# Patient Record
Sex: MALE | Race: BLACK OR AFRICAN AMERICAN | NOT HISPANIC OR LATINO
[De-identification: names, ages, dates, MRNs, and addresses within clinical notes are randomized per-mention and may not be internally consistent; named-entity substitution may affect disease eponyms.]

---

## 2017-12-19 ENCOUNTER — APPOINTMENT (OUTPATIENT)
Dept: INFECTIOUS DISEASE | Facility: CLINIC | Age: 27
End: 2017-12-19
Payer: MEDICAID

## 2017-12-19 ENCOUNTER — LABORATORY RESULT (OUTPATIENT)
Age: 27
End: 2017-12-19

## 2017-12-19 VITALS
OXYGEN SATURATION: 97 % | SYSTOLIC BLOOD PRESSURE: 126 MMHG | HEART RATE: 83 BPM | WEIGHT: 212 LBS | TEMPERATURE: 97.8 F | DIASTOLIC BLOOD PRESSURE: 80 MMHG | HEIGHT: 78 IN | BODY MASS INDEX: 24.53 KG/M2

## 2017-12-19 DIAGNOSIS — F17.200 NICOTINE DEPENDENCE, UNSPECIFIED, UNCOMPLICATED: ICD-10-CM

## 2017-12-19 DIAGNOSIS — H57.9 UNSPECIFIED DISORDER OF EYE AND ADNEXA: ICD-10-CM

## 2017-12-19 DIAGNOSIS — F32.9 MAJOR DEPRESSIVE DISORDER, SINGLE EPISODE, UNSPECIFIED: ICD-10-CM

## 2017-12-19 PROCEDURE — 99204 OFFICE O/P NEW MOD 45 MIN: CPT

## 2017-12-20 LAB
25(OH)D3 SERPL-MCNC: 10 NG/ML
ALBUMIN SERPL ELPH-MCNC: 3.8 G/DL
ALP BLD-CCNC: 107 U/L
ALT SERPL-CCNC: 18 U/L
ANION GAP SERPL CALC-SCNC: 15 MMOL/L
APPEARANCE: CLEAR
AST SERPL-CCNC: 29 U/L
BACTERIA: NEGATIVE
BASOPHILS # BLD AUTO: 0 K/UL
BASOPHILS NFR BLD AUTO: 0 %
BILIRUB SERPL-MCNC: 0.3 MG/DL
BILIRUBIN URINE: ABNORMAL
BLOOD URINE: NEGATIVE
BUN SERPL-MCNC: 6 MG/DL
C TRACH RRNA SPEC QL NAA+PROBE: NOT DETECTED
CALCIUM SERPL-MCNC: 8.7 MG/DL
CD3 CELLS # BLD: 792 /UL
CD3 CELLS NFR BLD: 77 %
CD3+CD4+ CELLS # BLD: 199 /UL
CD3+CD4+ CELLS NFR BLD: 19 %
CD3+CD4+ CELLS/CD3+CD8+ CLL SPEC: 0.36 RATIO
CD3+CD8+ CELLS # SPEC: 550 /UL
CD3+CD8+ CELLS NFR BLD: 53 %
CHLORIDE SERPL-SCNC: 104 MMOL/L
CO2 SERPL-SCNC: 24 MMOL/L
COLOR: ABNORMAL
CREAT SERPL-MCNC: 0.91 MG/DL
EOSINOPHIL # BLD AUTO: 0 K/UL
EOSINOPHIL NFR BLD AUTO: 0
GLUCOSE QUALITATIVE U: NEGATIVE MG/DL
GLUCOSE SERPL-MCNC: 80 MG/DL
HAV IGG+IGM SER QL: REACTIVE
HBV CORE IGG+IGM SER QL: NONREACTIVE
HBV SURFACE AB SER QL: NONREACTIVE
HBV SURFACE AG SER QL: NONREACTIVE
HCT VFR BLD CALC: 43 %
HGB BLD-MCNC: 13.2 G/DL
HIV1+2 AB SPEC QL IA.RAPID: REACTIVE
HYALINE CASTS: 0 /LPF
KETONES URINE: ABNORMAL
LEUKOCYTE ESTERASE URINE: NEGATIVE
LYMPHOCYTES # BLD AUTO: 0.64 K/UL
LYMPHOCYTES NFR BLD AUTO: 24.8 %
MAN DIFF?: NORMAL
MCHC RBC-ENTMCNC: 26.6 PG
MCHC RBC-ENTMCNC: 30.7 GM/DL
MCV RBC AUTO: 86.7 FL
MICROSCOPIC-UA: NORMAL
MONOCYTES # BLD AUTO: 0.27 K/UL
MONOCYTES NFR BLD AUTO: 10.6 %
N GONORRHOEA RRNA SPEC QL NAA+PROBE: NOT DETECTED
NEUTROPHILS # BLD AUTO: 1.55 K/UL
NEUTROPHILS NFR BLD AUTO: 60.2 %
NITRITE URINE: NEGATIVE
PH URINE: 6.5
PLATELET # BLD AUTO: 148 K/UL
POTASSIUM SERPL-SCNC: 3.8 MMOL/L
PROT SERPL-MCNC: 8.2 G/DL
PROTEIN URINE: ABNORMAL MG/DL
RBC # BLD: 4.96 M/UL
RBC # FLD: 14.9 %
RED BLOOD CELLS URINE: 2 /HPF
SODIUM SERPL-SCNC: 143 MMOL/L
SOURCE AMPLIFICATION: NORMAL
SPECIFIC GRAVITY URINE: 1.03
SQUAMOUS EPITHELIAL CELLS: 1 /HPF
T PALLIDUM AB SER QL IA: NEGATIVE
UROBILINOGEN URINE: 1 MG/DL
WBC # FLD AUTO: 2.57 K/UL
WHITE BLOOD CELLS URINE: 4 /HPF

## 2017-12-21 LAB
ADJUSTED MITOGEN: 1.56 IU/ML
ADJUSTED TB AG: 0 IU/ML
HIV1 RNA # SERPL NAA+PROBE: ABNORMAL
HIV1 RNA # SERPL NAA+PROBE: NORMAL
M TB IFN-G BLD-IMP: NEGATIVE
QUANTIFERON GOLD NIL: 0.02 IU/ML
VIRAL LOAD INTERP: NORMAL
VIRAL LOAD LOG: 5.01

## 2017-12-22 ENCOUNTER — APPOINTMENT (OUTPATIENT)
Dept: INFECTIOUS DISEASE | Facility: CLINIC | Age: 27
End: 2017-12-22

## 2018-01-10 ENCOUNTER — APPOINTMENT (OUTPATIENT)
Dept: INFECTIOUS DISEASE | Facility: CLINIC | Age: 28
End: 2018-01-10
Payer: MEDICAID

## 2018-01-10 VITALS
HEIGHT: 78 IN | DIASTOLIC BLOOD PRESSURE: 79 MMHG | OXYGEN SATURATION: 97 % | HEART RATE: 77 BPM | BODY MASS INDEX: 24.3 KG/M2 | WEIGHT: 210 LBS | TEMPERATURE: 97.2 F | SYSTOLIC BLOOD PRESSURE: 128 MMHG

## 2018-01-10 PROCEDURE — 99214 OFFICE O/P EST MOD 30 MIN: CPT | Mod: 25

## 2018-01-10 PROCEDURE — 90740 HEPB VACC 3 DOSE IMMUNSUP IM: CPT

## 2018-01-10 PROCEDURE — G0010: CPT

## 2018-01-11 LAB
ALBUMIN SERPL ELPH-MCNC: 4 G/DL
ALP BLD-CCNC: 110 U/L
ALT SERPL-CCNC: 10 U/L
ANION GAP SERPL CALC-SCNC: 13 MMOL/L
AST SERPL-CCNC: 19 U/L
BASOPHILS # BLD AUTO: 0.01 K/UL
BASOPHILS NFR BLD AUTO: 0.3 %
BILIRUB SERPL-MCNC: 0.3 MG/DL
BUN SERPL-MCNC: 12 MG/DL
CALCIUM SERPL-MCNC: 9.3 MG/DL
CHLORIDE SERPL-SCNC: 104 MMOL/L
CO2 SERPL-SCNC: 26 MMOL/L
CREAT SERPL-MCNC: 1.19 MG/DL
EOSINOPHIL # BLD AUTO: 0.04 K/UL
EOSINOPHIL NFR BLD AUTO: 1.2 %
GLUCOSE SERPL-MCNC: 98 MG/DL
HCT VFR BLD CALC: 45.1 %
HGB BLD-MCNC: 13.8 G/DL
IMM GRANULOCYTES NFR BLD AUTO: 0.6 %
LYMPHOCYTES # BLD AUTO: 1.3 K/UL
LYMPHOCYTES NFR BLD AUTO: 39 %
MAN DIFF?: NORMAL
MCHC RBC-ENTMCNC: 27.2 PG
MCHC RBC-ENTMCNC: 30.6 GM/DL
MCV RBC AUTO: 89 FL
MONOCYTES # BLD AUTO: 0.48 K/UL
MONOCYTES NFR BLD AUTO: 14.4 %
NEUTROPHILS # BLD AUTO: 1.48 K/UL
NEUTROPHILS NFR BLD AUTO: 44.5 %
PLATELET # BLD AUTO: 172 K/UL
POTASSIUM SERPL-SCNC: 4.1 MMOL/L
PROT SERPL-MCNC: 7.7 G/DL
RBC # BLD: 5.07 M/UL
RBC # FLD: 15.5 %
SODIUM SERPL-SCNC: 143 MMOL/L
WBC # FLD AUTO: 3.33 K/UL

## 2018-01-12 LAB
CD3 CELLS # BLD: 1093 /UL
CD3 CELLS NFR BLD: 74 %
CD3+CD4+ CELLS # BLD: 342 /UL
CD3+CD4+ CELLS NFR BLD: 23 %
CD3+CD4+ CELLS/CD3+CD8+ CLL SPEC: 0.5 RATIO
CD3+CD8+ CELLS # SPEC: 682 /UL
CD3+CD8+ CELLS NFR BLD: 46 %
HIV1 RNA # SERPL NAA+PROBE: 401
HIV1 RNA # SERPL NAA+PROBE: ABNORMAL
VIRAL LOAD INTERP: NORMAL
VIRAL LOAD LOG: 2.6

## 2018-01-16 ENCOUNTER — APPOINTMENT (OUTPATIENT)
Dept: INFECTIOUS DISEASE | Facility: CLINIC | Age: 28
End: 2018-01-16

## 2018-01-17 LAB
HIV 1 PROVIRAL DNA GENTYP BLD MC DOC: NORMAL
HIV GENOSURE ARCHIVE 1: NORMAL
HIV1 PROVIR DNA RT + PR + IN MUT DET SEQ: NORMAL
HIV1 PROVIRAL DNA GENTYP BLD MC NAR: NORMAL

## 2018-02-12 ENCOUNTER — APPOINTMENT (OUTPATIENT)
Dept: INFECTIOUS DISEASE | Facility: CLINIC | Age: 28
End: 2018-02-12

## 2018-02-14 ENCOUNTER — APPOINTMENT (OUTPATIENT)
Dept: INFECTIOUS DISEASE | Facility: CLINIC | Age: 28
End: 2018-02-14
Payer: MEDICAID

## 2018-02-14 VITALS
OXYGEN SATURATION: 98 % | WEIGHT: 210 LBS | HEART RATE: 82 BPM | BODY MASS INDEX: 24.3 KG/M2 | HEIGHT: 78 IN | TEMPERATURE: 97.6 F | SYSTOLIC BLOOD PRESSURE: 121 MMHG | DIASTOLIC BLOOD PRESSURE: 78 MMHG

## 2018-02-14 PROCEDURE — 99214 OFFICE O/P EST MOD 30 MIN: CPT

## 2018-02-15 ENCOUNTER — MED ADMIN CHARGE (OUTPATIENT)
Age: 28
End: 2018-02-15

## 2018-02-15 LAB
ALBUMIN SERPL ELPH-MCNC: 3.9 G/DL
ALP BLD-CCNC: 106 U/L
ALT SERPL-CCNC: 16 U/L
ANION GAP SERPL CALC-SCNC: 14 MMOL/L
AST SERPL-CCNC: 30 U/L
BASOPHILS # BLD AUTO: 0.01 K/UL
BASOPHILS NFR BLD AUTO: 0.2 %
BILIRUB SERPL-MCNC: 0.4 MG/DL
BUN SERPL-MCNC: 14 MG/DL
CALCIUM SERPL-MCNC: 9.5 MG/DL
CHLORIDE SERPL-SCNC: 102 MMOL/L
CO2 SERPL-SCNC: 25 MMOL/L
CREAT SERPL-MCNC: 1.06 MG/DL
EOSINOPHIL # BLD AUTO: 0.02 K/UL
EOSINOPHIL NFR BLD AUTO: 0.5 %
GLUCOSE SERPL-MCNC: 82 MG/DL
HCT VFR BLD CALC: 44.8 %
HGB BLD-MCNC: 13.7 G/DL
HIV1 RNA # SERPL NAA+PROBE: ABNORMAL
HIV1 RNA # SERPL NAA+PROBE: ABNORMAL COPIES/ML
IMM GRANULOCYTES NFR BLD AUTO: 0 %
LYMPHOCYTES # BLD AUTO: 1.59 K/UL
LYMPHOCYTES NFR BLD AUTO: 36.6 %
MAN DIFF?: NORMAL
MCHC RBC-ENTMCNC: 27.1 PG
MCHC RBC-ENTMCNC: 30.6 GM/DL
MCV RBC AUTO: 88.7 FL
MONOCYTES # BLD AUTO: 0.55 K/UL
MONOCYTES NFR BLD AUTO: 12.6 %
NEUTROPHILS # BLD AUTO: 2.18 K/UL
NEUTROPHILS NFR BLD AUTO: 50.1 %
PLATELET # BLD AUTO: 217 K/UL
POTASSIUM SERPL-SCNC: 4.2 MMOL/L
PROT SERPL-MCNC: 8 G/DL
RBC # BLD: 5.05 M/UL
RBC # FLD: 14.9 %
SODIUM SERPL-SCNC: 141 MMOL/L
VIRAL LOAD INTERP: NORMAL
VIRAL LOAD LOG: ABNORMAL LG COP/ML
WBC # FLD AUTO: 4.35 K/UL

## 2018-04-16 ENCOUNTER — APPOINTMENT (OUTPATIENT)
Dept: INFECTIOUS DISEASE | Facility: CLINIC | Age: 28
End: 2018-04-16

## 2018-04-17 ENCOUNTER — APPOINTMENT (OUTPATIENT)
Dept: INFECTIOUS DISEASE | Facility: CLINIC | Age: 28
End: 2018-04-17
Payer: MEDICAID

## 2018-04-17 VITALS
DIASTOLIC BLOOD PRESSURE: 75 MMHG | HEIGHT: 78 IN | WEIGHT: 217 LBS | BODY MASS INDEX: 25.11 KG/M2 | OXYGEN SATURATION: 98 % | SYSTOLIC BLOOD PRESSURE: 116 MMHG | HEART RATE: 72 BPM | TEMPERATURE: 97.7 F

## 2018-04-17 DIAGNOSIS — H61.23 IMPACTED CERUMEN, BILATERAL: ICD-10-CM

## 2018-04-17 PROCEDURE — 99214 OFFICE O/P EST MOD 30 MIN: CPT

## 2018-04-18 LAB
ALBUMIN SERPL ELPH-MCNC: 3.8 G/DL
ALP BLD-CCNC: 110 U/L
ALT SERPL-CCNC: 23 U/L
ANION GAP SERPL CALC-SCNC: 11 MMOL/L
APPEARANCE: CLEAR
AST SERPL-CCNC: 27 U/L
BACTERIA: NEGATIVE
BASOPHILS # BLD AUTO: 0.03 K/UL
BASOPHILS NFR BLD AUTO: 0.8 %
BILIRUB SERPL-MCNC: 0.2 MG/DL
BILIRUBIN URINE: NEGATIVE
BLOOD URINE: NEGATIVE
BUN SERPL-MCNC: 13 MG/DL
C TRACH RRNA SPEC QL NAA+PROBE: NOT DETECTED
CALCIUM SERPL-MCNC: 9.5 MG/DL
CD3 CELLS # BLD: 1231 /UL
CD3 CELLS NFR BLD: 74 %
CD3+CD4+ CELLS # BLD: 444 /UL
CD3+CD4+ CELLS NFR BLD: 27 %
CD3+CD4+ CELLS/CD3+CD8+ CLL SPEC: 0.6 RATIO
CD3+CD8+ CELLS # SPEC: 734 /UL
CD3+CD8+ CELLS NFR BLD: 44 %
CHLORIDE SERPL-SCNC: 106 MMOL/L
CO2 SERPL-SCNC: 25 MMOL/L
COLOR: YELLOW
CREAT SERPL-MCNC: 1.13 MG/DL
EOSINOPHIL # BLD AUTO: 0.04 K/UL
EOSINOPHIL NFR BLD AUTO: 1 %
GLUCOSE QUALITATIVE U: NEGATIVE MG/DL
GLUCOSE SERPL-MCNC: 91 MG/DL
HBA1C MFR BLD HPLC: 5.2 %
HBV SURFACE AG SER QL: NONREACTIVE
HCT VFR BLD CALC: 45.8 %
HCV AB SER QL: NONREACTIVE
HCV S/CO RATIO: 0.14 S/CO
HGB BLD-MCNC: 14.2 G/DL
HIV1 RNA # SERPL NAA+PROBE: ABNORMAL
HIV1 RNA # SERPL NAA+PROBE: ABNORMAL COPIES/ML
HYALINE CASTS: 2 /LPF
IMM GRANULOCYTES NFR BLD AUTO: 0.5 %
KETONES URINE: NEGATIVE
LEUKOCYTE ESTERASE URINE: NEGATIVE
LYMPHOCYTES # BLD AUTO: 1.62 K/UL
LYMPHOCYTES NFR BLD AUTO: 41.9 %
MAN DIFF?: NORMAL
MCHC RBC-ENTMCNC: 27.2 PG
MCHC RBC-ENTMCNC: 31 GM/DL
MCV RBC AUTO: 87.6 FL
MICROSCOPIC-UA: NORMAL
MONOCYTES # BLD AUTO: 0.42 K/UL
MONOCYTES NFR BLD AUTO: 10.9 %
N GONORRHOEA RRNA SPEC QL NAA+PROBE: NOT DETECTED
NEUTROPHILS # BLD AUTO: 1.74 K/UL
NEUTROPHILS NFR BLD AUTO: 44.9 %
NITRITE URINE: NEGATIVE
PH URINE: 5.5
PLATELET # BLD AUTO: 200 K/UL
POTASSIUM SERPL-SCNC: 4.4 MMOL/L
PROT SERPL-MCNC: 7.8 G/DL
PROTEIN URINE: NEGATIVE MG/DL
RBC # BLD: 5.23 M/UL
RBC # FLD: 14.7 %
RED BLOOD CELLS URINE: 2 /HPF
SODIUM SERPL-SCNC: 142 MMOL/L
SOURCE AMPLIFICATION: NORMAL
SPECIFIC GRAVITY URINE: 1.03
SQUAMOUS EPITHELIAL CELLS: 1 /HPF
T PALLIDUM AB SER QL IA: NEGATIVE
UROBILINOGEN URINE: NEGATIVE MG/DL
VIRAL LOAD INTERP: NORMAL
VIRAL LOAD LOG: ABNORMAL LG COP/ML
WBC # FLD AUTO: 3.87 K/UL
WHITE BLOOD CELLS URINE: 1 /HPF

## 2018-04-18 RX ORDER — AMOXICILLIN AND CLAVULANATE POTASSIUM 875; 125 MG/1; MG/1
875-125 TABLET, COATED ORAL
Qty: 20 | Refills: 0 | Status: COMPLETED | COMMUNITY
Start: 2017-12-19 | End: 2018-04-18

## 2018-06-13 ENCOUNTER — RX RENEWAL (OUTPATIENT)
Age: 28
End: 2018-06-13

## 2018-07-11 ENCOUNTER — APPOINTMENT (OUTPATIENT)
Dept: INFECTIOUS DISEASE | Facility: CLINIC | Age: 28
End: 2018-07-11

## 2018-09-17 ENCOUNTER — APPOINTMENT (OUTPATIENT)
Dept: INFECTIOUS DISEASE | Facility: CLINIC | Age: 28
End: 2018-09-17

## 2018-09-17 ENCOUNTER — APPOINTMENT (OUTPATIENT)
Dept: INFECTIOUS DISEASE | Facility: CLINIC | Age: 28
End: 2018-09-17
Payer: MEDICAID

## 2018-09-17 VITALS
SYSTOLIC BLOOD PRESSURE: 121 MMHG | WEIGHT: 226 LBS | DIASTOLIC BLOOD PRESSURE: 77 MMHG | HEART RATE: 60 BPM | TEMPERATURE: 97.3 F | OXYGEN SATURATION: 100 % | RESPIRATION RATE: 16 BRPM | HEIGHT: 78 IN | BODY MASS INDEX: 26.15 KG/M2

## 2018-09-17 PROCEDURE — 99214 OFFICE O/P EST MOD 30 MIN: CPT

## 2018-09-19 LAB
25(OH)D3 SERPL-MCNC: 13 NG/ML
ALBUMIN SERPL ELPH-MCNC: 4.2 G/DL
ALP BLD-CCNC: 97 U/L
ALT SERPL-CCNC: 25 U/L
ANION GAP SERPL CALC-SCNC: 10 MMOL/L
APPEARANCE: CLEAR
AST SERPL-CCNC: 27 U/L
BACTERIA: NEGATIVE
BASOPHILS # BLD AUTO: 0.01 K/UL
BASOPHILS NFR BLD AUTO: 0.3 %
BILIRUB SERPL-MCNC: 0.2 MG/DL
BILIRUBIN URINE: NEGATIVE
BLOOD URINE: NEGATIVE
BUN SERPL-MCNC: 15 MG/DL
C TRACH RRNA SPEC QL NAA+PROBE: NOT DETECTED
CALCIUM SERPL-MCNC: 9.4 MG/DL
CD3 CELLS # BLD: 844 /UL
CD3 CELLS NFR BLD: 74 %
CD3+CD4+ CELLS # BLD: 348 /UL
CD3+CD4+ CELLS NFR BLD: 30 %
CD3+CD4+ CELLS/CD3+CD8+ CLL SPEC: 0.77 RATIO
CD3+CD8+ CELLS # SPEC: 450 /UL
CD3+CD8+ CELLS NFR BLD: 39 %
CHLORIDE SERPL-SCNC: 107 MMOL/L
CHOLEST SERPL-MCNC: 134 MG/DL
CHOLEST/HDLC SERPL: 2.7 RATIO
CO2 SERPL-SCNC: 26 MMOL/L
COLOR: YELLOW
CREAT SERPL-MCNC: 1.14 MG/DL
EOSINOPHIL # BLD AUTO: 0.04 K/UL
EOSINOPHIL NFR BLD AUTO: 1.2 %
GLUCOSE QUALITATIVE U: NEGATIVE MG/DL
GLUCOSE SERPL-MCNC: 99 MG/DL
HBA1C MFR BLD HPLC: 5.2 %
HCT VFR BLD CALC: 45.8 %
HDLC SERPL-MCNC: 50 MG/DL
HGB BLD-MCNC: 14.2 G/DL
HIV1 RNA # SERPL NAA+PROBE: 72
HIV1 RNA # SERPL NAA+PROBE: ABNORMAL
IMM GRANULOCYTES NFR BLD AUTO: 0.3 %
KETONES URINE: NEGATIVE
LDLC SERPL CALC-MCNC: 65 MG/DL
LEUKOCYTE ESTERASE URINE: NEGATIVE
LYMPHOCYTES # BLD AUTO: 1.2 K/UL
LYMPHOCYTES NFR BLD AUTO: 36.1 %
M TB IFN-G BLD-IMP: NEGATIVE
MAN DIFF?: NORMAL
MCHC RBC-ENTMCNC: 27.7 PG
MCHC RBC-ENTMCNC: 31 GM/DL
MCV RBC AUTO: 89.3 FL
MICROSCOPIC-UA: NORMAL
MONOCYTES # BLD AUTO: 0.38 K/UL
MONOCYTES NFR BLD AUTO: 11.4 %
N GONORRHOEA RRNA SPEC QL NAA+PROBE: NOT DETECTED
NEUTROPHILS # BLD AUTO: 1.68 K/UL
NEUTROPHILS NFR BLD AUTO: 50.7 %
NITRITE URINE: NEGATIVE
PH URINE: 6
PLATELET # BLD AUTO: 208 K/UL
POTASSIUM SERPL-SCNC: 3.9 MMOL/L
PROT SERPL-MCNC: 7.7 G/DL
PROTEIN URINE: NEGATIVE MG/DL
QUANTIFERON TB PLUS MITOGEN MINUS NIL: 7.1 IU/ML
QUANTIFERON TB PLUS NIL: 0.01 IU/ML
QUANTIFERON TB PLUS TB1 MINUS NIL: 0 IU/ML
QUANTIFERON TB PLUS TB2 MINUS NIL: 0.01 IU/ML
RBC # BLD: 5.13 M/UL
RBC # FLD: 14.3 %
RED BLOOD CELLS URINE: 3 /HPF
SODIUM SERPL-SCNC: 143 MMOL/L
SOURCE AMPLIFICATION: NORMAL
SPECIFIC GRAVITY URINE: 1.02
SQUAMOUS EPITHELIAL CELLS: 0 /HPF
T PALLIDUM AB SER QL IA: NEGATIVE
TRIGL SERPL-MCNC: 93 MG/DL
UROBILINOGEN URINE: NEGATIVE MG/DL
VIRAL LOAD INTERP: NORMAL
VIRAL LOAD LOG: 1.86
WBC # FLD AUTO: 3.32 K/UL
WHITE BLOOD CELLS URINE: 0 /HPF

## 2018-11-19 ENCOUNTER — APPOINTMENT (OUTPATIENT)
Dept: INFECTIOUS DISEASE | Facility: CLINIC | Age: 28
End: 2018-11-19
Payer: MEDICAID

## 2018-11-19 ENCOUNTER — TRANSCRIPTION ENCOUNTER (OUTPATIENT)
Age: 28
End: 2018-11-19

## 2018-11-19 VITALS
OXYGEN SATURATION: 98 % | DIASTOLIC BLOOD PRESSURE: 71 MMHG | HEART RATE: 78 BPM | WEIGHT: 227 LBS | BODY MASS INDEX: 26.27 KG/M2 | TEMPERATURE: 97 F | HEIGHT: 78 IN | SYSTOLIC BLOOD PRESSURE: 113 MMHG

## 2018-11-19 DIAGNOSIS — Z00.00 ENCOUNTER FOR GENERAL ADULT MEDICAL EXAMINATION W/OUT ABNORMAL FINDINGS: ICD-10-CM

## 2018-11-19 DIAGNOSIS — K04.7 PERIAPICAL ABSCESS W/OUT SINUS: ICD-10-CM

## 2018-11-19 PROCEDURE — 99214 OFFICE O/P EST MOD 30 MIN: CPT

## 2018-11-20 LAB
25(OH)D3 SERPL-MCNC: 20.4 NG/ML
ALBUMIN SERPL ELPH-MCNC: 4.3 G/DL
ALP BLD-CCNC: 86 U/L
ALT SERPL-CCNC: 18 U/L
ANION GAP SERPL CALC-SCNC: 12 MMOL/L
APPEARANCE: CLEAR
AST SERPL-CCNC: 23 U/L
BACTERIA: NEGATIVE
BASOPHILS # BLD AUTO: 0.01 K/UL
BASOPHILS NFR BLD AUTO: 0.3 %
BILIRUB SERPL-MCNC: 0.3 MG/DL
BILIRUBIN URINE: NEGATIVE
BLOOD URINE: NEGATIVE
BUN SERPL-MCNC: 12 MG/DL
C TRACH RRNA SPEC QL NAA+PROBE: NOT DETECTED
C TRACH RRNA SPEC QL NAA+PROBE: NOT DETECTED
CALCIUM SERPL-MCNC: 9.6 MG/DL
CD3 CELLS # BLD: 947 /UL
CD3 CELLS NFR BLD: 73 %
CD3+CD4+ CELLS # BLD: 408 /UL
CD3+CD4+ CELLS NFR BLD: 32 %
CD3+CD4+ CELLS/CD3+CD8+ CLL SPEC: 0.87 RATIO
CD3+CD8+ CELLS # SPEC: 472 /UL
CD3+CD8+ CELLS NFR BLD: 36 %
CHLORIDE SERPL-SCNC: 103 MMOL/L
CHOLEST SERPL-MCNC: 154 MG/DL
CHOLEST/HDLC SERPL: 3.1 RATIO
CO2 SERPL-SCNC: 27 MMOL/L
COLOR: YELLOW
CREAT SERPL-MCNC: 1 MG/DL
EOSINOPHIL # BLD AUTO: 0.02 K/UL
EOSINOPHIL NFR BLD AUTO: 0.5 %
GLUCOSE QUALITATIVE U: NEGATIVE MG/DL
GLUCOSE SERPL-MCNC: 77 MG/DL
HBA1C MFR BLD HPLC: 5.1 %
HBV SURFACE AB SER QL: REACTIVE
HCT VFR BLD CALC: 47.1 %
HCV AB SER QL: NONREACTIVE
HCV S/CO RATIO: 0.15 S/CO
HDLC SERPL-MCNC: 49 MG/DL
HGB BLD-MCNC: 14.5 G/DL
HIV1 RNA # SERPL NAA+PROBE: ABNORMAL
HIV1 RNA # SERPL NAA+PROBE: ABNORMAL COPIES/ML
HYALINE CASTS: 1 /LPF
IMM GRANULOCYTES NFR BLD AUTO: 0.5 %
KETONES URINE: ABNORMAL
LDLC SERPL CALC-MCNC: 76 MG/DL
LEUKOCYTE ESTERASE URINE: NEGATIVE
LYMPHOCYTES # BLD AUTO: 1.39 K/UL
LYMPHOCYTES NFR BLD AUTO: 38.1 %
MAN DIFF?: NORMAL
MCHC RBC-ENTMCNC: 27.1 PG
MCHC RBC-ENTMCNC: 30.8 GM/DL
MCV RBC AUTO: 88 FL
MICROSCOPIC-UA: NORMAL
MONOCYTES # BLD AUTO: 0.28 K/UL
MONOCYTES NFR BLD AUTO: 7.7 %
N GONORRHOEA RRNA SPEC QL NAA+PROBE: NOT DETECTED
N GONORRHOEA RRNA SPEC QL NAA+PROBE: NOT DETECTED
NEUTROPHILS # BLD AUTO: 1.93 K/UL
NEUTROPHILS NFR BLD AUTO: 52.9 %
NITRITE URINE: NEGATIVE
PH URINE: 6
PLATELET # BLD AUTO: 185 K/UL
POTASSIUM SERPL-SCNC: 4.1 MMOL/L
PROT SERPL-MCNC: 7.8 G/DL
PROTEIN URINE: NEGATIVE MG/DL
RBC # BLD: 5.35 M/UL
RBC # FLD: 14.2 %
RED BLOOD CELLS URINE: 6 /HPF
SODIUM SERPL-SCNC: 142 MMOL/L
SOURCE AMPLIFICATION: NORMAL
SOURCE ORAL: NORMAL
SPECIFIC GRAVITY URINE: 1.03
SQUAMOUS EPITHELIAL CELLS: 0 /HPF
T PALLIDUM AB SER QL IA: NEGATIVE
TRIGL SERPL-MCNC: 144 MG/DL
UROBILINOGEN URINE: 1 MG/DL
VIRAL LOAD INTERP: NORMAL
VIRAL LOAD LOG: ABNORMAL LG COP/ML
WBC # FLD AUTO: 3.65 K/UL
WHITE BLOOD CELLS URINE: 1 /HPF

## 2018-11-21 LAB
M TB IFN-G BLD-IMP: NEGATIVE
QUANTIFERON TB PLUS MITOGEN MINUS NIL: >10 IU/ML
QUANTIFERON TB PLUS NIL: 0.06 IU/ML
QUANTIFERON TB PLUS TB1 MINUS NIL: 0 IU/ML
QUANTIFERON TB PLUS TB2 MINUS NIL: -0.01 IU/ML

## 2018-11-28 ENCOUNTER — APPOINTMENT (OUTPATIENT)
Dept: OPHTHALMOLOGY | Facility: CLINIC | Age: 28
End: 2018-11-28

## 2018-11-30 ENCOUNTER — APPOINTMENT (OUTPATIENT)
Dept: INFECTIOUS DISEASE | Facility: CLINIC | Age: 28
End: 2018-11-30
Payer: MEDICAID

## 2018-11-30 VITALS
BODY MASS INDEX: 26.15 KG/M2 | DIASTOLIC BLOOD PRESSURE: 72 MMHG | SYSTOLIC BLOOD PRESSURE: 128 MMHG | TEMPERATURE: 99.3 F | WEIGHT: 226 LBS | OXYGEN SATURATION: 98 % | HEIGHT: 78 IN | HEART RATE: 78 BPM

## 2018-11-30 DIAGNOSIS — Z87.09 PERSONAL HISTORY OF OTHER DISEASES OF THE RESPIRATORY SYSTEM: ICD-10-CM

## 2018-11-30 PROCEDURE — 99214 OFFICE O/P EST MOD 30 MIN: CPT

## 2018-12-03 LAB
CORONAVIRUS (229E,HKU1,NL63,OC43): DETECTED
MEV IGG FLD QL IA: <5 AU/ML
MEV IGG+IGM SER-IMP: NEGATIVE
MUV AB SER-ACNC: NEGATIVE
MUV IGG SER QL IA: <5 AU/ML
RAPID RVP RESULT: DETECTED
RUBV IGG FLD-ACNC: 0.3 INDEX
RUBV IGG SER-IMP: NEGATIVE
RUBV IGM FLD-ACNC: <20 AU/ML

## 2018-12-06 LAB
MEV IGM SER QL: NEGATIVE
MUV IGM SER QL IA: <0.8 AU

## 2019-01-16 ENCOUNTER — APPOINTMENT (OUTPATIENT)
Dept: INFECTIOUS DISEASE | Facility: CLINIC | Age: 29
End: 2019-01-16

## 2019-01-30 ENCOUNTER — APPOINTMENT (OUTPATIENT)
Dept: INFECTIOUS DISEASE | Facility: CLINIC | Age: 29
End: 2019-01-30
Payer: MEDICAID

## 2019-01-30 ENCOUNTER — MED ADMIN CHARGE (OUTPATIENT)
Age: 29
End: 2019-01-30

## 2019-01-30 VITALS
TEMPERATURE: 97.3 F | HEIGHT: 78 IN | DIASTOLIC BLOOD PRESSURE: 71 MMHG | SYSTOLIC BLOOD PRESSURE: 122 MMHG | BODY MASS INDEX: 26.38 KG/M2 | WEIGHT: 228 LBS | HEART RATE: 83 BPM | OXYGEN SATURATION: 98 %

## 2019-01-30 PROCEDURE — 90707 MMR VACCINE SC: CPT

## 2019-01-30 PROCEDURE — 90715 TDAP VACCINE 7 YRS/> IM: CPT

## 2019-01-30 PROCEDURE — 99214 OFFICE O/P EST MOD 30 MIN: CPT | Mod: 25

## 2019-01-30 PROCEDURE — 90471 IMMUNIZATION ADMIN: CPT

## 2019-01-30 PROCEDURE — 90472 IMMUNIZATION ADMIN EACH ADD: CPT

## 2019-01-30 NOTE — ASSESSMENT
[Treatment Education] : treatment education [Treatment Adherence] : treatment adherence [Drug Interactions / Side Effects] : drug interactions/side effects [Anticipatory Guidance] : anticipatory guidance [FreeTextEntry1] : YONI GAITAN is a 28 year old male being seen for a follow-up visit.  please check on 3rd hep B today. doing well on Genvoya. see in 6 months. . \par Uses  [facility] pharmacy.

## 2019-01-30 NOTE — HISTORY OF PRESENT ILLNESS
[FreeTextEntry1] : History of Present Illness\par Reason For Visit\par YONI GAITAN is a 28 year old male being seen for a follow-up visit.  please check on 3rd hep B today. doing well on Genvoya. see in 6 months. Needs MMR booster for school...CD4 over 200...400 present. Needs TD booster. . \par Uses  [place] [facility]. \par  \par  History of Present Illness\par 27 year old male, diagnosed in 2003 took a test to check his status . Was originally taking stribild and stated undetectable ( I switched to Genvoya) . Pt was at the [facility]. .Pt states didn’t feel right and came here . Pt lives in the [place]. Medical Hx: HIV, depression. Surgeries: none. \par pt drinks socially. Smokes cigaretes 4 a day, and pot three times a day. occasional; cocaine. \par MSM only male partners. \par family Hx: both alive: dad-unknown, mom , HTN, glasses. \par medications: only genvoya and a green over the counter pill clonazepam. ( from family member) \par Allergies: NKA \par \par  \par Active Problems\par Anxiety (300.00) (F41.9)\par Dental decay (521.00) (K02.9)\par Dental infection (522.4) (K04.7)\par Depression (311) (F32.9)\par HIV infection (V08) (B20)\par Right eye symptoms (379.90) (H57.9)\par \par Allergies\par No Known Drug Allergies\par \par  \par Active Problems\par Anxiety (300.00) (F41.9)\par Dental decay (521.00) (K02.9)\par Dental infection (522.4) (K04.7)\par Depression (311) (F32.9)\par Excessive cerumen in both ear canals (380.4) (H61.23)\par HIV infection (V08) (B20)\par Right eye symptoms (379.90) (H57.9)\par Visit for eye and vision exam (V72.0) (Z01.00)\par \par Past Medical History\par History of dental examination (V15.89) (Z92.89)\par \par Current Meds\par Amoxicillin-Pot Clavulanate 875-125 MG Oral Tablet; TAKE 1 TABLET EVERY 12 HOURS\par DAILY\par Daily Multi Oral Tablet; TAKE 1 TABLET DAILY\par Genvoya [id number] MG Oral Tablet; TAKE 1 TABLET BY MOUTH DAILY\par Vitamin D3 2000 UNIT Oral Tablet; Take 1 tablet daily\par \par Allergies\par No Known Drug Allergies\par

## 2019-02-01 LAB
25(OH)D3 SERPL-MCNC: 35.4 NG/ML
ALBUMIN SERPL ELPH-MCNC: 3.9 G/DL
ALP BLD-CCNC: 96 U/L
ALT SERPL-CCNC: 14 U/L
ANION GAP SERPL CALC-SCNC: 12 MMOL/L
APPEARANCE: CLEAR
AST SERPL-CCNC: 23 U/L
BACTERIA: NEGATIVE
BASOPHILS # BLD AUTO: 0.01 K/UL
BASOPHILS NFR BLD AUTO: 0.2 %
BILIRUB SERPL-MCNC: 0.2 MG/DL
BILIRUBIN URINE: NEGATIVE
BLOOD URINE: NEGATIVE
BUN SERPL-MCNC: 12 MG/DL
C TRACH RRNA SPEC QL NAA+PROBE: NOT DETECTED
CALCIUM SERPL-MCNC: 9 MG/DL
CD3 CELLS # BLD: 1149 /UL
CD3 CELLS NFR BLD: 71 %
CD3+CD4+ CELLS # BLD: 499 /UL
CD3+CD4+ CELLS NFR BLD: 31 %
CD3+CD4+ CELLS/CD3+CD8+ CLL SPEC: 0.82 RATIO
CD3+CD8+ CELLS # SPEC: 610 /UL
CD3+CD8+ CELLS NFR BLD: 38 %
CHLORIDE SERPL-SCNC: 105 MMOL/L
CHOLEST SERPL-MCNC: 118 MG/DL
CHOLEST/HDLC SERPL: 2.7 RATIO
CO2 SERPL-SCNC: 26 MMOL/L
COLOR: ABNORMAL
CREAT SERPL-MCNC: 0.99 MG/DL
EOSINOPHIL # BLD AUTO: 0.07 K/UL
EOSINOPHIL NFR BLD AUTO: 1.5 %
GLUCOSE QUALITATIVE U: NEGATIVE MG/DL
GLUCOSE SERPL-MCNC: 77 MG/DL
HBA1C MFR BLD HPLC: 5 %
HBV SURFACE AG SER QL: NONREACTIVE
HCT VFR BLD CALC: 43.4 %
HDLC SERPL-MCNC: 43 MG/DL
HGB BLD-MCNC: 13 G/DL
HIV1 RNA # SERPL NAA+PROBE: ABNORMAL
HIV1 RNA # SERPL NAA+PROBE: ABNORMAL COPIES/ML
IMM GRANULOCYTES NFR BLD AUTO: 0 %
KETONES URINE: ABNORMAL
LDLC SERPL CALC-MCNC: 50 MG/DL
LEUKOCYTE ESTERASE URINE: NEGATIVE
LYMPHOCYTES # BLD AUTO: 1.48 K/UL
LYMPHOCYTES NFR BLD AUTO: 31 %
MAN DIFF?: NORMAL
MCHC RBC-ENTMCNC: 26.8 PG
MCHC RBC-ENTMCNC: 30 GM/DL
MCV RBC AUTO: 89.5 FL
MICROSCOPIC-UA: NORMAL
MONOCYTES # BLD AUTO: 0.55 K/UL
MONOCYTES NFR BLD AUTO: 11.5 %
N GONORRHOEA RRNA SPEC QL NAA+PROBE: NOT DETECTED
NEUTROPHILS # BLD AUTO: 2.67 K/UL
NEUTROPHILS NFR BLD AUTO: 55.8 %
NITRITE URINE: NEGATIVE
PH URINE: 6
PLATELET # BLD AUTO: 217 K/UL
POTASSIUM SERPL-SCNC: 4 MMOL/L
PROT SERPL-MCNC: 7 G/DL
PROTEIN URINE: ABNORMAL MG/DL
RBC # BLD: 4.85 M/UL
RBC # FLD: 14.6 %
RED BLOOD CELLS URINE: 3 /HPF
SODIUM SERPL-SCNC: 143 MMOL/L
SOURCE AMPLIFICATION: NORMAL
SPECIFIC GRAVITY URINE: 1.04
SQUAMOUS EPITHELIAL CELLS: 1 /HPF
T PALLIDUM AB SER QL IA: NEGATIVE
TRIGL SERPL-MCNC: 127 MG/DL
UROBILINOGEN URINE: 1 MG/DL
VIRAL LOAD INTERP: NORMAL
VIRAL LOAD LOG: ABNORMAL LG COP/ML
WBC # FLD AUTO: 4.78 K/UL
WHITE BLOOD CELLS URINE: 1 /HPF

## 2019-02-07 LAB
M TB IFN-G BLD-IMP: NEGATIVE
QUANTIFERON TB PLUS MITOGEN MINUS NIL: 3.14 IU/ML
QUANTIFERON TB PLUS NIL: 0.01 IU/ML
QUANTIFERON TB PLUS TB1 MINUS NIL: 0 IU/ML
QUANTIFERON TB PLUS TB2 MINUS NIL: 0 IU/ML

## 2019-04-04 ENCOUNTER — APPOINTMENT (OUTPATIENT)
Dept: INFECTIOUS DISEASE | Facility: CLINIC | Age: 29
End: 2019-04-04
Payer: MEDICAID

## 2019-04-04 ENCOUNTER — LABORATORY RESULT (OUTPATIENT)
Age: 29
End: 2019-04-04

## 2019-04-04 VITALS
BODY MASS INDEX: 25.92 KG/M2 | SYSTOLIC BLOOD PRESSURE: 135 MMHG | WEIGHT: 224 LBS | HEART RATE: 72 BPM | TEMPERATURE: 97.1 F | DIASTOLIC BLOOD PRESSURE: 76 MMHG | OXYGEN SATURATION: 99 % | HEIGHT: 78 IN

## 2019-04-04 DIAGNOSIS — Z72.0 TOBACCO USE: ICD-10-CM

## 2019-04-04 DIAGNOSIS — K02.9 DENTAL CARIES, UNSPECIFIED: ICD-10-CM

## 2019-04-04 LAB
CD3 CELLS # BLD: 1020 /UL
CD3 CELLS NFR BLD: 74 %
CD3+CD4+ CELLS # BLD: 426 /UL
CD3+CD4+ CELLS NFR BLD: 31 %
CD3+CD4+ CELLS/CD3+CD8+ CLL SPEC: 0.78 RATIO
CD3+CD8+ CELLS # SPEC: 548 /UL
CD3+CD8+ CELLS NFR BLD: 40 %

## 2019-04-04 PROCEDURE — 99214 OFFICE O/P EST MOD 30 MIN: CPT

## 2019-04-04 NOTE — HISTORY OF PRESENT ILLNESS
[FreeTextEntry1] : \par History of Present Illness\par Reason For Visit\par 4/4/2019----YONI GAITAN is a 28 year old male being seen for a follow-up visit. please check on 3rd hep B today. doing well on Genvoya. see in 6 months. labs today. \par Uses [place] [facility]. \par  \par  History of Present Illness\par 27 year old male, diagnosed in 2003 took a test to check his status . Was originally taking stribild and stated undetectable ( I switched to Genvoya) . Pt was at the clinic [facility]. .Pt states didn’t feel right and came here . Pt lives in the [place]. Medical Hx: HIV, depression. Surgeries: none. \par pt drinks socially. Smokes cigaretes 4 a day, and pot three times a day. occasional; cocaine. \par MSM only male partners. \par family Hx: both alive: dad-unknown, mom , HTN, glasses. \par medications: only genvoya and a green over the counter pill clonazepam. ( from family member) \par Allergies: NKA \par \par  \par Active Problems\par Anxiety (300.00) (F41.9)\par Dental decay (521.00) (K02.9)\par Dental infection (522.4) (K04.7)\par Depression (311) (F32.9)\par HIV infection (V08) (B20)\par Right eye symptoms (379.90) (H57.9)\par \par Allergies\par No Known Drug Allergies\par

## 2019-04-04 NOTE — ASSESSMENT
[Treatment Education] : treatment education [Treatment Adherence] : treatment adherence [Drug Interactions / Side Effects] : drug interactions/side effects [HIV Education] : HIV Education [Anticipatory Guidance] : anticipatory guidance [FreeTextEntry1] : 4/4/2019----YONI GAITAN is a 28 year old male being seen for a follow-up visit. please check on 3rd hep B today. doing well on Genvoya. see in 6 months. labs today. \par Uses [facility]. HIV stable. smoking cessation discussed and pt starting to cut down on his own and does not want any other help at this time.

## 2019-04-05 LAB
25(OH)D3 SERPL-MCNC: 37.8 NG/ML
ALBUMIN SERPL ELPH-MCNC: 4.1 G/DL
ALP BLD-CCNC: 106 U/L
ALT SERPL-CCNC: 18 U/L
ANION GAP SERPL CALC-SCNC: 15 MMOL/L
APPEARANCE: CLEAR
AST SERPL-CCNC: 24 U/L
BACTERIA: NEGATIVE
BASOPHILS # BLD AUTO: 0.02 K/UL
BASOPHILS NFR BLD AUTO: 0.5 %
BILIRUB SERPL-MCNC: <0.2 MG/DL
BILIRUBIN URINE: NEGATIVE
BLOOD URINE: NEGATIVE
BUN SERPL-MCNC: 21 MG/DL
C TRACH RRNA SPEC QL NAA+PROBE: NOT DETECTED
CALCIUM SERPL-MCNC: 9.2 MG/DL
CHLORIDE SERPL-SCNC: 105 MMOL/L
CHOLEST SERPL-MCNC: 133 MG/DL
CHOLEST/HDLC SERPL: 2.7 RATIO
CO2 SERPL-SCNC: 24 MMOL/L
COLOR: YELLOW
CREAT SERPL-MCNC: 1.19 MG/DL
EOSINOPHIL # BLD AUTO: 0.06 K/UL
EOSINOPHIL NFR BLD AUTO: 1.5 %
GLUCOSE QUALITATIVE U: NEGATIVE
GLUCOSE SERPL-MCNC: 102 MG/DL
HCT VFR BLD CALC: 45.7 %
HDLC SERPL-MCNC: 49 MG/DL
HGB BLD-MCNC: 13.7 G/DL
HIV1 RNA # SERPL NAA+PROBE: ABNORMAL
HIV1 RNA # SERPL NAA+PROBE: ABNORMAL COPIES/ML
HYALINE CASTS: 1 /LPF
IMM GRANULOCYTES NFR BLD AUTO: 0.2 %
KETONES URINE: NEGATIVE
LDLC SERPL CALC-MCNC: 61 MG/DL
LEUKOCYTE ESTERASE URINE: NEGATIVE
LYMPHOCYTES # BLD AUTO: 1.42 K/UL
LYMPHOCYTES NFR BLD AUTO: 34.7 %
MAN DIFF?: NORMAL
MCHC RBC-ENTMCNC: 26.9 PG
MCHC RBC-ENTMCNC: 30 GM/DL
MCV RBC AUTO: 89.6 FL
MICROSCOPIC-UA: NORMAL
MONOCYTES # BLD AUTO: 0.46 K/UL
MONOCYTES NFR BLD AUTO: 11.2 %
N GONORRHOEA RRNA SPEC QL NAA+PROBE: NOT DETECTED
NEUTROPHILS # BLD AUTO: 2.12 K/UL
NEUTROPHILS NFR BLD AUTO: 51.9 %
NITRITE URINE: NEGATIVE
PH URINE: 6.5
PLATELET # BLD AUTO: 161 K/UL
POTASSIUM SERPL-SCNC: 4.4 MMOL/L
PROT SERPL-MCNC: 6.8 G/DL
PROTEIN URINE: NORMAL
RBC # BLD: 5.1 M/UL
RBC # FLD: 14.2 %
RED BLOOD CELLS URINE: 3 /HPF
SODIUM SERPL-SCNC: 144 MMOL/L
SOURCE AMPLIFICATION: NORMAL
SOURCE ANAL: NORMAL
SOURCE ORAL: NORMAL
SPECIFIC GRAVITY URINE: 1.03
SQUAMOUS EPITHELIAL CELLS: 1 /HPF
TRIGL SERPL-MCNC: 115 MG/DL
TSH SERPL-ACNC: 2.6 UIU/ML
UROBILINOGEN URINE: NORMAL
VIRAL LOAD INTERP: NORMAL
VIRAL LOAD LOG: ABNORMAL LG COP/ML
WBC # FLD AUTO: 4.09 K/UL
WHITE BLOOD CELLS URINE: 1 /HPF

## 2019-04-29 RX ORDER — OSELTAMIVIR PHOSPHATE 75 MG/1
75 CAPSULE ORAL TWICE DAILY
Qty: 10 | Refills: 0 | Status: COMPLETED | COMMUNITY
Start: 2018-11-30 | End: 2019-04-29

## 2019-05-20 ENCOUNTER — APPOINTMENT (OUTPATIENT)
Dept: INFECTIOUS DISEASE | Facility: CLINIC | Age: 29
End: 2019-05-20

## 2019-07-15 ENCOUNTER — RX RENEWAL (OUTPATIENT)
Age: 29
End: 2019-07-15

## 2019-07-31 NOTE — REASON FOR VISIT
----- Message from Suha Wilhelm sent at 7/31/2019  8:45 AM CDT -----  Contact: Aliyah/Wife/[phone]  Aliyah called in regards needing to talk with Dr Sequeira about patient is back to the emergency. Please check her message through [facility]. Thank you.  
Spoke to pt's wife this morning, see separate message, ED MD informed pt is coming.  
[Follow-Up: _____] : a [unfilled] follow-up visit

## 2019-10-04 ENCOUNTER — APPOINTMENT (OUTPATIENT)
Dept: INFECTIOUS DISEASE | Facility: CLINIC | Age: 29
End: 2019-10-04
Payer: MEDICAID

## 2019-10-04 VITALS
BODY MASS INDEX: 23.37 KG/M2 | HEIGHT: 78 IN | SYSTOLIC BLOOD PRESSURE: 119 MMHG | TEMPERATURE: 97.5 F | DIASTOLIC BLOOD PRESSURE: 70 MMHG | HEART RATE: 85 BPM | OXYGEN SATURATION: 97 % | WEIGHT: 202 LBS

## 2019-10-04 LAB
ALBUMIN SERPL ELPH-MCNC: 4.8 G/DL
ALP BLD-CCNC: 88 U/L
ALT SERPL-CCNC: 13 U/L
ANION GAP SERPL CALC-SCNC: 13 MMOL/L
APPEARANCE: CLEAR
AST SERPL-CCNC: 25 U/L
BACTERIA: NEGATIVE
BASOPHILS # BLD AUTO: 0.02 K/UL
BASOPHILS NFR BLD AUTO: 0.6 %
BILIRUB SERPL-MCNC: 0.4 MG/DL
BILIRUBIN URINE: NEGATIVE
BLOOD URINE: NEGATIVE
BUN SERPL-MCNC: 17 MG/DL
CALCIUM SERPL-MCNC: 9.6 MG/DL
CD3 CELLS # BLD: 807 /UL
CD3 CELLS NFR BLD: 72 %
CD3+CD4+ CELLS # BLD: 338 /UL
CD3+CD4+ CELLS NFR BLD: 30 %
CD3+CD4+ CELLS/CD3+CD8+ CLL SPEC: 0.78 RATIO
CD3+CD8+ CELLS # SPEC: 431 /UL
CD3+CD8+ CELLS NFR BLD: 39 %
CHLORIDE SERPL-SCNC: 102 MMOL/L
CHOLEST SERPL-MCNC: 135 MG/DL
CHOLEST/HDLC SERPL: 2.7 RATIO
CO2 SERPL-SCNC: 26 MMOL/L
COLOR: YELLOW
CREAT SERPL-MCNC: 1.26 MG/DL
EOSINOPHIL # BLD AUTO: 0.02 K/UL
EOSINOPHIL NFR BLD AUTO: 0.6 %
ESTIMATED AVERAGE GLUCOSE: 103 MG/DL
GLUCOSE QUALITATIVE U: NEGATIVE
GLUCOSE SERPL-MCNC: 96 MG/DL
HBA1C MFR BLD HPLC: 5.2 %
HCT VFR BLD CALC: 46.9 %
HDLC SERPL-MCNC: 51 MG/DL
HGB BLD-MCNC: 14.3 G/DL
HYALINE CASTS: 0 /LPF
IMM GRANULOCYTES NFR BLD AUTO: 0.3 %
KETONES URINE: NEGATIVE
LDLC SERPL CALC-MCNC: 75 MG/DL
LEUKOCYTE ESTERASE URINE: NEGATIVE
LYMPHOCYTES # BLD AUTO: 1.23 K/UL
LYMPHOCYTES NFR BLD AUTO: 36.9 %
MAN DIFF?: NORMAL
MCHC RBC-ENTMCNC: 26.8 PG
MCHC RBC-ENTMCNC: 30.5 GM/DL
MCV RBC AUTO: 88 FL
MICROSCOPIC-UA: NORMAL
MONOCYTES # BLD AUTO: 0.41 K/UL
MONOCYTES NFR BLD AUTO: 12.3 %
NEUTROPHILS # BLD AUTO: 1.64 K/UL
NEUTROPHILS NFR BLD AUTO: 49.3 %
NITRITE URINE: NEGATIVE
PH URINE: 6
PLATELET # BLD AUTO: 164 K/UL
POTASSIUM SERPL-SCNC: 4.4 MMOL/L
PROT SERPL-MCNC: 7.2 G/DL
PROTEIN URINE: NORMAL
RBC # BLD: 5.33 M/UL
RBC # FLD: 13.2 %
RED BLOOD CELLS URINE: 2 /HPF
SODIUM SERPL-SCNC: 141 MMOL/L
SPECIFIC GRAVITY URINE: 1.04
SQUAMOUS EPITHELIAL CELLS: 0 /HPF
TRIGL SERPL-MCNC: 44 MG/DL
TSH SERPL-ACNC: 1.17 UIU/ML
UROBILINOGEN URINE: ABNORMAL
WBC # FLD AUTO: 3.33 K/UL
WHITE BLOOD CELLS URINE: 1 /HPF

## 2019-10-04 PROCEDURE — 99214 OFFICE O/P EST MOD 30 MIN: CPT

## 2019-10-07 LAB
25(OH)D3 SERPL-MCNC: 27.3 NG/ML
C TRACH RRNA SPEC QL NAA+PROBE: NOT DETECTED
C TRACH RRNA SPEC QL NAA+PROBE: NOT DETECTED
HIV1 RNA # SERPL NAA+PROBE: 655
HIV1 RNA # SERPL NAA+PROBE: ABNORMAL
N GONORRHOEA RRNA SPEC QL NAA+PROBE: NOT DETECTED
N GONORRHOEA RRNA SPEC QL NAA+PROBE: NOT DETECTED
SOURCE AMPLIFICATION: NORMAL
SOURCE ORAL: NORMAL
T PALLIDUM AB SER QL IA: NEGATIVE
VIRAL LOAD INTERP: NORMAL
VIRAL LOAD LOG: 2.82

## 2019-10-21 NOTE — ASSESSMENT
[FreeTextEntry1] : 10/4/2019----YONI GAITAN is a 28 year old male being seen for a follow-up visit. Doing well on Genvoya. see in 6 months. labs today. Uses [facility]. HIV stable.  [Treatment Education] : treatment education [Treatment Adherence] : treatment adherence [Drug Interactions / Side Effects] : drug interactions/side effects [HIV Education] : HIV Education [Anticipatory Guidance] : anticipatory guidance

## 2019-10-21 NOTE — HISTORY OF PRESENT ILLNESS
[FreeTextEntry1] : Reason For Visit\par 10/4/2019----YONI GAITAN is a 28 year old male being seen for a follow-up visit. Doing well on Genvoya. see in 6 months. labs today. \par Uses [facility]. \par  \par  History of Present Illness\par 27 year old male, diagnosed in 2003 took a test to check his status . Was originally taking stribild and stated undetectable ( I switched to Genvoya) . Pt was at the [facility] .Pt states didn’t feel right and came here . Pt lives in the [place]. Medical Hx: HIV, depression. Surgeries: none. \par pt drinks socially. Smokes cigaretes 4 a day, and pot three times a day. occasional; cocaine. \par MSM only male partners. \par family Hx: both alive: dad-unknown, mom , HTN, glasses. \par medications: only genvoya and a green over the counter pill clonazepam. ( from family member) \par Allergies: NKA \par \par  \par Active Problems\par Anxiety (300.00) (F41.9)\par Dental decay (521.00) (K02.9)\par Dental infection (522.4) (K04.7)\par Depression (311) (F32.9)\par HIV infection (V08) (B20)\par Right eye symptoms (379.90) (H57.9)\par \par Allergies\par No Known Drug Allergies\par \par  \par Active Problems\par Anxiety (300.00) (F41.9)\par Dental decay (521.00) (K02.9)\par Dental infection (522.4) (K04.7)\par Depression (311) (F32.9)\par Excessive cerumen in both ear canals (380.4) (H61.23)\par HIV infection (V08) (B20)\par Right eye symptoms (379.90) (H57.9)\par Upper respiratory infection (465.9) (J06.9)\par Visit for eye and vision exam (V72.0) (Z01.00)\par \par Past Medical History\par History of dental examination (V15.89) (Z92.89)\par \par Current Meds\par Amoxicillin-Pot Clavulanate 875-125 MG Oral Tablet; TAKE 1 TABLET EVERY 12 HOURS\par DAILY\par Daily Multi Oral Tablet; TAKE 1 TABLET DAILY\par Genvoya [id number] MG Oral Tablet; TAKE 1 TABLET BY MOUTH DAILY\par Oseltamivir Phosphate 75 MG Oral Capsule; TAKE 1 CAPSULE TWICE DAILY WITH\par MEALS\par Vitamin D3 2000 UNIT Oral Tablet; Take 1 tablet daily\par \par Allergies\par No Known Drug Allergies\par

## 2020-04-03 ENCOUNTER — APPOINTMENT (OUTPATIENT)
Dept: INFECTIOUS DISEASE | Facility: CLINIC | Age: 30
End: 2020-04-03

## 2020-05-07 ENCOUNTER — APPOINTMENT (OUTPATIENT)
Dept: INFECTIOUS DISEASE | Facility: CLINIC | Age: 30
End: 2020-05-07

## 2020-06-23 ENCOUNTER — APPOINTMENT (OUTPATIENT)
Dept: INFECTIOUS DISEASE | Facility: CLINIC | Age: 30
End: 2020-06-23
Payer: COMMERCIAL

## 2020-06-23 ENCOUNTER — APPOINTMENT (OUTPATIENT)
Dept: INFECTIOUS DISEASE | Facility: CLINIC | Age: 30
End: 2020-06-23

## 2020-06-23 PROCEDURE — 99442: CPT

## 2020-07-28 ENCOUNTER — RX RENEWAL (OUTPATIENT)
Age: 30
End: 2020-07-28

## 2020-07-28 RX ORDER — MULTIVIT-MIN/FOLIC/VIT K/LYCOP 400-300MCG
50 MCG TABLET ORAL
Qty: 30 | Refills: 1 | Status: COMPLETED | COMMUNITY
Start: 2018-11-19 | End: 2020-07-28

## 2020-07-28 RX ORDER — AMOXICILLIN AND CLAVULANATE POTASSIUM 875; 125 MG/1; MG/1
875-125 TABLET, COATED ORAL
Qty: 20 | Refills: 0 | Status: COMPLETED | COMMUNITY
Start: 2018-04-17 | End: 2020-07-28

## 2020-08-16 ENCOUNTER — FORM ENCOUNTER (OUTPATIENT)
Age: 30
End: 2020-08-16

## 2020-08-17 ENCOUNTER — APPOINTMENT (OUTPATIENT)
Dept: INFECTIOUS DISEASE | Facility: CLINIC | Age: 30
End: 2020-08-17
Payer: COMMERCIAL

## 2020-08-17 VITALS
SYSTOLIC BLOOD PRESSURE: 121 MMHG | BODY MASS INDEX: 26.84 KG/M2 | WEIGHT: 232 LBS | DIASTOLIC BLOOD PRESSURE: 74 MMHG | HEIGHT: 78 IN | TEMPERATURE: 97.5 F | OXYGEN SATURATION: 98 % | HEART RATE: 74 BPM

## 2020-08-17 PROCEDURE — 99214 OFFICE O/P EST MOD 30 MIN: CPT

## 2020-08-17 NOTE — HISTORY OF PRESENT ILLNESS
[FreeTextEntry1] : 8/17/2020----YONI GAITAN is a 29 year old male being seen for a follow-up visit. Doing well on Genvoya. I was cioncerned over VL blip 655 in Oct 2019. Pt states missing meds for a few days. \par Pt states no family Hx of colon cancer. Not smoking. MSM , no new partners. \par Plan 8/17/2020: \par all labs today.  \par see in 6 months\par  \par  \par Diagnosed with HIV in 2003 took a HIV test to check his status. Was originally taking Stribild and stated undetectable ( I switched to Genvoya). Pt was at the clinic [facility]..Pt states didn’t feel right at [facility] and came here. Pt lives in the [place]. M\par edical Hx: HIV, depression. Surgeries: none. \par pt drinks socially. Smokes cigaretes 4 a day, and pot three times a day. occasional; cocaine. \par MSM only male partners. \par family Hx: both alive: dad-unknown, mom , HTN, glasses. \par medications: only genvoya and a green over the counter pill clonazepam. ( from family member) \par Allergies: NKA \par \par  \par Active Problems\par Anxiety (300.00) (F41.9)\par Dental decay (521.00) (K02.9)\par Dental infection (522.4) (K04.7)\par Depression (311) (F32.9)\par HIV infection (V08) (B20)\par Right eye symptoms (379.90) (H57.9)\par \par Allergies\par No Known Drug Allergies\par \par  \par Active Problems\par Anxiety (300.00) (F41.9)\par Dental decay (521.00) (K02.9)\par Dental infection (522.4) (K04.7)\par Depression (311) (F32.9)\par Excessive cerumen in both ear canals (380.4) (H61.23)\par HIV infection (V08) (B20)\par Right eye symptoms (379.90) (H57.9)\par Upper respiratory infection (465.9) (J06.9)\par Visit for eye and vision exam (V72.0) (Z01.00)\par \par Past Medical History\par History of dental examination (V15.89) (Z92.89)\par \par Current Meds\par Daily Multi Oral Tablet; TAKE 1 TABLET DAILY\par Genvoya 506-204-163-10 MG Oral Tablet; TAKE 1 TABLET BY MOUTH DAILY\par Vitamin D3 2000 UNIT Oral Tablet; Take 1 tablet daily\par \par Allergies\par No Known Drug Allergies\par \par Review of Systems\par \par Constitutional, Eyes, ENT, Cardiovascular, Respiratory, Gastrointestinal, Genitourinary, Musculoskeletal, Integumentary, Neurological, Psychiatric and Heme/Lymph are otherwise negative. \par  \par \par \par

## 2020-08-18 LAB
25(OH)D3 SERPL-MCNC: 18.9 NG/ML
ALBUMIN SERPL ELPH-MCNC: 3.9 G/DL
ALP BLD-CCNC: 115 U/L
ALT SERPL-CCNC: 28 U/L
ANION GAP SERPL CALC-SCNC: 11 MMOL/L
APPEARANCE: CLEAR
AST SERPL-CCNC: 34 U/L
BACTERIA: NEGATIVE
BASOPHILS # BLD AUTO: 0.01 K/UL
BASOPHILS NFR BLD AUTO: 0.2 %
BILIRUB SERPL-MCNC: 0.3 MG/DL
BILIRUBIN URINE: NEGATIVE
BLOOD URINE: NEGATIVE
BUN SERPL-MCNC: 14 MG/DL
CALCIUM SERPL-MCNC: 8.8 MG/DL
CD3 CELLS # BLD: 1241 /UL
CD3 CELLS NFR BLD: 78 %
CD3+CD4+ CELLS # BLD: 498 /UL
CD3+CD4+ CELLS NFR BLD: 31 %
CD3+CD4+ CELLS/CD3+CD8+ CLL SPEC: 0.79 RATIO
CD3+CD8+ CELLS # SPEC: 632 /UL
CD3+CD8+ CELLS NFR BLD: 40 %
CHLORIDE SERPL-SCNC: 106 MMOL/L
CHOLEST SERPL-MCNC: 129 MG/DL
CHOLEST/HDLC SERPL: 2.8 RATIO
CO2 SERPL-SCNC: 24 MMOL/L
COLOR: YELLOW
CREAT SERPL-MCNC: 1.15 MG/DL
EOSINOPHIL # BLD AUTO: 0.04 K/UL
EOSINOPHIL NFR BLD AUTO: 0.9 %
ESTIMATED AVERAGE GLUCOSE: 100 MG/DL
GLUCOSE QUALITATIVE U: NEGATIVE
GLUCOSE SERPL-MCNC: 92 MG/DL
HBA1C MFR BLD HPLC: 5.1 %
HCT VFR BLD CALC: 43.5 %
HCV AB SER QL: NONREACTIVE
HCV S/CO RATIO: 0.16 S/CO
HDLC SERPL-MCNC: 47 MG/DL
HGB BLD-MCNC: 13.3 G/DL
HYALINE CASTS: 0 /LPF
IMM GRANULOCYTES NFR BLD AUTO: 0.2 %
KETONES URINE: NEGATIVE
LDLC SERPL CALC-MCNC: 46 MG/DL
LEUKOCYTE ESTERASE URINE: NEGATIVE
LYMPHOCYTES # BLD AUTO: 1.67 K/UL
LYMPHOCYTES NFR BLD AUTO: 39.1 %
MAN DIFF?: NORMAL
MCHC RBC-ENTMCNC: 27.7 PG
MCHC RBC-ENTMCNC: 30.6 GM/DL
MCV RBC AUTO: 90.4 FL
MICROSCOPIC-UA: NORMAL
MONOCYTES # BLD AUTO: 0.49 K/UL
MONOCYTES NFR BLD AUTO: 11.5 %
NEUTROPHILS # BLD AUTO: 2.05 K/UL
NEUTROPHILS NFR BLD AUTO: 48.1 %
NITRITE URINE: NEGATIVE
PH URINE: 6.5
PLATELET # BLD AUTO: 162 K/UL
POTASSIUM SERPL-SCNC: 4.1 MMOL/L
PROT SERPL-MCNC: 6.6 G/DL
PROTEIN URINE: NORMAL
RBC # BLD: 4.81 M/UL
RBC # FLD: 13.8 %
RED BLOOD CELLS URINE: 2 /HPF
SARS-COV-2 IGG SERPL IA-ACNC: <0.1 INDEX
SARS-COV-2 IGG SERPL QL IA: NEGATIVE
SODIUM SERPL-SCNC: 141 MMOL/L
SPECIFIC GRAVITY URINE: 1.03
SQUAMOUS EPITHELIAL CELLS: 0 /HPF
T PALLIDUM AB SER QL IA: NEGATIVE
TRIGL SERPL-MCNC: 181 MG/DL
UROBILINOGEN URINE: ABNORMAL
WBC # FLD AUTO: 4.27 K/UL
WHITE BLOOD CELLS URINE: 1 /HPF

## 2020-08-19 LAB
C TRACH RRNA SPEC QL NAA+PROBE: NOT DETECTED
C TRACH RRNA SPEC QL NAA+PROBE: NOT DETECTED
HIV1 RNA # SERPL NAA+PROBE: ABNORMAL
HIV1 RNA # SERPL NAA+PROBE: ABNORMAL COPIES/ML
N GONORRHOEA RRNA SPEC QL NAA+PROBE: NOT DETECTED
N GONORRHOEA RRNA SPEC QL NAA+PROBE: NOT DETECTED
SOURCE AMPLIFICATION: NORMAL
SOURCE ORAL: NORMAL
VIRAL LOAD INTERP: NORMAL
VIRAL LOAD LOG: ABNORMAL LG COP/ML

## 2020-08-20 LAB
M TB IFN-G BLD-IMP: NEGATIVE
QUANTIFERON TB PLUS MITOGEN MINUS NIL: 8.67 IU/ML
QUANTIFERON TB PLUS NIL: 0.02 IU/ML
QUANTIFERON TB PLUS TB1 MINUS NIL: 0 IU/ML
QUANTIFERON TB PLUS TB2 MINUS NIL: 0 IU/ML

## 2020-09-08 LAB
HIV GENOSURE ARCHIVE 1: NORMAL
HIV1 PROVIR DNA RT + PR + IN MUT DET SEQ: NORMAL
HIV1 PROVIRAL DNA GENTYP BLD MC NAR: NORMAL

## 2020-12-16 PROBLEM — Z87.09 HISTORY OF UPPER RESPIRATORY INFECTION: Status: RESOLVED | Noted: 2018-11-30 | Resolved: 2020-12-16

## 2021-01-08 ENCOUNTER — NON-APPOINTMENT (OUTPATIENT)
Age: 31
End: 2021-01-08

## 2021-02-16 ENCOUNTER — APPOINTMENT (OUTPATIENT)
Dept: INFECTIOUS DISEASE | Facility: CLINIC | Age: 31
End: 2021-02-16

## 2021-02-16 ENCOUNTER — NON-APPOINTMENT (OUTPATIENT)
Age: 31
End: 2021-02-16

## 2021-03-03 ENCOUNTER — APPOINTMENT (OUTPATIENT)
Dept: INFECTIOUS DISEASE | Facility: CLINIC | Age: 31
End: 2021-03-03

## 2021-03-12 ENCOUNTER — NON-APPOINTMENT (OUTPATIENT)
Age: 31
End: 2021-03-12

## 2021-03-15 ENCOUNTER — NON-APPOINTMENT (OUTPATIENT)
Age: 31
End: 2021-03-15

## 2021-03-17 ENCOUNTER — NON-APPOINTMENT (OUTPATIENT)
Age: 31
End: 2021-03-17

## 2021-04-20 ENCOUNTER — APPOINTMENT (OUTPATIENT)
Dept: INFECTIOUS DISEASE | Facility: CLINIC | Age: 31
End: 2021-04-20

## 2021-05-07 ENCOUNTER — NON-APPOINTMENT (OUTPATIENT)
Age: 31
End: 2021-05-07

## 2021-06-02 ENCOUNTER — FORM ENCOUNTER (OUTPATIENT)
Age: 31
End: 2021-06-02

## 2021-06-03 ENCOUNTER — APPOINTMENT (OUTPATIENT)
Dept: INFECTIOUS DISEASE | Facility: CLINIC | Age: 31
End: 2021-06-03
Payer: COMMERCIAL

## 2021-06-03 ENCOUNTER — LABORATORY RESULT (OUTPATIENT)
Age: 31
End: 2021-06-03

## 2021-06-03 VITALS
HEIGHT: 78 IN | DIASTOLIC BLOOD PRESSURE: 72 MMHG | OXYGEN SATURATION: 99 % | SYSTOLIC BLOOD PRESSURE: 146 MMHG | TEMPERATURE: 97.6 F | HEART RATE: 72 BPM | WEIGHT: 224 LBS | BODY MASS INDEX: 25.92 KG/M2

## 2021-06-03 DIAGNOSIS — L98.9 DISORDER OF THE SKIN AND SUBCUTANEOUS TISSUE, UNSPECIFIED: ICD-10-CM

## 2021-06-03 PROCEDURE — 99072 ADDL SUPL MATRL&STAF TM PHE: CPT

## 2021-06-03 PROCEDURE — 99213 OFFICE O/P EST LOW 20 MIN: CPT

## 2021-06-03 NOTE — HISTORY OF PRESENT ILLNESS
[FreeTextEntry1] : 6/3/2021----YONI GAITAN is a 30 year old male being seen for a follow-up visit. Doing well on Genvoya. \par Pt states no family Hx of colon cancer. Not smoking. MSM , no new partners. \par Planning on getting Covid Vaccine \par social drinking , smokes marijuana daily, cutting down, \par works for [employer], going to school. \par no family Hx of colon \par Has dentures top and bottom . \par Plan 6/3/2021: \par all labs today, taking Genvoya., vitamin D3. \par see in 6 months\par  \par  \par Diagnosed with HIV in 2003 took a HIV test to check his status. Was originally taking Stribild and stated undetectable ( I switched to Genvoya). Pt was at the clinic [facility]..Pt states didn’t feel right at [facility] and came here. Pt lives in the [place]. M\par edical Hx: HIV, depression. Surgeries: none. \par pt drinks socially. Smokes cigaretes 4 a day, and pot three times a day. occasional; cocaine. \par MSM only male partners. \par family Hx: both alive: dad-unknown, mom , HTN, glasses. \par medications: only genvoya and a green over the counter pill clonazepam. ( from family member) \par Allergies: NKA \par \par  \par Active Problems\par Anxiety (300.00) (F41.9)\par Dental decay (521.00) (K02.9)\par Dental infection (522.4) (K04.7)\par Depression (311) (F32.9)\par HIV infection (V08) (B20)\par Right eye symptoms (379.90) (H57.9)\par \par Allergies\par No Known Drug Allergies\par \par  \par Active Problems\par Anxiety (300.00) (F41.9)\par Dental decay (521.00) (K02.9)\par Dental infection (522.4) (K04.7)\par Depression (311) (F32.9)\par Excessive cerumen in both ear canals (380.4) (H61.23)\par HIV infection (V08) (B20)\par Right eye symptoms (379.90) (H57.9)\par Upper respiratory infection (465.9) (J06.9)\par Visit for eye and vision exam (V72.0) (Z01.00)\par \par Past Medical History\par History of dental examination (V15.89) (Z92.89)\par \par Current Meds\par Daily Multi Oral Tablet; TAKE 1 TABLET DAILY\par Genvoya 772-519-785-10 MG Oral Tablet; TAKE 1 TABLET BY MOUTH DAILY\par Vitamin D3 2000 UNIT Oral Tablet; Take 1 tablet daily\par \par Allergies\par No Known Drug Allergies\par

## 2021-06-04 LAB
25(OH)D3 SERPL-MCNC: 24.4 NG/ML
ALBUMIN SERPL ELPH-MCNC: 4.2 G/DL
ALP BLD-CCNC: 95 U/L
ALT SERPL-CCNC: 24 U/L
ANION GAP SERPL CALC-SCNC: 11 MMOL/L
APPEARANCE: CLEAR
AST SERPL-CCNC: 35 U/L
BACTERIA: NEGATIVE
BASOPHILS # BLD AUTO: 0.01 K/UL
BASOPHILS NFR BLD AUTO: 0.4 %
BILIRUB SERPL-MCNC: 0.6 MG/DL
BILIRUBIN URINE: NEGATIVE
BLOOD URINE: NEGATIVE
BUN SERPL-MCNC: 10 MG/DL
C TRACH RRNA SPEC QL NAA+PROBE: NOT DETECTED
CALCIUM OXALATE CRYSTALS: ABNORMAL
CALCIUM SERPL-MCNC: 9.2 MG/DL
CD3 CELLS # BLD: 670 /UL
CD3 CELLS NFR BLD: 78 %
CD3+CD4+ CELLS # BLD: 261 /UL
CD3+CD4+ CELLS NFR BLD: 30 %
CD3+CD4+ CELLS/CD3+CD8+ CLL SPEC: 0.75 RATIO
CD3+CD8+ CELLS # SPEC: 351 /UL
CD3+CD8+ CELLS NFR BLD: 41 %
CHLORIDE SERPL-SCNC: 104 MMOL/L
CHOLEST SERPL-MCNC: 128 MG/DL
CO2 SERPL-SCNC: 26 MMOL/L
COLOR: YELLOW
CREAT SERPL-MCNC: 1.07 MG/DL
EOSINOPHIL # BLD AUTO: 0.02 K/UL
EOSINOPHIL NFR BLD AUTO: 0.8 %
ESTIMATED AVERAGE GLUCOSE: 103 MG/DL
GLUCOSE QUALITATIVE U: NEGATIVE
GLUCOSE SERPL-MCNC: 93 MG/DL
HBA1C MFR BLD HPLC: 5.2 %
HCT VFR BLD CALC: 48 %
HCV AB SER QL: NONREACTIVE
HCV S/CO RATIO: 0.17 S/CO
HDLC SERPL-MCNC: 47 MG/DL
HGB BLD-MCNC: 14.5 G/DL
HYALINE CASTS: 0 /LPF
IMM GRANULOCYTES NFR BLD AUTO: 0.4 %
KETONES URINE: NEGATIVE
LDLC SERPL CALC-MCNC: 64 MG/DL
LEUKOCYTE ESTERASE URINE: ABNORMAL
LYMPHOCYTES # BLD AUTO: 0.92 K/UL
LYMPHOCYTES NFR BLD AUTO: 36.2 %
MAN DIFF?: NORMAL
MCHC RBC-ENTMCNC: 27 PG
MCHC RBC-ENTMCNC: 30.2 GM/DL
MCV RBC AUTO: 89.2 FL
MICROSCOPIC-UA: NORMAL
MONOCYTES # BLD AUTO: 0.42 K/UL
MONOCYTES NFR BLD AUTO: 16.5 %
N GONORRHOEA RRNA SPEC QL NAA+PROBE: NOT DETECTED
NEUTROPHILS # BLD AUTO: 1.16 K/UL
NEUTROPHILS NFR BLD AUTO: 45.7 %
NITRITE URINE: NEGATIVE
NONHDLC SERPL-MCNC: 80 MG/DL
PH URINE: 7
PLATELET # BLD AUTO: 150 K/UL
POTASSIUM SERPL-SCNC: 4.1 MMOL/L
PROT SERPL-MCNC: 7 G/DL
PROTEIN URINE: ABNORMAL
RBC # BLD: 5.38 M/UL
RBC # FLD: 13.4 %
RED BLOOD CELLS URINE: 4 /HPF
SODIUM SERPL-SCNC: 140 MMOL/L
SOURCE AMPLIFICATION: NORMAL
SOURCE ANAL: NORMAL
SOURCE ORAL: NORMAL
SPECIFIC GRAVITY URINE: 1.03
SQUAMOUS EPITHELIAL CELLS: 1 /HPF
T PALLIDUM AB SER QL IA: NEGATIVE
TRIGL SERPL-MCNC: 80 MG/DL
TSH SERPL-ACNC: 0.71 UIU/ML
UROBILINOGEN URINE: ABNORMAL
WBC # FLD AUTO: 2.54 K/UL
WHITE BLOOD CELLS URINE: 1 /HPF

## 2021-06-07 LAB
HIV1 RNA # SERPL NAA+PROBE: ABNORMAL
HIV1 RNA # SERPL NAA+PROBE: NORMAL
VIRAL LOAD INTERP: NORMAL
VIRAL LOAD LOG: 3.35

## 2021-07-06 ENCOUNTER — NON-APPOINTMENT (OUTPATIENT)
Age: 31
End: 2021-07-06

## 2021-11-16 ENCOUNTER — NON-APPOINTMENT (OUTPATIENT)
Age: 31
End: 2021-11-16

## 2021-12-06 ENCOUNTER — NON-APPOINTMENT (OUTPATIENT)
Age: 31
End: 2021-12-06

## 2021-12-21 ENCOUNTER — NON-APPOINTMENT (OUTPATIENT)
Age: 31
End: 2021-12-21

## 2021-12-21 ENCOUNTER — FORM ENCOUNTER (OUTPATIENT)
Age: 31
End: 2021-12-21

## 2021-12-22 ENCOUNTER — APPOINTMENT (OUTPATIENT)
Dept: INFECTIOUS DISEASE | Facility: CLINIC | Age: 31
End: 2021-12-22

## 2021-12-22 ENCOUNTER — NON-APPOINTMENT (OUTPATIENT)
Age: 31
End: 2021-12-22

## 2021-12-22 ENCOUNTER — APPOINTMENT (OUTPATIENT)
Dept: INFECTIOUS DISEASE | Facility: CLINIC | Age: 31
End: 2021-12-22
Payer: COMMERCIAL

## 2021-12-22 VITALS
DIASTOLIC BLOOD PRESSURE: 86 MMHG | WEIGHT: 242 LBS | BODY MASS INDEX: 28 KG/M2 | HEIGHT: 78 IN | SYSTOLIC BLOOD PRESSURE: 152 MMHG | TEMPERATURE: 98.1 F | OXYGEN SATURATION: 97 % | HEART RATE: 78 BPM

## 2021-12-22 LAB
25(OH)D3 SERPL-MCNC: 21.1 NG/ML
ALBUMIN SERPL ELPH-MCNC: 4.3 G/DL
ALP BLD-CCNC: 139 U/L
ALT SERPL-CCNC: 50 U/L
ANION GAP SERPL CALC-SCNC: 11 MMOL/L
AST SERPL-CCNC: 31 U/L
BASOPHILS # BLD AUTO: 0.01 K/UL
BASOPHILS NFR BLD AUTO: 0.3 %
BILIRUB SERPL-MCNC: 0.2 MG/DL
BUN SERPL-MCNC: 13 MG/DL
CALCIUM SERPL-MCNC: 9.2 MG/DL
CD3 CELLS # BLD: 986 /UL
CD3 CELLS NFR BLD: 79 %
CD3+CD4+ CELLS # BLD: 390 /UL
CD3+CD4+ CELLS NFR BLD: 31 %
CD3+CD4+ CELLS/CD3+CD8+ CLL SPEC: 0.75 RATIO
CD3+CD8+ CELLS # SPEC: 523 /UL
CD3+CD8+ CELLS NFR BLD: 42 %
CHLORIDE SERPL-SCNC: 104 MMOL/L
CHOLEST SERPL-MCNC: 148 MG/DL
CO2 SERPL-SCNC: 25 MMOL/L
CREAT SERPL-MCNC: 1.08 MG/DL
EOSINOPHIL # BLD AUTO: 0.04 K/UL
EOSINOPHIL NFR BLD AUTO: 1.1 %
GLUCOSE SERPL-MCNC: 100 MG/DL
HCT VFR BLD CALC: 46 %
HDLC SERPL-MCNC: 40 MG/DL
HGB BLD-MCNC: 13.8 G/DL
IMM GRANULOCYTES NFR BLD AUTO: 0.5 %
LDLC SERPL CALC-MCNC: 72 MG/DL
LYMPHOCYTES # BLD AUTO: 1.44 K/UL
LYMPHOCYTES NFR BLD AUTO: 39 %
MAN DIFF?: NORMAL
MCHC RBC-ENTMCNC: 27.2 PG
MCHC RBC-ENTMCNC: 30 GM/DL
MCV RBC AUTO: 90.7 FL
MONOCYTES # BLD AUTO: 0.44 K/UL
MONOCYTES NFR BLD AUTO: 11.9 %
NEUTROPHILS # BLD AUTO: 1.74 K/UL
NEUTROPHILS NFR BLD AUTO: 47.2 %
NONHDLC SERPL-MCNC: 108 MG/DL
PLATELET # BLD AUTO: 129 K/UL
POTASSIUM SERPL-SCNC: 4.3 MMOL/L
PROT SERPL-MCNC: 7 G/DL
PSA SERPL-MCNC: 0.33 NG/ML
RBC # BLD: 5.07 M/UL
RBC # FLD: 13.6 %
SODIUM SERPL-SCNC: 140 MMOL/L
TRIGL SERPL-MCNC: 182 MG/DL
TSH SERPL-ACNC: 2.82 UIU/ML
WBC # FLD AUTO: 3.69 K/UL

## 2021-12-22 PROCEDURE — 99213 OFFICE O/P EST LOW 20 MIN: CPT

## 2021-12-22 PROCEDURE — 99072 ADDL SUPL MATRL&STAF TM PHE: CPT

## 2021-12-22 NOTE — HISTORY OF PRESENT ILLNESS
[FreeTextEntry1] : 12/22/2021----YONI GAITAN is a 31 year old male being seen for a follow-up visit. \par Doing well on Genvoya. \par Pt states no family Hx of colon cancer. \par Not smoking. MSM , no new partners. \par Planning on getting Covid Vaccine \par social drinking , smokes marijuana daily, cutting down, \par works for [employer], going to school. \par no family Hx of colon \par Has dentures top and bottom . \par Declines flu vaccine\par Plan 12/22/2021: \par 1) all labs today, taking Genvoya., vitamin D3. \par 2) see in 6 months\par  \par  \par Diagnosed with HIV in 2003 took a HIV test to check his status. Was originally taking Stribild and stated undetectable ( I switched to Genvoya). Pt was at the clinic [facility]..Pt states didn’t feel right at [facility] and came here. Pt lives in the [place]. M\par edical Hx: HIV, depression. Surgeries: none. \par pt drinks socially. Smokes cigaretes 4 a day, and pot three times a day. occasional; cocaine. \par MSM only male partners. \par family Hx: both alive: dad-unknown, mom , HTN, glasses. \par medications: only genvoya and a green over the counter pill clonazepam. ( from family member) \par Allergies: NKA \par \par  \par Active Problems\par Anxiety (300.00) (F41.9)\par Dental decay (521.00) (K02.9)\par Dental infection (522.4) (K04.7)\par Depression (311) (F32.9)\par HIV infection (V08) (B20)\par Right eye symptoms (379.90) (H57.9)\par \par Allergies\par No Known Drug Allergies\par \par  \par Active Problems\par Anxiety (300.00) (F41.9)\par Dental decay (521.00) (K02.9)\par Dental infection (522.4) (K04.7)\par Depression (311) (F32.9)\par Excessive cerumen in both ear canals (380.4) (H61.23)\par HIV infection (V08) (B20)\par Right eye symptoms (379.90) (H57.9)\par Upper respiratory infection (465.9) (J06.9)\par Visit for eye and vision exam (V72.0) (Z01.00)\par \par Past Medical History\par History of dental examination (V15.89) (Z92.89)\par \par Current Meds\par Daily Multi Oral Tablet; TAKE 1 TABLET DAILY\par Genvoya [id number] MG Oral Tablet; TAKE 1 TABLET BY MOUTH DAILY\par Vitamin D3 2000 UNIT Oral Tablet; Take 1 tablet daily\par \par Allergies\par No Known Drug Allergies\par \par

## 2021-12-23 ENCOUNTER — NON-APPOINTMENT (OUTPATIENT)
Age: 31
End: 2021-12-23

## 2021-12-23 LAB
APPEARANCE: CLEAR
BACTERIA: NEGATIVE
BILIRUBIN URINE: NEGATIVE
BLOOD URINE: NEGATIVE
C TRACH RRNA SPEC QL NAA+PROBE: NOT DETECTED
COLOR: YELLOW
ESTIMATED AVERAGE GLUCOSE: 105 MG/DL
GLUCOSE QUALITATIVE U: NEGATIVE
HBA1C MFR BLD HPLC: 5.3 %
HCV AB SER QL: NONREACTIVE
HCV S/CO RATIO: 0.13 S/CO
HIV1 RNA # SERPL NAA+PROBE: ABNORMAL
HIV1 RNA # SERPL NAA+PROBE: NORMAL
HYALINE CASTS: 0 /LPF
KETONES URINE: NEGATIVE
LEUKOCYTE ESTERASE URINE: NEGATIVE
MICROSCOPIC-UA: NORMAL
N GONORRHOEA RRNA SPEC QL NAA+PROBE: NOT DETECTED
NITRITE URINE: NEGATIVE
PH URINE: 6
PROTEIN URINE: NORMAL
RED BLOOD CELLS URINE: 4 /HPF
SOURCE AMPLIFICATION: NORMAL
SPECIFIC GRAVITY URINE: 1.03
SQUAMOUS EPITHELIAL CELLS: 0 /HPF
T PALLIDUM AB SER QL IA: NEGATIVE
UROBILINOGEN URINE: NORMAL
VIRAL LOAD INTERP: NORMAL
VIRAL LOAD LOG: 3.1
WHITE BLOOD CELLS URINE: 0 /HPF

## 2021-12-27 LAB
C TRACH RRNA SPEC QL NAA+PROBE: NOT DETECTED
N GONORRHOEA RRNA SPEC QL NAA+PROBE: NOT DETECTED
SOURCE ORAL: NORMAL

## 2021-12-30 ENCOUNTER — NON-APPOINTMENT (OUTPATIENT)
Age: 31
End: 2021-12-30

## 2022-02-09 ENCOUNTER — NON-APPOINTMENT (OUTPATIENT)
Age: 32
End: 2022-02-09

## 2022-02-10 ENCOUNTER — APPOINTMENT (OUTPATIENT)
Dept: INFECTIOUS DISEASE | Facility: CLINIC | Age: 32
End: 2022-02-10

## 2022-02-14 ENCOUNTER — NON-APPOINTMENT (OUTPATIENT)
Age: 32
End: 2022-02-14

## 2022-04-01 ENCOUNTER — NON-APPOINTMENT (OUTPATIENT)
Age: 32
End: 2022-04-01

## 2022-06-23 ENCOUNTER — NON-APPOINTMENT (OUTPATIENT)
Age: 32
End: 2022-06-23

## 2022-06-27 ENCOUNTER — NON-APPOINTMENT (OUTPATIENT)
Age: 32
End: 2022-06-27

## 2022-06-28 ENCOUNTER — FORM ENCOUNTER (OUTPATIENT)
Age: 32
End: 2022-06-28

## 2022-06-29 ENCOUNTER — NON-APPOINTMENT (OUTPATIENT)
Age: 32
End: 2022-06-29

## 2022-06-29 ENCOUNTER — APPOINTMENT (OUTPATIENT)
Dept: INFECTIOUS DISEASE | Facility: CLINIC | Age: 32
End: 2022-06-29

## 2022-06-29 ENCOUNTER — APPOINTMENT (OUTPATIENT)
Dept: INFECTIOUS DISEASE | Facility: CLINIC | Age: 32
End: 2022-06-29
Payer: COMMERCIAL

## 2022-06-29 VITALS
BODY MASS INDEX: 21.64 KG/M2 | TEMPERATURE: 98.3 F | DIASTOLIC BLOOD PRESSURE: 69 MMHG | WEIGHT: 187 LBS | SYSTOLIC BLOOD PRESSURE: 109 MMHG | OXYGEN SATURATION: 98 % | HEART RATE: 98 BPM | HEIGHT: 78 IN

## 2022-06-29 DIAGNOSIS — K60.2 ANAL FISSURE, UNSPECIFIED: ICD-10-CM

## 2022-06-29 DIAGNOSIS — Z92.89 PERSONAL HISTORY OF OTHER MEDICAL TREATMENT: ICD-10-CM

## 2022-06-29 DIAGNOSIS — Z01.00 ENCOUNTER FOR EXAMINATION OF EYES AND VISION W/OUT ABNORMAL FINDINGS: ICD-10-CM

## 2022-06-29 PROCEDURE — 99214 OFFICE O/P EST MOD 30 MIN: CPT

## 2022-06-29 PROCEDURE — 99072 ADDL SUPL MATRL&STAF TM PHE: CPT

## 2022-06-29 RX ORDER — ELVITEGRAVIR, COBICISTAT, EMTRICITABINE, AND TENOFOVIR ALAFENAMIDE 150; 150; 200; 10 MG/1; MG/1; MG/1; MG/1
150-150-200-10 TABLET ORAL
Qty: 30 | Refills: 0 | Status: DISCONTINUED | COMMUNITY
Start: 2017-12-19 | End: 2022-06-29

## 2022-06-29 NOTE — HISTORY OF PRESENT ILLNESS
[FreeTextEntry1] : \par 6/29/22----YONI GAITAN is a 31 year old male being seen for a follow-up visit. was  on Genvoya, plan to start Biktarvy today . \par Pt states no family Hx of colon cancer. \par Not smoking. MSM , no new partners. \par Planning on getting Covid Vaccine \par social drinking , smokes marijuana daily, cutting down, \par works for [employer], going to school. \par no family Hx of colon \par Has dentures top and bottom . \par Declines flu vaccine\par Plan 6/29/22: \par 1) all labs today, stop Genvoya and start Biktarvy., vitamin D3. \par 2) see in 2 months\par 3) see colorectal ASAP, for rectal fissures, I put in a referral,. I sent over colace and percoset 5/325 max 2 tabs a day quanity 20 tablets\par  \par Diagnosed with HIV in 2003 took a HIV test to check his status. Was originally taking Stribild and stated undetectable ( I switched to Genvoya). Pt was at the clinic [facility]..Pt states didn’t feel right at [facility] and came here. Pt lives in the [place]. M\par edical Hx: HIV, depression. Surgeries: none. \par pt drinks socially. Smokes cigaretes 4 a day, and pot three times a day. occasional; cocaine. \par MSM only male partners. \par family Hx: both alive: dad-unknown, mom , HTN, glasses. \par medications: only genvoya and a green over the counter pill clonazepam. ( from family member) \par Allergies: NKA \par \par  \par Active Problems\par Anxiety (300.00) (F41.9)\par Dental decay (521.00) (K02.9)\par Dental infection (522.4) (K04.7)\par Depression (311) (F32.9)\par HIV infection (V08) (B20)\par Right eye symptoms (379.90) (H57.9)\par \par Allergies\par No Known Drug Allergies\par \par  \par Active Problems\par Anxiety (300.00) (F41.9)\par Dental decay (521.00) (K02.9)\par Dental infection (522.4) (K04.7)\par Depression (311) (F32.9)\par Excessive cerumen in both ear canals (380.4) (H61.23)\par HIV infection (V08) (B20)\par Right eye symptoms (379.90) (H57.9)\par Upper respiratory infection (465.9) (J06.9)\par Visit for eye and vision exam (V72.0) (Z01.00)\par \par Past Medical History\par History of dental examination (V15.89) (Z92.89)\par \par Current Meds\par Daily Multi Oral Tablet; TAKE 1 TABLET DAILY\par Genvoya [phone] MG Oral Tablet; TAKE 1 TABLET BY MOUTH DAILY\par Vitamin D3 2000 UNIT Oral Tablet; Take 1 tablet daily\par \par Allergies\par No Known Drug Allergies\par \par

## 2022-06-30 ENCOUNTER — NON-APPOINTMENT (OUTPATIENT)
Age: 32
End: 2022-06-30

## 2022-06-30 LAB
25(OH)D3 SERPL-MCNC: 24.1 NG/ML
ALBUMIN SERPL ELPH-MCNC: 3.9 G/DL
ALP BLD-CCNC: 89 U/L
ALT SERPL-CCNC: 8 U/L
ANION GAP SERPL CALC-SCNC: 13 MMOL/L
AST SERPL-CCNC: 13 U/L
BASOPHILS # BLD AUTO: 0.02 K/UL
BASOPHILS NFR BLD AUTO: 0.4 %
BILIRUB SERPL-MCNC: 0.2 MG/DL
BUN SERPL-MCNC: 20 MG/DL
CALCIUM SERPL-MCNC: 9.3 MG/DL
CD3 CELLS # BLD: 815 /UL
CD3 CELLS NFR BLD: 90 %
CD3+CD4+ CELLS # BLD: 237 /UL
CD3+CD4+ CELLS NFR BLD: 26 %
CD3+CD4+ CELLS/CD3+CD8+ CLL SPEC: 0.45 RATIO
CD3+CD8+ CELLS # SPEC: 527 /UL
CD3+CD8+ CELLS NFR BLD: 58 %
CHLORIDE SERPL-SCNC: 99 MMOL/L
CHOLEST SERPL-MCNC: 117 MG/DL
CO2 SERPL-SCNC: 26 MMOL/L
CREAT SERPL-MCNC: 1.51 MG/DL
EGFR: 62 ML/MIN/1.73M2
EOSINOPHIL # BLD AUTO: 0.06 K/UL
EOSINOPHIL NFR BLD AUTO: 1.1 %
ESTIMATED AVERAGE GLUCOSE: 114 MG/DL
GLUCOSE SERPL-MCNC: 97 MG/DL
HBA1C MFR BLD HPLC: 5.6 %
HBV SURFACE AG SER QL: NONREACTIVE
HCT VFR BLD CALC: 36.5 %
HCV AB SER QL: NONREACTIVE
HCV S/CO RATIO: 0.2 S/CO
HDLC SERPL-MCNC: 29 MG/DL
HGB BLD-MCNC: 10.7 G/DL
HIV1 RNA # SERPL NAA+PROBE: ABNORMAL
HIV1 RNA # SERPL NAA+PROBE: NORMAL
IMM GRANULOCYTES NFR BLD AUTO: 0.5 %
LDLC SERPL CALC-MCNC: 70 MG/DL
LYMPHOCYTES # BLD AUTO: 1.09 K/UL
LYMPHOCYTES NFR BLD AUTO: 19.8 %
MAN DIFF?: NORMAL
MCHC RBC-ENTMCNC: 23.5 PG
MCHC RBC-ENTMCNC: 29.3 GM/DL
MCV RBC AUTO: 80.2 FL
MONOCYTES # BLD AUTO: 0.63 K/UL
MONOCYTES NFR BLD AUTO: 11.4 %
NEUTROPHILS # BLD AUTO: 3.68 K/UL
NEUTROPHILS NFR BLD AUTO: 66.8 %
NONHDLC SERPL-MCNC: 89 MG/DL
PLATELET # BLD AUTO: 242 K/UL
POTASSIUM SERPL-SCNC: 4 MMOL/L
PROT SERPL-MCNC: 7.4 G/DL
PSA SERPL-MCNC: 0.34 NG/ML
RBC # BLD: 4.55 M/UL
RBC # FLD: 14.6 %
SODIUM SERPL-SCNC: 138 MMOL/L
T PALLIDUM AB SER QL IA: NEGATIVE
TRIGL SERPL-MCNC: 93 MG/DL
VIRAL LOAD INTERP: NORMAL
VIRAL LOAD LOG: 4.63
WBC # FLD AUTO: 5.51 K/UL

## 2022-07-01 LAB
C TRACH RRNA SPEC QL NAA+PROBE: NOT DETECTED
C TRACH RRNA SPEC QL NAA+PROBE: NOT DETECTED
N GONORRHOEA RRNA SPEC QL NAA+PROBE: NOT DETECTED
N GONORRHOEA RRNA SPEC QL NAA+PROBE: NOT DETECTED
SOURCE ANAL: NORMAL
SOURCE ORAL: NORMAL

## 2022-07-06 ENCOUNTER — NON-APPOINTMENT (OUTPATIENT)
Age: 32
End: 2022-07-06

## 2022-07-11 ENCOUNTER — NON-APPOINTMENT (OUTPATIENT)
Age: 32
End: 2022-07-11

## 2022-07-13 ENCOUNTER — NON-APPOINTMENT (OUTPATIENT)
Age: 32
End: 2022-07-13

## 2022-07-20 RX ORDER — EPINEPHRINE 0.3 MG/.3ML
0.3 INJECTION INTRAMUSCULAR
Qty: 2 | Refills: 0 | Status: DISCONTINUED | COMMUNITY
Start: 2022-05-29

## 2022-07-20 RX ORDER — IBUPROFEN 800 MG/1
800 TABLET, FILM COATED ORAL
Qty: 20 | Refills: 0 | Status: DISCONTINUED | COMMUNITY
Start: 2022-05-18

## 2022-07-20 RX ORDER — CHOLECALCIFEROL (VITAMIN D3) 50 MCG
50 MCG TABLET ORAL
Qty: 30 | Refills: 5 | Status: DISCONTINUED | COMMUNITY
Start: 2020-07-28 | End: 2022-07-20

## 2022-07-20 RX ORDER — PREDNISONE 20 MG/1
20 TABLET ORAL
Qty: 21 | Refills: 0 | Status: DISCONTINUED | COMMUNITY
Start: 2022-05-29

## 2022-07-20 RX ORDER — SENNOSIDES 8.6 MG/1
8.6 TABLET, COATED ORAL
Qty: 60 | Refills: 0 | Status: DISCONTINUED | COMMUNITY
Start: 2022-04-12

## 2022-07-20 RX ORDER — POLYETHYLENE GLYCOL 3350 17 G/17G
17 POWDER, FOR SOLUTION ORAL
Qty: 238 | Refills: 0 | Status: DISCONTINUED | COMMUNITY
Start: 2022-04-12

## 2022-07-20 RX ORDER — HYDROCORTISONE 25 MG/G
2.5 CREAM TOPICAL
Qty: 28 | Refills: 0 | Status: DISCONTINUED | COMMUNITY
Start: 2022-04-12

## 2022-07-20 RX ORDER — MULTIVITAMIN/IRON/FOLIC ACID 18MG-0.4MG
TABLET ORAL DAILY
Qty: 30 | Refills: 5 | Status: DISCONTINUED | COMMUNITY
Start: 2018-11-19 | End: 2022-07-20

## 2022-07-20 RX ORDER — DIPHENHYDRAMINE HCL 25 MG/1
25 CAPSULE ORAL
Qty: 30 | Refills: 0 | Status: DISCONTINUED | COMMUNITY
Start: 2022-05-29

## 2022-07-21 ENCOUNTER — APPOINTMENT (OUTPATIENT)
Dept: SURGERY | Facility: CLINIC | Age: 32
End: 2022-07-21

## 2022-07-21 VITALS
SYSTOLIC BLOOD PRESSURE: 124 MMHG | HEART RATE: 103 BPM | OXYGEN SATURATION: 97 % | HEIGHT: 78 IN | RESPIRATION RATE: 17 BRPM | TEMPERATURE: 98 F | DIASTOLIC BLOOD PRESSURE: 81 MMHG

## 2022-07-21 DIAGNOSIS — K62.89 OTHER SPECIFIED DISEASES OF ANUS AND RECTUM: ICD-10-CM

## 2022-07-21 PROCEDURE — 99203 OFFICE O/P NEW LOW 30 MIN: CPT

## 2022-07-21 PROCEDURE — 99072 ADDL SUPL MATRL&STAF TM PHE: CPT

## 2022-07-21 NOTE — PHYSICAL EXAM
[Normal Breath Sounds] : Normal breath sounds [Normal Heart Sounds] : normal heart sounds [No Rash or Lesion] : No rash or lesion [Alert] : alert [Oriented to Person] : oriented to person [Oriented to Place] : oriented to place [Oriented to Time] : oriented to time [Calm] : calm [JVD] : no jugular venous distention  [de-identified] : NAD [de-identified] : Neuro- Cranial nerve grossly intact. Normal gait.  [de-identified] : ROm WNL [FreeTextEntry1] : Perianal inspection demonstrated a 2 cm left anterior ulcerated firm mass.  Internal digital exam and anoscopy deferred because of severe pain.

## 2022-07-21 NOTE — HISTORY OF PRESENT ILLNESS
[FreeTextEntry1] : Murphy is a 31 year old male here for consultation of rectal discomfort.\par \par Pt never had a colonoscopy. \par \par Patients having rectal discomfort,excess tissue  and pain for the past couple of months and worsened for the past  two days during BM and lingers for whole day. Pain wakes him up at night. Reports pain all day. Reports having BM 2-3 loose BMs daily. Stools are watery with clumps and occasional large blood clots. Last time he saw blood was 2 days  ago. History of fissure in the past and  was treated . Mucus discharge noted while passing gas yesterday. Not on anticoagulants. No family history of [place]/Rectal CA. \par  \par

## 2022-07-21 NOTE — ASSESSMENT
[FreeTextEntry1] : I have seen and evaluated patient and I have corroborated all nursing input into this note.  Patient with severely painful ulcerated anal mass.  I am concerned this is anal cancer.  Biopsies are needed.  Indications, risks, benefits, alternatives for exam under anesthesia with biopsies reviewed including but not limited to bleeding, infection, and persistent pain.  All questions answered.

## 2022-07-25 ENCOUNTER — OUTPATIENT (OUTPATIENT)
Dept: OUTPATIENT SERVICES | Facility: HOSPITAL | Age: 32
LOS: 1 days | End: 2022-07-25
Payer: MEDICAID

## 2022-07-25 ENCOUNTER — TRANSCRIPTION ENCOUNTER (OUTPATIENT)
Age: 32
End: 2022-07-25

## 2022-07-25 VITALS
TEMPERATURE: 98 F | HEIGHT: 78 IN | WEIGHT: 179.02 LBS | RESPIRATION RATE: 16 BRPM | DIASTOLIC BLOOD PRESSURE: 73 MMHG | SYSTOLIC BLOOD PRESSURE: 119 MMHG | HEART RATE: 98 BPM | OXYGEN SATURATION: 98 %

## 2022-07-25 DIAGNOSIS — Z01.818 ENCOUNTER FOR OTHER PREPROCEDURAL EXAMINATION: ICD-10-CM

## 2022-07-25 DIAGNOSIS — B20 HUMAN IMMUNODEFICIENCY VIRUS [HIV] DISEASE: ICD-10-CM

## 2022-07-25 DIAGNOSIS — K62.89 OTHER SPECIFIED DISEASES OF ANUS AND RECTUM: ICD-10-CM

## 2022-07-25 LAB
ANION GAP SERPL CALC-SCNC: 12 MMOL/L — SIGNIFICANT CHANGE UP (ref 5–17)
BUN SERPL-MCNC: 16 MG/DL — SIGNIFICANT CHANGE UP (ref 7–23)
CALCIUM SERPL-MCNC: 9.1 MG/DL — SIGNIFICANT CHANGE UP (ref 8.4–10.5)
CHLORIDE SERPL-SCNC: 99 MMOL/L — SIGNIFICANT CHANGE UP (ref 96–108)
CO2 SERPL-SCNC: 27 MMOL/L — SIGNIFICANT CHANGE UP (ref 22–31)
CREAT SERPL-MCNC: 1.33 MG/DL — HIGH (ref 0.5–1.3)
EGFR: 72 ML/MIN/1.73M2 — SIGNIFICANT CHANGE UP
GLUCOSE SERPL-MCNC: 97 MG/DL — SIGNIFICANT CHANGE UP (ref 70–99)
HCT VFR BLD CALC: 27.1 % — LOW (ref 39–50)
HGB BLD-MCNC: 8.2 G/DL — LOW (ref 13–17)
MCHC RBC-ENTMCNC: 22.8 PG — LOW (ref 27–34)
MCHC RBC-ENTMCNC: 30.3 GM/DL — LOW (ref 32–36)
MCV RBC AUTO: 75.3 FL — LOW (ref 80–100)
NRBC # BLD: 0 /100 WBCS — SIGNIFICANT CHANGE UP (ref 0–0)
PLATELET # BLD AUTO: 216 K/UL — SIGNIFICANT CHANGE UP (ref 150–400)
POTASSIUM SERPL-MCNC: 3.3 MMOL/L — LOW (ref 3.5–5.3)
POTASSIUM SERPL-SCNC: 3.3 MMOL/L — LOW (ref 3.5–5.3)
RBC # BLD: 3.6 M/UL — LOW (ref 4.2–5.8)
RBC # FLD: 15.9 % — HIGH (ref 10.3–14.5)
SODIUM SERPL-SCNC: 138 MMOL/L — SIGNIFICANT CHANGE UP (ref 135–145)
WBC # BLD: 9.14 K/UL — SIGNIFICANT CHANGE UP (ref 3.8–10.5)
WBC # FLD AUTO: 9.14 K/UL — SIGNIFICANT CHANGE UP (ref 3.8–10.5)

## 2022-07-25 PROCEDURE — 80048 BASIC METABOLIC PNL TOTAL CA: CPT

## 2022-07-25 PROCEDURE — 85027 COMPLETE CBC AUTOMATED: CPT

## 2022-07-25 PROCEDURE — G0463: CPT

## 2022-07-25 RX ORDER — LIDOCAINE HCL 20 MG/ML
0.2 VIAL (ML) INJECTION ONCE
Refills: 0 | Status: DISCONTINUED | OUTPATIENT
Start: 2022-07-26 | End: 2022-08-10

## 2022-07-25 RX ORDER — SODIUM CHLORIDE 9 MG/ML
1000 INJECTION, SOLUTION INTRAVENOUS
Refills: 0 | Status: DISCONTINUED | OUTPATIENT
Start: 2022-07-26 | End: 2022-08-10

## 2022-07-25 NOTE — H&P PST ADULT - NSICDXPASTMEDICALHX_GEN_ALL_CORE_FT
PAST MEDICAL HISTORY:  Current smoker     History of depression and anxiety    HIV infection 6/30/2022 virus detected, switched to Biktarvy, male partners

## 2022-07-25 NOTE — H&P PST ADULT - ATTENDING COMMENTS
If benign fissure identified will do LIS because the pain so severe.  Risk of incontinence of flatus and stool staining reviewed and all questions answered

## 2022-07-25 NOTE — H&P PST ADULT - HISTORY OF PRESENT ILLNESS
31 yr old M with HIV followed by jacky [facility] , switched to Biktarvy a month ago, tolerating medication well. Patient reports anal mass and worsening of rectal pain, presents to PST for scheduled rectal examination under anesthesia, biopsy anal mass on 7/26/2022. Denies fever, chills, no acute complaints. Denies Covid exposure. Covid PCR 7/23/2022 - negative in [facility].

## 2022-07-25 NOTE — H&P PST ADULT - PROBLEM SELECTOR PLAN 1
rectal examination under anesthesia   biopsy anal mass   PST Instructions provided, patient verbalized understanding   CBC, BMP collected and sent   Covid PCR 7/23- negative in [facility]

## 2022-07-26 ENCOUNTER — OUTPATIENT (OUTPATIENT)
Dept: OUTPATIENT SERVICES | Facility: HOSPITAL | Age: 32
LOS: 1 days | End: 2022-07-26
Payer: MEDICAID

## 2022-07-26 ENCOUNTER — TRANSCRIPTION ENCOUNTER (OUTPATIENT)
Age: 32
End: 2022-07-26

## 2022-07-26 ENCOUNTER — RESULT REVIEW (OUTPATIENT)
Age: 32
End: 2022-07-26

## 2022-07-26 ENCOUNTER — NON-APPOINTMENT (OUTPATIENT)
Age: 32
End: 2022-07-26

## 2022-07-26 ENCOUNTER — APPOINTMENT (OUTPATIENT)
Dept: SURGERY | Facility: HOSPITAL | Age: 32
End: 2022-07-26

## 2022-07-26 VITALS
HEART RATE: 100 BPM | RESPIRATION RATE: 18 BRPM | DIASTOLIC BLOOD PRESSURE: 69 MMHG | OXYGEN SATURATION: 100 % | SYSTOLIC BLOOD PRESSURE: 142 MMHG | TEMPERATURE: 100 F

## 2022-07-26 VITALS
SYSTOLIC BLOOD PRESSURE: 113 MMHG | RESPIRATION RATE: 16 BRPM | OXYGEN SATURATION: 96 % | TEMPERATURE: 98 F | HEIGHT: 78 IN | HEART RATE: 122 BPM | DIASTOLIC BLOOD PRESSURE: 66 MMHG | WEIGHT: 179.02 LBS

## 2022-07-26 DIAGNOSIS — K62.89 OTHER SPECIFIED DISEASES OF ANUS AND RECTUM: ICD-10-CM

## 2022-07-26 PROCEDURE — 88305 TISSUE EXAM BY PATHOLOGIST: CPT

## 2022-07-26 PROCEDURE — 45171 EXC RECT TUM TRANSANAL PART: CPT

## 2022-07-26 PROCEDURE — 88360 TUMOR IMMUNOHISTOCHEM/MANUAL: CPT | Mod: 26

## 2022-07-26 PROCEDURE — 88360 TUMOR IMMUNOHISTOCHEM/MANUAL: CPT

## 2022-07-26 PROCEDURE — C1889: CPT

## 2022-07-26 PROCEDURE — 88341 IMHCHEM/IMCYTCHM EA ADD ANTB: CPT

## 2022-07-26 PROCEDURE — 88342 IMHCHEM/IMCYTCHM 1ST ANTB: CPT | Mod: 26,59

## 2022-07-26 PROCEDURE — 88341 IMHCHEM/IMCYTCHM EA ADD ANTB: CPT | Mod: 26,59

## 2022-07-26 PROCEDURE — 88305 TISSUE EXAM BY PATHOLOGIST: CPT | Mod: 26

## 2022-07-26 DEVICE — SURGICEL FIBRILLAR 2 X 4": Type: IMPLANTABLE DEVICE | Status: FUNCTIONAL

## 2022-07-26 RX ORDER — IBUPROFEN 200 MG
800 TABLET ORAL ONCE
Refills: 0 | Status: DISCONTINUED | OUTPATIENT
Start: 2022-07-26 | End: 2022-08-10

## 2022-07-26 RX ORDER — BICTEGRAVIR SODIUM, EMTRICITABINE, AND TENOFOVIR ALAFENAMIDE FUMARATE 30; 120; 15 MG/1; MG/1; MG/1
1 TABLET ORAL
Qty: 0 | Refills: 0 | DISCHARGE

## 2022-07-26 RX ORDER — OXYCODONE HYDROCHLORIDE 5 MG/1
5 TABLET ORAL ONCE
Refills: 0 | Status: DISCONTINUED | OUTPATIENT
Start: 2022-07-26 | End: 2022-07-26

## 2022-07-26 RX ADMIN — OXYCODONE HYDROCHLORIDE 5 MILLIGRAM(S): 5 TABLET ORAL at 16:29

## 2022-07-26 NOTE — ASU DISCHARGE PLAN (ADULT/PEDIATRIC) - CALL YOUR DOCTOR IF YOU HAVE ANY OF THE FOLLOWING:
Bleeding that does not stop/Swelling that gets worse/Pain not relieved by Medications/Numbness, tingling, color or temperature change to extremity/Excessive diarrhea/Inability to tolerate liquids or foods

## 2022-07-26 NOTE — ASU DISCHARGE PLAN (ADULT/PEDIATRIC) - ASU DC SPECIAL INSTRUCTIONSFT
ACTIVITY: No heavy lifting anything more than 10-15lbs or straining. Otherwise, you may return to your usual level of physical activity. If you are taking narcotic pain medication (such as oxycodone or Percocet), do NOT drive a car, operate machinery or make important decisions.  NOTIFY YOUR SURGEON IF: You have any excessive bleeding or pus draining from your wound, any fever (over 100.4 F) or chills, persistent nausea/vomiting with inability to tolerate food or liquids, persistent diarrhea, or if your pain is not controlled on your discharge pain medications.  FOLLOW-UP:  1. Please call to make a follow-up appointment in 1-2 weeks upon discharge from the hospital - Please call immediately upon discharge to schedule the appointment  2. Please follow up with your primary care physician in one week regarding your hospitalization.

## 2022-07-26 NOTE — BRIEF OPERATIVE NOTE - NSICDXBRIEFPROCEDURE_GEN_ALL_CORE_FT
PROCEDURES:  Exam under anesthesia, anus 26-Jul-2022 16:05:58  Krishan Wagoner  Biopsy of mass of anal canal 26-Jul-2022 16:06:09  Krishan Wagoner

## 2022-07-26 NOTE — ASU DISCHARGE PLAN (ADULT/PEDIATRIC) - ***IN THE EVENT THAT YOU DEVELOP A COMPLICATION AND YOU ARE UNABLE TO REACH YOUR OWN PHYSICIAN, YOU MAY CONTACT:
"Chief Complaint   Patient presents with   • Conjunctivitis     right eye pink started this am.    • Cough     congestion x2 days       HISTORY OF PRESENT ILLNESS: Patient is a 47 y.o. male who presents today for about 5 days of worsening sinus congestion/sore throat and in last 2 days right red eye, drainage and discomfort.  Patient states he is waking up with his eye crusted shut with drainage.  He denies vision changes.   Does not wear contact lenses.  Has bad sore throat, diffuse, aching.  Sinus congestion/PND.  Coughing also started in last few days.  He has had fevers on and off for last several days as well.  OTC not helping, nothing attempted for the eye.  Did miss work as well.     There are no active problems to display for this patient.      Allergies:Patient has no known allergies.    Current Outpatient Prescriptions Ordered in [facility]   Medication Sig Dispense Refill   • amoxicillin-clavulanate (AUGMENTIN) 875-125 MG Tab Take 1 Tab by mouth 2 times a day. 20 Tab 0   • tobramycin (TOBREX) 0.3 % Solution ophthalmic solution Place 1 Drop in right eye every 4 hours for 7 days. 3 mL 0     No current Epic-ordered facility-administered medications on file.        No past medical history on file.    Social History   Substance Use Topics   • Smoking status: Never Smoker   • Smokeless tobacco: Never Used   • Alcohol use No       No family status information on file.   No family history on file. No pertinent FH    ROS:  Review of Systems   Constitutional: SEE HPI  HENT: SEE HPI  Eyes: SEE HPI  Respiratory: SEE HPI   Cardiovascular: Negative for chest pain, palpitations, orthopnea and leg swelling.   Gastrointestinal: Negative for heartburn, nausea, vomiting and abdominal pain.       Exam:  Blood pressure 122/78, pulse 88, temperature 37.5 °C (99.5 °F), temperature source Temporal, resp. rate 17, height 1.854 m (6' 1\"), weight 96.6 kg (213 lb), SpO2 97 %.  General:  Well nourished, well developed male in NAD  Eyes: " PERRLA, EOM within normal limits, moderate right conjunctival injection with yellow crusting/drainage, no scleral icterus, visual fields and acuity grossly intact.  Ears: Normal shape and symmetry, no tenderness, no discharge. External canals are without any significant edema or erythema. Tympanic membranes are without any inflammation, no effusion. Gross auditory acuity is intact  Nose: Symmetrical, sinuses diffuse tenderness, boggy turbinates.   Mouth: reasonable hygiene, significant posterior erythema without exudates however copious purulent post nasal drainage.   Neck: no masses, range of motion within normal limits, no tracheal deviation. No lymphadenopathy  Pulmonary: Normal respiratory effort, no wheezes, crackles, or rhonchi.  Cardiovascular: regular rate and rhythm without murmurs, rubs, or gallops.  Skin: No visible rashes or lesion. Warm, pink, dry.   Extremities: no clubbing, cyanosis, or edema.  Neuro: A&O x 3. Speech normal/clear.  Normal gait.       Assessment/Plan:  1. Acute non-recurrent maxillary sinusitis  amoxicillin-clavulanate (AUGMENTIN) 875-125 MG Tab   2. Acute bacterial conjunctivitis of right eye  tobramycin (TOBREX) 0.3 % Solution ophthalmic solution        -consistent with unilateral bacterial conjunctivitis.     -drops as above.   -hand hygiene encouraged.   -oral coverage, copious purulent drainage, fevers.   -work note provided  -RTC precautions and ER precautions regarding eyes discussed      Supportive care, differential diagnoses, and indications for immediate follow-up discussed with patient.   Pathogenesis of diagnosis discussed including typical length and natural progression.   Instructed to return to clinic or nearest emergency department for any change in condition, further concerns, or worsening of symptoms.  Patient states understanding of the plan of care and discharge instructions.        Keara Pearce P.A.-C.     Statement Selected

## 2022-07-26 NOTE — PRE-ANESTHESIA EVALUATION ADULT - NSANTHSUBSTSD_GEN_ALL_CORE
marijuana; cocaine/Yes marijuana (smokes and eats multiple times/day); cocaine (occasionally, last snorted three weeks ago)/Yes

## 2022-07-26 NOTE — ASU DISCHARGE PLAN (ADULT/PEDIATRIC) - NS MD DC FALL RISK RISK
For information on Fall & Injury Prevention, visit: [email or web address] OR  [email or web address] OR  https://www.cdc.gov/steadi/patient.html

## 2022-07-27 ENCOUNTER — OUTPATIENT (OUTPATIENT)
Dept: OUTPATIENT SERVICES | Facility: HOSPITAL | Age: 32
LOS: 1 days | Discharge: HOME | End: 2022-07-27

## 2022-07-27 DIAGNOSIS — Z09 ENCOUNTER FOR FOLLOW-UP EXAMINATION AFTER COMPLETED TREATMENT FOR CONDITIONS OTHER THAN MALIGNANT NEOPLASM: ICD-10-CM

## 2022-07-27 DIAGNOSIS — C21.0 MALIGNANT NEOPLASM OF ANUS, UNSPECIFIED: ICD-10-CM

## 2022-07-27 PROBLEM — B20 HUMAN IMMUNODEFICIENCY VIRUS [HIV] DISEASE: Chronic | Status: ACTIVE | Noted: 2022-07-25

## 2022-07-27 PROBLEM — Z86.59 PERSONAL HISTORY OF OTHER MENTAL AND BEHAVIORAL DISORDERS: Chronic | Status: ACTIVE | Noted: 2022-07-25

## 2022-07-27 PROBLEM — F17.200 NICOTINE DEPENDENCE, UNSPECIFIED, UNCOMPLICATED: Chronic | Status: ACTIVE | Noted: 2022-07-25

## 2022-07-28 ENCOUNTER — RESULT REVIEW (OUTPATIENT)
Age: 32
End: 2022-07-28

## 2022-07-28 ENCOUNTER — APPOINTMENT (OUTPATIENT)
Dept: HEMATOLOGY ONCOLOGY | Facility: CLINIC | Age: 32
End: 2022-07-28

## 2022-07-28 ENCOUNTER — OUTPATIENT (OUTPATIENT)
Dept: OUTPATIENT SERVICES | Facility: HOSPITAL | Age: 32
LOS: 1 days | End: 2022-07-28
Payer: MEDICAID

## 2022-07-28 VITALS
DIASTOLIC BLOOD PRESSURE: 69 MMHG | HEIGHT: 78 IN | SYSTOLIC BLOOD PRESSURE: 118 MMHG | HEART RATE: 101 BPM | RESPIRATION RATE: 17 BRPM | TEMPERATURE: 98.4 F | OXYGEN SATURATION: 96 % | BODY MASS INDEX: 21.43 KG/M2 | WEIGHT: 185.19 LBS

## 2022-07-28 DIAGNOSIS — F41.9 ANXIETY DISORDER, UNSPECIFIED: ICD-10-CM

## 2022-07-28 DIAGNOSIS — B20 HUMAN IMMUNODEFICIENCY VIRUS [HIV] DISEASE: ICD-10-CM

## 2022-07-28 DIAGNOSIS — Z60.2 PROBLEMS RELATED TO LIVING ALONE: ICD-10-CM

## 2022-07-28 DIAGNOSIS — C21.0 MALIGNANT NEOPLASM OF ANUS, UNSPECIFIED: ICD-10-CM

## 2022-07-28 DIAGNOSIS — Z78.9 OTHER SPECIFIED HEALTH STATUS: ICD-10-CM

## 2022-07-28 LAB
BASOPHILS # BLD AUTO: 0.02 K/UL — SIGNIFICANT CHANGE UP (ref 0–0.2)
BASOPHILS NFR BLD AUTO: 0.2 % — SIGNIFICANT CHANGE UP (ref 0–2)
EOSINOPHIL # BLD AUTO: 0.16 K/UL — SIGNIFICANT CHANGE UP (ref 0–0.5)
EOSINOPHIL NFR BLD AUTO: 1.8 % — SIGNIFICANT CHANGE UP (ref 0–6)
HCT VFR BLD CALC: 26.1 % — LOW (ref 39–50)
HGB BLD-MCNC: 8 G/DL — LOW (ref 13–17)
IMM GRANULOCYTES NFR BLD AUTO: 1 % — SIGNIFICANT CHANGE UP (ref 0–1.5)
LYMPHOCYTES # BLD AUTO: 1.41 K/UL — SIGNIFICANT CHANGE UP (ref 1–3.3)
LYMPHOCYTES # BLD AUTO: 16.1 % — SIGNIFICANT CHANGE UP (ref 13–44)
MCHC RBC-ENTMCNC: 22.7 PG — LOW (ref 27–34)
MCHC RBC-ENTMCNC: 30.7 G/DL — LOW (ref 32–36)
MCV RBC AUTO: 74.1 FL — LOW (ref 80–100)
MONOCYTES # BLD AUTO: 0.65 K/UL — SIGNIFICANT CHANGE UP (ref 0–0.9)
MONOCYTES NFR BLD AUTO: 7.4 % — SIGNIFICANT CHANGE UP (ref 2–14)
NEUTROPHILS # BLD AUTO: 6.44 K/UL — SIGNIFICANT CHANGE UP (ref 1.8–7.4)
NEUTROPHILS NFR BLD AUTO: 73.5 % — SIGNIFICANT CHANGE UP (ref 43–77)
NRBC # BLD: 0 /100 WBCS — SIGNIFICANT CHANGE UP (ref 0–0)
PLATELET # BLD AUTO: 413 K/UL — HIGH (ref 150–400)
RBC # BLD: 3.52 M/UL — LOW (ref 4.2–5.8)
RBC # FLD: 16.1 % — HIGH (ref 10.3–14.5)
WBC # BLD: 8.77 K/UL — SIGNIFICANT CHANGE UP (ref 3.8–10.5)
WBC # FLD AUTO: 8.77 K/UL — SIGNIFICANT CHANGE UP (ref 3.8–10.5)

## 2022-07-28 PROCEDURE — 99205 OFFICE O/P NEW HI 60 MIN: CPT | Mod: 25

## 2022-07-28 PROCEDURE — 86901 BLOOD TYPING SEROLOGIC RH(D): CPT

## 2022-07-28 PROCEDURE — 99417 PROLNG OP E/M EACH 15 MIN: CPT

## 2022-07-28 PROCEDURE — 86850 RBC ANTIBODY SCREEN: CPT

## 2022-07-28 PROCEDURE — 86900 BLOOD TYPING SEROLOGIC ABO: CPT

## 2022-07-28 PROCEDURE — 99072 ADDL SUPL MATRL&STAF TM PHE: CPT

## 2022-07-28 RX ORDER — BICTEGRAVIR SODIUM, EMTRICITABINE, AND TENOFOVIR ALAFENAMIDE FUMARATE 50; 200; 25 MG/1; MG/1; MG/1
50-200-25 TABLET ORAL
Qty: 30 | Refills: 3 | Status: ACTIVE | COMMUNITY
Start: 2022-06-29

## 2022-07-28 RX ORDER — MULTIVITAMIN
TABLET ORAL
Qty: 30 | Refills: 3 | Status: ACTIVE | COMMUNITY
Start: 2022-06-30

## 2022-07-28 RX ORDER — DOCUSATE SODIUM 100 MG/1
100 CAPSULE, LIQUID FILLED ORAL
Qty: 30 | Refills: 0 | Status: ACTIVE | COMMUNITY
Start: 2022-06-29

## 2022-07-28 RX ORDER — OXYCODONE 5 MG/1
5 TABLET ORAL
Qty: 10 | Refills: 0 | Status: DISCONTINUED | COMMUNITY
Start: 2022-07-21 | End: 2022-07-28

## 2022-07-28 RX ORDER — OXYCODONE AND ACETAMINOPHEN 5; 325 MG/1; MG/1
5-325 TABLET ORAL
Qty: 20 | Refills: 0 | Status: DISCONTINUED | COMMUNITY
Start: 2022-06-29 | End: 2022-07-28

## 2022-07-28 SDOH — SOCIAL STABILITY - SOCIAL INSECURITY: PROBLEMS RELATED TO LIVING ALONE: Z60.2

## 2022-07-28 NOTE — PHYSICAL EXAM
[Ambulatory and capable of all self care but unable to carry out any work activities] : Status 2- Ambulatory and capable of all self care but unable to carry out any work activities. Up and about more than 50% of waking hours [Thin] : thin [Normal] : affect appropriate [FreeTextEntry1] : Perianal inspection demonstrated a 2 cm left anterior ulcerated firm mass. Internal digital exam and anoscopy deferred because of severe pain.  [de-identified] : several ~ 2cm nodes palpable in the left inguinal space (none noted on right).

## 2022-07-28 NOTE — CONSULT LETTER
[Dear  ___] : Dear  [unfilled], [Consult Letter:] : I had the pleasure of evaluating your patient, [unfilled]. [Please see my note below.] : Please see my note below. [Consult Closing:] : Thank you very much for allowing me to participate in the care of this patient.  If you have any questions, please do not hesitate to contact me. [Sincerely,] : Sincerely, [DrIsabel  ___] : Dr. JC

## 2022-07-28 NOTE — HISTORY OF PRESENT ILLNESS
[de-identified] : Mr. Porter is a 31 year old male with past medical history of HIV since 2003 (no infection related) presenting to the office for an initial consultation on anal CA.\par \par Patients having rectal discomfort since 4/2022. Then noted excess tissue and pain for the past couple of months and worsened for the past two days during BM and lingers for whole day. Pain wakes him up at night. Pain is intermittent all day. Reports having BM 2-3 loose BMs daily. Stools are watery with clumps and occasional large blood clots. Last time he saw blood was 2 days ago. History of fissure in the past and was treated . Mucus discharge noted while passing gas yesterday. Not on anticoagulants. No family history of [place]/Rectal CA. \par \par He has lost weight 240 to 190 pounds.\par \par EUA 7/26/2022: Mass left lateral anal canal then extends proximally for 5-6 cm near circumferential.  Ulcerated and fixed.  Extensive biopsies taken.\par Patient states a bit more pain since biopsy.\par \par I called radiology to get CT scan done today.\par Will do MRI next week due to pain; he wants to wait until then.\par \par Of note, he is very emotional today, tearful, and afraid. \par He broke down multiple time and was able to collect himself to have some limited conversations.\par He did decline SW support today on multiple requests.\par He is here alone today, and took an Uber from the [place] to arrive.\par Of note his mother lives in [place] and is his only support.\par He has no significant other, and lives alone.\par He does not work and is supported via public assistance. [de-identified] : ID: Jamaal Field\par Surgeon: Driss Clay\par Radiation Oncology: pending\par \par patient cell [phone]

## 2022-07-29 LAB
25(OH)D3 SERPL-MCNC: 16.4 NG/ML
ALBUMIN SERPL ELPH-MCNC: 3.6 G/DL
ALP BLD-CCNC: 98 U/L
ALT SERPL-CCNC: 10 U/L
ANION GAP SERPL CALC-SCNC: 14 MMOL/L
APTT BLD: 27.8 SEC
AST SERPL-CCNC: 16 U/L
BILIRUB SERPL-MCNC: 0.3 MG/DL
BUN SERPL-MCNC: 6 MG/DL
CALCIUM SERPL-MCNC: 9.2 MG/DL
CEA SERPL-MCNC: 18.2 NG/ML
CHLORIDE SERPL-SCNC: 100 MMOL/L
CO2 SERPL-SCNC: 28 MMOL/L
COVID-19 NUCLEOCAPSID  GAM ANTIBODY INTERPRETATION: NEGATIVE
COVID-19 SPIKE DOMAIN ANTIBODY INTERPRETATION: POSITIVE
CREAT SERPL-MCNC: 1.06 MG/DL
EGFR: 95 ML/MIN/1.73M2
ESTIMATED AVERAGE GLUCOSE: 120 MG/DL
FERRITIN SERPL-MCNC: 77 NG/ML
GLUCOSE SERPL-MCNC: 96 MG/DL
HBA1C MFR BLD HPLC: 5.8 %
HBV SURFACE AB SER QL: NONREACTIVE
HBV SURFACE AG SER QL: NONREACTIVE
HCV AB SER QL: NONREACTIVE
HCV S/CO RATIO: 0.15 S/CO
INR PPP: 1.09 RATIO
LDH SERPL-CCNC: 214 U/L
POTASSIUM SERPL-SCNC: 3.6 MMOL/L
PROT SERPL-MCNC: 6.7 G/DL
PT BLD: 12.7 SEC
SARS-COV-2 AB SERPL IA-ACNC: 2.94 U/ML
SARS-COV-2 AB SERPL QL IA: 0.1 INDEX
SODIUM SERPL-SCNC: 141 MMOL/L

## 2022-08-01 ENCOUNTER — OUTPATIENT (OUTPATIENT)
Dept: OUTPATIENT SERVICES | Facility: HOSPITAL | Age: 32
LOS: 1 days | Discharge: HOME | End: 2022-08-01

## 2022-08-01 PROCEDURE — 99072 ADDL SUPL MATRL&STAF TM PHE: CPT

## 2022-08-01 PROCEDURE — 77263 THER RADIOLOGY TX PLNG CPLX: CPT

## 2022-08-02 ENCOUNTER — APPOINTMENT (OUTPATIENT)
Dept: RADIATION ONCOLOGY | Facility: CLINIC | Age: 32
End: 2022-08-02

## 2022-08-02 ENCOUNTER — NON-APPOINTMENT (OUTPATIENT)
Age: 32
End: 2022-08-02

## 2022-08-02 PROCEDURE — 99072 ADDL SUPL MATRL&STAF TM PHE: CPT

## 2022-08-02 PROCEDURE — 99204 OFFICE O/P NEW MOD 45 MIN: CPT | Mod: 25,GC

## 2022-08-02 NOTE — REASON FOR VISIT
[Home] : at home, [unfilled] , at the time of the visit. [Medical Office: ([facility])___] : at the medical office located in  [Verbal consent obtained from patient] : the patient, [unfilled]

## 2022-08-03 ENCOUNTER — NON-APPOINTMENT (OUTPATIENT)
Age: 32
End: 2022-08-03

## 2022-08-03 LAB — SURGICAL PATHOLOGY STUDY: SIGNIFICANT CHANGE UP

## 2022-08-04 ENCOUNTER — NON-APPOINTMENT (OUTPATIENT)
Age: 32
End: 2022-08-04

## 2022-08-04 NOTE — VITALS
[Maximal Pain Intensity: 8/10] : 8/10 [Opioid] : opioid [80: Normal activity with effort; some signs or symptoms of disease.] : 80: Normal activity with effort; some signs or symptoms of disease.  [ECOG Performance Status: 1 - Restricted in physically strenuous activity but ambulatory and able to carry out work of a light or sedentary nature] : Performance Status: 1 - Restricted in physically strenuous activity but ambulatory and able to carry out work of a light or sedentary nature, e.g., light house work, office work [Date: ____________] : Patient's last distress assessment performed on [unfilled]. [7 - Distress Level] : Distress Level: 7 [Referred Patient  to social work for follow-up] : Patient was referred to social work for follow-up [FreeTextEntry7] : Transporation and insurance concerns

## 2022-08-04 NOTE — PHYSICAL EXAM
[Normal] : well developed, well nourished, in no acute distress [Thin] : thin [de-identified] : telehealth

## 2022-08-04 NOTE — HISTORY OF PRESENT ILLNESS
[FreeTextEntry1] : Mr. Portre is a 31 year old male with past medical history of HIV since 2003 (no infection related)  presenting to the office for an initial consultation on anal CA.\par \par 4/2022- noticed rectal discomfort, later found excess tissue and pain \par Weight loss:\par 12/2021: 242lb\par 7/28/22: 185lb\par \par 7/21/22: Surgeon Dr. Clay noted 2cm anal mass and scheduled him for biopsy\par \par EUA 7/26/2022: Mass left lateral anal canal then extends proximally for 5-6 cm near circumferential. Ulcerated and fixed. Extensive biopsies taken.\par \par Saw Dr. Frazier 7/28/22 who palpated left sided inguinal nodes\par \par CEA 7/28/22: 18.2\par CT chest and MRI scheduled for 8/3/2022\par \par He has lost weight 240 to 190 pounds.\par \par \par 8/2/22\par Pt presents in consultation. Reports ongoing 8/10 pain (relieved with Rx Oxycodone), fluctuating appetite, and mild constipation. Denies abdominal pain, N/V, or diarrhea. Notes straining and discomfort with urination, muscle weakness on BLE. He is currently staying with his mother. Has MRI scheduled for tomorrow. \par

## 2022-08-04 NOTE — REVIEW OF SYSTEMS
[Constipation] : constipation [Muscle Weakness] : muscle weakness [Difficulty Walking] : difficulty walking [Depression] : depression [Negative] : Respiratory [Anal Pain: Grade 2 - Moderate pain; limiting instrumental ADL] : Anal Pain: Grade 2 - Moderate pain; limiting instrumental ADL [Constipation: Grade 1 - Occasional or intermittent symptoms; occasional use of stool softeners, laxatives, dietary modification, or enema] : Constipation: Grade 1 - Occasional or intermittent symptoms; occasional use of stool softeners, laxatives, dietary modification, or enema [Diarrhea: Grade 0] : Diarrhea: Grade 0 [Dyspepsia: Grade 0] : Dyspepsia: Grade 0 [Dysphagia: Grade 0] : Dysphagia: Grade 0 [Esophagitis: Grade 0] : Esophagitis: Grade 0 [Fecal Incontinence: Grade 0] : Fecal Incontinence: Grade 0 [Gastroparesis: Grade 0] : Gastroparesis: Grade 0 [Nausea: Grade 0] : Nausea: Grade 0 [Proctitis: Grade 0] : Proctitis: Grade 0 [Rectal Pain: Grade 2 - Moderate pain; limiting instrumental ADL] : Rectal Pain: Grade 2 - Moderate pain; limiting instrumental ADL [Small Intestinal Obstruction: Grade 0] : Small Intestinal Obstruction: Grade 0 [Vomiting: Grade 0] : Vomiting: Grade 0 [FreeTextEntry8] : dysuria  [FreeTextEntry9] : BLE

## 2022-08-05 ENCOUNTER — APPOINTMENT (OUTPATIENT)
Dept: MRI IMAGING | Facility: IMAGING CENTER | Age: 32
End: 2022-08-05

## 2022-08-05 LAB
FULL GENE SEQUENCE RESULT: NORMAL
INTERPRETATION PGDFRB: NORMAL
Lab: NORMAL
REF LAB TEST METHOD: NORMAL
REVIEWED BY: NORMAL
TA REPEAT RESULT: NORMAL
TEST PERFORMANCE INFO SPEC: NORMAL

## 2022-08-06 LAB
DPYD GENOTYPE: NORMAL
DPYD PHENOTYPE: NORMAL
DPYD SPECIMEN: NORMAL

## 2022-08-08 ENCOUNTER — APPOINTMENT (OUTPATIENT)
Dept: HEMATOLOGY ONCOLOGY | Facility: CLINIC | Age: 32
End: 2022-08-08

## 2022-08-08 ENCOUNTER — NON-APPOINTMENT (OUTPATIENT)
Age: 32
End: 2022-08-08

## 2022-08-08 VITALS
RESPIRATION RATE: 16 BRPM | WEIGHT: 174.36 LBS | HEIGHT: 78 IN | BODY MASS INDEX: 20.17 KG/M2 | SYSTOLIC BLOOD PRESSURE: 111 MMHG | OXYGEN SATURATION: 97 % | DIASTOLIC BLOOD PRESSURE: 66 MMHG | HEART RATE: 105 BPM | TEMPERATURE: 97.1 F

## 2022-08-08 DIAGNOSIS — Z01.812 ENCOUNTER FOR PREPROCEDURAL LABORATORY EXAMINATION: ICD-10-CM

## 2022-08-08 DIAGNOSIS — G47.9 SLEEP DISORDER, UNSPECIFIED: ICD-10-CM

## 2022-08-08 PROCEDURE — 99205 OFFICE O/P NEW HI 60 MIN: CPT

## 2022-08-08 PROCEDURE — 99072 ADDL SUPL MATRL&STAF TM PHE: CPT

## 2022-08-08 RX ORDER — NALOXONE HYDROCHLORIDE 4 MG/.1ML
4 SPRAY NASAL
Qty: 1 | Refills: 0 | Status: ACTIVE | COMMUNITY
Start: 2022-08-08 | End: 1900-01-01

## 2022-08-08 NOTE — HISTORY OF PRESENT ILLNESS
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 Santiago. \par PMH significant for HIV (dx in 2003) on Biktarvy, follows with Dr. Jamaal Wright from ID at [facility]. \par \par Patient began having rectal discomfort in 4/2022, with pain progressively worsening. He noticed tissue growth from the anus and began passing blood clots. Was seen by colorectal sx on 7/21. Patient was taken to OR for exam under anesthesia and biopsy of the anorectal mass on 7/26/22. A partial excision was performed of a 2cm x 2cm mass. \par \par Main reason for which patient is referred is for pain. \par His pain is localized to the rectum/anus and L buttock. The pain is throbbing in intensity. He rates the pain at 8-9/10 at its worst. He has been using Oxycodone IR 5mg every 6 hours, as written. Has also been using Aleve 440mg once to twice daily. He smokes marijuana daily, this helps to provide some pain relief and a boost in appetite. Has been applying lidocaine cream to the perianal region.\par \par ROS:\par +weight loss of ~60lbs in the last 8 months\par +taste is off lately, has altered his food intake although he does have a good appetite\par +trouble sleeping - 2/2 pain; is asking if he can get a medication to help him sleep.\par +constipation - took fleet enema ahead of a scheduled MRI last week but it made his pain much worse. He did have a good BM though. Last BM was yesterday.  He uses colace TID. Just recently started taking dulcolax PRN. Tries to stay well hydrated. \par +has been anxious about his health and the treatments to come. \par Denies n/v.\par \par Patient is single, lives alone in the [place] in an apartment. Has no children. Is temporarily staying with his mother in [facility] in order to make it to the cancer center more easily for frequent appointments. Uses taxis and medical transportation.  Was previously working for [employer] and [employer], is not presently working. Receives public assistance. His support system consists of his mother and his sister. Describes himself as a quirky person at baseline. He uses cocaine recreationally, states he has not used any since the time of his cancer diagnosis as he wants to focus on getting treated. \par \par I-STOP Ref#:  [id number]

## 2022-08-08 NOTE — PHYSICAL EXAM
[General Appearance - Alert] : alert [General Appearance - In No Acute Distress] : in no acute distress [Sclera] : the sclera and conjunctiva were normal [Normal Oral Mucosa] : normal oral mucosa [Neck Appearance] : the appearance of the neck was normal [] : no respiratory distress [Auscultation Breath Sounds / Voice Sounds] : lungs were clear to auscultation bilaterally [Heart Rate And Rhythm] : heart rate was normal and rhythm regular [Heart Sounds] : normal S1 and S2 [Edema] : there was no peripheral edema [Bowel Sounds] : normal bowel sounds [Abdomen Soft] : soft [Abdomen Tenderness] : non-tender [Skin Color & Pigmentation] : normal skin color and pigmentation [No Focal Deficits] : no focal deficits [Oriented To Time, Place, And Person] : oriented to person, place, and time [Affect] : the affect was normal [FreeTextEntry1] : antalgic gait

## 2022-08-08 NOTE — ASSESSMENT
[FreeTextEntry1] : 31yoM with:\par \par 1. Anal SCC - Patient is pending PET/CT; communicated with Rad Onc and Med Onc teams as he is presently scheduled for a CT A/P on 8/10 but PET/CT would be preferable imaging modality. \par \par 2. Pain 2/2 Neoplasm - \par - Start methadone 2.5mg BID, c/w Oxycodone IR 5mg PRN. Will uptitrate methadone as tolerated to obtain optimal pain control.\par - Medical cannabis certification completed today. Provided cannabis education, overview of state program, and discussed adverse effects in detail. Counseled that vaporized cannabis is not the preferred route of administration due to the fact that both short-term and long-term risks associated with vaporizing oils are not yet fully understood. \par - Co-prescribed Naloxone spray 4 mg/0.1 ml intranasal, spray 0.1 mL into one nostril. Repeat into other nostril after 2-3 minutes if no or minimal response (http://[email or web address].org/prescribers/[email or web address]/).  Educated patient on signs of opioid overdose.\par \par 3. Trouble sleeping - 2/2 to pain - Will work on controlling pain and readdress. \par \par 4. Substance use disorder - Pt reports he last used cocaine weeks ago and has no desire to use at this time given his medical issues. Reinforced importance of staying off of illegal drugs. \par \par 5. Encounter for palliative care- Emotional support provided. \par \par Follow up in 1 to 2 weeks, call sooner with questions or issues.

## 2022-08-09 ENCOUNTER — NON-APPOINTMENT (OUTPATIENT)
Age: 32
End: 2022-08-09

## 2022-08-10 ENCOUNTER — APPOINTMENT (OUTPATIENT)
Dept: CT IMAGING | Facility: IMAGING CENTER | Age: 32
End: 2022-08-10

## 2022-08-10 ENCOUNTER — NON-APPOINTMENT (OUTPATIENT)
Age: 32
End: 2022-08-10

## 2022-08-10 PROCEDURE — 77333 RADIATION TREATMENT AID(S): CPT | Mod: 26

## 2022-08-11 ENCOUNTER — NON-APPOINTMENT (OUTPATIENT)
Age: 32
End: 2022-08-11

## 2022-08-11 RX ORDER — OXYCODONE 5 MG/1
5 TABLET ORAL
Qty: 40 | Refills: 0 | Status: DISCONTINUED | COMMUNITY
Start: 2022-07-27 | End: 2022-08-11

## 2022-08-12 ENCOUNTER — APPOINTMENT (OUTPATIENT)
Dept: NUCLEAR MEDICINE | Facility: IMAGING CENTER | Age: 32
End: 2022-08-12

## 2022-08-12 ENCOUNTER — NON-APPOINTMENT (OUTPATIENT)
Age: 32
End: 2022-08-12

## 2022-08-15 ENCOUNTER — NON-APPOINTMENT (OUTPATIENT)
Age: 32
End: 2022-08-15

## 2022-08-16 ENCOUNTER — APPOINTMENT (OUTPATIENT)
Dept: NUCLEAR MEDICINE | Facility: IMAGING CENTER | Age: 32
End: 2022-08-16

## 2022-08-16 ENCOUNTER — APPOINTMENT (OUTPATIENT)
Dept: HEMATOLOGY ONCOLOGY | Facility: CLINIC | Age: 32
End: 2022-08-16

## 2022-08-16 ENCOUNTER — OUTPATIENT (OUTPATIENT)
Dept: OUTPATIENT SERVICES | Facility: HOSPITAL | Age: 32
LOS: 1 days | End: 2022-08-16
Payer: MEDICAID

## 2022-08-16 DIAGNOSIS — Z00.8 ENCOUNTER FOR OTHER GENERAL EXAMINATION: ICD-10-CM

## 2022-08-16 DIAGNOSIS — C21.0 MALIGNANT NEOPLASM OF ANUS, UNSPECIFIED: ICD-10-CM

## 2022-08-16 PROCEDURE — 78815 PET IMAGE W/CT SKULL-THIGH: CPT | Mod: 26,PI

## 2022-08-16 PROCEDURE — A9552: CPT

## 2022-08-16 PROCEDURE — 78815 PET IMAGE W/CT SKULL-THIGH: CPT

## 2022-08-17 ENCOUNTER — APPOINTMENT (OUTPATIENT)
Dept: MRI IMAGING | Facility: CLINIC | Age: 32
End: 2022-08-17

## 2022-08-18 ENCOUNTER — NON-APPOINTMENT (OUTPATIENT)
Age: 32
End: 2022-08-18

## 2022-08-18 RX ORDER — OXYCODONE AND ACETAMINOPHEN 7.5; 325 MG/1; MG/1
7.5-325 TABLET ORAL
Qty: 45 | Refills: 0 | Status: DISCONTINUED | COMMUNITY
Start: 2022-08-11 | End: 2022-08-18

## 2022-08-19 ENCOUNTER — NON-APPOINTMENT (OUTPATIENT)
Age: 32
End: 2022-08-19

## 2022-08-22 ENCOUNTER — APPOINTMENT (OUTPATIENT)
Dept: NUCLEAR MEDICINE | Facility: CLINIC | Age: 32
End: 2022-08-22

## 2022-08-22 ENCOUNTER — NON-APPOINTMENT (OUTPATIENT)
Age: 32
End: 2022-08-22

## 2022-08-24 ENCOUNTER — NON-APPOINTMENT (OUTPATIENT)
Age: 32
End: 2022-08-24

## 2022-08-24 PROCEDURE — 77300 RADIATION THERAPY DOSE PLAN: CPT | Mod: 26

## 2022-08-24 PROCEDURE — 77301 RADIOTHERAPY DOSE PLAN IMRT: CPT | Mod: 26

## 2022-08-24 PROCEDURE — 77338 DESIGN MLC DEVICE FOR IMRT: CPT | Mod: 26

## 2022-08-25 ENCOUNTER — APPOINTMENT (OUTPATIENT)
Dept: HEMATOLOGY ONCOLOGY | Facility: CLINIC | Age: 32
End: 2022-08-25

## 2022-08-25 ENCOUNTER — NON-APPOINTMENT (OUTPATIENT)
Age: 32
End: 2022-08-25

## 2022-08-25 VITALS
DIASTOLIC BLOOD PRESSURE: 68 MMHG | SYSTOLIC BLOOD PRESSURE: 116 MMHG | OXYGEN SATURATION: 97 % | HEART RATE: 100 BPM | TEMPERATURE: 97.3 F | RESPIRATION RATE: 16 BRPM

## 2022-08-25 DIAGNOSIS — K59.00 CONSTIPATION, UNSPECIFIED: ICD-10-CM

## 2022-08-25 DIAGNOSIS — Z51.5 ENCOUNTER FOR PALLIATIVE CARE: ICD-10-CM

## 2022-08-25 DIAGNOSIS — R05.9 COUGH, UNSPECIFIED: ICD-10-CM

## 2022-08-25 DIAGNOSIS — F19.90 OTHER PSYCHOACTIVE SUBSTANCE USE, UNSPECIFIED, UNCOMPLICATED: ICD-10-CM

## 2022-08-25 DIAGNOSIS — K62.89 OTHER SPECIFIED DISEASES OF ANUS AND RECTUM: ICD-10-CM

## 2022-08-25 DIAGNOSIS — C21.0 MALIGNANT NEOPLASM OF ANUS, UNSPECIFIED: ICD-10-CM

## 2022-08-25 PROCEDURE — 99213 OFFICE O/P EST LOW 20 MIN: CPT

## 2022-08-25 PROCEDURE — 99072 ADDL SUPL MATRL&STAF TM PHE: CPT

## 2022-08-25 PROCEDURE — 93010 ELECTROCARDIOGRAM REPORT: CPT

## 2022-08-25 PROCEDURE — 99215 OFFICE O/P EST HI 40 MIN: CPT

## 2022-08-25 RX ORDER — METHADONE HYDROCHLORIDE 10 MG/5ML
10 SOLUTION ORAL TWICE DAILY
Qty: 35 | Refills: 0 | Status: ACTIVE | COMMUNITY
Start: 2022-08-25 | End: 1900-01-01

## 2022-08-25 RX ORDER — OXYCODONE 5 MG/1
5 TABLET ORAL
Qty: 40 | Refills: 0 | Status: ACTIVE | COMMUNITY
Start: 2022-08-18 | End: 1900-01-01

## 2022-08-25 RX ORDER — METHADONE HYDROCHLORIDE 10 MG/5ML
10 SOLUTION ORAL
Qty: 53 | Refills: 0 | Status: DISCONTINUED | COMMUNITY
Start: 2022-08-08 | End: 2022-08-25

## 2022-08-25 NOTE — HISTORY OF PRESENT ILLNESS
[de-identified] : Mr. Porter is a 31 year old male with past medical history of HIV since 2003 (no infection related) presenting to the office for an initial consultation on anal CA.\par \par Patients having rectal discomfort since 4/2022. Then noted excess tissue and pain for the past couple of months and worsened for the past two days during BM and lingers for whole day. Pain wakes him up at night. Pain is intermittent all day. Reports having BM 2-3 loose BMs daily. Stools are watery with clumps and occasional large blood clots. Last time he saw blood was 2 days ago. History of fissure in the past and was treated . Mucus discharge noted while passing gas yesterday. Not on anticoagulants. No family history of [place]/Rectal CA. \par \par He has lost weight 240 to 190 pounds.\par \par EUA 7/26/2022: Mass left lateral anal canal then extends proximally for 5-6 cm near circumferential.  Ulcerated and fixed.  Extensive biopsies taken.\par Patient states a bit more pain since biopsy.\par \par I called radiology to get CT scan done today.\par Will do MRI next week due to pain; he wants to wait until then.\par \par Of note, he is very emotional today, tearful, and afraid. \par He broke down multiple time and was able to collect himself to have some limited conversations.\par He did decline SW support today on multiple requests.\par He is here alone today, and took an Uber from the [place] to arrive.\par Of note his mother lives in [place] and is his only support.\par He has no significant other, and lives alone.\par He does not work and is supported via public assistance.\par \par 8/25/2022:\par Notified by palliative team patient not feeling well.\par Patient seen in exam area.\par Patient admits to fatigue and new productive cough which he reports began from vacation.\par Patient recently visit [place] and now reports productive cough with yellow sputum.\par Patient admits to rectal pain and is using PRN oxycodone 5mg. Pain regimen is Methadone 2.5mg BID and PRN oxycodone IR 5mg, takes two times a day.\par Patient underwent PET/CT which revealed diffuse disease.  \par Thickening of the anorectal jcn corresponds to known squamous cell carcinoma,  FDG-avid left perirectal, left pelvic, and left inguinal lymphadenopathy is compatible with metastatic disease.Difficult to delineate hypermetabolism in left lower sacrum is suspicious for bone involvement. Extension of hypermetabolism through the left sciatic notch is suspicious for perineural disease, disease in the liver, left lateral chest wall discrete intramuscular focus at the level of the second/third intercostal space, indeterminate and difficult to evaluate on CT.\par ECG was performed which revealed sinus tachycardia, HR 120s.  \par No ST elevation or arrhythmias noted.\par  [de-identified] : ID: Jamaal Field\par Surgeon: Driss Clay\par Radiation Oncology: pending\par \par patient cell [phone]

## 2022-08-25 NOTE — PHYSICAL EXAM
[Ambulatory and capable of all self care but unable to carry out any work activities] : Status 2- Ambulatory and capable of all self care but unable to carry out any work activities. Up and about more than 50% of waking hours [Thin] : thin [Normal] : affect appropriate [FreeTextEntry1] : Perianal inspection demonstrated a 2 cm left anterior ulcerated firm mass. Internal digital exam and anoscopy deferred because of severe pain.  [de-identified] : several ~ 2cm nodes palpable in the left inguinal space (none noted on right).

## 2022-08-26 ENCOUNTER — APPOINTMENT (OUTPATIENT)
Dept: INFECTIOUS DISEASE | Facility: CLINIC | Age: 32
End: 2022-08-26

## 2022-08-26 LAB — SARS-COV-2 N GENE NPH QL NAA+PROBE: NOT DETECTED

## 2022-08-26 NOTE — PHYSICAL EXAM
[General Appearance - Alert] : alert [General Appearance - In No Acute Distress] : in no acute distress [Sclera] : the sclera and conjunctiva were normal [Normal Oral Mucosa] : normal oral mucosa [Neck Appearance] : the appearance of the neck was normal [] : no respiratory distress [Heart Sounds] : normal S1 and S2 [Edema] : there was no peripheral edema [Bowel Sounds] : normal bowel sounds [Abdomen Soft] : soft [Abdomen Tenderness] : non-tender [Skin Color & Pigmentation] : normal skin color and pigmentation [No Focal Deficits] : no focal deficits [Oriented To Time, Place, And Person] : oriented to person, place, and time [FreeTextEntry1] : +Irritable

## 2022-08-26 NOTE — DATA REVIEWED
[FreeTextEntry1] : PET CT  (08/16/2022)\par COMPARISON:  No PET/CT available for comparison.\par \par OTHER STUDIES USED FOR CORRELATION: None.\par \par FINDINGS: Study is technically suboptimal due to patient motion.\par \par HEAD/NECK: Physiologic FDG activity in visualized brain, head, and neck.\par \par CHEST: Discrete intramuscular focus in left lateral chest wall at the level of the second-third intercostal space (SUV 6.3; image 89), without corresponding abnormality on CT.\par \par There are a few small foci of increased radiotracer activity in the mediastinum and right perihilar regions, without corresponding abnormalities on CT, possibly corresponding to difficult to delineate lymph nodes.\par \par LUNGS: FDG avid ill-defined patchy opacities within the right upper, middle and lower lobes SUV 10.6 (image 117). Non-FDG avid left upper lobe groundglass subpleural nodule, measuring up to 0.9 cm (image 107). Faint non-FDG avid groundglass opacity within the left lower lobe (image 119). There is a 3 mm non-FDG avid left lower lobe nodule (image 111).\par \par PLEURA/PERICARDIUM: No abnormal FDG activity. No effusion.\par \par HEPATOBILIARY/PANCREAS: Multiple FDG avid bilobar hepatic hypodensities, difficult to delineate on CT in the absence of intravenous contrast. For reference, a lesion in the right hepatic lobe measures 2.2 x 1.3 cm, SUV 5.8 (image 148). A hypodense hepatic lesion measuring 4.8 x 4.4 cm demonstrates FDG activity comparable to activity in normal liver, possibly a hemangioma (image 160). For reference, normal liver demonstrates SUV mean 1.4.\par \par SPLEEN: No abnormal FDG activity.\par \par ADRENAL GLANDS: No abnormal FDG activity. No nodule.\par \par KIDNEYS/URINARY BLADDER: Physiologic excreted FDG activity.\par \par REPRODUCTIVE ORGANS: No abnormal FDG activity.\par \par ABDOMINOPELVIC NODES: Extensive conglomerate of enlarged lymph nodes in the left common iliac, left internal/external iliac, left perirectal and bilateral inguinal regions, difficult to delineate on CT in the absence of intravenous contrast. For reference, a peripherally FDG avid centrally necrotic left internal iliac chain lymph node SUV 10.1 (image 227). Additionally, left inguinal lymph node measuring 1.9 x 1.8 cm SUV 9.3 (image 242).\par \par BOWEL/PERITONEUM/MESENTERY: Extensive FDG avid thickening at the anorectal junction with associated enlarged perirectal lymph nodes.\par \par BONES/SOFT TISSUES: FDG avidity at the level of the left lower sacrum (SUV 13.9; image 216). FDG avidity extends through the left sciatic notch to the level of the posterior left acetabulum (images 235-242), concerning for perineural disease.\par \par IMPRESSION: Abnormal FDG-PET/CT scan.\par \par Study is technically suboptimal due to patient motion.\par \par 1. Extensive FDG avid thickening at the anorectal junction corresponds to known squamous cell carcinoma.\par \par 2. FDG-avid left perirectal, left pelvic, and left inguinal lymphadenopathy is compatible with metastatic disease.\par \par 3. Difficult to delineate hypermetabolism in left lower sacrum is suspicious for bone involvement. Extension of hypermetabolism through the left sciatic notch is suspicious for perineural disease. Further evaluation may be performed with pelvic MRI, with and without intravenous contrast.\par \par 3. Multiple FDG avid bilobar hepatic hepatic metastases. A hypodense hepatic lesion with physiologic FDG activity may represent a hemangioma. Further evaluation may be performed with MRI. Hepatic lesions are accessible to an ultrasound-guided percutaneous needle biopsy.\par \par 4. FDG avid patchy opacities in the right lung with additional findings in the left lung, may be related to underlying infectious etiology. Please correlate clinically. Dedicated CT of chest may be obtained for further evaluation.\par \par 6. Increased radiotracer activity within the mediastinum and right perihilar regions, indeterminate and difficult to delineate on noncontrast CT. Contrast-enhanced CT may be obtained for further evaluation.\par \par 7. Left lateral chest wall discrete intramuscular focus at the level of the second/third intercostal space, indeterminate and difficult to evaluate on CT. Differential diagnosis includes metastasis. Further evaluation may be performed with ultrasound.

## 2022-08-26 NOTE — ASSESSMENT
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 Follow up with Rad Onc and Med Onc. \par - Med Onc team notified of patient's new onset of symptoms.  ECG performed in office today to evaluate QTc as patient is on Methadone.  ECG revealed sinus tachycardia with . No ST elevations or arrhythmia noted. \par - Recommended patient go to the ED for further evaluation to r/o PE vs. infectious process.  He declined and wishes to follow up with his ID MD tomorrow. \par \par 2. Pain 2/2 Neoplasm \par - Recommend uptitration of Methadone for optimal pain control but limited by QTc 480ms in office today. Discussed with Dr. Mazariegos. Will c/w methadone 2.5mg BID at this time.  C/w PRN Oxycodone IR 5mg q6h #40 tabs dispensed today. Discussed expectation that it will last him until next follow-up. He expressed understanding.\par - Previously certified for medical cannabis. He continues to utilize recreational cannabis.  \par - Previously co-prescribed Naloxone spray ([email or web address]).  \par \par 3. Constipation - Encouraged to maintain strict bowel regimen while on opioids and the importance of staying well hydrated. \par \par 4. Trouble sleeping - 2/2 to pain - Will work on controlling pain and reassess. \par \par 5. DANIELA - Patient reports he last used cocaine weeks ago and has no desire to use at this time given his medical issues. Reinforced importance of abstaining from illicit substance use. \par \par 6. Encounter for palliative care- Emotional support provided. \par \par Patient will require short interval follow up to ensure symptom optimization and medication adherence. \par Follow up in 1 to 2 weeks in-person, call sooner with questions or issues.

## 2022-08-26 NOTE — HISTORY OF PRESENT ILLNESS
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 Santiago. \par PMH significant for HIV (dx in 2003) on Biktarvy, follows with Dr. Jamaal Wright [facility]. \par \par Patient began having rectal discomfort in 4/2022, with pain progressively worsening. He noticed tissue growth from the anus and began passing blood clots. Was seen by colorectal sx on 7/21. Patient was taken to OR for exam under anesthesia and biopsy of the anorectal mass on 7/26/22. A partial excision was performed of a 2cm x 2cm mass. \par \par Main reason for which patient is referred is for pain. \par His pain is localized to the rectum/anus and L buttock. The pain is throbbing in intensity. He rates the pain at 8-9/10 at its worst. He has been using Oxycodone IR 5mg every 6 hours, as written. Has also been using Aleve 440mg once to twice daily. He smokes marijuana daily, this helps to provide some pain relief and a boost in appetite. Has been applying lidocaine cream to the perianal region.\par \par Interval history:\par Patient is seen for follow-up visit.\par He reports increased weakness, fatigue, and new productive cough (+yellow sputum) which began after returning from [place] last night.  He has discomfort to his ribs with coughing. Denies fever, chills. or recent sick contacts. He is scheduled to see his ID MD tomorrow.\par  \par He continues to experience neoplasm-related rectal pain which is exacerbated with movement, laying on his back, and sitting. He rates the pain at its highest an 10/10 and is completely relieved with PRN oxycodone 5mg. He remains on Methadone 2.5mg BID.  It was previously recommended that the increase his Methadone to 5mg but he has not done so.\par \par Current analgesic regimen:\par Methadone 2.5mg BID\par PRN Oxycodone IR 5mg (reports using twice daily)\par \par ROS:\par +low appetite\par +weight loss of ~60lbs in the last 8 months\par +taste is off lately, has altered his food intake although he does have a good appetite\par +trouble sleeping - 2/2 pain\par +constipation - last BM today.  No longer using colace or dulcolax PRN. Tries to stay well hydrated. \par +has been anxious about his health and the treatments to come. \par Denies n/v.\par All other ROS as outlined or noncontributory. \par \par Patient is single, lives alone in the [place] in an apartment. Has no children. Is temporarily staying with his mother in [facility] in order to make it to the cancer center more easily for frequent appointments. Uses taxis and medical transportation.  Was previously working for [employer], is not presently working. Receives public assistance. His support system consists of his mother and his sister. Describes himself as a quirky person at baseline. He uses cocaine recreationally, states he has not used any since the time of his cancer diagnosis as he wants to focus on getting treated. \par \par I-STOP Ref#:  [id number]

## 2022-08-29 ENCOUNTER — NON-APPOINTMENT (OUTPATIENT)
Age: 32
End: 2022-08-29

## 2022-08-30 ENCOUNTER — NON-APPOINTMENT (OUTPATIENT)
Age: 32
End: 2022-08-30

## 2022-08-31 ENCOUNTER — APPOINTMENT (OUTPATIENT)
Dept: HEMATOLOGY ONCOLOGY | Facility: CLINIC | Age: 32
End: 2022-08-31

## 2022-08-31 ENCOUNTER — NON-APPOINTMENT (OUTPATIENT)
Age: 32
End: 2022-08-31

## 2022-09-01 ENCOUNTER — NON-APPOINTMENT (OUTPATIENT)
Age: 32
End: 2022-09-01

## 2022-09-02 ENCOUNTER — NON-APPOINTMENT (OUTPATIENT)
Age: 32
End: 2022-09-02

## 2022-09-06 ENCOUNTER — APPOINTMENT (OUTPATIENT)
Dept: HEMATOLOGY ONCOLOGY | Facility: CLINIC | Age: 32
End: 2022-09-06

## 2022-09-08 ENCOUNTER — NON-APPOINTMENT (OUTPATIENT)
Age: 32
End: 2022-09-08

## 2022-09-08 ENCOUNTER — INPATIENT (INPATIENT)
Facility: HOSPITAL | Age: 32
LOS: 33 days | Discharge: EXPIRED | End: 2022-10-12
Attending: HOSPITALIST | Admitting: HOSPITALIST

## 2022-09-08 ENCOUNTER — APPOINTMENT (OUTPATIENT)
Dept: HEMATOLOGY ONCOLOGY | Facility: CLINIC | Age: 32
End: 2022-09-08

## 2022-09-08 VITALS
WEIGHT: 126.77 LBS | DIASTOLIC BLOOD PRESSURE: 61 MMHG | OXYGEN SATURATION: 100 % | RESPIRATION RATE: 17 BRPM | SYSTOLIC BLOOD PRESSURE: 123 MMHG | HEIGHT: 78 IN | TEMPERATURE: 96 F | HEART RATE: 95 BPM

## 2022-09-08 DIAGNOSIS — I80.00 PHLEBITIS AND THROMBOPHLEBITIS OF SUPERFICIAL VESSELS OF UNSPECIFIED LOWER EXTREMITY: ICD-10-CM

## 2022-09-08 DIAGNOSIS — B20 HUMAN IMMUNODEFICIENCY VIRUS [HIV] DISEASE: ICD-10-CM

## 2022-09-08 DIAGNOSIS — J85.2 ABSCESS OF LUNG WITHOUT PNEUMONIA: ICD-10-CM

## 2022-09-08 DIAGNOSIS — C20 MALIGNANT NEOPLASM OF RECTUM: ICD-10-CM

## 2022-09-08 DIAGNOSIS — J98.9 RESPIRATORY DISORDER, UNSPECIFIED: ICD-10-CM

## 2022-09-08 LAB
A1C WITH ESTIMATED AVERAGE GLUCOSE RESULT: 5.1 % — SIGNIFICANT CHANGE UP (ref 4–5.6)
ALBUMIN SERPL ELPH-MCNC: 2.3 G/DL — LOW (ref 3.3–5)
ALP SERPL-CCNC: 250 U/L — HIGH (ref 40–120)
ALT FLD-CCNC: 12 U/L — SIGNIFICANT CHANGE UP (ref 4–41)
ANION GAP SERPL CALC-SCNC: 7 MMOL/L — SIGNIFICANT CHANGE UP (ref 7–14)
APPEARANCE UR: ABNORMAL
APTT BLD: 29.6 SEC — SIGNIFICANT CHANGE UP (ref 27–36.3)
AST SERPL-CCNC: 22 U/L — SIGNIFICANT CHANGE UP (ref 4–40)
BACTERIA # UR AUTO: ABNORMAL
BASE EXCESS BLDV CALC-SCNC: 10.7 MMOL/L — HIGH (ref -2–3)
BASOPHILS # BLD AUTO: 0.03 K/UL — SIGNIFICANT CHANGE UP (ref 0–0.2)
BASOPHILS NFR BLD AUTO: 0.2 % — SIGNIFICANT CHANGE UP (ref 0–2)
BILIRUB SERPL-MCNC: 0.5 MG/DL — SIGNIFICANT CHANGE UP (ref 0.2–1.2)
BILIRUB UR-MCNC: NEGATIVE — SIGNIFICANT CHANGE UP
BLD GP AB SCN SERPL QL: NEGATIVE — SIGNIFICANT CHANGE UP
BLOOD GAS VENOUS COMPREHENSIVE RESULT: SIGNIFICANT CHANGE UP
BUN SERPL-MCNC: 13 MG/DL — SIGNIFICANT CHANGE UP (ref 7–23)
CALCIUM SERPL-MCNC: 14.2 MG/DL — CRITICAL HIGH (ref 8.4–10.5)
CHLORIDE BLDV-SCNC: 100 MMOL/L — SIGNIFICANT CHANGE UP (ref 96–108)
CHLORIDE SERPL-SCNC: 97 MMOL/L — LOW (ref 98–107)
CO2 BLDV-SCNC: 37.7 MMOL/L — HIGH (ref 22–26)
CO2 SERPL-SCNC: 31 MMOL/L — SIGNIFICANT CHANGE UP (ref 22–31)
COLOR SPEC: YELLOW — SIGNIFICANT CHANGE UP
CREAT SERPL-MCNC: 0.81 MG/DL — SIGNIFICANT CHANGE UP (ref 0.5–1.3)
DIFF PNL FLD: ABNORMAL
EGFR: 119 ML/MIN/1.73M2 — SIGNIFICANT CHANGE UP
EOSINOPHIL # BLD AUTO: 0.16 K/UL — SIGNIFICANT CHANGE UP (ref 0–0.5)
EOSINOPHIL NFR BLD AUTO: 1.1 % — SIGNIFICANT CHANGE UP (ref 0–6)
EPI CELLS # UR: 1 /HPF — SIGNIFICANT CHANGE UP (ref 0–5)
ESTIMATED AVERAGE GLUCOSE: 100 — SIGNIFICANT CHANGE UP
GAS PNL BLDV: 132 MMOL/L — LOW (ref 136–145)
GAS PNL BLDV: SIGNIFICANT CHANGE UP
GLUCOSE BLDV-MCNC: 92 MG/DL — SIGNIFICANT CHANGE UP (ref 70–99)
GLUCOSE SERPL-MCNC: 95 MG/DL — SIGNIFICANT CHANGE UP (ref 70–99)
GLUCOSE UR QL: NEGATIVE — SIGNIFICANT CHANGE UP
HCO3 BLDV-SCNC: 36 MMOL/L — HIGH (ref 22–29)
HCT VFR BLD CALC: 31.4 % — LOW (ref 39–50)
HCT VFR BLDA CALC: 29 % — LOW (ref 39–51)
HGB BLD CALC-MCNC: 9.7 G/DL — LOW (ref 13–17)
HGB BLD-MCNC: 9.4 G/DL — LOW (ref 13–17)
IANC: 11.82 K/UL — HIGH (ref 1.8–7.4)
IMM GRANULOCYTES NFR BLD AUTO: 1 % — SIGNIFICANT CHANGE UP (ref 0–1.5)
INR BLD: 1.21 RATIO — HIGH (ref 0.88–1.16)
KETONES UR-MCNC: NEGATIVE — SIGNIFICANT CHANGE UP
LACTATE BLDV-MCNC: 1.5 MMOL/L — SIGNIFICANT CHANGE UP (ref 0.5–2)
LEUKOCYTE ESTERASE UR-ACNC: NEGATIVE — SIGNIFICANT CHANGE UP
LYMPHOCYTES # BLD AUTO: 1.38 K/UL — SIGNIFICANT CHANGE UP (ref 1–3.3)
LYMPHOCYTES # BLD AUTO: 9.9 % — LOW (ref 13–44)
MAGNESIUM SERPL-MCNC: 1.7 MG/DL — SIGNIFICANT CHANGE UP (ref 1.6–2.6)
MCHC RBC-ENTMCNC: 23.2 PG — LOW (ref 27–34)
MCHC RBC-ENTMCNC: 29.9 GM/DL — LOW (ref 32–36)
MCV RBC AUTO: 77.3 FL — LOW (ref 80–100)
MONOCYTES # BLD AUTO: 0.42 K/UL — SIGNIFICANT CHANGE UP (ref 0–0.9)
MONOCYTES NFR BLD AUTO: 3 % — SIGNIFICANT CHANGE UP (ref 2–14)
NEUTROPHILS # BLD AUTO: 11.82 K/UL — HIGH (ref 1.8–7.4)
NEUTROPHILS NFR BLD AUTO: 84.8 % — HIGH (ref 43–77)
NITRITE UR-MCNC: NEGATIVE — SIGNIFICANT CHANGE UP
NRBC # BLD: 0 /100 WBCS — SIGNIFICANT CHANGE UP (ref 0–0)
NRBC # FLD: 0 K/UL — SIGNIFICANT CHANGE UP (ref 0–0)
PCO2 BLDV: 52 MMHG — SIGNIFICANT CHANGE UP (ref 42–55)
PH BLDV: 7.45 — HIGH (ref 7.32–7.43)
PH UR: 7 — SIGNIFICANT CHANGE UP (ref 5–8)
PHOSPHATE SERPL-MCNC: 3.4 MG/DL — SIGNIFICANT CHANGE UP (ref 2.5–4.5)
PLATELET # BLD AUTO: 538 K/UL — HIGH (ref 150–400)
PO2 BLDV: 80 MMHG — SIGNIFICANT CHANGE UP
POTASSIUM BLDV-SCNC: 4.3 MMOL/L — SIGNIFICANT CHANGE UP (ref 3.5–5.1)
POTASSIUM SERPL-MCNC: 4.4 MMOL/L — SIGNIFICANT CHANGE UP (ref 3.5–5.3)
POTASSIUM SERPL-SCNC: 4.4 MMOL/L — SIGNIFICANT CHANGE UP (ref 3.5–5.3)
PROCALCITONIN SERPL-MCNC: 0.25 NG/ML — HIGH (ref 0.02–0.1)
PROT SERPL-MCNC: 6.3 G/DL — SIGNIFICANT CHANGE UP (ref 6–8.3)
PROT UR-MCNC: ABNORMAL
PROTHROM AB SERPL-ACNC: 14.1 SEC — HIGH (ref 10.5–13.4)
RBC # BLD: 4.06 M/UL — LOW (ref 4.2–5.8)
RBC # FLD: 23 % — HIGH (ref 10.3–14.5)
RBC CASTS # UR COMP ASSIST: 80 /HPF — HIGH (ref 0–4)
RH IG SCN BLD-IMP: POSITIVE — SIGNIFICANT CHANGE UP
SAO2 % BLDV: 97.5 % — SIGNIFICANT CHANGE UP
SODIUM SERPL-SCNC: 135 MMOL/L — SIGNIFICANT CHANGE UP (ref 135–145)
SP GR SPEC: 1.02 — SIGNIFICANT CHANGE UP (ref 1.01–1.05)
TSH SERPL-MCNC: 7.88 UIU/ML — HIGH (ref 0.27–4.2)
UROBILINOGEN FLD QL: ABNORMAL
WBC # BLD: 13.95 K/UL — HIGH (ref 3.8–10.5)
WBC # FLD AUTO: 13.95 K/UL — HIGH (ref 3.8–10.5)
WBC UR QL: 5 /HPF — SIGNIFICANT CHANGE UP (ref 0–5)

## 2022-09-08 PROCEDURE — 71045 X-RAY EXAM CHEST 1 VIEW: CPT | Mod: 26

## 2022-09-08 PROCEDURE — 99291 CRITICAL CARE FIRST HOUR: CPT

## 2022-09-08 RX ORDER — HYDROMORPHONE HYDROCHLORIDE 2 MG/ML
0.5 INJECTION INTRAMUSCULAR; INTRAVENOUS; SUBCUTANEOUS ONCE
Refills: 0 | Status: DISCONTINUED | OUTPATIENT
Start: 2022-09-08 | End: 2022-09-08

## 2022-09-08 RX ORDER — PIPERACILLIN AND TAZOBACTAM 4; .5 G/20ML; G/20ML
3.38 INJECTION, POWDER, LYOPHILIZED, FOR SOLUTION INTRAVENOUS EVERY 8 HOURS
Refills: 0 | Status: DISCONTINUED | OUTPATIENT
Start: 2022-09-08 | End: 2022-09-08

## 2022-09-08 RX ORDER — LIDOCAINE 4 G/100G
1 CREAM TOPICAL DAILY
Refills: 0 | Status: DISCONTINUED | OUTPATIENT
Start: 2022-09-08 | End: 2022-10-10

## 2022-09-08 RX ORDER — PIPERACILLIN AND TAZOBACTAM 4; .5 G/20ML; G/20ML
3.38 INJECTION, POWDER, LYOPHILIZED, FOR SOLUTION INTRAVENOUS ONCE
Refills: 0 | Status: DISCONTINUED | OUTPATIENT
Start: 2022-09-08 | End: 2022-09-08

## 2022-09-08 RX ORDER — MORPHINE SULFATE 50 MG/1
2 CAPSULE, EXTENDED RELEASE ORAL EVERY 6 HOURS
Refills: 0 | Status: DISCONTINUED | OUTPATIENT
Start: 2022-09-08 | End: 2022-09-08

## 2022-09-08 RX ORDER — HEPARIN SODIUM 5000 [USP'U]/ML
5000 INJECTION INTRAVENOUS; SUBCUTANEOUS EVERY 8 HOURS
Refills: 0 | Status: DISCONTINUED | OUTPATIENT
Start: 2022-09-08 | End: 2022-09-08

## 2022-09-08 RX ORDER — SENNA PLUS 8.6 MG/1
2 TABLET ORAL AT BEDTIME
Refills: 0 | Status: DISCONTINUED | OUTPATIENT
Start: 2022-09-08 | End: 2022-10-11

## 2022-09-08 RX ORDER — HEPARIN SODIUM 5000 [USP'U]/ML
4500 INJECTION INTRAVENOUS; SUBCUTANEOUS EVERY 6 HOURS
Refills: 0 | Status: DISCONTINUED | OUTPATIENT
Start: 2022-09-08 | End: 2022-09-11

## 2022-09-08 RX ORDER — POLYETHYLENE GLYCOL 3350 17 G/17G
17 POWDER, FOR SOLUTION ORAL DAILY
Refills: 0 | Status: DISCONTINUED | OUTPATIENT
Start: 2022-09-08 | End: 2022-09-11

## 2022-09-08 RX ORDER — LACTULOSE 10 G/15ML
10 SOLUTION ORAL DAILY
Refills: 0 | Status: DISCONTINUED | OUTPATIENT
Start: 2022-09-08 | End: 2022-10-11

## 2022-09-08 RX ORDER — MAGNESIUM SULFATE 500 MG/ML
2 VIAL (ML) INJECTION ONCE
Refills: 0 | Status: COMPLETED | OUTPATIENT
Start: 2022-09-08 | End: 2022-09-09

## 2022-09-08 RX ORDER — ONDANSETRON 8 MG/1
4 TABLET, FILM COATED ORAL EVERY 8 HOURS
Refills: 0 | Status: DISCONTINUED | OUTPATIENT
Start: 2022-09-08 | End: 2022-09-14

## 2022-09-08 RX ORDER — HEPARIN SODIUM 5000 [USP'U]/ML
2000 INJECTION INTRAVENOUS; SUBCUTANEOUS EVERY 6 HOURS
Refills: 0 | Status: DISCONTINUED | OUTPATIENT
Start: 2022-09-08 | End: 2022-09-11

## 2022-09-08 RX ORDER — HEPARIN SODIUM 5000 [USP'U]/ML
4500 INJECTION INTRAVENOUS; SUBCUTANEOUS ONCE
Refills: 0 | Status: COMPLETED | OUTPATIENT
Start: 2022-09-08 | End: 2022-09-08

## 2022-09-08 RX ORDER — MORPHINE SULFATE 50 MG/1
30 CAPSULE, EXTENDED RELEASE ORAL EVERY 12 HOURS
Refills: 0 | Status: DISCONTINUED | OUTPATIENT
Start: 2022-09-08 | End: 2022-09-12

## 2022-09-08 RX ORDER — PIPERACILLIN AND TAZOBACTAM 4; .5 G/20ML; G/20ML
3.38 INJECTION, POWDER, LYOPHILIZED, FOR SOLUTION INTRAVENOUS ONCE
Refills: 0 | Status: COMPLETED | OUTPATIENT
Start: 2022-09-08 | End: 2022-09-08

## 2022-09-08 RX ORDER — MORPHINE SULFATE 50 MG/1
4 CAPSULE, EXTENDED RELEASE ORAL EVERY 4 HOURS
Refills: 0 | Status: DISCONTINUED | OUTPATIENT
Start: 2022-09-08 | End: 2022-09-08

## 2022-09-08 RX ORDER — ACETAMINOPHEN 500 MG
1000 TABLET ORAL ONCE
Refills: 0 | Status: COMPLETED | OUTPATIENT
Start: 2022-09-08 | End: 2022-09-08

## 2022-09-08 RX ORDER — HEPARIN SODIUM 5000 [USP'U]/ML
INJECTION INTRAVENOUS; SUBCUTANEOUS
Qty: 25000 | Refills: 0 | Status: DISCONTINUED | OUTPATIENT
Start: 2022-09-08 | End: 2022-09-09

## 2022-09-08 RX ORDER — CHLORHEXIDINE GLUCONATE 213 G/1000ML
1 SOLUTION TOPICAL
Refills: 0 | Status: DISCONTINUED | OUTPATIENT
Start: 2022-09-08 | End: 2022-10-12

## 2022-09-08 RX ORDER — FERROUS SULFATE 325(65) MG
325 TABLET ORAL
Refills: 0 | Status: DISCONTINUED | OUTPATIENT
Start: 2022-09-08 | End: 2022-09-26

## 2022-09-08 RX ORDER — BICTEGRAVIR SODIUM, EMTRICITABINE, AND TENOFOVIR ALAFENAMIDE FUMARATE 30; 120; 15 MG/1; MG/1; MG/1
1 TABLET ORAL DAILY
Refills: 0 | Status: DISCONTINUED | OUTPATIENT
Start: 2022-09-08 | End: 2022-10-11

## 2022-09-08 RX ORDER — PIPERACILLIN AND TAZOBACTAM 4; .5 G/20ML; G/20ML
3.38 INJECTION, POWDER, LYOPHILIZED, FOR SOLUTION INTRAVENOUS EVERY 8 HOURS
Refills: 0 | Status: DISCONTINUED | OUTPATIENT
Start: 2022-09-08 | End: 2022-09-09

## 2022-09-08 RX ORDER — HYDROMORPHONE HYDROCHLORIDE 2 MG/ML
0.5 INJECTION INTRAMUSCULAR; INTRAVENOUS; SUBCUTANEOUS EVERY 4 HOURS
Refills: 0 | Status: DISCONTINUED | OUTPATIENT
Start: 2022-09-08 | End: 2022-09-10

## 2022-09-08 RX ORDER — CEFAZOLIN SODIUM 1 G
VIAL (EA) INJECTION
Refills: 0 | Status: DISCONTINUED | OUTPATIENT
Start: 2022-09-08 | End: 2022-09-08

## 2022-09-08 RX ADMIN — MORPHINE SULFATE 4 MILLIGRAM(S): 50 CAPSULE, EXTENDED RELEASE ORAL at 17:00

## 2022-09-08 RX ADMIN — Medication 325 MILLIGRAM(S): at 15:39

## 2022-09-08 RX ADMIN — LACTULOSE 10 GRAM(S): 10 SOLUTION ORAL at 23:52

## 2022-09-08 RX ADMIN — MORPHINE SULFATE 30 MILLIGRAM(S): 50 CAPSULE, EXTENDED RELEASE ORAL at 18:43

## 2022-09-08 RX ADMIN — MORPHINE SULFATE 2 MILLIGRAM(S): 50 CAPSULE, EXTENDED RELEASE ORAL at 14:46

## 2022-09-08 RX ADMIN — Medication 1000 MILLIGRAM(S): at 16:08

## 2022-09-08 RX ADMIN — PIPERACILLIN AND TAZOBACTAM 25 GRAM(S): 4; .5 INJECTION, POWDER, LYOPHILIZED, FOR SOLUTION INTRAVENOUS at 20:54

## 2022-09-08 RX ADMIN — Medication 400 MILLIGRAM(S): at 15:38

## 2022-09-08 RX ADMIN — BICTEGRAVIR SODIUM, EMTRICITABINE, AND TENOFOVIR ALAFENAMIDE FUMARATE 1 TABLET(S): 30; 120; 15 TABLET ORAL at 23:52

## 2022-09-08 RX ADMIN — MORPHINE SULFATE 2 MILLIGRAM(S): 50 CAPSULE, EXTENDED RELEASE ORAL at 14:31

## 2022-09-08 RX ADMIN — HEPARIN SODIUM 1100 UNIT(S)/HR: 5000 INJECTION INTRAVENOUS; SUBCUTANEOUS at 22:22

## 2022-09-08 RX ADMIN — MORPHINE SULFATE 30 MILLIGRAM(S): 50 CAPSULE, EXTENDED RELEASE ORAL at 19:13

## 2022-09-08 RX ADMIN — PIPERACILLIN AND TAZOBACTAM 200 GRAM(S): 4; .5 INJECTION, POWDER, LYOPHILIZED, FOR SOLUTION INTRAVENOUS at 15:37

## 2022-09-08 RX ADMIN — HYDROMORPHONE HYDROCHLORIDE 0.5 MILLIGRAM(S): 2 INJECTION INTRAMUSCULAR; INTRAVENOUS; SUBCUTANEOUS at 19:48

## 2022-09-08 RX ADMIN — ONDANSETRON 4 MILLIGRAM(S): 8 TABLET, FILM COATED ORAL at 14:00

## 2022-09-08 RX ADMIN — POLYETHYLENE GLYCOL 3350 17 GRAM(S): 17 POWDER, FOR SOLUTION ORAL at 23:52

## 2022-09-08 RX ADMIN — HEPARIN SODIUM 4500 UNIT(S): 5000 INJECTION INTRAVENOUS; SUBCUTANEOUS at 22:16

## 2022-09-08 RX ADMIN — CHLORHEXIDINE GLUCONATE 1 APPLICATION(S): 213 SOLUTION TOPICAL at 14:34

## 2022-09-08 RX ADMIN — SENNA PLUS 2 TABLET(S): 8.6 TABLET ORAL at 22:55

## 2022-09-08 RX ADMIN — HYDROMORPHONE HYDROCHLORIDE 0.5 MILLIGRAM(S): 2 INJECTION INTRAMUSCULAR; INTRAVENOUS; SUBCUTANEOUS at 20:03

## 2022-09-08 RX ADMIN — MORPHINE SULFATE 4 MILLIGRAM(S): 50 CAPSULE, EXTENDED RELEASE ORAL at 17:15

## 2022-09-08 NOTE — PATIENT PROFILE ADULT - DOMESTIC TRAVEL HIGH RISK QUESTION
Knee Sprain: Care Instructions  Your Care Instructions    A knee sprain is one or more stretched, partly torn, or completely torn knee ligaments. Ligaments are bands of ropelike tissue that connect bone to bone and make the knee stable. The knee has four main ligaments. Knee sprains often happen because of a twisting or bending injury from sports such as skiing, basketball, soccer, or football. The knee turns one way while the lower or upper leg goes another way. A sprain also can happen when the knee is hit from the side or the front. If a knee ligament is slightly stretched, you will probably need only home treatment. You may need a splint or brace (immobilizer) for a partly torn ligament. A complete tear may need surgery. A minor knee sprain may take up to 6 weeks to heal, while a severe sprain may take months. Follow-up care is a key part of your treatment and safety. Be sure to make and go to all appointments, and call your doctor if you are having problems. It's also a good idea to know your test results and keep a list of the medicines you take. How can you care for yourself at home? · Follow instructions about how much weight you can put on your leg and how to walk with crutches. · Prop up your leg on a pillow when you ice it or anytime you sit or lie down for the next 3 days. Try to keep it above the level of your heart. This will help reduce swelling. · Put ice or a cold pack on your knee for 10 to 20 minutes at a time. Try to do this every 1 to 2 hours for the next 3 days (when you are awake) or until the swelling goes down. Put a thin cloth between the ice and your skin. Do not get the splint wet. · If you have an elastic bandage, make sure it is snug but not so tight that your leg is numb, tingles, or swells below the bandage. You can loosen the bandage if it is too tight. · Your doctor may recommend a brace (immobilizer) to support your knee while it heals. Wear it as directed.   · Ask your doctor if you can take an over-the-counter pain medicine, such as acetaminophen (Tylenol), ibuprofen (Advil, Motrin), or naproxen (Aleve). Be safe with medicines. Read and follow all instructions on the label. When should you call for help? Call 911 anytime you think you may need emergency care. For example, call if:  ? · You have sudden chest pain and shortness of breath, or you cough up blood. ?Call your doctor now or seek immediate medical care if:  ? · You have increased or severe pain. ? · You cannot move your toes or ankle. ? · Your foot is cool or pale or changes color. ? · You have tingling, weakness, or numbness in your foot or leg. ? · Your splint or brace feels too tight. ? · You are unable to straighten the knee, or the knee \"locks. \"   ? · You have signs of a blood clot in your leg, such as:  ¨ Pain in your calf, back of the knee, thigh, or groin. ¨ Redness and swelling in your leg. ? Watch closely for changes in your health, and be sure to contact your doctor if:  ? · Your pain is not getting better or is getting worse. Where can you learn more? Go to http://isael-patti.info/. Enter N406 in the search box to learn more about \"Knee Sprain: Care Instructions. \"  Current as of: March 21, 2017  Content Version: 11.4  © 9335-3279 [address]. Care instructions adapted under license by [employer] (which disclaims liability or warranty for this information). If you have questions about a medical condition or this instruction, always ask your healthcare professional. [place] [address] any warranty or liability for your use of this information. Knee Pain or Injury: Care Instructions  Your Care Instructions    Injuries are a common cause of knee problems. Sudden (acute) injuries may be caused by a direct blow to the knee. They can also be caused by abnormal twisting, bending, or falling on the knee.  Pain, bruising, or swelling may be severe, and may start within minutes of the injury. Overuse is another cause of knee pain. Other causes are climbing stairs, kneeling, and other activities that use the knee. Everyday wear and tear, especially as you get older, also can cause knee pain. Rest, along with home treatment, often relieves pain and allows your knee to heal. If you have a serious knee injury, you may need tests and treatment. Follow-up care is a key part of your treatment and safety. Be sure to make and go to all appointments, and call your doctor if you are having problems. It's also a good idea to know your test results and keep a list of the medicines you take. How can you care for yourself at home? · Be safe with medicines. Read and follow all instructions on the label. ¨ If the doctor gave you a prescription medicine for pain, take it as prescribed. ¨ If you are not taking a prescription pain medicine, ask your doctor if you can take an over-the-counter medicine. · Rest and protect your knee. Take a break from any activity that may cause pain. · Put ice or a cold pack on your knee for 10 to 20 minutes at a time. Put a thin cloth between the ice and your skin. · Prop up a sore knee on a pillow when you ice it or anytime you sit or lie down for the next 3 days. Try to keep it above the level of your heart. This will help reduce swelling. · If your knee is not swollen, you can put moist heat, a heating pad, or a warm cloth on your knee. · If your doctor recommends an elastic bandage, sleeve, or other type of support for your knee, wear it as directed. · Follow your doctor's instructions about how much weight you can put on your leg. Use a cane, crutches, or a walker as instructed. · Follow your doctor's instructions about activity during your healing process. If you can do mild exercise, slowly increase your activity. · Reach and stay at a healthy weight.  Extra weight can strain the joints, especially the knees and hips, and make the pain worse. Losing even a few pounds may help. When should you call for help? Call 911 anytime you think you may need emergency care. For example, call if:  ? · You have symptoms of a blood clot in your lung (called a pulmonary embolism). These may include:  ¨ Sudden chest pain. ¨ Trouble breathing. ¨ Coughing up blood. ?Call your doctor now or seek immediate medical care if:  ? · You have severe or increasing pain. ? · Your leg or foot turns cold or changes color. ? · You cannot stand or put weight on your knee. ? · Your knee looks twisted or bent out of shape. ? · You cannot move your knee. ? · You have signs of infection, such as:  ¨ Increased pain, swelling, warmth, or redness. ¨ Red streaks leading from the knee. ¨ Pus draining from a place on your knee. ¨ A fever. ? · You have signs of a blood clot in your leg (called a deep vein thrombosis), such as:  ¨ Pain in your calf, back of the knee, thigh, or groin. ¨ Redness and swelling in your leg or groin. ? Watch closely for changes in your health, and be sure to contact your doctor if:  ? · You have tingling, weakness, or numbness in your knee. ? · You have any new symptoms, such as swelling. ? · You have bruises from a knee injury that last longer than 2 weeks. ? · You do not get better as expected. Where can you learn more? Go to http://isael-patti.info/. Enter K195 in the search box to learn more about \"Knee Pain or Injury: Care Instructions. \"  Current as of: March 20, 2017  Content Version: 11.4  © 1439-4167 [employer]. Care instructions adapted under license by "[employer] (which disclaims liability or warranty for this information). If you have questions about a medical condition or this instruction, always ask your healthcare professional. [address] any warranty or liability for your use of this information.        Anterior Cruciate Ligament (ACL) Tear: Care Instructions  Your Care Instructions    The anterior cruciate ligament (ACL) is one of four knee ligaments that connect the upper leg bone with the lower leg bones. The ACL keeps your knee stable when your leg moves forward and backward. It also keeps the knee from bending sideways. A torn ACL often occurs during sports that require stop-and-go or twisting motions, such as soccer, football, basketball, and snow skiing. Treatment depends on how badly the ACL is torn. Not all injuries need surgery. Your doctor also will base your treatment on how unstable the knee is, whether other parts of your knee are injured, and how active you are. You may be able to wait for a while before deciding whether to have surgery. Follow-up care is a key part of your treatment and safety. Be sure to make and go to all appointments, and call your doctor if you are having problems. It's also a good idea to know your test results and keep a list of the medicines you take. How can you care for yourself at home? · Follow your doctor's directions for wearing a brace or an immobilizer, which limits movement of your knee. Wrapping your knee with an elastic bandage may help reduce or prevent swelling. · Use crutches as directed in the first few days after your injury if your doctor recommends them. · Rest your knee when possible. But try to flex your calf muscles often to help blood circulate in your legs. · Put ice or a cold pack on your knee for 10 to 20 minutes at a time. Try to do this every 1 to 2 hours during the next 3 days (when you are awake) or until the swelling goes down. Put a thin cloth between the ice and your skin. · Prop up your leg on a pillow when you ice it or anytime you sit or lie down during the next 3 days. Try to keep it above the level of your heart. This will help reduce swelling. · Take pain medicines exactly as directed.   ¨ If the doctor gave you a prescription medicine for pain, take it as prescribed. ¨ If you are not taking a prescription pain medicine, take an over-the-counter medicine to reduce pain and swelling. These include ibuprofen (Advil, Motrin) and naproxen (Aleve). Read and follow all instructions on the label. · Follow your doctor's or physical therapist's directions for strength exercises. Exercises to make your thigh muscles stronger and increase knee motion can help you get ready for a physical rehabilitation (rehab) program or surgery with a rehab program. Here are a few exercises you can do if your doctor says it is okay. ¨ Quad sets: Lie down on the floor or the bed with your injured leg straight. Fully extend your leg-there should be no or little bend in your knee. Tighten the thigh (quadriceps) of your injured leg for 10 seconds. Do not lift your heel up. Relax your quadriceps for 10 seconds. Repeat 8 to 12 times several times during the day. ¨ Straight-leg raises: Lie down on the floor or the bed with your injured leg flat and your uninjured leg bent so that the bottom of your foot is on the floor or bed. Tighten the quadriceps of your injured leg. Keeping your knee as straight as possible, lift your injured leg off the bed until it is about 18 inches above the bed or floor. Lower your leg back down and relax for 5 seconds. Do 3 sets of 8 to 12 repetitions. ¨ Heel slides: Lie down on the floor or the bed with your leg flat. Slowly begin to slide your heel toward your rear end (buttocks), keeping your heel on the floor. Your knee will begin to bend. Slide your heel and bend your knee until it becomes a little sore and you can feel a small amount of pressure inside your knee. Hold this position for 15 to 30 seconds. Slide your heel back down until your leg is straight on the floor. Relax for 10 seconds. Repeat 2 to 4 times several times during the day. ¨ Side-lying leg lifts: Lie on your side with your legs straight and the injured leg on top.  Keeping your leg straight, raise your injured leg up and backward about 6 inches. Lower the leg to the starting position. Do 3 sets of 20 repetitions, or if you tire quickly, 3 sets of 8 to 12 repetitions. When should you call for help? Call 911 anytime you think you may need emergency care. For example, call if:  ? · You have chest pain, are short of breath, or you cough up blood. ?Call your doctor now or seek immediate medical care if:  ? · You have new or worse pain. ? · Your foot is cool or pale or changes color. ? · You have tingling, weakness, or numbness in your toes. ? · Your cast or splint feels too tight. ? · You have signs of a blood clot in your leg (called a deep vein thrombosis), such as:  ¨ Pain in your calf, back of the knee, thigh, or groin. ¨ Redness or swelling in your leg. ? Watch closely for changes in your health, and be sure to contact your doctor if:  ? · You have a problem with your splint or cast.   ? · You do not get better as expected. Where can you learn more? Go to http://isael-patti.info/. Sofia Butler in the search box to learn more about \"Anterior Cruciate Ligament (ACL) Tear: Care Instructions. \"  Current as of: March 21, 2017  Content Version: 11.4  © 5997-1554 [employer]. Care instructions adapted under license by [employer] (which disclaims liability or warranty for this information). If you have questions about a medical condition or this instruction, always ask your healthcare professional. [place] [address] any warranty or liability for your use of this information. No

## 2022-09-08 NOTE — H&P ADULT - ASSESSMENT
31 M with PMH/ HIV, rectal cancer not on tx (followed up by Dr Frazier ([facility] oncology)), mets to liver and possibly bone,  R eye cataract, who presented to the [facility] on 8/31/22 after syncopal episode at home. Pt with cough x 2 mos, with yellow sputum and shortness of breath since 5 days PTA.   -- at [facility]: pt was found to be hypoxemic. 8/31/22 -- L chest wall Pigtail placement. Pigtail was placed for suspected PMX with improvement in shortness of breath. CT chest was performed, which showed that pigtail is within the mass__ differential is TB, fungal infection, cavitary lesion, or squamous cell carcinoma. Pt was found to be anemic due to rectal bleeding with Hgb 5.9 s/p 2 U PRBC on 9/1. Blood cxs with MSSA bacteremia, and has been treated with Cefazolin and Zosyn.  Since 9/2 pt with worsening diffuse SC emphysema extending to the neck/face/all 4 extremities/scrotum/back , which has been getting progressively worse over past 2 days as per pt's statement. 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 TTE with two large RV masses and two additional small masses. 9/7-- s/p L lateral chest tube to 14 Fr. 9/7 -- s/p IVC Filter placement (as per [facility] statement, was placed prophylactically due to undiagnosed vegetations on the TTE. 12 cm multiloculated thick walled cavitary mass in the CHRISTINE and lingula.+ liver lesion on the CT a/p. + external iliac vein thrombosis, PE study was indeterminate   __ pt was transferred to [facility] as per family request (mother) for pt to be evaluated by his outpt oncology team (Dr Frazier). As per oncology team the plan is to start Carbotaxol q 3 weeks + RT for diffuse mets as per PET scan,     Neuro: A & O x 3 , no active issues  -- not on sedation  -- continue Morphine ER 30 mg BID, and Morphine 4 mg IVP PRN q 4 hrs for severe pain (scrotal pain, rectal pain)    CV: pt is hemodynamically stable,   - vegetation /? mass/possibly clot noted on TTE  - -repeat TTE ordered    Pulm:  CT to gravity, place to suction only for transfer if off the unit  -- CTA to R/O PE and to evaluate cavitary lesion, prior PE study is indeterminate,   -- AFB x 3 are negative at 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  - CT in place, __ keep to gravity, repeat CT/chest ordered.     GI:   regular diet, bowel regimen    Renal:   TELMA, likely prerenal, resolved   -- follow up renal function     #scrotal swelling likely due SC empysema  -- indwelling pedraza was placed at [facility] (Coude placed for urinary strain)       ID:  MSSA bacteremia at [facility]  - -repeat blood cxs, UA, Cx of pleural fluid from the Pigtail,   -- continue Zosyn and reassess Abx after pleural fluid cx results,   - CXR with L cavitary lesion   -- AFB x 3 neg at [facility], galactomannan neg, blood cxs neg as of 9/2 as per [facility] documents     Hematology:   # rectal CA, possible RV mass, mets to liver and possibly bone,  -- Dr Frazier was called to see pt  - -will see pt in am,_ _follow up recs      #cardiac ? mass _ Lovenox held at [facility] and s/p 9/7-- IVC filter   thrombocytosis,  AOCD, ferritin 512, iron 11, TIBC-- 150, Iron -- 7 %, transferrin -- 122, ([facility])  - -hemoglobin send  and in lab -- follow up     # L external iliac vein DVT  -- start a/c after CBC and aPTT results, __ heparin gtt ordered     Endo:   not diabetic, HgbA1C ordered     Code: Full code    Lines:   PIV, indwelling pedraza    31 M with PMH/ HIV, rectal cancer not on tx (followed up by Dr Frazier ([facility] oncology)), mets to liver and possibly bone,  R eye cataract, who presented to the [facility] on 8/31/22 after syncopal episode at home. Pt with cough x 2 mos, with yellow sputum and shortness of breath since 5 days PTA.   -- at [facility]: pt was found to be hypoxemic. 8/31/22 -- L chest wall Pigtail placement. Pigtail was placed for suspected PMX with improvement in shortness of breath. CT chest was performed, which showed that pigtail is within the mass__ differential is TB, fungal infection, cavitary lesion, or squamous cell carcinoma. Pt was found to be anemic due to rectal bleeding with Hgb 5.9 s/p 2 U PRBC on 9/1. Blood cxs with MSSA bacteremia, and has been treated with Cefazolin and Zosyn.  Since 9/2 pt with worsening diffuse SC emphysema extending to the neck/face/all 4 extremities/scrotum/back , which has been getting progressively worse over past 2 days as per pt's statement. 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 TTE with two large RV masses and two additional small masses. 9/7-- s/p L lateral chest tube to 14 Fr. 9/7 -- s/p IVC Filter placement (as per [facility] statement, was placed prophylactically due to undiagnosed vegetations on the TTE. 12 cm multiloculated thick walled cavitary mass in the CHRISTINE and lingula.+ liver lesion on the CT a/p. + external iliac vein thrombosis, PE study was indeterminate   __ pt was transferred to [facility] as per family request (mother) for pt to be evaluated by his outpt oncology team (Dr Frazier). As per oncology team the plan is to start Carbotaxol q 3 weeks + RT for diffuse mets as per PET scan,     Neuro: A & O x 3 , no active issues  -- not on sedation  -- continue Morphine ER 30 mg BID, and Morphine 4 mg IVP PRN q 4 hrs for severe pain (scrotal pain, rectal pain)    CV: pt is hemodynamically stable,   - vegetation /? 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  - L CT in place, __ keep to water seal, repeat CT/chest ordered.   -- consider CT surgery eval     GI:   regular diet, bowel regimen    Renal:   TELMA, likely prerenal, resolved   -- follow up renal function     #scrotal swelling likely due SC emphysema  -- indwelling pedraza was placed at [facility] (Coude placed for urinary strain)       ID:  MSSA bacteremia at [facility]  - -repeat blood cxs, UA, Cx of pleural fluid from the Pigtail,   -- continue Zosyn and reassess Abx after pleural fluid cx results,   - CXR with L cavitary lesion   -- AFB x 3 neg at [facility], galactomannan neg, blood cxs neg as of 9/2 as per [facility] documents     Hematology:   # rectal CA, possible RV mass, mets to liver and possibly bone,  -- Dr Frazier was called to see pt  - -will see pt in am,_ _follow up recs      #cardiac ? mass _ Lovenox held at [facility] and s/p 9/7-- IVC filter   thrombocytosis,  AOCD, ferritin 512, iron 11, TIBC-- 150, Iron -- 7 %, transferrin -- 122, ([facility])  - -hemoglobin send  and in lab -- follow up     # L external iliac vein DVT  -- start a/c after CBC and aPTT results, __ heparin gtt ordered     Endo:   not diabetic, HgbA1C ordered     Code: Full code    Lines:   PIV, indwelling pedraza

## 2022-09-08 NOTE — CHART NOTE - NSCHARTNOTEFT_GEN_A_CORE
Pt needs CT scan of chest with IV contrast to rule out PE and better assess his pulmonary infection, however pt does not want to sign consent for the contrast, and he does not want to go to CT scan tonight. He states he will sign consent and will go to CT scan tomorrow.   I explained in detail the risk and benefit. He verbalizes understanding. He was judged to have mental capacity as well.     Witness by his assigned RN Krishan Dorsey.     d/w MICU attending Dr. Torre.

## 2022-09-08 NOTE — PATIENT PROFILE ADULT - FALL HARM RISK - HARM RISK INTERVENTIONS

## 2022-09-08 NOTE — H&P ADULT - CRITICAL CARE ATTENDING COMMENT
Patient is a 31 y o M w/ HIV/AIDS (on HAART), Metastatic Rectal Ca (Not yet on chemo) who was initially admitted to [facility] on 8/31 after presenting with productive cough for 2 months and then acute SOB x 5 days. L pigtail catheter was placed due to concern for L PTX. However, patient was found to have large CHRISTINE cavitary lesion instead, into which the pigtail had been placed.     Imaging notable for 8/31 CTH unremarkable, 8/31 CT C/A/P notable for large thick walled multiloculated CHRISTINE cavitary lesion, 10 cm ulcerated rectal mass inseperable from prostate and multiple necrotic liver mets and concern for L external iliac thrombosis. 9/6 CT chest which showed displaced pigtail catheter and worsening subcutaneous emphysema.     TTE notable for 2 large sperical masses in RV.    Sputm cultures from 9/1 grew MSSA and Blood cultures from 8/31 grew MSSA with blood cultures from 9/2 NGTD. Legionella negative. Sputum AFB negative. Fungitell negative.     Patient had been empirically on Zosyn, Cefazolin and Caspofungin (which was dc'ed).    On 9/7 patient had L pigtail replaced (currently 14F) as well as IVC filter placement.     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 Will consider removal and observation vs endobronchial valve placement. Will discuss with IP  - Pigtail to waterseal for now    #Concern for DVT - CT AP with concern for L external Iliac DVT. IVC filter placed at OSH for unclear reasons. No contraindication to AC  - b/l LE duplex  - Heparin gtt for now    #Abnormal TTE - OSH TTE notable for 2 large sperical masses in RV. POCUS here with limited views due to severe diffuse subcutaneous emphysema but on limited subcostal views with abnormally thickened and heterogenous RV endocardium with areas that are independently mobile. Differential includes infectious vs malignancy vs thrombus  - Official TTE but may need JENNIFER for better characterization  - Heparin gtt  - Blood cultures    #Metastatic rectal ca  - Onc consult    Shan Stout MD  Pulmonary & Critical Care Patient is a 31 y o M w/ HIV/AIDS (on HAART), Metastatic Rectal Ca (Not yet on chemo) who was initially admitted to [facility] on 8/31 after presenting with productive cough for 2 months and then acute SOB x 5 days. L pigtail catheter was placed due to concern for L PTX. However, patient was found to have large CHRISTINE cavitary lesion instead, into which the pigtail had been placed.     Imaging notable for 8/31 CTH unremarkable, 8/31 CT C/A/P notable for large thick walled multiloculated CHRISTINE cavitary lesion, 10 cm ulcerated rectal mass inseperable from prostate and multiple necrotic liver mets and concern for L external iliac thrombosis. 9/6 CT chest which showed displaced pigtail catheter and worsening subcutaneous emphysema.     TTE notable for 2 large sperical masses in RV.    Sputm cultures from 9/1 grew MSSA and Blood cultures from 8/31 grew MSSA with blood cultures from 9/2 NGTD. Legionella negative. Sputum AFB negative. Fungitell negative.     Patient had been empirically on Zosyn, Cefazolin and Caspofungin (which was dc'ed).    On 9/7 patient had L pigtail replaced (currently 14F) as well as IVC filter placement.     #CHRISTINE cavitary lesion - Differential includes cavitary PNA (sputum and blood cultures positive for MSSA) vs malignancy given history of metastatic rectal CA vs atypical infection. Of note PET/CT in our system from 8/2022 with only few subcm nodules in L and GGO in R lung, making malignancy as etiology of cavitary lesion less likely.   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 Will consider removal and observation vs endobronchial valve placement. Will discuss with IP  - Pigtail to waterseal for now    #Concern for DVT - CT AP with concern for L external Iliac DVT. IVC filter placed at OSH for unclear reasons. No contraindication to AC  - b/l LE duplex  - Heparin gtt for now    #Abnormal TTE - OSH TTE notable for 2 large sperical masses in RV. POCUS here with limited views due to severe diffuse subcutaneous emphysema but on limited subcostal views with abnormally thickened and heterogenous RV endocardium with areas that are independently mobile. Differential includes infectious vs malignancy vs thrombus  - Official TTE but may need JENNIFER for better characterization  - Heparin gtt  - Blood cultures    #Metastatic rectal ca  - Onc consult    Shan Stout MD  Pulmonary & Critical Care

## 2022-09-08 NOTE — H&P ADULT - HISTORY OF PRESENT ILLNESS
31 M with PMH/ HIV, anal cancer not on tx, Reye cataract, who presented to the [facility] after syncopal episode at home. Pt with cough x 2 mos, with yellow sputum and shortness of breath since 5 days PTA.   -- at [facility]: pt was found to be hypoxemix and have CHRISTINE cavitary lesion.  31 M with PMH/ HIV, anal cancer not on tx, Reye cataract, who presented to the [facility] on 8/31/22 after syncopal episode at home. Pt with cough x 2 mos, with yellow sputum and shortness of breath since 5 days PTA.   -- at [facility]: pt was found to be hypoxemic and have CHRISTINE cavitary lesion. 8/31/22 -- L Pigtail was placed for suspected PMX with improvement in shortness of breath. Pt was found to be anemic with Hgb 5.9 s/p 2 U PRBC on 9/1. Blood cxs with MSSA bacteremia, and has been treated with Cefazolin and Zosyn.  Since 9/2 pt with worsening diffuse SC emphysema, which has been getting progressively worse since 2 days ago as per pt's statement. TTE with two large RV masses and two additional small masses.  31 M with PMH/ HIV, rectal cancer not on tx (followed up by Dr Frazier ([facility] oncology)),  R eye cataract, who presented to the [facility] on 8/31/22 after syncopal episode at home. Pt with cough x 2 mos, with yellow sputum and shortness of breath since 5 days PTA.   -- at [facility]: pt was found to be hypoxemic and have CHRISTINE cavitary lesion. 8/31/22 -- ? 30 Fr L chest wall. Pigtail was placed for suspected PMX with improvement in shortness of breath. Pt was found to be anemic due to rectal bleeding with Hgb 5.9 s/p 2 U PRBC on 9/1. Blood cxs with MSSA bacteremia, and has been treated with Cefazolin and Zosyn.  Since 9/2 pt with worsening diffuse SC emphysema extending to the neck/face/all 4 extremities/scrotum/back , which has been getting progressively worse over past 2 days as per pt's statement. TTE with two large RV masses and two additional small masses. 9/7-- s/p L lateral chest tube to 14 Fr. 9/7 -- s/p IVC Filter placement (as per [facility] statement, was placed prophylactically due to undiagnosed vegetations on the TTE.   __ pt was transferred to [facility] as per family request (mother) for pt to be evaluated by his outpt oncology team (Dr Frazier). As per oncology team the plan is to start Carbotaxol q 3 weeks + RT for diffuse mets as per PET scan,  31 M with PMH/ HIV, rectal cancer not on tx (followed up by Dr Frazier ([facility] oncology)),  R eye cataract, who presented to the [facility] on 8/31/22 after syncopal episode at home. Pt with cough x 2 mos, with yellow sputum and shortness of breath since 5 days PTA.   -- at [facility]: pt was found to be hypoxemic. 8/31/22 -- L chest wall Pigtail placement. Pigtail was placed for suspected PMX with improvement in shortness of breath. CT chest was performed, which showed that pigtail is within the mas__ differential is TB, fungal infection, cavitary lesion, or squamous cell carcinoma. Pt was found to be anemic due to rectal bleeding with Hgb 5.9 s/p 2 U PRBC on 9/1. Blood cxs with MSSA bacteremia, and has been treated with Cefazolin and Zosyn.  Since 9/2 pt with worsening diffuse SC emphysema extending to the neck/face/all 4 extremities/scrotum/back , which has been getting progressively worse over past 2 days as per pt's statement. TTE with two large RV masses and two additional small masses. 9/7-- s/p L lateral chest tube to 14 Fr. 9/7 -- s/p IVC Filter placement (as per [facility] statement, was placed prophylactically due to undiagnosed vegetations on the TTE.   __ pt was transferred to [facility] as per family request (mother) for pt to be evaluated by his outpt oncology team (Dr Frazier). As per oncology team the plan is to start Carbotaxol q 3 weeks + RT for diffuse mets as per PET scan,  31 M with PMH/ HIV, rectal cancer not on tx (followed up by Dr Frazier ([facility] oncology)), mets to liver and possibly bone, R eye cataract, who presented to the [facility] on 8/31/22 after syncopal episode at home. Pt with cough x 2 mos, with yellow sputum and shortness of breath since 5 days PTA.   -- at [facility]: pt was found to be hypoxemic. 8/31/22 -- L chest wall Pigtail placement. Pigtail was placed for suspected PMX with improvement in shortness of breath. CT chest was performed, which showed that pigtail is within the mass__ differential is TB, fungal infection, cavitary lesion, or squamous cell carcinoma. Pt was found to be anemic due to rectal bleeding with Hgb 5.9 s/p 2 U PRBC on 9/1. Blood cxs with MSSA bacteremia, and has been treated with Cefazolin and Zosyn.  Since 9/2 pt with worsening diffuse SC emphysema extending to the neck/face/all 4 extremities/scrotum/back , which has been getting progressively worse over past 2 days as per pt's statement. 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 TTE with two large RV masses and two additional small masses. 9/7-- s/p L lateral chest tube to 14 Fr. 9/7 -- s/p IVC Filter placement (as per [facility] statement, was placed prophylactically due to undiagnosed vegetations on the TTE. 12 cm multiloculated thick walled cavitary mass in the CHRISTINE and lingula.+ liver lesion on the CT a/p. + external iliac vein thrombosis, PE study was indeterminate   __ pt was transferred to [facility] as per family request (mother) for pt to be evaluated by his outpt oncology team (Dr Frazier). As per oncology team the plan is to start Carbotaxol q 3 weeks + RT for diffuse mets as per PET scan,

## 2022-09-08 NOTE — H&P ADULT - NSHPPHYSICALEXAM_GEN_ALL_CORE
Vital Signs Last 24 Hrs  T(C): 35.7 (08 Sep 2022 14:00), Max: 35.7 (08 Sep 2022 14:00)  T(F): 96.2 (08 Sep 2022 14:00), Max: 96.2 (08 Sep 2022 14:00)  HR: 95 (08 Sep 2022 14:00) (95 - 95)  BP: 123/61 (08 Sep 2022 14:00) (123/61 - 123/61)  BP(mean): 75 (08 Sep 2022 14:00) (75 - 75)  RR: 17 (08 Sep 2022 14:00) (17 - 17)  SpO2: 100% (08 Sep 2022 14:00) (100% - 100%)    Parameters below as of 08 Sep 2022 14:00  Patient On (Oxygen Delivery Method): room air

## 2022-09-08 NOTE — H&P ADULT - NSICDXPASTMEDICALHX_GEN_ALL_CORE_FT
PAST MEDICAL HISTORY:  Current smoker     History of depression and anxiety    HIV infection 6/30/2022 virus detected, switched to Biktarvy, male partners    Rectal cancer not on tretment    Right cataract

## 2022-09-08 NOTE — H&P ADULT - NEUROLOGICAL
details… normal/cranial nerves II-XII intact/sensation intact/responds to pain/responds to verbal commands/deep reflexes intact/cranial nerves intact

## 2022-09-09 DIAGNOSIS — I51.89 OTHER ILL-DEFINED HEART DISEASES: ICD-10-CM

## 2022-09-09 PROBLEM — H26.9 UNSPECIFIED CATARACT: Chronic | Status: ACTIVE | Noted: 2022-09-08

## 2022-09-09 LAB
ANION GAP SERPL CALC-SCNC: 5 MMOL/L — LOW (ref 7–14)
APTT BLD: 38 SEC — HIGH (ref 27–36.3)
APTT BLD: 38.2 SEC — HIGH (ref 27–36.3)
APTT BLD: 49.5 SEC — HIGH (ref 27–36.3)
BASOPHILS # BLD AUTO: 0.03 K/UL — SIGNIFICANT CHANGE UP (ref 0–0.2)
BASOPHILS NFR BLD AUTO: 0.2 % — SIGNIFICANT CHANGE UP (ref 0–2)
BUN SERPL-MCNC: 13 MG/DL — SIGNIFICANT CHANGE UP (ref 7–23)
CALCIUM SERPL-MCNC: 14.9 MG/DL — CRITICAL HIGH (ref 8.4–10.5)
CHLORIDE SERPL-SCNC: 97 MMOL/L — LOW (ref 98–107)
CO2 SERPL-SCNC: 32 MMOL/L — HIGH (ref 22–31)
CREAT SERPL-MCNC: 0.86 MG/DL — SIGNIFICANT CHANGE UP (ref 0.5–1.3)
CRYPTOC AG FLD QL: NEGATIVE — SIGNIFICANT CHANGE UP
EGFR: 117 ML/MIN/1.73M2 — SIGNIFICANT CHANGE UP
EOSINOPHIL # BLD AUTO: 0.13 K/UL — SIGNIFICANT CHANGE UP (ref 0–0.5)
EOSINOPHIL NFR BLD AUTO: 0.9 % — SIGNIFICANT CHANGE UP (ref 0–6)
GLUCOSE SERPL-MCNC: 104 MG/DL — HIGH (ref 70–99)
HAPTOGLOB SERPL-MCNC: 308 MG/DL — HIGH (ref 34–200)
HCT VFR BLD CALC: 31.1 % — LOW (ref 39–50)
HGB BLD-MCNC: 9.4 G/DL — LOW (ref 13–17)
IANC: 12.46 K/UL — HIGH (ref 1.8–7.4)
IMM GRANULOCYTES NFR BLD AUTO: 0.8 % — SIGNIFICANT CHANGE UP (ref 0–1.5)
LDH SERPL L TO P-CCNC: 240 U/L — HIGH (ref 135–225)
LYMPHOCYTES # BLD AUTO: 1.25 K/UL — SIGNIFICANT CHANGE UP (ref 1–3.3)
LYMPHOCYTES # BLD AUTO: 8.7 % — LOW (ref 13–44)
MAGNESIUM SERPL-MCNC: 1.9 MG/DL — SIGNIFICANT CHANGE UP (ref 1.6–2.6)
MCHC RBC-ENTMCNC: 23.5 PG — LOW (ref 27–34)
MCHC RBC-ENTMCNC: 30.2 GM/DL — LOW (ref 32–36)
MCV RBC AUTO: 77.8 FL — LOW (ref 80–100)
MONOCYTES # BLD AUTO: 0.38 K/UL — SIGNIFICANT CHANGE UP (ref 0–0.9)
MONOCYTES NFR BLD AUTO: 2.6 % — SIGNIFICANT CHANGE UP (ref 2–14)
NEUTROPHILS # BLD AUTO: 12.46 K/UL — HIGH (ref 1.8–7.4)
NEUTROPHILS NFR BLD AUTO: 86.8 % — HIGH (ref 43–77)
NRBC # BLD: 0 /100 WBCS — SIGNIFICANT CHANGE UP (ref 0–0)
NRBC # FLD: 0 K/UL — SIGNIFICANT CHANGE UP (ref 0–0)
PHOSPHATE SERPL-MCNC: 3.4 MG/DL — SIGNIFICANT CHANGE UP (ref 2.5–4.5)
PLATELET # BLD AUTO: 615 K/UL — HIGH (ref 150–400)
POTASSIUM SERPL-MCNC: 4.3 MMOL/L — SIGNIFICANT CHANGE UP (ref 3.5–5.3)
POTASSIUM SERPL-SCNC: 4.3 MMOL/L — SIGNIFICANT CHANGE UP (ref 3.5–5.3)
RBC # BLD: 4 M/UL — LOW (ref 4.2–5.8)
RBC # FLD: 22.7 % — HIGH (ref 10.3–14.5)
SODIUM SERPL-SCNC: 134 MMOL/L — LOW (ref 135–145)
T3FREE SERPL-MCNC: 1.68 PG/ML — LOW (ref 2–4.4)
T4 FREE SERPL-MCNC: 1.1 NG/DL — SIGNIFICANT CHANGE UP (ref 0.9–1.8)
URATE SERPL-MCNC: 3.4 MG/DL — SIGNIFICANT CHANGE UP (ref 3.4–8.8)
WBC # BLD: 14.37 K/UL — HIGH (ref 3.8–10.5)
WBC # FLD AUTO: 14.37 K/UL — HIGH (ref 3.8–10.5)

## 2022-09-09 PROCEDURE — 71275 CT ANGIOGRAPHY CHEST: CPT | Mod: 26

## 2022-09-09 PROCEDURE — 99223 1ST HOSP IP/OBS HIGH 75: CPT

## 2022-09-09 PROCEDURE — 99221 1ST HOSP IP/OBS SF/LOW 40: CPT

## 2022-09-09 PROCEDURE — 99291 CRITICAL CARE FIRST HOUR: CPT

## 2022-09-09 PROCEDURE — 93306 TTE W/DOPPLER COMPLETE: CPT | Mod: 26

## 2022-09-09 RX ORDER — HYDROMORPHONE HYDROCHLORIDE 2 MG/ML
0.5 INJECTION INTRAMUSCULAR; INTRAVENOUS; SUBCUTANEOUS ONCE
Refills: 0 | Status: DISCONTINUED | OUTPATIENT
Start: 2022-09-09 | End: 2022-09-09

## 2022-09-09 RX ORDER — CEFAZOLIN SODIUM 1 G
2000 VIAL (EA) INJECTION EVERY 8 HOURS
Refills: 0 | Status: DISCONTINUED | OUTPATIENT
Start: 2022-09-10 | End: 2022-10-11

## 2022-09-09 RX ORDER — SODIUM CHLORIDE 9 MG/ML
1000 INJECTION, SOLUTION INTRAVENOUS
Refills: 0 | Status: DISCONTINUED | OUTPATIENT
Start: 2022-09-09 | End: 2022-09-11

## 2022-09-09 RX ORDER — HEPARIN SODIUM 5000 [USP'U]/ML
1500 INJECTION INTRAVENOUS; SUBCUTANEOUS
Qty: 25000 | Refills: 0 | Status: DISCONTINUED | OUTPATIENT
Start: 2022-09-09 | End: 2022-09-11

## 2022-09-09 RX ADMIN — HYDROMORPHONE HYDROCHLORIDE 0.5 MILLIGRAM(S): 2 INJECTION INTRAMUSCULAR; INTRAVENOUS; SUBCUTANEOUS at 00:03

## 2022-09-09 RX ADMIN — HEPARIN SODIUM 4500 UNIT(S): 5000 INJECTION INTRAVENOUS; SUBCUTANEOUS at 13:59

## 2022-09-09 RX ADMIN — HEPARIN SODIUM 1300 UNIT(S)/HR: 5000 INJECTION INTRAVENOUS; SUBCUTANEOUS at 13:58

## 2022-09-09 RX ADMIN — HYDROMORPHONE HYDROCHLORIDE 0.5 MILLIGRAM(S): 2 INJECTION INTRAMUSCULAR; INTRAVENOUS; SUBCUTANEOUS at 19:51

## 2022-09-09 RX ADMIN — HYDROMORPHONE HYDROCHLORIDE 0.5 MILLIGRAM(S): 2 INJECTION INTRAMUSCULAR; INTRAVENOUS; SUBCUTANEOUS at 11:00

## 2022-09-09 RX ADMIN — SODIUM CHLORIDE 75 MILLILITER(S): 9 INJECTION, SOLUTION INTRAVENOUS at 06:18

## 2022-09-09 RX ADMIN — MORPHINE SULFATE 30 MILLIGRAM(S): 50 CAPSULE, EXTENDED RELEASE ORAL at 20:22

## 2022-09-09 RX ADMIN — BICTEGRAVIR SODIUM, EMTRICITABINE, AND TENOFOVIR ALAFENAMIDE FUMARATE 1 TABLET(S): 30; 120; 15 TABLET ORAL at 13:59

## 2022-09-09 RX ADMIN — HEPARIN SODIUM 1300 UNIT(S)/HR: 5000 INJECTION INTRAVENOUS; SUBCUTANEOUS at 04:53

## 2022-09-09 RX ADMIN — SODIUM CHLORIDE 75 MILLILITER(S): 9 INJECTION, SOLUTION INTRAVENOUS at 21:04

## 2022-09-09 RX ADMIN — MORPHINE SULFATE 30 MILLIGRAM(S): 50 CAPSULE, EXTENDED RELEASE ORAL at 06:18

## 2022-09-09 RX ADMIN — PIPERACILLIN AND TAZOBACTAM 25 GRAM(S): 4; .5 INJECTION, POWDER, LYOPHILIZED, FOR SOLUTION INTRAVENOUS at 21:04

## 2022-09-09 RX ADMIN — LIDOCAINE 1 PATCH: 4 CREAM TOPICAL at 19:47

## 2022-09-09 RX ADMIN — HYDROMORPHONE HYDROCHLORIDE 0.5 MILLIGRAM(S): 2 INJECTION INTRAMUSCULAR; INTRAVENOUS; SUBCUTANEOUS at 15:22

## 2022-09-09 RX ADMIN — HYDROMORPHONE HYDROCHLORIDE 0.5 MILLIGRAM(S): 2 INJECTION INTRAMUSCULAR; INTRAVENOUS; SUBCUTANEOUS at 19:49

## 2022-09-09 RX ADMIN — Medication 25 GRAM(S): at 02:50

## 2022-09-09 RX ADMIN — HYDROMORPHONE HYDROCHLORIDE 0.5 MILLIGRAM(S): 2 INJECTION INTRAMUSCULAR; INTRAVENOUS; SUBCUTANEOUS at 14:30

## 2022-09-09 RX ADMIN — PIPERACILLIN AND TAZOBACTAM 25 GRAM(S): 4; .5 INJECTION, POWDER, LYOPHILIZED, FOR SOLUTION INTRAVENOUS at 12:36

## 2022-09-09 RX ADMIN — HEPARIN SODIUM 4500 UNIT(S): 5000 INJECTION INTRAVENOUS; SUBCUTANEOUS at 04:52

## 2022-09-09 RX ADMIN — HYDROMORPHONE HYDROCHLORIDE 0.5 MILLIGRAM(S): 2 INJECTION INTRAMUSCULAR; INTRAVENOUS; SUBCUTANEOUS at 10:21

## 2022-09-09 RX ADMIN — HEPARIN SODIUM 1500 UNIT(S)/HR: 5000 INJECTION INTRAVENOUS; SUBCUTANEOUS at 22:31

## 2022-09-09 RX ADMIN — HYDROMORPHONE HYDROCHLORIDE 0.5 MILLIGRAM(S): 2 INJECTION INTRAMUSCULAR; INTRAVENOUS; SUBCUTANEOUS at 04:04

## 2022-09-09 RX ADMIN — LIDOCAINE 1 PATCH: 4 CREAM TOPICAL at 12:38

## 2022-09-09 RX ADMIN — HYDROMORPHONE HYDROCHLORIDE 0.5 MILLIGRAM(S): 2 INJECTION INTRAMUSCULAR; INTRAVENOUS; SUBCUTANEOUS at 19:11

## 2022-09-09 RX ADMIN — HYDROMORPHONE HYDROCHLORIDE 0.5 MILLIGRAM(S): 2 INJECTION INTRAMUSCULAR; INTRAVENOUS; SUBCUTANEOUS at 00:33

## 2022-09-09 RX ADMIN — HYDROMORPHONE HYDROCHLORIDE 0.5 MILLIGRAM(S): 2 INJECTION INTRAMUSCULAR; INTRAVENOUS; SUBCUTANEOUS at 04:30

## 2022-09-09 RX ADMIN — PIPERACILLIN AND TAZOBACTAM 25 GRAM(S): 4; .5 INJECTION, POWDER, LYOPHILIZED, FOR SOLUTION INTRAVENOUS at 04:53

## 2022-09-09 RX ADMIN — CHLORHEXIDINE GLUCONATE 1 APPLICATION(S): 213 SOLUTION TOPICAL at 06:18

## 2022-09-09 NOTE — CONSULT NOTE ADULT - SUBJECTIVE AND OBJECTIVE BOX
Patient is a 31y old  Male who presents with a chief complaint of Shortness of breath (09 Sep 2022 09:05)    HPI:  31 M with PMH/ HIV, rectal cancer not on tx (followed up by Dr Frazier ([facility] oncology)), mets to liver and possibly bone, R eye cataract, who presented to the [facility] on 22 after syncopal episode at home. Pt with cough x 2 mos, with yellow sputum and shortness of breath since 5 days PTA.   -- at [facility]: pt was found to be hypoxemic. 22 -- L chest wall Pigtail placement. Pigtail was placed for suspected PMX with improvement in shortness of breath. CT chest was performed, which showed that pigtail is within the mass__ differential is TB, fungal infection, cavitary lesion, or squamous cell carcinoma. Pt was found to be anemic due to rectal bleeding with Hgb 5.9 s/p 2 U PRBC on . Blood cxs with MSSA bacteremia, and has been treated with Cefazolin and Zosyn.  Since  pt with worsening diffuse SC emphysema extending to the neck/face/all 4 extremities/scrotum/back , which has been getting progressively worse over past 2 days as per pt's statement. 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 TTE with two large RV masses and two additional small masses. -- s/p L lateral chest tube to 14 Fr.  -- s/p IVC Filter placement (as per [facility] statement, was placed prophylactically due to undiagnosed vegetations on the TTE. 12 cm multiloculated thick walled cavitary mass in the CHRISTINE and lingula.+ liver lesion on the CT a/p. + external iliac vein thrombosis, PE study was indeterminate   __ pt was transferred to [facility] as per family request (mother) for pt to be evaluated by his outpt oncology team (Dr Frazier). As per oncology team the plan is to start Carbotaxol q 3 weeks + RT for diffuse mets as per PET scan,  (08 Sep 2022 14:44)       prior hospital charts reviewed [  ]  primary team notes reviewed [  ]  other consultant notes reviewed [  ]    PAST MEDICAL & SURGICAL HISTORY:  HIV infection  2022 virus detected, switched to Biktarvy, male partners      Current smoker      History of depression  and anxiety      Rectal cancer  not on tretment      Right cataract      No significant past surgical history          Allergies  ibuprofen (Angioedema)    ANTIMICROBIALS (past 90 days)  MEDICATIONS  (STANDING):  bictegravir 50 mG/emtricitabine 200 mG/tenofovir alafenamide 25 mG (BIKTARVY)   1 Tablet(s) Oral (22 @ 23:52)    piperacillin/tazobactam IVPB.   200 mL/Hr IV Intermittent (22 @ 15:37)    piperacillin/tazobactam IVPB..   25 mL/Hr IV Intermittent (22 @ 12:36)   25 mL/Hr IV Intermittent (22 @ 04:53)   25 mL/Hr IV Intermittent (22 @ 20:54)        bictegravir 50 mG/emtricitabine 200 mG/tenofovir alafenamide 25 mG (BIKTARVY) 1 daily  piperacillin/tazobactam IVPB.. 3.375 every 8 hours    MEDICATIONS  (STANDING):  heparin   Injectable 4500 every 6 hours PRN  heparin   Injectable 2000 every 6 hours PRN  heparin  Infusion.  <Continuous>  HYDROmorphone  Injectable 0.5 every 4 hours PRN  lactulose Syrup 10 daily  morphine ER Tablet 30 every 12 hours  ondansetron Injectable 4 every 8 hours PRN  polyethylene glycol 3350 17 daily  senna 2 at bedtime    SOCIAL HISTORY:       FAMILY HISTORY:    REVIEW OF SYSTEMS  [  ] ROS unobtainable because:    [  ] All other systems negative except as noted below:	    Constitutional:  [ ] fever [ ] chills  [ ] weight loss  [ ] weakness  Skin:  [ ] rash [ ] phlebitis	  Eyes: [ ] icterus [ ] pain  [ ] discharge	  ENMT: [ ] sore throat  [ ] thrush [ ] ulcers [ ] exudates  Respiratory: [ ] dyspnea [ ] hemoptysis [ ] cough [ ] sputum	  Cardiovascular:  [ ] chest pain [ ] palpitations [ ] edema	  Gastrointestinal:  [ ] nausea [ ] vomiting [ ] diarrhea [ ] constipation [ ] pain	  Genitourinary:  [ ] dysuria [ ] frequency [ ] hematuria [ ] discharge [ ] flank pain  [ ] incontinence  Musculoskeletal:  [ ] myalgias [ ] arthralgias [ ] arthritis  [ ] back pain  Neurological:  [ ] headache [ ] seizures  [ ] confusion/altered mental status  Psychiatric:  [ ] anxiety [ ] depression	  Hematology/Lymphatics:  [ ] lymphadenopathy  Endocrine:  [ ] adrenal [ ] thyroid  Allergic/Immunologic:	 [ ] transplant [ ] seasonal    Vital Signs Last 24 Hrs  T(F): 97 (22 @ 08:00), Max: 97 (22 @ 08:00)  Vital Signs Last 24 Hrs  HR: 83 (22 @ 10:00) (81 - 95)  BP: 133/60 (22 @ 10:00) (91/70 - 139/61)  RR: 16 (22 @ 10:00)  SpO2: 98% (22 @ 10:00) (98% - 100%)  Wt(kg): --    PHYSICAL EXAM:  Constitutional: non-toxic, no distress  HEAD/EYES: anicteric, no conjunctival injection  ENT:  supple, no thrush  Cardiovascular:   normal S1, S2, no murmur, no edema  Respiratory:  clear BS bilaterally, no wheezes, no rales, chest tube set to water seal  GI:  soft, non-tender, normal bowel sounds  :  no pedraza, no CVA tenderness  Musculoskeletal:  no synovitis, normal ROM  Neurologic: awake and alert, normal strength, no focal findings  Skin:  no rash, no erythema, no phlebitis  Heme/Onc: no lymphadenopathy   Psychiatric:  awake, alert, appropriate mood                            9.4    14.37 )-----------( 615      ( 09 Sep 2022 03:50 )             31.1       134<L>  |  97<L>  |  13  ----------------------------<  104<H>  4.3   |  32<H>  |  0.86    Ca    14.9<HH>      09 Sep 2022 03:50  Phos  3.4       Mg     1.90         TPro  6.3  /  Alb  2.3<L>  /  TBili  0.5  /  DBili  x   /  AST  22  /  ALT  12  /  AlkPhos  250<H>      Urinalysis Basic - ( 08 Sep 2022 20:00 )    Color: Yellow / Appearance: Slightly Turbid / S.016 / pH: x  Gluc: x / Ketone: Negative  / Bili: Negative / Urobili: 3 mg/dL   Blood: x / Protein: Trace / Nitrite: Negative   Leuk Esterase: Negative / RBC: 80 /HPF / WBC 5 /HPF   Sq Epi: x / Non Sq Epi: 1 /HPF / Bacteria: Moderate    MICROBIOLOGY:              RADIOLOGY:  imaging below personally reviewed and agree with findings Patient is a 31y old  Male who presents with a chief complaint of Shortness of breath (09 Sep 2022 09:05)    HPI:  31 M with PMH/ HIV, rectal cancer not on tx (followed up by Dr Frazier ([facility] oncology)), mets to liver and possibly bone, R eye cataract, who presented to the [facility] on 22 after syncopal episode at home. Pt with cough x 2 mos, with yellow sputum and shortness of breath since 5 days PTA.   -- at [facility]: pt was found to be hypoxemic. 22 -- L chest wall Pigtail placement. Pigtail was placed for suspected PMX with improvement in shortness of breath. CT chest was performed, which showed that pigtail is within the mass__ differential is TB, fungal infection, cavitary lesion, or squamous cell carcinoma. Pt was found to be anemic due to rectal bleeding with Hgb 5.9 s/p 2 U PRBC on . Blood cxs with MSSA bacteremia, and has been treated with Cefazolin and Zosyn.  Since  pt with worsening diffuse SC emphysema extending to the neck/face/all 4 extremities/scrotum/back , which has been getting progressively worse over past 2 days as per pt's statement. 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 TTE with two large RV masses and two additional small masses. -- s/p L lateral chest tube to 14 Fr.  -- s/p IVC Filter placement (as per [facility] statement, was placed prophylactically due to undiagnosed vegetations on the TTE. 12 cm multiloculated thick walled cavitary mass in the CHRISTINE and lingula.+ liver lesion on the CT a/p. + external iliac vein thrombosis, PE study was indeterminate   __ pt was transferred to [facility] as per family request (mother) for pt to be evaluated by his outpt oncology team (Dr Frazier). As per oncology team the plan is to start Carbotaxol q 3 weeks + RT for diffuse mets as per PET scan,  (08 Sep 2022 14:44)       prior hospital charts reviewed [ x ]  primary team notes reviewed [x  ]  other consultant notes reviewed [x  ]    PAST MEDICAL & SURGICAL HISTORY:  HIV infection  2022 virus detected, switched to Biktarvy, male partners      Current smoker      History of depression  and anxiety      Rectal cancer  not on tretment      Right cataract      No significant past surgical history          Allergies  ibuprofen (Angioedema)    ANTIMICROBIALS (past 90 days)  MEDICATIONS  (STANDING):  bictegravir 50 mG/emtricitabine 200 mG/tenofovir alafenamide 25 mG (BIKTARVY)   1 Tablet(s) Oral (22 @ 23:52)    piperacillin/tazobactam IVPB.   200 mL/Hr IV Intermittent (22 @ 15:37)    piperacillin/tazobactam IVPB..   25 mL/Hr IV Intermittent (22 @ 12:36)   25 mL/Hr IV Intermittent (22 @ 04:53)   25 mL/Hr IV Intermittent (22 @ 20:54)        bictegravir 50 mG/emtricitabine 200 mG/tenofovir alafenamide 25 mG (BIKTARVY) 1 daily  piperacillin/tazobactam IVPB.. 3.375 every 8 hours    MEDICATIONS  (STANDING):  heparin   Injectable 4500 every 6 hours PRN  heparin   Injectable 2000 every 6 hours PRN  heparin  Infusion.  <Continuous>  HYDROmorphone  Injectable 0.5 every 4 hours PRN  lactulose Syrup 10 daily  morphine ER Tablet 30 every 12 hours  ondansetron Injectable 4 every 8 hours PRN  polyethylene glycol 3350 17 daily  senna 2 at bedtime    SOCIAL HISTORY:       FAMILY HISTORY:    REVIEW OF SYSTEMS  [  ] ROS unobtainable because:    [ x ] All other systems negative except as noted below:	    Constitutional:  [ ] fever [ ] chills  [ ] weight loss  [x ] weakness  Skin:  [ ] rash [ ] phlebitis	  Eyes: [ ] icterus [ ] pain  [ ] discharge	  ENMT: [ ] sore throat  [ ] thrush [ ] ulcers [ ] exudates  Respiratory: [x ] dyspnea [ ] hemoptysis [ ] cough [ ] sputum	  Cardiovascular:  [x ] chest pain [ ] palpitations [ ] edema	  Gastrointestinal:  [ ] nausea [x ] vomiting [ ] diarrhea [ ] constipation [x ] pain	  Genitourinary:  [ ] dysuria [ ] frequency [ ] hematuria [ ] discharge [ ] flank pain  [ ] incontinence  Musculoskeletal:  [ ] myalgias [ ] arthralgias [ ] arthritis  [ ] back pain  Neurological:  [ ] headache [ ] seizures  [ ] confusion/altered mental status  Psychiatric:  [ ] anxiety [ ] depression	  Hematology/Lymphatics:  [ ] lymphadenopathy  Endocrine:  [ ] adrenal [ ] thyroid  Allergic/Immunologic:	 [ ] transplant [ ] seasonal    Vital Signs Last 24 Hrs  T(F): 97 (22 @ 08:00), Max: 97 (22 @ 08:00)  Vital Signs Last 24 Hrs  HR: 83 (22 @ 10:00) (81 - 95)  BP: 133/60 (22 @ 10:00) (91/70 - 139/61)  RR: 16 (22 @ 10:00)  SpO2: 98% (22 @ 10:00) (98% - 100%)  Wt(kg): --    PHYSICAL EXAM:  Constitutional: non-toxic, no distress  HEAD/EYES: anicteric, no conjunctival injection  ENT:  supple, no thrush  Cardiovascular:   normal S1, S2, no murmur, no edema  Respiratory:  clear BS bilaterally although difficult to hear given SQ emphysema, no wheezes, no rales, L chest tube set to water seal  GI:  soft, non-tender, normal bowel sounds  :  no pedraza, no CVA tenderness  Musculoskeletal:  no synovitis, normal ROM, appreciable SQ air felt on chest diffusely   Neurologic: awake and alert, normal strength, no focal findings  Skin:  no rash, no erythema, no phlebitis  Heme/Onc: no lymphadenopathy   Psychiatric:  awake, alert, appropriate mood                            9.4    14.37 )-----------( 615      ( 09 Sep 2022 03:50 )             31.1       134<L>  |  97<L>  |  13  ----------------------------<  104<H>  4.3   |  32<H>  |  0.86    Ca    14.9<HH>      09 Sep 2022 03:50  Phos  3.4       Mg     1.90         TPro  6.3  /  Alb  2.3<L>  /  TBili  0.5  /  DBili  x   /  AST  22  /  ALT  12  /  AlkPhos  250<H>      Urinalysis Basic - ( 08 Sep 2022 20:00 )    Color: Yellow / Appearance: Slightly Turbid / S.016 / pH: x  Gluc: x / Ketone: Negative  / Bili: Negative / Urobili: 3 mg/dL   Blood: x / Protein: Trace / Nitrite: Negative   Leuk Esterase: Negative / RBC: 80 /HPF / WBC 5 /HPF   Sq Epi: x / Non Sq Epi: 1 /HPF / Bacteria: Moderate    MICROBIOLOGY:              RADIOLOGY:  imaging below personally reviewed and agree with findings Patient is a 31y old  Male who presents with a chief complaint of Shortness of breath (09 Sep 2022 09:05)    HPI:  31 M with PMH/ HIV, rectal cancer not on tx (followed up by Dr Frazier ([facility] oncology)), mets to liver and possibly bone, R eye cataract, who   presented to the [facility] on 22 after syncopal episode at home.   Pt with cough x 2 mos, with yellow sputum and shortness of breath since 5 days PTA.   -- at [facility]: pt was found to be hypoxemic. 22 -- L chest wall Pigtail placement. Pigtail was placed for suspected PMX with improvement in shortness of breath. CT chest was performed, which showed that pigtail is within the mass__ differential is TB, fungal infection, cavitary lesion, or squamous cell carcinoma.   Pt was found to be anemic due to rectal bleeding with Hgb 5.9 s/p 2 U PRBC on . Blood cxs with MSSA bacteremia, and has been treated with Cefazolin and Zosyn.  Since  pt with worsening diffuse SC emphysema extending to the neck/face/all 4 extremities/scrotum/back , which has been getting progressively worse over past 2 days as per pt's statement. 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 TTE with two large RV masses and two additional small masses. -- s/p L lateral chest tube to 14 Fr.  -- s/p IVC Filter placement (as per [facility] statement, was placed prophylactically due to undiagnosed vegetations on the TTE. 12 cm multiloculated thick walled cavitary mass in the CHRISTINE and lingula.+ liver lesion on the CT a/p. + external iliac vein thrombosis, PE study was indeterminate   __ pt was transferred to [facility] as per family request (mother) for pt to be evaluated by his outpt oncology team (Dr Frazier). As per oncology team the plan is to start Carbotaxol q 3 weeks + RT for diffuse mets as per PET scan,  (08 Sep 2022 14:44)       prior hospital charts reviewed [ x ]  primary team notes reviewed [x  ]  other consultant notes reviewed [x  ]    PAST MEDICAL & SURGICAL HISTORY:  HIV infection  2022 virus detected, switched to Biktarvy, male partners      Current smoker      History of depression  and anxiety      Rectal cancer  not on tretment      Right cataract      No significant past surgical history          Allergies  ibuprofen (Angioedema)    ANTIMICROBIALS (past 90 days)  MEDICATIONS  (STANDING):  bictegravir 50 mG/emtricitabine 200 mG/tenofovir alafenamide 25 mG (BIKTARVY)   1 Tablet(s) Oral (22 @ 23:52)    piperacillin/tazobactam IVPB.   200 mL/Hr IV Intermittent (22 @ 15:37)    piperacillin/tazobactam IVPB..   25 mL/Hr IV Intermittent (22 @ 12:36)   25 mL/Hr IV Intermittent (22 @ 04:53)   25 mL/Hr IV Intermittent (22 @ 20:54)        bictegravir 50 mG/emtricitabine 200 mG/tenofovir alafenamide 25 mG (BIKTARVY) 1 daily  piperacillin/tazobactam IVPB.. 3.375 every 8 hours    MEDICATIONS  (STANDING):  heparin   Injectable 4500 every 6 hours PRN  heparin   Injectable 2000 every 6 hours PRN  heparin  Infusion.  <Continuous>  HYDROmorphone  Injectable 0.5 every 4 hours PRN  lactulose Syrup 10 daily  morphine ER Tablet 30 every 12 hours  ondansetron Injectable 4 every 8 hours PRN  polyethylene glycol 3350 17 daily  senna 2 at bedtime    SOCIAL HISTORY: MSM     FAMILY HISTORY: noncontributory     REVIEW OF SYSTEMS  [  ] ROS unobtainable because:    [ x ] All other systems negative except as noted below:	    Constitutional:  [ ] fever [ ] chills  [ ] weight loss  [x ] weakness  Skin:  [ ] rash [ ] phlebitis	  Eyes: [ ] icterus [ ] pain  [ ] discharge	  ENMT: [ ] sore throat  [ ] thrush [ ] ulcers [ ] exudates  Respiratory: [x ] dyspnea [ ] hemoptysis [ ] cough [ ] sputum	  Cardiovascular:  [x ] chest pain [ ] palpitations [ ] edema	  Gastrointestinal:  [ ] nausea [x ] vomiting [ ] diarrhea [ ] constipation [ ] pain	  Genitourinary:  [ ] dysuria [ ] frequency [ ] hematuria [ ] discharge [ ] flank pain  [ ] incontinence  Musculoskeletal:  [ ] myalgias [ ] arthralgias [ ] arthritis  [ ] back pain  Neurological:  [ ] headache [ ] seizures  [ ] confusion/altered mental status  Psychiatric:  [ ] anxiety [ ] depression	  Hematology/Lymphatics:  [ ] lymphadenopathy  Endocrine:  [ ] adrenal [ ] thyroid  Allergic/Immunologic:	 [ ] transplant [ ] seasonal    Vital Signs Last 24 Hrs  T(F): 97 (22 @ 08:00), Max: 97 (22 @ 08:00)  Vital Signs Last 24 Hrs  HR: 83 (22 @ 10:00) (81 - 95)  BP: 133/60 (22 @ 10:00) (91/70 - 139/61)  RR: 16 (22 @ 10:00)  SpO2: 98% (22 @ 10:00) (98% - 100%)  Wt(kg): --    PHYSICAL EXAM:  Constitutional: non-toxic, no distress  HEAD/EYES: anicteric, no conjunctival injection  ENT:  supple  Cardiovascular: regular rate   Respiratory:  grossly clear BS bilaterally although difficult to hear given SQ emphysema, no wheezes, no rales, L chest tube set to water seal  GI:  soft, non-tender, normal bowel sounds  : no CVA tenderness  Musculoskeletal:  atraumatic   Neurologic: nonfocal   Skin:  no rash. appreciable SQ air felt on chest diffusely   Heme/Onc: no lymphadenopathy   Psychiatric:  jasson                             9.4    14.37 )-----------( 615      ( 09 Sep 2022 03:50 )             31.1       134<L>  |  97<L>  |  13  ----------------------------<  104<H>  4.3   |  32<H>  |  0.86    Ca    14.9<HH>      09 Sep 2022 03:50  Phos  3.4       Mg     1.90         TPro  6.3  /  Alb  2.3<L>  /  TBili  0.5  /  DBili  x   /  AST  22  /  ALT  12  /  AlkPhos  250<H>      Urinalysis Basic - ( 08 Sep 2022 20:00 )    Color: Yellow / Appearance: Slightly Turbid / S.016 / pH: x  Gluc: x / Ketone: Negative  / Bili: Negative / Urobili: 3 mg/dL   Blood: x / Protein: Trace / Nitrite: Negative   Leuk Esterase: Negative / RBC: 80 /HPF / WBC 5 /HPF   Sq Epi: x / Non Sq Epi: 1 /HPF / Bacteria: Moderate    MICROBIOLOGY:      RADIOLOGY:  imaging below personally reviewed and agree with findings  Xray Chest 1 View- PORTABLE-Urgent (Xray Chest 1 View- PORTABLE-Urgent .) (22 @ 15:33)   Very limited study as above.  Lower left chest pigtail catheter passes through focal lucency in lower   left lung.  Chest CT scan be done if clinically indicated.

## 2022-09-09 NOTE — CONSULT NOTE ADULT - SUBJECTIVE AND OBJECTIVE BOX
Patient is a 31y old  Male who presents with a chief complaint of Shortness of breath (09 Sep 2022 12:45)      HPI:  31 M with PMH/ HIV, anal SCC not on tx (followed up by Dr Frazier ([facility] oncology)), mets to liver and possibly bone, R eye cataract, who presented to the [facility] on 8/31/22 after syncopal episode at home. Pt with cough x 2 mos, with yellow sputum and shortness of breath since 5 days PTA.   -- at [facility]: pt was found to be hypoxemic. 8/31/22 -- L chest wall Pigtail placement. Pigtail was placed for suspected PMX with improvement in shortness of breath. CT chest was performed, which showed that pigtail is within the mass__ differential is TB, fungal infection, cavitary lesion, or squamous cell carcinoma. Pt was found to be anemic due to rectal bleeding with Hgb 5.9 s/p 2 U PRBC on 9/1. Blood cxs with MSSA bacteremia, and has been treated with Cefazolin and Zosyn.  Since 9/2 pt with worsening diffuse SC emphysema extending to the neck/face/all 4 extremities/scrotum/back , which has been getting progressively worse over past 2 days as per pt's statement. 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 TTE with two large RV masses and two additional small masses. 9/7-- s/p L lateral chest tube to 14 Fr. 9/7 -- s/p IVC Filter placement (as per [facility] statement, was placed prophylactically due to undiagnosed vegetations on the TTE. 12 cm multiloculated thick walled cavitary mass in the CHRISTINE and lingula.+ liver lesion on the CT a/p. + external iliac vein thrombosis, PE study was indeterminate   __ pt was transferred to [facility] as per family request (mother) for pt to be evaluated by his outpt oncology team (Dr Frazier).      ROS: as above     PAST MEDICAL & SURGICAL HISTORY:  HIV infection  6/30/2022 virus detected, switched to Biktarvy, male partners      Current smoker      History of depression  and anxiety      Rectal cancer  not on tretment      Right cataract      No significant past surgical history          SOCIAL HISTORY:    FAMILY HISTORY:      MEDICATIONS  (STANDING):  bictegravir 50 mG/emtricitabine 200 mG/tenofovir alafenamide 25 mG (BIKTARVY) 1 Tablet(s) Oral daily  chlorhexidine 2% Cloths 1 Application(s) Topical <User Schedule>  ferrous    sulfate 325 milliGRAM(s) Oral <User Schedule>  heparin  Infusion. 1500 Unit(s)/Hr (15 mL/Hr) IV Continuous <Continuous>  lactated ringers. 1000 milliLiter(s) (75 mL/Hr) IV Continuous <Continuous>  lactulose Syrup 10 Gram(s) Oral daily  lidocaine   4% Patch 1 Patch Transdermal daily  morphine ER Tablet 30 milliGRAM(s) Oral every 12 hours  piperacillin/tazobactam IVPB.. 3.375 Gram(s) IV Intermittent every 8 hours  polyethylene glycol 3350 17 Gram(s) Oral daily  senna 2 Tablet(s) Oral at bedtime    MEDICATIONS  (PRN):  heparin   Injectable 2000 Unit(s) IV Push every 6 hours PRN For aPTT between 40 - 57  heparin   Injectable 4500 Unit(s) IV Push every 6 hours PRN For aPTT less than 40  HYDROmorphone  Injectable 0.5 milliGRAM(s) IV Push every 4 hours PRN Severe Pain (7 - 10)  ondansetron Injectable 4 milliGRAM(s) IV Push every 8 hours PRN Nausea and/or Vomiting      Allergies    ibuprofen (Angioedema)    Intolerances        Vital Signs Last 24 Hrs  T(C): 36 (09 Sep 2022 12:00), Max: 36.1 (09 Sep 2022 08:00)  T(F): 96.8 (09 Sep 2022 12:00), Max: 97 (09 Sep 2022 08:00)  HR: 81 (09 Sep 2022 14:00) (81 - 93)  BP: 141/63 (09 Sep 2022 15:00) (91/70 - 141/63)  BP(mean): 79 (09 Sep 2022 15:00) (63 - 92)  RR: 16 (09 Sep 2022 10:00) (15 - 22)  SpO2: 98% (09 Sep 2022 10:00) (98% - 100%)    Parameters below as of 09 Sep 2022 10:00  Patient On (Oxygen Delivery Method): room air        PHYSICAL EXAM  General: adult in NAD  HEENT: clear oropharynx, anicteric sclera, pink conjunctiva  Neck: supple  CV: normal S1/S2 with no murmur rubs or gallops  Lungs: positive air movement b/l ant lungs, clear to auscultation, no wheezes, no rales  Abdomen: soft non-tender non-distended, no hepatosplenomegaly  Ext: no clubbing cyanosis or edema  Skin: no rashes and no petechiae  Neuro: alert and oriented X 3, none focal    LABS:                          9.4    14.37 )-----------( 615      ( 09 Sep 2022 03:50 )             31.1         Mean Cell Volume : 77.8 fL  Mean Cell Hemoglobin : 23.5 pg  Mean Cell Hemoglobin Concentration : 30.2 gm/dL  Auto Neutrophil # : 12.46 K/uL  Auto Lymphocyte # : 1.25 K/uL  Auto Monocyte # : 0.38 K/uL  Auto Eosinophil # : 0.13 K/uL  Auto Basophil # : 0.03 K/uL  Auto Neutrophil % : 86.8 %  Auto Lymphocyte % : 8.7 %  Auto Monocyte % : 2.6 %  Auto Eosinophil % : 0.9 %  Auto Basophil % : 0.2 %      Serial CBC's  09-09 @ 03:50  Hct-31.1 / Hgb-9.4 / Plat-615 / RBC-4.00 / WBC-14.37  Serial CBC's  09-08 @ 20:24  Hct-31.4 / Hgb-9.4 / Plat-538 / RBC-4.06 / WBC-13.95      09-09    134<L>  |  97<L>  |  13  ----------------------------<  104<H>  4.3   |  32<H>  |  0.86    Ca    14.9<HH>      09 Sep 2022 03:50  Phos  3.4     09-09  Mg     1.90     09-09    TPro  6.3  /  Alb  2.3<L>  /  TBili  0.5  /  DBili  x   /  AST  22  /  ALT  12  /  AlkPhos  250<H>  09-08      PT/INR - ( 08 Sep 2022 20:24 )   PT: 14.1 sec;   INR: 1.21 ratio         PTT - ( 09 Sep 2022 10:50 )  PTT:38.0 sec                RADIOLOGY & ADDITIONAL STUDIES:

## 2022-09-09 NOTE — PROGRESS NOTE ADULT - SUBJECTIVE AND OBJECTIVE BOX
INTERVAL HPI/OVERNIGHT EVENTS:    SUBJECTIVE: Patient seen and examined at bedside.     CONSTITUTIONAL: No weakness, fevers or chills  EYES/ENT: No visual changes;  No vertigo or throat pain   NECK: No pain or stiffness  RESPIRATORY: No cough, wheezing, hemoptysis; No shortness of breath  CARDIOVASCULAR: No chest pain or palpitations  GASTROINTESTINAL: No abdominal or epigastric pain. No nausea, vomiting, or hematemesis; No diarrhea or constipation. No melena or hematochezia.  GENITOURINARY: No dysuria, frequency or hematuria  NEUROLOGICAL: No numbness or weakness  SKIN: No itching, rashes    OBJECTIVE:    VITAL SIGNS:  ICU Vital Signs Last 24 Hrs  T(C): 36.1 (09 Sep 2022 08:00), Max: 36.1 (09 Sep 2022 08:00)  T(F): 97 (09 Sep 2022 08:00), Max: 97 (09 Sep 2022 08:00)  HR: 85 (09 Sep 2022 08:00) (81 - 95)  BP: 129/65 (09 Sep 2022 08:00) (91/70 - 139/61)  BP(mean): 79 (09 Sep 2022 08:00) (72 - 92)  ABP: --  ABP(mean): --  RR: 16 (09 Sep 2022 08:00) (15 - 22)  SpO2: 100% (09 Sep 2022 08:00) (100% - 100%)    O2 Parameters below as of 09 Sep 2022 08:00  Patient On (Oxygen Delivery Method): room air               @ 07:01  -   @ 07:00  --------------------------------------------------------  IN: 870 mL / OUT: 1640 mL / NET: -770 mL      CAPILLARY BLOOD GLUCOSE          PHYSICAL EXAM:    General: NAD  HEENT: NC/AT; PERRL, clear conjunctiva  Neck: supple  Respiratory: CTA b/l  Cardiovascular: +S1/S2; RRR  Abdomen: soft, NT/ND; +BS x4  Extremities: WWP, 2+ peripheral pulses b/l; no LE edema  Skin: normal color and turgor; no rash  Neurological:    MEDICATIONS:  MEDICATIONS  (STANDING):  bictegravir 50 mG/emtricitabine 200 mG/tenofovir alafenamide 25 mG (BIKTARVY) 1 Tablet(s) Oral daily  chlorhexidine 2% Cloths 1 Application(s) Topical <User Schedule>  ferrous    sulfate 325 milliGRAM(s) Oral <User Schedule>  heparin  Infusion.  Unit(s)/Hr (11 mL/Hr) IV Continuous <Continuous>  lactated ringers. 1000 milliLiter(s) (75 mL/Hr) IV Continuous <Continuous>  lactulose Syrup 10 Gram(s) Oral daily  lidocaine   4% Patch 1 Patch Transdermal daily  morphine ER Tablet 30 milliGRAM(s) Oral every 12 hours  piperacillin/tazobactam IVPB.. 3.375 Gram(s) IV Intermittent every 8 hours  polyethylene glycol 3350 17 Gram(s) Oral daily  senna 2 Tablet(s) Oral at bedtime    MEDICATIONS  (PRN):  heparin   Injectable 4500 Unit(s) IV Push every 6 hours PRN For aPTT less than 40  heparin   Injectable 2000 Unit(s) IV Push every 6 hours PRN For aPTT between 40 - 57  HYDROmorphone  Injectable 0.5 milliGRAM(s) IV Push every 4 hours PRN Severe Pain (7 - 10)  ondansetron Injectable 4 milliGRAM(s) IV Push every 8 hours PRN Nausea and/or Vomiting      ALLERGIES:  Allergies    ibuprofen (Angioedema)    Intolerances        LABS:                        9.4    14.37 )-----------( 615      ( 09 Sep 2022 03:50 )             31.1         134<L>  |  97<L>  |  13  ----------------------------<  104<H>  4.3   |  32<H>  |  0.86    Ca    14.9<HH>      09 Sep 2022 03:50  Phos  3.4       Mg     1.90         TPro  6.3  /  Alb  2.3<L>  /  TBili  0.5  /  DBili  x   /  AST  22  /  ALT  12  /  AlkPhos  250<H>      PT/INR - ( 08 Sep 2022 20:24 )   PT: 14.1 sec;   INR: 1.21 ratio         PTT - ( 09 Sep 2022 03:50 )  PTT:38.2 sec  Urinalysis Basic - ( 08 Sep 2022 20:00 )    Color: Yellow / Appearance: Slightly Turbid / S.016 / pH: x  Gluc: x / Ketone: Negative  / Bili: Negative / Urobili: 3 mg/dL   Blood: x / Protein: Trace / Nitrite: Negative   Leuk Esterase: Negative / RBC: 80 /HPF / WBC 5 /HPF   Sq Epi: x / Non Sq Epi: 1 /HPF / Bacteria: Moderate        RADIOLOGY & ADDITIONAL TESTS: Reviewed. INTERVAL HPI/OVERNIGHT EVENTS: Refused CT scan.  Ca elevated to 14.9.  UA with mod bacteria.      SUBJECTIVE: Patient seen and examined at bedside.     CONSTITUTIONAL: No weakness, fevers or chills  EYES/ENT: No visual changes;  No vertigo or throat pain   NECK: No pain or stiffness  RESPIRATORY: No cough, wheezing, hemoptysis; No shortness of breath  CARDIOVASCULAR: No chest pain or palpitations  GASTROINTESTINAL: No abdominal or epigastric pain. No nausea, vomiting, or hematemesis; No diarrhea or constipation. No melena or hematochezia.  GENITOURINARY: No dysuria, frequency or hematuria  NEUROLOGICAL: No numbness or weakness  SKIN: No itching, rashes    OBJECTIVE:    VITAL SIGNS:  ICU Vital Signs Last 24 Hrs  T(C): 36.1 (09 Sep 2022 08:00), Max: 36.1 (09 Sep 2022 08:00)  T(F): 97 (09 Sep 2022 08:00), Max: 97 (09 Sep 2022 08:00)  HR: 85 (09 Sep 2022 08:00) (81 - 95)  BP: 129/65 (09 Sep 2022 08:00) (91/70 - 139/61)  BP(mean): 79 (09 Sep 2022 08:00) (72 - 92)  ABP: --  ABP(mean): --  RR: 16 (09 Sep 2022 08:00) (15 - 22)  SpO2: 100% (09 Sep 2022 08:00) (100% - 100%)    O2 Parameters below as of 09 Sep 2022 08:00  Patient On (Oxygen Delivery Method): room air               @ 07:01  -   @ 07:00  --------------------------------------------------------  IN: 870 mL / OUT: 1640 mL / NET: -770 mL      CAPILLARY BLOOD GLUCOSE          PHYSICAL EXAM:    General: NAD  HEENT: NC/AT; PERRL, clear conjunctiva  Neck: supple  Respiratory: CTA b/l- L cT in place to waterseal   Cardiovascular: +S1/S2; RRR  Abdomen: soft, NT/ND; +BS x4  Extremities: WWP, 2+ peripheral pulses b/l; no LE edema  Skin: normal color and turgor; no rash  Neurological: A and O x 4 following commands     MEDICATIONS:  MEDICATIONS  (STANDING):  bictegravir 50 mG/emtricitabine 200 mG/tenofovir alafenamide 25 mG (BIKTARVY) 1 Tablet(s) Oral daily  chlorhexidine 2% Cloths 1 Application(s) Topical <User Schedule>  ferrous    sulfate 325 milliGRAM(s) Oral <User Schedule>  heparin  Infusion.  Unit(s)/Hr (11 mL/Hr) IV Continuous <Continuous>  lactated ringers. 1000 milliLiter(s) (75 mL/Hr) IV Continuous <Continuous>  lactulose Syrup 10 Gram(s) Oral daily  lidocaine   4% Patch 1 Patch Transdermal daily  morphine ER Tablet 30 milliGRAM(s) Oral every 12 hours  piperacillin/tazobactam IVPB.. 3.375 Gram(s) IV Intermittent every 8 hours  polyethylene glycol 3350 17 Gram(s) Oral daily  senna 2 Tablet(s) Oral at bedtime    MEDICATIONS  (PRN):  heparin   Injectable 4500 Unit(s) IV Push every 6 hours PRN For aPTT less than 40  heparin   Injectable 2000 Unit(s) IV Push every 6 hours PRN For aPTT between 40 - 57  HYDROmorphone  Injectable 0.5 milliGRAM(s) IV Push every 4 hours PRN Severe Pain (7 - 10)  ondansetron Injectable 4 milliGRAM(s) IV Push every 8 hours PRN Nausea and/or Vomiting      ALLERGIES:  Allergies    ibuprofen (Angioedema)    Intolerances        LABS:                        9.4    14.37 )-----------( 615      ( 09 Sep 2022 03:50 )             31.1         134<L>  |  97<L>  |  13  ----------------------------<  104<H>  4.3   |  32<H>  |  0.86    Ca    14.9<HH>      09 Sep 2022 03:50  Phos  3.4       Mg     1.90         TPro  6.3  /  Alb  2.3<L>  /  TBili  0.5  /  DBili  x   /  AST  22  /  ALT  12  /  AlkPhos  250<H>      PT/INR - ( 08 Sep 2022 20:24 )   PT: 14.1 sec;   INR: 1.21 ratio         PTT - ( 09 Sep 2022 03:50 )  PTT:38.2 sec  Urinalysis Basic - ( 08 Sep 2022 20:00 )    Color: Yellow / Appearance: Slightly Turbid / S.016 / pH: x  Gluc: x / Ketone: Negative  / Bili: Negative / Urobili: 3 mg/dL   Blood: x / Protein: Trace / Nitrite: Negative   Leuk Esterase: Negative / RBC: 80 /HPF / WBC 5 /HPF   Sq Epi: x / Non Sq Epi: 1 /HPF / Bacteria: Moderate        RADIOLOGY & ADDITIONAL TESTS: Reviewed.

## 2022-09-09 NOTE — PROGRESS NOTE ADULT - ASSESSMENT
31 M with PMH/ HIV, rectal cancer not on tx (followed up by Dr Frazier ([facility] oncology)), mets to liver and possibly bone,  R eye cataract, who presented to the [facility] on 8/31/22 after syncopal episode at home. Pt with cough x 2 mos, with yellow sputum and shortness of breath since 5 days PTA.   -- at [facility]: pt was found to be hypoxemic. 8/31/22 -- L chest wall Pigtail placement. Pigtail was placed for suspected PMX with improvement in shortness of breath. CT chest was performed, which showed that pigtail is within the mass__ differential is TB, fungal infection, cavitary lesion, or squamous cell carcinoma. Pt was found to be anemic due to rectal bleeding with Hgb 5.9 s/p 2 U PRBC on 9/1. Blood cxs with MSSA bacteremia, and has been treated with Cefazolin and Zosyn.  Since 9/2 pt with worsening diffuse SC emphysema extending to the neck/face/all 4 extremities/scrotum/back , which has been getting progressively worse over past 2 days as per pt's statement. 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 TTE with two large RV masses and two additional small masses. 9/7-- s/p L lateral chest tube to 14 Fr. 9/7 -- s/p IVC Filter placement (as per [facility] statement, was placed prophylactically due to undiagnosed vegetations on the TTE. 12 cm multiloculated thick walled cavitary mass in the CHRISTINE and lingula.+ liver lesion on the CT a/p. + external iliac vein thrombosis, PE study was indeterminate   __ pt was transferred to [facility] as per family request (mother) for pt to be evaluated by his outpt oncology team (Dr Frazier). As per oncology team the plan is to start Carbotaxol q 3 weeks + RT for diffuse mets as per PET scan,     Neuro: A & O x 3 , no active issues  -- not on sedation  -- continue Morphine ER 30 mg BID, and Morphine 4 mg IVP PRN q 4 hrs for severe pain (scrotal pain, rectal pain)    CV: pt is hemodynamically stable,   - vegetation /? 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  - L CT in place, __ keep to water seal, repeat CT/chest ordered.   -- consider CT surgery eval     GI:   regular diet, bowel regimen    Renal:   TELMA, likely prerenal, resolved   -- follow up renal function     #scrotal swelling likely due SC emphysema  -- indwelling pedraza was placed at [facility] (Coude placed for urinary strain)       ID:  MSSA bacteremia at [facility]  - -repeat blood cxs, UA, Cx of pleural fluid from the Pigtail,   -- continue Zosyn and reassess Abx after pleural fluid cx results,   - CXR with L cavitary lesion   -- AFB x 3 neg at [place], galactomannan neg, blood cxs neg as of 9/2 as per [place] documents     Hematology:   # rectal CA, possible RV mass, mets to liver and possibly bone,  -- Dr Frazier was called to see pt  - -will see pt in am,_ _follow up recs      #cardiac ? mass _ Lovenox held at [place] and s/p 9/7-- IVC filter   thrombocytosis,  AOCD, ferritin 512, iron 11, TIBC-- 150, Iron -- 7 %, transferrin -- 122, ([place])  - -hemoglobin send  and in lab -- follow up     # L external iliac vein DVT  -- start a/c after CBC and aPTT results, __ heparin gtt ordered     Endo:   not diabetic, HgbA1C ordered     Code: Full code    Lines:   PIV, indwelling pedraza    31 M with PMH/ HIV, rectal cancer not on tx (followed up by Dr Frazier ([facility] oncology)), mets to liver and possibly bone,  R eye cataract, who presented to the [facility] on 8/31/22 after syncopal episode at home. Pt with cough x 2 mos, with yellow sputum and shortness of breath since 5 days PTA.   At [facility]: pt was found to be hypoxemic. 8/31/22 -- L chest wall Pigtail placement. Pigtail was placed for suspected PMX with improvement in shortness of breath. CT chest was performed, which showed that pigtail is within the mass__ differential is TB, fungal infection, cavitary lesion, or squamous cell carcinoma. Pt was found to be anemic due to rectal bleeding with Hgb 5.9 s/p 2 U PRBC on 9/1. Blood cxs with MSSA bacteremia, and has been treated with Cefazolin and Zosyn.  Since 9/2 pt with worsening diffuse SC emphysema extending to the neck/face/all 4 extremities/scrotum/back , which has been getting progressively worse over past 2 days as per pt's statement. 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 TTE with two large RV masses and two additional small masses. 9/7-- s/p L lateral chest tube to 14 Fr. 9/7 -- s/p IVC Filter placement (as per [facility] statement, was placed prophylactically due to undiagnosed vegetations on the TTE. 12 cm multiloculated thick walled cavitary mass in the CHRISTINE and lingula.+ liver lesion on the CT a/p. + external iliac vein thrombosis, PE study was indeterminate     Pt was transferred to [facility] as per family request (mother) for pt to be evaluated by his outpt oncology team (Dr Frazier). As per oncology team the plan is to start Carbotaxol q 3 weeks + RT for diffuse mets as per PET scan     Neuro  -A & O x 3 , no active issues  - not on sedation  - continue Morphine ER 30 mg BID, and Morphine 4 mg IVP PRN q 4 hrs for severe pain (scrotal pain, rectal pain)    CV:   TTE with two large RV masses and two additional small masses. 9/7-- s/p L lateral chest tube to 14 Fr. 9/7 -- s/p IVC Filter placement (as per [facility] statement, was placed prophylactically due to undiagnosed vegetations on the TTE.  -pt is hemodynamically stable no pressors   -repeat TTE ordered f/u results  - cardiac MRI ordered       Pulm:  #CHRISTINE cavitary lesion - Differential includes cavitary PNA (sputum and blood cultures positive for MSSA) vs malignancy given history of metastatic rectal CA vs atypical infection. Of note PET/CT in our system from 8/2022 with only few subcm nodules in L and GGO in R lung, making malignancy as etiology of cavitary lesion less likely.   - L pigtail catheter was placed due to concern for L PTX. However, patient was found to have large CHRISTINE cavitary lesion instead, into which the pigtail had been placed.   -On 9/7 patient had L pigtail replaced (currently 14F) as well as IVC filter placement.   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  - L CT in place, keep to water seal, repeat  - CT/chest ordered.   - consider CT surgery eval vs IP once patient gets CT chest- currently refusing CT scan     GI:   regular diet, bowel regimen    Renal:   TELMA, likely prerenal, resolved   - follow up renal function   -Strict Is and Os: neg 770 making about 150/hr     #Hypercalcemia - ?related to malignancy  - Check PTH, PTHrp, Vit D levels  - Trend  - IVF    #scrotal swelling likely due SC emphysema  - indwelling pedraza was placed at [facility] (Coude placed for urinary strain)   - UA positive with moderate bacteria 9/9- will send Ucx       ID:  # MSSA bacteremia at [facility]  -Sputm cultures from 9/1 grew MSSA and Blood cultures from 8/31 grew MSSA with blood cultures from 9/2 NGTD. Legionella negative. Sputum AFB negative. Fungitell negative.    -repeat blood cxs, UA, Cx of pleural fluid from the Pigtail pending in lab  -Patient had been empirically on Zosyn, Cefazolin and Caspofungin (which was dc'ed).  - continue Zosyn and reassess Abx after pleural fluid cx results  - CXR with L cavitary lesion   - AFB x 3 neg at [place], galactomannan neg, blood cxs neg as of 9/2 as per [place] documents   - ID consult placed recs appreciated     Hematology:   # rectal CA, possible RV mass, mets to liver and possibly bone,  -Dr Frazier was called to see pt F/U      #cardiac ? mass  -Lovenox held at [place] and s/p 9/7-- IVC filter   thrombocytosis,  AOCD, ferritin 512, iron 11, TIBC-- 150, Iron -- 7 %, transferrin -- 122, ([place])  - H/H stable     # L external iliac vein DVT  - on heparin gtt  - ptt q 6     Endo:   -not diabetic, HgbA1C ordered     Code: Full code    Lines:   PIV, indwelling pedraza    31 M with PMH/ HIV, rectal cancer not on tx (followed up by Dr Frazier ([facility] oncology)), mets to liver and possibly bone,  R eye cataract, who presented to the [facility] on 8/31/22 after syncopal episode at home. Pt with cough x 2 mos, with yellow sputum and shortness of breath since 5 days PTA.   At [facility]: pt was found to be hypoxemic. 8/31/22 -- L chest wall Pigtail placement. Pigtail was placed for suspected PMX with improvement in shortness of breath. CT chest was performed, which showed that pigtail is within the mass__ differential is TB, fungal infection, cavitary lesion, or squamous cell carcinoma. Pt was found to be anemic due to rectal bleeding with Hgb 5.9 s/p 2 U PRBC on 9/1. Blood cxs with MSSA bacteremia, and has been treated with Cefazolin and Zosyn.  Since 9/2 pt with worsening diffuse SC emphysema extending to the neck/face/all 4 extremities/scrotum/back , which has been getting progressively worse over past 2 days as per pt's statement. 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 TTE with two large RV masses and two additional small masses. 9/7-- s/p L lateral chest tube to 14 Fr. 9/7 -- s/p IVC Filter placement (as per [facility] statement, was placed prophylactically due to undiagnosed vegetations on the TTE. 12 cm multiloculated thick walled cavitary mass in the CHRISTINE and lingula.+ liver lesion on the CT a/p. + external iliac vein thrombosis, PE study was indeterminate     Pt was transferred to [facility] as per family request (mother) for pt to be evaluated by his outpt oncology team (Dr Frazier). As per oncology team the plan is to start Carbotaxol q 3 weeks + RT for diffuse mets as per PET scan     Neuro  -A & O x 3 , no active issues  - not on sedation  - continue Morphine ER 30 mg BID, and Morphine 4 mg IVP PRN q 4 hrs for severe pain (scrotal pain, rectal pain)    CV:   TTE with two large RV masses and two additional small masses. 9/7-- s/p L lateral chest tube to 14 Fr. 9/7 -- s/p IVC Filter placement (as per [facility] statement, was placed prophylactically due to undiagnosed vegetations on the TTE.  -pt is hemodynamically stable no pressors   -repeat TTE ordered f/u results  - cardiac MRI ordered       Pulm:  #CHRISTINE cavitary lesion - Differential includes cavitary PNA (sputum and blood cultures positive for MSSA) vs malignancy given history of metastatic rectal CA vs atypical infection. Of note PET/CT in our system from 8/2022 with only few subcm nodules in L and GGO in R lung, making malignancy as etiology of cavitary lesion less likely.   - L pigtail catheter was placed due to concern for L PTX. However, patient was found to have large CHRISTINE cavitary lesion instead, into which the pigtail had been placed.   -On 9/7 patient had L pigtail replaced (currently 14F) as well as IVC filter placement.   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  - L CT in place, keep to water seal, repeat  - CT/chest ordered.   - consider CT surgery eval vs IP once patient gets CT chest- currently refusing CT scan     GI:   regular diet, bowel regimen    Renal:   TELMA, likely prerenal, resolved   - follow up renal function   -Strict Is and Os: neg 770 making about 150/hr     #Hypercalcemia - ?related to malignancy  - Check PTH, PTHrp, Vit D levels  - Trend  - IVF LR 2 75  - continue to trend levels     #scrotal swelling likely due SC emphysema  - indwelling pedraza was placed at [facility] (Coude placed for urinary strain)   - UA positive with moderate bacteria 9/9- will send Ucx       ID:  # MSSA bacteremia at [facility]  -Sputm cultures from 9/1 grew MSSA and Blood cultures from 8/31 grew MSSA with blood cultures from 9/2 NGTD. Legionella negative. Sputum AFB negative. Fungitell negative.    -repeat blood cxs, UA, Cx of pleural fluid from the Pigtail pending in lab  -Patient had been empirically on Zosyn, Cefazolin and Caspofungin (which was dc'ed).  - continue Zosyn and reassess Abx after pleural fluid cx results  - CXR with L cavitary lesion   - AFB x 3 neg at [place], galactomannan neg, blood cxs neg as of 9/2 as per [place] documents   - ID consult placed recs appreciated     Hematology:   # rectal CA, possible RV mass, mets to liver and possibly bone,  -Dr Frazier was called to see pt F/U      #cardiac ? mass  -Lovenox held at [place] and s/p 9/7-- IVC filter   thrombocytosis,  AOCD, ferritin 512, iron 11, TIBC-- 150, Iron -- 7 %, transferrin -- 122, ([place])  - H/H stable     # L external iliac vein DVT  - on heparin gtt  - ptt q 6     Endo:   -not diabetic, HgbA1C ordered     Code: Full code    Lines:   PIV, indwelling pedraza    31 M with PMH/ HIV, rectal cancer not on tx (followed up by Dr Frazier ([facility] oncology)), mets to liver and possibly bone,  R eye cataract, who presented to the [facility] on 8/31/22 after syncopal episode at home. Pt with cough x 2 mos, with yellow sputum and shortness of breath since 5 days PTA.   At [facility]: pt was found to be hypoxemic. 8/31/22 -- L chest wall Pigtail placement. Pigtail was placed for suspected PMX with improvement in shortness of breath. CT chest was performed, which showed that pigtail is within the mass__ differential is TB, fungal infection, cavitary lesion, or squamous cell carcinoma. Pt was found to be anemic due to rectal bleeding with Hgb 5.9 s/p 2 U PRBC on 9/1. Blood cxs with MSSA bacteremia, and has been treated with Cefazolin and Zosyn.  Since 9/2 pt with worsening diffuse SC emphysema extending to the neck/face/all 4 extremities/scrotum/back , which has been getting progressively worse over past 2 days as per pt's statement. 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 TTE with two large RV masses and two additional small masses. 9/7-- s/p L lateral chest tube to 14 Fr. 9/7 -- s/p IVC Filter placement (as per [facility] statement, was placed prophylactically due to undiagnosed vegetations on the TTE. 12 cm multiloculated thick walled cavitary mass in the CHRISTINE and lingula.+ liver lesion on the CT a/p. + external iliac vein thrombosis, PE study was indeterminate     Pt was transferred to [facility] as per family request (mother) for pt to be evaluated by his outpt oncology team (Dr Frazier). As per oncology team the plan is to start Carbotaxol q 3 weeks + RT for diffuse mets as per PET scan     Neuro  -A & O x 3 , no active issues  - not on sedation  - continue Morphine ER 30 mg BID, and Morphine 4 mg IVP PRN q 4 hrs for severe pain (scrotal pain, rectal pain)    CV:   TTE with two large RV masses and two additional small masses. 9/7-- s/p L lateral chest tube to 14 Fr. 9/7 -- s/p IVC Filter placement (as per [facility] statement, was placed prophylactically due to undiagnosed vegetations on the TTE.  -pt is hemodynamically stable no pressors   -repeat TTE ordered f/u results  - cardiac MRI ordered       Pulm:  #CHRISTINE cavitary lesion - Differential includes cavitary PNA (sputum and blood cultures positive for MSSA) vs malignancy given history of metastatic rectal CA vs atypical infection. Of note PET/CT in our system from 8/2022 with only few subcm nodules in L and GGO in R lung, making malignancy as etiology of cavitary lesion less likely.   - L pigtail catheter was placed due to concern for L PTX. However, patient was found to have large CHRISTINE cavitary lesion instead, into which the pigtail had been placed.   -On 9/7 patient had L pigtail replaced (currently 14F) as well as IVC filter placement.   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  - L CT in place, keep to water seal, repeat  - CT/chest ordered.   - consider CT surgery eval vs IP once patient gets CT chest- currently refusing CT scan     GI:   regular diet, bowel regimen    Renal:   TELMA, likely prerenal, resolved   - follow up renal function   -Strict Is and Os: neg 770 making about 150/hr     #Hypercalcemia - ?related to malignancy  - Check PTH, PTHrp, Vit D levels  - Trend  - IVF LR 2 75  - continue to trend levels     #scrotal swelling likely due SC emphysema  - indwelling pedraza was placed at [facility] (Coude placed for urinary strain)   - UA positive with moderate bacteria 9/9- will send Ucx       ID:  # MSSA bacteremia at [facility]  -Sputm cultures from 9/1 grew MSSA and Blood cultures from 8/31 grew MSSA with blood cultures from 9/2 NGTD. Legionella negative. Sputum AFB negative. Fungitell negative.    -repeat blood cxs, UA, Cx of pleural fluid from the Pigtail pending in lab  -Patient had been empirically on Zosyn, Cefazolin and Caspofungin (which was dc'ed).  - continue Zosyn and reassess Abx after pleural fluid cx results  - CXR with L cavitary lesion   - AFB x 3 neg at [place], galactomannan neg, blood cxs neg as of 9/2 as per [place] documents   - ID consult placed recs appreciated     # HIV  - Follows with Dr. Marcial in clinic  - CD4 392 and viral load < 30 on 9/2/22 as per ID note   - c/w home Biktarvy     Hematology:   # rectal CA, possible RV mass, mets to liver and possibly bone,  -Dr Frazier was called to see pt F/U      #cardiac ? mass  -Lovenox held at [place] and s/p 9/7-- IVC filter   thrombocytosis,  AOCD, ferritin 512, iron 11, TIBC-- 150, Iron -- 7 %, transferrin -- 122, ([place])  - H/H stable     # L external iliac vein DVT  - on heparin gtt  - ptt q 6     Endo:   -not diabetic, HgbA1C ordered     Code: Full code    Lines:   PIV, indwelling pedraza

## 2022-09-09 NOTE — CONSULT NOTE ADULT - ASSESSMENT
31m with untreated metastatic ano-rectal SCC, HPV positive, HIV infection, transferred from [facility] for continuation of care.   He presented to [facility] on 8/31/22 after syncopal episode at home.   Pt with cough x 2 mos, with yellow sputum and shortness of breath. Admitted for hypoxic respiratory failure.   8/31 CT C/A/P notable for large thick walled multiloculated CHRISTINE cavitary lesion, 10 cm ulcerated rectal mass inseperable from prostate and multiple necrotic liver mets and concern for L external iliac thrombosis.  S/p chest tube c/b severe subcutaneous emphysema  found to have MSSA bacteremia and multifocal cavitary lesions on CT chest c/f superimposed pna and vegetations in RV on TTE.    Recent PET/CT 8/16/22 reviewed  1. Extensive FDG avid thickening at the anorectal junction corresponds to   known squamous cell carcinoma.  2. FDG-avid left perirectal, left pelvic, and left inguinal   lymphadenopathy is compatible with metastatic disease.  3. Difficult to delineate hypermetabolism in left lower sacrum is   suspicious for bone involvement. Extension of hypermetabolism through the   left sciatic notch is suspicious for perineural disease. Further   evaluation may be performed with pelvic MRI, with and without intravenous   contrast.  3. Multiple FDG avid bilobar hepatic hepatic metastases. A hypodense   hepatic lesion with physiologic FDG activity may represent a hemangioma.   Further evaluation may be performed with MRI. Hepatic lesions are   accessible to an ultrasound-guided percutaneous needle biopsy.  4. FDG avid patchy opacities in the right lung with additional findings   in the left lung, may be related to underlying infectious etiology.   Please correlate clinically. Dedicated CT of chest may be obtained for   further evaluation.  6. Increased radiotracer activity within the mediastinum and right   perihilar regions, indeterminate and difficult to delineate on   noncontrast CT. Contrast-enhanced CT may be obtained for further   evaluation.  7. Left lateral chest wall discrete intramuscular focus at the level of   the second/third intercostal space, indeterminate and difficult to   evaluate on CT. Differential diagnosis includes metastasis. Further   evaluation may be performed with ultrasound.      -CT chest, LE dopplers and TTE/JENNIFER pending, will follow  -ID input appreciated, abx for 6 weeks  -Severe hypercalcemia, likely in the setting of malignancy. Ca is 14.9, corrected 16.8. Ca was normal 7/22. Check PTH, vit D and PTHrp. Unclear if pt has received treatment for hypercalcemia at [facility]. Would treat with aggressive hydration, calcitonin and Pamidronate or zometa.  -With regards to treatment of metastatic HPV related anal SCC, treatment will be on hold pending resolution of infection. Will need reassessment after resolution of infection to evaluate performance status and decide on systemic treatment options  -Pal care consult for pain management  -When stable and after the above, recommend radiation oncology consult for consideration of RT to anal mass for palliation.  -Supportive care, pain control, Nutrition, PT, DVT ppx  -Outpatient oncology f/u    Will follow. Please do not hesitate to call back with questions.     Racheal Constantino MD  Medical Oncology Attending  C: [phone]     time spent on direct patient care, interdisciplinary discussions and chart review.

## 2022-09-10 LAB
24R-OH-CALCIDIOL SERPL-MCNC: 14.7 NG/ML — LOW (ref 30–80)
ANION GAP SERPL CALC-SCNC: 7 MMOL/L — SIGNIFICANT CHANGE UP (ref 7–14)
ANION GAP SERPL CALC-SCNC: 8 MMOL/L — SIGNIFICANT CHANGE UP (ref 7–14)
APTT BLD: 41 SEC — HIGH (ref 27–36.3)
APTT BLD: 52.1 SEC — HIGH (ref 27–36.3)
APTT BLD: 60.6 SEC — HIGH (ref 27–36.3)
BASOPHILS # BLD AUTO: 0.03 K/UL — SIGNIFICANT CHANGE UP (ref 0–0.2)
BASOPHILS NFR BLD AUTO: 0.2 % — SIGNIFICANT CHANGE UP (ref 0–2)
BUN SERPL-MCNC: 18 MG/DL — SIGNIFICANT CHANGE UP (ref 7–23)
BUN SERPL-MCNC: 19 MG/DL — SIGNIFICANT CHANGE UP (ref 7–23)
CA-I BLD-SCNC: 1.89 MMOL/L — HIGH (ref 1.15–1.29)
CALCIUM SERPL-MCNC: 15.1 MG/DL — CRITICAL HIGH (ref 8.4–10.5)
CALCIUM SERPL-MCNC: 16.4 MG/DL — CRITICAL HIGH (ref 8.4–10.5)
CHLORIDE SERPL-SCNC: 97 MMOL/L — LOW (ref 98–107)
CHLORIDE SERPL-SCNC: 97 MMOL/L — LOW (ref 98–107)
CO2 SERPL-SCNC: 30 MMOL/L — SIGNIFICANT CHANGE UP (ref 22–31)
CO2 SERPL-SCNC: 33 MMOL/L — HIGH (ref 22–31)
CREAT SERPL-MCNC: 1.26 MG/DL — SIGNIFICANT CHANGE UP (ref 0.5–1.3)
CREAT SERPL-MCNC: 1.27 MG/DL — SIGNIFICANT CHANGE UP (ref 0.5–1.3)
CULTURE RESULTS: NO GROWTH — SIGNIFICANT CHANGE UP
EGFR: 76 ML/MIN/1.73M2 — SIGNIFICANT CHANGE UP
EGFR: 77 ML/MIN/1.73M2 — SIGNIFICANT CHANGE UP
EOSINOPHIL # BLD AUTO: 0.12 K/UL — SIGNIFICANT CHANGE UP (ref 0–0.5)
EOSINOPHIL NFR BLD AUTO: 0.8 % — SIGNIFICANT CHANGE UP (ref 0–6)
FUNGITELL: <31 PG/ML — SIGNIFICANT CHANGE UP
GLUCOSE SERPL-MCNC: 105 MG/DL — HIGH (ref 70–99)
GLUCOSE SERPL-MCNC: 97 MG/DL — SIGNIFICANT CHANGE UP (ref 70–99)
GRAM STN FLD: SIGNIFICANT CHANGE UP
HCT VFR BLD CALC: 29.7 % — LOW (ref 39–50)
HCT VFR BLD CALC: 33.1 % — LOW (ref 39–50)
HGB BLD-MCNC: 10.2 G/DL — LOW (ref 13–17)
HGB BLD-MCNC: 8.6 G/DL — LOW (ref 13–17)
HIV-1 VIRAL LOAD RESULT: SIGNIFICANT CHANGE UP
HIV1 RNA # SERPL NAA+PROBE: SIGNIFICANT CHANGE UP COPIES/ML
HIV1 RNA SER-IMP: SIGNIFICANT CHANGE UP
HIV1 RNA SERPL NAA+PROBE-ACNC: SIGNIFICANT CHANGE UP
HIV1 RNA SERPL NAA+PROBE-LOG#: SIGNIFICANT CHANGE UP LG COP/ML
IANC: 13.17 K/UL — HIGH (ref 1.8–7.4)
IMM GRANULOCYTES NFR BLD AUTO: 0.8 % — SIGNIFICANT CHANGE UP (ref 0–1.5)
LYMPHOCYTES # BLD AUTO: 1.22 K/UL — SIGNIFICANT CHANGE UP (ref 1–3.3)
LYMPHOCYTES # BLD AUTO: 7.9 % — LOW (ref 13–44)
MAGNESIUM SERPL-MCNC: 1.7 MG/DL — SIGNIFICANT CHANGE UP (ref 1.6–2.6)
MAGNESIUM SERPL-MCNC: 2 MG/DL — SIGNIFICANT CHANGE UP (ref 1.6–2.6)
MCHC RBC-ENTMCNC: 22.9 PG — LOW (ref 27–34)
MCHC RBC-ENTMCNC: 24.1 PG — LOW (ref 27–34)
MCHC RBC-ENTMCNC: 29 GM/DL — LOW (ref 32–36)
MCHC RBC-ENTMCNC: 30.8 GM/DL — LOW (ref 32–36)
MCV RBC AUTO: 78.3 FL — LOW (ref 80–100)
MCV RBC AUTO: 79.2 FL — LOW (ref 80–100)
MONOCYTES # BLD AUTO: 0.69 K/UL — SIGNIFICANT CHANGE UP (ref 0–0.9)
MONOCYTES NFR BLD AUTO: 4.5 % — SIGNIFICANT CHANGE UP (ref 2–14)
NEUTROPHILS # BLD AUTO: 13.17 K/UL — HIGH (ref 1.8–7.4)
NEUTROPHILS NFR BLD AUTO: 85.8 % — HIGH (ref 43–77)
NRBC # BLD: 0 /100 WBCS — SIGNIFICANT CHANGE UP (ref 0–0)
NRBC # BLD: 0 /100 WBCS — SIGNIFICANT CHANGE UP (ref 0–0)
NRBC # FLD: 0 K/UL — SIGNIFICANT CHANGE UP (ref 0–0)
NRBC # FLD: 0 K/UL — SIGNIFICANT CHANGE UP (ref 0–0)
PHOSPHATE SERPL-MCNC: 3.6 MG/DL — SIGNIFICANT CHANGE UP (ref 2.5–4.5)
PHOSPHATE SERPL-MCNC: 3.9 MG/DL — SIGNIFICANT CHANGE UP (ref 2.5–4.5)
PLATELET # BLD AUTO: 506 K/UL — HIGH (ref 150–400)
PLATELET # BLD AUTO: 568 K/UL — HIGH (ref 150–400)
POTASSIUM SERPL-MCNC: 4.3 MMOL/L — SIGNIFICANT CHANGE UP (ref 3.5–5.3)
POTASSIUM SERPL-MCNC: 4.4 MMOL/L — SIGNIFICANT CHANGE UP (ref 3.5–5.3)
POTASSIUM SERPL-SCNC: 4.3 MMOL/L — SIGNIFICANT CHANGE UP (ref 3.5–5.3)
POTASSIUM SERPL-SCNC: 4.4 MMOL/L — SIGNIFICANT CHANGE UP (ref 3.5–5.3)
RBC # BLD: 3.75 M/UL — LOW (ref 4.2–5.8)
RBC # BLD: 4.23 M/UL — SIGNIFICANT CHANGE UP (ref 4.2–5.8)
RBC # FLD: 22.8 % — HIGH (ref 10.3–14.5)
RBC # FLD: 22.9 % — HIGH (ref 10.3–14.5)
SODIUM SERPL-SCNC: 135 MMOL/L — SIGNIFICANT CHANGE UP (ref 135–145)
SODIUM SERPL-SCNC: 137 MMOL/L — SIGNIFICANT CHANGE UP (ref 135–145)
SPECIMEN SOURCE: SIGNIFICANT CHANGE UP
SPECIMEN SOURCE: SIGNIFICANT CHANGE UP
VIT D25+D1,25 OH+D1,25 PNL SERPL-MCNC: 8 PG/ML — LOW (ref 19.9–79.3)
WBC # BLD: 12.32 K/UL — HIGH (ref 3.8–10.5)
WBC # BLD: 15.35 K/UL — HIGH (ref 3.8–10.5)
WBC # FLD AUTO: 12.32 K/UL — HIGH (ref 3.8–10.5)
WBC # FLD AUTO: 15.35 K/UL — HIGH (ref 3.8–10.5)

## 2022-09-10 PROCEDURE — 93970 EXTREMITY STUDY: CPT | Mod: 26

## 2022-09-10 PROCEDURE — 99223 1ST HOSP IP/OBS HIGH 75: CPT

## 2022-09-10 PROCEDURE — 99291 CRITICAL CARE FIRST HOUR: CPT | Mod: GC

## 2022-09-10 RX ORDER — DORNASE ALFA 1 MG/ML
5 SOLUTION RESPIRATORY (INHALATION) ONCE
Refills: 0 | Status: DISCONTINUED | OUTPATIENT
Start: 2022-09-10 | End: 2022-09-10

## 2022-09-10 RX ORDER — HYDROMORPHONE HYDROCHLORIDE 2 MG/ML
1 INJECTION INTRAMUSCULAR; INTRAVENOUS; SUBCUTANEOUS EVERY 4 HOURS
Refills: 0 | Status: DISCONTINUED | OUTPATIENT
Start: 2022-09-10 | End: 2022-09-12

## 2022-09-10 RX ORDER — HYDROMORPHONE HYDROCHLORIDE 2 MG/ML
0.5 INJECTION INTRAMUSCULAR; INTRAVENOUS; SUBCUTANEOUS ONCE
Refills: 0 | Status: DISCONTINUED | OUTPATIENT
Start: 2022-09-10 | End: 2022-09-10

## 2022-09-10 RX ORDER — ACETAMINOPHEN 500 MG
1000 TABLET ORAL ONCE
Refills: 0 | Status: COMPLETED | OUTPATIENT
Start: 2022-09-10 | End: 2022-09-10

## 2022-09-10 RX ORDER — PAMIDRONATE DISODIUM 9 MG/ML
90 INJECTION, SOLUTION INTRAVENOUS ONCE
Refills: 0 | Status: COMPLETED | OUTPATIENT
Start: 2022-09-10 | End: 2022-09-10

## 2022-09-10 RX ORDER — CALCITONIN SALMON 200 [IU]/ML
230 INJECTION, SOLUTION INTRAMUSCULAR EVERY 12 HOURS
Refills: 0 | Status: COMPLETED | OUTPATIENT
Start: 2022-09-10 | End: 2022-09-11

## 2022-09-10 RX ORDER — ALTEPLASE 100 MG
10 KIT INTRAVENOUS ONCE
Refills: 0 | Status: DISCONTINUED | OUTPATIENT
Start: 2022-09-10 | End: 2022-09-10

## 2022-09-10 RX ORDER — ERGOCALCIFEROL 1.25 MG/1
50000 CAPSULE ORAL
Refills: 0 | Status: DISCONTINUED | OUTPATIENT
Start: 2022-09-10 | End: 2022-09-10

## 2022-09-10 RX ORDER — MAGNESIUM SULFATE 500 MG/ML
2 VIAL (ML) INJECTION ONCE
Refills: 0 | Status: COMPLETED | OUTPATIENT
Start: 2022-09-10 | End: 2022-09-11

## 2022-09-10 RX ORDER — SODIUM CHLORIDE 9 MG/ML
30 INJECTION INTRAMUSCULAR; INTRAVENOUS; SUBCUTANEOUS ONCE
Refills: 0 | Status: DISCONTINUED | OUTPATIENT
Start: 2022-09-10 | End: 2022-09-10

## 2022-09-10 RX ADMIN — CHLORHEXIDINE GLUCONATE 1 APPLICATION(S): 213 SOLUTION TOPICAL at 07:01

## 2022-09-10 RX ADMIN — Medication 325 MILLIGRAM(S): at 15:03

## 2022-09-10 RX ADMIN — HYDROMORPHONE HYDROCHLORIDE 1 MILLIGRAM(S): 2 INJECTION INTRAMUSCULAR; INTRAVENOUS; SUBCUTANEOUS at 21:30

## 2022-09-10 RX ADMIN — Medication 100 MILLIGRAM(S): at 12:30

## 2022-09-10 RX ADMIN — HYDROMORPHONE HYDROCHLORIDE 0.5 MILLIGRAM(S): 2 INJECTION INTRAMUSCULAR; INTRAVENOUS; SUBCUTANEOUS at 08:15

## 2022-09-10 RX ADMIN — MORPHINE SULFATE 30 MILLIGRAM(S): 50 CAPSULE, EXTENDED RELEASE ORAL at 07:00

## 2022-09-10 RX ADMIN — LIDOCAINE 1 PATCH: 4 CREAM TOPICAL at 12:40

## 2022-09-10 RX ADMIN — HEPARIN SODIUM 1600 UNIT(S)/HR: 5000 INJECTION INTRAVENOUS; SUBCUTANEOUS at 21:03

## 2022-09-10 RX ADMIN — HEPARIN SODIUM 1600 UNIT(S)/HR: 5000 INJECTION INTRAVENOUS; SUBCUTANEOUS at 06:18

## 2022-09-10 RX ADMIN — HYDROMORPHONE HYDROCHLORIDE 0.5 MILLIGRAM(S): 2 INJECTION INTRAMUSCULAR; INTRAVENOUS; SUBCUTANEOUS at 00:19

## 2022-09-10 RX ADMIN — HYDROMORPHONE HYDROCHLORIDE 1 MILLIGRAM(S): 2 INJECTION INTRAMUSCULAR; INTRAVENOUS; SUBCUTANEOUS at 12:30

## 2022-09-10 RX ADMIN — HYDROMORPHONE HYDROCHLORIDE 0.5 MILLIGRAM(S): 2 INJECTION INTRAMUSCULAR; INTRAVENOUS; SUBCUTANEOUS at 00:34

## 2022-09-10 RX ADMIN — BICTEGRAVIR SODIUM, EMTRICITABINE, AND TENOFOVIR ALAFENAMIDE FUMARATE 1 TABLET(S): 30; 120; 15 TABLET ORAL at 12:40

## 2022-09-10 RX ADMIN — SODIUM CHLORIDE 75 MILLILITER(S): 9 INJECTION, SOLUTION INTRAVENOUS at 21:04

## 2022-09-10 RX ADMIN — HYDROMORPHONE HYDROCHLORIDE 0.5 MILLIGRAM(S): 2 INJECTION INTRAMUSCULAR; INTRAVENOUS; SUBCUTANEOUS at 08:00

## 2022-09-10 RX ADMIN — HYDROMORPHONE HYDROCHLORIDE 1 MILLIGRAM(S): 2 INJECTION INTRAMUSCULAR; INTRAVENOUS; SUBCUTANEOUS at 17:30

## 2022-09-10 RX ADMIN — MORPHINE SULFATE 30 MILLIGRAM(S): 50 CAPSULE, EXTENDED RELEASE ORAL at 08:00

## 2022-09-10 RX ADMIN — MORPHINE SULFATE 30 MILLIGRAM(S): 50 CAPSULE, EXTENDED RELEASE ORAL at 18:48

## 2022-09-10 RX ADMIN — LIDOCAINE 1 PATCH: 4 CREAM TOPICAL at 00:29

## 2022-09-10 RX ADMIN — Medication 400 MILLIGRAM(S): at 15:09

## 2022-09-10 RX ADMIN — HYDROMORPHONE HYDROCHLORIDE 1 MILLIGRAM(S): 2 INJECTION INTRAMUSCULAR; INTRAVENOUS; SUBCUTANEOUS at 12:45

## 2022-09-10 RX ADMIN — Medication 1000 MILLIGRAM(S): at 15:30

## 2022-09-10 RX ADMIN — MORPHINE SULFATE 30 MILLIGRAM(S): 50 CAPSULE, EXTENDED RELEASE ORAL at 18:21

## 2022-09-10 RX ADMIN — HYDROMORPHONE HYDROCHLORIDE 1 MILLIGRAM(S): 2 INJECTION INTRAMUSCULAR; INTRAVENOUS; SUBCUTANEOUS at 21:04

## 2022-09-10 RX ADMIN — Medication 100 MILLIGRAM(S): at 04:31

## 2022-09-10 RX ADMIN — LIDOCAINE 1 PATCH: 4 CREAM TOPICAL at 19:14

## 2022-09-10 RX ADMIN — CALCITONIN SALMON 230 INTERNATIONAL UNIT(S): 200 INJECTION, SOLUTION INTRAMUSCULAR at 17:09

## 2022-09-10 RX ADMIN — HYDROMORPHONE HYDROCHLORIDE 0.5 MILLIGRAM(S): 2 INJECTION INTRAMUSCULAR; INTRAVENOUS; SUBCUTANEOUS at 04:19

## 2022-09-10 RX ADMIN — PAMIDRONATE DISODIUM 65 MILLIGRAM(S): 9 INJECTION, SOLUTION INTRAVENOUS at 17:10

## 2022-09-10 RX ADMIN — HYDROMORPHONE HYDROCHLORIDE 1 MILLIGRAM(S): 2 INJECTION INTRAMUSCULAR; INTRAVENOUS; SUBCUTANEOUS at 17:08

## 2022-09-10 RX ADMIN — Medication 100 MILLIGRAM(S): at 21:03

## 2022-09-10 RX ADMIN — SODIUM CHLORIDE 75 MILLILITER(S): 9 INJECTION, SOLUTION INTRAVENOUS at 08:13

## 2022-09-10 RX ADMIN — HYDROMORPHONE HYDROCHLORIDE 0.5 MILLIGRAM(S): 2 INJECTION INTRAMUSCULAR; INTRAVENOUS; SUBCUTANEOUS at 04:34

## 2022-09-10 NOTE — DIETITIAN INITIAL EVALUATION ADULT - NSFNSGIIOFT_GEN_A_CORE
09-09-22 @ 07:01  -  09-10-22 @ 07:00  --------------------------------------------------------  OUT:    Chest Tube (mL): 25 mL  Total OUT: 25 mL    Total NET: -25 mL      09-10-22 @ 07:01  -  09-10-22 @ 15:40  --------------------------------------------------------  OUT:    Chest Tube (mL): 0 mL  Total OUT: 0 mL    Total NET: 0 mL

## 2022-09-10 NOTE — DIETITIAN INITIAL EVALUATION ADULT - PERTINENT LABORATORY DATA
09-10    137  |  97<L>  |  18  ----------------------------<  97  4.4   |  33<H>  |  1.26    Ca    16.4<HH>      10 Sep 2022 12:25  Phos  3.9     09-10  Mg     2.00     09-10    TPro  6.3  /  Alb  2.3<L>  /  TBili  0.5  /  DBili  x   /  AST  22  /  ALT  12  /  AlkPhos  250<H>  09-08  A1C with Estimated Average Glucose Result: 5.1 % (09-08-22 @ 20:24)

## 2022-09-10 NOTE — CONSULT NOTE ADULT - SUBJECTIVE AND OBJECTIVE BOX
HPI:    31 M with PMH/ HIV, rectal cancer not on tx (followed up by Dr Frazier ([facility] oncology)), mets to liver and possibly bone, R eye cataract, who presented to the [facility] on 22 after syncopal episode at home. Pt with cough x 2 mos, with yellow sputum and shortness of breath since 5 days PTA.   -- at [facility]: pt was found to be hypoxemic. 22 -- L chest wall Pigtail placement. Pigtail was placed for suspected PMX with improvement in shortness of breath. CT chest was performed, which showed that pigtail is within the mass__ differential is TB, fungal infection, cavitary lesion, or squamous cell carcinoma. Pt was found to be anemic due to rectal bleeding with Hgb 5.9 s/p 2 U PRBC on . Blood cxs with MSSA bacteremia, and has been treated with Cefazolin and Zosyn.  Since  pt with worsening diffuse SC emphysema extending to the neck/face/all 4 extremities/scrotum/back , which has been getting progressively worse over past 2 days as per pt's statement. 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 TTE with two large RV masses and two additional small masses. -- s/p L lateral chest tube to 14 Fr.  -- s/p IVC Filter placement (as per [facility] statement, was placed prophylactically due to undiagnosed vegetations on the TTE. 12 cm multiloculated thick walled cavitary mass in the CHRISTINE and lingula.+ liver lesion on the CT a/p. + external iliac vein thrombosis, PE study was indeterminate   __ pt was transferred to [facility] as per family request (mother) for pt to be evaluated by his outpt oncology team (Dr Frazier). 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 Patient states that his shortness of breath has improved but feels swollen throughout his entire body with extensive testicular swelling which is causing him significant discomfort.     PAST MEDICAL & SURGICAL HISTORY:  HIV infection  2022 virus detected, switched to Biktarvy, male partners    Current smoker (tobacco and marijuana), cocaine use, alcohol use    History of depression and anxiety    Rectal cancer not on treatment    Right cataract    No significant past surgical history      REVIEW OF SYSTEMS  Negative other than what is noted in the HPI	    MEDICATIONS  (STANDING):  bictegravir 50 mG/emtricitabine 200 mG/tenofovir alafenamide 25 mG (BIKTARVY) 1 Tablet(s) Oral daily  ceFAZolin   IVPB 2000 milliGRAM(s) IV Intermittent every 8 hours  chlorhexidine 2% Cloths 1 Application(s) Topical <User Schedule>  ferrous    sulfate 325 milliGRAM(s) Oral <User Schedule>  heparin  Infusion. 1500 Unit(s)/Hr (15 mL/Hr) IV Continuous <Continuous>  HYDROmorphone  Injectable 0.5 milliGRAM(s) IV Push once  lactated ringers. 1000 milliLiter(s) (75 mL/Hr) IV Continuous <Continuous>  lactulose Syrup 10 Gram(s) Oral daily  lidocaine   4% Patch 1 Patch Transdermal daily  morphine ER Tablet 30 milliGRAM(s) Oral every 12 hours  polyethylene glycol 3350 17 Gram(s) Oral daily  senna 2 Tablet(s) Oral at bedtime    MEDICATIONS  (PRN):  heparin   Injectable 4500 Unit(s) IV Push every 6 hours PRN For aPTT less than 40  heparin   Injectable 2000 Unit(s) IV Push every 6 hours PRN For aPTT between 40 - 57  HYDROmorphone  Injectable 1 milliGRAM(s) IV Push every 4 hours PRN Severe Pain (7 - 10)  ondansetron Injectable 4 milliGRAM(s) IV Push every 8 hours PRN Nausea and/or Vomiting      Allergies  ibuprofen (Angioedema)  Intolerances    SOCIAL HISTORY: + Alcohol use, + Marijuana use, Smokes 1ppd x 10 years, occasional cocaine use, denies IVDA    FAMILY HISTORY: Denies      Vital Signs Last 24 Hrs  T(C): 35.8 (10 Sep 2022 08:00), Max: 36.1 (09 Sep 2022 20:00)  T(F): 96.5 (10 Sep 2022 08:00), Max: 96.9 (09 Sep 2022 20:00)  HR: 79 (10 Sep 2022 11:00) (70 - 90)  BP: 125/65 (10 Sep 2022 11:00) (118/63 - 145/71)  BP(mean): 80 (10 Sep 2022 11:00) (74 - 90)  RR: 16 (10 Sep 2022 11:00) (16 - 21)  SpO2: 98% (10 Sep 2022 11:00) (98% - 100%)    Parameters below as of 10 Sep 2022 08:00  Patient On (Oxygen Delivery Method): room air      PHYSICAL EXAM:    General: NAD, awake, alert, interactive	    Skin: Extensive subcutaneous emphysema throughout the body from the face (cheeks) extending down into the feet  	  ENMT: No gross deformities, extensive subcutaneous emphysema throughout the face and neck	    Respiratory and Thorax: Nonlabored respirations, saturating 100% on room air, extensive subcutaneous emphysema throughout the chest wall, tenderness to palpation, left chest pigtail catheter in place with thick, purulent material within the tubing, no airleak noted, however concern for clogged pigtail.   	  Cardiovascular:	Regular rate and rhythm    Gastrointestinal:	Soft, nondistended abdomen with subcutaneous emphysema with tenderness to palpation    Genitourinary: Scrotal swelling with significant amount of air    Musculoskeletal:	 No gross deformities, patient moving all extremities spontaneously     Neurological: No gross deficits    Psychiatric: Patient answering questions and responding appropriately	        LABS:                        8.6    12.32 )-----------( 506      ( 10 Sep 2022 04:30 )             29.7         134<L>  |  97<L>  |  13  ----------------------------<  104<H>  4.3   |  32<H>  |  0.86    Ca    14.9<HH>      09 Sep 2022 03:50  Phos  3.4       Mg     1.90         TPro  6.3  /  Alb  2.3<L>  /  TBili  0.5  /  DBili  x   /  AST  22  /  ALT  12  /  AlkPhos  250<H>  08    PT/INR - ( 08 Sep 2022 20:24 )   PT: 14.1 sec;   INR: 1.21 ratio         PTT - ( 10 Sep 2022 04:30 )  PTT:52.1 sec  Urinalysis Basic - ( 08 Sep 2022 20:00 )    Color: Yellow / Appearance: Slightly Turbid / S.016 / pH: x  Gluc: x / Ketone: Negative  / Bili: Negative / Urobili: 3 mg/dL   Blood: x / Protein: Trace / Nitrite: Negative   Leuk Esterase: Negative / RBC: 80 /HPF / WBC 5 /HPF   Sq Epi: x / Non Sq Epi: 1 /HPF / Bacteria: Moderate      RADIOLOGY & ADDITIONAL STUDIES:  ACC: [id number] EXAM: CT ANGIO CHEST PULM ART [facility]    PROCEDURE DATE: 2022  PROCEDURE:  CT Angiography of the Chest.  Sagittal and coronal reformats were performed as well as 3D (MIP) reconstructions.    FINDINGS: Motion artifacts degrade some of the imaging.    LUNGS AND AIRWAYS: Airway wall thickening. Widespread bilateral reticulonodular interstitial infiltrates.  Upper lobe predominant paraseptal emphysema.  Left upper lung nodular airspace disease versus noncalcified pulmonary nodules measuring up to 1.3 cm.  Small scattered areas of patchy consolidation particularly within right middle, right upper lobe and left lower lobes.    Large complex irregular thick walled almost 11 cm cavity primarily located within lingula and partially within left lower lobe contains a small caliber pigtail percutaneous drainage catheter via lateral approach. There are irregular internal septations and a small volume of layering complex fluid in the cavity. Left upper lobe segmental and subsegmental bronchi are associated with the superior aspect of the cavity. Lingular segmental and subsegmental bronchi appear to communicate with the cavity along the anterior inferior medial aspect.    There is a wide air and fluid containing tract along the path of the small caliber pigtail catheter which extends laterally to the chest wall which may be open externally  PLEURA: Small volume loculated air and fluid containing pleural collection anteriorly at the lower left hemithorax.  MEDIASTINUM AND MICHAEL: Extensive pneumomediastinum extending superiorly to the neck and also to a lesser extent inferiorly into the upper abdomen.  Similar mild multistation mediastinal and bilateral hilar prominent lymph nodes  VESSELS: Normal caliber thoracic aorta. Nonopacification of the lingular segmental and subsegmental arteries as well as a small portion of the left lower lobar pulmonary artery secondary to the adjacent cavitary pneumonia. Findings are felt less likely to be secondary to thromboembolic pulmonary embolism  HEART: Heart size is normal. No pericardial effusion.  CHEST WALL AND LOWER NECK: Extensive subcutaneous emphysema extended to abdominal wall  VISUALIZED UPPER ABDOMEN: Widespread hepatic metastases. Distended gallbladder likely containing sludge. Possible mild bilateral hydronephrosis. Upper retroperitoneal lymphadenopathy  Proximal celiac artery narrowing with poststenotic dilation suggestive of median arcuate ligament syndrome  BONES: Mild scoliosis    IMPRESSION:    Findings most in keeping with cavitary pneumonia involving lingula and left lower lobe complicated by bronchopleural fistula as described with tract around the small caliber pleural pigtail catheter, extensive subcutaneous emphysema and pneumomediastinum. Recommend thoracic surgical consultation.    Nonopacification of lingular and left lower lobe pulmonary arteries as described likely secondary to cavitary pneumonia rather than thromboembolic pulmonary embolism    Preliminary findings including the above were discussed with Dr. Wood by Dr Moody 2022 6:43 PM       HPI:    31 M with PMH/ HIV, rectal cancer not on tx (followed up by Dr Frazier ([facility] oncology)), mets to liver and possibly bone, R eye cataract, who presented to the [facility] on 22 after syncopal episode at home. Pt with cough x 2 mos, with yellow sputum and shortness of breath since 5 days PTA.   -- at [facility]: pt was found to be hypoxemic. 22 -- L chest wall Pigtail placement. Pigtail was placed for suspected PMX with improvement in shortness of breath. CT chest was performed, which showed that pigtail is within the mass__ differential is TB, fungal infection, cavitary lesion, or squamous cell carcinoma. Pt was found to be anemic due to rectal bleeding with Hgb 5.9 s/p 2 U PRBC on . Blood cxs with MSSA bacteremia, and has been treated with Cefazolin and Zosyn.  Since  pt with worsening diffuse SC emphysema extending to the neck/face/all 4 extremities/scrotum/back , which has been getting progressively worse over past 2 days as per pt's statement. 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 TTE with two large RV masses and two additional small masses. -- s/p L lateral chest tube to 14 Fr.  -- s/p IVC Filter placement (as per [facility] statement, was placed prophylactically due to undiagnosed vegetations on the TTE. 12 cm multiloculated thick walled cavitary mass in the CHRISTINE and lingula.+ liver lesion on the CT a/p. + external iliac vein thrombosis, PE study was indeterminate   __ pt was transferred to [facility] as per family request (mother) for pt to be evaluated by his outpt oncology team (Dr Frazier). 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 Patient states that his shortness of breath has improved but feels swollen throughout his entire body with extensive testicular swelling which is causing him significant discomfort. Denies any chest pain, fevers, chills, night sweats, nausea, vomiting.     PAST MEDICAL & SURGICAL HISTORY:  HIV infection  2022 virus detected, switched to Biktarvy, male partners    Current smoker (tobacco and marijuana), cocaine use, alcohol use    History of depression and anxiety    Rectal cancer not on treatment    Right cataract    No significant past surgical history      REVIEW OF SYSTEMS  Negative other than what is noted in the HPI	    MEDICATIONS  (STANDING):  bictegravir 50 mG/emtricitabine 200 mG/tenofovir alafenamide 25 mG (BIKTARVY) 1 Tablet(s) Oral daily  ceFAZolin   IVPB 2000 milliGRAM(s) IV Intermittent every 8 hours  chlorhexidine 2% Cloths 1 Application(s) Topical <User Schedule>  ferrous    sulfate 325 milliGRAM(s) Oral <User Schedule>  heparin  Infusion. 1500 Unit(s)/Hr (15 mL/Hr) IV Continuous <Continuous>  HYDROmorphone  Injectable 0.5 milliGRAM(s) IV Push once  lactated ringers. 1000 milliLiter(s) (75 mL/Hr) IV Continuous <Continuous>  lactulose Syrup 10 Gram(s) Oral daily  lidocaine   4% Patch 1 Patch Transdermal daily  morphine ER Tablet 30 milliGRAM(s) Oral every 12 hours  polyethylene glycol 3350 17 Gram(s) Oral daily  senna 2 Tablet(s) Oral at bedtime    MEDICATIONS  (PRN):  heparin   Injectable 4500 Unit(s) IV Push every 6 hours PRN For aPTT less than 40  heparin   Injectable 2000 Unit(s) IV Push every 6 hours PRN For aPTT between 40 - 57  HYDROmorphone  Injectable 1 milliGRAM(s) IV Push every 4 hours PRN Severe Pain (7 - 10)  ondansetron Injectable 4 milliGRAM(s) IV Push every 8 hours PRN Nausea and/or Vomiting      Allergies  ibuprofen (Angioedema)  Intolerances    SOCIAL HISTORY: + Alcohol use, + Marijuana use, Smokes 1ppd x 10 years, occasional cocaine use, denies IVDA    FAMILY HISTORY: Denies      Vital Signs Last 24 Hrs  T(C): 35.8 (10 Sep 2022 08:00), Max: 36.1 (09 Sep 2022 20:00)  T(F): 96.5 (10 Sep 2022 08:00), Max: 96.9 (09 Sep 2022 20:00)  HR: 79 (10 Sep 2022 11:00) (70 - 90)  BP: 125/65 (10 Sep 2022 11:00) (118/63 - 145/71)  BP(mean): 80 (10 Sep 2022 11:00) (74 - 90)  RR: 16 (10 Sep 2022 11:00) (16 - 21)  SpO2: 98% (10 Sep 2022 11:00) (98% - 100%)    Parameters below as of 10 Sep 2022 08:00  Patient On (Oxygen Delivery Method): room air      PHYSICAL EXAM:    General: NAD, awake, alert, interactive	    Skin: Extensive subcutaneous emphysema throughout the body from the face (cheeks) extending down into the feet  	  ENMT: No gross deformities, extensive subcutaneous emphysema throughout the face and neck	    Respiratory and Thorax: Nonlabored respirations, saturating 100% on room air, extensive subcutaneous emphysema throughout the chest wall, tenderness to palpation, left chest pigtail catheter in place with thick, purulent material within the tubing, no airleak noted, however concern for clogged pigtail.   	  Cardiovascular:	Regular rate and rhythm    Gastrointestinal:	Soft, nondistended abdomen with subcutaneous emphysema with tenderness to palpation    Genitourinary: Scrotal swelling with significant amount of air    Musculoskeletal:	 No gross deformities, patient moving all extremities spontaneously     Neurological: No gross deficits    Psychiatric: Patient answering questions and responding appropriately	        LABS:                        8.6    12.32 )-----------( 506      ( 10 Sep 2022 04:30 )             29.7     09-    134<L>  |  97<L>  |  13  ----------------------------<  104<H>  4.3   |  32<H>  |  0.86    Ca    14.9<HH>      09 Sep 2022 03:50  Phos  3.4     -  Mg     1.90     -    TPro  6.3  /  Alb  2.3<L>  /  TBili  0.5  /  DBili  x   /  AST  22  /  ALT  12  /  AlkPhos  250<H>  08    PT/INR - ( 08 Sep 2022 20:24 )   PT: 14.1 sec;   INR: 1.21 ratio         PTT - ( 10 Sep 2022 04:30 )  PTT:52.1 sec  Urinalysis Basic - ( 08 Sep 2022 20:00 )    Color: Yellow / Appearance: Slightly Turbid / S.016 / pH: x  Gluc: x / Ketone: Negative  / Bili: Negative / Urobili: 3 mg/dL   Blood: x / Protein: Trace / Nitrite: Negative   Leuk Esterase: Negative / RBC: 80 /HPF / WBC 5 /HPF   Sq Epi: x / Non Sq Epi: 1 /HPF / Bacteria: Moderate      RADIOLOGY & ADDITIONAL STUDIES:  ACC: [id number] EXAM: CT ANGIO CHEST PULM ART [facility]    PROCEDURE DATE: 2022  PROCEDURE:  CT Angiography of the Chest.  Sagittal and coronal reformats were performed as well as 3D (MIP) reconstructions.    FINDINGS: Motion artifacts degrade some of the imaging.    LUNGS AND AIRWAYS: Airway wall thickening. Widespread bilateral reticulonodular interstitial infiltrates.  Upper lobe predominant paraseptal emphysema.  Left upper lung nodular airspace disease versus noncalcified pulmonary nodules measuring up to 1.3 cm.  Small scattered areas of patchy consolidation particularly within right middle, right upper lobe and left lower lobes.    Large complex irregular thick walled almost 11 cm cavity primarily located within lingula and partially within left lower lobe contains a small caliber pigtail percutaneous drainage catheter via lateral approach. There are irregular internal septations and a small volume of layering complex fluid in the cavity. Left upper lobe segmental and subsegmental bronchi are associated with the superior aspect of the cavity. Lingular segmental and subsegmental bronchi appear to communicate with the cavity along the anterior inferior medial aspect.    There is a wide air and fluid containing tract along the path of the small caliber pigtail catheter which extends laterally to the chest wall which may be open externally  PLEURA: Small volume loculated air and fluid containing pleural collection anteriorly at the lower left hemithorax.  MEDIASTINUM AND MICHAEL: Extensive pneumomediastinum extending superiorly to the neck and also to a lesser extent inferiorly into the upper abdomen.  Similar mild multistation mediastinal and bilateral hilar prominent lymph nodes  VESSELS: Normal caliber thoracic aorta. Nonopacification of the lingular segmental and subsegmental arteries as well as a small portion of the left lower lobar pulmonary artery secondary to the adjacent cavitary pneumonia. Findings are felt less likely to be secondary to thromboembolic pulmonary embolism  HEART: Heart size is normal. No pericardial effusion.  CHEST WALL AND LOWER NECK: Extensive subcutaneous emphysema extended to abdominal wall  VISUALIZED UPPER ABDOMEN: Widespread hepatic metastases. Distended gallbladder likely containing sludge. Possible mild bilateral hydronephrosis. Upper retroperitoneal lymphadenopathy  Proximal celiac artery narrowing with poststenotic dilation suggestive of median arcuate ligament syndrome  BONES: Mild scoliosis    IMPRESSION:    Findings most in keeping with cavitary pneumonia involving lingula and left lower lobe complicated by bronchopleural fistula as described with tract around the small caliber pleural pigtail catheter, extensive subcutaneous emphysema and pneumomediastinum. Recommend thoracic surgical consultation.    Nonopacification of lingular and left lower lobe pulmonary arteries as described likely secondary to cavitary pneumonia rather than thromboembolic pulmonary embolism    Preliminary findings including the above were discussed with Dr. Wood by Dr Moody 2022 6:43 PM

## 2022-09-10 NOTE — DIETITIAN INITIAL EVALUATION ADULT - OTHER INFO
Spoke w PA.  Met with Pt., grandmother and cousin at bedside.  Spoke w RN.... poor PO intake reported (<50%).   Pt. generally disinterested during nutrition assessment and limited details regarding nutrition Hx obtained from Pt.   Chart reviewed.  Pt. requesting Ensure supplement which he replies drinking at least 3-5x/day PTA.   RDN also offered Magic Cup ice cream supplement, which Pt. is amenable to.  States usual body weight was ~225lbs w weight decrease to 125lbs.  Time period not specified.  Obtained weight hx from review of prior chart documentation.  Denies nausea/vomiting/diarrhea/constipation.

## 2022-09-10 NOTE — DIETITIAN INITIAL EVALUATION ADULT - REASON FOR ADMISSION
32yo M w HIV, metastatic rectal Ca.  Transferred to [facility] from [facility] where he was found to be hypoxemic s/p L  chest wall pigtail placement.  A/w findings of CHRISTINE cavitary lesion and worsening SC emphysema since (9/2).  Also with severe hypercalcemia.

## 2022-09-10 NOTE — PROGRESS NOTE ADULT - ASSESSMENT
31 M with PMH/ HIV, rectal cancer not on tx (followed up by Dr Frazier ([facility] oncology)), mets to liver and possibly bone,  R eye cataract, who presented to the [facility] on 8/31/22 after syncopal episode at home. Pt with cough x 2 mos, with yellow sputum and shortness of breath since 5 days PTA.   At [facility]: pt was found to be hypoxemic. 8/31/22 -- L chest wall Pigtail placement. Pigtail was placed for suspected PMX with improvement in shortness of breath. CT chest was performed, which showed that pigtail is within the mass__ differential is TB, fungal infection, cavitary lesion, or squamous cell carcinoma. Pt was found to be anemic due to rectal bleeding with Hgb 5.9 s/p 2 U PRBC on 9/1. Blood cxs with MSSA bacteremia, and has been treated with Cefazolin and Zosyn.  Since 9/2 pt with worsening diffuse SC emphysema extending to the neck/face/all 4 extremities/scrotum/back , which has been getting progressively worse over past 2 days as per pt's statement. 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 TTE with two large RV masses and two additional small masses. 9/7-- s/p L lateral chest tube to 14 Fr. 9/7 -- s/p IVC Filter placement (as per [facility] statement, was placed prophylactically due to undiagnosed vegetations on the TTE. 12 cm multiloculated thick walled cavitary mass in the CHRISTINE and lingula.+ liver lesion on the CT a/p. + external iliac vein thrombosis, PE study was indeterminate     Pt was transferred to [facility] as per family request (mother) for pt to be evaluated by his outpt oncology team (Dr Frazier). As per oncology team the plan is to start Carbotaxol q 3 weeks + RT for diffuse mets as per PET scan     Neuro  -A & O x 3 , no active issues  - not on sedation  - continue Morphine ER 30 mg BID, and Morphine 4 mg IVP PRN q 4 hrs for severe pain (scrotal pain, rectal pain)    CV:   TTE with two large RV masses and two additional small masses. 9/7-- s/p L lateral chest tube to 14 Fr. 9/7 -- s/p IVC Filter placement (as per [facility] statement, was placed prophylactically due to undiagnosed vegetations on the TTE.  -pt is hemodynamically stable no pressors   -repeat TTE ordered f/u results  - cardiac MRI ordered       Pulm:  #CHRISTINE cavitary lesion - Differential includes cavitary PNA (sputum and blood cultures positive for MSSA) vs malignancy given history of metastatic rectal CA vs atypical infection. Of note PET/CT in our system from 8/2022 with only few subcm nodules in L and GGO in R lung, making malignancy as etiology of cavitary lesion less likely.   - L pigtail catheter was placed due to concern for L PTX. However, patient was found to have large CHRISTINE cavitary lesion instead, into which the pigtail had been placed.   -On 9/7 patient had L pigtail replaced (currently 14F) as well as IVC filter placement.   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  - L CT in place, keep to water seal, repeat  - CT/chest ordered.   - consider CT surgery eval vs IP once patient gets CT chest- currently refusing CT scan     GI:   regular diet, bowel regimen    Renal:   TELMA, likely prerenal, resolved   - follow up renal function   -Strict Is and Os: neg 770 making about 150/hr     #Hypercalcemia - ?related to malignancy  - Check PTH, PTHrp, Vit D levels  - Trend  - IVF LR 2 75  - continue to trend levels     #scrotal swelling likely due SC emphysema  - indwelling pedraza was placed at [facility] (Coude placed for urinary strain)   - UA positive with moderate bacteria 9/9- will send Ucx       ID:  # MSSA bacteremia at [facility]  -Sputm cultures from 9/1 grew MSSA and Blood cultures from 8/31 grew MSSA with blood cultures from 9/2 NGTD. Legionella negative. Sputum AFB negative. Fungitell negative.    -repeat blood cxs, UA, Cx of pleural fluid from the Pigtail pending in lab  -Patient had been empirically on Zosyn, Cefazolin and Caspofungin (which was dc'ed).  - continue Zosyn and reassess Abx after pleural fluid cx results  - CXR with L cavitary lesion   - AFB x 3 neg at [place], galactomannan neg, blood cxs neg as of 9/2 as per [place] documents   - ID consult placed recs appreciated     # HIV  - Follows with Dr. Marcial in clinic  - CD4 392 and viral load < 30 on 9/2/22 as per ID note   - c/w home Biktarvy     Hematology:   # rectal CA, possible RV mass, mets to liver and possibly bone,  -Dr Frazier was called to see pt F/U      #cardiac ? mass  -Lovenox held at [place] and s/p 9/7-- IVC filter   thrombocytosis,  AOCD, ferritin 512, iron 11, TIBC-- 150, Iron -- 7 %, transferrin -- 122, ([place])  - H/H stable     # L external iliac vein DVT  - on heparin gtt  - ptt q 6     Endo:   -not diabetic, HgbA1C ordered     Code: Full code    Lines:   PIV, indwelling pedraza    31 M with PMH/ HIV, rectal cancer not on tx (followed up by Dr Frazier ([facility] oncology)), mets to liver and possibly bone,  R eye cataract, who presented to the [facility] on 8/31/22 after syncopal episode at home. Pt with cough x 2 mos, with yellow sputum and shortness of breath since 5 days PTA.   At [facility]: pt was found to be hypoxemic. 8/31/22 -- L chest wall Pigtail placement. Pigtail was placed for suspected PMX with improvement in shortness of breath. CT chest was performed, which showed that pigtail is within the mass__ differential is TB, fungal infection, cavitary lesion, or squamous cell carcinoma. Pt was found to be anemic due to rectal bleeding with Hgb 5.9 s/p 2 U PRBC on 9/1. Blood cxs with MSSA bacteremia, and has been treated with Cefazolin and Zosyn.  Since 9/2 pt with worsening diffuse SC emphysema extending to the neck/face/all 4 extremities/scrotum/back , which has been getting progressively worse over past 2 days as per pt's statement. 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 TTE with two large RV masses and two additional small masses. 9/7-- s/p L lateral chest tube to 14 Fr. 9/7 -- s/p IVC Filter placement (as per [facility] statement, was placed prophylactically due to undiagnosed vegetations on the TTE. 12 cm multiloculated thick walled cavitary mass in the CHRISTINE and lingula.+ liver lesion on the CT a/p. + external iliac vein thrombosis, PE study was indeterminate     Pt was transferred to [facility] as per family request (mother) for pt to be evaluated by his outpt oncology team (Dr Frazier). As per oncology team the plan is to start Carbotaxol q 3 weeks + RT for diffuse mets as per PET scan     Neuro  -A & O x 3 , no active issues  - not on sedation  - continue Morphine ER 30 mg BID, and Morphine 4 mg IVP PRN q 4 hrs for severe pain (scrotal pain, rectal pain)    CV:   TTE with two large RV masses and two additional small masses. 9/7-- s/p L lateral chest tube to 14 Fr. 9/7 -- s/p IVC Filter placement (as per [facility] statement, was placed prophylactically due to undiagnosed vegetations on the TTE.  - pt is hemodynamically stable no pressors   - repeat TTE 9/9 - Limited and technically difficult study with views limited to the subcostal window.  Very limited views demonstrate  what appears to be a mobile mass, possibly in association with the tricuspid valve. This may represent tumour, thrombus, or vegetation.  Consider JENNIFER for further evaluation, if clinically indicated.  - cardiac MRI ordered       Pulm:  #CHRISTINE cavitary lesion - Differential includes cavitary PNA (sputum and blood cultures positive for MSSA) vs malignancy given history of metastatic rectal CA vs atypical infection. Of note PET/CT in our system from 8/2022 with only few subcm nodules in L and GGO in R lung, making malignancy as etiology of cavitary lesion less likely.   - L pigtail catheter was placed due to concern for L PTX. However, patient was found to have large CHRISTINE cavitary lesion instead, into which the pigtail had been placed.   -On 9/7 patient had L pigtail replaced (currently 14F) as well as IVC filter placement.   - f/u sputum cultures  - Pleaural fluid Cx neg thus far  - f/u serum, histo, blasto, coccidio.  - serum fungitell and crypto Ag  -neg   - s/p Zosyn , now on Cefazolin x6 weeks per ID recs  - CT to water seal, place to suction only for transfer if off the unit  - CTA 9/9- Findings most in keeping with cavitary pneumonia involving lingula and left lower lobe complicated by bronchopleural fistula as described with tract around the small caliber pleural pigtail catheter, extensive subcutaneous emphysema and pneumomediastinum. Recommend thoracic surgical consultation.  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  - L CT in place, keep to water seal, repeat CTH completed, results as above  - CT surgery consulted- Recommend MIST protocol x1 dose through current pigtail catheter due to concern for clogged pigtail   - CT surg also recommended IR consult today for image guided pigtail catheter (pneumonostomy) placement into the pneumatocele- will hold off for now as MICU team to attempt pigtail placement at bedside tomorrow.    - CT surg plans for blow hole placement to evacuate subcutaneous emphysema today, heparin gtt held.  - No operative thoracic surgical interventions planned at this time    GI:   regular diet, bowel regimen    Renal:   TELMA, likely prerenal, resolved   - follow up renal function   -Strict Is and Os: +142 making about 100/hr     #Hypercalcemia - ?related to malignancy  - Check PTH, PTHrp  - Vit D levels low, per heme/onc hold off supplementation until Ca normalized as can worsen hyperCa  - Ca 16.4 today, 17 corrected for low serum albumin. per Heme/onc recs ordered calcitonin 4u/kg x 2 doses and pamidronate 90mcg IVPB x1.  - Cont IVF LR @ 75  - continue to trend levels     #scrotal swelling likely due SC emphysema  - indwelling pedraza was placed at [facility] (Coude placed for urinary strain)   - UA positive with moderate bacteria 9/9- UCx sent , pending results       ID:  # MSSA bacteremia at [facility]  - Sputm cultures from 9/1 grew MSSA and Blood cultures from 8/31 grew MSSA with blood cultures from 9/2 NGTD. Legionella negative. Sputum AFB negative x3. Fungitell and galactomannan negative.   - Patient had been empirically on Zosyn, Cefazolin and Caspofungin (which was dc'ed).  - s/p Zosyn, transitioned to Cefazolin per ID recs on 9/9, plan for 6 week course  - Pleural fluid Cx neg thus far  - Blood Cx neg 9/8  - UA 9/8 w/  some bacteria, UCx pending  - CXR with L cavitary lesion, seen again on CT chest 9/9  - fungitel neg 9/10  - Sputum Cx pending  - ID consulted, recs appreciated    # HIV  - Follows with Dr. Marcial in clinic  - CD4 392 and viral load < 30 on 9/2/22 as per ID note   - Viral load undetectable 9/8  - c/w home Biktarvy     Hematology:   # rectal CA, possible RV mass, mets to liver and possibly bone,  -Dr Frazier was called to see pt F/U      #cardiac ? mass  -Lovenox held at [place] and s/p 9/7-- IVC filter   thrombocytosis,  AOCD, ferritin 512, iron 11, TIBC-- 150, Iron -- 7 %, transferrin -- 122, ([place])  - H/H stable     # L external iliac vein DVT  - on heparin gtt  - ptt q 6     Endo:   -not diabetic, HgbA1C ordered     Code: Full code  Palliative consult placed 9/10    Lines:   PIV, indwelling pedraza    31 M with PMH/ HIV, rectal cancer not on tx (followed up by Dr Frazier ([facility] oncology)), mets to liver and possibly bone,  R eye cataract, who presented to the [facility] on 8/31/22 after syncopal episode at home. Pt with cough x 2 mos, with yellow sputum and shortness of breath since 5 days PTA.   At [facility]: pt was found to be hypoxemic. 8/31/22 -- L chest wall Pigtail placement. Pigtail was placed for suspected PMX with improvement in shortness of breath. CT chest was performed, which showed that pigtail is within the mass__ differential is TB, fungal infection, cavitary lesion, or squamous cell carcinoma. Pt was found to be anemic due to rectal bleeding with Hgb 5.9 s/p 2 U PRBC on 9/1. Blood cxs with MSSA bacteremia, and has been treated with Cefazolin and Zosyn.  Since 9/2 pt with worsening diffuse SC emphysema extending to the neck/face/all 4 extremities/scrotum/back , which has been getting progressively worse over past 2 days as per pt's statement. 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 TTE with two large RV masses and two additional small masses. 9/7-- s/p L lateral chest tube to 14 Fr. 9/7 -- s/p IVC Filter placement (as per [facility] statement, was placed prophylactically due to undiagnosed vegetations on the TTE. 12 cm multiloculated thick walled cavitary mass in the CHRISTINE and lingula.+ liver lesion on the CT a/p. + external iliac vein thrombosis, PE study was indeterminate     Pt was transferred to [facility] as per family request (mother) for pt to be evaluated by his outpt oncology team (Dr Frazier). As per oncology team the plan is to start Carbotaxol q 3 weeks + RT for diffuse mets as per PET scan     Neuro  -A & O x 3 , no active issues  - not on sedation  - continue Morphine ER 30 mg BID, and Dilaudid increased from 0.5 to 1mg q 4 hrs for severe pain (scrotal pain, rectal pain)    CV:   TTE with two large RV masses and two additional small masses. 9/7-- s/p L lateral chest tube to 14 Fr. 9/7 -- s/p IVC Filter placement (as per [facility] statement, was placed prophylactically due to undiagnosed vegetations on the TTE.  - pt is hemodynamically stable no pressors   - repeat TTE 9/9 - Limited and technically difficult study with views limited to the subcostal window.  Very limited views demonstrate  what appears to be a mobile mass, possibly in association with the tricuspid valve. This may represent tumour, thrombus, or vegetation.  Consider JENNIFER for further evaluation, if clinically indicated.  - cardiac MRI ordered       Pulm:  #CHRISTINE cavitary lesion - Differential includes cavitary PNA (sputum and blood cultures positive for MSSA) vs malignancy given history of metastatic rectal CA vs atypical infection. Of note PET/CT in our system from 8/2022 with only few subcm nodules in L and GGO in R lung, making malignancy as etiology of cavitary lesion less likely.   - L pigtail catheter was placed due to concern for L PTX. However, patient was found to have large CHRISTINE cavitary lesion instead, into which the pigtail had been placed.   -On 9/7 patient had L pigtail replaced (currently 14F) as well as IVC filter placement.   - f/u sputum cultures  - Pleaural fluid Cx neg thus far  - f/u serum, histo, blasto, coccidio.  - serum fungitell and crypto Ag  -neg   - s/p Zosyn , now on Cefazolin x6 weeks per ID recs  - CT to water seal, place to suction only for transfer if off the unit  - CTA 9/9- Findings most in keeping with cavitary pneumonia involving lingula and left lower lobe complicated by bronchopleural fistula as described with tract around the small caliber pleural pigtail catheter, extensive subcutaneous emphysema and pneumomediastinum. Recommend thoracic surgical consultation.  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  - L CT in place, keep to water seal, repeat CTH completed, results as above  - CT surgery consulted- Recommend MIST protocol x1 dose through current pigtail catheter due to concern for clogged pigtail   - CT surg also recommended IR consult today for image guided pigtail catheter (pneumonostomy) placement into the pneumatocele- will hold off for now as MICU team to attempt pigtail placement at bedside tomorrow.    - CT surg plans for blow hole placement to evacuate subcutaneous emphysema today, heparin gtt held.  - No operative thoracic surgical interventions planned at this time    GI:   regular diet, bowel regimen    Renal:   TELMA, likely prerenal, resolved   - follow up renal function   -Strict Is and Os: +142 making about 100/hr     #Hypercalcemia - ?related to malignancy  - Check PTH, PTHrp  - Vit D levels low, per heme/onc hold off supplementation until Ca normalized as can worsen hyperCa  - Ca 16.4 today, 17 corrected for low serum albumin. per Heme/onc recs ordered calcitonin 4u/kg x 2 doses and pamidronate 90mcg IVPB x1.  - Cont IVF LR @ 75  - continue to trend levels     #scrotal swelling likely due SC emphysema  - indwelling pedraza was placed at [facility] (Coude placed for urinary strain)   - UA positive with moderate bacteria 9/9- UCx sent , pending results       ID:  # MSSA bacteremia at [facility]  - Sputm cultures from 9/1 grew MSSA and Blood cultures from 8/31 grew MSSA with blood cultures from 9/2 NGTD. Legionella negative. Sputum AFB negative x3. Fungitell and galactomannan negative.   - Patient had been empirically on Zosyn, Cefazolin and Caspofungin (which was dc'ed).  - s/p Zosyn, transitioned to Cefazolin per ID recs on 9/9, plan for 6 week course  - Pleural fluid Cx neg thus far  - Blood Cx neg 9/8  - UA 9/8 w/  some bacteria, UCx pending  - CXR with L cavitary lesion, seen again on CT chest 9/9  - fungitel neg 9/10  - Sputum Cx pending  - ID consulted, recs appreciated    # HIV  - Follows with Dr. Marcial in clinic  - CD4 392 and viral load < 30 on 9/2/22 as per ID note   - Viral load undetectable 9/8  - c/w home Biktarvy     Hematology:   # rectal CA, possible RV mass, mets to liver and possibly bone,  -Dr Frazier was called to see pt F/U      #cardiac ? mass  -Lovenox held at [place] and s/p 9/7-- IVC filter   thrombocytosis,  AOCD, ferritin 512, iron 11, TIBC-- 150, Iron -- 7 %, transferrin -- 122, ([place])  - H/H stable     # L external iliac vein DVT  - on heparin gtt  - ptt q 6     Endo:  - not diabetic, HgbA1C ordered     Code: Full code  Palliative consult placed 9/10    Lines:   PIV, indwelling pedraza    31 M with PMH/ HIV, rectal cancer not on tx (followed up by Dr Frazier ([facility] oncology)), mets to liver and possibly bone,  R eye cataract, who presented to the [facility] on 8/31/22 after syncopal episode at home. Pt with cough x 2 mos, with yellow sputum and shortness of breath since 5 days PTA.   At [facility]: pt was found to be hypoxemic. 8/31/22 -- L chest wall Pigtail placement. Pigtail was placed for suspected PMX with improvement in shortness of breath. CT chest was performed, which showed that pigtail is within the mass__ differential is TB, fungal infection, cavitary lesion, or squamous cell carcinoma. Pt was found to be anemic due to rectal bleeding with Hgb 5.9 s/p 2 U PRBC on 9/1. Blood cxs with MSSA bacteremia, and has been treated with Cefazolin and Zosyn.  Since 9/2 pt with worsening diffuse SC emphysema extending to the neck/face/all 4 extremities/scrotum/back , which has been getting progressively worse over past 2 days as per pt's statement. 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 TTE with two large RV masses and two additional small masses. 9/7-- s/p L lateral chest tube to 14 Fr. 9/7 -- s/p IVC Filter placement (as per [facility] statement, was placed prophylactically due to undiagnosed vegetations on the TTE. 12 cm multiloculated thick walled cavitary mass in the CHRISTINE and lingula.+ liver lesion on the CT a/p. + external iliac vein thrombosis, PE study was indeterminate     Pt was transferred to [facility] as per family request (mother) for pt to be evaluated by his outpt oncology team (Dr Frazier). As per oncology team the plan is to start Carbotaxol q 3 weeks + RT for diffuse mets as per PET scan     Neuro  -A & O x 3 , no active issues  - not on sedation  - continue Morphine ER 30 mg BID, and Dilaudid increased from 0.5 to 1mg q 4 hrs for severe pain (scrotal pain, rectal pain)    CV:   TTE with two large RV masses and two additional small masses. 9/7-- s/p L lateral chest tube to 14 Fr. 9/7 -- s/p IVC Filter placement (as per [facility] statement, was placed prophylactically due to undiagnosed vegetations on the TTE.  - pt is hemodynamically stable no pressors   - repeat TTE 9/9 - Limited and technically difficult study with views limited to the subcostal window.  Very limited views demonstrate what appears to be a mobile mass, possibly in association with the tricuspid valve. This may represent tumour, thrombus, or vegetation. Consider JENNIFER for further evaluation, if clinically indicated.  - cardiac MRI ordered       Pulm:  #CHRISTINE cavitary lesion - Differential includes cavitary PNA (sputum and blood cultures positive for MSSA) vs malignancy given history of metastatic rectal CA vs atypical infection. Of note PET/CT in our system from 8/2022 with only few subcm nodules in L and GGO in R lung, making malignancy as etiology of cavitary lesion less likely.   - L pigtail catheter was placed due to concern for L PTX. However, patient was found to have large CHRISTINE cavitary lesion instead, into which the pigtail had been placed.   -On 9/7 patient had L pigtail replaced (currently 14F) as well as IVC filter placement.   - f/u sputum cultures  - Pleaural fluid Cx neg thus far  - f/u serum, histo, blasto, coccidio.  - serum fungitell and crypto Ag  -neg   - s/p Zosyn , now on Cefazolin x6 weeks per ID recs  - CT to water seal, place to suction only for transfer if off the unit  - CTA 9/9- Findings most in keeping with cavitary pneumonia involving lingula and left lower lobe complicated by bronchopleural fistula as described with tract around the small caliber pleural pigtail catheter, extensive subcutaneous emphysema and pneumomediastinum. Recommend thoracic surgical consultation.  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  - L CT in place, keep to water seal, repeat CTH completed, results as above  - CT surgery consulted- Recommend MIST protocol x1 dose through current pigtail catheter due to concern for clogged pigtail ,also rec to consult IR for image guided pigtail catheter (pneumonostomy) placement into the pneumatocele, as well as blow hole placement to evacuate subQ epmhysema- will hold off MIST/IR for now as MICU team to attempt pigtail placement at bedside tomorrow.    - hold heparin gtt pre procedure tomorrow.  - No operative thoracic surgical interventions planned at this time    GI:   regular diet, bowel regimen    Renal:   TELMA, likely prerenal, resolved   - follow up renal function   -Strict Is and Os: +142 making about 100/hr     #Hypercalcemia - ?related to malignancy  - Check PTH, PTHrp  - Vit D levels low, per heme/onc hold off supplementation until Ca normalized as can worsen hyperCa  - Ca 16.4 today, 17 corrected for low serum albumin. per Heme/onc recs ordered calcitonin 4u/kg x 2 doses and pamidronate 90mcg IVPB x1.  - Cont IVF LR @ 75  - continue to trend levels     #scrotal swelling likely due SC emphysema  - indwelling pedraza was placed at [facility] (Coude placed for urinary strain)   - UA positive with moderate bacteria 9/9- UCx sent , pending results       ID:  # MSSA bacteremia at [facility]  - Sputm cultures from 9/1 grew MSSA and Blood cultures from 8/31 grew MSSA with blood cultures from 9/2 NGTD. Legionella negative. Sputum AFB negative x3. Fungitell and galactomannan negative.   - Patient had been empirically on Zosyn, Cefazolin and Caspofungin (which was dc'ed).  - s/p Zosyn, transitioned to Cefazolin per ID recs on 9/9, plan for 6 week course  - Pleural fluid Cx neg thus far  - Blood Cx neg 9/8  - UA 9/8 w/  some bacteria, UCx pending  - CXR with L cavitary lesion, seen again on CT chest 9/9  - fungitel neg 9/10  - Sputum Cx pending  - ID consulted, recs appreciated    # HIV  - Follows with Dr. Marcial in clinic  - CD4 392 and viral load < 30 on 9/2/22 as per ID note   - Viral load undetectable 9/8  - c/w home Biktarvy     Hematology:   # rectal CA, possible RV mass, mets to liver and possibly bone,  -Dr Frazier was called to see pt F/U      #cardiac ? mass  -Lovenox held at [place] and s/p 9/7-- IVC filter   thrombocytosis,  AOCD, ferritin 512, iron 11, TIBC-- 150, Iron -- 7 %, transferrin -- 122, ([place])  - H/H stable     # L external iliac vein DVT  - on heparin gtt  - ptt q 6     Endo:  - not diabetic, HgbA1C ordered     Code: Full code  Palliative consult placed 9/10    Lines:   PIV, indwelling pedraza

## 2022-09-10 NOTE — CONSULT NOTE ADULT - ASSESSMENT
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   - Recommend MIST protocol x1 dose through current pigtail catheter due to concern for clogged pigtail   - Recommend IR consult today for image guided pigtail catheter (pneumonostomy) placement into the pneumotocele   - Will plan to evacuate subcutaneous emphysema via small skin incisions. Please hold heparin gtt as patient is being therapeutically anticoagulated for DVT.   - No operative thoracic surgical interventions planned at this time    Assessment and plan discussed with Dr. Quinteros who agrees.

## 2022-09-10 NOTE — PROGRESS NOTE ADULT - SUBJECTIVE AND OBJECTIVE BOX
INTERVAL HPI/OVERNIGHT EVENTS:     SUBJECTIVE: Patient seen and examined at bedside.     CONSTITUTIONAL: No weakness, fevers or chills  EYES/ENT: No visual changes;  No vertigo or throat pain   NECK: No pain or stiffness  RESPIRATORY: No cough, wheezing, hemoptysis; No shortness of breath  CARDIOVASCULAR: No chest pain or palpitations  GASTROINTESTINAL: No abdominal or epigastric pain. No nausea, vomiting, or hematemesis; No diarrhea or constipation. No melena or hematochezia.  GENITOURINARY: No dysuria, frequency or hematuria  NEUROLOGICAL: No numbness or weakness  SKIN: No itching, rashes    OBJECTIVE:    VITAL SIGNS:  ICU Vital Signs Last 24 Hrs  T(C): 36.1 (09 Sep 2022 08:00), Max: 36.1 (09 Sep 2022 08:00)  T(F): 97 (09 Sep 2022 08:00), Max: 97 (09 Sep 2022 08:00)  HR: 85 (09 Sep 2022 08:00) (81 - 95)  BP: 129/65 (09 Sep 2022 08:00) (91/70 - 139/61)  BP(mean): 79 (09 Sep 2022 08:00) (72 - 92)  ABP: --  ABP(mean): --  RR: 16 (09 Sep 2022 08:00) (15 - 22)  SpO2: 100% (09 Sep 2022 08:00) (100% - 100%)    O2 Parameters below as of 09 Sep 2022 08:00  Patient On (Oxygen Delivery Method): room air               @ 07:01  -   @ 07:00  --------------------------------------------------------  IN: 870 mL / OUT: 1640 mL / NET: -770 mL      CAPILLARY BLOOD GLUCOSE          PHYSICAL EXAM:    General: NAD  HEENT: NC/AT; PERRL, clear conjunctiva  Neck: supple  Respiratory: CTA b/l- L cT in place to waterseal   Cardiovascular: +S1/S2; RRR  Abdomen: soft, NT/ND; +BS x4  Extremities: WWP, 2+ peripheral pulses b/l; no LE edema  Skin: normal color and turgor; no rash  Neurological: A and O x 4 following commands     MEDICATIONS:  MEDICATIONS  (STANDING):  bictegravir 50 mG/emtricitabine 200 mG/tenofovir alafenamide 25 mG (BIKTARVY) 1 Tablet(s) Oral daily  chlorhexidine 2% Cloths 1 Application(s) Topical <User Schedule>  ferrous    sulfate 325 milliGRAM(s) Oral <User Schedule>  heparin  Infusion.  Unit(s)/Hr (11 mL/Hr) IV Continuous <Continuous>  lactated ringers. 1000 milliLiter(s) (75 mL/Hr) IV Continuous <Continuous>  lactulose Syrup 10 Gram(s) Oral daily  lidocaine   4% Patch 1 Patch Transdermal daily  morphine ER Tablet 30 milliGRAM(s) Oral every 12 hours  piperacillin/tazobactam IVPB.. 3.375 Gram(s) IV Intermittent every 8 hours  polyethylene glycol 3350 17 Gram(s) Oral daily  senna 2 Tablet(s) Oral at bedtime    MEDICATIONS  (PRN):  heparin   Injectable 4500 Unit(s) IV Push every 6 hours PRN For aPTT less than 40  heparin   Injectable 2000 Unit(s) IV Push every 6 hours PRN For aPTT between 40 - 57  HYDROmorphone  Injectable 0.5 milliGRAM(s) IV Push every 4 hours PRN Severe Pain (7 - 10)  ondansetron Injectable 4 milliGRAM(s) IV Push every 8 hours PRN Nausea and/or Vomiting      ALLERGIES:  Allergies    ibuprofen (Angioedema)    Intolerances        LABS:                        9.4    14.37 )-----------( 615      ( 09 Sep 2022 03:50 )             31.1         134<L>  |  97<L>  |  13  ----------------------------<  104<H>  4.3   |  32<H>  |  0.86    Ca    14.9<HH>      09 Sep 2022 03:50  Phos  3.4       Mg     1.90         TPro  6.3  /  Alb  2.3<L>  /  TBili  0.5  /  DBili  x   /  AST  22  /  ALT  12  /  AlkPhos  250<H>      PT/INR - ( 08 Sep 2022 20:24 )   PT: 14.1 sec;   INR: 1.21 ratio         PTT - ( 09 Sep 2022 03:50 )  PTT:38.2 sec  Urinalysis Basic - ( 08 Sep 2022 20:00 )    Color: Yellow / Appearance: Slightly Turbid / S.016 / pH: x  Gluc: x / Ketone: Negative  / Bili: Negative / Urobili: 3 mg/dL   Blood: x / Protein: Trace / Nitrite: Negative   Leuk Esterase: Negative / RBC: 80 /HPF / WBC 5 /HPF   Sq Epi: x / Non Sq Epi: 1 /HPF / Bacteria: Moderate        RADIOLOGY & ADDITIONAL TESTS: Reviewed. 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     SUBJECTIVE: Patient seen and examined at bedside. C/o scrotal pain.    CONSTITUTIONAL: No weakness, fevers or chills  EYES/ENT: No visual changes;  No vertigo or throat pain   NECK: No pain or stiffness  RESPIRATORY: No cough, wheezing, hemoptysis; No shortness of breath  CARDIOVASCULAR: No chest pain or palpitations  GASTROINTESTINAL: No abdominal or epigastric pain. No nausea, vomiting, or hematemesis; No diarrhea or constipation. No melena or hematochezia.  GENITOURINARY: No dysuria, frequency or hematuria  NEUROLOGICAL: No numbness or weakness  SKIN: No itching, rashes    OBJECTIVE:    VITAL SIGNS:  ICU Vital Signs Last 24 Hrs  T(C): 36.1 (09 Sep 2022 08:00), Max: 36.1 (09 Sep 2022 08:00)  T(F): 97 (09 Sep 2022 08:00), Max: 97 (09 Sep 2022 08:00)  HR: 85 (09 Sep 2022 08:00) (81 - 95)  BP: 129/65 (09 Sep 2022 08:00) (91/70 - 139/61)  BP(mean): 79 (09 Sep 2022 08:00) (72 - 92)  ABP: --  ABP(mean): --  RR: 16 (09 Sep 2022 08:00) (15 - 22)  SpO2: 100% (09 Sep 2022 08:00) (100% - 100%)    O2 Parameters below as of 09 Sep 2022 08:00  Patient On (Oxygen Delivery Method): room air               @ 07:01  -   @ 07:00  --------------------------------------------------------  IN: 870 mL / OUT: 1640 mL / NET: -770 mL      CAPILLARY BLOOD GLUCOSE          PHYSICAL EXAM:    General: NAD  HEENT: NC/AT; PERRL, clear conjunctiva  Neck: supple  Respiratory: CTA b/l- L cT in place to waterseal   Cardiovascular: +S1/S2; RRR  Abdomen: soft, NT/ND; +BS x4  Extremities: WWP, 2+ peripheral pulses b/l; no LE edema  Skin: normal color and turgor; no rash  Neurological: A and O x 4 following commands     MEDICATIONS:  MEDICATIONS  (STANDING):  bictegravir 50 mG/emtricitabine 200 mG/tenofovir alafenamide 25 mG (BIKTARVY) 1 Tablet(s) Oral daily  chlorhexidine 2% Cloths 1 Application(s) Topical <User Schedule>  ferrous    sulfate 325 milliGRAM(s) Oral <User Schedule>  heparin  Infusion.  Unit(s)/Hr (11 mL/Hr) IV Continuous <Continuous>  lactated ringers. 1000 milliLiter(s) (75 mL/Hr) IV Continuous <Continuous>  lactulose Syrup 10 Gram(s) Oral daily  lidocaine   4% Patch 1 Patch Transdermal daily  morphine ER Tablet 30 milliGRAM(s) Oral every 12 hours  piperacillin/tazobactam IVPB.. 3.375 Gram(s) IV Intermittent every 8 hours  polyethylene glycol 3350 17 Gram(s) Oral daily  senna 2 Tablet(s) Oral at bedtime    MEDICATIONS  (PRN):  heparin   Injectable 4500 Unit(s) IV Push every 6 hours PRN For aPTT less than 40  heparin   Injectable 2000 Unit(s) IV Push every 6 hours PRN For aPTT between 40 - 57  HYDROmorphone  Injectable 0.5 milliGRAM(s) IV Push every 4 hours PRN Severe Pain (7 - 10)  ondansetron Injectable 4 milliGRAM(s) IV Push every 8 hours PRN Nausea and/or Vomiting      ALLERGIES:  Allergies    ibuprofen (Angioedema)    Intolerances        LABS:                        9.4    14.37 )-----------( 615      ( 09 Sep 2022 03:50 )             31.1         134<L>  |  97<L>  |  13  ----------------------------<  104<H>  4.3   |  32<H>  |  0.86    Ca    14.9<HH>      09 Sep 2022 03:50  Phos  3.4       Mg     1.90         TPro  6.3  /  Alb  2.3<L>  /  TBili  0.5  /  DBili  x   /  AST  22  /  ALT  12  /  AlkPhos  250<H>      PT/INR - ( 08 Sep 2022 20:24 )   PT: 14.1 sec;   INR: 1.21 ratio         PTT - ( 09 Sep 2022 03:50 )  PTT:38.2 sec  Urinalysis Basic - ( 08 Sep 2022 20:00 )    Color: Yellow / Appearance: Slightly Turbid / S.016 / pH: x  Gluc: x / Ketone: Negative  / Bili: Negative / Urobili: 3 mg/dL   Blood: x / Protein: Trace / Nitrite: Negative   Leuk Esterase: Negative / RBC: 80 /HPF / WBC 5 /HPF   Sq Epi: x / Non Sq Epi: 1 /HPF / Bacteria: Moderate        RADIOLOGY & ADDITIONAL TESTS: Reviewed.

## 2022-09-10 NOTE — DIETITIAN INITIAL EVALUATION ADULT - PERTINENT MEDS FT
MEDICATIONS  (STANDING):  bictegravir 50 mG/emtricitabine 200 mG/tenofovir alafenamide 25 mG (BIKTARVY) 1 Tablet(s) Oral daily  calcitonin Injectable 230 International Unit(s) IntraMuscular every 12 hours  ceFAZolin   IVPB 2000 milliGRAM(s) IV Intermittent every 8 hours  chlorhexidine 2% Cloths 1 Application(s) Topical <User Schedule>  ferrous    sulfate 325 milliGRAM(s) Oral <User Schedule>  heparin  Infusion. 1500 Unit(s)/Hr (15 mL/Hr) IV Continuous <Continuous>  lactated ringers. 1000 milliLiter(s) (75 mL/Hr) IV Continuous <Continuous>  lactulose Syrup 10 Gram(s) Oral daily  lidocaine   4% Patch 1 Patch Transdermal daily  morphine ER Tablet 30 milliGRAM(s) Oral every 12 hours  pamidronate IVPB 90 milliGRAM(s) IV Intermittent once  polyethylene glycol 3350 17 Gram(s) Oral daily  senna 2 Tablet(s) Oral at bedtime    MEDICATIONS  (PRN):  heparin   Injectable 4500 Unit(s) IV Push every 6 hours PRN For aPTT less than 40  heparin   Injectable 2000 Unit(s) IV Push every 6 hours PRN For aPTT between 40 - 57  HYDROmorphone  Injectable 1 milliGRAM(s) IV Push every 4 hours PRN Severe Pain (7 - 10)  ondansetron Injectable 4 milliGRAM(s) IV Push every 8 hours PRN Nausea and/or Vomiting

## 2022-09-10 NOTE — DIETITIAN INITIAL EVALUATION ADULT - ORAL NUTRITION SUPPLEMENTS
--Ensure Plus HP (8oz) PO 4x daily   --Will provide Magic Cup supplement 2x daily (butter pecan and orange/cream flavors)

## 2022-09-11 LAB
ALBUMIN SERPL ELPH-MCNC: 2.4 G/DL — LOW (ref 3.3–5)
ALP SERPL-CCNC: 232 U/L — HIGH (ref 40–120)
ALT FLD-CCNC: 15 U/L — SIGNIFICANT CHANGE UP (ref 4–41)
ANION GAP SERPL CALC-SCNC: 8 MMOL/L — SIGNIFICANT CHANGE UP (ref 7–14)
APTT BLD: 48.9 SEC — HIGH (ref 27–36.3)
APTT BLD: 57.9 SEC — HIGH (ref 27–36.3)
AST SERPL-CCNC: 37 U/L — SIGNIFICANT CHANGE UP (ref 4–40)
BASOPHILS # BLD AUTO: 0.03 K/UL — SIGNIFICANT CHANGE UP (ref 0–0.2)
BASOPHILS NFR BLD AUTO: 0.2 % — SIGNIFICANT CHANGE UP (ref 0–2)
BILIRUB DIRECT SERPL-MCNC: <0.2 MG/DL — SIGNIFICANT CHANGE UP (ref 0–0.3)
BILIRUB INDIRECT FLD-MCNC: >0.2 MG/DL — SIGNIFICANT CHANGE UP (ref 0–1)
BILIRUB SERPL-MCNC: 0.4 MG/DL — SIGNIFICANT CHANGE UP (ref 0.2–1.2)
BLD GP AB SCN SERPL QL: NEGATIVE — SIGNIFICANT CHANGE UP
BUN SERPL-MCNC: 21 MG/DL — SIGNIFICANT CHANGE UP (ref 7–23)
CA-I BLD-SCNC: 1.83 MMOL/L — HIGH (ref 1.15–1.29)
CALCIUM SERPL-MCNC: 14.2 MG/DL — CRITICAL HIGH (ref 8.4–10.5)
CHLORIDE SERPL-SCNC: 94 MMOL/L — LOW (ref 98–107)
CO2 SERPL-SCNC: 30 MMOL/L — SIGNIFICANT CHANGE UP (ref 22–31)
CREAT SERPL-MCNC: 1.38 MG/DL — HIGH (ref 0.5–1.3)
EGFR: 69 ML/MIN/1.73M2 — SIGNIFICANT CHANGE UP
EOSINOPHIL # BLD AUTO: 0.18 K/UL — SIGNIFICANT CHANGE UP (ref 0–0.5)
EOSINOPHIL NFR BLD AUTO: 1.3 % — SIGNIFICANT CHANGE UP (ref 0–6)
GLUCOSE SERPL-MCNC: 108 MG/DL — HIGH (ref 70–99)
GRAM STN FLD: SIGNIFICANT CHANGE UP
HCT VFR BLD CALC: 29.6 % — LOW (ref 39–50)
HCT VFR BLD CALC: 30.8 % — LOW (ref 39–50)
HGB BLD-MCNC: 8.8 G/DL — LOW (ref 13–17)
HGB BLD-MCNC: 9.4 G/DL — LOW (ref 13–17)
IANC: 11.79 K/UL — HIGH (ref 1.8–7.4)
IMM GRANULOCYTES NFR BLD AUTO: 0.5 % — SIGNIFICANT CHANGE UP (ref 0–1.5)
LACTATE SERPL-SCNC: 1.8 MMOL/L — SIGNIFICANT CHANGE UP (ref 0.5–2)
LYMPHOCYTES # BLD AUTO: 1.38 K/UL — SIGNIFICANT CHANGE UP (ref 1–3.3)
LYMPHOCYTES # BLD AUTO: 9.8 % — LOW (ref 13–44)
MAGNESIUM SERPL-MCNC: 1.5 MG/DL — LOW (ref 1.6–2.6)
MCHC RBC-ENTMCNC: 23.3 PG — LOW (ref 27–34)
MCHC RBC-ENTMCNC: 23.7 PG — LOW (ref 27–34)
MCHC RBC-ENTMCNC: 29.7 GM/DL — LOW (ref 32–36)
MCHC RBC-ENTMCNC: 30.5 GM/DL — LOW (ref 32–36)
MCV RBC AUTO: 77.6 FL — LOW (ref 80–100)
MCV RBC AUTO: 78.3 FL — LOW (ref 80–100)
MONOCYTES # BLD AUTO: 0.61 K/UL — SIGNIFICANT CHANGE UP (ref 0–0.9)
MONOCYTES NFR BLD AUTO: 4.3 % — SIGNIFICANT CHANGE UP (ref 2–14)
NEUTROPHILS # BLD AUTO: 11.79 K/UL — HIGH (ref 1.8–7.4)
NEUTROPHILS NFR BLD AUTO: 83.9 % — HIGH (ref 43–77)
NRBC # BLD: 0 /100 WBCS — SIGNIFICANT CHANGE UP (ref 0–0)
NRBC # BLD: 0 /100 WBCS — SIGNIFICANT CHANGE UP (ref 0–0)
NRBC # FLD: 0 K/UL — SIGNIFICANT CHANGE UP (ref 0–0)
NRBC # FLD: 0 K/UL — SIGNIFICANT CHANGE UP (ref 0–0)
PHOSPHATE SERPL-MCNC: 3.5 MG/DL — SIGNIFICANT CHANGE UP (ref 2.5–4.5)
PLATELET # BLD AUTO: 457 K/UL — HIGH (ref 150–400)
PLATELET # BLD AUTO: 477 K/UL — HIGH (ref 150–400)
POTASSIUM SERPL-MCNC: 4.2 MMOL/L — SIGNIFICANT CHANGE UP (ref 3.5–5.3)
POTASSIUM SERPL-SCNC: 4.2 MMOL/L — SIGNIFICANT CHANGE UP (ref 3.5–5.3)
PROT SERPL-MCNC: 6.3 G/DL — SIGNIFICANT CHANGE UP (ref 6–8.3)
RBC # BLD: 3.78 M/UL — LOW (ref 4.2–5.8)
RBC # BLD: 3.97 M/UL — LOW (ref 4.2–5.8)
RBC # FLD: 22.5 % — HIGH (ref 10.3–14.5)
RBC # FLD: 22.7 % — HIGH (ref 10.3–14.5)
RH IG SCN BLD-IMP: POSITIVE — SIGNIFICANT CHANGE UP
SODIUM SERPL-SCNC: 132 MMOL/L — LOW (ref 135–145)
SPECIMEN SOURCE: SIGNIFICANT CHANGE UP
WBC # BLD: 14.06 K/UL — HIGH (ref 3.8–10.5)
WBC # BLD: 14.22 K/UL — HIGH (ref 3.8–10.5)
WBC # FLD AUTO: 14.06 K/UL — HIGH (ref 3.8–10.5)
WBC # FLD AUTO: 14.22 K/UL — HIGH (ref 3.8–10.5)

## 2022-09-11 PROCEDURE — 74176 CT ABD & PELVIS W/O CONTRAST: CPT | Mod: 26

## 2022-09-11 PROCEDURE — 99291 CRITICAL CARE FIRST HOUR: CPT | Mod: GC

## 2022-09-11 PROCEDURE — 71250 CT THORAX DX C-: CPT | Mod: 26

## 2022-09-11 RX ORDER — HYDROMORPHONE HYDROCHLORIDE 2 MG/ML
1 INJECTION INTRAMUSCULAR; INTRAVENOUS; SUBCUTANEOUS ONCE
Refills: 0 | Status: DISCONTINUED | OUTPATIENT
Start: 2022-09-11 | End: 2022-09-11

## 2022-09-11 RX ORDER — ACETAMINOPHEN 500 MG
1000 TABLET ORAL ONCE
Refills: 0 | Status: COMPLETED | OUTPATIENT
Start: 2022-09-11 | End: 2022-09-11

## 2022-09-11 RX ORDER — HEPARIN SODIUM 5000 [USP'U]/ML
4500 INJECTION INTRAVENOUS; SUBCUTANEOUS EVERY 6 HOURS
Refills: 0 | Status: DISCONTINUED | OUTPATIENT
Start: 2022-09-11 | End: 2022-09-14

## 2022-09-11 RX ORDER — HEPARIN SODIUM 5000 [USP'U]/ML
1700 INJECTION INTRAVENOUS; SUBCUTANEOUS
Qty: 25000 | Refills: 0 | Status: DISCONTINUED | OUTPATIENT
Start: 2022-09-11 | End: 2022-09-16

## 2022-09-11 RX ORDER — HEPARIN SODIUM 5000 [USP'U]/ML
2000 INJECTION INTRAVENOUS; SUBCUTANEOUS EVERY 6 HOURS
Refills: 0 | Status: DISCONTINUED | OUTPATIENT
Start: 2022-09-11 | End: 2022-09-14

## 2022-09-11 RX ORDER — POLYETHYLENE GLYCOL 3350 17 G/17G
17 POWDER, FOR SOLUTION ORAL
Refills: 0 | Status: DISCONTINUED | OUTPATIENT
Start: 2022-09-11 | End: 2022-10-10

## 2022-09-11 RX ORDER — MAGNESIUM SULFATE 500 MG/ML
2 VIAL (ML) INJECTION ONCE
Refills: 0 | Status: COMPLETED | OUTPATIENT
Start: 2022-09-11 | End: 2022-09-11

## 2022-09-11 RX ADMIN — Medication 100 MILLIGRAM(S): at 03:55

## 2022-09-11 RX ADMIN — Medication 1000 MILLIGRAM(S): at 09:02

## 2022-09-11 RX ADMIN — HYDROMORPHONE HYDROCHLORIDE 1 MILLIGRAM(S): 2 INJECTION INTRAMUSCULAR; INTRAVENOUS; SUBCUTANEOUS at 14:00

## 2022-09-11 RX ADMIN — CALCITONIN SALMON 230 INTERNATIONAL UNIT(S): 200 INJECTION, SOLUTION INTRAMUSCULAR at 06:36

## 2022-09-11 RX ADMIN — HYDROMORPHONE HYDROCHLORIDE 1 MILLIGRAM(S): 2 INJECTION INTRAMUSCULAR; INTRAVENOUS; SUBCUTANEOUS at 17:00

## 2022-09-11 RX ADMIN — LIDOCAINE 1 PATCH: 4 CREAM TOPICAL at 19:00

## 2022-09-11 RX ADMIN — LIDOCAINE 1 PATCH: 4 CREAM TOPICAL at 23:00

## 2022-09-11 RX ADMIN — HYDROMORPHONE HYDROCHLORIDE 1 MILLIGRAM(S): 2 INJECTION INTRAMUSCULAR; INTRAVENOUS; SUBCUTANEOUS at 09:08

## 2022-09-11 RX ADMIN — HEPARIN SODIUM 1600 UNIT(S)/HR: 5000 INJECTION INTRAVENOUS; SUBCUTANEOUS at 07:32

## 2022-09-11 RX ADMIN — CHLORHEXIDINE GLUCONATE 1 APPLICATION(S): 213 SOLUTION TOPICAL at 07:18

## 2022-09-11 RX ADMIN — LIDOCAINE 1 PATCH: 4 CREAM TOPICAL at 11:49

## 2022-09-11 RX ADMIN — HYDROMORPHONE HYDROCHLORIDE 1 MILLIGRAM(S): 2 INJECTION INTRAMUSCULAR; INTRAVENOUS; SUBCUTANEOUS at 23:55

## 2022-09-11 RX ADMIN — HEPARIN SODIUM 1600 UNIT(S)/HR: 5000 INJECTION INTRAVENOUS; SUBCUTANEOUS at 04:33

## 2022-09-11 RX ADMIN — POLYETHYLENE GLYCOL 3350 17 GRAM(S): 17 POWDER, FOR SOLUTION ORAL at 08:16

## 2022-09-11 RX ADMIN — LIDOCAINE 1 PATCH: 4 CREAM TOPICAL at 00:41

## 2022-09-11 RX ADMIN — HEPARIN SODIUM 1700 UNIT(S)/HR: 5000 INJECTION INTRAVENOUS; SUBCUTANEOUS at 12:16

## 2022-09-11 RX ADMIN — HYDROMORPHONE HYDROCHLORIDE 1 MILLIGRAM(S): 2 INJECTION INTRAMUSCULAR; INTRAVENOUS; SUBCUTANEOUS at 01:42

## 2022-09-11 RX ADMIN — Medication 1000 MILLIGRAM(S): at 16:00

## 2022-09-11 RX ADMIN — HYDROMORPHONE HYDROCHLORIDE 1 MILLIGRAM(S): 2 INJECTION INTRAMUSCULAR; INTRAVENOUS; SUBCUTANEOUS at 19:22

## 2022-09-11 RX ADMIN — Medication 400 MILLIGRAM(S): at 08:41

## 2022-09-11 RX ADMIN — Medication 100 MILLIGRAM(S): at 11:49

## 2022-09-11 RX ADMIN — MORPHINE SULFATE 30 MILLIGRAM(S): 50 CAPSULE, EXTENDED RELEASE ORAL at 06:36

## 2022-09-11 RX ADMIN — MORPHINE SULFATE 30 MILLIGRAM(S): 50 CAPSULE, EXTENDED RELEASE ORAL at 17:57

## 2022-09-11 RX ADMIN — BICTEGRAVIR SODIUM, EMTRICITABINE, AND TENOFOVIR ALAFENAMIDE FUMARATE 1 TABLET(S): 30; 120; 15 TABLET ORAL at 11:51

## 2022-09-11 RX ADMIN — MORPHINE SULFATE 30 MILLIGRAM(S): 50 CAPSULE, EXTENDED RELEASE ORAL at 18:46

## 2022-09-11 RX ADMIN — HYDROMORPHONE HYDROCHLORIDE 1 MILLIGRAM(S): 2 INJECTION INTRAMUSCULAR; INTRAVENOUS; SUBCUTANEOUS at 16:25

## 2022-09-11 RX ADMIN — Medication 100 MILLIGRAM(S): at 20:04

## 2022-09-11 RX ADMIN — HYDROMORPHONE HYDROCHLORIDE 1 MILLIGRAM(S): 2 INJECTION INTRAMUSCULAR; INTRAVENOUS; SUBCUTANEOUS at 06:37

## 2022-09-11 RX ADMIN — HYDROMORPHONE HYDROCHLORIDE 1 MILLIGRAM(S): 2 INJECTION INTRAMUSCULAR; INTRAVENOUS; SUBCUTANEOUS at 19:50

## 2022-09-11 RX ADMIN — SENNA PLUS 2 TABLET(S): 8.6 TABLET ORAL at 22:50

## 2022-09-11 RX ADMIN — Medication 25 GRAM(S): at 03:43

## 2022-09-11 RX ADMIN — HEPARIN SODIUM 1800 UNIT(S)/HR: 5000 INJECTION INTRAVENOUS; SUBCUTANEOUS at 19:50

## 2022-09-11 RX ADMIN — HYDROMORPHONE HYDROCHLORIDE 1 MILLIGRAM(S): 2 INJECTION INTRAMUSCULAR; INTRAVENOUS; SUBCUTANEOUS at 09:49

## 2022-09-11 RX ADMIN — HEPARIN SODIUM 1600 UNIT(S)/HR: 5000 INJECTION INTRAVENOUS; SUBCUTANEOUS at 02:19

## 2022-09-11 RX ADMIN — HYDROMORPHONE HYDROCHLORIDE 1 MILLIGRAM(S): 2 INJECTION INTRAMUSCULAR; INTRAVENOUS; SUBCUTANEOUS at 02:00

## 2022-09-11 RX ADMIN — Medication 400 MILLIGRAM(S): at 15:16

## 2022-09-11 RX ADMIN — SODIUM CHLORIDE 75 MILLILITER(S): 9 INJECTION, SOLUTION INTRAVENOUS at 07:31

## 2022-09-11 RX ADMIN — HYDROMORPHONE HYDROCHLORIDE 1 MILLIGRAM(S): 2 INJECTION INTRAMUSCULAR; INTRAVENOUS; SUBCUTANEOUS at 07:53

## 2022-09-11 RX ADMIN — Medication 25 GRAM(S): at 07:31

## 2022-09-11 RX ADMIN — HYDROMORPHONE HYDROCHLORIDE 1 MILLIGRAM(S): 2 INJECTION INTRAMUSCULAR; INTRAVENOUS; SUBCUTANEOUS at 13:35

## 2022-09-11 NOTE — PROGRESS NOTE ADULT - ASSESSMENT
31 M with PMH/ HIV, rectal cancer not on tx (followed up by Dr Frazier ([facility] oncology)), mets to liver and possibly bone,  R eye cataract, who presented to the [facility] on 8/31/22 after syncopal episode at home. Pt with cough x 2 mos, with yellow sputum and shortness of breath since 5 days PTA.   At [facility]: pt was found to be hypoxemic. 8/31/22 -- L chest wall Pigtail placement. Pigtail was placed for suspected PMX with improvement in shortness of breath. CT chest was performed, which showed that pigtail is within the mass__ differential is TB, fungal infection, cavitary lesion, or squamous cell carcinoma. Pt was found to be anemic due to rectal bleeding with Hgb 5.9 s/p 2 U PRBC on 9/1. Blood cxs with MSSA bacteremia, and has been treated with Cefazolin and Zosyn.  Since 9/2 pt with worsening diffuse SC emphysema extending to the neck/face/all 4 extremities/scrotum/back , which has been getting progressively worse over past 2 days as per pt's statement. 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 TTE with two large RV masses and two additional small masses. 9/7-- s/p L lateral chest tube to 14 Fr. 9/7 -- s/p IVC Filter placement (as per [facility] statement, was placed prophylactically due to undiagnosed vegetations on the TTE. 12 cm multiloculated thick walled cavitary mass in the CHRISTINE and lingula.+ liver lesion on the CT a/p. + external iliac vein thrombosis, PE study was indeterminate     Pt was transferred to [facility] as per family request (mother) for pt to be evaluated by his outpt oncology team (Dr Frazier). As per oncology team the plan is to start Carbotaxol q 3 weeks + RT for diffuse mets as per PET scan     Neuro  -A & O x 3 , no active issues  - not on sedation  - continue Morphine ER 30 mg BID, and Dilaudid increased from 0.5 to 1mg q 4 hrs for severe pain (scrotal pain, rectal pain)    CV:   TTE with two large RV masses and two additional small masses. 9/7-- s/p L lateral chest tube to 14 Fr. 9/7 -- s/p IVC Filter placement (as per [facility] statement, was placed prophylactically due to undiagnosed vegetations on the TTE.  - pt is hemodynamically stable no pressors   - repeat TTE 9/9 - Limited and technically difficult study with views limited to the subcostal window.  Very limited views demonstrate what appears to be a mobile mass, possibly in association with the tricuspid valve. This may represent tumour, thrombus, or vegetation. Consider JENNIFER for further evaluation, if clinically indicated.  - cardiac MRI ordered       Pulm:  #CHRISTINE cavitary lesion - Differential includes cavitary PNA (sputum and blood cultures positive for MSSA) vs malignancy given history of metastatic rectal CA vs atypical infection. Of note PET/CT in our system from 8/2022 with only few subcm nodules in L and GGO in R lung, making malignancy as etiology of cavitary lesion less likely.   - L pigtail catheter was placed due to concern for L PTX. However, patient was found to have large CHRISTINE cavitary lesion instead, into which the pigtail had been placed.   -On 9/7 patient had L pigtail replaced (currently 14F) as well as IVC filter placement.   - f/u sputum cultures  - Pleaural fluid Cx neg thus far  - f/u serum, histo, blasto, coccidio.  - serum fungitell and crypto Ag  -neg   - s/p Zosyn , now on Cefazolin x6 weeks per ID recs  - CT to water seal, place to suction only for transfer if off the unit  - CTA 9/9- Findings most in keeping with cavitary pneumonia involving lingula and left lower lobe complicated by bronchopleural fistula as described with tract around the small caliber pleural pigtail catheter, extensive subcutaneous emphysema and pneumomediastinum. Recommend thoracic surgical consultation.  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  - L CT in place, keep to water seal, repeat CTH completed, results as above  - CT surgery consulted- Recommend MIST protocol x1 dose through current pigtail catheter due to concern for clogged pigtail ,also rec to consult IR for image guided pigtail catheter (pneumonostomy) placement into the pneumatocele, as well as blow hole placement to evacuate subQ epmhysema- will hold off MIST/IR for now as MICU team to attempt pigtail placement at bedside tomorrow.    - hold heparin gtt pre procedure tomorrow.  - No operative thoracic surgical interventions planned at this time    GI:   regular diet, bowel regimen    Renal:   TELMA, likely prerenal, resolved   - follow up renal function   -Strict Is and Os: +142 making about 100/hr     #Hypercalcemia - ?related to malignancy  - Check PTH, PTHrp  - Vit D levels low, per heme/onc hold off supplementation until Ca normalized as can worsen hyperCa  - Ca 16.4 today, 17 corrected for low serum albumin. per Heme/onc recs ordered calcitonin 4u/kg x 2 doses and pamidronate 90mcg IVPB x1.  - Cont IVF LR @ 75  - continue to trend levels     #scrotal swelling likely due SC emphysema  - indwelling pedraza was placed at [facility] (Coude placed for urinary strain)   - UA positive with moderate bacteria 9/9- UCx sent , pending results       ID:  # MSSA bacteremia at [facility]  - Sputm cultures from 9/1 grew MSSA and Blood cultures from 8/31 grew MSSA with blood cultures from 9/2 NGTD. Legionella negative. Sputum AFB negative x3. Fungitell and galactomannan negative.   - Patient had been empirically on Zosyn, Cefazolin and Caspofungin (which was dc'ed).  - s/p Zosyn, transitioned to Cefazolin per ID recs on 9/9, plan for 6 week course  - Pleural fluid Cx neg thus far  - Blood Cx neg 9/8  - UA 9/8 w/  some bacteria, UCx pending  - CXR with L cavitary lesion, seen again on CT chest 9/9  - fungitel neg 9/10  - Sputum Cx pending  - ID consulted, recs appreciated    # HIV  - Follows with Dr. Marcial in clinic  - CD4 392 and viral load < 30 on 9/2/22 as per ID note   - Viral load undetectable 9/8  - c/w home Biktarvy     Hematology:   # rectal CA, possible RV mass, mets to liver and possibly bone,  -Dr Frazier was called to see pt F/U      #cardiac ? mass  -Lovenox held at [place] and s/p 9/7-- IVC filter   thrombocytosis,  AOCD, ferritin 512, iron 11, TIBC-- 150, Iron -- 7 %, transferrin -- 122, ([place])  - H/H stable     # L external iliac vein DVT  - on heparin gtt  - ptt q 6     Endo:  - not diabetic, HgbA1C ordered     Code: Full code  Palliative consult placed 9/10    Lines:   PIV, indwelling pedraza    31 M with PMH/ HIV, rectal cancer not on tx (followed up by Dr Frazier ([facility] oncology)), mets to liver and possibly bone,  R eye cataract, who presented to the [facility] on 8/31/22 after syncopal episode at home. Pt with cough x 2 mos, with yellow sputum and shortness of breath since 5 days PTA.   At [facility]: pt was found to be hypoxemic. 8/31/22 -- L chest wall Pigtail placement. Pigtail was placed for suspected PMX with improvement in shortness of breath. CT chest was performed, which showed that pigtail is within the mass__ differential is TB, fungal infection, cavitary lesion, or squamous cell carcinoma. Pt was found to be anemic due to rectal bleeding with Hgb 5.9 s/p 2 U PRBC on 9/1. Blood cxs with MSSA bacteremia, and has been treated with Cefazolin and Zosyn.  Since 9/2 pt with worsening diffuse SC emphysema extending to the neck/face/all 4 extremities/scrotum/back , which has been getting progressively worse over past 2 days as per pt's statement. 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 TTE with two large RV masses and two additional small masses. 9/7-- s/p L lateral chest tube to 14 Fr. 9/7 -- s/p IVC Filter placement (as per [facility] statement, was placed prophylactically due to undiagnosed vegetations on the TTE. 12 cm multiloculated thick walled cavitary mass in the CHRISTINE and lingula.+ liver lesion on the CT a/p. + external iliac vein thrombosis, PE study was indeterminate     Pt was transferred to [facility] as per family request (mother) for pt to be evaluated by his outpt oncology team (Dr Frazier). As per oncology team the plan is to start Carbotaxol q 3 weeks + RT for diffuse mets as per PET scan     Neuro  -A & O x 3 , no active issues  - not on sedation  - continue Morphine ER 30 mg BID, and Dilaudid increased from 1 mg q hrs for severe pain (scrotal pain, rectal pain)__ increased to q 3 hrs, encourage pt to day bowel regimen   -- monitor to pain control     CV:   TTE with two large RV masses and two additional small masses. 9/7-- s/p L lateral chest tube to 14 Fr. 9/7 -- s/p IVC Filter placement (as per [facility] statement, was placed prophylactically due to undiagnosed vegetations on the TTE.  - pt is hemodynamically stable no pressors   - repeat TTE 9/9 - Limited and technically difficult study with views limited to the subcostal window.  Very limited views demonstrate what appears to be a mobile mass, possibly in association with the tricuspid valve. This may represent tumour, thrombus, or vegetation. Consider JENNIFER for further evaluation, if clinically indicated.  - cardiac MRI ordered to evaluate RV mass /RV thrombosis      Pulm:  #CHRISTINE cavitary lesion - Differential includes cavitary PNA (sputum and blood cultures positive for MSSA) vs malignancy given history of metastatic rectal CA vs atypical infection. Of note PET/CT in our system from 8/2022 with only few subcm nodules in L and GGO in R lung, making malignancy as etiology of cavitary lesion less likely.   - L pigtail catheter was placed due to concern for L PTX. However, patient was found to have large CHRISTINE cavitary lesion instead, into which the pigtail had been placed.   -On 9/7 patient had L pigtail replaced (currently 14F) as well as IVC filter placement.   - f/u sputum cultures  - Pleaural fluid Cx neg thus far  - f/u serum, histo, blasto, coccidio.  - serum fungitell and crypto Ag  -neg   - s/p Zosyn , now on Cefazolin x6 weeks per ID recs  - CT to water seal, place to suction only for transfer if off the unit  - CTA 9/9- Findings most in keeping with cavitary pneumonia involving lingula and left lower lobe complicated by bronchopleural fistula as described with tract around the small caliber pleural pigtail catheter, extensive subcutaneous emphysema and pneumomediastinum. Recommend thoracic surgical consultation.  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  - L CT in place, keep to water seal, repeat CTH completed, results as above  - CT surgery consulted- Recommend MIST protocol x1 dose through current pigtail catheter due to concern for clogged pigtail ,also rec to consult IR for image guided pigtail catheter (pneumonostomy) placement into the pneumatocele, as well as blow hole placement to evacuate subQ epmhysema- will hold off MIST/IR for now as MICU team to attempt pigtail placement at bedside tomorrow.    - hold heparin gtt pre procedure tomorrow.  - No operative thoracic surgical interventions planned at this time    GI:   regular diet, bowel regimen    Renal:   TELMA, likely prerenal, resolved   - follow up renal function   -Strict Is and Os: +142 making about 100/hr     #Hypercalcemia - ?related to malignancy  - Check PTH, PTHrp  - Vit D levels low, per heme/onc hold off supplementation until Ca normalized as can worsen hyperCa  - Ca 16.4 today, 17 corrected for low serum albumin. per Heme/onc recs ordered calcitonin 4u/kg x 2 doses and pamidronate 90mcg IVPB x1.  - Cont IVF LR @ 75  - continue to trend levels     #scrotal swelling likely due SC emphysema  - indwelling pedraza was placed at [place] (Coude placed for urinary strain)   - UA positive with moderate bacteria 9/9- UCx sent , pending results       ID:  # MSSA bacteremia at [facility]  - Sputm cultures from 9/1 grew MSSA and Blood cultures from 8/31 grew MSSA with blood cultures from 9/2 NGTD. Legionella negative. Sputum AFB negative x3. Fungitell and galactomannan negative.   - Patient had been empirically on Zosyn, Cefazolin and Caspofungin (which was dc'ed).  - s/p Zosyn, transitioned to Cefazolin per ID recs on 9/9, plan for 6 week course  - Pleural fluid Cx neg thus far  - Blood Cx neg 9/8  - UA 9/8 w/  some bacteria, UCx pending  - CXR with L cavitary lesion, seen again on CT chest 9/9  - fungitel neg 9/10  - Sputum Cx pending  - ID consulted, recs appreciated    # HIV  - Follows with Dr. Marcial in clinic  - CD4 392 and viral load < 30 on 9/2/22 as per ID note   - Viral load undetectable 9/8  - c/w home Biktarvy     Hematology:   # rectal CA, possible RV mass, mets to liver and possibly bone,  -Dr Frazier was called to see pt F/U      #cardiac ? mass  -Lovenox held at [place] and s/p 9/7-- IVC filter   thrombocytosis,  AOCD, ferritin 512, iron 11, TIBC-- 150, Iron -- 7 %, transferrin -- 122, ([place])  - H/H stable     # L external iliac vein DVT  - on heparin gtt  - ptt q 6     Endo:  - not diabetic, HgbA1C ordered     Code: Full code  Palliative consult placed 9/10    Lines:   PIV, indwelling pedraza    31 M with PMH/ HIV, rectal cancer not on tx (followed up by Dr Frazier ([facility] oncology)), mets to liver and possibly bone,  R eye cataract, who presented to the [facility] on 8/31/22 after syncopal episode at home. Pt with cough x 2 mos, with yellow sputum and shortness of breath since 5 days PTA.   At [facility]: pt was found to be hypoxemic. 8/31/22 -- L chest wall Pigtail placement. Pigtail was placed for suspected PMX with improvement in shortness of breath. CT chest was performed, which showed that pigtail is within the mass__ differential is TB, fungal infection, cavitary lesion, or squamous cell carcinoma. Pt was found to be anemic due to rectal bleeding with Hgb 5.9 s/p 2 U PRBC on 9/1. Blood cxs with MSSA bacteremia, and has been treated with Cefazolin and Zosyn.  Since 9/2 pt with worsening diffuse SC emphysema extending to the neck/face/all 4 extremities/scrotum/back , which has been getting progressively worse over past 2 days as per pt's statement. 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 TTE with two large RV masses and two additional small masses. 9/7-- s/p L lateral chest tube to 14 Fr. 9/7 -- s/p IVC Filter placement (as per [facility] statement, was placed prophylactically due to undiagnosed vegetations on the TTE. 12 cm multiloculated thick walled cavitary mass in the CHRISTINE and lingula.+ liver lesion on the CT a/p. + external iliac vein thrombosis, PE study was indeterminate     Pt was transferred to [facility] as per family request (mother) for pt to be evaluated by his outpt oncology team (Dr Frazier). As per oncology team the plan is to start Carbotaxol q 3 weeks + RT for diffuse mets as per PET scan     Neuro  -A & O x 3 , no active issues  - not on sedation  - continue Morphine ER 30 mg BID, and Dilaudid increased from 1 mg q hrs for severe pain (scrotal pain, rectal pain)__ increased to q 3 hrs, encourage pt to day bowel regimen   -- monitor to pain control     CV:   - hemodynamically stable, remains off vasopressors   TTE with two large RV masses and two additional small masses. 9/7-- s/p L lateral chest tube to 14 Fr. 9/7 -- s/p IVC Filter placement (as per [facility] statement, was placed prophylactically due to undiagnosed vegetations on the TTE.  - pt is hemodynamically stable no pressors   - repeat TTE 9/9 - Limited and technically difficult study with views limited to the subcostal window.  Very limited views demonstrate what appears to be a mobile mass, possibly in association with the tricuspid valve. This may represent tumour, thrombus, or vegetation. Consider JENNIFER for further evaluation, if clinically indicated.  - cardiac MRI ordered to evaluate RV mass /RV thrombosis      Pulm:  #CHRISTINE cavitary lesion - Differential includes cavitary PNA (sputum and blood cultures positive for MSSA) vs malignancy given history of metastatic rectal CA vs atypical infection. Of note PET/CT in our system from 8/2022 with only few subcm nodules in L and GGO in R lung, making malignancy as etiology of cavitary lesion less likely.   - L pigtail catheter was placed at [facility] due to concern for L PTX. However, patient was found to have large CHRISTINE cavitary lesion instead, into which the pigtail had been placed.   -On 9/7 patient had L pigtail replaced (currently 14F) as well as IVC filter placement. ([facility])   - f/u sputum cultures-- sent on 9/10  - Pleural fluid Cx neg 9/9  - f/u asperfillus,, histo, blasto, coccidio.-- in lab  - serum fungitell and crypto Ag  - neg   - s/p Zosyn , now on Cefazolin x 6 weeks per ID recs  - CT to water seal, place to suction only for transfer if off the unit, no output from chest tube   - CTA 9/9- Findings most in keeping with cavitary pneumonia involving lingula and left lower lobe complicated by bronchopleural fistula as described with tract around the small caliber pleural pigtail catheter, extensive subcutaneous emphysema and pneumomediastinum. 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  - L CT in place, keep to water seal, repeat CTH completed, results as above  - CT surgery consulted- Recommend MIST protocol x1 dose through current pigtail catheter due to concern for clogged pigtail ,also rec to consult IR for image guided pigtail catheter (pneumonostomy) placement into the pneumatocele, as well as blow hole placement to evacuate subQ epmhysema- will hold off MIST/IR for now as MICU team to attempt pigtail placement at bedside tomorrow.    - hold heparin gtt pre procedure tomorrow.  - No operative thoracic surgical interventions planned at this time    GI:   regular diet, bowel regimen    Renal:   TELMA, likely prerenal, resolved   - follow up renal function   -Strict Is and Os: +142 making about 100/hr     #Hypercalcemia - ?related to malignancy  - Check PTH, PTHrp  - Vit D levels low, per heme/onc hold off supplementation until Ca normalized as can worsen hyperCa  - Ca 16.4 today, 17 corrected for low serum albumin. per Heme/onc recs ordered calcitonin 4u/kg x 2 doses and pamidronate 90mcg IVPB x1.  - Cont IVF LR @ 75  - continue to trend levels     #scrotal swelling likely due SC emphysema  - indwelling pedraza was placed at [facility] (Coude placed for urinary strain)   - UA positive with moderate bacteria 9/9- UCx sent , pending results       ID:  # MSSA bacteremia at [facility]  - Sputm cultures from 9/1 grew MSSA and Blood cultures from 8/31 grew MSSA with blood cultures from 9/2 NGTD. Legionella negative. Sputum AFB negative x3. Fungitell and galactomannan negative.   - Patient had been empirically on Zosyn, Cefazolin and Caspofungin (which was dc'ed).  - s/p Zosyn, transitioned to Cefazolin per ID recs on 9/9, plan for 6 week course  - Pleural fluid Cx neg thus far  - Blood Cx neg 9/8  - UA 9/8 w/  some bacteria, UCx pending  - CXR with L cavitary lesion, seen again on CT chest 9/9  - fungitel neg 9/10  - Sputum Cx pending  - ID consulted, recs appreciated    # HIV  - Follows with Dr. Marcial in clinic  - CD4 392 and viral load < 30 on 9/2/22 as per ID note   - Viral load undetectable 9/8  - c/w home Biktarvy     Hematology:   # rectal CA, possible RV mass, mets to liver and possibly bone,  -Dr Frazier was called to see pt F/U      #cardiac ? mass  -Lovenox held at [place] and s/p 9/7-- IVC filter   thrombocytosis,  AOCD, ferritin 512, iron 11, TIBC-- 150, Iron -- 7 %, transferrin -- 122, ([place])  - H/H stable     # L external iliac vein DVT  - on heparin gtt  - ptt q 6     Endo:  - not diabetic, HgbA1C ordered     Code: Full code  Palliative consult placed 9/10    Lines:   PIV, indwelling pedraza    31 M with PMH/ HIV, rectal cancer not on tx (followed up by Dr Frazier ([facility] oncology)), mets to liver and possibly bone,  R eye cataract, who presented to the [facility] on 8/31/22 after syncopal episode at home. Pt with cough x 2 mos, with yellow sputum and shortness of breath since 5 days PTA.   At [facility]: pt was found to be hypoxemic. 8/31/22 -- L chest wall Pigtail placement. Pigtail was placed for suspected PMX with improvement in shortness of breath. CT chest was performed, which showed that pigtail is within the mass__ differential is TB, fungal infection, cavitary lesion, or squamous cell carcinoma. Pt was found to be anemic due to rectal bleeding with Hgb 5.9 s/p 2 U PRBC on 9/1. Blood cxs with MSSA bacteremia, and has been treated with Cefazolin and Zosyn.  Since 9/2 pt with worsening diffuse SC emphysema extending to the neck/face/all 4 extremities/scrotum/back , which has been getting progressively worse over past 2 days as per pt's statement. 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 TTE with two large RV masses and two additional small masses. 9/7-- s/p L lateral chest tube to 14 Fr. 9/7 -- s/p IVC Filter placement (as per [facility] statement, was placed prophylactically due to undiagnosed vegetations on the TTE. 12 cm multiloculated thick walled cavitary mass in the CHRISTINE and lingula.+ liver lesion on the CT a/p. + external iliac vein thrombosis, PE study was indeterminate     Pt was transferred to [facility] as per family request (mother) for pt to be evaluated by his outpt oncology team (Dr Frazier). As per oncology team the plan is to start Carbotaxol q 3 weeks + RT for diffuse mets as per PET scan     Neuro  -A & O x 3 , no active issues  - not on sedation  - continue Morphine ER 30 mg BID, and Dilaudid increased from 1 mg q hrs for severe pain (scrotal pain, rectal pain)__ increased to q 3 hrs, encourage pt to day bowel regimen   -- monitor to pain control     CV:   - hemodynamically stable, remains off vasopressors   TTE with two large RV masses and two additional small masses. 9/7-- s/p L lateral chest tube to 14 Fr. 9/7 -- s/p IVC Filter placement (as per [facility] statement, was placed prophylactically due to undiagnosed vegetations on the TTE.  - pt is hemodynamically stable no pressors   - repeat TTE 9/9 - Limited and technically difficult study with views limited to the subcostal window.  Very limited views demonstrate what appears to be a mobile mass, possibly in association with the tricuspid valve. This may represent tumour, thrombus, or vegetation. Consider JENNIFER for further evaluation, if clinically indicated.  - cardiac MRI ordered to evaluate RV mass /RV thrombosis      Pulm:  #CHRISTINE cavitary lesion - Differential includes cavitary PNA (sputum and blood cultures positive for MSSA) vs malignancy given history of metastatic rectal CA vs atypical infection. Of note PET/CT in our system from 8/2022 with only few subcm nodules in L and GGO in R lung, making malignancy as etiology of cavitary lesion less likely.   - L pigtail catheter was placed at [facility] due to concern for L PTX. However, patient was found to have large CHRISTINE cavitary lesion instead, into which the pigtail had been placed.   -On 9/7 patient had L pigtail replaced (currently 14F) as well as IVC filter placement. ([facility])   9/10-- s/p MIST (10 mg of Alteplase injected into pleural space)   - f/u sputum cultures-- sent on 9/10  - Pleural fluid Cx neg 9/9  - f/u asperfillus,, histo, blasto, coccidio.-- in lab  - serum fungitell and crypto Ag  - neg   - s/p Zosyn , now on Cefazolin x 6 weeks per ID recs   - CT to water seal, place to suction only for transfer if off the unit, no output from chest tube   - CTA 9/9- Findings most in keeping with cavitary pneumonia involving lingula and left lower lobe complicated by bronchopleural fistula as described with tract around the small caliber pleural pigtail catheter, extensive subcutaneous emphysema and pneumomediastinum. Nonopacification of lingular and left lower lobe pulmonary arteries as described likely secondary to cavitary pneumonia rather than thromboembolic pulmonary embolism  - AFB x 3 are negative at [facility]     # bronchopleural fistula.  - L CT in place, keep to water seal, repeat CTH completed, results as above  - CT surgery consulted- Recommend MIST protocol x1 dose through current pigtail catheter due to concern for clogged pigtail ,also rec to consult IR for image guided pigtail catheter (pneumonostomy) placement into the pneumatocele, as well as blow hole placement to evacuate subQ epmhysema- will hold off MIST/IR for now as MICU team to attempt pigtail placement at bedside tomorrow.    - hold heparin gtt pre procedure tomorrow.  - No operative thoracic surgical interventions planned at this time    GI:   regular diet, bowel regimen    Renal:   TELMA, likely prerenal, resolved   - follow up renal function   -Strict Is and Os: +142 making about 100/hr     #Hypercalcemia - ?related to malignancy  - Check PTH, PTHrp  - Vit D levels low, per heme/onc hold off supplementation until Ca normalized as can worsen hyperCa  - Ca 16.4 today, 17 corrected for low serum albumin. per Heme/onc recs ordered calcitonin 4u/kg x 2 doses and pamidronate 90mcg IVPB x1.  - Cont IVF LR @ 75  - continue to trend levels     #scrotal swelling likely due SC emphysema  - indwelling pedraza was placed at [facility] (Coude placed for urinary strain)   - UA positive with moderate bacteria 9/9- UCx sent , pending results       ID:  # MSSA bacteremia at [facility]  - Sputm cultures from 9/1 grew MSSA and Blood cultures from 8/31 grew MSSA with blood cultures from 9/2 NGTD. Legionella negative. Sputum AFB negative x3. Fungitell and galactomannan negative.   - Patient had been empirically on Zosyn, Cefazolin and Caspofungin (which was dc'ed).  - s/p Zosyn, transitioned to Cefazolin per ID recs on 9/9, plan for 6 week course  - Pleural fluid Cx neg thus far  - Blood Cx neg 9/8  - UA 9/8 w/  some bacteria, UCx pending  - CXR with L cavitary lesion, seen again on CT chest 9/9  - fungitel neg 9/10  - Sputum Cx pending  - ID consulted, recs appreciated    # HIV  - Follows with Dr. Marcial in clinic  - CD4 392 and viral load < 30 on 9/2/22 as per ID note   - Viral load undetectable 9/8  - c/w home Biktarvy     Hematology:   # rectal CA, possible RV mass, mets to liver and possibly bone,  -Dr Frazier was called to see pt F/U      #cardiac ? mass  -Lovenox held at [place] and s/p 9/7-- IVC filter   thrombocytosis,  AOCD, ferritin 512, iron 11, TIBC-- 150, Iron -- 7 %, transferrin -- 122, ([place])  - H/H stable     # L external iliac vein DVT  - on heparin gtt  - ptt q 6     Endo:  - not diabetic, HgbA1C ordered     Code: Full code  Palliative consult placed 9/10    Lines:   PIV, indwelling pedraza    31 M with PMH/ HIV, rectal cancer not on tx (followed up by Dr Frazier ([facility] oncology)), mets to liver and possibly bone,  R eye cataract, who presented to the [facility] on 8/31/22 after syncopal episode at home. Pt with cough x 2 mos, with yellow sputum and shortness of breath since 5 days PTA.   At [facility]: pt was found to be hypoxemic. 8/31/22 -- L chest wall Pigtail placement. Pigtail was placed for suspected PMX with improvement in shortness of breath. CT chest was performed, which showed that pigtail is within the mass__ differential is TB, fungal infection, cavitary lesion, or squamous cell carcinoma. Pt was found to be anemic due to rectal bleeding with Hgb 5.9 s/p 2 U PRBC on 9/1. Blood cxs with MSSA bacteremia, and has been treated with Cefazolin and Zosyn.  Since 9/2 pt with worsening diffuse SC emphysema extending to the neck/face/all 4 extremities/scrotum/back , which has been getting progressively worse over past 2 days as per pt's statement. 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 TTE with two large RV masses and two additional small masses. 9/7-- s/p L lateral chest tube to 14 Fr. 9/7 -- s/p IVC Filter placement (as per [facility] statement, was placed prophylactically due to undiagnosed vegetations on the TTE. 12 cm multiloculated thick walled cavitary mass in the CHRISTINE and lingula.+ liver lesion on the CT a/p. + external iliac vein thrombosis, PE study was indeterminate     Pt was transferred to [facility] as per family request (mother) for pt to be evaluated by his outpt oncology team (Dr Frazier). As per oncology team the plan is to start Carbotaxol q 3 weeks + RT for diffuse mets as per PET scan     Neuro  -A & O x 3 , no active issues  - not on sedation  - continue Morphine ER 30 mg BID, and Dilaudid increased from 1 mg q hrs for severe pain (scrotal pain, rectal pain)__ increased to q 3 hrs, encourage pt to day bowel regimen   -- monitor to pain control     CV:   - hemodynamically stable, remains off vasopressors   TTE with two large RV masses and two additional small masses. 9/7-- s/p L lateral chest tube to 14 Fr. 9/7 -- s/p IVC Filter placement (as per [facility] statement, was placed prophylactically due to undiagnosed vegetations on the TTE.  - pt is hemodynamically stable no pressors   - repeat TTE 9/9 - Limited and technically difficult study with views limited to the subcostal window.  Very limited views demonstrate what appears to be a mobile mass, possibly in association with the tricuspid valve. This may represent tumour, thrombus, or vegetation. Consider JENNIFER for further evaluation, if clinically indicated.  - cardiac MRI ordered to evaluate RV mass /RV thrombosis      Pulm:  #CHRISTINE cavitary lesion - Differential includes cavitary PNA (sputum and blood cultures positive for MSSA) vs malignancy given history of metastatic rectal CA vs atypical infection. Of note PET/CT in our system from 8/2022 with only few subcm nodules in L and GGO in R lung, making malignancy as etiology of cavitary lesion less likely.   - L pigtail catheter was placed at [facility] due to concern for L PTX. However, patient was found to have large CHRISTINE cavitary lesion instead, into which the pigtail had been placed.   -On 9/7 patient had L pigtail replaced (currently 14F) as well as IVC filter placement. ([facility])   9/10-- s/p MIST (10 mg of Alteplase injected into pleural space)   - f/u sputum cultures-- sent on 9/10  - Pleural fluid Cx neg 9/9  - f/u asperfillus,, histo, blasto, coccidio.-- in lab  - serum fungitell and crypto Ag  - neg   - s/p Zosyn , now on Cefazolin x 6 weeks per ID recs   - CT to water seal, place to suction only for transfer if off the unit, no output from chest tube   - CTA 9/9- Findings most in keeping with cavitary pneumonia involving lingula and left lower lobe complicated by bronchopleural fistula as described with tract around the small caliber pleural pigtail catheter, extensive subcutaneous emphysema and pneumomediastinum. Nonopacification of lingular and left lower lobe pulmonary arteries as described likely secondary to cavitary pneumonia rather than thromboembolic pulmonary embolism  - AFB x 3 are negative at [facility]   -- CT Sx recommended MIST protocol, hold it for now and repeat CT /chest ordered -- to evaluate if pig tail is still in place since there is no drainage from the cavitary lesion/ space     # bronchopleural fistula.  - L CT in place, keep to water seal, repeat CTH completed, results as above  - CT surgery consulted- Recommend MIST protocol x1 dose through current pigtail catheter due to concern for clogged pigtail ,also rec to consult IR for image guided pigtail catheter (pneumonostomy) placement into the pneumatocele, as well as blow hole placement to evacuate subQ epmhysema- will hold off MIST/IR for now as MICU team possibly to attempt replace pigtail placement at bedside today    -- repeat CT /chest ordered   - hold heparin gtt for possible chest tube replacement today.  - No operative thoracic surgical interventions planned at this time    GI:   regular diet, bowel regimen    Renal:   TELMA, likely prerenal,   -- Creat-- 1.27>1.38   LOS net is neg 190/ 24 hrs net is positive 463, voids about /hr, and voided 2.38 L in 24 hrs  - continue trending renal function   -Strict Is and Os:    #Hypercalcemia - ?related to malignancy  - Check PTH-- in lab, PTHrp -- in lab  - Vit D levels low, per heme/onc hold off supplementation until Ca normalized as can worsen hyperCa  - Ca 16.4 -- 9/10, 17 corrected for low serum albumin. per Heme/onc recs, s/p (9/10) calcitonin 4u/kg x 2 doses and pamidronate 90mcg IVPB x1. Calcium level today is 14.2   - s/p IVF LR @ 75__ held for now  - continue to trend levels q 24 hrs     #scrotal swelling likely due SC emphysema, with severe pain  - indwelling pedraza was placed at [facility] (Coude placed for urinary strain)   - UA positive with moderate bacteria 9/9- UCx 9/9 neg   -- pain control with dilaudid and Ofirmev, scrotal US and ct a/p pending to evaluate for inflammation/hydrocele/infection      ID:  # MSSA bacteremia at [facility]. afebrile T max 96.5   -- TTE with possible vegetation on tricuspid valve   - Sputum cultures from 9/1 grew MSSA and Blood cultures from 8/31 grew MSSA with blood cultures from 9/2 NGTD. Legionella negative. Sputum AFB negative x3. Fungitell and galactomannan negative.   - Patient had been empirically on Zosyn, Cefazolin and Caspofungin (which was dc'ed).  - s/p Zosyn (9/8 - 9/9), transitioned to Cefazolin per ID recs on 9/9, plan for 6 week course  - Pleural fluid Cx neg 9/9  - Blood Cx neg 9/8  - UA 9/8 w/  some bacteria, UCx 9/9 neg   - CXR with L cavitary lesion, seen again on CT chest 9/9  - fungitell neg 9/10  - Sputum Cx pending 9/10  - ID consulted, recs appreciated    # HIV  - Follows with Dr. Marcial in clinic  - CD4 392 and viral load < 30 on 9/2/22 as per ID note   - Viral load undetectable 9/8  - c/w home Biktarvy     Hematology:   # rectal CA, possible RV mass, mets to liver and possibly bone,  # metastatic HPV related anal SCC,  -- Hem/Onc--With regards to treatment of metastatic HPV related anal SCC, treatment will be on hold pending resolution of infection. Will need reassessment after resolution of infection to evaluate performance status and decide on systemic treatment options  -Pal care consult for pain management placed on 9/10  -When stable and after the above, recommend radiation oncology consult for consideration of RT to anal mass for palliation.      Hematology   #cardiac ? mass/thrombus/vegetation   -Lovenox held at [place] and s/p 9/7-- IVC filter    #anemia   thrombocytosis,  AOCD, ferritin 512, iron 11, TIBC-- 150, Iron -- 7 %, transferrin -- 122, ([place])  -- Haptoglobin -- 308, LDL-- 240, Urica acid-- 3.4   - H/H stable --8.8/29.6. plts- 477     # L external iliac vein DVT  - on heparin gtt  - ptt q 24 hrs, aPTT therapeutic-   -- pt refused LE's venous duplex     Endo:  - not diabetic, HgbA1C 5.1     Code: Full code  Palliative consult placed 9/10    Lines:   PIV, indwelling pedraza, L PTC,

## 2022-09-11 NOTE — CHART NOTE - NSCHARTNOTEFT_GEN_A_CORE
Following pt who is 31M with PMH of metastatic rectal cancer, HIV with undetectable viral load, with concern for pneumatocele after pigtail placement into an abscess cavity.   - will review repeat CT scan today with thoracic, Dr Quinteros and continue to follow    Rafaela DIEHL [id number]

## 2022-09-11 NOTE — PROGRESS NOTE ADULT - SUBJECTIVE AND OBJECTIVE BOX
Significant recent/past 24 hr events:    Subjective:    Review of Systems         [ ] A ten-point review of systems was otherwise negative except as noted.  [ ] Due to altered mental status/intubation, subjective information were not able to be obtained from the patient. History was obtained, to the extent possible, from review of the chart and collateral sources of information.      Patient is a 31y old  Male who presents with a chief complaint of 30yo M w HIV, metastatic rectal Ca.  Transferred to [facility] from [facility] where he was found to be hypoxemic s/p L  chest wall pigtail placement.  A/w findings of CHRISTINE cavitary lesion and worsening SC emphysema since (9/2).  Also with severe hypercalcemia.     (10 Sep 2022 15:40)    HPI:  31 M with PMH/ HIV, rectal cancer not on tx (followed up by Dr Frazier ([facility] oncology)), mets to liver and possibly bone, R eye cataract, who presented to the [facility] on 8/31/22 after syncopal episode at home. Pt with cough x 2 mos, with yellow sputum and shortness of breath since 5 days PTA.   -- at [facility]: pt was found to be hypoxemic. 8/31/22 -- L chest wall Pigtail placement. Pigtail was placed for suspected PMX with improvement in shortness of breath. CT chest was performed, which showed that pigtail is within the mass__ differential is TB, fungal infection, cavitary lesion, or squamous cell carcinoma. Pt was found to be anemic due to rectal bleeding with Hgb 5.9 s/p 2 U PRBC on 9/1. Blood cxs with MSSA bacteremia, and has been treated with Cefazolin and Zosyn.  Since 9/2 pt with worsening diffuse SC emphysema extending to the neck/face/all 4 extremities/scrotum/back , which has been getting progressively worse over past 2 days as per pt's statement. 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 TTE with two large RV masses and two additional small masses. 9/7-- s/p L lateral chest tube to 14 Fr. 9/7 -- s/p IVC Filter placement (as per [facility] statement, was placed prophylactically due to undiagnosed vegetations on the TTE. 12 cm multiloculated thick walled cavitary mass in the CHRISTINE and lingula.+ liver lesion on the CT a/p. + external iliac vein thrombosis, PE study was indeterminate   __ pt was transferred to [facility] as per family request (mother) for pt to be evaluated by his outpt oncology team (Dr Frazier). As per oncology team the plan is to start Carbotaxol q 3 weeks + RT for diffuse mets as per PET scan,  (08 Sep 2022 14:44)    PAST MEDICAL & SURGICAL HISTORY:  HIV infection  6/30/2022 virus detected, switched to Biktarvy, male partners      Rectal cancer  not on tretment      Right cataract      Current smoker      History of depression  and anxiety      No significant past surgical history        FAMILY HISTORY:      Vitals   ICU Vital Signs Last 24 Hrs  T(C): 35.8 (11 Sep 2022 00:00), Max: 35.8 (10 Sep 2022 08:00)  T(F): 96.5 (11 Sep 2022 00:00), Max: 96.5 (10 Sep 2022 08:00)  HR: 92 (11 Sep 2022 06:00) (70 - 95)  BP: 128/66 (11 Sep 2022 06:00) (100/66 - 145/74)  BP(mean): 79 (11 Sep 2022 06:00) (71 - 92)  ABP: --  ABP(mean): --  RR: 25 (11 Sep 2022 06:00) (14 - 25)  SpO2: 100% (11 Sep 2022 06:00) (98% - 100%)    O2 Parameters below as of 11 Sep 2022 06:00  Patient On (Oxygen Delivery Method): room air            Physical Exam:   Constitutional: NAD, well-groomed, well-developed  HEENT: PERRLA, EOMI, no drainage or redness  Neck: supple,  No JVD, Trachea midline  Back: Normal spine flexure, No CVA tenderness, No deformity or limitation of movement  Respiratory: Breath Sounds equal & clear bilaterally to auscultation, no accessory muscle use noted  Cardiovascular: Regular rate, regular rhythm, normal S1, S2; no murmurs or rub  Gastrointestinal: Soft, non-tender, non distended, no hepatosplenomegaly, normal bowel sounds  Extremities: NEGRO x 4, no peripheral edema, no cyanosis, no clubbing   Vascular: Equal and normal pulses: 2+ peripheral pulses throughout  Neurological: A+O x 3; speech clear and intact; no sensory, motor  deficits, normal reflexes  Psychiatric: calm, normal mood, normal affect  Musculoskeletal: No joint swelling or deformity; no limitation of movement  Skin: warm, dry, well perfused, no rashes    VENT SETTINGS         I&O's Detail    09 Sep 2022 07:01  -  10 Sep 2022 07:00  --------------------------------------------------------  IN:    Heparin Infusion: 265 mL    Heparin Infusion: 91 mL    IV PiggyBack: 200 mL    Lactated Ringers: 1800 mL    Oral Fluid: 296 mL  Total IN: 2652 mL    OUT:    Chest Tube (mL): 25 mL    Indwelling Catheter - Urethral (mL): 2510 mL  Total OUT: 2535 mL    Total NET: 117 mL      10 Sep 2022 07:01  -  11 Sep 2022 06:45  --------------------------------------------------------  IN:    Heparin Infusion: 323 mL    IV PiggyBack: 450 mL    Lactated Ringers: 1350 mL    Oral Fluid: 720 mL  Total IN: 2843 mL    OUT:    Chest Tube (mL): 0 mL    Indwelling Catheter - Urethral (mL): 2380 mL  Total OUT: 2380 mL    Total NET: 463 mL          LABS                        8.8    14.06 )-----------( 477      ( 11 Sep 2022 03:45 )             29.6     09-11    132<L>  |  94<L>  |  21  ----------------------------<  108<H>  4.2   |  30  |  1.38<H>    Ca    14.2<HH>      11 Sep 2022 03:45  Phos  3.5     09-11  Mg     1.50     09-11        PTT - ( 11 Sep 2022 03:45 )  PTT:57.9 sec                MEDICATIONS  (STANDING):  bictegravir 50 mG/emtricitabine 200 mG/tenofovir alafenamide 25 mG (BIKTARVY) 1 Tablet(s) Oral daily  ceFAZolin   IVPB 2000 milliGRAM(s) IV Intermittent every 8 hours  chlorhexidine 2% Cloths 1 Application(s) Topical <User Schedule>  ferrous    sulfate 325 milliGRAM(s) Oral <User Schedule>  heparin  Infusion. 1500 Unit(s)/Hr (15 mL/Hr) IV Continuous <Continuous>  lactated ringers. 1000 milliLiter(s) (75 mL/Hr) IV Continuous <Continuous>  lactulose Syrup 10 Gram(s) Oral daily  lidocaine   4% Patch 1 Patch Transdermal daily  magnesium sulfate  IVPB 2 Gram(s) IV Intermittent once  morphine ER Tablet 30 milliGRAM(s) Oral every 12 hours  polyethylene glycol 3350 17 Gram(s) Oral daily  senna 2 Tablet(s) Oral at bedtime    MEDICATIONS  (PRN):  heparin   Injectable 4500 Unit(s) IV Push every 6 hours PRN For aPTT less than 40  heparin   Injectable 2000 Unit(s) IV Push every 6 hours PRN For aPTT between 40 - 57  HYDROmorphone  Injectable 1 milliGRAM(s) IV Push every 4 hours PRN Severe Pain (7 - 10)  ondansetron Injectable 4 milliGRAM(s) IV Push every 8 hours PRN Nausea and/or Vomiting      Allergies:  ibuprofen (Angioedema)        CRITICAL CARE TIME SPENT:  minutes of critical care time spent providing medical care for patient's acute illness/conditions that impairs at least one vital organ system and/or poses a high risk of imminent or life threatening deterioration in the patient's condition. It includes time spent evaluating and treating the patient's acute illness as well as time spent reviewing labs, radiology, discussing goals of care with patient and/or patient's family, and discussing the case with a multidisciplinary team, in an effort to prevent further life threatening deterioration or end organ damage. This time is independent of any procedures performed.         Significant recent/past 24 hr events: no BM, calcium level improved     Subjective: pt c/o severe scrotal pain,    Review of Systems         [ ] A ten-point review of systems was otherwise negative except as noted.  [ ] Due to altered mental status/intubation, subjective information were not able to be obtained from the patient. History was obtained, to the extent possible, from review of the chart and collateral sources of information.      Patient is a 31y old  Male who presents with a chief complaint of 32yo M w HIV, metastatic rectal Ca.  Transferred to [facility] from [facility] where he was found to be hypoxemic s/p L  chest wall pigtail placement.  A/w findings of CHRISTINE cavitary lesion and worsening SC emphysema since (9/2).  Also with severe hypercalcemia.     (10 Sep 2022 15:40)    HPI:  31 M with PMH/ HIV, rectal cancer not on tx (followed up by Dr Frazier ([facility] oncology)), mets to liver and possibly bone, R eye cataract, who presented to the [facility] on 8/31/22 after syncopal episode at home. Pt with cough x 2 mos, with yellow sputum and shortness of breath since 5 days PTA.   -- at [facility]: pt was found to be hypoxemic. 8/31/22 -- L chest wall Pigtail placement. Pigtail was placed for suspected PMX with improvement in shortness of breath. CT chest was performed, which showed that pigtail is within the mass__ differential is TB, fungal infection, cavitary lesion, or squamous cell carcinoma. Pt was found to be anemic due to rectal bleeding with Hgb 5.9 s/p 2 U PRBC on 9/1. Blood cxs with MSSA bacteremia, and has been treated with Cefazolin and Zosyn.  Since 9/2 pt with worsening diffuse SC emphysema extending to the neck/face/all 4 extremities/scrotum/back , which has been getting progressively worse over past 2 days as per pt's statement. 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 TTE with two large RV masses and two additional small masses. 9/7-- s/p L lateral chest tube to 14 Fr. 9/7 -- s/p IVC Filter placement (as per [facility] statement, was placed prophylactically due to undiagnosed vegetations on the TTE. 12 cm multiloculated thick walled cavitary mass in the CHRISTINE and lingula.+ liver lesion on the CT a/p. + external iliac vein thrombosis, PE study was indeterminate   __ pt was transferred to [facility] as per family request (mother) for pt to be evaluated by his outpt oncology team (Dr Frazier). As per oncology team the plan is to start Carbotaxol q 3 weeks + RT for diffuse mets as per PET scan,  (08 Sep 2022 14:44)    PAST MEDICAL & SURGICAL HISTORY:  HIV infection  6/30/2022 virus detected, switched to Biktarvy, male partners      Rectal cancer  not on tretment      Right cataract      Current smoker      History of depression  and anxiety      No significant past surgical history        FAMILY HISTORY:      Vitals   ICU Vital Signs Last 24 Hrs  T(C): 35.8 (11 Sep 2022 00:00), Max: 35.8 (10 Sep 2022 08:00)  T(F): 96.5 (11 Sep 2022 00:00), Max: 96.5 (10 Sep 2022 08:00)  HR: 92 (11 Sep 2022 06:00) (70 - 95)  BP: 128/66 (11 Sep 2022 06:00) (100/66 - 145/74)  BP(mean): 79 (11 Sep 2022 06:00) (71 - 92)  ABP: --  ABP(mean): --  RR: 25 (11 Sep 2022 06:00) (14 - 25)  SpO2: 100% (11 Sep 2022 06:00) (98% - 100%)    O2 Parameters below as of 11 Sep 2022 06:00  Patient On (Oxygen Delivery Method): room air            Physical Exam:   Constitutional: NAD, well-groomed, well-developed, SC emphysema througout   HEENT: PERRLA, EOMI, no drainage or redness  Neck: supple,  No JVD, Trachea midline  Back: Normal spine flexure, No CVA tenderness, No deformity or limitation of movement  Respiratory: Breath Sounds equal & clear bilaterally to auscultation, no accessory muscle use noted  Cardiovascular: Regular rate, regular rhythm, normal S1, S2; no murmurs or rub  Gastrointestinal: Soft, non-tender, non distended, no hepatosplenomegaly, normal bowel sounds  : severe scrotal setting in the setting of SC emphysema   Extremities: NEGRO x 4, no peripheral edema, no cyanosis, no clubbing   Vascular: Equal and normal pulses: 2+ peripheral pulses throughout  Neurological: A+O x 3; speech clear and intact; no sensory, motor  deficits, normal reflexes  Psychiatric: agitated from pain , normal mood, normal affect  Musculoskeletal: No joint swelling or deformity; no limitation of movement  Skin: warm, dry, well perfused, no rashes    VENT SETTINGS         I&O's Detail    09 Sep 2022 07:01  -  10 Sep 2022 07:00  --------------------------------------------------------  IN:    Heparin Infusion: 265 mL    Heparin Infusion: 91 mL    IV PiggyBack: 200 mL    Lactated Ringers: 1800 mL    Oral Fluid: 296 mL  Total IN: 2652 mL    OUT:    Chest Tube (mL): 25 mL    Indwelling Catheter - Urethral (mL): 2510 mL  Total OUT: 2535 mL    Total NET: 117 mL      10 Sep 2022 07:01  -  11 Sep 2022 06:45  --------------------------------------------------------  IN:    Heparin Infusion: 323 mL    IV PiggyBack: 450 mL    Lactated Ringers: 1350 mL    Oral Fluid: 720 mL  Total IN: 2843 mL    OUT:    Chest Tube (mL): 0 mL    Indwelling Catheter - Urethral (mL): 2380 mL  Total OUT: 2380 mL    Total NET: 463 mL          LABS                        8.8    14.06 )-----------( 477      ( 11 Sep 2022 03:45 )             29.6     09-11    132<L>  |  94<L>  |  21  ----------------------------<  108<H>  4.2   |  30  |  1.38<H>    Ca    14.2<HH>      11 Sep 2022 03:45  Phos  3.5     09-11  Mg     1.50     09-11        PTT - ( 11 Sep 2022 03:45 )  PTT:57.9 sec                MEDICATIONS  (STANDING):  bictegravir 50 mG/emtricitabine 200 mG/tenofovir alafenamide 25 mG (BIKTARVY) 1 Tablet(s) Oral daily  ceFAZolin   IVPB 2000 milliGRAM(s) IV Intermittent every 8 hours  chlorhexidine 2% Cloths 1 Application(s) Topical <User Schedule>  ferrous    sulfate 325 milliGRAM(s) Oral <User Schedule>  heparin  Infusion. 1500 Unit(s)/Hr (15 mL/Hr) IV Continuous <Continuous>  lactated ringers. 1000 milliLiter(s) (75 mL/Hr) IV Continuous <Continuous>  lactulose Syrup 10 Gram(s) Oral daily  lidocaine   4% Patch 1 Patch Transdermal daily  magnesium sulfate  IVPB 2 Gram(s) IV Intermittent once  morphine ER Tablet 30 milliGRAM(s) Oral every 12 hours  polyethylene glycol 3350 17 Gram(s) Oral daily  senna 2 Tablet(s) Oral at bedtime    MEDICATIONS  (PRN):  heparin   Injectable 4500 Unit(s) IV Push every 6 hours PRN For aPTT less than 40  heparin   Injectable 2000 Unit(s) IV Push every 6 hours PRN For aPTT between 40 - 57  HYDROmorphone  Injectable 1 milliGRAM(s) IV Push every 4 hours PRN Severe Pain (7 - 10)  ondansetron Injectable 4 milliGRAM(s) IV Push every 8 hours PRN Nausea and/or Vomiting      Allergies:  ibuprofen (Angioedema)        CRITICAL CARE TIME SPENT: 40 minutes of critical care time spent providing medical care for patient's acute illness/conditions that impairs at least one vital organ system and/or poses a high risk of imminent or life threatening deterioration in the patient's condition. It includes time spent evaluating and treating the patient's acute illness as well as time spent reviewing labs, radiology, discussing goals of care with patient and/or patient's family, and discussing the case with a multidisciplinary team, in an effort to prevent further life threatening deterioration or end organ damage. This time is independent of any procedures performed.

## 2022-09-12 ENCOUNTER — NON-APPOINTMENT (OUTPATIENT)
Age: 32
End: 2022-09-12

## 2022-09-12 DIAGNOSIS — K59.00 CONSTIPATION, UNSPECIFIED: ICD-10-CM

## 2022-09-12 DIAGNOSIS — T79.7XXA TRAUMATIC SUBCUTANEOUS EMPHYSEMA, INITIAL ENCOUNTER: ICD-10-CM

## 2022-09-12 DIAGNOSIS — Z51.5 ENCOUNTER FOR PALLIATIVE CARE: ICD-10-CM

## 2022-09-12 DIAGNOSIS — R78.81 BACTEREMIA: ICD-10-CM

## 2022-09-12 DIAGNOSIS — G89.3 NEOPLASM RELATED PAIN (ACUTE) (CHRONIC): ICD-10-CM

## 2022-09-12 LAB
-  AMIKACIN: SIGNIFICANT CHANGE UP
-  AMOXICILLIN/CLAVULANIC ACID: SIGNIFICANT CHANGE UP
-  AMPICILLIN/SULBACTAM: SIGNIFICANT CHANGE UP
-  AMPICILLIN/SULBACTAM: SIGNIFICANT CHANGE UP
-  AMPICILLIN: SIGNIFICANT CHANGE UP
-  AZTREONAM: SIGNIFICANT CHANGE UP
-  CEFAZOLIN: SIGNIFICANT CHANGE UP
-  CEFAZOLIN: SIGNIFICANT CHANGE UP
-  CEFEPIME: SIGNIFICANT CHANGE UP
-  CEFOXITIN: SIGNIFICANT CHANGE UP
-  CEFTRIAXONE: SIGNIFICANT CHANGE UP
-  CIPROFLOXACIN: SIGNIFICANT CHANGE UP
-  CLINDAMYCIN: SIGNIFICANT CHANGE UP
-  ERTAPENEM: SIGNIFICANT CHANGE UP
-  ERYTHROMYCIN: SIGNIFICANT CHANGE UP
-  GENTAMICIN: SIGNIFICANT CHANGE UP
-  GENTAMICIN: SIGNIFICANT CHANGE UP
-  IMIPENEM: SIGNIFICANT CHANGE UP
-  LEVOFLOXACIN: SIGNIFICANT CHANGE UP
-  MEROPENEM: SIGNIFICANT CHANGE UP
-  OXACILLIN: SIGNIFICANT CHANGE UP
-  PENICILLIN: SIGNIFICANT CHANGE UP
-  PIPERACILLIN/TAZOBACTAM: SIGNIFICANT CHANGE UP
-  RIFAMPIN: SIGNIFICANT CHANGE UP
-  TETRACYCLINE: SIGNIFICANT CHANGE UP
-  TOBRAMYCIN: SIGNIFICANT CHANGE UP
-  TRIMETHOPRIM/SULFAMETHOXAZOLE: SIGNIFICANT CHANGE UP
-  TRIMETHOPRIM/SULFAMETHOXAZOLE: SIGNIFICANT CHANGE UP
-  VANCOMYCIN: SIGNIFICANT CHANGE UP
4/8 RATIO: 1.02 RATIO — SIGNIFICANT CHANGE UP (ref 0.9–3.6)
ABS CD8: 406 /UL — SIGNIFICANT CHANGE UP (ref 142–740)
ALBUMIN SERPL ELPH-MCNC: 2.6 G/DL — LOW (ref 3.3–5)
ALP SERPL-CCNC: 255 U/L — HIGH (ref 40–120)
ALT FLD-CCNC: 13 U/L — SIGNIFICANT CHANGE UP (ref 4–41)
ANION GAP SERPL CALC-SCNC: 11 MMOL/L — SIGNIFICANT CHANGE UP (ref 7–14)
APTT BLD: 65.5 SEC — HIGH (ref 27–36.3)
APTT BLD: 65.6 SEC — HIGH (ref 27–36.3)
AST SERPL-CCNC: 41 U/L — HIGH (ref 4–40)
BASOPHILS # BLD AUTO: 0.03 K/UL — SIGNIFICANT CHANGE UP (ref 0–0.2)
BASOPHILS NFR BLD AUTO: 0.2 % — SIGNIFICANT CHANGE UP (ref 0–2)
BILIRUB DIRECT SERPL-MCNC: <0.2 MG/DL — SIGNIFICANT CHANGE UP (ref 0–0.3)
BILIRUB INDIRECT FLD-MCNC: >0.2 MG/DL — SIGNIFICANT CHANGE UP (ref 0–1)
BILIRUB SERPL-MCNC: 0.4 MG/DL — SIGNIFICANT CHANGE UP (ref 0.2–1.2)
BUN SERPL-MCNC: 25 MG/DL — HIGH (ref 7–23)
CA-I BLD-SCNC: 1.82 MMOL/L — HIGH (ref 1.15–1.29)
CALCIUM SERPL-MCNC: 13.6 MG/DL — CRITICAL HIGH (ref 8.4–10.5)
CD16+CD56+ CELLS NFR BLD: 3 % — LOW (ref 5–23)
CD16+CD56+ CELLS NFR SPEC: 33 /UL — LOW (ref 71–410)
CD19 BLASTS SPEC-ACNC: 17 % — SIGNIFICANT CHANGE UP (ref 6–24)
CD19 BLASTS SPEC-ACNC: 197 /UL — SIGNIFICANT CHANGE UP (ref 84–469)
CD3 BLASTS SPEC-ACNC: 78 % — SIGNIFICANT CHANGE UP (ref 59–83)
CD3 BLASTS SPEC-ACNC: 868 /UL — SIGNIFICANT CHANGE UP (ref 672–1870)
CD4 %: 38 % — SIGNIFICANT CHANGE UP (ref 30–62)
CD8 %: 37 % — HIGH (ref 12–36)
CHLORIDE SERPL-SCNC: 94 MMOL/L — LOW (ref 98–107)
CO2 SERPL-SCNC: 28 MMOL/L — SIGNIFICANT CHANGE UP (ref 22–31)
CREAT SERPL-MCNC: 1.61 MG/DL — HIGH (ref 0.5–1.3)
CULTURE RESULTS: SIGNIFICANT CHANGE UP
EGFR: 58 ML/MIN/1.73M2 — LOW
EOSINOPHIL # BLD AUTO: 0.12 K/UL — SIGNIFICANT CHANGE UP (ref 0–0.5)
EOSINOPHIL NFR BLD AUTO: 0.8 % — SIGNIFICANT CHANGE UP (ref 0–6)
GLUCOSE SERPL-MCNC: 95 MG/DL — SIGNIFICANT CHANGE UP (ref 70–99)
HCT VFR BLD CALC: 30.6 % — LOW (ref 39–50)
HGB BLD-MCNC: 9.5 G/DL — LOW (ref 13–17)
IANC: 13.43 K/UL — HIGH (ref 1.8–7.4)
IMM GRANULOCYTES NFR BLD AUTO: 0.5 % — SIGNIFICANT CHANGE UP (ref 0–1.5)
LACTATE SERPL-SCNC: 1.3 MMOL/L — SIGNIFICANT CHANGE UP (ref 0.5–2)
LYMPHOCYTES # BLD AUTO: 1.28 K/UL — SIGNIFICANT CHANGE UP (ref 1–3.3)
LYMPHOCYTES # BLD AUTO: 8.2 % — LOW (ref 13–44)
MAGNESIUM SERPL-MCNC: 2 MG/DL — SIGNIFICANT CHANGE UP (ref 1.6–2.6)
MCHC RBC-ENTMCNC: 23.9 PG — LOW (ref 27–34)
MCHC RBC-ENTMCNC: 31 GM/DL — LOW (ref 32–36)
MCV RBC AUTO: 77.1 FL — LOW (ref 80–100)
METHOD TYPE: SIGNIFICANT CHANGE UP
METHOD TYPE: SIGNIFICANT CHANGE UP
MONOCYTES # BLD AUTO: 0.61 K/UL — SIGNIFICANT CHANGE UP (ref 0–0.9)
MONOCYTES NFR BLD AUTO: 3.9 % — SIGNIFICANT CHANGE UP (ref 2–14)
NEUTROPHILS # BLD AUTO: 13.43 K/UL — HIGH (ref 1.8–7.4)
NEUTROPHILS NFR BLD AUTO: 86.4 % — HIGH (ref 43–77)
NRBC # BLD: 0 /100 WBCS — SIGNIFICANT CHANGE UP (ref 0–0)
NRBC # FLD: 0 K/UL — SIGNIFICANT CHANGE UP (ref 0–0)
ORGANISM # SPEC MICROSCOPIC CNT: SIGNIFICANT CHANGE UP
ORGANISM # SPEC MICROSCOPIC CNT: SIGNIFICANT CHANGE UP
PHOSPHATE SERPL-MCNC: 3.3 MG/DL — SIGNIFICANT CHANGE UP (ref 2.5–4.5)
PLATELET # BLD AUTO: 466 K/UL — HIGH (ref 150–400)
POTASSIUM SERPL-MCNC: 3.7 MMOL/L — SIGNIFICANT CHANGE UP (ref 3.5–5.3)
POTASSIUM SERPL-SCNC: 3.7 MMOL/L — SIGNIFICANT CHANGE UP (ref 3.5–5.3)
PROT SERPL-MCNC: 6.9 G/DL — SIGNIFICANT CHANGE UP (ref 6–8.3)
RBC # BLD: 3.97 M/UL — LOW (ref 4.2–5.8)
RBC # FLD: 22.5 % — HIGH (ref 10.3–14.5)
SODIUM SERPL-SCNC: 133 MMOL/L — LOW (ref 135–145)
SPECIMEN SOURCE: SIGNIFICANT CHANGE UP
T-CELL CD4 SUBSET PNL BLD: 414 /UL — LOW (ref 489–1457)
WBC # BLD: 15.54 K/UL — HIGH (ref 3.8–10.5)
WBC # FLD AUTO: 15.54 K/UL — HIGH (ref 3.8–10.5)

## 2022-09-12 PROCEDURE — 93010 ELECTROCARDIOGRAM REPORT: CPT

## 2022-09-12 PROCEDURE — 99358 PROLONG SERVICE W/O CONTACT: CPT

## 2022-09-12 PROCEDURE — 99291 CRITICAL CARE FIRST HOUR: CPT

## 2022-09-12 PROCEDURE — 99223 1ST HOSP IP/OBS HIGH 75: CPT | Mod: 25

## 2022-09-12 PROCEDURE — 99222 1ST HOSP IP/OBS MODERATE 55: CPT

## 2022-09-12 PROCEDURE — 99232 SBSQ HOSP IP/OBS MODERATE 35: CPT

## 2022-09-12 RX ORDER — HYDROMORPHONE HYDROCHLORIDE 2 MG/ML
1 INJECTION INTRAMUSCULAR; INTRAVENOUS; SUBCUTANEOUS ONCE
Refills: 0 | Status: DISCONTINUED | OUTPATIENT
Start: 2022-09-12 | End: 2022-09-12

## 2022-09-12 RX ORDER — LANOLIN ALCOHOL/MO/W.PET/CERES
3 CREAM (GRAM) TOPICAL AT BEDTIME
Refills: 0 | Status: DISCONTINUED | OUTPATIENT
Start: 2022-09-12 | End: 2022-10-10

## 2022-09-12 RX ORDER — METHADONE HYDROCHLORIDE 40 MG/1
2.5 TABLET ORAL
Refills: 0 | Status: DISCONTINUED | OUTPATIENT
Start: 2022-09-12 | End: 2022-09-19

## 2022-09-12 RX ORDER — HYDROMORPHONE HYDROCHLORIDE 2 MG/ML
1 INJECTION INTRAMUSCULAR; INTRAVENOUS; SUBCUTANEOUS
Refills: 0 | Status: DISCONTINUED | OUTPATIENT
Start: 2022-09-12 | End: 2022-09-13

## 2022-09-12 RX ORDER — SODIUM CHLORIDE 9 MG/ML
1000 INJECTION INTRAMUSCULAR; INTRAVENOUS; SUBCUTANEOUS ONCE
Refills: 0 | Status: COMPLETED | OUTPATIENT
Start: 2022-09-12 | End: 2022-09-12

## 2022-09-12 RX ORDER — NALOXEGOL OXALATE 12.5 MG/1
12.5 TABLET, FILM COATED ORAL DAILY
Refills: 0 | Status: DISCONTINUED | OUTPATIENT
Start: 2022-09-12 | End: 2022-09-13

## 2022-09-12 RX ORDER — SODIUM CHLORIDE 9 MG/ML
1000 INJECTION, SOLUTION INTRAVENOUS
Refills: 0 | Status: DISCONTINUED | OUTPATIENT
Start: 2022-09-12 | End: 2022-09-13

## 2022-09-12 RX ADMIN — HYDROMORPHONE HYDROCHLORIDE 1 MILLIGRAM(S): 2 INJECTION INTRAMUSCULAR; INTRAVENOUS; SUBCUTANEOUS at 08:33

## 2022-09-12 RX ADMIN — HYDROMORPHONE HYDROCHLORIDE 1 MILLIGRAM(S): 2 INJECTION INTRAMUSCULAR; INTRAVENOUS; SUBCUTANEOUS at 04:20

## 2022-09-12 RX ADMIN — BICTEGRAVIR SODIUM, EMTRICITABINE, AND TENOFOVIR ALAFENAMIDE FUMARATE 1 TABLET(S): 30; 120; 15 TABLET ORAL at 12:33

## 2022-09-12 RX ADMIN — HEPARIN SODIUM 1800 UNIT(S)/HR: 5000 INJECTION INTRAVENOUS; SUBCUTANEOUS at 07:32

## 2022-09-12 RX ADMIN — HYDROMORPHONE HYDROCHLORIDE 1 MILLIGRAM(S): 2 INJECTION INTRAMUSCULAR; INTRAVENOUS; SUBCUTANEOUS at 20:40

## 2022-09-12 RX ADMIN — SODIUM CHLORIDE 75 MILLILITER(S): 9 INJECTION, SOLUTION INTRAVENOUS at 20:00

## 2022-09-12 RX ADMIN — NALOXEGOL OXALATE 12.5 MILLIGRAM(S): 12.5 TABLET, FILM COATED ORAL at 12:52

## 2022-09-12 RX ADMIN — HYDROMORPHONE HYDROCHLORIDE 1 MILLIGRAM(S): 2 INJECTION INTRAMUSCULAR; INTRAVENOUS; SUBCUTANEOUS at 21:10

## 2022-09-12 RX ADMIN — MORPHINE SULFATE 30 MILLIGRAM(S): 50 CAPSULE, EXTENDED RELEASE ORAL at 06:40

## 2022-09-12 RX ADMIN — SODIUM CHLORIDE 75 MILLILITER(S): 9 INJECTION, SOLUTION INTRAVENOUS at 11:00

## 2022-09-12 RX ADMIN — HYDROMORPHONE HYDROCHLORIDE 1 MILLIGRAM(S): 2 INJECTION INTRAMUSCULAR; INTRAVENOUS; SUBCUTANEOUS at 09:00

## 2022-09-12 RX ADMIN — HYDROMORPHONE HYDROCHLORIDE 1 MILLIGRAM(S): 2 INJECTION INTRAMUSCULAR; INTRAVENOUS; SUBCUTANEOUS at 12:23

## 2022-09-12 RX ADMIN — MORPHINE SULFATE 30 MILLIGRAM(S): 50 CAPSULE, EXTENDED RELEASE ORAL at 06:10

## 2022-09-12 RX ADMIN — HYDROMORPHONE HYDROCHLORIDE 1 MILLIGRAM(S): 2 INJECTION INTRAMUSCULAR; INTRAVENOUS; SUBCUTANEOUS at 09:30

## 2022-09-12 RX ADMIN — SODIUM CHLORIDE 1000 MILLILITER(S): 9 INJECTION INTRAMUSCULAR; INTRAVENOUS; SUBCUTANEOUS at 10:00

## 2022-09-12 RX ADMIN — HYDROMORPHONE HYDROCHLORIDE 1 MILLIGRAM(S): 2 INJECTION INTRAMUSCULAR; INTRAVENOUS; SUBCUTANEOUS at 09:51

## 2022-09-12 RX ADMIN — HEPARIN SODIUM 1800 UNIT(S)/HR: 5000 INJECTION INTRAVENOUS; SUBCUTANEOUS at 02:38

## 2022-09-12 RX ADMIN — HYDROMORPHONE HYDROCHLORIDE 1 MILLIGRAM(S): 2 INJECTION INTRAMUSCULAR; INTRAVENOUS; SUBCUTANEOUS at 12:48

## 2022-09-12 RX ADMIN — HYDROMORPHONE HYDROCHLORIDE 1 MILLIGRAM(S): 2 INJECTION INTRAMUSCULAR; INTRAVENOUS; SUBCUTANEOUS at 16:40

## 2022-09-12 RX ADMIN — Medication 3 MILLIGRAM(S): at 22:08

## 2022-09-12 RX ADMIN — HYDROMORPHONE HYDROCHLORIDE 1 MILLIGRAM(S): 2 INJECTION INTRAMUSCULAR; INTRAVENOUS; SUBCUTANEOUS at 15:15

## 2022-09-12 RX ADMIN — CHLORHEXIDINE GLUCONATE 1 APPLICATION(S): 213 SOLUTION TOPICAL at 06:15

## 2022-09-12 RX ADMIN — HEPARIN SODIUM 1800 UNIT(S)/HR: 5000 INJECTION INTRAVENOUS; SUBCUTANEOUS at 09:13

## 2022-09-12 RX ADMIN — Medication 100 MILLIGRAM(S): at 20:40

## 2022-09-12 RX ADMIN — HYDROMORPHONE HYDROCHLORIDE 1 MILLIGRAM(S): 2 INJECTION INTRAMUSCULAR; INTRAVENOUS; SUBCUTANEOUS at 03:50

## 2022-09-12 RX ADMIN — LIDOCAINE 1 PATCH: 4 CREAM TOPICAL at 12:29

## 2022-09-12 RX ADMIN — HYDROMORPHONE HYDROCHLORIDE 1 MILLIGRAM(S): 2 INJECTION INTRAMUSCULAR; INTRAVENOUS; SUBCUTANEOUS at 00:30

## 2022-09-12 RX ADMIN — HEPARIN SODIUM 1800 UNIT(S)/HR: 5000 INJECTION INTRAVENOUS; SUBCUTANEOUS at 20:45

## 2022-09-12 RX ADMIN — LIDOCAINE 1 PATCH: 4 CREAM TOPICAL at 21:37

## 2022-09-12 RX ADMIN — HYDROMORPHONE HYDROCHLORIDE 1 MILLIGRAM(S): 2 INJECTION INTRAMUSCULAR; INTRAVENOUS; SUBCUTANEOUS at 16:39

## 2022-09-12 RX ADMIN — METHADONE HYDROCHLORIDE 2.5 MILLIGRAM(S): 40 TABLET ORAL at 18:46

## 2022-09-12 RX ADMIN — HYDROMORPHONE HYDROCHLORIDE 1 MILLIGRAM(S): 2 INJECTION INTRAMUSCULAR; INTRAVENOUS; SUBCUTANEOUS at 16:56

## 2022-09-12 RX ADMIN — SENNA PLUS 2 TABLET(S): 8.6 TABLET ORAL at 21:39

## 2022-09-12 RX ADMIN — Medication 100 MILLIGRAM(S): at 03:50

## 2022-09-12 RX ADMIN — Medication 100 MILLIGRAM(S): at 12:25

## 2022-09-12 NOTE — PROGRESS NOTE ADULT - SUBJECTIVE AND OBJECTIVE BOX
INTERVAL HPI/OVERNIGHT EVENTS:  Patient seen at bedside.  Feeling slightly better. Reviewed clinical course.    VITAL SIGNS:  T(F): 97 (09-12-22 @ 08:00)  HR: 85 (09-12-22 @ 15:00)  BP: 130/65 (09-12-22 @ 15:00)  RR: 18 (09-12-22 @ 15:00)  SpO2: 100% (09-12-22 @ 15:00)  Wt(kg): --    PHYSICAL EXAM:    Constitutional: NAD, resting in bed comfortably  Eyes: EOMI, sclera non-icteric  Neck: supple, no LAP  Respiratory: CTA b/l, good air entry b/l, no wheezing, rhonchi or crackels  Cardiovascular: RRR, normal S1S2, no M/R/G  Gastrointestinal: soft, NTND  Extremities: no edema  Neurological: AAOx3, non focal  Skin: Normal temperature    MEDICATIONS  (STANDING):  bictegravir 50 mG/emtricitabine 200 mG/tenofovir alafenamide 25 mG (BIKTARVY) 1 Tablet(s) Oral daily  ceFAZolin   IVPB 2000 milliGRAM(s) IV Intermittent every 8 hours  chlorhexidine 2% Cloths 1 Application(s) Topical <User Schedule>  ferrous    sulfate 325 milliGRAM(s) Oral <User Schedule>  heparin  Infusion. 1700 Unit(s)/Hr (17 mL/Hr) IV Continuous <Continuous>  HYDROmorphone  Injectable 1 milliGRAM(s) IV Push once  lactated ringers. 1000 milliLiter(s) (75 mL/Hr) IV Continuous <Continuous>  lactulose Syrup 10 Gram(s) Oral daily  lidocaine   4% Patch 1 Patch Transdermal daily  naloxegol 12.5 milliGRAM(s) Oral daily  polyethylene glycol 3350 17 Gram(s) Oral two times a day  senna 2 Tablet(s) Oral at bedtime    MEDICATIONS  (PRN):  heparin   Injectable 4500 Unit(s) IV Push every 6 hours PRN For aPTT less than 40  heparin   Injectable 2000 Unit(s) IV Push every 6 hours PRN For aPTT between 40 - 57  HYDROmorphone  Injectable 1 milliGRAM(s) IV Push every 3 hours PRN Severe Pain (7 - 10)  ondansetron Injectable 4 milliGRAM(s) IV Push every 8 hours PRN Nausea and/or Vomiting      Allergies    ibuprofen (Angioedema)    Intolerances        LABS:                        9.5    15.54 )-----------( 466      ( 12 Sep 2022 01:50 )             30.6     09-12    133<L>  |  94<L>  |  25<H>  ----------------------------<  95  3.7   |  28  |  1.61<H>    Ca    13.6<HH>      12 Sep 2022 01:50  Phos  3.3     09-12  Mg     2.00     09-12    TPro  6.9  /  Alb  2.6<L>  /  TBili  0.4  /  DBili  <0.2  /  AST  41<H>  /  ALT  13  /  AlkPhos  255<H>  09-12    PTT - ( 12 Sep 2022 08:35 )  PTT:65.5 sec      RADIOLOGY & ADDITIONAL TESTS:  Studies reviewed.

## 2022-09-12 NOTE — CONSULT NOTE ADULT - PROBLEM SELECTOR RECOMMENDATION 8
Case reviewed with primary team and outpatient palliative care team  Discussed with primary team about symptom management recommendations    Thank you for allowing us to participate in your patient's care. Please page [phone] for any questions/concerns.

## 2022-09-12 NOTE — CHART NOTE - NSCHARTNOTEFT_GEN_A_CORE
Patient seen at bedside, denies cp/sob  L PTC to WS with no air leak but there is pus in PTC tubing, tubbing was stripped  Original plan was for MICU to place PTC with blowholes on 9/11  Patient is not a candidate for a formal chest tube, if indicated by MICU team, recommend IR guided PTC  D/w Dr. Quinteros   D/w Primary team

## 2022-09-12 NOTE — CONSULT NOTE ADULT - PROBLEM SELECTOR RECOMMENDATION 4
- TTE: mobile mass, possibly in association with the tricuspid valve This may represent tumour, thrombus, or vegetation.  - management as per primary team

## 2022-09-12 NOTE — CONSULT NOTE ADULT - ASSESSMENT
31 y o M w/ HIV/AIDS (on HAART), Metastatic Rectal Ca (Not yet on chemo) who was initially admitted to [facility] on 8/31 after presenting with productive cough for 2 months and then acute SOB x 5 days. L pigtail catheter was placed due to concern for L PTX but found to be in a large cavitary lesions. Patient was also found to have MSSA PNA, bacteremia, with possible endocarditis vs clot, hx of DVT, now on hep gtt, was initially held 2/2 to GIB but cbc is now stable. Course is complicated but massive subq emphysema with involvement of the scrotum, and now kelb growing in the sputum. Urology consulted due to significant subcutaneous emphysema with tender to palpation to the scrotum  - Due to extensive subcutaneous emphysema with pain controlled, confirmed on CT scan, no acute urologic intervention  - Recommend some type of scrotal wrap to help with scrotal swelling  - Could try either using a Kurlex to wrap the scortum or an ace bandage if patient is amenable  - Scrotal elevation unlikely to help improve swelling  - Discussed with Dr. Lucio    Urology  [id number]   31 y o M w/ HIV/AIDS (on HAART), Metastatic Rectal Ca (Not yet on chemo) who was initially admitted to [facility] on 8/31 after presenting with productive cough for 2 months and then acute SOB x 5 days. L pigtail catheter was placed due to concern for L PTX but found to be in a large cavitary lesions. Patient was also found to have MSSA PNA, bacteremia, with possible endocarditis vs clot, hx of DVT, now on hep gtt, was initially held 2/2 to GIB but cbc is now stable. Course is complicated but massive subq emphysema with involvement of the scrotum, and now kelb growing in the sputum. Urology consulted due to significant subcutaneous emphysema with tender to palpation to the scrotum  - Due to extensive subcutaneous emphysema with pain controlled, confirmed on CT scan, no acute urologic intervention  - Recommend some type of scrotal wrap to help with scrotal swelling  - Could try either using a Kurlex to wrap the scortum or an ace bandage if patient is amenable  - Scrotal elevation unlikely to help improve swelling  - Keep pedraza  - Discussed with Dr. Lucio    Urology  [id number]

## 2022-09-12 NOTE — CONSULT NOTE ADULT - ASSESSMENT
31 y o M w/ HIV/AIDS (on HAART), Metastatic Rectal Ca (Not yet on chemo) who was initially admitted to [facility] on 8/31 after presenting with productive cough for 2 months and then acute SOB x 5 days. L pigtail catheter was placed due to concern for L PTX. However, patient was found to have large CHRISTINE cavitary lesion instead, into which the pigtail had been placed. 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

## 2022-09-12 NOTE — CONSULT NOTE ADULT - PROBLEM SELECTOR RECOMMENDATION 6
- Patient with metastatic ano-rectal SCC  - CT- Large anorectal mass.  Liver metastases. There are multiple centrally hypoattenuating liver lesions   measuring up to 2.8 cm, compatible with widespread metastatic disease and internal cavitation.  - Oncology recommendations appreciated

## 2022-09-12 NOTE — PROGRESS NOTE ADULT - ASSESSMENT
31m with untreated metastatic ano-rectal SCC, HPV positive, HIV infection, transferred from [facility] for continuation of care.   He presented to [facility] on 8/31/22 after syncopal episode at home.   Pt with cough x 2 mos, with yellow sputum and shortness of breath. Admitted for hypoxic respiratory failure.   8/31 CT C/A/P notable for large thick walled multiloculated CHRISTINE cavitary lesion, 10 cm ulcerated rectal mass inseperable from prostate and multiple necrotic liver mets and concern for L external iliac thrombosis.  S/p chest tube c/b severe subcutaneous emphysema  found to have MSSA bacteremia and multifocal cavitary lesions on CT chest c/f superimposed pna with klebsiella in sputum, and vegetations in RV on TTE.      Recent PET/CT 8/16/22 reviewed  1. Extensive FDG avid thickening at the anorectal junction corresponds to   known squamous cell carcinoma.  2. FDG-avid left perirectal, left pelvic, and left inguinal   lymphadenopathy is compatible with metastatic disease.  3. Difficult to delineate hypermetabolism in left lower sacrum is   suspicious for bone involvement. Extension of hypermetabolism through the   left sciatic notch is suspicious for perineural disease. Further   evaluation may be performed with pelvic MRI, with and without intravenous   contrast.  3. Multiple FDG avid bilobar hepatic hepatic metastases. A hypodense   hepatic lesion with physiologic FDG activity may represent a hemangioma.   Further evaluation may be performed with MRI. Hepatic lesions are   accessible to an ultrasound-guided percutaneous needle biopsy.  4. FDG avid patchy opacities in the right lung with additional findings   in the left lung, may be related to underlying infectious etiology.   Please correlate clinically. Dedicated CT of chest may be obtained for   further evaluation.  6. Increased radiotracer activity within the mediastinum and right   perihilar regions, indeterminate and difficult to delineate on   noncontrast CT. Contrast-enhanced CT may be obtained for further   evaluation.  7. Left lateral chest wall discrete intramuscular focus at the level of   the second/third intercostal space, indeterminate and difficult to   evaluate on CT. Differential diagnosis includes metastasis. Further   evaluation may be performed with ultrasound.      -ID input appreciated, abx for 6 weeks  -Severe hypercalcemia, likely in the setting of malignancy. S/p Pamidronate 9/10. Monitor levels and c/w IVF. Follow PTH, PTHrP  -With regards to treatment of metastatic HPV related anal SCC, treatment will be on hold pending resolution of infection. Will need reassessment after resolution of infection to evaluate performance status and decide on systemic treatment options  -Pal care consult for pain management  -When stable and after the above, recommend radiation oncology consult for consideration of RT to anal mass for palliation.  -Supportive care, pain control, Nutrition, PT, DVT ppx  -Outpatient oncology f/u    Will follow. Please do not hesitate to call back with questions.     Racheal Constantino MD  Medical Oncology Attending  C: [phone]     time spent on direct patient care, interdisciplinary discussions and chart review.

## 2022-09-12 NOTE — CONSULT NOTE ADULT - PROBLEM SELECTOR RECOMMENDATION 9
- Outpatient regimen : Methadone 2.5mg BID; Oxycodone IR 5mg PRN ; unclear if patient compliant with methadone outpatient    - Patient was on MS CONTIN 30mg BID and IV Dilaudid 1mg PRN.   In past 24 hours, patient received 7 doses of IV Dilaudid 1mg = IV Dilaudid 7mg ~ 140MME from prn usage  Total 24 MME = 60mg (from MS CONTIN 30mg) + 140mg (from prn usage) ~200mg  ; will reduce for cross tolerance   - MS CONTIN discontinued by primary team this AM due to TELMA  - QTC - 480 on EKG from 8/25. Please repeat EKG today. If QTC wnl, can start PO Methadone 2.5mg q12   - Liberalize frequency of PRN to IV Dilaudid 1mg q3 PRN (hold for hypotension, oversedation, respiratory depression)

## 2022-09-12 NOTE — PROGRESS NOTE ADULT - SUBJECTIVE AND OBJECTIVE BOX
INTERVAL HPI/OVERNIGHT EVENTS: No significant overnight.      SUBJECTIVE: Patient seen and examined at bedside. C/o scrotal pain.    CONSTITUTIONAL: No weakness, fevers or chills  EYES/ENT: No visual changes;  No vertigo or throat pain   NECK: No pain or stiffness  RESPIRATORY: No cough, wheezing, hemoptysis; No shortness of breath  CARDIOVASCULAR: No chest pain or palpitations  GASTROINTESTINAL: No abdominal or epigastric pain. No nausea, vomiting, or hematemesis; No diarrhea or constipation. No melena or hematochezia.  GENITOURINARY: No dysuria, frequency or hematuria  NEUROLOGICAL: No numbness or weakness  SKIN: No itching, rashes    OBJECTIVE:    VITAL SIGNS:  ICU Vital Signs Last 24 Hrs  T(C): 36.1 (09 Sep 2022 08:00), Max: 36.1 (09 Sep 2022 08:00)  T(F): 97 (09 Sep 2022 08:00), Max: 97 (09 Sep 2022 08:00)  HR: 85 (09 Sep 2022 08:00) (81 - 95)  BP: 129/65 (09 Sep 2022 08:00) (91/70 - 139/61)  BP(mean): 79 (09 Sep 2022 08:00) (72 - 92)  ABP: --  ABP(mean): --  RR: 16 (09 Sep 2022 08:00) (15 - 22)  SpO2: 100% (09 Sep 2022 08:00) (100% - 100%)    O2 Parameters below as of 09 Sep 2022 08:00  Patient On (Oxygen Delivery Method): room air               @ 07:01  -  - @ 07:00  --------------------------------------------------------  IN: 870 mL / OUT: 1640 mL / NET: -770 mL      CAPILLARY BLOOD GLUCOSE          PHYSICAL EXAM:    General: NAD  HEENT: NC/AT; PERRL, clear conjunctiva  Neck: supple  Respiratory: CTA b/l- L cT in place to waterseal   Cardiovascular: +S1/S2; RRR  Abdomen: soft, NT/ND; +BS x4  Extremities: WWP, 2+ peripheral pulses b/l; no LE edema  Skin: normal color and turgor; no rash  Neurological: A and O x 4 following commands     MEDICATIONS:  MEDICATIONS  (STANDING):  bictegravir 50 mG/emtricitabine 200 mG/tenofovir alafenamide 25 mG (BIKTARVY) 1 Tablet(s) Oral daily  chlorhexidine 2% Cloths 1 Application(s) Topical <User Schedule>  ferrous    sulfate 325 milliGRAM(s) Oral <User Schedule>  heparin  Infusion.  Unit(s)/Hr (11 mL/Hr) IV Continuous <Continuous>  lactated ringers. 1000 milliLiter(s) (75 mL/Hr) IV Continuous <Continuous>  lactulose Syrup 10 Gram(s) Oral daily  lidocaine   4% Patch 1 Patch Transdermal daily  morphine ER Tablet 30 milliGRAM(s) Oral every 12 hours  piperacillin/tazobactam IVPB.. 3.375 Gram(s) IV Intermittent every 8 hours  polyethylene glycol 3350 17 Gram(s) Oral daily  senna 2 Tablet(s) Oral at bedtime    MEDICATIONS  (PRN):  heparin   Injectable 4500 Unit(s) IV Push every 6 hours PRN For aPTT less than 40  heparin   Injectable 2000 Unit(s) IV Push every 6 hours PRN For aPTT between 40 - 57  HYDROmorphone  Injectable 0.5 milliGRAM(s) IV Push every 4 hours PRN Severe Pain (7 - 10)  ondansetron Injectable 4 milliGRAM(s) IV Push every 8 hours PRN Nausea and/or Vomiting      ALLERGIES:  Allergies    ibuprofen (Angioedema)    Intolerances        LABS:                        9.4    14.37 )-----------( 615      ( 09 Sep 2022 03:50 )             31.1         134<L>  |  97<L>  |  13  ----------------------------<  104<H>  4.3   |  32<H>  |  0.86    Ca    14.9<HH>      09 Sep 2022 03:50  Phos  3.4       Mg     1.90         TPro  6.3  /  Alb  2.3<L>  /  TBili  0.5  /  DBili  x   /  AST  22  /  ALT  12  /  AlkPhos  250<H>      PT/INR - ( 08 Sep 2022 20:24 )   PT: 14.1 sec;   INR: 1.21 ratio         PTT - ( 09 Sep 2022 03:50 )  PTT:38.2 sec  Urinalysis Basic - ( 08 Sep 2022 20:00 )    Color: Yellow / Appearance: Slightly Turbid / S.016 / pH: x  Gluc: x / Ketone: Negative  / Bili: Negative / Urobili: 3 mg/dL   Blood: x / Protein: Trace / Nitrite: Negative   Leuk Esterase: Negative / RBC: 80 /HPF / WBC 5 /HPF   Sq Epi: x / Non Sq Epi: 1 /HPF / Bacteria: Moderate        RADIOLOGY & ADDITIONAL TESTS: Reviewed. INTERVAL HPI/OVERNIGHT EVENTS: complained of pain overnight, received additional dose of Dilaudid 1mg IV push x1.    SUBJECTIVE: Patient seen and examined at bedside.     CONSTITUTIONAL: No weakness, fevers or chills  EYES/ENT: No visual changes;  No vertigo or throat pain   NECK: No pain or stiffness  RESPIRATORY: No cough, wheezing, hemoptysis; No shortness of breath  CARDIOVASCULAR: No chest pain or palpitations  GASTROINTESTINAL: No abdominal or epigastric pain. No nausea, vomiting, or hematemesis; No diarrhea or constipation. No melena or hematochezia.  GENITOURINARY: No dysuria, frequency or hematuria  NEUROLOGICAL: No numbness or weakness  SKIN: No itching, rashes    OBJECTIVE:    VITAL SIGNS:  ICU Vital Signs Last 24 Hrs  T(C): 36.1 (09 Sep 2022 08:00), Max: 36.1 (09 Sep 2022 08:00)  T(F): 97 (09 Sep 2022 08:00), Max: 97 (09 Sep 2022 08:00)  HR: 85 (09 Sep 2022 08:00) (81 - 95)  BP: 129/65 (09 Sep 2022 08:00) (91/70 - 139/61)  BP(mean): 79 (09 Sep 2022 08:00) (72 - 92)  ABP: --  ABP(mean): --  RR: 16 (09 Sep 2022 08:00) (15 - 22)  SpO2: 100% (09 Sep 2022 08:00) (100% - 100%)    O2 Parameters below as of 09 Sep 2022 08:00  Patient On (Oxygen Delivery Method): room air               @ 07:01  -   @ 07:00  --------------------------------------------------------  IN: 870 mL / OUT: 1640 mL / NET: -770 mL      CAPILLARY BLOOD GLUCOSE          PHYSICAL EXAM:    General: NAD  HEENT: NC/AT; PERRL, clear conjunctiva  Neck: supple  Respiratory: CTA b/l- L cT in place to waterseal   Cardiovascular: +S1/S2; RRR  Abdomen: soft, NT/ND; +BS x4  Extremities: WWP, 2+ peripheral pulses b/l; no LE edema  Skin: normal color and turgor; no rash  Neurological: A and O x 4 following commands     MEDICATIONS:  MEDICATIONS  (STANDING):  bictegravir 50 mG/emtricitabine 200 mG/tenofovir alafenamide 25 mG (BIKTARVY) 1 Tablet(s) Oral daily  chlorhexidine 2% Cloths 1 Application(s) Topical <User Schedule>  ferrous    sulfate 325 milliGRAM(s) Oral <User Schedule>  heparin  Infusion.  Unit(s)/Hr (11 mL/Hr) IV Continuous <Continuous>  lactated ringers. 1000 milliLiter(s) (75 mL/Hr) IV Continuous <Continuous>  lactulose Syrup 10 Gram(s) Oral daily  lidocaine   4% Patch 1 Patch Transdermal daily  morphine ER Tablet 30 milliGRAM(s) Oral every 12 hours  piperacillin/tazobactam IVPB.. 3.375 Gram(s) IV Intermittent every 8 hours  polyethylene glycol 3350 17 Gram(s) Oral daily  senna 2 Tablet(s) Oral at bedtime    MEDICATIONS  (PRN):  heparin   Injectable 4500 Unit(s) IV Push every 6 hours PRN For aPTT less than 40  heparin   Injectable 2000 Unit(s) IV Push every 6 hours PRN For aPTT between 40 - 57  HYDROmorphone  Injectable 0.5 milliGRAM(s) IV Push every 4 hours PRN Severe Pain (7 - 10)  ondansetron Injectable 4 milliGRAM(s) IV Push every 8 hours PRN Nausea and/or Vomiting      ALLERGIES:  Allergies    ibuprofen (Angioedema)    Intolerances        LABS:                        9.4    14.37 )-----------( 615      ( 09 Sep 2022 03:50 )             31.1         134<L>  |  97<L>  |  13  ----------------------------<  104<H>  4.3   |  32<H>  |  0.86    Ca    14.9<HH>      09 Sep 2022 03:50  Phos  3.4       Mg     1.90         TPro  6.3  /  Alb  2.3<L>  /  TBili  0.5  /  DBili  x   /  AST  22  /  ALT  12  /  AlkPhos  250<H>      PT/INR - ( 08 Sep 2022 20:24 )   PT: 14.1 sec;   INR: 1.21 ratio         PTT - ( 09 Sep 2022 03:50 )  PTT:38.2 sec  Urinalysis Basic - ( 08 Sep 2022 20:00 )    Color: Yellow / Appearance: Slightly Turbid / S.016 / pH: x  Gluc: x / Ketone: Negative  / Bili: Negative / Urobili: 3 mg/dL   Blood: x / Protein: Trace / Nitrite: Negative   Leuk Esterase: Negative / RBC: 80 /HPF / WBC 5 /HPF   Sq Epi: x / Non Sq Epi: 1 /HPF / Bacteria: Moderate        RADIOLOGY & ADDITIONAL TESTS: Reviewed. INTERVAL HPI/OVERNIGHT EVENTS: complained of testicular pain overnight, received additional dose of Dilaudid 1mg IV push x1.    SUBJECTIVE: Patient seen and examined at bedside. Feels overwhelmed by his current medical issues.     CONSTITUTIONAL: No weakness, fevers or chills  EYES/ENT: No visual changes;  No vertigo or throat pain   NECK: No pain or stiffness  RESPIRATORY: No cough, wheezing, hemoptysis; No shortness of breath  CARDIOVASCULAR: No chest pain or palpitations  GASTROINTESTINAL: No abdominal or epigastric pain. No nausea, vomiting, or hematemesis; No diarrhea or constipation. No melena or hematochezia.  GENITOURINARY: No dysuria, frequency or hematuria  NEUROLOGICAL: No numbness or weakness  SKIN: No itching, rashes    OBJECTIVE:    VITAL SIGNS:  ICU Vital Signs Last 24 Hrs  T(C): 36.1 (09 Sep 2022 08:00), Max: 36.1 (09 Sep 2022 08:00)  T(F): 97 (09 Sep 2022 08:00), Max: 97 (09 Sep 2022 08:00)  HR: 85 (09 Sep 2022 08:00) (81 - 95)  BP: 129/65 (09 Sep 2022 08:00) (91/70 - 139/61)  BP(mean): 79 (09 Sep 2022 08:00) (72 - 92)  ABP: --  ABP(mean): --  RR: 16 (09 Sep 2022 08:00) (15 - 22)  SpO2: 100% (09 Sep 2022 08:00) (100% - 100%)    O2 Parameters below as of 09 Sep 2022 08:00  Patient On (Oxygen Delivery Method): room air         @ 07:01  -  - @ 07:00  --------------------------------------------------------  IN: 870 mL / OUT: 1640 mL / NET: -770 mL      CAPILLARY BLOOD GLUCOSE      PHYSICAL EXAM:    General: NAD  HEENT: NC/AT; PERRL, clear conjunctiva  Neck: supple  Respiratory: CTA b/l- L cT in place to waterseal   Cardiovascular: +S1/S2; RRR  Abdomen: soft, NT/ND; +BS x4  : swollen testicles TTP  Extremities: WWP, 2+ peripheral pulses b/l; no LE edema  Skin: normal color and turgor; no rash  Neurological: A and O x 4 following commands     MEDICATIONS:  MEDICATIONS  (STANDING):  bictegravir 50 mG/emtricitabine 200 mG/tenofovir alafenamide 25 mG (BIKTARVY) 1 Tablet(s) Oral daily  chlorhexidine 2% Cloths 1 Application(s) Topical <User Schedule>  ferrous    sulfate 325 milliGRAM(s) Oral <User Schedule>  heparin  Infusion.  Unit(s)/Hr (11 mL/Hr) IV Continuous <Continuous>  lactated ringers. 1000 milliLiter(s) (75 mL/Hr) IV Continuous <Continuous>  lactulose Syrup 10 Gram(s) Oral daily  lidocaine   4% Patch 1 Patch Transdermal daily  morphine ER Tablet 30 milliGRAM(s) Oral every 12 hours  piperacillin/tazobactam IVPB.. 3.375 Gram(s) IV Intermittent every 8 hours  polyethylene glycol 3350 17 Gram(s) Oral daily  senna 2 Tablet(s) Oral at bedtime    MEDICATIONS  (PRN):  heparin   Injectable 4500 Unit(s) IV Push every 6 hours PRN For aPTT less than 40  heparin   Injectable 2000 Unit(s) IV Push every 6 hours PRN For aPTT between 40 - 57  HYDROmorphone  Injectable 0.5 milliGRAM(s) IV Push every 4 hours PRN Severe Pain (7 - 10)  ondansetron Injectable 4 milliGRAM(s) IV Push every 8 hours PRN Nausea and/or Vomiting      ALLERGIES:  Allergies    ibuprofen (Angioedema)    Intolerances        LABS:                        9.4    14.37 )-----------( 615      ( 09 Sep 2022 03:50 )             31.1         134<L>  |  97<L>  |  13  ----------------------------<  104<H>  4.3   |  32<H>  |  0.86    Ca    14.9<HH>      09 Sep 2022 03:50  Phos  3.4       Mg     1.90         TPro  6.3  /  Alb  2.3<L>  /  TBili  0.5  /  DBili  x   /  AST  22  /  ALT  12  /  AlkPhos  250<H>      PT/INR - ( 08 Sep 2022 20:24 )   PT: 14.1 sec;   INR: 1.21 ratio         PTT - ( 09 Sep 2022 03:50 )  PTT:38.2 sec  Urinalysis Basic - ( 08 Sep 2022 20:00 )    Color: Yellow / Appearance: Slightly Turbid / S.016 / pH: x  Gluc: x / Ketone: Negative  / Bili: Negative / Urobili: 3 mg/dL   Blood: x / Protein: Trace / Nitrite: Negative   Leuk Esterase: Negative / RBC: 80 /HPF / WBC 5 /HPF   Sq Epi: x / Non Sq Epi: 1 /HPF / Bacteria: Moderate        RADIOLOGY & ADDITIONAL TESTS: Reviewed. INTERVAL HPI/OVERNIGHT EVENTS: complained of testicular pain  overnight, received additional dose of Dilaudid 1mg IV push x1.    SUBJECTIVE: Patient seen and examined at bedside. Feels overwhelmed by his current medical issues.     CONSTITUTIONAL: No weakness, fevers or chills  EYES/ENT: No visual changes;  No vertigo or throat pain   NECK: No pain or stiffness  RESPIRATORY: No cough, wheezing, hemoptysis; No shortness of breath  CARDIOVASCULAR: No chest pain or palpitations  GASTROINTESTINAL: No abdominal or epigastric pain. No nausea, vomiting, or hematemesis; No diarrhea or constipation. No melena or hematochezia.  GENITOURINARY: No dysuria, frequency or hematuria  NEUROLOGICAL: No numbness or weakness  SKIN: No itching, rashes    OBJECTIVE:    VITAL SIGNS:  ICU Vital Signs Last 24 Hrs  T(C): 36.1 (09 Sep 2022 08:00), Max: 36.1 (09 Sep 2022 08:00)  T(F): 97 (09 Sep 2022 08:00), Max: 97 (09 Sep 2022 08:00)  HR: 85 (09 Sep 2022 08:00) (81 - 95)  BP: 129/65 (09 Sep 2022 08:00) (91/70 - 139/61)  BP(mean): 79 (09 Sep 2022 08:00) (72 - 92)  ABP: --  ABP(mean): --  RR: 16 (09 Sep 2022 08:00) (15 - 22)  SpO2: 100% (09 Sep 2022 08:00) (100% - 100%)    O2 Parameters below as of 09 Sep 2022 08:00  Patient On (Oxygen Delivery Method): room air         @ 07:01  -  - @ 07:00  --------------------------------------------------------  IN: 870 mL / OUT: 1640 mL / NET: -770 mL      CAPILLARY BLOOD GLUCOSE      PHYSICAL EXAM:    General: NAD  HEENT: NC/AT; PERRL, clear conjunctiva  Neck: supple  Respiratory: CTA b/l- L cT in place to waterseal   Cardiovascular: +S1/S2; RRR  Abdomen: soft, NT/ND; +BS x4  : swollen testicles TTP  Extremities: WWP, 2+ peripheral pulses b/l; no LE edema  Skin: normal color and turgor; no rash  Neurological: A and O x 4 following commands     MEDICATIONS:  MEDICATIONS  (STANDING):  bictegravir 50 mG/emtricitabine 200 mG/tenofovir alafenamide 25 mG (BIKTARVY) 1 Tablet(s) Oral daily  chlorhexidine 2% Cloths 1 Application(s) Topical <User Schedule>  ferrous    sulfate 325 milliGRAM(s) Oral <User Schedule>  heparin  Infusion.  Unit(s)/Hr (11 mL/Hr) IV Continuous <Continuous>  lactated ringers. 1000 milliLiter(s) (75 mL/Hr) IV Continuous <Continuous>  lactulose Syrup 10 Gram(s) Oral daily  lidocaine   4% Patch 1 Patch Transdermal daily  morphine ER Tablet 30 milliGRAM(s) Oral every 12 hours  piperacillin/tazobactam IVPB.. 3.375 Gram(s) IV Intermittent every 8 hours  polyethylene glycol 3350 17 Gram(s) Oral daily  senna 2 Tablet(s) Oral at bedtime    MEDICATIONS  (PRN):  heparin   Injectable 4500 Unit(s) IV Push every 6 hours PRN For aPTT less than 40  heparin   Injectable 2000 Unit(s) IV Push every 6 hours PRN For aPTT between 40 - 57  HYDROmorphone  Injectable 0.5 milliGRAM(s) IV Push every 4 hours PRN Severe Pain (7 - 10)  ondansetron Injectable 4 milliGRAM(s) IV Push every 8 hours PRN Nausea and/or Vomiting      ALLERGIES:  Allergies    ibuprofen (Angioedema)    Intolerances        LABS:                        9.4    14.37 )-----------( 615      ( 09 Sep 2022 03:50 )             31.1         134<L>  |  97<L>  |  13  ----------------------------<  104<H>  4.3   |  32<H>  |  0.86    Ca    14.9<HH>      09 Sep 2022 03:50  Phos  3.4       Mg     1.90         TPro  6.3  /  Alb  2.3<L>  /  TBili  0.5  /  DBili  x   /  AST  22  /  ALT  12  /  AlkPhos  250<H>      PT/INR - ( 08 Sep 2022 20:24 )   PT: 14.1 sec;   INR: 1.21 ratio         PTT - ( 09 Sep 2022 03:50 )  PTT:38.2 sec  Urinalysis Basic - ( 08 Sep 2022 20:00 )    Color: Yellow / Appearance: Slightly Turbid / S.016 / pH: x  Gluc: x / Ketone: Negative  / Bili: Negative / Urobili: 3 mg/dL   Blood: x / Protein: Trace / Nitrite: Negative   Leuk Esterase: Negative / RBC: 80 /HPF / WBC 5 /HPF   Sq Epi: x / Non Sq Epi: 1 /HPF / Bacteria: Moderate        RADIOLOGY & ADDITIONAL TESTS: Reviewed.

## 2022-09-12 NOTE — PROGRESS NOTE ADULT - ASSESSMENT
31 M with PMH/ HIV, rectal cancer not on tx (followed up by Dr Frazier ([facility] oncology)), mets to liver and possibly bone,  R eye cataract, who presented to the [facility] on 8/31/22 after syncopal episode at home. Pt with cough x 2 mos, with yellow sputum and shortness of breath since 5 days PTA.   At [facility]: pt was found to be hypoxemic. 8/31/22 -- L chest wall Pigtail placement. Pigtail was placed for suspected PMX with improvement in shortness of breath. CT chest was performed, which showed that pigtail is within the mass__ differential is TB, fungal infection, cavitary lesion, or squamous cell carcinoma. Pt was found to be anemic due to rectal bleeding with Hgb 5.9 s/p 2 U PRBC on 9/1. Blood cxs with MSSA bacteremia, and has been treated with Cefazolin and Zosyn.  Since 9/2 pt with worsening diffuse SC emphysema extending to the neck/face/all 4 extremities/scrotum/back , which has been getting progressively worse over past 2 days as per pt's statement. 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 TTE with two large RV masses and two additional small masses. 9/7-- s/p L lateral chest tube to 14 Fr. 9/7 -- s/p IVC Filter placement (as per [facility] statement, was placed prophylactically due to undiagnosed vegetations on the TTE. 12 cm multiloculated thick walled cavitary mass in the CHRISTINE and lingula.+ liver lesion on the CT a/p. + external iliac vein thrombosis, PE study was indeterminate     Pt was transferred to [facility] as per family request (mother) for pt to be evaluated by his outpt oncology team (Dr Frazier). As per oncology team the plan is to start Carbotaxol q 3 weeks + RT for diffuse mets as per PET scan     Neuro  -A & O x 3 , no active issues  - not on sedation  - continue Morphine ER 30 mg BID, and Dilaudid increased from 1 mg q hrs for severe pain (scrotal pain, rectal pain)__ increased to q 3 hrs, encourage pt to day bowel regimen   -- monitor to pain control     CV:   - hemodynamically stable, remains off vasopressors   TTE with two large RV masses and two additional small masses. 9/7-- s/p L lateral chest tube to 14 Fr. 9/7 -- s/p IVC Filter placement (as per [facility] statement, was placed prophylactically due to undiagnosed vegetations on the TTE.  - pt is hemodynamically stable no pressors   - repeat TTE 9/9 - Limited and technically difficult study with views limited to the subcostal window.  Very limited views demonstrate what appears to be a mobile mass, possibly in association with the tricuspid valve. This may represent tumour, thrombus, or vegetation. Consider JENNIFER for further evaluation, if clinically indicated.  - cardiac MRI ordered to evaluate RV mass /RV thrombosis      Pulm:  #CHRISTINE cavitary lesion - Differential includes cavitary PNA (sputum and blood cultures positive for MSSA) vs malignancy given history of metastatic rectal CA vs atypical infection. Of note PET/CT in our system from 8/2022 with only few subcm nodules in L and GGO in R lung, making malignancy as etiology of cavitary lesion less likely.   - L pigtail catheter was placed at [facility] due to concern for L PTX. However, patient was found to have large CHRISTINE cavitary lesion instead, into which the pigtail had been placed.   -On 9/7 patient had L pigtail replaced (currently 14F) as well as IVC filter placement. ([facility])   9/10-- s/p MIST (10 mg of Alteplase injected into pleural space)   - f/u sputum cultures-- sent on 9/10  - Pleural fluid Cx neg 9/9  - f/u asperfillus,, histo, blasto, coccidio.-- in lab  - serum fungitell and crypto Ag  - neg   - s/p Zosyn , now on Cefazolin x 6 weeks per ID recs   - CT to water seal, place to suction only for transfer if off the unit, no output from chest tube   - CTA 9/9- Findings most in keeping with cavitary pneumonia involving lingula and left lower lobe complicated by bronchopleural fistula as described with tract around the small caliber pleural pigtail catheter, extensive subcutaneous emphysema and pneumomediastinum. Nonopacification of lingular and left lower lobe pulmonary arteries as described likely secondary to cavitary pneumonia rather than thromboembolic pulmonary embolism  - AFB x 3 are negative at [facility]   -- CT Sx recommended MIST protocol, hold it for now and repeat CT /chest ordered -- to evaluate if pig tail is still in place since there is no drainage from the cavitary lesion/ space     # bronchopleural fistula.  - L CT in place, keep to water seal, repeat CTH completed, results as above  - CT surgery consulted- Recommend MIST protocol x1 dose through current pigtail catheter due to concern for clogged pigtail ,also rec to consult IR for image guided pigtail catheter (pneumonostomy) placement into the pneumatocele, as well as blow hole placement to evacuate subQ epmhysema- will hold off MIST/IR for now as MICU team possibly to attempt replace pigtail placement at bedside today    -- repeat CT /chest ordered   - hold heparin gtt for possible chest tube replacement today.  - No operative thoracic surgical interventions planned at this time    GI:   regular diet, bowel regimen    Renal:   TELMA, likely prerenal,   -- Creat-- 1.27>1.38   LOS net is neg 190/ 24 hrs net is positive 463, voids about /hr, and voided 2.38 L in 24 hrs  - continue trending renal function   -Strict Is and Os:    #Hypercalcemia - ?related to malignancy  - Check PTH-- in lab, PTHrp -- in lab  - Vit D levels low, per heme/onc hold off supplementation until Ca normalized as can worsen hyperCa  - Ca 16.4 -- 9/10, 17 corrected for low serum albumin. per Heme/onc recs, s/p (9/10) calcitonin 4u/kg x 2 doses and pamidronate 90mcg IVPB x1. Calcium level today is 14.2   - s/p IVF LR @ 75__ held for now  - continue to trend levels q 24 hrs     #scrotal swelling likely due SC emphysema, with severe pain  - indwelling pedraza was placed at [facility] (Coude placed for urinary strain)   - UA positive with moderate bacteria 9/9- UCx 9/9 neg   -- pain control with dilaudid and Ofirmev, scrotal US and ct a/p pending to evaluate for inflammation/hydrocele/infection      ID:  # MSSA bacteremia at [facility]. afebrile T max 96.5   -- TTE with possible vegetation on tricuspid valve   - Sputum cultures from 9/1 grew MSSA and Blood cultures from 8/31 grew MSSA with blood cultures from 9/2 NGTD. Legionella negative. Sputum AFB negative x3. Fungitell and galactomannan negative.   - Patient had been empirically on Zosyn, Cefazolin and Caspofungin (which was dc'ed).  - s/p Zosyn (9/8 - 9/9), transitioned to Cefazolin per ID recs on 9/9, plan for 6 week course  - Pleural fluid Cx neg 9/9  - Blood Cx neg 9/8  - UA 9/8 w/  some bacteria, UCx 9/9 neg   - CXR with L cavitary lesion, seen again on CT chest 9/9  - fungitell neg 9/10  - Sputum Cx pending 9/10  - ID consulted, recs appreciated    # HIV  - Follows with Dr. Marcial in clinic  - CD4 392 and viral load < 30 on 9/2/22 as per ID note   - Viral load undetectable 9/8  - c/w home Biktarvy     Hematology:   # rectal CA, possible RV mass, mets to liver and possibly bone,  # metastatic HPV related anal SCC,  -- Hem/Onc--With regards to treatment of metastatic HPV related anal SCC, treatment will be on hold pending resolution of infection. Will need reassessment after resolution of infection to evaluate performance status and decide on systemic treatment options  -Pal care consult for pain management placed on 9/10  -When stable and after the above, recommend radiation oncology consult for consideration of RT to anal mass for palliation.      Hematology   #cardiac ? mass/thrombus/vegetation   -Lovenox held at [facility] and s/p 9/7-- IVC filter    #anemia   thrombocytosis,  AOCD, ferritin 512, iron 11, TIBC-- 150, Iron -- 7 %, transferrin -- 122, ([facility])  -- Haptoglobin -- 308, LDL-- 240, Urica acid-- 3.4   - H/H stable --8.8/29.6. plts- 477     # L external iliac vein DVT  - on heparin gtt  - ptt q 24 hrs, aPTT therapeutic-   -- pt refused LE's venous duplex     Endo:  - not diabetic, HgbA1C 5.1     Code: Full code  Palliative consult placed 9/10    Lines:   PIV, indwelling pedraza, L PTC,     31 M with PMH/ HIV, rectal cancer not on tx (followed up by Dr Frazier ([facility] oncology)), mets to liver and possibly bone,  R eye cataract, who presented to the [facility] on 8/31/22 after syncopal episode at home. Pt with cough x 2 mos, with yellow sputum and shortness of breath since 5 days PTA.   At [facility]: pt was found to be hypoxemic. 8/31/22 -- L chest wall Pigtail placement. Pigtail was placed for suspected PMX with improvement in shortness of breath. CT chest was performed, which showed that pigtail is within the mass__ differential is TB, fungal infection, cavitary lesion, or squamous cell carcinoma. Pt was found to be anemic due to rectal bleeding with Hgb 5.9 s/p 2 U PRBC on 9/1. Blood cxs with MSSA bacteremia, and has been treated with Cefazolin and Zosyn.  Since 9/2 pt with worsening diffuse SC emphysema extending to the neck/face/all 4 extremities/scrotum/back , which has been getting progressively worse over past 2 days as per pt's statement. 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 TTE with two large RV masses and two additional small masses. 9/7-- s/p L lateral chest tube to 14 Fr. 9/7 -- s/p IVC Filter placement (as per [facility] statement, was placed prophylactically due to undiagnosed vegetations on the TTE. 12 cm multiloculated thick walled cavitary mass in the CHRISTINE and lingula.+ liver lesion on the CT a/p. + external iliac vein thrombosis, PE study was indeterminate     Pt was transferred to [facility] as per family request (mother) for pt to be evaluated by his outpt oncology team (Dr Frazier). As per oncology team the plan is to start Carbotaxol q 3 weeks + RT for diffuse mets as per PET scan     Neuro  -A & O x 3 , no active issues  - not on sedation  - continue Morphine ER 30 mg BID, and Dilaudid increased from 1 mg q3hrs for severe pain (scrotal pain, rectal pain)     CV:   - hemodynamically stable, remains off vasopressors   TTE with two large RV masses and two additional small masses. 9/7-- s/p L lateral chest tube to 14 Fr. 9/7 -- s/p IVC Filter placement (as per [facility] statement, was placed prophylactically due to undiagnosed vegetations on the TTE.  - pt is hemodynamically stable no pressors   - repeat TTE 9/9 - Limited and technically difficult study with views limited to the subcostal window.  Very limited views demonstrate what appears to be a mobile mass, possibly in association with the tricuspid valve. This may represent tumour, thrombus, or vegetation. Consider JENNIFER for further evaluation, if clinically indicated.  - cardiac MRI ordered to evaluate RV mass /RV thrombosis      Pulm:  #CHRISTINE cavitary lesion - Differential includes cavitary PNA (sputum and blood cultures positive for MSSA) vs malignancy given history of metastatic rectal CA vs atypical infection. Of note PET/CT in our system from 8/2022 with only few subcm nodules in L and GGO in R lung, making malignancy as etiology of cavitary lesion less likely.   - L pigtail catheter was placed at [facility] due to concern for L PTX. However, patient was found to have large CHRISTINE cavitary lesion instead, into which the pigtail had been placed.   -On 9/7 patient had L pigtail replaced (currently 14F) as well as IVC filter placement. ([facility])   9/10-- s/p MIST (10 mg of Alteplase injected into pleural space)   - f/u sputum cultures-- sent on 9/10  - Pleural fluid Cx neg 9/9  - f/u asperfillus,, histo, blasto, coccidio.-- in lab  - serum fungitell and crypto Ag  - neg   - s/p Zosyn , now on Cefazolin x 6 weeks per ID recs   - CT to water seal, place to suction only for transfer if off the unit, no output from chest tube   - CTA 9/9- Findings most in keeping with cavitary pneumonia involving lingula and left lower lobe complicated by bronchopleural fistula as described with tract around the small caliber pleural pigtail catheter, extensive subcutaneous emphysema and pneumomediastinum. Nonopacification of lingular and left lower lobe pulmonary arteries as described likely secondary to cavitary pneumonia rather than thromboembolic pulmonary embolism  - AFB x 3 are negative at [facility]   - CT Sx recommended MIST protocol, held pending repeat CT /chest to evaluate if pig tail is still in place as there was no drainage from the cavitary lesion/ space, repeat CT chest 9/11 showed pigtail still in cavity.    # bronchopleural fistula.  - L CT in place, keep to water seal, repeat CTH completed, results as above  - CT surgery consulted- Recommend MIST protocol x1 dose through current pigtail catheter due to concern for clogged pigtail ,also rec to consult IR for image guided pigtail catheter (pneumonostomy) placement into the pneumatocele, as well as blow hole placement to evacuate subQ epmhysema- will hold off MIST/IR for now as MICU team possibly to attempt replace pigtail placement at bedside today    - repeat CT /chest 9/11- Persistent left upper lobe cavitary lesion, with pigtail catheter in place, likely communicating with the lingular bronchus. 2. Right middle lobe pneumonia and scattered airspace opacities throughout the remaining lungs, as described. 3. Persistent pneumomediastinum and extensive subcutaneous emphysema of the neck, chest, abdomen, pelvis, and extremities, including the scrotum.  4. Large anorectal mass.  5. Liver metastases.    - hold heparin gtt for possible chest tube replacement today.  - No operative thoracic surgical interventions planned at this time    GI:   regular diet, bowel regimen    Renal:   TELMA, likely prerenal,   -- Creat-- 1.27>1.38   LOS net is neg 190/ 24 hrs net is positive 463, voids about /hr, and voided 2.38 L in 24 hrs  - continue trending renal function   -Strict Is and Os:    #Hypercalcemia - ?related to malignancy  - Check PTH-- in lab, PTHrp -- in lab  - Vit D levels low, per heme/onc hold off supplementation until Ca normalized as can worsen hyperCa  - Ca 16.4 -- 9/10, 17 corrected for low serum albumin. per Heme/onc recs, s/p (9/10) calcitonin 4u/kg x 2 doses and pamidronate 90mcg IVPB x1. Calcium level today is 14.2   - s/p IVF LR @ 75__ held for now  - continue to trend levels q 24 hrs     #scrotal swelling likely due SC emphysema, with severe pain  - indwelling pedraza was placed at [facility] (Coude placed for urinary strain)   - UA positive with moderate bacteria 9/9- UCx 9/9 neg   -- pain control with dilaudid and Ofirmev, scrotal US and ct a/p pending to evaluate for inflammation/hydrocele/infection      ID:  # MSSA bacteremia at [facility]. afebrile T max 96.5   -- TTE with possible vegetation on tricuspid valve   - Sputum cultures from 9/1 grew MSSA and Blood cultures from 8/31 grew MSSA with blood cultures from 9/2 NGTD. Legionella negative. Sputum AFB negative x3. Fungitell and galactomannan negative.   - Patient had been empirically on Zosyn, Cefazolin and Caspofungin (which was dc'ed).  - s/p Zosyn (9/8 - 9/9), transitioned to Cefazolin per ID recs on 9/9, plan for 6 week course  - Pleural fluid Cx neg 9/9  - Blood Cx neg 9/8  - UA 9/8 w/  some bacteria, UCx 9/9 neg   - CXR with L cavitary lesion, seen again on CT chest 9/9  - fungitell neg 9/10  - Sputum Cx pending 9/10  - ID consulted, recs appreciated    # HIV  - Follows with Dr. Marcial in clinic  - CD4 392 and viral load < 30 on 9/2/22 as per ID note   - Viral load undetectable 9/8  - c/w home Biktarvy     Hematology:   # rectal CA, possible RV mass, mets to liver and possibly bone,  # metastatic HPV related anal SCC,  -- Hem/Onc--With regards to treatment of metastatic HPV related anal SCC, treatment will be on hold pending resolution of infection. Will need reassessment after resolution of infection to evaluate performance status and decide on systemic treatment options  -Pal care consult for pain management placed on 9/10  -When stable and after the above, recommend radiation oncology consult for consideration of RT to anal mass for palliation.      Hematology   #cardiac ? mass/thrombus/vegetation   -Lovenox held at [facility] and s/p 9/7-- IVC filter    #anemia   thrombocytosis,  AOCD, ferritin 512, iron 11, TIBC-- 150, Iron -- 7 %, transferrin -- 122, ([facility])  -- Haptoglobin -- 308, LDL-- 240, Urica acid-- 3.4   - H/H stable --8.8/29.6. plts- 477     # L external iliac vein DVT  - on heparin gtt  - ptt q 24 hrs, aPTT therapeutic-   -- pt refused LE's venous duplex     Endo:  - not diabetic, HgbA1C 5.1     Code: Full code  Palliative consult placed 9/10    Lines:   PIV, indwelling pedraza, L PTC,     31 M with PMH/ HIV, rectal cancer not on tx (followed up by Dr Frazier ([facility] oncology)), mets to liver and possibly bone,  R eye cataract, who presented to the [facility] on 8/31/22 after syncopal episode at home. Pt with cough x 2 mos, with yellow sputum and shortness of breath since 5 days PTA.   At [facility]: pt was found to be hypoxemic. 8/31/22 - L chest wall Pigtail placement. Pigtail was placed for suspected PMX with improvement in shortness of breath. CT chest was performed, which showed that pigtail is within the mass w/ DDx of TB, fungal infection, cavitary lesion, or squamous cell carcinoma. Pt was found to be anemic due to rectal bleeding with Hgb 5.9 s/p 2 U PRBC on 9/1. Blood cxs with MSSA bacteremia, and has been treated with Cefazolin and Zosyn.  Since 9/2 pt with worsening diffuse SC emphysema extending to the neck/face/all 4 extremities/scrotum/back , which has been getting progressively worse over past 2 days as per pt's statement. 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 TTE with two large RV masses and two additional small masses. 9/7- s/p L lateral chest tube to 14 Fr. 9/7 - s/p IVC Filter placement (as per [facility] statement, was placed prophylactically due to undiagnosed vegetations on the TTE. 12 cm multiloculated thick walled cavitary mass in the CHRISTINE and lingula.+ liver lesion on the CT a/p. + external iliac vein thrombosis, PE study was indeterminate     Pt was transferred to [facility] as per family request (mother) for pt to be evaluated by his outpt oncology team (Dr Frazier). As per oncology team the plan was to start Carbotaxol q 3 weeks + RT for diffuse mets as per PET scan.    Neuro  -A & O x 3 , no active issues  - not on sedation  - continue Morphine ER 30 mg BID, and Dilaudid increased from 1 mg q3hrs for severe pain (scrotal pain, rectal pain)     CV:   - hemodynamically stable, remains off vasopressors   TTE with two large RV masses and two additional small masses. 9/7-- s/p L lateral chest tube to 14 Fr. 9/7 -- s/p IVC Filter placement (as per [facility] statement, was placed prophylactically due to undiagnosed vegetations on the TTE.  - pt is hemodynamically stable no pressors   - repeat TTE 9/9 - Limited and technically difficult study with views limited to the subcostal window.  Very limited views demonstrate what appears to be a mobile mass, possibly in association with the tricuspid valve. This may represent tumour, thrombus, or vegetation. Consider JENNIFER for further evaluation, if clinically indicated.  - cardiac MRI ordered to evaluate RV mass /RV thrombosis      Pulm:  #CHRISTINE cavitary lesion - Differential includes cavitary PNA (sputum and blood cultures positive for MSSA) vs malignancy given history of metastatic rectal CA vs atypical infection. Of note PET/CT in our system from 8/2022 with only few subcm nodules in L and GGO in R lung, making malignancy as etiology of cavitary lesion less likely.   - L pigtail catheter was placed at [facility] due to concern for L PTX. However, patient was found to have large CHRISTINE cavitary lesion instead, into which the pigtail had been placed.   -On 9/7 patient had L pigtail replaced (currently 14F) as well as IVC filter placement. ([facility])   9/10-- s/p MIST (10 mg of Alteplase injected into pleural space)   - f/u sputum cultures-- sent on 9/10  - Pleural fluid Cx neg 9/9  - f/u asperfillus,, histo, blasto, coccidio.-- in lab  - serum fungitell and crypto Ag  - neg   - s/p Zosyn , now on Cefazolin x 6 weeks per ID recs   - CT to water seal, place to suction only for transfer if off the unit, no output from chest tube   - CTA 9/9- Findings most in keeping with cavitary pneumonia involving lingula and left lower lobe complicated by bronchopleural fistula as described with tract around the small caliber pleural pigtail catheter, extensive subcutaneous emphysema and pneumomediastinum. Nonopacification of lingular and left lower lobe pulmonary arteries as described likely secondary to cavitary pneumonia rather than thromboembolic pulmonary embolism  - AFB x 3 are negative at [facility]   - CT Sx recommended MIST protocol, held pending repeat CT /chest to evaluate if pig tail is still in place as there was no drainage from the cavitary lesion/ space, repeat CT chest 9/11 showed pigtail still in cavity.    # bronchopleural fistula.  - L CT in place, keep to water seal, repeat CTH completed, results as above  - CT surgery consulted- Recommend MIST protocol x1 dose through current pigtail catheter due to concern for clogged pigtail ,also rec to consult IR for image guided pigtail catheter (pneumonostomy) placement into the pneumatocele, as well as blow hole placement to evacuate subQ epmhysema- will hold off MIST/IR for now as MICU team possibly to attempt replace pigtail placement at bedside today    - repeat CT /chest 9/11- Persistent left upper lobe cavitary lesion, with pigtail catheter in place, likely communicating with the lingular bronchus. 2. Right middle lobe pneumonia and scattered airspace opacities throughout the remaining lungs, as described. 3. Persistent pneumomediastinum and extensive subcutaneous emphysema of the neck, chest, abdomen, pelvis, and extremities, including the scrotum. 4. Large anorectal mass. 5. Liver metastases.  - No operative thoracic surgical interventions planned at this time    GI:   regular diet,   - cont bowel regimen w/ lactulose, senna, miralax BID  - no BM reported     Renal:   TELMA, likely prerenal,   - Cr-- 1.27>1.38-> 1.61. Cr rising today, will give fluids?  - UOP: LOS net is neg 2L/ 24 hrs net is neg 1.9L, voids about 100-125/hr, and voided 2.8 L in 24 hrs  - continue trending renal function   - Strict Is and Os    #Hypercalcemia - ?related to malignancy  - Check PTH- in lab, PTHrp - in lab  - Vit D levels low, per heme/onc hold off supplementation until Ca normalized as can worsen hyperCa in setting of hypercalcemia of malignancy  - Ca 16.4 - 9/10, 17 corrected for low serum albumin. per Heme/onc recs, s/p (9/10) calcitonin 4u/kg x 2 doses and pamidronate 90mcg IVPB x1. Calcium level today is 13.6, continues to downtrend   - s/p IVF LR @ 75, now off  - continue to trend levels q 24 hrs     #scrotal swelling likely due SC emphysema, with severe pain  - indwelling pedraza was placed at [facility] (Coude placed for urinary strain)   - UA positive with moderate bacteria 9/9- UCx 9/9 neg   - pain control with dilaudid and Ofirmev, scrotal US and ct a/p 9/11 did not show any inflammation/hydrocele/infection .    ID:  # MSSA bacteremia at [facility]. afebrile T max 96.8   - Sputum cultures from 9/1 grew MSSA and Blood cultures from 8/31 grew MSSA with blood cultures from 9/2 NGTD. Legionella negative. Sputum AFB negative x3. Fungitell and galactomannan negative. Patient had been empirically on Zosyn, Cefazolin and Caspofungin (which was dc'ed).  - TTE 9/9 with possible vegetation on tricuspid valve   - s/p Zosyn (9/8 - 9/9), transitioned to Cefazolin per ID recs on 9/9, plan for 6 week course  - Pleural fluid Cx 9/9 - few staph aureus   - Blood Cx neg 9/8  - UA 9/8 w/  some bacteria, UCx 9/9 neg   - CXR with L cavitary lesion, seen again on CT chest 9/9 and 9/11  - fungitell and crypto neg 9/10  - Sputum Cx  9/10 + mod klebsiella pnuemoniae  - ID consulted, recs appreciated    # HIV  - Follows with Dr. Marcial in clinic  - CD4 392 and viral load < 30 on 9/2/22 as per ID note   - Viral load undetectable 9/8  - c/w home Biktarvy     Hematology:   # rectal CA, possible RV mass, mets to liver and possibly bone,  # metastatic HPV related anal SCC,  -- Hem/Onc--With regards to treatment of metastatic HPV related anal SCC, treatment will be on hold pending resolution of infection. Will need reassessment after resolution of infection to evaluate performance status and decide on systemic treatment options  -Pal care consult for pain management placed on 9/10  -When stable and after the above, recommend radiation oncology consult for consideration of RT to anal mass for palliation.      Hematology   #cardiac ? mass/thrombus/vegetation   -Lovenox held at [place] and s/p 9/7-- IVC filter    #anemia   thrombocytosis,  AOCD, ferritin 512, iron 11, TIBC-- 150, Iron -- 7 %, transferrin -- 122, ([place])  -- Haptoglobin -- 308, LDL-- 240, Urica acid-- 3.4   - H/H stable - 9.5/30.6 . plts- 466    # L external iliac vein DVT  - on heparin gtt  - ptt q 24 hrs, aPTT therapeutic-   - Duplex b/l LEs 9/10 neg for DVT    Endo:  - not diabetic, HgbA1C 5.1     Code: Full code  Palliative consult placed 9/10    Lines:   PIV, indwelling pedraza, L PTC,   31 M with PMH/ HIV, rectal cancer not on tx (followed up by Dr Frazier ([facility] oncology)), mets to liver and possibly bone,  R eye cataract, who presented to the [facility] on 8/31/22 after syncopal episode at home. Pt with cough x 2 mos, with yellow sputum and shortness of breath since 5 days PTA.   At [facility]: pt was found to be hypoxemic. 8/31/22 - L chest wall Pigtail placement. Pigtail was placed for suspected PMX with improvement in shortness of breath. CT chest was performed, which showed that pigtail is within the mass w/ DDx of TB, fungal infection, cavitary lesion, or squamous cell carcinoma. Pt was found to be anemic due to rectal bleeding with Hgb 5.9 s/p 2 U PRBC on 9/1. Blood cxs with MSSA bacteremia, and has been treated with Cefazolin and Zosyn.  Since 9/2 pt with worsening diffuse SC emphysema extending to the neck/face/all 4 extremities/scrotum/back , which has been getting progressively worse over past 2 days as per pt's statement. 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 TTE with two large RV masses and two additional small masses. 9/7- s/p L lateral chest tube to 14 Fr. 9/7 - s/p IVC Filter placement (as per [facility] statement, was placed prophylactically due to undiagnosed vegetations on the TTE. 12 cm multiloculated thick walled cavitary mass in the CHRISTINE and lingula.+ liver lesion on the CT a/p. + external iliac vein thrombosis, PE study was indeterminate     Pt was transferred to [facility] as per family request (mother) for pt to be evaluated by his outpt oncology team (Dr Frazier). As per oncology team the plan was to start Carbotaxol q 3 weeks + RT for diffuse mets as per PET scan.    Neuro  -A & O x 3 , no active issues  - not on sedation  - C/o of severe scrotal/rectal pain, Morphine ER 30 mg BID Dc'd 9/12 in setting of TELMA, cont Dilaudid 1 mg q4hrs for severe pain. has required extra pushes of 1mg and IV tylenol.  - palliative care consulted for assistance w/ pain regimen as well as goals of care.   - pt is appropriately overwhelmed by his complex medical condition. He does not feel he needs to speak w/ BH at present however will consider in future.     CV:   - hemodynamically stable, remains off vasopressors   TTE with two large RV masses and two additional small masses. 9/7-- s/p L lateral chest tube to 14 Fr. 9/7 -- s/p IVC Filter placement (as per [facility] statement, was placed prophylactically due to undiagnosed vegetations on the TTE.  - pt is hemodynamically stable no pressors   - repeat TTE 9/9 - Limited and technically difficult study with views limited to the subcostal window.  Very limited views demonstrate what appears to be a mobile mass, possibly in association with the tricuspid valve. This may represent tumour, thrombus, or vegetation. Consider JENNIFER for further evaluation, if clinically indicated.  - per cardiology risk of JENNIFER outweighs benefit,  will obtain cardiac MRI instead to evaluate RV mass /RV thrombosis      Pulm:  #CHRISTINE cavitary lesion - Differential includes cavitary PNA (sputum and blood cultures positive for MSSA) vs malignancy given history of metastatic rectal CA vs atypical infection. Of note PET/CT in our system from 8/2022 with only few subcm nodules in L and GGO in R lung, making malignancy as etiology of cavitary lesion less likely.   - L pigtail catheter was placed at [facility] due to concern for L PTX. However, patient was found to have large CHRISTINE cavitary lesion instead, into which the pigtail had been placed.   -On 9/7 patient had L pigtail replaced (currently 14F) as well as IVC filter placement. ([facility])   9/10-- s/p MIST (10 mg of Alteplase injected into pleural space)   - f/u sputum cultures-- sent on 9/10  - Pleural fluid Cx neg 9/9  - f/u aspergillus,, histo, blasto, coccidio.-- in lab  - serum fungitell and crypto Ag  - neg   - s/p Zosyn , now on Cefazolin x 6 weeks per ID recs   - CT to water seal, place to suction only for transfer if off the unit, no output from chest tube   - CTA 9/9- Findings most in keeping with cavitary pneumonia involving lingula and left lower lobe complicated by bronchopleural fistula as described with tract around the small caliber pleural pigtail catheter, extensive subcutaneous emphysema and pneumomediastinum. Nonopacification of lingular and left lower lobe pulmonary arteries as described likely secondary to cavitary pneumonia rather than thromboembolic pulmonary embolism  - AFB x 3 are negative at [facility]   - CT Sx recommended MIST protocol, held pending repeat CT /chest to evaluate if pig tail is still in place as there was no drainage from the cavitary lesion/ space, repeat CT chest 9/11 showed pigtail still in cavity.    # bronchopleural fistula.  - L CT in place, keep to water seal, repeat CTH completed, results as above  - CT surgery consulted- Recommend MIST protocol x1 dose through current pigtail catheter due to concern for clogged pigtail ,also rec to consult IR for image guided pigtail catheter (pneumonostomy) placement into the pneumatocele, as well as blow hole placement to evacuate subQ epmhysema- will hold off MIST/IR for now as MICU team possibly to attempt replace pigtail placement at bedside today    - repeat CT /chest 9/11- Persistent left upper lobe cavitary lesion, with pigtail catheter in place, likely communicating with the lingular bronchus. 2. Right middle lobe pneumonia and scattered airspace opacities throughout the remaining lungs, as described. 3. Persistent pneumomediastinum and extensive subcutaneous emphysema of the neck, chest, abdomen, pelvis, and extremities, including the scrotum. 4. Large anorectal mass. 5. Liver metastases.  - No operative thoracic surgical interventions planned at this time    GI:   regular diet,   - cont bowel regimen w/ lactulose, senna, miralax BID  - no BM reported   - 9/12 ordered naloxegol, monitor for BM     Renal:   TELMA, likely prerenal,   - Cr-- 1.27>1.38-> 1.61. Cr rising today, will give 1L NS and standing IVFs w/ LR @ 75 cc/hr  - UOP: LOS net is neg 2L/ 24 hrs net is neg 1.9L, voids about 100-125/hr, and voided 2.8 L in 24 hrs  - continue trending renal function   - Strict Is and Os    #Hypercalcemia - ?related to malignancy  - Check PTH- in lab, PTHrp - in lab  - Vit D levels low, per heme/onc hold off supplementation until Ca normalized as can worsen hyperCa in setting of hypercalcemia of malignancy  - Ca 16.4 - 9/10, 17 corrected for low serum albumin. per Heme/onc recs, s/p (9/10) calcitonin 4u/kg x 2 doses and pamidronate 90mcg IVPB x1. Calcium level today is 13.6, continues to downtrend   - s/p IVF LR @ 75, this was dc'd yesterday. restarting IVFs today, ordered for 1L NS and standing IVFs w/ LR @ 75 cc/hr.  - continue to trend levels q 24 hrs     #scrotal swelling likely due SC emphysema, with severe pain  - indwelling pedraza was placed at [facility] (Coude placed for urinary strain)   - UA positive with moderate bacteria 9/9- UCx 9/9 neg   - pain control with dilaudid and Ofirmev,  ct a/p 9/11 did not show any inflammation/hydrocele/infection .  - US scrotum ordered, pending  - Urology consulted, requested to be called when scrotal US completed.     ID:  # MSSA bacteremia at [facility]. afebrile T max 96.8   - Sputum cultures from 9/1 grew MSSA and Blood cultures from 8/31 grew MSSA with blood cultures from 9/2 NGTD. Legionella negative. Sputum AFB negative x3. Fungitell and galactomannan negative. Patient had been empirically on Zosyn, Cefazolin and Caspofungin (which was dc'ed).  - TTE 9/9 with possible vegetation on tricuspid valve   - s/p Zosyn (9/8 - 9/9), transitioned to Cefazolin per ID recs on 9/9, plan for 6 week course  - Pleural fluid Cx 9/9 - grew few staph aureus+  - Blood Cx neg 9/8  - UA 9/8 w/  some bacteria, UCx 9/9 neg   - CXR with L cavitary lesion, seen again on CT chest 9/9 and 9/11  - fungitell and crypto neg 9/10  - Sputum Cx  9/10 + mod klebsiella pneumoniae  - ID consulted, recs appreciated    # HIV  - Follows with Dr. Marcial in clinic  - CD4 392 and viral load < 30 on 9/2/22 as per ID note   - Viral load undetectable 9/8  - c/w home Biktarvy , monitor Cr if worsening will d/c    Hematology:   # rectal CA, possible RV mass, mets to liver and possibly bone,  # metastatic HPV related anal SCC,  -- Hem/Onc--With regards to treatment of metastatic HPV related anal SCC, treatment will be on hold pending resolution of infection. Will need reassessment after resolution of infection to evaluate performance status and decide on systemic treatment options  -Pal care consult for pain management placed on 9/10  -When stable and after the above, recommend radiation oncology consult for consideration of RT to anal mass for palliation.      Hematology   #cardiac ? mass/thrombus/vegetation   -Lovenox held at [place] and s/p 9/7-- IVC filter    #anemia   thrombocytosis,  AOCD, ferritin 512, iron 11, TIBC-- 150, Iron -- 7 %, transferrin -- 122, ([place])  -- Haptoglobin -- 308, LDL-- 240, Urica acid-- 3.4   - H/H stable - 9.5/30.6 . plts- 466    # L external iliac vein DVT  - on heparin gtt  - ptt q 24 hrs, aPTT therapeutic-   - Duplex b/l LEs 9/10 neg for DVT    Endo:  - not diabetic, HgbA1C 5.1     Code: Full code  Palliative consult placed 9/10    Lines:   PIV, indwelling pedraza, L PTC,   31 M with PMH/ HIV, rectal cancer not on tx (followed up by Dr Frazier ([facility] oncology)), mets to liver and possibly bone,  R eye cataract, who presented to the [facility] on 8/31/22 after syncopal episode at home. Pt with cough x 2 mos, with yellow sputum and shortness of breath since 5 days PTA.   At [facility]: pt was found to be hypoxemic. 8/31/22 - L chest wall Pigtail placement. Pigtail was placed for suspected PMX with improvement in shortness of breath. CT chest was performed, which showed that pigtail is within the mass w/ DDx of TB, fungal infection, cavitary lesion, or squamous cell carcinoma. Pt was found to be anemic due to rectal bleeding with Hgb 5.9 s/p 2 U PRBC on 9/1. Blood cxs with MSSA bacteremia, and has been treated with Cefazolin and Zosyn.  Since 9/2 pt with worsening diffuse SC emphysema extending to the neck/face/all 4 extremities/scrotum/back , which has been getting progressively worse over past 2 days as per pt's statement. 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 TTE with two large RV masses and two additional small masses. 9/7- s/p L lateral chest tube to 14 Fr. 9/7 - s/p IVC Filter placement (as per [facility] statement, was placed prophylactically due to undiagnosed vegetations on the TTE. 12 cm multiloculated thick walled cavitary mass in the CHRISTINE and lingula.+ liver lesion on the CT a/p. + external iliac vein thrombosis, PE study was indeterminate     Pt was transferred to [facility] as per family request (mother) for pt to be evaluated by his outpt oncology team (Dr Frazier). As per oncology team the plan was to start Carbotaxol q 3 weeks + RT for diffuse mets as per PET scan.    Neuro  -A & O x 3 , no active issues  - not on sedation  - C/o of severe scrotal/rectal pain, Morphine ER 30 mg BID Dc'd 9/12 in setting of TELMA, cont Dilaudid 1 mg q3hrs for severe pain. has required extra pushes of 1mg and IV tylenol.  - palliative care consulted for assistance w/ pain regimen- pt was on methadone 2.5mg qd and Oxycontin oupt , will check qtc and restart methadone if WNLs.   - pt is appropriately overwhelmed by his complex medical condition. He does not feel he needs to speak w/ BH at present however will consider in future.     CV:   - hemodynamically stable, remains off vasopressors   TTE with two large RV masses and two additional small masses. 9/7-- s/p L lateral chest tube to 14 Fr. 9/7 -- s/p IVC Filter placement (as per [facility] statement, was placed prophylactically due to undiagnosed vegetations on the TTE.  - pt is hemodynamically stable no pressors   - repeat TTE 9/9 - Limited and technically difficult study with views limited to the subcostal window.  Very limited views demonstrate what appears to be a mobile mass, possibly in association with the tricuspid valve. This may represent tumour, thrombus, or vegetation. Consider JENNIFER for further evaluation, if clinically indicated.  - per cardiology risk of JENNIFER outweighs benefit,  will obtain cardiac MRI instead to evaluate RV mass /RV thrombosis      Pulm:  #CHRISTINE cavitary lesion - Differential includes cavitary PNA (sputum and blood cultures positive for MSSA) vs malignancy given history of metastatic rectal CA vs atypical infection. Of note PET/CT in our system from 8/2022 with only few subcm nodules in L and GGO in R lung, making malignancy as etiology of cavitary lesion less likely.   - L pigtail catheter was placed at [facility] due to concern for L PTX. However, patient was found to have large CHRISTINE cavitary lesion instead, into which the pigtail had been placed.   -On 9/7 patient had L pigtail replaced (currently 14F) as well as IVC filter placement. ([facility])   9/10-- s/p MIST (10 mg of Alteplase injected into pleural space)   - f/u sputum cultures-- sent on 9/10  - Pleural fluid Cx neg 9/9  - f/u aspergillus,, histo, blasto, coccidio.-- in lab  - serum fungitell and crypto Ag  - neg   - s/p Zosyn , now on Cefazolin x 6 weeks per ID recs   - CT to water seal, place to suction only for transfer if off the unit, no output from chest tube   - CTA 9/9- Findings most in keeping with cavitary pneumonia involving lingula and left lower lobe complicated by bronchopleural fistula as described with tract around the small caliber pleural pigtail catheter, extensive subcutaneous emphysema and pneumomediastinum. Nonopacification of lingular and left lower lobe pulmonary arteries as described likely secondary to cavitary pneumonia rather than thromboembolic pulmonary embolism  - AFB x 3 are negative at [facility]   - CT Sx recommended MIST protocol, held pending repeat CT /chest to evaluate if pig tail is still in place as there was no drainage from the cavitary lesion/ space, repeat CT chest 9/11 showed pigtail still in cavity.    # bronchopleural fistula.  - L CT in place, keep to water seal, repeat CTH completed, results as above  - CT surgery consulted- Recommend MIST protocol x1 dose through current pigtail catheter due to concern for clogged pigtail ,also rec to consult IR for image guided pigtail catheter (pneumonostomy) placement into the pneumatocele, as well as blow hole placement to evacuate subQ emphysema  holding off for now. plan to clamp pigtail tomorrow morning and assess if it can be pulled. if needs another chest tube surgical would be ideal, discussed w/ CT surgery PA.   - repeat CT /chest 9/11- Persistent left upper lobe cavitary lesion, with pigtail catheter in place, likely communicating with the lingular bronchus. 2. Right middle lobe pneumonia and scattered airspace opacities throughout the remaining lungs, as described. 3. Persistent pneumomediastinum and extensive subcutaneous emphysema of the neck, chest, abdomen, pelvis, and extremities, including the scrotum. 4. Large anorectal mass. 5. Liver metastases.  - No operative thoracic surgical interventions planned at this time    GI:   regular diet,   - cont bowel regimen w/ lactulose, senna, miralax BID  - no BM reported   - 9/12 ordered naloxegol, monitor for BM     Renal:   TELMA, likely prerenal,   - Cr-- 1.27>1.38-> 1.61. Cr rising today, will give 1L NS and standing IVFs w/ LR @ 75 cc/hr  - UOP: LOS net is neg 2L/ 24 hrs net is neg 1.9L, voids about 100-125/hr, and voided 2.8 L in 24 hrs  - continue trending renal function   - Strict Is and Os    #Hypercalcemia - ?related to malignancy  - Check PTH- in lab, PTHrp - in lab  - Vit D levels low, per heme/onc hold off supplementation until Ca normalized as can worsen hyperCa in setting of hypercalcemia of malignancy  - Ca 16.4 - 9/10, 17 corrected for low serum albumin. per Heme/onc recs, s/p (9/10) calcitonin 4u/kg x 2 doses and pamidronate 90mcg IVPB x1. Calcium level today is 13.6, continues to downtrend   - s/p IVF LR @ 75, this was dc'd yesterday. restarting IVFs today, ordered for 1L NS and standing IVFs w/ LR @ 75 cc/hr.  - continue to trend levels q 24 hrs     #scrotal swelling likely due SC emphysema, with severe pain  - indwelling pedraza was placed at [facility] (Coude placed for urinary strain)   - UA positive with moderate bacteria 9/9- UCx 9/9 neg   - pain control with dilaudid and Ofirmev,  ct a/p 9/11 did not show any inflammation/hydrocele/infection .  - US scrotum ordered, pending  - Urology consulted, requested to be called when scrotal US completed.     ID:  # MSSA bacteremia at [facility]. afebrile T max 96.8   - Sputum cultures from 9/1 grew MSSA and Blood cultures from 8/31 grew MSSA with blood cultures from 9/2 NGTD. Legionella negative. Sputum AFB negative x3. Fungitell and galactomannan negative. Patient had been empirically on Zosyn, Cefazolin and Caspofungin (which was dc'ed).  - TTE 9/9 with possible vegetation on tricuspid valve   - s/p Zosyn (9/8 - 9/9), transitioned to Cefazolin per ID recs on 9/9, plan for 6 week course  - Pleural fluid Cx 9/9 - grew few staph aureus+  - Blood Cx neg 9/8  - UA 9/8 w/  some bacteria, UCx 9/9 neg   - CXR with L cavitary lesion, seen again on CT chest 9/9 and 9/11  - fungitell and crypto neg 9/10  - Sputum Cx  9/10 + mod klebsiella pneumoniae  - ID consulted, recs appreciated    # HIV  - Follows with Dr. Marcial in clinic  - CD4 392 and viral load < 30 on 9/2/22 as per ID note   - Viral load undetectable 9/8  - c/w home Biktarvy , monitor Cr if worsening will d/c    Hematology:   # rectal CA, possible RV mass, mets to liver and possibly bone,  # metastatic HPV related anal SCC,  -- Hem/Onc--With regards to treatment of metastatic HPV related anal SCC, treatment will be on hold pending resolution of infection. Will need reassessment after resolution of infection to evaluate performance status and decide on systemic treatment options  -Pal care consult for pain management placed on 9/10  -When stable and after the above, recommend radiation oncology consult for consideration of RT to anal mass for palliation.      Hematology   #cardiac ? mass/thrombus/vegetation   -Lovenox held at [place] and s/p 9/7-- IVC filter    #anemia   thrombocytosis,  AOCD, ferritin 512, iron 11, TIBC-- 150, Iron -- 7 %, transferrin -- 122, ([place])  -- Haptoglobin -- 308, LDL-- 240, Urica acid-- 3.4   - H/H stable - 9.5/30.6 . plts- 466    # L external iliac vein DVT  - on heparin gtt  - ptt q 24 hrs, aPTT therapeutic-   - Duplex b/l LEs 9/10 neg for DVT    Endo:  - not diabetic, HgbA1C 5.1     Code: Full code  Palliative consult placed 9/10    Lines:   PIV, indwelling pedraza, L PTC,

## 2022-09-12 NOTE — CONSULT NOTE ADULT - SUBJECTIVE AND OBJECTIVE BOX
Reason for Consultation:	[x] Pain		[] Goals of Care		[] Non-pain symptoms    [] End of life discussion		[] Other:    HPI:  31 M with PMH/ HIV, rectal cancer not on tx (followed up by Dr Frazier ([facility] oncology)), mets to liver and possibly bone, R eye cataract, who presented to the [facility] on 8/31/22 after syncopal episode at home. Pt with cough x 2 mos, with yellow sputum and shortness of breath since 5 days PTA.   -- at [facility]: pt was found to be hypoxemic. 8/31/22 -- L chest wall Pigtail placement. Pigtail was placed for suspected PMX with improvement in shortness of breath. CT chest was performed, which showed that pigtail is within the mass__ differential is TB, fungal infection, cavitary lesion, or squamous cell carcinoma. Pt was found to be anemic due to rectal bleeding with Hgb 5.9 s/p 2 U PRBC on 9/1. Blood cxs with MSSA bacteremia, and has been treated with Cefazolin and Zosyn.  Since 9/2 pt with worsening diffuse SC emphysema extending to the neck/face/all 4 extremities/scrotum/back , which has been getting progressively worse over past 2 days as per pt's statement. 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 TTE with two large RV masses and two additional small masses. 9/7-- s/p L lateral chest tube to 14 Fr. 9/7 -- s/p IVC Filter placement (as per [facility] statement, was placed prophylactically due to undiagnosed vegetations on the TTE. 12 cm multiloculated thick walled cavitary mass in the CHRISTINE and lingula.+ liver lesion on the CT a/p. + external iliac vein thrombosis, PE study was indeterminate   __ pt was transferred to [facility] as per family request (mother) for pt to be evaluated by his outpt oncology team (Dr Frazier). As per oncology team the plan is to start Carbotaxol q 3 weeks + RT for diffuse mets as per PET scan,  (08 Sep 2022 14:44)      Interval History:   History limited as patient in pain during encounter. Patient reports his pain is "all over the body", unable to specify if there are any areas in particular with severe pain. Reports pain is sharp and intermittent. Pain is worse with urination. States he has had this pain for weeks now.   Patient unable to recall when he had last taken methadone prescribed for his pain by outpatient palliative care team.  Reports pain relief with current dose of IV Dilaudid 1mg, but needing prn dose sooner than when next dose is ordered.   Reports he has constipation.      PERTINENT PM/SXH:   HIV infection  Current smoker  History of depression  Rectal cancer  Right cataract  No significant past surgical history    FAMILY HISTORY: No family hx of cancer     ITEMS NOT CHECKED ARE NOT PRESENT    SOCIAL HISTORY:   Significant other/partner[ ]  Children[ ]  Episcopalian/Spirituality:   Substance hx:  [x- hx of cocaine use ]   Tobacco hx:  [x -current smoker]   Alcohol hx: [ ]   Home Opioid hx:  [ x] I-Stop Reference No: [id number]  Living Situation: [x ]Home  [ ]Long term care  [ ]Rehab [ ]Other      ADVANCE DIRECTIVES:    MOLST  [ ]  Living Will  [ ]   DECISION MAKER(s):  [ ] Health Care Proxy(s)  [x ] Surrogate(s)  [ ] Guardian           Name(s): Phone Number(s):  Mother Sophie Schulte : [phone]    BASELINE (I)ADL(s) (prior to admission):  [place]: [x ]Total  [ ] Moderate [ ]Dependent    Allergies    ibuprofen (Angioedema)    Intolerances    MEDICATIONS  (STANDING):  bictegravir 50 mG/emtricitabine 200 mG/tenofovir alafenamide 25 mG (BIKTARVY) 1 Tablet(s) Oral daily  ceFAZolin   IVPB 2000 milliGRAM(s) IV Intermittent every 8 hours  chlorhexidine 2% Cloths 1 Application(s) Topical <User Schedule>  ferrous    sulfate 325 milliGRAM(s) Oral <User Schedule>  heparin  Infusion. 1700 Unit(s)/Hr (17 mL/Hr) IV Continuous <Continuous>  HYDROmorphone  Injectable 1 milliGRAM(s) IV Push once  lactated ringers. 1000 milliLiter(s) (75 mL/Hr) IV Continuous <Continuous>  lactulose Syrup 10 Gram(s) Oral daily  lidocaine   4% Patch 1 Patch Transdermal daily  naloxegol 12.5 milliGRAM(s) Oral daily  polyethylene glycol 3350 17 Gram(s) Oral two times a day  senna 2 Tablet(s) Oral at bedtime    MEDICATIONS  (PRN):  heparin   Injectable 4500 Unit(s) IV Push every 6 hours PRN For aPTT less than 40  heparin   Injectable 2000 Unit(s) IV Push every 6 hours PRN For aPTT between 40 - 57  HYDROmorphone  Injectable 1 milliGRAM(s) IV Push every 3 hours PRN Severe Pain (7 - 10)  ondansetron Injectable 4 milliGRAM(s) IV Push every 8 hours PRN Nausea and/or Vomiting      PRESENT SYMPTOMS: [ ]Unable to self-report due to altered mental status  [ ] CPOT [ ] PAINADs [ ] RDOS  Source if other than patient:  [ ]Family   [ ]Team     Pain: [x ]yes [ ]no  QOL impact - severe  Location -  "all over my body"                  Aggravating factors - urination  Quality - sharp  Radiation - "all over my body"  Timing- intermittent  Severity (0-10 scale): 10  Minimal acceptable level (0-10 scale): 3    CPOT:    https://www.[facility].org/getattachment/[email or web address]/Critical-Care-Pain-Observation-Tool-(CPOT)      PAIN AD Score:     http://geriatrictoolkit.[facility]/cog/painad.pdf (press ctrl +  left click to view)    Dyspnea:                           [ ]Mild [ ]Moderate [ ]Severe  RDOS:  0 to 2  minimal or no respiratory distress   3  mild distress  4 to 6 moderate distress  >7 severe distress  https://homecareinformation.net/handouts/hen/Respiratory_Distress_Observation_Scale.pdf (Ctrl +  left click to view)     Anxiety:                             [ ]Mild [ ]Moderate [ ]Severe  Agitation:                          [ ]Mild [ ]Moderate [ ]Severe  Fatigue:                             [ ]Mild [ ]Moderate [ ]Severe  Nausea:                             [ ]Mild [ ]Moderate [ ]Severe  Loss of appetite:              [ ]Mild [ ]Moderate [ ]Severe  Constipation:                   [ x]Mild [ ]Moderate [ ]Severe  Diarrhea:                          [ ]Mild [ ]Moderate [ ]Severe      PCSSQ[Palliative Care Spiritual Screening Question]   Severity (0-10):  Score of 4 or > indicate consideration of Chaplaincy referral.  Chaplaincy Referral: [ ] yes [ ] refused [ ] following    Other Symptoms:  [x ]All other review of systems negative     Palliative Performance Status Version 2:    70     %    http://npcrc.org/files/news/palliative_performance_scale_ppsv2.pdf    PHYSICAL EXAM:  Vital Signs Last 24 Hrs  T(C): 36.1 (12 Sep 2022 08:00), Max: 36.1 (12 Sep 2022 08:00)  T(F): 97 (12 Sep 2022 08:00), Max: 97 (12 Sep 2022 08:00)  HR: 85 (12 Sep 2022 15:00) (82 - 100)  BP: 130/65 (12 Sep 2022 15:00) (91/61 - 133/68)  BP(mean): 80 (12 Sep 2022 15:00) (68 - 92)  RR: 18 (12 Sep 2022 15:00) (15 - 23)  SpO2: 100% (12 Sep 2022 15:00) (96% - 100%)    Parameters below as of 12 Sep 2022 15:00  Patient On (Oxygen Delivery Method): room air         I&O's Summary    11 Sep 2022 07:01  -  12 Sep 2022 07:00  --------------------------------------------------------  IN: 994 mL / OUT: 3030 mL / NET: -2036 mL    12 Sep 2022 07:01  -  12 Sep 2022 15:53  --------------------------------------------------------  IN: 1569 mL / OUT: 890 mL / NET: 679 mL        GENERAL:  [ x]Alert  [ x]Oriented x 3  [ ]Lethargic  [ ]Cachexia  [ ]Unarousable  [ x]Verbal  [ ]Non-Verbal    Behavioral:   [ ] Anxiety  [ ] Delirium [ ] Agitation [ ] Calm  [ x] Other - Appears uncomfortable   HEENT:  [x ]Normal  [ ] Temporal Wasting  [ ]Dry mouth   [ ]ET Tube/Trach  [ ]Oral lesions  [ ] Mucositis  PULMONARY: Left chest tube   [x ]Clear [ ]Tachypnea  [ ]Audible excessive secretions   [ ]Rhonchi        [ ]Right [ ]Left [ ]Bilateral  [ ]Crackles        [ ]Right [ ]Left [ ]Bilateral  [ ]Wheezing     [ ]Right [ ]Left [ ]Bilateral  [ ]Diminished breath sounds [ ]right [ ]left [ ]bilateral  CARDIOVASCULAR:    [x ]Regular [ ]Irregular [ ]Tachy  [ ]Jose [ ]Murmur [ ]Other  GASTROINTESTINAL:  [ x]Soft  [ ]Distended   [ ]+BS  [ x]Non tender [ ]Tender  [ ]PEG [ ]OGT/ NGT  Last BM: 9/8  GENITOURINARY:  [ ]Normal [ ] Incontinent   [ ]Oliguria/Anuria   [x ]Rosario  MUSCULOSKELETAL:   [x ]Normal   [ ]Weakness  [ ]Bed/Wheelchair bound [ ]Edema  [  ] amputation  [  ] contraction  NEUROLOGIC:   [x ]No focal deficits  [ ]Cognitive impairment  [ ]Dysphagia [ ]Dysarthria [ ]Paresis [ ]Other   SKIN: See Nursing Skin Assessment for further details  [ x]Normal    [ ]Rash  [ ]Pressure ulcer(s)       Present on admission [ ]y [ ]n   [  ]  Wound    [  ] hyperpigmentation    CRITICAL CARE:  [ ] Shock Present  [ ]Septic [ ]Cardiogenic [ ]Neurologic [ ]Hypovolemic  [ ]  Vasopressors [ ]  Inotropes   [ ]Respiratory failure present [ ]Mechanical ventilation [ ]Non-invasive ventilatory support [ ]High flow    [ ]Acute  [ ]Chronic [ ]Hypoxic  [ ]Hypercarbic [ ]Other  [ ]Other organ failure     LABS:                        9.5    15.54 )-----------( 466      ( 12 Sep 2022 01:50 )             30.6   09-12    133<L>  |  94<L>  |  25<H>  ----------------------------<  95  3.7   |  28  |  1.61<H>    Ca    13.6<HH>      12 Sep 2022 01:50  Phos  3.3     09-12  Mg     2.00     09-12    TPro  6.9  /  Alb  2.6<L>  /  TBili  0.4  /  DBili  <0.2  /  AST  41<H>  /  ALT  13  /  AlkPhos  255<H>  09-12  PTT - ( 12 Sep 2022 08:35 )  PTT:65.5 sec    CAPILLARY BLOOD GLUCOSE    RADIOLOGY & ADDITIONAL STUDIES:  CT Chest/Abd/ Pelvis 9/11/2022  1. Persistent left upper lobe cavitary lesion, with pigtail catheter in   place, likely communicating with the lingular bronchus.  2. Right middle lobe pneumonia and scattered airspace opacities   throughout the remaining lungs, as described.  3. Persistent pneumomediastinum and extensive subcutaneous emphysema of   the neck, chest, abdomen, pelvis, and extremities, including the scrotum.  4. Large anorectal mass.  5. Liver metastases.    CTA Chest 9/9/2022  Findings most in keeping with cavitary pneumonia involving lingula and   left lower lobe complicated by bronchopleural fistula as described with   tract around the small caliber pleural pigtail catheter, extensive   subcutaneous emphysema and pneumomediastinum. Recommend thoracic surgical   consultation.  Nonopacification of lingular and left lower lobe pulmonary arteries as   described likely secondary to cavitary pneumonia rather than   thromboembolic pulmonary embolism    TTE 9/9/2022  Limited and technically difficult study with views limited  to the subcostal window.  Very limited views demonstrate  what appears to be a mobile mass, possibly in association  with the tricuspid valve (best seen in images 9, 10, and  11).  This may represent tumour, thrombus, or vegetation.    PROTEIN CALORIE MALNUTRITION PRESENT: [ ]mild [ ]moderate [ x]severe [ ]underweight [ ]morbid obesity  https://www.andeal.org/vault/2440/web/files/ONC/Table_Clinical%20Characteristics%20to%20Document%20Malnutrition-White%20JV%20et%20al%202012.pdf    Height (cm): 198.1 (09-08-22 @ 14:00), 198.2 (08-08-22 @ 12:00), 198.1 (07-26-22 @ 13:32)  Weight (kg): 57.5 (09-08-22 @ 14:00), 79.0884 (08-08-22 @ 12:00), 81.2 (07-26-22 @ 13:32)  BMI (kg/m2): 14.7 (09-08-22 @ 14:00), 20.1 (08-08-22 @ 12:00), 20.7 (07-26-22 @ 13:32)    [ ]PPSV2 < or = to 30% [ ]significant weight loss  [ ]poor nutritional intake  [ ]anasarca [ ]Artificial Nutrition      REFERRALS:   [ ]Chaplaincy  [ ]Hospice  [ ]Child Life  [ ]Social Work  [x ]Case management [ ]Holistic Therapy     ************************************************************************  PALLIATIVE MEDICINE COORDINATION OF CARE DOCUMENTATION  [x] Inpatient Consult  [ ] Other:  ************************************************************************    HPI reviewed     ------------------------------------------------------------------------    MEDICATION REVIEW:  --- Pls refer to current medicatons in the body of this note  ISTOP REFERENCE: [id number]  Others' Prescriptions  Patient Name: Murphy Coker Date: 1990  Address: [place] [address] [place] [id number]: Male  Rx Written	Rx Dispensed	Drug	Quantity	Days Supply	Prescriber Name	Prescriber Yulisa #	Payment Method	Dispenser  08/18/2022	08/18/2022	oxycodone hcl (ir) 5 mg tablet	42	7	Bri Clay	[id number]	[facility]  08/11/2022	08/11/2022	oxycodone-acetaminophen 7.5-325 mg tablet	45	15	Chad Machado MD	[id number]	[place]	[facility]  08/04/2022	08/07/2022	oxycodone hcl (ir) 5 mg tablet	40	7	Chad Machado MD	[id number]	[facility]  08/03/2022	08/03/2022	oxycodone hcl (ir) 5 mg tablet	21	7	Cyndy Wayne	[id number]	[facility]  07/26/2022	07/27/2022	oxycodone hcl (ir) 5 mg tablet	5	2	Yoli Cordero	[id number]	[facility]  07/27/2022	07/27/2022	oxycodone hcl (ir) 5 mg tablet	25	6	Leona Thurston S	[id number]	[facility]    Patient Name: Murphy PorterBirth Date: 1990  Address: [address] [place] [address] [address] [place], [place] [address]Uoy: Male  Rx Written	Rx Dispensed	Drug	Quantity	Days Supply	Prescriber Name	Prescriber Yulisa #	Payment Method	Dispenser  06/29/2022	07/01/2022	oxycodone-acetaminophen 5-325 mg tab	20	10	Jamaal Wright ([facility])	[id number]	[place]	[facility]    Patient Name: Murphy PorterBirth Date: 1990  Address: [address] [place] [address] [address] [place], [place] [address]Rba: Male  Rx Written	Rx Dispensed	Drug	Quantity	Days Supply	Prescriber Name	Prescriber Yulisa #	Payment Method	Dispenser  08/25/2022	08/25/2022	oxycodone hcl (ir) 5 mg tablet	40	7	Bri Clay	[id number]	[facility]  08/25/2022	08/25/2022	methadone 10 mg/5 ml solution	35ml	14	Bri Clay	[id number]	[facility]  08/08/2022	08/08/2022	methadone 10 mg/5 ml solution	75ml	30	Kiesha Juan)	[id number]	[facility]  07/21/2022	07/22/2022	oxycodone hcl (ir) 5 mg tablet	10	3	Leona Thurston S	[id number]	Insurance	[facility]  07/13/2022	07/13/2022	oxycodone-acetaminophen 5-325 mg tab	20	10	Jamaal Wright T (FN)	[id number]	Insurance	[facility]  --- PRN usage: The patient HAS used 7 doses of IV Dilaudid 1mg.   ------------------------------------------------------------------------  COORDINATION OF CARE:  --- Palliative Care consulted for: symptom management   --- Patient assessed: 9/12  ADVANCE CARE PLANNING  --- HCP/ Surrogate: Mother   --- GOC document found in Alpha: NONE  --- HCP/ Living will/ Other advanced directives in Alpha: NONE  ------------------------------------------------------------------------  CARE PROVIDER DOCUMENTATION:  --- Medicine notes reviewed   --- CT Thoracic notes reviewed  --- Oncology notes reviewed   --- Nutrition recs: severe protein calorie malnutrition  PLAN OF CARE  --- Known admissions in past year:  1  --- Current admit date: 9/8/22  --- LOS: 4 days  --- LACE score: 13  --- Current dispo plan: TO BE DETERMINED  ------------------------------------------------------------------------  --- Chart reviewed: 30 Minutes [including time used to gather, review and transfer data to this note]    Prolonged services rendered, as part of this patient's care provided by Palliative Medicine, include: i.chart review for provider and ancillary service documentation, ii.pertinent diagnostics including laboratory and imaging studies,iii. medication review including PRN use, iv. admission history including previous palliative care encounters and GOC notes, v.advance care planning documents including HCP and MOLST forms in Alpha. Part of Palliative Medicine extended evaluation and management also involves coordination of care with our IDT, the primary and consulting dion, and unit / and Hospice if eligible. Recommendations based on the information gathered and discussed are outline in the AP of our notes.    ************************************************************************  Reason for Consultation:	[x] Pain		[] Goals of Care		[] Non-pain symptoms    [] End of life discussion		[] Other:    HPI:  31 M with PMH/ HIV, rectal cancer not on tx (followed up by Dr Frazier ([facility] oncology)), mets to liver and possibly bone, R eye cataract, who presented to the [facility] on 8/31/22 after syncopal episode at home. Pt with cough x 2 mos, with yellow sputum and shortness of breath since 5 days PTA.   -- at [facility]: pt was found to be hypoxemic. 8/31/22 -- L chest wall Pigtail placement. Pigtail was placed for suspected PMX with improvement in shortness of breath. CT chest was performed, which showed that pigtail is within the mass__ differential is TB, fungal infection, cavitary lesion, or squamous cell carcinoma. Pt was found to be anemic due to rectal bleeding with Hgb 5.9 s/p 2 U PRBC on 9/1. Blood cxs with MSSA bacteremia, and has been treated with Cefazolin and Zosyn.  Since 9/2 pt with worsening diffuse SC emphysema extending to the neck/face/all 4 extremities/scrotum/back , which has been getting progressively worse over past 2 days as per pt's statement. 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 TTE with two large RV masses and two additional small masses. 9/7-- s/p L lateral chest tube to 14 Fr. 9/7 -- s/p IVC Filter placement (as per [facility] statement, was placed prophylactically due to undiagnosed vegetations on the TTE. 12 cm multiloculated thick walled cavitary mass in the CHRISTINE and lingula.+ liver lesion on the CT a/p. + external iliac vein thrombosis, PE study was indeterminate   __ pt was transferred to [facility] as per family request (mother) for pt to be evaluated by his outpt oncology team (Dr Frazier). As per oncology team the plan is to start Carbotaxol q 3 weeks + RT for diffuse mets as per PET scan,  (08 Sep 2022 14:44)      Interval History:   History limited as patient in pain during encounter. Patient reports his pain is "all over the body", unable to specify if there are any areas in particular with severe pain. Reports pain is sharp and intermittent. Pain is worse with urination. States he has had this pain for weeks now.   Patient unable to recall when he had last taken methadone prescribed for his pain by outpatient palliative care team.  Reports pain relief with current dose of IV Dilaudid 1mg, but needing prn dose sooner than when next dose is ordered.   Reports he has constipation.      PERTINENT PM/SXH:   HIV infection  Current smoker  History of depression  Rectal cancer  Right cataract  No significant past surgical history    FAMILY HISTORY: No family hx of cancer     ITEMS NOT CHECKED ARE NOT PRESENT    SOCIAL HISTORY:   Significant other/partner[ ]  Children[ ]  Presybeterian/Spirituality:   Substance hx:  [x- hx of cocaine use ]   Tobacco hx:  [x -current smoker]   Alcohol hx: [ ]   Home Opioid hx:  [ x] I-Stop Reference No: [id number]  Living Situation: [x ]Home  [ ]Long term care  [ ]Rehab [ ]Other      ADVANCE DIRECTIVES:    MOLST  [ ]  Living Will  [ ]   DECISION MAKER(s):  [ ] Health Care Proxy(s)  [x ] Surrogate(s)  [ ] Guardian           Name(s): Phone Number(s):  Mother Sophie Schulte : [phone]    BASELINE (I)ADL(s) (prior to admission):  [place]: [x ]Total  [ ] Moderate [ ]Dependent    Allergies    ibuprofen (Angioedema)    Intolerances    MEDICATIONS  (STANDING):  bictegravir 50 mG/emtricitabine 200 mG/tenofovir alafenamide 25 mG (BIKTARVY) 1 Tablet(s) Oral daily  ceFAZolin   IVPB 2000 milliGRAM(s) IV Intermittent every 8 hours  chlorhexidine 2% Cloths 1 Application(s) Topical <User Schedule>  ferrous    sulfate 325 milliGRAM(s) Oral <User Schedule>  heparin  Infusion. 1700 Unit(s)/Hr (17 mL/Hr) IV Continuous <Continuous>  HYDROmorphone  Injectable 1 milliGRAM(s) IV Push once  lactated ringers. 1000 milliLiter(s) (75 mL/Hr) IV Continuous <Continuous>  lactulose Syrup 10 Gram(s) Oral daily  lidocaine   4% Patch 1 Patch Transdermal daily  naloxegol 12.5 milliGRAM(s) Oral daily  polyethylene glycol 3350 17 Gram(s) Oral two times a day  senna 2 Tablet(s) Oral at bedtime    MEDICATIONS  (PRN):  heparin   Injectable 4500 Unit(s) IV Push every 6 hours PRN For aPTT less than 40  heparin   Injectable 2000 Unit(s) IV Push every 6 hours PRN For aPTT between 40 - 57  HYDROmorphone  Injectable 1 milliGRAM(s) IV Push every 3 hours PRN Severe Pain (7 - 10)  ondansetron Injectable 4 milliGRAM(s) IV Push every 8 hours PRN Nausea and/or Vomiting      PRESENT SYMPTOMS: [ ]Unable to self-report due to altered mental status  [ ] CPOT [ ] PAINADs [ ] RDOS  Source if other than patient:  [ ]Family   [ ]Team     Pain: [x ]yes [ ]no  QOL impact - severe  Location -  "all over my body"                  Aggravating factors - urination  Quality - sharp  Radiation - "all over my body"  Timing- intermittent  Severity (0-10 scale): 10  Minimal acceptable level (0-10 scale): 3    CPOT:    https://www.[facility].org/getattachment/[email or web address]/Critical-Care-Pain-Observation-Tool-(CPOT)      PAIN AD Score:     http://geriatrictoolkit.[facility]/cog/painad.pdf (press ctrl +  left click to view)    Dyspnea:                           [ ]Mild [ ]Moderate [ ]Severe  RDOS:  0 to 2  minimal or no respiratory distress   3  mild distress  4 to 6 moderate distress  >7 severe distress  https://homecareinformation.net/handouts/hen/Respiratory_Distress_Observation_Scale.pdf (Ctrl +  left click to view)     Anxiety:                             [ ]Mild [ ]Moderate [ ]Severe  Agitation:                          [ ]Mild [ ]Moderate [ ]Severe  Fatigue:                             [ ]Mild [ ]Moderate [ ]Severe  Nausea:                             [ ]Mild [ ]Moderate [ ]Severe  Loss of appetite:              [ ]Mild [ ]Moderate [ ]Severe  Constipation:                   [ x]Mild [ ]Moderate [ ]Severe  Diarrhea:                          [ ]Mild [ ]Moderate [ ]Severe      PCSSQ[Palliative Care Spiritual Screening Question]   Severity (0-10):  Score of 4 or > indicate consideration of Chaplaincy referral.  Chaplaincy Referral: [ ] yes [ ] refused [ ] following    Other Symptoms:  [x ]All other review of systems negative     Palliative Performance Status Version 2:    70     %    http://npcrc.org/files/news/palliative_performance_scale_ppsv2.pdf    PHYSICAL EXAM:  Vital Signs Last 24 Hrs  T(C): 36.1 (12 Sep 2022 08:00), Max: 36.1 (12 Sep 2022 08:00)  T(F): 97 (12 Sep 2022 08:00), Max: 97 (12 Sep 2022 08:00)  HR: 85 (12 Sep 2022 15:00) (82 - 100)  BP: 130/65 (12 Sep 2022 15:00) (91/61 - 133/68)  BP(mean): 80 (12 Sep 2022 15:00) (68 - 92)  RR: 18 (12 Sep 2022 15:00) (15 - 23)  SpO2: 100% (12 Sep 2022 15:00) (96% - 100%)    Parameters below as of 12 Sep 2022 15:00  Patient On (Oxygen Delivery Method): room air         I&O's Summary    11 Sep 2022 07:01  -  12 Sep 2022 07:00  --------------------------------------------------------  IN: 994 mL / OUT: 3030 mL / NET: -2036 mL    12 Sep 2022 07:01  -  12 Sep 2022 15:53  --------------------------------------------------------  IN: 1569 mL / OUT: 890 mL / NET: 679 mL        GENERAL:  [ x]Alert  [ x]Oriented x 3  [ ]Lethargic  [ ]Cachexia  [ ]Unarousable  [ x]Verbal  [ ]Non-Verbal    Behavioral:   [ ] Anxiety  [ ] Delirium [ ] Agitation [ ] Calm  [ x] Other - Appears uncomfortable   HEENT:  [x ]Normal  [ ] Temporal Wasting  [ ]Dry mouth   [ ]ET Tube/Trach  [ ]Oral lesions  [ ] Mucositis  PULMONARY: Left chest tube   [x ]Clear [ ]Tachypnea  [ ]Audible excessive secretions   [ ]Rhonchi        [ ]Right [ ]Left [ ]Bilateral  [ ]Crackles        [ ]Right [ ]Left [ ]Bilateral  [ ]Wheezing     [ ]Right [ ]Left [ ]Bilateral  [ ]Diminished breath sounds [ ]right [ ]left [ ]bilateral  CARDIOVASCULAR:    [x ]Regular [ ]Irregular [ ]Tachy  [ ]Jose [ ]Murmur [ ]Other  GASTROINTESTINAL:  [ x]Soft  [ ]Distended   [ ]+BS  [ x]Non tender [ ]Tender  [ ]PEG [ ]OGT/ NGT  Last BM: 9/8  GENITOURINARY:  [ ]Normal [ ] Incontinent   [ ]Oliguria/Anuria   [x ]Rosario  MUSCULOSKELETAL:   [x ]Normal   [ ]Weakness  [ ]Bed/Wheelchair bound [ ]Edema  [  ] amputation  [  ] contraction  NEUROLOGIC:   [x ]No focal deficits  [ ]Cognitive impairment  [ ]Dysphagia [ ]Dysarthria [ ]Paresis [ ]Other   SKIN: See Nursing Skin Assessment for further details  [ x]Normal    [ ]Rash  [ ]Pressure ulcer(s)       Present on admission [ ]y [ ]n   [  ]  Wound    [  ] hyperpigmentation    CRITICAL CARE:  [ ] Shock Present  [ ]Septic [ ]Cardiogenic [ ]Neurologic [ ]Hypovolemic  [ ]  Vasopressors [ ]  Inotropes   [ ]Respiratory failure present [ ]Mechanical ventilation [ ]Non-invasive ventilatory support [ ]High flow    [ ]Acute  [ ]Chronic [ ]Hypoxic  [ ]Hypercarbic [ ]Other  [ ]Other organ failure     LABS:                        9.5    15.54 )-----------( 466      ( 12 Sep 2022 01:50 )             30.6   09-12    133<L>  |  94<L>  |  25<H>  ----------------------------<  95  3.7   |  28  |  1.61<H>    Ca    13.6<HH>      12 Sep 2022 01:50  Phos  3.3     09-12  Mg     2.00     09-12    TPro  6.9  /  Alb  2.6<L>  /  TBili  0.4  /  DBili  <0.2  /  AST  41<H>  /  ALT  13  /  AlkPhos  255<H>  09-12  PTT - ( 12 Sep 2022 08:35 )  PTT:65.5 sec    CAPILLARY BLOOD GLUCOSE    RADIOLOGY & ADDITIONAL STUDIES:  CT Chest/Abd/ Pelvis 9/11/2022  1. Persistent left upper lobe cavitary lesion, with pigtail catheter in   place, likely communicating with the lingular bronchus.  2. Right middle lobe pneumonia and scattered airspace opacities   throughout the remaining lungs, as described.  3. Persistent pneumomediastinum and extensive subcutaneous emphysema of   the neck, chest, abdomen, pelvis, and extremities, including the scrotum.  4. Large anorectal mass.  5. Liver metastases.    CTA Chest 9/9/2022  Findings most in keeping with cavitary pneumonia involving lingula and   left lower lobe complicated by bronchopleural fistula as described with   tract around the small caliber pleural pigtail catheter, extensive   subcutaneous emphysema and pneumomediastinum. Recommend thoracic surgical   consultation.  Nonopacification of lingular and left lower lobe pulmonary arteries as   described likely secondary to cavitary pneumonia rather than   thromboembolic pulmonary embolism    TTE 9/9/2022  Limited and technically difficult study with views limited  to the subcostal window.  Very limited views demonstrate  what appears to be a mobile mass, possibly in association  with the tricuspid valve (best seen in images 9, 10, and  11).  This may represent tumour, thrombus, or vegetation.    PROTEIN CALORIE MALNUTRITION PRESENT: [ ]mild [ ]moderate [ x]severe [ ]underweight [ ]morbid obesity  https://www.andeal.org/vault/2440/web/files/ONC/Table_Clinical%20Characteristics%20to%20Document%20Malnutrition-White%20JV%20et%20al%202012.pdf    Height (cm): 198.1 (09-08-22 @ 14:00), 198.2 (08-08-22 @ 12:00), 198.1 (07-26-22 @ 13:32)  Weight (kg): 57.5 (09-08-22 @ 14:00), 79.0884 (08-08-22 @ 12:00), 81.2 (07-26-22 @ 13:32)  BMI (kg/m2): 14.7 (09-08-22 @ 14:00), 20.1 (08-08-22 @ 12:00), 20.7 (07-26-22 @ 13:32)    [ ]PPSV2 < or = to 30% [ ]significant weight loss  [ ]poor nutritional intake  [ ]anasarca [ ]Artificial Nutrition      REFERRALS:   [ ]Chaplaincy  [ ]Hospice  [ ]Child Life  [ ]Social Work  [x ]Case management [ ]Holistic Therapy     ************************************************************************  PALLIATIVE MEDICINE COORDINATION OF CARE DOCUMENTATION  [x] Inpatient Consult  [ ] Other:  ************************************************************************    HPI reviewed     ------------------------------------------------------------------------    MEDICATION REVIEW:  --- Pls refer to current medicatons in the body of this note  ISTOP REFERENCE: [id number]  Others' Prescriptions  Patient Name: Murphy Coker Date: 1990  Address: [place] [address] [place] [id number]: Male  Rx Written	Rx Dispensed	Drug	Quantity	Days Supply	Prescriber Name	Prescriber Yulisa #	Payment Method	Dispenser  08/18/2022	08/18/2022	oxycodone hcl (ir) 5 mg tablet	42	7	Bri Clay	[id number]	[facility]  08/11/2022	08/11/2022	oxycodone-acetaminophen 7.5-325 mg tablet	45	15	Chad Machado MD	[id number]	[place]	[employer]  08/04/2022	08/07/2022	oxycodone hcl (ir) 5 mg tablet	40	7	Ch[facility] Mach[facility]	[id number]	[facility]  08/03/2022	08/03/2022	oxycodone hcl (ir) 5 mg tablet	21	7	Cyndy Wayne	[id number]	[facility]  07/26/2022	07/27/2022	oxycodone hcl (ir) 5 mg tablet	5	2	Yoli Cordero	[id number]	[facility]  07/27/2022	07/27/2022	oxycodone hcl (ir) 5 mg tablet	25	6	Leona Thurston S	[id number]	[facility]    Patient Name: Murphy PorterBirth Date: 1990  Address: [address] [place] [address] [address] [place], [place] [address]Wuh: Male  Rx Written	Rx Dispensed	Drug	Quantity	Days Supply	Prescriber Name	Prescriber Yulisa #	Payment Method	Dispenser  06/29/2022	07/01/2022	oxycodone-acetaminophen 5-325 mg tab	20	10	Jamaal Wright ([facility])	[id number]	[place]	[facility]    Patient Name: Murphy PorterBirth Date: 1990  Address: [address] [place] [address] [address] [place], [place] [address]Sjf: Male  Rx Written	Rx Dispensed	Drug	Quantity	Days Supply	Prescriber Name	Prescriber Yulisa #	Payment Method	Dispenser  08/25/2022	08/25/2022	oxycodone hcl (ir) 5 mg tablet	40	7	Bri Clay	[id number]	[facility]  08/25/2022	08/25/2022	methadone 10 mg/5 ml solution	35ml	14	Bri Clay	[id number]	[facility]  08/08/2022	08/08/2022	methadone 10 mg/5 ml solution	75ml	30	Kiesha Juan)	[id number]	[facility]  07/21/2022	07/22/2022	oxycodone hcl (ir) 5 mg tablet	10	3	Leona Thurston S	[id number]	Insurance	[facility]  07/13/2022	07/13/2022	oxycodone-acetaminophen 5-325 mg tab	20	10	Jamaal Wright T (FN)	[id number]	Insurance	[facility]  --- PRN usage: The patient HAS used 7 doses of IV Dilaudid 1mg.   ------------------------------------------------------------------------  COORDINATION OF CARE:  --- Palliative Care consulted for: symptom management   --- Patient assessed: 9/12  ADVANCE CARE PLANNING  --- HCP/ Surrogate: Mother   --- GOC document found in Alpha: NONE  --- HCP/ Living will/ Other advanced directives in Alpha: NONE  ------------------------------------------------------------------------  CARE PROVIDER DOCUMENTATION:  --- Medicine notes reviewed   --- CT Thoracic notes reviewed  --- Oncology notes reviewed   --- Infectious Disease notes reviewed   --- Nutrition recs: severe protein calorie malnutrition  PLAN OF CARE  --- Known admissions in past year:  1  --- Current admit date: 9/8/22  --- LOS: 4 days  --- LACE score: 13  --- Current dispo plan: TO BE DETERMINED  ------------------------------------------------------------------------  --- Chart reviewed: 30 Minutes [including time used to gather, review and transfer data to this note]    Prolonged services rendered, as part of this patient's care provided by Palliative Medicine, include: i.chart review for provider and ancillary service documentation, ii.pertinent diagnostics including laboratory and imaging studies,iii. medication review including PRN use, iv. admission history including previous palliative care encounters and GOC notes, v.advance care planning documents including HCP and MOLST forms in Alpha. Part of Palliative Medicine extended evaluation and management also involves coordination of care with our IDT, the primary and consulting dion, and unit / and Hospice if eligible. Recommendations based on the information gathered and discussed are outline in the AP of our notes.    ************************************************************************

## 2022-09-12 NOTE — CONSULT NOTE ADULT - SUBJECTIVE AND OBJECTIVE BOX
HPI  31 M with PMH/ HIV, rectal cancer not on tx (followed up by Dr Frazier ([facility] oncology)), mets to liver and possibly bone, R eye cataract, who presented to the [facility] on 8/31/22 after syncopal episode at home. Pt with cough x 2 mos, with yellow sputum and shortness of breath since 5 days PTA.   -- at [facility]: pt was found to be hypoxemic. 8/31/22 -- L chest wall Pigtail placement. Pigtail was placed for suspected PMX with improvement in shortness of breath. CT chest was performed, which showed that pigtail is within the mass__ differential is TB, fungal infection, cavitary lesion, or squamous cell carcinoma. Pt was found to be anemic due to rectal bleeding with Hgb 5.9 s/p 2 U PRBC on 9/1. Blood cxs with MSSA bacteremia, and has been treated with Cefazolin and Zosyn.  Since 9/2 pt with worsening diffuse SC emphysema extending to the neck/face/all 4 extremities/scrotum/back , which has been getting progressively worse over past 2 days as per pt's statement. 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 TTE with two large RV masses and two additional small masses. 9/7-- s/p L lateral chest tube to 14 Fr. 9/7 -- s/p IVC Filter placement (as per [facility] statement, was placed prophylactically due to undiagnosed vegetations on the TTE. 12 cm multiloculated thick walled cavitary mass in the CHRISTINE and lingula.+ liver lesion on the CT a/p. + external iliac vein thrombosis, PE study was indeterminate   __ pt was transferred to [facility] as per family request (mother) for pt to be evaluated by his outpt oncology team (Dr Frazier). As per oncology team the plan is to start Carbotaxol q 3 weeks + RT for diffuse mets as per PET scan,  (08 Sep 2022 14:44)    Urology consulted due to worsening scrotal pain in the setting of significant subcutaneous emphysema tracking all the way down to the scrotum. They wanted evaluation due to scrotal pain and swelling by urology. The patient states that the pain is controlled when he is lying down but that it is tender to palpation.       PAST MEDICAL & SURGICAL HISTORY:  HIV infection  6/30/2022 virus detected, switched to Biktarvy, male partners      Current smoker      History of depression  and anxiety      Rectal cancer  not on tretment      Right cataract      No significant past surgical history          MEDICATIONS  (STANDING):  bictegravir 50 mG/emtricitabine 200 mG/tenofovir alafenamide 25 mG (BIKTARVY) 1 Tablet(s) Oral daily  ceFAZolin   IVPB 2000 milliGRAM(s) IV Intermittent every 8 hours  chlorhexidine 2% Cloths 1 Application(s) Topical <User Schedule>  ferrous    sulfate 325 milliGRAM(s) Oral <User Schedule>  heparin  Infusion. 1700 Unit(s)/Hr (17 mL/Hr) IV Continuous <Continuous>  lactated ringers. 1000 milliLiter(s) (75 mL/Hr) IV Continuous <Continuous>  lactulose Syrup 10 Gram(s) Oral daily  lidocaine   4% Patch 1 Patch Transdermal daily  naloxegol 12.5 milliGRAM(s) Oral daily  polyethylene glycol 3350 17 Gram(s) Oral two times a day  senna 2 Tablet(s) Oral at bedtime    MEDICATIONS  (PRN):  heparin   Injectable 4500 Unit(s) IV Push every 6 hours PRN For aPTT less than 40  heparin   Injectable 2000 Unit(s) IV Push every 6 hours PRN For aPTT between 40 - 57  HYDROmorphone  Injectable 1 milliGRAM(s) IV Push every 3 hours PRN Severe Pain (7 - 10)  ondansetron Injectable 4 milliGRAM(s) IV Push every 8 hours PRN Nausea and/or Vomiting      FAMILY HISTORY:      Allergies    ibuprofen (Angioedema)    Intolerances        SOCIAL HISTORY:    REVIEW OF SYSTEMS: Otherwise negative as stated in HPI    Physical Exam  Vital signs  T(C): 36.1 (09-12-22 @ 16:00), Max: 36.2 (09-12-22 @ 12:00)  HR: 112 (09-12-22 @ 18:00)  BP: 135/96 (09-12-22 @ 18:00)  SpO2: 100% (09-12-22 @ 18:00)  Wt(kg): --    Output    OUT:    Indwelling Catheter - Urethral (mL): 3030 mL  Total OUT: 3030 mL    Total NET: -3030 mL      OUT:    Indwelling Catheter - Urethral (mL): 1135 mL  Total OUT: 1135 mL    Total NET: -1135 mL          Gen: NAD  Pulm: No respiratory distress	  CV: RRR  GI: S/ND/NT  : Scrotum swollen and tympanic. Left scrotum tender to palpation.   MSK: moves all 4 limbs spontaneously    LABS:      09-12 @ 01:50    WBC 15.54 / Hct 30.6  / SCr 1.61     09-11 @ 17:50    WBC 14.22 / Hct 30.8  / SCr --       09-12    133<L>  |  94<L>  |  25<H>  ----------------------------<  95  3.7   |  28  |  1.61<H>    Ca    13.6<HH>      12 Sep 2022 01:50  Phos  3.3     09-12  Mg     2.00     09-12    TPro  6.9  /  Alb  2.6<L>  /  TBili  0.4  /  DBili  <0.2  /  AST  41<H>  /  ALT  13  /  AlkPhos  255<H>  09-12    PTT - ( 12 Sep 2022 08:35 )  PTT:65.5 sec        RADIOLOGY:  < from: CT Abdomen and Pelvis No Cont (09.11.22 @ 16:54) >    ACC: [id number] EXAM:  CT ABDOMEN AND PELVIS                        ACC: [id number] EXAM:  CT CHEST                          PROCEDURE DATE:  09/11/2022          INTERPRETATION:  CLINICAL INFORMATION: Subcutaneous metastatic squamous   cell carcinoma. History of HIV, anal mass. Emphysema. Chest tube.    COMPARISON: CT pulmonary angiogram September 9, 2022, whole body PET/CT   August 16, 2022.    PROCEDURE:  CT of the Chest, Abdomen and Pelvis was performed.  Sagittal and coronal reformats were performed.    FINDINGS:  CHEST:  LUNGS AND LARGE AIRWAYS: Patent central airways. Again is noted a 10.5 x   7.6 x 11.0 cm cavity in the lateral left upper lobe with trace layering   fluid and a pigtail catheter in place, likely communicating with the   superior lingular bronchus. Again is noted patchy consolidative opacity   in the right middle lobe and scattered peribronchovascular nodular and   confluent opacities throughout the remaining lobes.  PLEURA: There are no pleural effusions. There is a stable moderate   pneumomediastinum anteriorly.  VESSELS: Within normal limits.  HEART: Heart size is normal. No pericardial effusion.  MEDIASTINUM AND MICHAEL: No lymphadenopathy.  CHEST WALL AND LOWER NECK: Again is noted extensive subcutaneous   emphysema of the visualized neck, upper extremities, and chest wall left   greater than right.    ABDOMEN AND PELVIS:  LIVER: There are multiple centrally hypoattenuating liver lesions   measuring up to 2.8 cm, compatible with widespread metastatic disease and   internal cavitation.  BILE DUCTS: Normal caliber.  GALLBLADDER: Layering sludge and/or tiny stones  SPLEEN: Within normal limits.  PANCREAS: Within normal limits.  ADRENALS: Within normal limits.  KIDNEYS/URETERS: No evidence of hydronephrosis orperinephric stranding   bilaterally.    BLADDER: Rosario catheter. In the bladder with moderate nondependent gas   and layering debris.  REPRODUCTIVE ORGANS:    BOWEL: Again is noted concentric wall thickening of the distal rectum and   anal canal withextraluminal gas extending through the anorectal junction   at the 2:00 position extending to the pelvic sidewall. There is lobular   low-attenuation infiltration of the left perirectal space extending to   the pelvic sidewall, suggestive of metastatic disease and/or metastatic   adenopathy.    Stool throughout the right colon. No evidence of bowel obstruction. Scant   intra-abdominal fat.  PERITONEUM: No ascites.  VESSELS: IVC filter. The abdominal aorta is nonaneurysmal.  RETROPERITONEUM/LYMPH NODES: No lymphadenopathy.  ABDOMINAL WALL: There is extensive subcutaneous emphysema of the anterior   abdominal wall tracking into the scrotal sac and bilateral lower   extremities, left greater than right.  BONES: Within normal limits.    IMPRESSION:  1. Persistent left upper lobe cavitary lesion, with pigtail catheter in   place, likely communicating with the lingular bronchus.  2. Right middle lobe pneumonia and scattered airspace opacities   throughout the remaining lungs, as described.  3. Persistent pneumomediastinum and extensive subcutaneous emphysema of   the neck, chest, abdomen, pelvis, and extremities, including the scrotum.  4. Large anorectal mass.  5. Liver metastases.    --- End of Report ---            JB ANDRES MD; Attending Radiologist  This document has been electronically signed. Sep 11 2022  5:26PM    < end of copied text >         HPI  31 M with PMH/ HIV, rectal cancer not on tx (followed up by Dr Frazier ([facility] oncology)), mets to liver and possibly bone, R eye cataract, who presented to the [facility] on 8/31/22 after syncopal episode at home. Pt with cough x 2 mos, with yellow sputum and shortness of breath since 5 days PTA.   -- at [facility]: pt was found to be hypoxemic. 8/31/22 -- L chest wall Pigtail placement. Pigtail was placed for suspected PMX with improvement in shortness of breath. CT chest was performed, which showed that pigtail is within the mass__ differential is TB, fungal infection, cavitary lesion, or squamous cell carcinoma. Pt was found to be anemic due to rectal bleeding with Hgb 5.9 s/p 2 U PRBC on 9/1. Blood cxs with MSSA bacteremia, and has been treated with Cefazolin and Zosyn.  Since 9/2 pt with worsening diffuse SC emphysema extending to the neck/face/all 4 extremities/scrotum/back , which has been getting progressively worse over past 2 days as per pt's statement. 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 TTE with two large RV masses and two additional small masses. 9/7-- s/p L lateral chest tube to 14 Fr. 9/7 -- s/p IVC Filter placement (as per [facility] statement, was placed prophylactically due to undiagnosed vegetations on the TTE. 12 cm multiloculated thick walled cavitary mass in the CHRISTINE and lingula.+ liver lesion on the CT a/p. + external iliac vein thrombosis, PE study was indeterminate   __ pt was transferred to [facility] as per family request (mother) for pt to be evaluated by his outpt oncology team (Dr Frazier). As per oncology team the plan is to start Carbotaxol q 3 weeks + RT for diffuse mets as per PET scan,  (08 Sep 2022 14:44)    Urology consulted due to worsening scrotal pain in the setting of significant subcutaneous emphysema tracking all the way down to the scrotum. They wanted evaluation due to scrotal pain and swelling by urology. The patient states that the pain is controlled when he is lying down but that it is tender to palpation.       PAST MEDICAL & SURGICAL HISTORY:  HIV infection  6/30/2022 virus detected, switched to Biktarvy, male partners      Current smoker      History of depression  and anxiety      Rectal cancer  not on tretment      Right cataract      No significant past surgical history          MEDICATIONS  (STANDING):  bictegravir 50 mG/emtricitabine 200 mG/tenofovir alafenamide 25 mG (BIKTARVY) 1 Tablet(s) Oral daily  ceFAZolin   IVPB 2000 milliGRAM(s) IV Intermittent every 8 hours  chlorhexidine 2% Cloths 1 Application(s) Topical <User Schedule>  ferrous    sulfate 325 milliGRAM(s) Oral <User Schedule>  heparin  Infusion. 1700 Unit(s)/Hr (17 mL/Hr) IV Continuous <Continuous>  lactated ringers. 1000 milliLiter(s) (75 mL/Hr) IV Continuous <Continuous>  lactulose Syrup 10 Gram(s) Oral daily  lidocaine   4% Patch 1 Patch Transdermal daily  naloxegol 12.5 milliGRAM(s) Oral daily  polyethylene glycol 3350 17 Gram(s) Oral two times a day  senna 2 Tablet(s) Oral at bedtime    MEDICATIONS  (PRN):  heparin   Injectable 4500 Unit(s) IV Push every 6 hours PRN For aPTT less than 40  heparin   Injectable 2000 Unit(s) IV Push every 6 hours PRN For aPTT between 40 - 57  HYDROmorphone  Injectable 1 milliGRAM(s) IV Push every 3 hours PRN Severe Pain (7 - 10)  ondansetron Injectable 4 milliGRAM(s) IV Push every 8 hours PRN Nausea and/or Vomiting      FAMILY HISTORY:      Allergies    ibuprofen (Angioedema)    Intolerances        SOCIAL HISTORY:    REVIEW OF SYSTEMS: Otherwise negative as stated in HPI    Physical Exam  Vital signs  T(C): 36.1 (09-12-22 @ 16:00), Max: 36.2 (09-12-22 @ 12:00)  HR: 112 (09-12-22 @ 18:00)  BP: 135/96 (09-12-22 @ 18:00)  SpO2: 100% (09-12-22 @ 18:00)  Wt(kg): --    Output    OUT:    Indwelling Catheter - Urethral (mL): 3030 mL  Total OUT: 3030 mL    Total NET: -3030 mL      OUT:    Indwelling Catheter - Urethral (mL): 1135 mL  Total OUT: 1135 mL    Total NET: -1135 mL          Gen: NAD  Pulm: No respiratory distress	  CV: RRR  GI: S/ND/NT  : Scrotum swollen and tympanic. Left scrotum tender to palpation. Rosario draining clear yellow urine.  MSK: moves all 4 limbs spontaneously    LABS:      09-12 @ 01:50    WBC 15.54 / Hct 30.6  / SCr 1.61     09-11 @ 17:50    WBC 14.22 / Hct 30.8  / SCr --       09-12    133<L>  |  94<L>  |  25<H>  ----------------------------<  95  3.7   |  28  |  1.61<H>    Ca    13.6<HH>      12 Sep 2022 01:50  Phos  3.3     09-12  Mg     2.00     09-12    TPro  6.9  /  Alb  2.6<L>  /  TBili  0.4  /  DBili  <0.2  /  AST  41<H>  /  ALT  13  /  AlkPhos  255<H>  09-12    PTT - ( 12 Sep 2022 08:35 )  PTT:65.5 sec        RADIOLOGY:  < from: CT Abdomen and Pelvis No Cont (09.11.22 @ 16:54) >    ACC: [id number] EXAM:  CT ABDOMEN AND PELVIS                        ACC: [id number] EXAM:  CT CHEST                          PROCEDURE DATE:  09/11/2022          INTERPRETATION:  CLINICAL INFORMATION: Subcutaneous metastatic squamous   cell carcinoma. History of HIV, anal mass. Emphysema. Chest tube.    COMPARISON: CT pulmonary angiogram September 9, 2022, whole body PET/CT   August 16, 2022.    PROCEDURE:  CT of the Chest, Abdomen and Pelvis was performed.  Sagittal and coronal reformats were performed.    FINDINGS:  CHEST:  LUNGS AND LARGE AIRWAYS: Patent central airways. Again is noted a 10.5 x   7.6 x 11.0 cm cavity in the lateral left upper lobe with trace layering   fluid and a pigtail catheter in place, likely communicating with the   superior lingular bronchus. Again is noted patchy consolidative opacity   in the right middle lobe and scattered peribronchovascular nodular and   confluent opacities throughout the remaining lobes.  PLEURA: There are no pleural effusions. There is a stable moderate   pneumomediastinum anteriorly.  VESSELS: Within normal limits.  HEART: Heart size is normal. No pericardial effusion.  MEDIASTINUM AND MICHAEL: No lymphadenopathy.  CHEST WALL AND LOWER NECK: Again is noted extensive subcutaneous   emphysema of the visualized neck, upper extremities, and chest wall left   greater than right.    ABDOMEN AND PELVIS:  LIVER: There are multiple centrally hypoattenuating liver lesions   measuring up to 2.8 cm, compatible with widespread metastatic disease and   internal cavitation.  BILE DUCTS: Normal caliber.  GALLBLADDER: Layering sludge and/or tiny stones  SPLEEN: Within normal limits.  PANCREAS: Within normal limits.  ADRENALS: Within normal limits.  KIDNEYS/URETERS: No evidence of hydronephrosis orperinephric stranding   bilaterally.    BLADDER: Rosario catheter. In the bladder with moderate nondependent gas   and layering debris.  REPRODUCTIVE ORGANS:    BOWEL: Again is noted concentric wall thickening of the distal rectum and   anal canal withextraluminal gas extending through the anorectal junction   at the 2:00 position extending to the pelvic sidewall. There is lobular   low-attenuation infiltration of the left perirectal space extending to   the pelvic sidewall, suggestive of metastatic disease and/or metastatic   adenopathy.    Stool throughout the right colon. No evidence of bowel obstruction. Scant   intra-abdominal fat.  PERITONEUM: No ascites.  VESSELS: IVC filter. The abdominal aorta is nonaneurysmal.  RETROPERITONEUM/LYMPH NODES: No lymphadenopathy.  ABDOMINAL WALL: There is extensive subcutaneous emphysema of the anterior   abdominal wall tracking into the scrotal sac and bilateral lower   extremities, left greater than right.  BONES: Within normal limits.    IMPRESSION:  1. Persistent left upper lobe cavitary lesion, with pigtail catheter in   place, likely communicating with the lingular bronchus.  2. Right middle lobe pneumonia and scattered airspace opacities   throughout the remaining lungs, as described.  3. Persistent pneumomediastinum and extensive subcutaneous emphysema of   the neck, chest, abdomen, pelvis, and extremities, including the scrotum.  4. Large anorectal mass.  5. Liver metastases.    --- End of Report ---            JB ANDRES MD; Attending Radiologist  This document has been electronically signed. Sep 11 2022  5:26PM    < end of copied text >

## 2022-09-12 NOTE — CONSULT NOTE ADULT - PROBLEM SELECTOR RECOMMENDATION 3
- L pigtail catheter was placed at [facility] due to concern for L PTX. However, patient was found to have large CRHISTINE cavitary lesion instead, into which the pigtail had been placed.   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  - CardioThoracic recommendations appreciated  - management as per primary team

## 2022-09-13 LAB
ALBUMIN SERPL ELPH-MCNC: 2.9 G/DL — LOW (ref 3.3–5)
ALP SERPL-CCNC: 268 U/L — HIGH (ref 40–120)
ALT FLD-CCNC: 10 U/L — SIGNIFICANT CHANGE UP (ref 4–41)
ANION GAP SERPL CALC-SCNC: 7 MMOL/L — SIGNIFICANT CHANGE UP (ref 7–14)
APTT BLD: 60.2 SEC — HIGH (ref 27–36.3)
AST SERPL-CCNC: 38 U/L — SIGNIFICANT CHANGE UP (ref 4–40)
B DERMAT AB FLD QL: NEGATIVE — SIGNIFICANT CHANGE UP
BASOPHILS # BLD AUTO: 0.04 K/UL — SIGNIFICANT CHANGE UP (ref 0–0.2)
BASOPHILS NFR BLD AUTO: 0.3 % — SIGNIFICANT CHANGE UP (ref 0–2)
BILIRUB SERPL-MCNC: 0.4 MG/DL — SIGNIFICANT CHANGE UP (ref 0.2–1.2)
BUN SERPL-MCNC: 24 MG/DL — HIGH (ref 7–23)
CA-I BLD-SCNC: 1.64 MMOL/L — HIGH (ref 1.15–1.29)
CALCIUM SERPL-MCNC: 14.5 MG/DL — CRITICAL HIGH (ref 8.4–10.5)
CHLORIDE SERPL-SCNC: 97 MMOL/L — LOW (ref 98–107)
CO2 SERPL-SCNC: 30 MMOL/L — SIGNIFICANT CHANGE UP (ref 22–31)
CREAT SERPL-MCNC: 1.53 MG/DL — HIGH (ref 0.5–1.3)
CULTURE RESULTS: SIGNIFICANT CHANGE UP
EGFR: 61 ML/MIN/1.73M2 — SIGNIFICANT CHANGE UP
EOSINOPHIL # BLD AUTO: 0.16 K/UL — SIGNIFICANT CHANGE UP (ref 0–0.5)
EOSINOPHIL NFR BLD AUTO: 1.1 % — SIGNIFICANT CHANGE UP (ref 0–6)
GLUCOSE SERPL-MCNC: 100 MG/DL — HIGH (ref 70–99)
HCT VFR BLD CALC: 30.4 % — LOW (ref 39–50)
HGB BLD-MCNC: 9.3 G/DL — LOW (ref 13–17)
IANC: 12.35 K/UL — HIGH (ref 1.8–7.4)
IMM GRANULOCYTES NFR BLD AUTO: 0.3 % — SIGNIFICANT CHANGE UP (ref 0–1.5)
LACTATE SERPL-SCNC: 1.7 MMOL/L — SIGNIFICANT CHANGE UP (ref 0.5–2)
LYMPHOCYTES # BLD AUTO: 1.32 K/UL — SIGNIFICANT CHANGE UP (ref 1–3.3)
LYMPHOCYTES # BLD AUTO: 9.1 % — LOW (ref 13–44)
MAGNESIUM SERPL-MCNC: 1.6 MG/DL — SIGNIFICANT CHANGE UP (ref 1.6–2.6)
MCHC RBC-ENTMCNC: 23.8 PG — LOW (ref 27–34)
MCHC RBC-ENTMCNC: 30.6 GM/DL — LOW (ref 32–36)
MCV RBC AUTO: 77.9 FL — LOW (ref 80–100)
MONOCYTES # BLD AUTO: 0.61 K/UL — SIGNIFICANT CHANGE UP (ref 0–0.9)
MONOCYTES NFR BLD AUTO: 4.2 % — SIGNIFICANT CHANGE UP (ref 2–14)
NEUTROPHILS # BLD AUTO: 12.35 K/UL — HIGH (ref 1.8–7.4)
NEUTROPHILS NFR BLD AUTO: 85 % — HIGH (ref 43–77)
NRBC # BLD: 0 /100 WBCS — SIGNIFICANT CHANGE UP (ref 0–0)
NRBC # FLD: 0 K/UL — SIGNIFICANT CHANGE UP (ref 0–0)
PHOSPHATE SERPL-MCNC: 3.5 MG/DL — SIGNIFICANT CHANGE UP (ref 2.5–4.5)
PLATELET # BLD AUTO: 432 K/UL — HIGH (ref 150–400)
POTASSIUM SERPL-MCNC: 3.9 MMOL/L — SIGNIFICANT CHANGE UP (ref 3.5–5.3)
POTASSIUM SERPL-SCNC: 3.9 MMOL/L — SIGNIFICANT CHANGE UP (ref 3.5–5.3)
PROT SERPL-MCNC: 7.1 G/DL — SIGNIFICANT CHANGE UP (ref 6–8.3)
RBC # BLD: 3.9 M/UL — LOW (ref 4.2–5.8)
RBC # FLD: 22.4 % — HIGH (ref 10.3–14.5)
SODIUM SERPL-SCNC: 134 MMOL/L — LOW (ref 135–145)
SPECIMEN SOURCE: SIGNIFICANT CHANGE UP
WBC # BLD: 14.53 K/UL — HIGH (ref 3.8–10.5)
WBC # FLD AUTO: 14.53 K/UL — HIGH (ref 3.8–10.5)

## 2022-09-13 PROCEDURE — 99233 SBSQ HOSP IP/OBS HIGH 50: CPT

## 2022-09-13 PROCEDURE — 99291 CRITICAL CARE FIRST HOUR: CPT

## 2022-09-13 RX ORDER — NALOXEGOL OXALATE 12.5 MG/1
25 TABLET, FILM COATED ORAL DAILY
Refills: 0 | Status: DISCONTINUED | OUTPATIENT
Start: 2022-09-13 | End: 2022-10-11

## 2022-09-13 RX ORDER — ACETAMINOPHEN 500 MG
1000 TABLET ORAL ONCE
Refills: 0 | Status: COMPLETED | OUTPATIENT
Start: 2022-09-13 | End: 2022-09-13

## 2022-09-13 RX ORDER — HYDROMORPHONE HYDROCHLORIDE 2 MG/ML
1 INJECTION INTRAMUSCULAR; INTRAVENOUS; SUBCUTANEOUS ONCE
Refills: 0 | Status: DISCONTINUED | OUTPATIENT
Start: 2022-09-13 | End: 2022-09-13

## 2022-09-13 RX ORDER — ACETAMINOPHEN 500 MG
650 TABLET ORAL ONCE
Refills: 0 | Status: COMPLETED | OUTPATIENT
Start: 2022-09-13 | End: 2022-09-13

## 2022-09-13 RX ORDER — HYDROMORPHONE HYDROCHLORIDE 2 MG/ML
0.5 INJECTION INTRAMUSCULAR; INTRAVENOUS; SUBCUTANEOUS
Refills: 0 | Status: DISCONTINUED | OUTPATIENT
Start: 2022-09-13 | End: 2022-09-15

## 2022-09-13 RX ORDER — SODIUM CHLORIDE 9 MG/ML
1000 INJECTION, SOLUTION INTRAVENOUS
Refills: 0 | Status: DISCONTINUED | OUTPATIENT
Start: 2022-09-13 | End: 2022-09-13

## 2022-09-13 RX ORDER — SODIUM CHLORIDE 9 MG/ML
1000 INJECTION, SOLUTION INTRAVENOUS
Refills: 0 | Status: DISCONTINUED | OUTPATIENT
Start: 2022-09-13 | End: 2022-09-14

## 2022-09-13 RX ORDER — HYDROMORPHONE HYDROCHLORIDE 2 MG/ML
1.5 INJECTION INTRAMUSCULAR; INTRAVENOUS; SUBCUTANEOUS
Refills: 0 | Status: DISCONTINUED | OUTPATIENT
Start: 2022-09-13 | End: 2022-09-18

## 2022-09-13 RX ORDER — HYDROMORPHONE HYDROCHLORIDE 2 MG/ML
0.3 INJECTION INTRAMUSCULAR; INTRAVENOUS; SUBCUTANEOUS THREE TIMES A DAY
Refills: 0 | Status: DISCONTINUED | OUTPATIENT
Start: 2022-09-13 | End: 2022-09-13

## 2022-09-13 RX ADMIN — HYDROMORPHONE HYDROCHLORIDE 1.5 MILLIGRAM(S): 2 INJECTION INTRAMUSCULAR; INTRAVENOUS; SUBCUTANEOUS at 20:49

## 2022-09-13 RX ADMIN — POLYETHYLENE GLYCOL 3350 17 GRAM(S): 17 POWDER, FOR SOLUTION ORAL at 17:50

## 2022-09-13 RX ADMIN — HYDROMORPHONE HYDROCHLORIDE 1.5 MILLIGRAM(S): 2 INJECTION INTRAMUSCULAR; INTRAVENOUS; SUBCUTANEOUS at 16:00

## 2022-09-13 RX ADMIN — Medication 100 MILLIGRAM(S): at 04:37

## 2022-09-13 RX ADMIN — HYDROMORPHONE HYDROCHLORIDE 1.5 MILLIGRAM(S): 2 INJECTION INTRAMUSCULAR; INTRAVENOUS; SUBCUTANEOUS at 21:15

## 2022-09-13 RX ADMIN — Medication 3 MILLIGRAM(S): at 22:24

## 2022-09-13 RX ADMIN — Medication 325 MILLIGRAM(S): at 15:55

## 2022-09-13 RX ADMIN — HYDROMORPHONE HYDROCHLORIDE 1 MILLIGRAM(S): 2 INJECTION INTRAMUSCULAR; INTRAVENOUS; SUBCUTANEOUS at 08:00

## 2022-09-13 RX ADMIN — SODIUM CHLORIDE 100 MILLILITER(S): 9 INJECTION, SOLUTION INTRAVENOUS at 20:29

## 2022-09-13 RX ADMIN — LIDOCAINE 1 PATCH: 4 CREAM TOPICAL at 12:19

## 2022-09-13 RX ADMIN — Medication 100 MILLIGRAM(S): at 20:30

## 2022-09-13 RX ADMIN — SENNA PLUS 2 TABLET(S): 8.6 TABLET ORAL at 22:24

## 2022-09-13 RX ADMIN — HYDROMORPHONE HYDROCHLORIDE 1.5 MILLIGRAM(S): 2 INJECTION INTRAMUSCULAR; INTRAVENOUS; SUBCUTANEOUS at 15:19

## 2022-09-13 RX ADMIN — METHADONE HYDROCHLORIDE 2.5 MILLIGRAM(S): 40 TABLET ORAL at 06:15

## 2022-09-13 RX ADMIN — Medication 100 MILLIGRAM(S): at 12:25

## 2022-09-13 RX ADMIN — HYDROMORPHONE HYDROCHLORIDE 1 MILLIGRAM(S): 2 INJECTION INTRAMUSCULAR; INTRAVENOUS; SUBCUTANEOUS at 09:00

## 2022-09-13 RX ADMIN — HYDROMORPHONE HYDROCHLORIDE 1.5 MILLIGRAM(S): 2 INJECTION INTRAMUSCULAR; INTRAVENOUS; SUBCUTANEOUS at 17:49

## 2022-09-13 RX ADMIN — HEPARIN SODIUM 1800 UNIT(S)/HR: 5000 INJECTION INTRAVENOUS; SUBCUTANEOUS at 07:18

## 2022-09-13 RX ADMIN — POLYETHYLENE GLYCOL 3350 17 GRAM(S): 17 POWDER, FOR SOLUTION ORAL at 06:14

## 2022-09-13 RX ADMIN — HYDROMORPHONE HYDROCHLORIDE 1 MILLIGRAM(S): 2 INJECTION INTRAMUSCULAR; INTRAVENOUS; SUBCUTANEOUS at 00:00

## 2022-09-13 RX ADMIN — HEPARIN SODIUM 1800 UNIT(S)/HR: 5000 INJECTION INTRAVENOUS; SUBCUTANEOUS at 20:29

## 2022-09-13 RX ADMIN — LIDOCAINE 1 PATCH: 4 CREAM TOPICAL at 19:51

## 2022-09-13 RX ADMIN — LACTULOSE 10 GRAM(S): 10 SOLUTION ORAL at 12:30

## 2022-09-13 RX ADMIN — HYDROMORPHONE HYDROCHLORIDE 1 MILLIGRAM(S): 2 INJECTION INTRAMUSCULAR; INTRAVENOUS; SUBCUTANEOUS at 04:25

## 2022-09-13 RX ADMIN — HYDROMORPHONE HYDROCHLORIDE 1.5 MILLIGRAM(S): 2 INJECTION INTRAMUSCULAR; INTRAVENOUS; SUBCUTANEOUS at 12:45

## 2022-09-13 RX ADMIN — CHLORHEXIDINE GLUCONATE 1 APPLICATION(S): 213 SOLUTION TOPICAL at 06:20

## 2022-09-13 RX ADMIN — HYDROMORPHONE HYDROCHLORIDE 1 MILLIGRAM(S): 2 INJECTION INTRAMUSCULAR; INTRAVENOUS; SUBCUTANEOUS at 03:55

## 2022-09-13 RX ADMIN — LIDOCAINE 1 PATCH: 4 CREAM TOPICAL at 00:11

## 2022-09-13 RX ADMIN — BICTEGRAVIR SODIUM, EMTRICITABINE, AND TENOFOVIR ALAFENAMIDE FUMARATE 1 TABLET(S): 30; 120; 15 TABLET ORAL at 12:24

## 2022-09-13 RX ADMIN — METHADONE HYDROCHLORIDE 2.5 MILLIGRAM(S): 40 TABLET ORAL at 17:49

## 2022-09-13 RX ADMIN — Medication 650 MILLIGRAM(S): at 06:45

## 2022-09-13 RX ADMIN — SODIUM CHLORIDE 100 MILLILITER(S): 9 INJECTION, SOLUTION INTRAVENOUS at 10:42

## 2022-09-13 RX ADMIN — HYDROMORPHONE HYDROCHLORIDE 1 MILLIGRAM(S): 2 INJECTION INTRAMUSCULAR; INTRAVENOUS; SUBCUTANEOUS at 11:00

## 2022-09-13 RX ADMIN — NALOXEGOL OXALATE 25 MILLIGRAM(S): 12.5 TABLET, FILM COATED ORAL at 12:24

## 2022-09-13 RX ADMIN — HYDROMORPHONE HYDROCHLORIDE 1 MILLIGRAM(S): 2 INJECTION INTRAMUSCULAR; INTRAVENOUS; SUBCUTANEOUS at 10:42

## 2022-09-13 RX ADMIN — SODIUM CHLORIDE 75 MILLILITER(S): 9 INJECTION, SOLUTION INTRAVENOUS at 07:19

## 2022-09-13 RX ADMIN — Medication 400 MILLIGRAM(S): at 01:33

## 2022-09-13 RX ADMIN — HYDROMORPHONE HYDROCHLORIDE 1 MILLIGRAM(S): 2 INJECTION INTRAMUSCULAR; INTRAVENOUS; SUBCUTANEOUS at 09:30

## 2022-09-13 RX ADMIN — Medication 650 MILLIGRAM(S): at 06:15

## 2022-09-13 RX ADMIN — HYDROMORPHONE HYDROCHLORIDE 1.5 MILLIGRAM(S): 2 INJECTION INTRAMUSCULAR; INTRAVENOUS; SUBCUTANEOUS at 15:30

## 2022-09-13 RX ADMIN — HYDROMORPHONE HYDROCHLORIDE 1.5 MILLIGRAM(S): 2 INJECTION INTRAMUSCULAR; INTRAVENOUS; SUBCUTANEOUS at 12:21

## 2022-09-13 RX ADMIN — HYDROMORPHONE HYDROCHLORIDE 1 MILLIGRAM(S): 2 INJECTION INTRAMUSCULAR; INTRAVENOUS; SUBCUTANEOUS at 00:30

## 2022-09-13 RX ADMIN — Medication 1000 MILLIGRAM(S): at 02:05

## 2022-09-13 RX ADMIN — SODIUM CHLORIDE 75 MILLILITER(S): 9 INJECTION, SOLUTION INTRAVENOUS at 01:33

## 2022-09-13 RX ADMIN — HYDROMORPHONE HYDROCHLORIDE 1 MILLIGRAM(S): 2 INJECTION INTRAMUSCULAR; INTRAVENOUS; SUBCUTANEOUS at 07:18

## 2022-09-13 NOTE — PROGRESS NOTE ADULT - PROBLEM SELECTOR PROBLEM 4
Problem: Communication  Goal: The ability to communicate needs accurately and effectively will improve    Intervention: Educate patient and significant other/support system about the plan of care, procedures, treatments, medications and allow for questions  Patient is educated of disease process and condition. Treatment team has included patient in plan of care. All medications indications and side effects are explained. Patient is encouraged to ask questions. Patient indicates understanding.         Cardiac mass

## 2022-09-13 NOTE — PROGRESS NOTE ADULT - PROBLEM SELECTOR PLAN 8
Discussed with primary team about symptom management recommendations    Thank you for allowing us to participate in your patient's care. Please page [phone] for any questions/concerns.

## 2022-09-13 NOTE — PROGRESS NOTE ADULT - SUBJECTIVE AND OBJECTIVE BOX
SUBJECTIVE AND OBJECTIVE:  Patient seen and examined at bedside. Patient being followed up for pain.  Patient reports having scrotal pain worse with urination.     INTERVAL HPI/OVERNIGHT EVENTS:  In past 24 hours, received 9 prn doses of IV Dilaudid 9mg     Allergies    ibuprofen (Angioedema)    Intolerances    MEDICATIONS  (STANDING):  bictegravir 50 mG/emtricitabine 200 mG/tenofovir alafenamide 25 mG (BIKTARVY) 1 Tablet(s) Oral daily  ceFAZolin   IVPB 2000 milliGRAM(s) IV Intermittent every 8 hours  chlorhexidine 2% Cloths 1 Application(s) Topical <User Schedule>  ferrous    sulfate 325 milliGRAM(s) Oral <User Schedule>  heparin  Infusion. 1700 Unit(s)/Hr (17 mL/Hr) IV Continuous <Continuous>  lactated ringers. 1000 milliLiter(s) (100 mL/Hr) IV Continuous <Continuous>  lactulose Syrup 10 Gram(s) Oral daily  lidocaine   4% Patch 1 Patch Transdermal daily  melatonin 3 milliGRAM(s) Oral at bedtime  methadone    Tablet 2.5 milliGRAM(s) Oral two times a day  naloxegol 25 milliGRAM(s) Oral daily  polyethylene glycol 3350 17 Gram(s) Oral two times a day  senna 2 Tablet(s) Oral at bedtime    MEDICATIONS  (PRN):  heparin   Injectable 4500 Unit(s) IV Push every 6 hours PRN For aPTT less than 40  heparin   Injectable 2000 Unit(s) IV Push every 6 hours PRN For aPTT between 40 - 57  HYDROmorphone  Injectable 1.5 milliGRAM(s) IV Push every 3 hours PRN Severe Pain (7 - 10)  HYDROmorphone  Injectable 0.5 milliGRAM(s) IV Push every 3 hours PRN Moderate Pain (4 - 6)  ondansetron Injectable 4 milliGRAM(s) IV Push every 8 hours PRN Nausea and/or Vomiting      ITEMS UNCHECKED ARE NOT PRESENT    PRESENT SYMPTOMS: [ ]Unable to self-report due to altered mental status- see [ ] CPOT [ ] PAINADS [ ] RDOS  Source if other than patient:  [ ]Family   [ ]Team     Pain: [x ]yes [ ]no  QOL impact - severe  Location -  scrotal                  Aggravating factors - urination, movement  Quality - sharp  Radiation - "all over my body"  Timing- intermittent  Severity (0-10 scale): 10  Minimal acceptable level (0-10 scale): 3    Dyspnea:                           [ ]Mild [ ]Moderate [ ]Severe  RDOS:  0 to 2  minimal or no respiratory distress   3  mild distress  4 to 6 moderate distress  >7 severe distress  [email or web address]://[email or web address]/handouts/hen/Respiratory_Distress_Observation_Scale.pdf (Ctrl +  left click to view)     Anxiety:                             [ ]Mild [ ]Moderate [ ]Severe  Agitation:                          [ ]Mild [ ]Moderate [ ]Severe  Fatigue:                             [ ]Mild [ ]Moderate [ ]Severe  Nausea:                             [ ]Mild [ ]Moderate [ ]Severe  Loss of appetite:              [ ]Mild [ ]Moderate [ ]Severe  Constipation:                   [ ]Mild [ ]Moderate [ ]Severe  Diarrhea:                          [ ]Mild [ ]Moderate [ ]Severe      CPOT:    https://www.[facility].org/getattachment/[email or web address]/Critical-Care-Pain-Observation-Tool-(CPOT)    PAIN AD Score:	  http://geriatrictoolkit.[facility]/cog/painad.pdf (Ctrl + left click to view)    PCSSQ[Palliative Care Spiritual Screening Question]   Severity (0-10):  Score of 4 or > indicate consideration of Chaplaincy referral.  Chaplaincy Referral: [ ] yes [ ] refused [ ] following    Other Symptoms:  [ x]All other review of systems negative     Palliative Performance Status Version 2:  70       %      http://[email or web address].org/files/news/palliative_performance_scale_[email or web address].pdf    PHYSICAL EXAM:  Vital Signs Last 24 Hrs  T(C): 35.6 (13 Sep 2022 12:00), Max: 35.6 (13 Sep 2022 00:00)  T(F): 96 (13 Sep 2022 12:00), Max: 96.1 (13 Sep 2022 04:00)  HR: 99 (13 Sep 2022 16:00) (81 - 112)  BP: 127/76 (13 Sep 2022 16:00) (115/71 - 142/85)  BP(mean): 89 (13 Sep 2022 16:00) (68 - 106)  RR: 16 (13 Sep 2022 16:00) (15 - 23)  SpO2: 100% (13 Sep 2022 16:00) (98% - 100%)    Parameters below as of 13 Sep 2022 16:00  Patient On (Oxygen Delivery Method): room air         I&O's Summary    12 Sep 2022 07:01  -  13 Sep 2022 07:00  --------------------------------------------------------  IN: 3382 mL / OUT: 2455 mL / NET: 927 mL    13 Sep 2022 07:01  -  13 Sep 2022 16:24  --------------------------------------------------------  IN: 1062 mL / OUT: 1115 mL / NET: -53 mL       GENERAL:  [ x]Alert  [ x]Oriented x 3  [ ]Lethargic  [ ]Cachexia  [ ]Unarousable  [ x]Verbal  [ ]Non-Verbal    Behavioral:   [ ] Anxiety  [ ] Delirium [ ] Agitation [ ] Calm  [ x] Other - Appears uncomfortable   HEENT:  [x ]Normal  [ ] Temporal Wasting  [ ]Dry mouth   [ ]ET Tube/Trach  [ ]Oral lesions  [ ] Mucositis  PULMONARY: Left chest tube   [x ]Clear [ ]Tachypnea  [ ]Audible excessive secretions   [ ]Rhonchi        [ ]Right [ ]Left [ ]Bilateral  [ ]Crackles        [ ]Right [ ]Left [ ]Bilateral  [ ]Wheezing     [ ]Right [ ]Left [ ]Bilateral  [ ]Diminished breath sounds [ ]right [ ]left [ ]bilateral  CARDIOVASCULAR:    [x ]Regular [ ]Irregular [ ]Tachy  [ ]Jose [ ]Murmur [ ]Other  GASTROINTESTINAL:  [ x]Soft  [ ]Distended   [ ]+BS  [ x]Non tender [ ]Tender  [ ]PEG [ ]OGT/ NGT  Last BM: 9/8  GENITOURINARY:  [ ]Normal [ ] Incontinent   [ ]Oliguria/Anuria   [x ]Rosario  MUSCULOSKELETAL:   [x ]Normal   [ ]Weakness  [ ]Bed/Wheelchair bound [ ]Edema  [  ] amputation  [  ] contraction  NEUROLOGIC:   [x ]No focal deficits  [ ]Cognitive impairment  [ ]Dysphagia [ ]Dysarthria [ ]Paresis [ ]Other   SKIN: See Nursing Skin Assessment for further details  [ x]Normal    [ ]Rash  [ ]Pressure ulcer(s)       Present on admission [ ]y [ ]n   [  ]  Wound    [  ] hyperpigmentation    CRITICAL CARE:  [ ]Shock Present  [ ]Septic [ ]Cardiogenic [ ]Neurologic [ ]Hypovolemic  [ ]Vasopressors [ ]Inotropes  [ ]Respiratory failure present [ ]Mechanical Ventilation [ ]Non-invasive ventilatory support [ ]High-Flow   [ ]Acute  [ ]Chronic [ ]Hypoxic  [ ]Hypercarbic [ ]Other  [ ]Other organ failure     LABS:                        9.3    14.53 )-----------( 432      ( 13 Sep 2022 02:45 )             30.4   09-13    134<L>  |  97<L>  |  24<H>  ----------------------------<  100<H>  3.9   |  30  |  1.53<H>    Ca    14.5<HH>      13 Sep 2022 02:45  Phos  3.5     09-13  Mg     1.60     09-13    TPro  7.1  /  Alb  2.9<L>  /  TBili  0.4  /  DBili  x   /  AST  38  /  ALT  10  /  AlkPhos  268<H>  09-13  PTT - ( 13 Sep 2022 02:45 )  PTT:60.2 sec    CAPILLARY BLOOD GLUCOSE      RADIOLOGY & ADDITIONAL STUDIES: reviewed     Protein Calorie Malnutrition Present: [ ]mild [ ]moderate [ x]severe [ ]underweight [ ]morbid obesity  https://www.andeal.org/vault/3750/web/files/ONC/Table_Clinical%20Characteristics%20to%20Document%20Malnutrition-White%20JV%20et%20al%202012.pdf    Height (cm): 198.1 (09-08-22 @ 14:00), 198.2 (08-08-22 @ 12:00), 198.1 (07-26-22 @ 13:32)  Weight (kg): 57.5 (09-08-22 @ 14:00), 79.0884 (08-08-22 @ 12:00), 81.2 (07-26-22 @ 13:32)  BMI (kg/m2): 14.7 (09-08-22 @ 14:00), 20.1 (08-08-22 @ 12:00), 20.7 (07-26-22 @ 13:32)    [ ]PPSV2 < or = 30%  [ ]significant weight loss [ ]poor nutritional intake [ ]anasarca   [ ]Artificial Nutrition    REFERRALS:   [ ]Chaplaincy  [ ]Hospice  [ ]Child Life  [ ]Social Work  [x ]Case management [ ]Holistic Therapy        SUBJECTIVE AND OBJECTIVE:  Patient seen and examined at bedside. Patient being followed up for pain.  Patient reports having scrotal pain worse with urination.     INTERVAL HPI/OVERNIGHT EVENTS:  In past 24 hours, received 9 prn doses of IV Dilaudid 1mg     Allergies    ibuprofen (Angioedema)    Intolerances    MEDICATIONS  (STANDING):  bictegravir 50 mG/emtricitabine 200 mG/tenofovir alafenamide 25 mG (BIKTARVY) 1 Tablet(s) Oral daily  ceFAZolin   IVPB 2000 milliGRAM(s) IV Intermittent every 8 hours  chlorhexidine 2% Cloths 1 Application(s) Topical <User Schedule>  ferrous    sulfate 325 milliGRAM(s) Oral <User Schedule>  heparin  Infusion. 1700 Unit(s)/Hr (17 mL/Hr) IV Continuous <Continuous>  lactated ringers. 1000 milliLiter(s) (100 mL/Hr) IV Continuous <Continuous>  lactulose Syrup 10 Gram(s) Oral daily  lidocaine   4% Patch 1 Patch Transdermal daily  melatonin 3 milliGRAM(s) Oral at bedtime  methadone    Tablet 2.5 milliGRAM(s) Oral two times a day  naloxegol 25 milliGRAM(s) Oral daily  polyethylene glycol 3350 17 Gram(s) Oral two times a day  senna 2 Tablet(s) Oral at bedtime    MEDICATIONS  (PRN):  heparin   Injectable 4500 Unit(s) IV Push every 6 hours PRN For aPTT less than 40  heparin   Injectable 2000 Unit(s) IV Push every 6 hours PRN For aPTT between 40 - 57  HYDROmorphone  Injectable 1.5 milliGRAM(s) IV Push every 3 hours PRN Severe Pain (7 - 10)  HYDROmorphone  Injectable 0.5 milliGRAM(s) IV Push every 3 hours PRN Moderate Pain (4 - 6)  ondansetron Injectable 4 milliGRAM(s) IV Push every 8 hours PRN Nausea and/or Vomiting      ITEMS UNCHECKED ARE NOT PRESENT    PRESENT SYMPTOMS: [ ]Unable to self-report due to altered mental status- see [ ] CPOT [ ] PAINADS [ ] RDOS  Source if other than patient:  [ ]Family   [ ]Team     Pain: [x ]yes [ ]no  QOL impact - severe  Location -  scrotal                  Aggravating factors - urination, movement  Quality - sharp  Radiation - "all over my body"  Timing- intermittent  Severity (0-10 scale): 10  Minimal acceptable level (0-10 scale): 3    Dyspnea:                           [ ]Mild [ ]Moderate [ ]Severe  RDOS:  0 to 2  minimal or no respiratory distress   3  mild distress  4 to 6 moderate distress  >7 severe distress  [email or web address]/[email or web address]/hen/Respiratory_Distress_Observation_Scale.pdf (Ctrl +  left click to view)     Anxiety:                             [ ]Mild [ ]Moderate [ ]Severe  Agitation:                          [ ]Mild [ ]Moderate [ ]Severe  Fatigue:                             [ ]Mild [ ]Moderate [ ]Severe  Nausea:                             [ ]Mild [ ]Moderate [ ]Severe  Loss of appetite:              [ ]Mild [ ]Moderate [ ]Severe  Constipation:                   [ ]Mild [ ]Moderate [ ]Severe  Diarrhea:                          [ ]Mild [ ]Moderate [ ]Severe      CPOT:    https://www.[facility].org/getattachment/[email or web address]/Critical-Care-Pain-Observation-Tool-(CPOT)    PAIN AD Score:	  http://geriatrictoolkit.[facility]/cog/painad.pdf (Ctrl + left click to view)    PCSSQ[Palliative Care Spiritual Screening Question]   Severity (0-10):  Score of 4 or > indicate consideration of Chaplaincy referral.  Chaplaincy Referral: [ ] yes [ ] refused [ ] following    Other Symptoms:  [ x]All other review of systems negative     Palliative Performance Status Version 2:  70       %      http://[facility].org/files/news/palliative_performance_scale_[email or web address].pdf    PHYSICAL EXAM:  Vital Signs Last 24 Hrs  T(C): 35.6 (13 Sep 2022 12:00), Max: 35.6 (13 Sep 2022 00:00)  T(F): 96 (13 Sep 2022 12:00), Max: 96.1 (13 Sep 2022 04:00)  HR: 99 (13 Sep 2022 16:00) (81 - 112)  BP: 127/76 (13 Sep 2022 16:00) (115/71 - 142/85)  BP(mean): 89 (13 Sep 2022 16:00) (68 - 106)  RR: 16 (13 Sep 2022 16:00) (15 - 23)  SpO2: 100% (13 Sep 2022 16:00) (98% - 100%)    Parameters below as of 13 Sep 2022 16:00  Patient On (Oxygen Delivery Method): room air         I&O's Summary    12 Sep 2022 07:01  -  13 Sep 2022 07:00  --------------------------------------------------------  IN: 3382 mL / OUT: 2455 mL / NET: 927 mL    13 Sep 2022 07:01  -  13 Sep 2022 16:24  --------------------------------------------------------  IN: 1062 mL / OUT: 1115 mL / NET: -53 mL       GENERAL:  [ x]Alert  [ x]Oriented x 3  [ ]Lethargic  [ ]Cachexia  [ ]Unarousable  [ x]Verbal  [ ]Non-Verbal    Behavioral:   [ ] Anxiety  [ ] Delirium [ ] Agitation [ ] Calm  [ x] Other - Appears uncomfortable   HEENT:  [x ]Normal  [ ] Temporal Wasting  [ ]Dry mouth   [ ]ET Tube/Trach  [ ]Oral lesions  [ ] Mucositis  PULMONARY: Left chest tube   [x ]Clear [ ]Tachypnea  [ ]Audible excessive secretions   [ ]Rhonchi        [ ]Right [ ]Left [ ]Bilateral  [ ]Crackles        [ ]Right [ ]Left [ ]Bilateral  [ ]Wheezing     [ ]Right [ ]Left [ ]Bilateral  [ ]Diminished breath sounds [ ]right [ ]left [ ]bilateral  CARDIOVASCULAR:    [x ]Regular [ ]Irregular [ ]Tachy  [ ]Jose [ ]Murmur [ ]Other  GASTROINTESTINAL:  [ x]Soft  [ ]Distended   [ ]+BS  [ x]Non tender [ ]Tender  [ ]PEG [ ]OGT/ NGT  Last BM: 9/8  GENITOURINARY:  [ ]Normal [ ] Incontinent   [ ]Oliguria/Anuria   [x ]Rosario  MUSCULOSKELETAL:   [x ]Normal   [ ]Weakness  [ ]Bed/Wheelchair bound [ ]Edema  [  ] amputation  [  ] contraction  NEUROLOGIC:   [x ]No focal deficits  [ ]Cognitive impairment  [ ]Dysphagia [ ]Dysarthria [ ]Paresis [ ]Other   SKIN: See Nursing Skin Assessment for further details  [ x]Normal    [ ]Rash  [ ]Pressure ulcer(s)       Present on admission [ ]y [ ]n   [  ]  Wound    [  ] hyperpigmentation    CRITICAL CARE:  [ ]Shock Present  [ ]Septic [ ]Cardiogenic [ ]Neurologic [ ]Hypovolemic  [ ]Vasopressors [ ]Inotropes  [ ]Respiratory failure present [ ]Mechanical Ventilation [ ]Non-invasive ventilatory support [ ]High-Flow   [ ]Acute  [ ]Chronic [ ]Hypoxic  [ ]Hypercarbic [ ]Other  [ ]Other organ failure     LABS:                        9.3    14.53 )-----------( 432      ( 13 Sep 2022 02:45 )             30.4   09-13    134<L>  |  97<L>  |  24<H>  ----------------------------<  100<H>  3.9   |  30  |  1.53<H>    Ca    14.5<HH>      13 Sep 2022 02:45  Phos  3.5     09-13  Mg     1.60     09-13    TPro  7.1  /  Alb  2.9<L>  /  TBili  0.4  /  DBili  x   /  AST  38  /  ALT  10  /  AlkPhos  268<H>  09-13  PTT - ( 13 Sep 2022 02:45 )  PTT:60.2 sec    CAPILLARY BLOOD GLUCOSE      RADIOLOGY & ADDITIONAL STUDIES: reviewed     Protein Calorie Malnutrition Present: [ ]mild [ ]moderate [ x]severe [ ]underweight [ ]morbid obesity  https://www.andeal.org/vault/5990/web/files/ONC/Table_Clinical%20Characteristics%20to%20Document%20Malnutrition-White%20JV%20et%20al%202012.pdf    Height (cm): 198.1 (09-08-22 @ 14:00), 198.2 (08-08-22 @ 12:00), 198.1 (07-26-22 @ 13:32)  Weight (kg): 57.5 (09-08-22 @ 14:00), 79.0884 (08-08-22 @ 12:00), 81.2 (07-26-22 @ 13:32)  BMI (kg/m2): 14.7 (09-08-22 @ 14:00), 20.1 (08-08-22 @ 12:00), 20.7 (07-26-22 @ 13:32)    [ ]PPSV2 < or = 30%  [ ]significant weight loss [ ]poor nutritional intake [ ]anasarca   [ ]Artificial Nutrition    REFERRALS:   [ ]Chaplaincy  [ ]Hospice  [ ]Child Life  [ ]Social Work  [x ]Case management [ ]Holistic Therapy

## 2022-09-13 NOTE — PROGRESS NOTE ADULT - PROBLEM SELECTOR PLAN 6
- Patient with metastatic ano-rectal SCC  - CT- Large anorectal mass.  Liver metastases. There are multiple centrally hypoattenuating liver lesions   measuring up to 2.8 cm, compatible with widespread metastatic disease and internal cavitation.  - Oncology recommendations appreciated.

## 2022-09-13 NOTE — PROGRESS NOTE ADULT - ASSESSMENT
31M MSM HIV 2003 well controlled on Biktarvy.   Widely metastatic anal SCC diagnosed 7/2022, not yet on therapy.   Admitted 8/31 to [facility] with cough, hypoxia, large left cavitary lesion, concern for pneumothorax s/p chest tube.   Complicated by pneumomediastinum, subcutaneous emphysema.   Also MSSA bacteremia 8/31, cleared since 9/1.   Grew in sputum too.   Transferred to [facility] 9/8.   Clinically stable, possible removal of chest tube soon.   Suspect malignant cavitary lesion with secondary MSSA pneumonia causing bacteremia.   RV masses on TTE might be thrombi but will treat as IE out of caution.   Doubt MTB - four sputum AFB and prior Quantiferon 2020 negative.   Klebsiella in latest sputum 9/11 unlikely significant but covered anyways.   Poor long term prognosis.     Suggest  -Cefazolin 2GM q8h for 6 weeks ending 10/13   -monitor blood cultures   -continue Biktarvy     Alphonso Dimas MD   Infectious Disease   Available on TEAMS. After 5PM and on weekends please page fellow on call or call [phone]

## 2022-09-13 NOTE — BH CONSULTATION LIAISON ASSESSMENT NOTE - NSBHATTESTAPPAMEND_PSY_A_CORE
I have personally seen and examined this patient. I fully participated in the care of this patient. I have made amendments to the documentation where appropriate and otherwise agree with the history, physical exam, and plan as documented by the CHARLETTE

## 2022-09-13 NOTE — PROGRESS NOTE ADULT - ASSESSMENT
31 M with PMH/ HIV, rectal cancer not on tx (followed up by Dr Frazier ([facility] oncology)), mets to liver and possibly bone,  R eye cataract, who presented to the [facility] on 8/31/22 after syncopal episode at home. Pt with cough x 2 mos, with yellow sputum and shortness of breath since 5 days PTA.   At [facility]: pt was found to be hypoxemic. 8/31/22 - L chest wall Pigtail placement. Pigtail was placed for suspected PMX with improvement in shortness of breath. CT chest was performed, which showed that pigtail is within the mass w/ DDx of TB, fungal infection, cavitary lesion, or squamous cell carcinoma. Pt was found to be anemic due to rectal bleeding with Hgb 5.9 s/p 2 U PRBC on 9/1. Blood cxs with MSSA bacteremia, and has been treated with Cefazolin and Zosyn.  Since 9/2 pt with worsening diffuse SC emphysema extending to the neck/face/all 4 extremities/scrotum/back , which has been getting progressively worse over past 2 days as per pt's statement. 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 TTE with two large RV masses and two additional small masses. 9/7- s/p L lateral chest tube to 14 Fr. 9/7 - s/p IVC Filter placement (as per [facility] statement, was placed prophylactically due to undiagnosed vegetations on the TTE. 12 cm multiloculated thick walled cavitary mass in the CHRISTINE and lingula.+ liver lesion on the CT a/p. + external iliac vein thrombosis, PE study was indeterminate     Pt was transferred to [facility] as per family request (mother) for pt to be evaluated by his outpt oncology team (Dr Frazier). As per oncology team the plan was to start Carbotaxol q 3 weeks + RT for diffuse mets as per PET scan.    Neuro  -A & O x 3 , no active issues  - not on sedation  - C/o of severe scrotal/rectal pain, Morphine ER 30 mg BID Dc'd 9/12 in setting of TELMA, cont Dilaudid 1 mg q3hrs for severe pain. has required extra pushes of 1mg and IV tylenol.  - palliative care consulted for assistance w/ pain regimen- pt was on methadone 2.5mg qd and Oxycontin oupt , will check qtc and restart methadone if WNLs.   - pt is appropriately overwhelmed by his complex medical condition. He does not feel he needs to speak w/ BH at present however will consider in future.     CV:   - hemodynamically stable, remains off vasopressors   TTE with two large RV masses and two additional small masses. 9/7-- s/p L lateral chest tube to 14 Fr. 9/7 -- s/p IVC Filter placement (as per [facility] statement, was placed prophylactically due to undiagnosed vegetations on the TTE.  - pt is hemodynamically stable no pressors   - repeat TTE 9/9 - Limited and technically difficult study with views limited to the subcostal window.  Very limited views demonstrate what appears to be a mobile mass, possibly in association with the tricuspid valve. This may represent tumour, thrombus, or vegetation. Consider JENNIFER for further evaluation, if clinically indicated.  - per cardiology risk of JENNIFER outweighs benefit,  will obtain cardiac MRI instead to evaluate RV mass /RV thrombosis      Pulm:  #CHRISTINE cavitary lesion - Differential includes cavitary PNA (sputum and blood cultures positive for MSSA) vs malignancy given history of metastatic rectal CA vs atypical infection. Of note PET/CT in our system from 8/2022 with only few subcm nodules in L and GGO in R lung, making malignancy as etiology of cavitary lesion less likely.   - L pigtail catheter was placed at [facility] due to concern for L PTX. However, patient was found to have large CHRISTINE cavitary lesion instead, into which the pigtail had been placed.   -On 9/7 patient had L pigtail replaced (currently 14F) as well as IVC filter placement. ([facility])   9/10-- s/p MIST (10 mg of Alteplase injected into pleural space)   - f/u sputum cultures-- sent on 9/10  - Pleural fluid Cx neg 9/9  - f/u aspergillus,, histo, blasto, coccidio.-- in lab  - serum fungitell and crypto Ag  - neg   - s/p Zosyn , now on Cefazolin x 6 weeks per ID recs   - CT to water seal, place to suction only for transfer if off the unit, no output from chest tube   - CTA 9/9- Findings most in keeping with cavitary pneumonia involving lingula and left lower lobe complicated by bronchopleural fistula as described with tract around the small caliber pleural pigtail catheter, extensive subcutaneous emphysema and pneumomediastinum. Nonopacification of lingular and left lower lobe pulmonary arteries as described likely secondary to cavitary pneumonia rather than thromboembolic pulmonary embolism  - AFB x 3 are negative at [facility]   - CT Sx recommended MIST protocol, held pending repeat CT /chest to evaluate if pig tail is still in place as there was no drainage from the cavitary lesion/ space, repeat CT chest 9/11 showed pigtail still in cavity.    # bronchopleural fistula.  - L CT in place, keep to water seal, repeat CTH completed, results as above  - CT surgery consulted- Recommend MIST protocol x1 dose through current pigtail catheter due to concern for clogged pigtail ,also rec to consult IR for image guided pigtail catheter (pneumonostomy) placement into the pneumatocele, as well as blow hole placement to evacuate subQ emphysema  holding off for now. plan to clamp pigtail tomorrow morning and assess if it can be pulled. if needs another chest tube surgical would be ideal, discussed w/ CT surgery PA.   - repeat CT /chest 9/11- Persistent left upper lobe cavitary lesion, with pigtail catheter in place, likely communicating with the lingular bronchus. 2. Right middle lobe pneumonia and scattered airspace opacities throughout the remaining lungs, as described. 3. Persistent pneumomediastinum and extensive subcutaneous emphysema of the neck, chest, abdomen, pelvis, and extremities, including the scrotum. 4. Large anorectal mass. 5. Liver metastases.  - No operative thoracic surgical interventions planned at this time    GI:   regular diet,   - cont bowel regimen w/ lactulose, senna, miralax BID  - no BM reported   - 9/12 ordered naloxegol, monitor for BM     Renal:   TELMA, likely prerenal,   - Cr-- 1.27>1.38-> 1.61. Cr rising today, will give 1L NS and standing IVFs w/ LR @ 75 cc/hr  - UOP: LOS net is neg 2L/ 24 hrs net is neg 1.9L, voids about 100-125/hr, and voided 2.8 L in 24 hrs  - continue trending renal function   - Strict Is and Os    #Hypercalcemia - ?related to malignancy  - Check PTH- in lab, PTHrp - in lab  - Vit D levels low, per heme/onc hold off supplementation until Ca normalized as can worsen hyperCa in setting of hypercalcemia of malignancy  - Ca 16.4 - 9/10, 17 corrected for low serum albumin. per Heme/onc recs, s/p (9/10) calcitonin 4u/kg x 2 doses and pamidronate 90mcg IVPB x1. Calcium level today is 13.6, continues to downtrend   - s/p IVF LR @ 75, this was dc'd yesterday. restarting IVFs today, ordered for 1L NS and standing IVFs w/ LR @ 75 cc/hr.  - continue to trend levels q 24 hrs     #scrotal swelling likely due SC emphysema, with severe pain  - indwelling pedraza was placed at [facility] (Coude placed for urinary strain)   - UA positive with moderate bacteria 9/9- UCx 9/9 neg   - pain control with dilaudid and Ofirmev,  ct a/p 9/11 did not show any inflammation/hydrocele/infection .  - US scrotum ordered, pending  - Urology consulted, requested to be called when scrotal US completed.     ID:  # MSSA bacteremia at [facility]. afebrile T max 96.8   - Sputum cultures from 9/1 grew MSSA and Blood cultures from 8/31 grew MSSA with blood cultures from 9/2 NGTD. Legionella negative. Sputum AFB negative x3. Fungitell and galactomannan negative. Patient had been empirically on Zosyn, Cefazolin and Caspofungin (which was dc'ed).  - TTE 9/9 with possible vegetation on tricuspid valve   - s/p Zosyn (9/8 - 9/9), transitioned to Cefazolin per ID recs on 9/9, plan for 6 week course  - Pleural fluid Cx 9/9 - grew few staph aureus+  - Blood Cx neg 9/8  - UA 9/8 w/  some bacteria, UCx 9/9 neg   - CXR with L cavitary lesion, seen again on CT chest 9/9 and 9/11  - fungitell and crypto neg 9/10  - Sputum Cx  9/10 + mod klebsiella pneumoniae  - ID consulted, recs appreciated    # HIV  - Follows with Dr. Marcial in clinic  - CD4 392 and viral load < 30 on 9/2/22 as per ID note   - Viral load undetectable 9/8  - c/w home Biktarvy , monitor Cr if worsening will d/c    Hematology:   # rectal CA, possible RV mass, mets to liver and possibly bone,  # metastatic HPV related anal SCC,  -- Hem/Onc--With regards to treatment of metastatic HPV related anal SCC, treatment will be on hold pending resolution of infection. Will need reassessment after resolution of infection to evaluate performance status and decide on systemic treatment options  -Pal care consult for pain management placed on 9/10  -When stable and after the above, recommend radiation oncology consult for consideration of RT to anal mass for palliation.      Hematology   #cardiac ? mass/thrombus/vegetation   -Lovenox held at [place] and s/p 9/7-- IVC filter    #anemia   thrombocytosis,  AOCD, ferritin 512, iron 11, TIBC-- 150, Iron -- 7 %, transferrin -- 122, ([place])  -- Haptoglobin -- 308, LDL-- 240, Urica acid-- 3.4   - H/H stable - 9.5/30.6 . plts- 466    # L external iliac vein DVT  - on heparin gtt  - ptt q 24 hrs, aPTT therapeutic-   - Duplex b/l LEs 9/10 neg for DVT    Endo:  - not diabetic, HgbA1C 5.1     Code: Full code  Palliative consult placed 9/10    Lines:   PIV, indwelling pedraza, L PTC,   31 M with PMH/ HIV, rectal cancer not on tx (followed up by Dr Frazier ([facility] oncology)), mets to liver and possibly bone,  R eye cataract, who presented to the [facility] on 8/31/22 after syncopal episode at home. Pt with cough x 2 mos, with yellow sputum and shortness of breath since 5 days PTA.   At [facility]: pt was found to be hypoxemic. 8/31/22 - L chest wall Pigtail placement. Pigtail was placed for suspected PMX with improvement in shortness of breath. CT chest was performed, which showed that pigtail is within the mass w/ DDx of TB, fungal infection, cavitary lesion, or squamous cell carcinoma. Pt was found to be anemic due to rectal bleeding with Hgb 5.9 s/p 2 U PRBC on 9/1. Blood cxs with MSSA bacteremia, and has been treated with Cefazolin and Zosyn.  Since 9/2 pt with worsening diffuse SC emphysema extending to the neck/face/all 4 extremities/scrotum/back , which has been getting progressively worse over past 2 days as per pt's statement. 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 TTE with two large RV masses and two additional small masses. 9/7- s/p L lateral chest tube to 14 Fr. 9/7 - s/p IVC Filter placement (as per [facility] statement, was placed prophylactically due to undiagnosed vegetations on the TTE. 12 cm multiloculated thick walled cavitary mass in the CHRISTINE and lingula.+ liver lesion on the CT a/p. + external iliac vein thrombosis, PE study was indeterminate     Pt was transferred to [facility] as per family request (mother) for pt to be evaluated by his outpt oncology team (Dr Frazier). As per oncology team the plan was to start Carbotaxol q 3 weeks + RT for diffuse mets as per PET scan.    Neuro  -A & O x 3 , no active issues  - not on sedation  - C/o of severe scrotal/rectal pain, Morphine ER 30 mg BID Dc'd 9/12 in setting of TELMA, increased Dilaudid 1.5 mg q3hrs for severe pain. and 0.5 mg q3hrs for breakthrough, he has frequently required extra pushes of dilaudid and IV tylenol the past.  - palliative care consulted for assistance w/ pain regimen- pt was on methadone 2.5mg qd and Oxycontin oupt , will check qtc and restart methadone if WNLs.   - pt is appropriately overwhelmed by his complex medical condition, holistic RN and Behavioral health consulted.    CV:   - hemodynamically stable, remains off vasopressors   TTE with two large RV masses and two additional small masses. 9/7-- s/p L lateral chest tube to 14 Fr. 9/7 -- s/p IVC Filter placement (as per [facility] statement, was placed prophylactically due to undiagnosed vegetations on the TTE.  - pt is hemodynamically stable no pressors   - repeat TTE 9/9 - Limited and technically difficult study with views limited to the subcostal window.  Very limited views demonstrate what appears to be a mobile mass, possibly in association with the tricuspid valve. This may represent tumour, thrombus, or vegetation. Consider JENNIFER for further evaluation, if clinically indicated.  - per cardiology risk of JENNIFER outweighs benefit,  will obtain cardiac MRI instead to evaluate RV mass /RV thrombosis      Pulm:  #CHRISTINE cavitary lesion - Differential includes cavitary PNA (sputum and blood cultures positive for MSSA) vs malignancy given history of metastatic rectal CA vs atypical infection. Of note PET/CT in our system from 8/2022 with only few subcm nodules in L and GGO in R lung, making malignancy as etiology of cavitary lesion less likely.   - L pigtail catheter was placed at [facility] due to concern for L PTX. However, patient was found to have large CHRISTINE cavitary lesion instead, into which the pigtail had been placed.   -On 9/7 patient had L pigtail replaced (currently 14F) as well as IVC filter placement. ([facility])   9/10-- s/p MIST (10 mg of Alteplase injected into pleural space)   - f/u sputum cultures-- sent on 9/10  - Pleural fluid Cx neg 9/9  - f/u aspergillus,, histo, blasto, coccidio.-- in lab  - serum fungitell and crypto Ag  - neg   - s/p Zosyn , now on Cefazolin x 6 weeks per ID recs   - CT to water seal, place to suction only for transfer if off the unit, no output from chest tube   - CTA 9/9- Findings most in keeping with cavitary pneumonia involving lingula and left lower lobe complicated by bronchopleural fistula as described with tract around the small caliber pleural pigtail catheter, extensive subcutaneous emphysema and pneumomediastinum. Nonopacification of lingular and left lower lobe pulmonary arteries as described likely secondary to cavitary pneumonia rather than thromboembolic pulmonary embolism  - AFB x 3 are negative at [facility]   - CT Sx recommended MIST protocol, held pending repeat CT /chest to evaluate if pig tail is still in place as there was no drainage from the cavitary lesion/ space, repeat CT chest 9/11 showed pigtail still in cavity.   - 9/13- flushed chest tube, noted w/ with thick secretions and small airleak at times. Will continue w/ chest tube for now as there is continued purulent drainage and air. When output improves will consider clamping trials.   - Flush chest tube BID.     # bronchopleural fistula.  - L CT in place, keep to water seal, repeat CTH completed, results as above  - CT surgery consulted- Recommend MIST protocol x1 dose through current pigtail catheter due to concern for clogged pigtail ,also rec to consult IR for image guided pigtail catheter (pneumonostomy) placement into the pneumatocele, as well as blow hole placement to evacuate subQ emphysema  holding off for now. plan to clamp pigtail tomorrow morning and assess if it can be pulled. if needs another chest tube surgical would be ideal, discussed w/ CT surgery PA.   - repeat CT /chest 9/11- Persistent left upper lobe cavitary lesion, with pigtail catheter in place, likely communicating with the lingular bronchus. 2. Right middle lobe pneumonia and scattered airspace opacities throughout the remaining lungs, as described. 3. Persistent pneumomediastinum and extensive subcutaneous emphysema of the neck, chest, abdomen, pelvis, and extremities, including the scrotum. 4. Large anorectal mass. 5. Liver metastases.  - No operative thoracic surgical interventions planned at this time    GI:   regular diet,   - cont bowel regimen w/ lactulose, senna, miralax BID  - no BM reported   - 9/12 ordered naloxegol, monitor for BM , will give resistor tomorrow if no BM    Renal:   TELMA, likely prerenal,   - Cr-- 1.27>1.38-> 1.61. Cr rising 9/12, s/p 1L NS and standing IVFs. continue w/ LR @ 100cc/hr. Cr improved slightlt to 1.53 today.   - UOP: LOS net is neg 1.1L/ 24 hrs net is +982L, voids about /hr  - continue trending renal function   - Strict Is and Os    #Hypercalcemia - ?related to malignancy  - Check PTH- in lab, PTHrp - in lab  - Vit D levels low, per heme/onc hold off supplementation until Ca normalized as can worsen hyperCa in setting of hypercalcemia of malignancy  - Ca 16.4 - 9/10, 17 corrected for low serum albumin. per Heme/onc recs, s/p (9/10) calcitonin 4u/kg x 2 doses and pamidronate 90mcg IVPB x1. Calcium level today is 14.5, continues to downtrend   - cont IVFs w/ LR @ 100cc/hr  - continue to trend levels q 24 hrs     #scrotal swelling likely due SC emphysema, with severe pain  - indwelling pedraza was placed at [facility] (Coude placed for urinary strain)   - UA positive with moderate bacteria 9/9- UCx 9/9 neg   - pain control with dilaudid and methadone, ct a/p 9/11 did not show any inflammation/hydrocele/infection .  - Urology consulted, recommended to wrap scrotum, offers no other intervention at this time.      ID:  # MSSA bacteremia at [facility]  - Sputum cultures from 9/1 grew MSSA and Blood cultures from 8/31 grew MSSA with blood cultures from 9/2 NGTD. Legionella negative. Sputum AFB negative x3. Fungitell and galactomannan negative. Patient had been empirically on Zosyn, Cefazolin and Caspofungin (which was dc'ed).  - TTE 9/9 with possible vegetation on tricuspid valve   - s/p Zosyn (9/8 - 9/9), transitioned to Cefazolin per ID recs on 9/9, plan for 6 week course  - Pleural fluid(cavitary lesion) Cx 9/9 - grew few staph aureus+  - Blood Cx neg 9/8  - UA 9/8 w/  some bacteria, UCx 9/9 neg   - CXR with L cavitary lesion, seen again on CT chest 9/9 and 9/11  - fungitell and crypto neg 9/10  - Sputum Cx  9/10 + mod klebsiella pneumoniae  - ID consulted, recs appreciated  - afebrile overnight, WBC stable 14-15s    # HIV  - Follows with Dr. Marcial in clinic  - CD4 392 and viral load < 30 on 9/2/22 as per ID note   - Viral load undetectable 9/8  - c/w home Biktarvy , monitor Cr if worsening will d/c    Hematology:   # rectal CA, possible RV mass, mets to liver and possibly bone,  # metastatic HPV related anal SCC,  -- Hem/Onc--With regards to treatment of metastatic HPV related anal SCC, treatment will be on hold pending resolution of infection. Will need reassessment after resolution of infection to evaluate performance status and decide on systemic treatment options  -Pal care consult for pain management placed on 9/10  -When stable and after the above, recommend radiation oncology consult for consideration of RT to anal mass for palliation.      Hematology   #cardiac ? mass/thrombus/vegetation   -Lovenox held at [place] and s/p 9/7-- IVC filter    #anemia   thrombocytosis,  AOCD, ferritin 512, iron 11, TIBC-- 150, Iron -- 7 %, transferrin -- 122, ([place])  -- Haptoglobin -- 308, LDL-- 240, Urica acid-- 3.4   - H/H stable - 9.5/30.6 . plts- 466    # L external iliac vein DVT  - on heparin gtt  - ptt q 24 hrs, aPTT therapeutic-   - Duplex b/l LEs 9/10 neg for DVT    Endo:  - not diabetic, HgbA1C 5.1     Code: Full code  Palliative consult placed 9/10    Lines:   PIV, indwelling pedraza, L PTC,

## 2022-09-13 NOTE — PROGRESS NOTE ADULT - SUBJECTIVE AND OBJECTIVE BOX
INTERVAL HPI/OVERNIGHT EVENTS:     SUBJECTIVE: Patient seen and examined at bedside. Feels overwhelmed by his current medical issues.     CONSTITUTIONAL: No weakness, fevers or chills  EYES/ENT: No visual changes;  No vertigo or throat pain   NECK: No pain or stiffness  RESPIRATORY: No cough, wheezing, hemoptysis; No shortness of breath  CARDIOVASCULAR: No chest pain or palpitations  GASTROINTESTINAL: No abdominal or epigastric pain. No nausea, vomiting, or hematemesis; No diarrhea or constipation. No melena or hematochezia.  GENITOURINARY: No dysuria, frequency or hematuria  NEUROLOGICAL: No numbness or weakness  SKIN: No itching, rashes    OBJECTIVE:    VITAL SIGNS:  ICU Vital Signs Last 24 Hrs  T(C): 36.1 (09 Sep 2022 08:00), Max: 36.1 (09 Sep 2022 08:00)  T(F): 97 (09 Sep 2022 08:00), Max: 97 (09 Sep 2022 08:00)  HR: 85 (09 Sep 2022 08:00) (81 - 95)  BP: 129/65 (09 Sep 2022 08:00) (91/70 - 139/61)  BP(mean): 79 (09 Sep 2022 08:00) (72 - 92)  ABP: --  ABP(mean): --  RR: 16 (09 Sep 2022 08:00) (15 - 22)  SpO2: 100% (09 Sep 2022 08:00) (100% - 100%)    O2 Parameters below as of 09 Sep 2022 08:00  Patient On (Oxygen Delivery Method): room air         @ 07:01  -   @ 07:00  --------------------------------------------------------  IN: 870 mL / OUT: 1640 mL / NET: -770 mL      CAPILLARY BLOOD GLUCOSE      PHYSICAL EXAM:    General: NAD  HEENT: NC/AT; PERRL, clear conjunctiva  Neck: supple  Respiratory: CTA b/l- L cT in place to waterseal   Cardiovascular: +S1/S2; RRR  Abdomen: soft, NT/ND; +BS x4  : swollen testicles TTP  Extremities: WWP, 2+ peripheral pulses b/l; no LE edema  Skin: normal color and turgor; no rash  Neurological: A and O x 4 following commands     MEDICATIONS:  MEDICATIONS  (STANDING):  bictegravir 50 mG/emtricitabine 200 mG/tenofovir alafenamide 25 mG (BIKTARVY) 1 Tablet(s) Oral daily  chlorhexidine 2% Cloths 1 Application(s) Topical <User Schedule>  ferrous    sulfate 325 milliGRAM(s) Oral <User Schedule>  heparin  Infusion.  Unit(s)/Hr (11 mL/Hr) IV Continuous <Continuous>  lactated ringers. 1000 milliLiter(s) (75 mL/Hr) IV Continuous <Continuous>  lactulose Syrup 10 Gram(s) Oral daily  lidocaine   4% Patch 1 Patch Transdermal daily  morphine ER Tablet 30 milliGRAM(s) Oral every 12 hours  piperacillin/tazobactam IVPB.. 3.375 Gram(s) IV Intermittent every 8 hours  polyethylene glycol 3350 17 Gram(s) Oral daily  senna 2 Tablet(s) Oral at bedtime    MEDICATIONS  (PRN):  heparin   Injectable 4500 Unit(s) IV Push every 6 hours PRN For aPTT less than 40  heparin   Injectable 2000 Unit(s) IV Push every 6 hours PRN For aPTT between 40 - 57  HYDROmorphone  Injectable 0.5 milliGRAM(s) IV Push every 4 hours PRN Severe Pain (7 - 10)  ondansetron Injectable 4 milliGRAM(s) IV Push every 8 hours PRN Nausea and/or Vomiting      ALLERGIES:  Allergies    ibuprofen (Angioedema)    Intolerances        LABS:                        9.4    14.37 )-----------( 615      ( 09 Sep 2022 03:50 )             31.1         134<L>  |  97<L>  |  13  ----------------------------<  104<H>  4.3   |  32<H>  |  0.86    Ca    14.9<HH>      09 Sep 2022 03:50  Phos  3.4       Mg     1.90         TPro  6.3  /  Alb  2.3<L>  /  TBili  0.5  /  DBili  x   /  AST  22  /  ALT  12  /  AlkPhos  250<H>      PT/INR - ( 08 Sep 2022 20:24 )   PT: 14.1 sec;   INR: 1.21 ratio         PTT - ( 09 Sep 2022 03:50 )  PTT:38.2 sec  Urinalysis Basic - ( 08 Sep 2022 20:00 )    Color: Yellow / Appearance: Slightly Turbid / S.016 / pH: x  Gluc: x / Ketone: Negative  / Bili: Negative / Urobili: 3 mg/dL   Blood: x / Protein: Trace / Nitrite: Negative   Leuk Esterase: Negative / RBC: 80 /HPF / WBC 5 /HPF   Sq Epi: x / Non Sq Epi: 1 /HPF / Bacteria: Moderate        RADIOLOGY & ADDITIONAL TESTS: Reviewed. INTERVAL HPI/OVERNIGHT EVENTS: seen by urology overnight, scrotum was wrapped.     SUBJECTIVE: Patient seen and examined at bedside. Continues to complain for scrotal pain.     ROS:  CONSTITUTIONAL: No weakness, fevers or chills  EYES/ENT: No visual changes;  No vertigo or throat pain   NECK: No pain or stiffness  RESPIRATORY: No cough, wheezing, hemoptysis; No shortness of breath  CARDIOVASCULAR: No chest pain or palpitations  GASTROINTESTINAL: No abdominal or epigastric pain. No nausea, vomiting, or hematemesis; No diarrhea or constipation. No melena or hematochezia.  GENITOURINARY: No dysuria, frequency or hematuria  NEUROLOGICAL: No numbness or weakness  SKIN: No itching, rashes    OBJECTIVE:    VITAL SIGNS:  ICU Vital Signs Last 24 Hrs  T(C): 36.1 (09 Sep 2022 08:00), Max: 36.1 (09 Sep 2022 08:00)  T(F): 97 (09 Sep 2022 08:00), Max: 97 (09 Sep 2022 08:00)  HR: 85 (09 Sep 2022 08:00) (81 - 95)  BP: 129/65 (09 Sep 2022 08:00) (91/70 - 139/61)  BP(mean): 79 (09 Sep 2022 08:00) (72 - 92)  ABP: --  ABP(mean): --  RR: 16 (09 Sep 2022 08:00) (15 - 22)  SpO2: 100% (09 Sep 2022 08:00) (100% - 100%)    O2 Parameters below as of 09 Sep 2022 08:00  Patient On (Oxygen Delivery Method): room air         @ 07:01  -   @ 07:00  --------------------------------------------------------  IN: 870 mL / OUT: 1640 mL / NET: -770 mL      CAPILLARY BLOOD GLUCOSE      PHYSICAL EXAM:    General: NAD  HEENT: NC/AT; PERRL, clear conjunctiva  Neck: supple  Respiratory: CTA b/l- L cT in place to waterseal   Cardiovascular: +S1/S2; RRR  Abdomen: soft, NT/ND; +BS x4  : swollen testicles TTP  Extremities: WWP, 2+ peripheral pulses b/l; no LE edema  Skin: normal color and turgor; no rash  Neurological: A and O x 4 following commands     MEDICATIONS:  MEDICATIONS  (STANDING):  bictegravir 50 mG/emtricitabine 200 mG/tenofovir alafenamide 25 mG (BIKTARVY) 1 Tablet(s) Oral daily  chlorhexidine 2% Cloths 1 Application(s) Topical <User Schedule>  ferrous    sulfate 325 milliGRAM(s) Oral <User Schedule>  heparin  Infusion.  Unit(s)/Hr (11 mL/Hr) IV Continuous <Continuous>  lactated ringers. 1000 milliLiter(s) (75 mL/Hr) IV Continuous <Continuous>  lactulose Syrup 10 Gram(s) Oral daily  lidocaine   4% Patch 1 Patch Transdermal daily  morphine ER Tablet 30 milliGRAM(s) Oral every 12 hours  piperacillin/tazobactam IVPB.. 3.375 Gram(s) IV Intermittent every 8 hours  polyethylene glycol 3350 17 Gram(s) Oral daily  senna 2 Tablet(s) Oral at bedtime    MEDICATIONS  (PRN):  heparin   Injectable 4500 Unit(s) IV Push every 6 hours PRN For aPTT less than 40  heparin   Injectable 2000 Unit(s) IV Push every 6 hours PRN For aPTT between 40 - 57  HYDROmorphone  Injectable 0.5 milliGRAM(s) IV Push every 4 hours PRN Severe Pain (7 - 10)  ondansetron Injectable 4 milliGRAM(s) IV Push every 8 hours PRN Nausea and/or Vomiting      ALLERGIES:  Allergies    ibuprofen (Angioedema)    Intolerances        LABS:                        9.4    14.37 )-----------( 615      ( 09 Sep 2022 03:50 )             31.1         134<L>  |  97<L>  |  13  ----------------------------<  104<H>  4.3   |  32<H>  |  0.86    Ca    14.9<HH>      09 Sep 2022 03:50  Phos  3.4       Mg     1.90         TPro  6.3  /  Alb  2.3<L>  /  TBili  0.5  /  DBili  x   /  AST  22  /  ALT  12  /  AlkPhos  250<H>      PT/INR - ( 08 Sep 2022 20:24 )   PT: 14.1 sec;   INR: 1.21 ratio         PTT - ( 09 Sep 2022 03:50 )  PTT:38.2 sec  Urinalysis Basic - ( 08 Sep 2022 20:00 )    Color: Yellow / Appearance: Slightly Turbid / S.016 / pH: x  Gluc: x / Ketone: Negative  / Bili: Negative / Urobili: 3 mg/dL   Blood: x / Protein: Trace / Nitrite: Negative   Leuk Esterase: Negative / RBC: 80 /HPF / WBC 5 /HPF   Sq Epi: x / Non Sq Epi: 1 /HPF / Bacteria: Moderate        RADIOLOGY & ADDITIONAL TESTS: Reviewed.

## 2022-09-13 NOTE — PROGRESS NOTE ADULT - SUBJECTIVE AND OBJECTIVE BOX
Follow Up: HIV, MSSA    Interval History/ROS: Feels ok, pain is under control right now but wants more pain meds. No cough or diarrhea.     Allergies  ibuprofen (Angioedema)        ANTIMICROBIALS:  bictegravir 50 mG/emtricitabine 200 mG/tenofovir alafenamide 25 mG (BIKTARVY) 1 daily  ceFAZolin   IVPB 2000 every 8 hours      OTHER MEDS:  chlorhexidine 2% Cloths 1 Application(s) Topical <User Schedule>  ferrous    sulfate 325 milliGRAM(s) Oral <User Schedule>  heparin   Injectable 4500 Unit(s) IV Push every 6 hours PRN  heparin   Injectable 2000 Unit(s) IV Push every 6 hours PRN  heparin  Infusion. 1700 Unit(s)/Hr IV Continuous <Continuous>  HYDROmorphone  Injectable 1.5 milliGRAM(s) IV Push every 3 hours PRN  HYDROmorphone  Injectable 0.5 milliGRAM(s) IV Push every 3 hours PRN  lactated ringers. 1000 milliLiter(s) IV Continuous <Continuous>  lactulose Syrup 10 Gram(s) Oral daily  lidocaine   4% Patch 1 Patch Transdermal daily  melatonin 3 milliGRAM(s) Oral at bedtime  methadone    Tablet 2.5 milliGRAM(s) Oral two times a day  naloxegol 25 milliGRAM(s) Oral daily  ondansetron Injectable 4 milliGRAM(s) IV Push every 8 hours PRN  polyethylene glycol 3350 17 Gram(s) Oral two times a day  senna 2 Tablet(s) Oral at bedtime      Vital Signs Last 24 Hrs  T(C): 36.1 (13 Sep 2022 16:00), Max: 36.1 (13 Sep 2022 16:00)  T(F): 97 (13 Sep 2022 16:00), Max: 97 (13 Sep 2022 16:00)  HR: 93 (13 Sep 2022 18:00) (81 - 101)  BP: 134/83 (13 Sep 2022 18:00) (115/71 - 142/85)  BP(mean): 97 (13 Sep 2022 18:00) (68 - 97)  RR: 20 (13 Sep 2022 18:00) (15 - 23)  SpO2: 100% (13 Sep 2022 18:00) (98% - 100%)    Parameters below as of 13 Sep 2022 18:00  Patient On (Oxygen Delivery Method): room air        Physical Exam:  General: non toxic, resting   Cardio: regular rate   Respiratory: nonlabored on room air   abd: nondistended, nontender   Skin: no rash                          9.3    14.53 )-----------( 432      ( 13 Sep 2022 02:45 )             30.4       09-13    134<L>  |  97<L>  |  24<H>  ----------------------------<  100<H>  3.9   |  30  |  1.53<H>    Ca    14.5<HH>      13 Sep 2022 02:45  Phos  3.5     09-13  Mg     1.60     09-13    TPro  7.1  /  Alb  2.9<L>  /  TBili  0.4  /  DBili  x   /  AST  38  /  ALT  10  /  AlkPhos  268<H>  09-13          MICROBIOLOGY:  Culture - Sputum (collected 09-10-22 @ 18:15)  Source: .Sputum Sputum  Gram Stain (09-11-22 @ 09:10):    Numerous polymorphonuclear leukocytes per low power field    Few Squamous epithelial cells per low power field    Moderate Gram Negative Rods seen per oil power field    Rare Gram Positive Rods seen per oil power field    Numerous Yeast like cells seen peroil power field  Final Report (09-12-22 @ 22:13):    Moderate Klebsiella pneumoniae    Normal Respiratory Tamie present  Organism: Klebsiella pneumoniae (09-12-22 @ 22:13)  Organism: Klebsiella pneumoniae (09-12-22 @ 22:13)      -  Amikacin: S <=16      -  Amoxicillin/Clavulanic Acid: S <=8/4      -  Ampicillin: R >16 These ampicillin results predict results for amoxicillin      -  Ampicillin/Sulbactam: S 8/4 Enterobacter, Klebsiella aerogenes, Citrobacter, and Serratia may develop resistance during prolonged therapy (3-4 days)      -  Aztreonam: S <=4      -  Cefazolin: S <=2 Enterobacter, Klebsiella aerogenes, Citrobacter, and Serratia may develop resistance during prolonged therapy (3-4 days)      -  Cefepime: S <=2      -  Cefoxitin: S <=8      -  Ceftriaxone: S <=1 Enterobacter, Klebsiella aerogenes, Citrobacter, and Serratia may develop resistance during prolonged therapy      -  Ciprofloxacin: S <=0.25      -  Ertapenem: S <=0.5      -  Gentamicin: S <=2      -  Imipenem: S <=1      -  Levofloxacin: S <=0.5      -  Meropenem: S <=1      -  Piperacillin/Tazobactam: S <=8      -  Tobramycin: S <=2      -  Trimethoprim/Sulfamethoxazole: S <=0.5/9.5      Method Type: BALDEV    Culture - Body Fluid with Gram Stain (collected 09-09-22 @ 22:52)  Source: Pleural Fl Pleural Fluid  Gram Stain (09-10-22 @ 10:48):    Numerous polymorphonuclear leukocytes seen per low power field    No organisms seen per oil power field  Preliminary Report (09-11-22 @ 13:27):    Few Staphylococcus aureus  Organism: Staphylococcus aureus (09-12-22 @ 08:20)  Organism: Staphylococcus aureus (09-12-22 @ 08:20)      -  Ampicillin/Sulbactam: S <=8/4      -  Cefazolin: S <=4      -  Clindamycin: R <=0.25 This isolate is presumed to be clindamycin resistant based on detection of inducible resistance. Clindamycin may still be effective in some patients.      -  Erythromycin: R >4      -  Gentamicin: S 2 Should not be used as monotherapy      -  Oxacillin: S <=0.25 Oxacillin predicts susceptibility for dicloxacillin, methicillin, and nafcillin      -  Penicillin: R 4      -  Rifampin: S <=1 Should not be used as monotherapy      -  Tetra/Doxy: S <=1      -  Trimethoprim/Sulfamethoxazole: S <=0.5/9.5      -  Vancomycin: S 1      Method Type: BALDEV    Culture - Urine (collected 09-09-22 @ 12:00)  Source: Catheterized Catheterized  Final Report (09-10-22 @ 15:48):    No growth    Culture - Blood (collected 09-08-22 @ 20:00)  Source: .Blood Blood-Peripheral  Preliminary Report (09-10-22 @ 02:02):    No growth to date.    Culture - Fungal, Blood (collected 09-08-22 @ 17:35)  Source: .Blood Blood-Peripheral  Preliminary Report (09-10-22 @ 11:32):    Testing in progress    Culture - Blood (collected 09-08-22 @ 17:35)  Source: .Blood Blood-Venous  Preliminary Report (09-09-22 @ 22:01):    No growth to date.      HIV-1 RNA Quantitative, Viral Load Log: NOT DET. lg /mL (09-08-22 @ 20:24)    RADIOLOGY:  Images below reviewed personally  CT Chest Abdomen and Pelvis No Cont (09.11.22 @ 16:54)   1. Persistent left upper lobe cavitary lesion, with pigtail catheter in   place, likely communicating with the lingular bronchus.  2. Right middle lobe pneumonia and scattered airspace opacities   throughout the remaining lungs, as described.  3. Persistent pneumomediastinum and extensive subcutaneous emphysema of   the neck, chest, abdomen, pelvis, and extremities, including the scrotum.  4. Large anorectal mass.  5. Liver metastases.

## 2022-09-13 NOTE — BH CONSULTATION LIAISON ASSESSMENT NOTE - SUMMARY
As per chart review "31 M with PMH/ HIV, rectal cancer not on tx (followed up by Dr Frazier ([facility] oncology)), mets to liver and possibly bone, R eye cataract, who presented to the [facility] on 8/31/22 after syncopal episode at home. Pt with cough x 2 mos, with yellow sputum and shortness of breath since 5 days PTA.   -- at [facility]: pt was found to be hypoxemic. 8/31/22 -- L chest wall Pigtail placement. Pigtail was placed for suspected PMX with improvement in shortness of breath. CT chest was performed, which showed that pigtail is within the mass__ differential is TB, fungal infection, cavitary lesion, or squamous cell carcinoma. Pt was found to be anemic due to rectal bleeding with Hgb 5.9 s/p 2 U PRBC on 9/1. Blood cxs with MSSA bacteremia, and has been treated with Cefazolin and Zosyn.  Since 9/2 pt with worsening diffuse SC emphysema extending to the neck/face/all 4 extremities/scrotum/back , which has been getting progressively worse over past 2 days as per pt's statement. 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 TTE with two large RV masses and two additional small masses. 9/7-- s/p L lateral chest tube to 14 Fr. 9/7 -- s/p IVC Filter placement (as per [facility] statement, was placed prophylactically due to undiagnosed vegetations on the TTE. 12 cm multiloculated thick walled cavitary mass in the CHRISTINE and lingula.+ liver lesion on the CT a/p. + external iliac vein thrombosis, PE study was indeterminate   __ pt was transferred to [facility] as per family request (mother) for pt to be evaluated by his outpt oncology team (Dr Frazier). As per oncology team the plan is to start Carbotaxol q 3 weeks + RT for diffuse mets as per PET scan."    Patient with no formal psychiatric hx. Patient uses THC at home to self tx anxiety and help appetite. Patient also reports trying marinol before with + outcome.   NO inpt admissions, no hx of SA.     PLAN  - defer level of observation to primary team  - Agree with recommendation of holistic RN  - Patient requesting appetite stimulant - has been on marinol in the past? defer to MICU  - PRN for anxiety or mild agitation: if qtc < 500, seroquel 25mg q6hrs  - ongoing support and communication with both patient and family as needed

## 2022-09-13 NOTE — BH CONSULTATION LIAISON ASSESSMENT NOTE - HPI (INCLUDE ILLNESS QUALITY, SEVERITY, DURATION, TIMING, CONTEXT, MODIFYING FACTORS, ASSOCIATED SIGNS AND SYMPTOMS)
As per chart review "31 M with PMH/ HIV, rectal cancer not on tx (followed up by Dr Frazier ([facility] oncology)), mets to liver and possibly bone, R eye cataract, who presented to the [facility] on 8/31/22 after syncopal episode at home. Pt with cough x 2 mos, with yellow sputum and shortness of breath since 5 days PTA.   -- at [facility]: pt was found to be hypoxemic. 8/31/22 -- L chest wall Pigtail placement. Pigtail was placed for suspected PMX with improvement in shortness of breath. CT chest was performed, which showed that pigtail is within the mass__ differential is TB, fungal infection, cavitary lesion, or squamous cell carcinoma. Pt was found to be anemic due to rectal bleeding with Hgb 5.9 s/p 2 U PRBC on 9/1. Blood cxs with MSSA bacteremia, and has been treated with Cefazolin and Zosyn.  Since 9/2 pt with worsening diffuse SC emphysema extending to the neck/face/all 4 extremities/scrotum/back , which has been getting progressively worse over past 2 days as per pt's statement. 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 TTE with two large RV masses and two additional small masses. 9/7-- s/p L lateral chest tube to 14 Fr. 9/7 -- s/p IVC Filter placement (as per [facility] statement, was placed prophylactically due to undiagnosed vegetations on the TTE. 12 cm multiloculated thick walled cavitary mass in the CHRISTINE and lingula.+ liver lesion on the CT a/p. + external iliac vein thrombosis, PE study was indeterminate   __ pt was transferred to [facility] as per family request (mother) for pt to be evaluated by his outpt oncology team (Dr Frazier). As per oncology team the plan is to start Carbotaxol q 3 weeks + RT for diffuse mets as per PET scan."    Patient with no formal psychiatric hx. Patient uses THC at home to self tx anxiety and help appetite. Patient also reports trying marinol before with + outcome.   NO inpt admissions, no hx of SA.     Patient was seen and assessed at bedside. patient is alert, oriented, calm, cooperative. Is reporting pain and states at times when he is in pain this can cause him to become anxious or irritable "which im working on".  Patient reports no depression, feels hopeful and positive. He denies difficulty falling asleep but is interrupted by environmental factors. POor PO intake and poor appetite - states at home his anxiety and appetite are improved by THC. Has medical marijuana. Also has used Marinol for appetite in the past which helped him - interested in this at this time.  Patient has no SI or HI. no psychosis. Mom at bedside with no acute concerns.

## 2022-09-13 NOTE — CHART NOTE - NSCHARTNOTEFT_GEN_A_CORE
Pt seen. REsting in bed. Feels   breathing is improved since yesterday  No current CP or SOB.  Off oxygen at present    Vital Signs Last 24 Hrs  T(C): 35.6 (13 Sep 2022 08:00), Max: 36.1 (12 Sep 2022 16:00)  T(F): 96.1 (13 Sep 2022 08:00), Max: 97 (12 Sep 2022 16:00)  HR: 83 (13 Sep 2022 11:00) (81 - 112)  BP: 130/74 (13 Sep 2022 11:00) (115/71 - 142/85)  BP(mean): 68 (13 Sep 2022 11:00) (68 - 106)  RR: 22 (13 Sep 2022 11:00) (15 - 23)  SpO2: 100% (13 Sep 2022 11:00) (98% - 100%)    Parameters below as of 13 Sep 2022 11:00  Patient On (Oxygen Delivery Method): room air    A+O x 3  SQ air throughout arms, chest, abd  Minimal to neck and face  Left PTC to WS, purulent fluid noted throughout  tubing. Tubing stripped. Pleuravac to waterseal  No air leak seen by myself, however pt MICU team   + air leak seen this am    Per MICU, plans are to eventually clamp PTC if no air leak and eventually remove  Recommendations from Thoracic surgery remain the same  Will defer management to MICU team  Will cont to follow as needed. Pt seen. REsting in bed. Feels   breathing is improved since yesterday  No current CP or SOB.  Off oxygen at present    Vital Signs Last 24 Hrs  T(C): 35.6 (13 Sep 2022 08:00), Max: 36.1 (12 Sep 2022 16:00)  T(F): 96.1 (13 Sep 2022 08:00), Max: 97 (12 Sep 2022 16:00)  HR: 83 (13 Sep 2022 11:00) (81 - 112)  BP: 130/74 (13 Sep 2022 11:00) (115/71 - 142/85)  BP(mean): 68 (13 Sep 2022 11:00) (68 - 106)  RR: 22 (13 Sep 2022 11:00) (15 - 23)  SpO2: 100% (13 Sep 2022 11:00) (98% - 100%)    Parameters below as of 13 Sep 2022 11:00  Patient On (Oxygen Delivery Method): room air    A+O x 3  SQ air throughout arms, chest, abd  Minimal to neck and face  Left PTC to WS, purulent fluid noted throughout  tubing. Tubing stripped. Pleuravac to waterseal  No air leak seen by myself, however per MICU team   + air leak seen this am    Per MICU, plans are to eventually clamp PTC if no air leak and then remove.   Recommendations from Thoracic surgery remain the same  Will defer management to MICU team  Will cont to follow as needed.

## 2022-09-13 NOTE — BH CONSULTATION LIAISON ASSESSMENT NOTE - RISK ASSESSMENT
risk: male gender, acute illness  Protective: no SI or HI, no hx of SA< support from family, help seeking

## 2022-09-13 NOTE — PROGRESS NOTE ADULT - PROBLEM SELECTOR PLAN 3
- L pigtail catheter was placed at [facility] due to concern for L PTX. However, patient was found to have large CHRISTINE cavitary lesion instead, into which the pigtail had been placed.   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  - CardioThoracic recommendations appreciated  - management as per primary team.

## 2022-09-13 NOTE — BH CONSULTATION LIAISON ASSESSMENT NOTE - NSBHATTESTCOMMENTATTENDFT_PSY_A_CORE
Met with the patient today- 9/14. Anxious, worries about his ongoing pain, and asks if the medicine team has decided on an appetite stimulant. Discussed that the above, and also coordinated with medicine RN.    Case discussed with Vita Kwong NP impression and plan discussed and agreed upon.

## 2022-09-13 NOTE — BH CONSULTATION LIAISON ASSESSMENT NOTE - NSBHCHARTREVIEWVS_PSY_A_CORE FT
Vital Signs Last 24 Hrs  T(C): 36.1 (13 Sep 2022 16:00), Max: 36.1 (13 Sep 2022 16:00)  T(F): 97 (13 Sep 2022 16:00), Max: 97 (13 Sep 2022 16:00)  HR: 93 (13 Sep 2022 18:00) (81 - 101)  BP: 134/83 (13 Sep 2022 18:00) (115/71 - 142/85)  BP(mean): 97 (13 Sep 2022 18:00) (68 - 97)  RR: 20 (13 Sep 2022 18:00) (15 - 23)  SpO2: 100% (13 Sep 2022 18:00) (98% - 100%)    Parameters below as of 13 Sep 2022 18:00  Patient On (Oxygen Delivery Method): room air

## 2022-09-13 NOTE — PROGRESS NOTE ADULT - PROBLEM SELECTOR PLAN 4
- TTE: mobile mass, possibly in association with the tricuspid valve This may represent tumour, thrombus, or vegetation.  - management as per primary team.

## 2022-09-13 NOTE — BH CONSULTATION LIAISON ASSESSMENT NOTE - NSBHCONSULTFOLLOWAFTERCARE_PSY_A_CORE FT
unclear what services patient will require on dc. if goes home...  [facility] [phone] (M-F 9am-7pm)

## 2022-09-13 NOTE — PROGRESS NOTE ADULT - ASSESSMENT
31 y o M w/ HIV/AIDS (on HAART), Metastatic Rectal Ca (Not yet on chemo) who was initially admitted to [facility] on 8/31 after presenting with productive cough for 2 months and then acute SOB x 5 days. L pigtail catheter was placed due to concern for L PTX. However, patient was found to have large CHRISTINE cavitary lesion instead, into which the pigtail had been placed. 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

## 2022-09-13 NOTE — BH CONSULTATION LIAISON ASSESSMENT NOTE - CURRENT MEDICATION
MEDICATIONS  (STANDING):  bictegravir 50 mG/emtricitabine 200 mG/tenofovir alafenamide 25 mG (BIKTARVY) 1 Tablet(s) Oral daily  ceFAZolin   IVPB 2000 milliGRAM(s) IV Intermittent every 8 hours  chlorhexidine 2% Cloths 1 Application(s) Topical <User Schedule>  ferrous    sulfate 325 milliGRAM(s) Oral <User Schedule>  heparin  Infusion. 1700 Unit(s)/Hr (17 mL/Hr) IV Continuous <Continuous>  lactated ringers. 1000 milliLiter(s) (100 mL/Hr) IV Continuous <Continuous>  lactulose Syrup 10 Gram(s) Oral daily  lidocaine   4% Patch 1 Patch Transdermal daily  melatonin 3 milliGRAM(s) Oral at bedtime  methadone    Tablet 2.5 milliGRAM(s) Oral two times a day  naloxegol 25 milliGRAM(s) Oral daily  polyethylene glycol 3350 17 Gram(s) Oral two times a day  senna 2 Tablet(s) Oral at bedtime    MEDICATIONS  (PRN):  heparin   Injectable 4500 Unit(s) IV Push every 6 hours PRN For aPTT less than 40  heparin   Injectable 2000 Unit(s) IV Push every 6 hours PRN For aPTT between 40 - 57  HYDROmorphone  Injectable 1.5 milliGRAM(s) IV Push every 3 hours PRN Severe Pain (7 - 10)  HYDROmorphone  Injectable 0.5 milliGRAM(s) IV Push every 3 hours PRN Moderate Pain (4 - 6)  ondansetron Injectable 4 milliGRAM(s) IV Push every 8 hours PRN Nausea and/or Vomiting

## 2022-09-13 NOTE — PROGRESS NOTE ADULT - PROBLEM SELECTOR PLAN 1
- Pain from subcutaneous emphysema in setting of underlying cancer pain  - Continue PO Methadone 2.5mg q12 (started on 9/12) (QTC-444 on 9/12); monitor QTC closely while on Methadone  - Increase to IV Dilaudid 1.5mg q3 PRN (hold for hypotension, oversedation, respiratory depression).  - Patient amenable to Holistic RN referral and Behavioral Health referral

## 2022-09-13 NOTE — BH CONSULTATION LIAISON ASSESSMENT NOTE - NSBHCHARTREVIEWLAB_PSY_A_CORE FT
9.3    14.53 )-----------( 432      ( 13 Sep 2022 02:45 )             30.4   09-13    134<L>  |  97<L>  |  24<H>  ----------------------------<  100<H>  3.9   |  30  |  1.53<H>    Ca    14.5<HH>      13 Sep 2022 02:45  Phos  3.5     09-13  Mg     1.60     09-13    TPro  7.1  /  Alb  2.9<L>  /  TBili  0.4  /  DBili  x   /  AST  38  /  ALT  10  /  AlkPhos  268<H>  09-13

## 2022-09-14 LAB
ALBUMIN SERPL ELPH-MCNC: 0.7 G/DL — LOW (ref 3.3–5)
ALBUMIN SERPL ELPH-MCNC: 1.7 G/DL — LOW (ref 3.3–5)
ALBUMIN SERPL ELPH-MCNC: 2.7 G/DL — LOW (ref 3.3–5)
ALP SERPL-CCNC: 178 U/L — HIGH (ref 40–120)
ALP SERPL-CCNC: 238 U/L — HIGH (ref 40–120)
ALP SERPL-CCNC: 73 U/L — SIGNIFICANT CHANGE UP (ref 40–120)
ALT FLD-CCNC: 6 U/L — SIGNIFICANT CHANGE UP (ref 4–41)
ALT FLD-CCNC: 9 U/L — SIGNIFICANT CHANGE UP (ref 4–41)
ALT FLD-CCNC: <5 U/L — LOW (ref 4–41)
ANION GAP SERPL CALC-SCNC: 13 MMOL/L — SIGNIFICANT CHANGE UP (ref 7–14)
ANION GAP SERPL CALC-SCNC: 25 MMOL/L — HIGH (ref 7–14)
ANION GAP SERPL CALC-SCNC: 9 MMOL/L — SIGNIFICANT CHANGE UP (ref 7–14)
APTT BLD: 60.3 SEC — HIGH (ref 27–36.3)
AST SERPL-CCNC: 10 U/L — SIGNIFICANT CHANGE UP (ref 4–40)
AST SERPL-CCNC: 25 U/L — SIGNIFICANT CHANGE UP (ref 4–40)
AST SERPL-CCNC: 32 U/L — SIGNIFICANT CHANGE UP (ref 4–40)
BASOPHILS # BLD AUTO: 0.02 K/UL — SIGNIFICANT CHANGE UP (ref 0–0.2)
BASOPHILS # BLD AUTO: 0.03 K/UL — SIGNIFICANT CHANGE UP (ref 0–0.2)
BASOPHILS # BLD AUTO: 0.03 K/UL — SIGNIFICANT CHANGE UP (ref 0–0.2)
BASOPHILS NFR BLD AUTO: 0.2 % — SIGNIFICANT CHANGE UP (ref 0–2)
BILIRUB SERPL-MCNC: 0.2 MG/DL — SIGNIFICANT CHANGE UP (ref 0.2–1.2)
BILIRUB SERPL-MCNC: 0.4 MG/DL — SIGNIFICANT CHANGE UP (ref 0.2–1.2)
BILIRUB SERPL-MCNC: <0.2 MG/DL — SIGNIFICANT CHANGE UP (ref 0.2–1.2)
BLOOD GAS ARTERIAL - LYTES,HGB,ICA,LACT RESULT: SIGNIFICANT CHANGE UP
BUN SERPL-MCNC: 16 MG/DL — SIGNIFICANT CHANGE UP (ref 7–23)
BUN SERPL-MCNC: 18 MG/DL — SIGNIFICANT CHANGE UP (ref 7–23)
BUN SERPL-MCNC: 7 MG/DL — SIGNIFICANT CHANGE UP (ref 7–23)
CALCIUM SERPL-MCNC: 10.9 MG/DL — HIGH (ref 8.4–10.5)
CALCIUM SERPL-MCNC: 12.5 MG/DL — HIGH (ref 8.4–10.5)
CALCIUM SERPL-MCNC: 7.3 MG/DL — LOW (ref 8.4–10.5)
CHLORIDE SERPL-SCNC: 100 MMOL/L — SIGNIFICANT CHANGE UP (ref 98–107)
CHLORIDE SERPL-SCNC: 101 MMOL/L — SIGNIFICANT CHANGE UP (ref 98–107)
CHLORIDE SERPL-SCNC: 94 MMOL/L — LOW (ref 98–107)
CO2 SERPL-SCNC: 10 MMOL/L — CRITICAL LOW (ref 22–31)
CO2 SERPL-SCNC: 20 MMOL/L — LOW (ref 22–31)
CO2 SERPL-SCNC: 27 MMOL/L — SIGNIFICANT CHANGE UP (ref 22–31)
CREAT SERPL-MCNC: 0.37 MG/DL — LOW (ref 0.5–1.3)
CREAT SERPL-MCNC: 1.01 MG/DL — SIGNIFICANT CHANGE UP (ref 0.5–1.3)
CREAT SERPL-MCNC: 1.24 MG/DL — SIGNIFICANT CHANGE UP (ref 0.5–1.3)
CULTURE RESULTS: SIGNIFICANT CHANGE UP
EGFR: 101 ML/MIN/1.73M2 — SIGNIFICANT CHANGE UP
EGFR: 151 ML/MIN/1.73M2 — SIGNIFICANT CHANGE UP
EGFR: 79 ML/MIN/1.73M2 — SIGNIFICANT CHANGE UP
EOSINOPHIL # BLD AUTO: 0.16 K/UL — SIGNIFICANT CHANGE UP (ref 0–0.5)
EOSINOPHIL # BLD AUTO: 0.18 K/UL — SIGNIFICANT CHANGE UP (ref 0–0.5)
EOSINOPHIL # BLD AUTO: 0.22 K/UL — SIGNIFICANT CHANGE UP (ref 0–0.5)
EOSINOPHIL NFR BLD AUTO: 1.4 % — SIGNIFICANT CHANGE UP (ref 0–6)
EOSINOPHIL NFR BLD AUTO: 1.6 % — SIGNIFICANT CHANGE UP (ref 0–6)
EOSINOPHIL NFR BLD AUTO: 1.6 % — SIGNIFICANT CHANGE UP (ref 0–6)
GLUCOSE SERPL-MCNC: 247 MG/DL — HIGH (ref 70–99)
GLUCOSE SERPL-MCNC: 76 MG/DL — SIGNIFICANT CHANGE UP (ref 70–99)
GLUCOSE SERPL-MCNC: 99 MG/DL — SIGNIFICANT CHANGE UP (ref 70–99)
HCT VFR BLD CALC: 21.9 % — LOW (ref 39–50)
HCT VFR BLD CALC: 28 % — LOW (ref 39–50)
HCT VFR BLD CALC: 28.5 % — LOW (ref 39–50)
HGB BLD-MCNC: 6.4 G/DL — CRITICAL LOW (ref 13–17)
HGB BLD-MCNC: 8.4 G/DL — LOW (ref 13–17)
HGB BLD-MCNC: 8.9 G/DL — LOW (ref 13–17)
IANC: 11.19 K/UL — HIGH (ref 1.8–7.4)
IANC: 11.21 K/UL — HIGH (ref 1.8–7.4)
IANC: 8.52 K/UL — HIGH (ref 1.8–7.4)
IMM GRANULOCYTES NFR BLD AUTO: 0.4 % — SIGNIFICANT CHANGE UP (ref 0–1.5)
IMM GRANULOCYTES NFR BLD AUTO: 0.5 % — SIGNIFICANT CHANGE UP (ref 0–1.5)
IMM GRANULOCYTES NFR BLD AUTO: 0.6 % — SIGNIFICANT CHANGE UP (ref 0–1.5)
LYMPHOCYTES # BLD AUTO: 0.93 K/UL — LOW (ref 1–3.3)
LYMPHOCYTES # BLD AUTO: 1.25 K/UL — SIGNIFICANT CHANGE UP (ref 1–3.3)
LYMPHOCYTES # BLD AUTO: 1.35 K/UL — SIGNIFICANT CHANGE UP (ref 1–3.3)
LYMPHOCYTES # BLD AUTO: 10 % — LOW (ref 13–44)
LYMPHOCYTES # BLD AUTO: 9.2 % — LOW (ref 13–44)
LYMPHOCYTES # BLD AUTO: 9.4 % — LOW (ref 13–44)
MAGNESIUM SERPL-MCNC: 0.9 MG/DL — CRITICAL LOW (ref 1.6–2.6)
MAGNESIUM SERPL-MCNC: 1.1 MG/DL — LOW (ref 1.6–2.6)
MAGNESIUM SERPL-MCNC: 2.1 MG/DL — SIGNIFICANT CHANGE UP (ref 1.6–2.6)
MCHC RBC-ENTMCNC: 23.6 PG — LOW (ref 27–34)
MCHC RBC-ENTMCNC: 23.8 PG — LOW (ref 27–34)
MCHC RBC-ENTMCNC: 24.3 PG — LOW (ref 27–34)
MCHC RBC-ENTMCNC: 29.2 GM/DL — LOW (ref 32–36)
MCHC RBC-ENTMCNC: 30 GM/DL — LOW (ref 32–36)
MCHC RBC-ENTMCNC: 31.2 GM/DL — LOW (ref 32–36)
MCV RBC AUTO: 77.7 FL — LOW (ref 80–100)
MCV RBC AUTO: 79.3 FL — LOW (ref 80–100)
MCV RBC AUTO: 80.8 FL — SIGNIFICANT CHANGE UP (ref 80–100)
MONOCYTES # BLD AUTO: 0.47 K/UL — SIGNIFICANT CHANGE UP (ref 0–0.9)
MONOCYTES # BLD AUTO: 0.58 K/UL — SIGNIFICANT CHANGE UP (ref 0–0.9)
MONOCYTES # BLD AUTO: 0.65 K/UL — SIGNIFICANT CHANGE UP (ref 0–0.9)
MONOCYTES NFR BLD AUTO: 4.4 % — SIGNIFICANT CHANGE UP (ref 2–14)
MONOCYTES NFR BLD AUTO: 4.6 % — SIGNIFICANT CHANGE UP (ref 2–14)
MONOCYTES NFR BLD AUTO: 4.8 % — SIGNIFICANT CHANGE UP (ref 2–14)
NEUTROPHILS # BLD AUTO: 11.19 K/UL — HIGH (ref 1.8–7.4)
NEUTROPHILS # BLD AUTO: 11.21 K/UL — HIGH (ref 1.8–7.4)
NEUTROPHILS # BLD AUTO: 8.52 K/UL — HIGH (ref 1.8–7.4)
NEUTROPHILS NFR BLD AUTO: 83 % — HIGH (ref 43–77)
NEUTROPHILS NFR BLD AUTO: 83.8 % — HIGH (ref 43–77)
NEUTROPHILS NFR BLD AUTO: 84.1 % — HIGH (ref 43–77)
NRBC # BLD: 0 /100 WBCS — SIGNIFICANT CHANGE UP (ref 0–0)
NRBC # FLD: 0 K/UL — SIGNIFICANT CHANGE UP (ref 0–0)
PHOSPHATE SERPL-MCNC: 1.7 MG/DL — LOW (ref 2.5–4.5)
PHOSPHATE SERPL-MCNC: 3 MG/DL — SIGNIFICANT CHANGE UP (ref 2.5–4.5)
PHOSPHATE SERPL-MCNC: 5.6 MG/DL — HIGH (ref 2.5–4.5)
PLATELET # BLD AUTO: 224 K/UL — SIGNIFICANT CHANGE UP (ref 150–400)
PLATELET # BLD AUTO: 261 K/UL — SIGNIFICANT CHANGE UP (ref 150–400)
PLATELET # BLD AUTO: 335 K/UL — SIGNIFICANT CHANGE UP (ref 150–400)
POTASSIUM SERPL-MCNC: 3.7 MMOL/L — SIGNIFICANT CHANGE UP (ref 3.5–5.3)
POTASSIUM SERPL-MCNC: 3.8 MMOL/L — SIGNIFICANT CHANGE UP (ref 3.5–5.3)
POTASSIUM SERPL-MCNC: 4 MMOL/L — SIGNIFICANT CHANGE UP (ref 3.5–5.3)
POTASSIUM SERPL-SCNC: 3.7 MMOL/L — SIGNIFICANT CHANGE UP (ref 3.5–5.3)
POTASSIUM SERPL-SCNC: 3.8 MMOL/L — SIGNIFICANT CHANGE UP (ref 3.5–5.3)
POTASSIUM SERPL-SCNC: 4 MMOL/L — SIGNIFICANT CHANGE UP (ref 3.5–5.3)
PROT SERPL-MCNC: 2.1 G/DL — LOW (ref 6–8.3)
PROT SERPL-MCNC: 4.8 G/DL — LOW (ref 6–8.3)
PROT SERPL-MCNC: 6.7 G/DL — SIGNIFICANT CHANGE UP (ref 6–8.3)
PTH-INTACT FLD-MCNC: 3 PG/ML — LOW (ref 15–65)
PTH-INTACT FLD-MCNC: 5 PG/ML — LOW (ref 15–65)
RBC # BLD: 2.71 M/UL — LOW (ref 4.2–5.8)
RBC # BLD: 3.53 M/UL — LOW (ref 4.2–5.8)
RBC # BLD: 3.67 M/UL — LOW (ref 4.2–5.8)
RBC # FLD: 22.6 % — HIGH (ref 10.3–14.5)
RBC # FLD: 22.9 % — HIGH (ref 10.3–14.5)
RBC # FLD: 22.9 % — HIGH (ref 10.3–14.5)
SODIUM SERPL-SCNC: 129 MMOL/L — LOW (ref 135–145)
SODIUM SERPL-SCNC: 134 MMOL/L — LOW (ref 135–145)
SODIUM SERPL-SCNC: 136 MMOL/L — SIGNIFICANT CHANGE UP (ref 135–145)
SPECIMEN SOURCE: SIGNIFICANT CHANGE UP
WBC # BLD: 10.16 K/UL — SIGNIFICANT CHANGE UP (ref 3.8–10.5)
WBC # BLD: 13.29 K/UL — HIGH (ref 3.8–10.5)
WBC # BLD: 13.52 K/UL — HIGH (ref 3.8–10.5)
WBC # FLD AUTO: 10.16 K/UL — SIGNIFICANT CHANGE UP (ref 3.8–10.5)
WBC # FLD AUTO: 13.29 K/UL — HIGH (ref 3.8–10.5)
WBC # FLD AUTO: 13.52 K/UL — HIGH (ref 3.8–10.5)

## 2022-09-14 PROCEDURE — 90792 PSYCH DIAG EVAL W/MED SRVCS: CPT

## 2022-09-14 PROCEDURE — 99291 CRITICAL CARE FIRST HOUR: CPT

## 2022-09-14 PROCEDURE — 36600 WITHDRAWAL OF ARTERIAL BLOOD: CPT

## 2022-09-14 RX ORDER — MAGNESIUM SULFATE 500 MG/ML
2 VIAL (ML) INJECTION
Refills: 0 | Status: DISCONTINUED | OUTPATIENT
Start: 2022-09-14 | End: 2022-09-14

## 2022-09-14 RX ORDER — DRONABINOL 2.5 MG
2.5 CAPSULE ORAL ONCE
Refills: 0 | Status: DISCONTINUED | OUTPATIENT
Start: 2022-09-14 | End: 2022-09-14

## 2022-09-14 RX ORDER — POTASSIUM CHLORIDE 20 MEQ
40 PACKET (EA) ORAL ONCE
Refills: 0 | Status: COMPLETED | OUTPATIENT
Start: 2022-09-14 | End: 2022-09-14

## 2022-09-14 RX ORDER — HYDROMORPHONE HYDROCHLORIDE 2 MG/ML
1 INJECTION INTRAMUSCULAR; INTRAVENOUS; SUBCUTANEOUS ONCE
Refills: 0 | Status: DISCONTINUED | OUTPATIENT
Start: 2022-09-14 | End: 2022-09-14

## 2022-09-14 RX ORDER — METHYLNALTREXONE BROMIDE 12 MG/.6ML
8 INJECTION, SOLUTION SUBCUTANEOUS ONCE
Refills: 0 | Status: COMPLETED | OUTPATIENT
Start: 2022-09-14 | End: 2022-09-14

## 2022-09-14 RX ORDER — DRONABINOL 2.5 MG
2.5 CAPSULE ORAL
Refills: 0 | Status: DISCONTINUED | OUTPATIENT
Start: 2022-09-14 | End: 2022-09-15

## 2022-09-14 RX ORDER — MAGNESIUM SULFATE 500 MG/ML
2 VIAL (ML) INJECTION
Refills: 0 | Status: COMPLETED | OUTPATIENT
Start: 2022-09-14 | End: 2022-09-14

## 2022-09-14 RX ORDER — POTASSIUM CHLORIDE 20 MEQ
20 PACKET (EA) ORAL ONCE
Refills: 0 | Status: COMPLETED | OUTPATIENT
Start: 2022-09-14 | End: 2022-09-14

## 2022-09-14 RX ORDER — SODIUM CHLORIDE 9 MG/ML
1000 INJECTION, SOLUTION INTRAVENOUS
Refills: 0 | Status: DISCONTINUED | OUTPATIENT
Start: 2022-09-14 | End: 2022-09-15

## 2022-09-14 RX ORDER — HYDROMORPHONE HYDROCHLORIDE 2 MG/ML
0.5 INJECTION INTRAMUSCULAR; INTRAVENOUS; SUBCUTANEOUS ONCE
Refills: 0 | Status: DISCONTINUED | OUTPATIENT
Start: 2022-09-14 | End: 2022-09-14

## 2022-09-14 RX ADMIN — LIDOCAINE 1 PATCH: 4 CREAM TOPICAL at 11:43

## 2022-09-14 RX ADMIN — HYDROMORPHONE HYDROCHLORIDE 1 MILLIGRAM(S): 2 INJECTION INTRAMUSCULAR; INTRAVENOUS; SUBCUTANEOUS at 19:00

## 2022-09-14 RX ADMIN — Medication 100 MILLIGRAM(S): at 11:43

## 2022-09-14 RX ADMIN — HYDROMORPHONE HYDROCHLORIDE 0.5 MILLIGRAM(S): 2 INJECTION INTRAMUSCULAR; INTRAVENOUS; SUBCUTANEOUS at 09:13

## 2022-09-14 RX ADMIN — HYDROMORPHONE HYDROCHLORIDE 0.5 MILLIGRAM(S): 2 INJECTION INTRAMUSCULAR; INTRAVENOUS; SUBCUTANEOUS at 03:00

## 2022-09-14 RX ADMIN — HYDROMORPHONE HYDROCHLORIDE 1.5 MILLIGRAM(S): 2 INJECTION INTRAMUSCULAR; INTRAVENOUS; SUBCUTANEOUS at 13:06

## 2022-09-14 RX ADMIN — HYDROMORPHONE HYDROCHLORIDE 1.5 MILLIGRAM(S): 2 INJECTION INTRAMUSCULAR; INTRAVENOUS; SUBCUTANEOUS at 10:15

## 2022-09-14 RX ADMIN — HYDROMORPHONE HYDROCHLORIDE 1.5 MILLIGRAM(S): 2 INJECTION INTRAMUSCULAR; INTRAVENOUS; SUBCUTANEOUS at 00:15

## 2022-09-14 RX ADMIN — HEPARIN SODIUM 1800 UNIT(S)/HR: 5000 INJECTION INTRAVENOUS; SUBCUTANEOUS at 03:36

## 2022-09-14 RX ADMIN — Medication 25 GRAM(S): at 18:30

## 2022-09-14 RX ADMIN — LIDOCAINE 1 PATCH: 4 CREAM TOPICAL at 23:17

## 2022-09-14 RX ADMIN — HYDROMORPHONE HYDROCHLORIDE 1.5 MILLIGRAM(S): 2 INJECTION INTRAMUSCULAR; INTRAVENOUS; SUBCUTANEOUS at 04:00

## 2022-09-14 RX ADMIN — HYDROMORPHONE HYDROCHLORIDE 1 MILLIGRAM(S): 2 INJECTION INTRAMUSCULAR; INTRAVENOUS; SUBCUTANEOUS at 18:30

## 2022-09-14 RX ADMIN — HYDROMORPHONE HYDROCHLORIDE 0.5 MILLIGRAM(S): 2 INJECTION INTRAMUSCULAR; INTRAVENOUS; SUBCUTANEOUS at 14:43

## 2022-09-14 RX ADMIN — HYDROMORPHONE HYDROCHLORIDE 0.5 MILLIGRAM(S): 2 INJECTION INTRAMUSCULAR; INTRAVENOUS; SUBCUTANEOUS at 20:35

## 2022-09-14 RX ADMIN — Medication 25 GRAM(S): at 08:03

## 2022-09-14 RX ADMIN — Medication 3 MILLIGRAM(S): at 22:06

## 2022-09-14 RX ADMIN — HYDROMORPHONE HYDROCHLORIDE 0.5 MILLIGRAM(S): 2 INJECTION INTRAMUSCULAR; INTRAVENOUS; SUBCUTANEOUS at 23:17

## 2022-09-14 RX ADMIN — HYDROMORPHONE HYDROCHLORIDE 0.5 MILLIGRAM(S): 2 INJECTION INTRAMUSCULAR; INTRAVENOUS; SUBCUTANEOUS at 11:00

## 2022-09-14 RX ADMIN — HYDROMORPHONE HYDROCHLORIDE 1.5 MILLIGRAM(S): 2 INJECTION INTRAMUSCULAR; INTRAVENOUS; SUBCUTANEOUS at 06:48

## 2022-09-14 RX ADMIN — HYDROMORPHONE HYDROCHLORIDE 1.5 MILLIGRAM(S): 2 INJECTION INTRAMUSCULAR; INTRAVENOUS; SUBCUTANEOUS at 00:00

## 2022-09-14 RX ADMIN — Medication 85 MILLIMOLE(S): at 08:22

## 2022-09-14 RX ADMIN — HYDROMORPHONE HYDROCHLORIDE 0.5 MILLIGRAM(S): 2 INJECTION INTRAMUSCULAR; INTRAVENOUS; SUBCUTANEOUS at 15:15

## 2022-09-14 RX ADMIN — CHLORHEXIDINE GLUCONATE 1 APPLICATION(S): 213 SOLUTION TOPICAL at 06:37

## 2022-09-14 RX ADMIN — Medication 25 GRAM(S): at 10:31

## 2022-09-14 RX ADMIN — HYDROMORPHONE HYDROCHLORIDE 1.5 MILLIGRAM(S): 2 INJECTION INTRAMUSCULAR; INTRAVENOUS; SUBCUTANEOUS at 13:30

## 2022-09-14 RX ADMIN — HYDROMORPHONE HYDROCHLORIDE 1.5 MILLIGRAM(S): 2 INJECTION INTRAMUSCULAR; INTRAVENOUS; SUBCUTANEOUS at 21:51

## 2022-09-14 RX ADMIN — NALOXEGOL OXALATE 25 MILLIGRAM(S): 12.5 TABLET, FILM COATED ORAL at 11:42

## 2022-09-14 RX ADMIN — Medication 2.5 MILLIGRAM(S): at 17:17

## 2022-09-14 RX ADMIN — SODIUM CHLORIDE 100 MILLILITER(S): 9 INJECTION, SOLUTION INTRAVENOUS at 09:15

## 2022-09-14 RX ADMIN — Medication 2.5 MILLIGRAM(S): at 18:30

## 2022-09-14 RX ADMIN — HYDROMORPHONE HYDROCHLORIDE 0.5 MILLIGRAM(S): 2 INJECTION INTRAMUSCULAR; INTRAVENOUS; SUBCUTANEOUS at 02:06

## 2022-09-14 RX ADMIN — Medication 20 MILLIEQUIVALENT(S): at 18:31

## 2022-09-14 RX ADMIN — HYDROMORPHONE HYDROCHLORIDE 1.5 MILLIGRAM(S): 2 INJECTION INTRAMUSCULAR; INTRAVENOUS; SUBCUTANEOUS at 03:29

## 2022-09-14 RX ADMIN — HYDROMORPHONE HYDROCHLORIDE 0.5 MILLIGRAM(S): 2 INJECTION INTRAMUSCULAR; INTRAVENOUS; SUBCUTANEOUS at 17:55

## 2022-09-14 RX ADMIN — LIDOCAINE 1 PATCH: 4 CREAM TOPICAL at 23:00

## 2022-09-14 RX ADMIN — POLYETHYLENE GLYCOL 3350 17 GRAM(S): 17 POWDER, FOR SOLUTION ORAL at 17:20

## 2022-09-14 RX ADMIN — HYDROMORPHONE HYDROCHLORIDE 1.5 MILLIGRAM(S): 2 INJECTION INTRAMUSCULAR; INTRAVENOUS; SUBCUTANEOUS at 09:48

## 2022-09-14 RX ADMIN — SODIUM CHLORIDE 100 MILLILITER(S): 9 INJECTION, SOLUTION INTRAVENOUS at 07:24

## 2022-09-14 RX ADMIN — HYDROMORPHONE HYDROCHLORIDE 0.5 MILLIGRAM(S): 2 INJECTION INTRAMUSCULAR; INTRAVENOUS; SUBCUTANEOUS at 18:39

## 2022-09-14 RX ADMIN — Medication 100 MILLIGRAM(S): at 20:36

## 2022-09-14 RX ADMIN — HYDROMORPHONE HYDROCHLORIDE 0.5 MILLIGRAM(S): 2 INJECTION INTRAMUSCULAR; INTRAVENOUS; SUBCUTANEOUS at 08:09

## 2022-09-14 RX ADMIN — BICTEGRAVIR SODIUM, EMTRICITABINE, AND TENOFOVIR ALAFENAMIDE FUMARATE 1 TABLET(S): 30; 120; 15 TABLET ORAL at 11:42

## 2022-09-14 RX ADMIN — HEPARIN SODIUM 1800 UNIT(S)/HR: 5000 INJECTION INTRAVENOUS; SUBCUTANEOUS at 07:24

## 2022-09-14 RX ADMIN — SENNA PLUS 2 TABLET(S): 8.6 TABLET ORAL at 22:07

## 2022-09-14 RX ADMIN — HYDROMORPHONE HYDROCHLORIDE 0.5 MILLIGRAM(S): 2 INJECTION INTRAMUSCULAR; INTRAVENOUS; SUBCUTANEOUS at 11:41

## 2022-09-14 RX ADMIN — HYDROMORPHONE HYDROCHLORIDE 1.5 MILLIGRAM(S): 2 INJECTION INTRAMUSCULAR; INTRAVENOUS; SUBCUTANEOUS at 23:18

## 2022-09-14 RX ADMIN — METHADONE HYDROCHLORIDE 2.5 MILLIGRAM(S): 40 TABLET ORAL at 06:36

## 2022-09-14 RX ADMIN — LACTULOSE 10 GRAM(S): 10 SOLUTION ORAL at 11:42

## 2022-09-14 RX ADMIN — HYDROMORPHONE HYDROCHLORIDE 1.5 MILLIGRAM(S): 2 INJECTION INTRAMUSCULAR; INTRAVENOUS; SUBCUTANEOUS at 16:06

## 2022-09-14 RX ADMIN — Medication 40 MILLIEQUIVALENT(S): at 06:36

## 2022-09-14 RX ADMIN — Medication 100 MILLIGRAM(S): at 03:29

## 2022-09-14 RX ADMIN — HYDROMORPHONE HYDROCHLORIDE 1.5 MILLIGRAM(S): 2 INJECTION INTRAMUSCULAR; INTRAVENOUS; SUBCUTANEOUS at 06:33

## 2022-09-14 RX ADMIN — LIDOCAINE 1 PATCH: 4 CREAM TOPICAL at 00:06

## 2022-09-14 RX ADMIN — METHADONE HYDROCHLORIDE 2.5 MILLIGRAM(S): 40 TABLET ORAL at 17:18

## 2022-09-14 RX ADMIN — METHYLNALTREXONE BROMIDE 8 MILLIGRAM(S): 12 INJECTION, SOLUTION SUBCUTANEOUS at 10:50

## 2022-09-14 RX ADMIN — HYDROMORPHONE HYDROCHLORIDE 1.5 MILLIGRAM(S): 2 INJECTION INTRAMUSCULAR; INTRAVENOUS; SUBCUTANEOUS at 16:29

## 2022-09-14 NOTE — PROGRESS NOTE ADULT - ASSESSMENT
31 M with PMH/ HIV, rectal cancer not on tx (followed up by Dr Frazier ([facility] oncology)), mets to liver and possibly bone,  R eye cataract, who presented to the [facility] on 8/31/22 after syncopal episode at home. Pt with cough x 2 mos, with yellow sputum and shortness of breath since 5 days PTA.   At [facility]: pt was found to be hypoxemic. 8/31/22 - L chest wall Pigtail placement. Pigtail was placed for suspected PMX with improvement in shortness of breath. CT chest was performed, which showed that pigtail is within the mass w/ DDx of TB, fungal infection, cavitary lesion, or squamous cell carcinoma. Pt was found to be anemic due to rectal bleeding with Hgb 5.9 s/p 2 U PRBC on 9/1. Blood cxs with MSSA bacteremia, and has been treated with Cefazolin and Zosyn.  Since 9/2 pt with worsening diffuse SC emphysema extending to the neck/face/all 4 extremities/scrotum/back , which has been getting progressively worse over past 2 days as per pt's statement. 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 TTE with two large RV masses and two additional small masses. 9/7- s/p L lateral chest tube to 14 Fr. 9/7 - s/p IVC Filter placement (as per [facility] statement, was placed prophylactically due to undiagnosed vegetations on the TTE. 12 cm multiloculated thick walled cavitary mass in the CHRISTINE and lingula.+ liver lesion on the CT a/p. + external iliac vein thrombosis, PE study was indeterminate     Pt was transferred to [facility] as per family request (mother) for pt to be evaluated by his outpt oncology team (Dr Frazier). As per oncology team the plan was to start Carbotaxol q 3 weeks + RT for diffuse mets as per PET scan.    Neuro  -A & O x 3 , no active issues  - not on sedation  - C/o of severe scrotal/rectal pain, Morphine ER 30 mg BID Dc'd 9/12 in setting of TELMA, increased Dilaudid 1.5 mg q3hrs for severe pain. and 0.5 mg q3hrs for breakthrough, he has frequently required extra pushes of dilaudid and IV tylenol the past.  - palliative care consulted for assistance w/ pain regimen- pt was on methadone 2.5mg qd and Oxycontin oupt , will check qtc and restart methadone if WNLs.   - pt is appropriately overwhelmed by his complex medical condition, holistic RN and Behavioral health consulted.    CV:   - hemodynamically stable, remains off vasopressors   TTE with two large RV masses and two additional small masses. 9/7-- s/p L lateral chest tube to 14 Fr. 9/7 -- s/p IVC Filter placement (as per [facility] statement, was placed prophylactically due to undiagnosed vegetations on the TTE.  - pt is hemodynamically stable no pressors   - repeat TTE 9/9 - Limited and technically difficult study with views limited to the subcostal window.  Very limited views demonstrate what appears to be a mobile mass, possibly in association with the tricuspid valve. This may represent tumour, thrombus, or vegetation. Consider JENNIFER for further evaluation, if clinically indicated.  - per cardiology risk of JENNIFER outweighs benefit,  will obtain cardiac MRI instead to evaluate RV mass /RV thrombosis      Pulm:  #CHRISTINE cavitary lesion - Differential includes cavitary PNA (sputum and blood cultures positive for MSSA) vs malignancy given history of metastatic rectal CA vs atypical infection. Of note PET/CT in our system from 8/2022 with only few subcm nodules in L and GGO in R lung, making malignancy as etiology of cavitary lesion less likely.   - L pigtail catheter was placed at [facility] due to concern for L PTX. However, patient was found to have large CHRISTINE cavitary lesion instead, into which the pigtail had been placed.   -On 9/7 patient had L pigtail replaced (currently 14F) as well as IVC filter placement. ([facility])   9/10-- s/p MIST (10 mg of Alteplase injected into pleural space)   - f/u sputum cultures-- sent on 9/10  - Pleural fluid Cx neg 9/9  - f/u aspergillus,, histo, blasto, coccidio.-- in lab  - serum fungitell and crypto Ag  - neg   - s/p Zosyn , now on Cefazolin x 6 weeks per ID recs   - CT to water seal, place to suction only for transfer if off the unit, no output from chest tube   - CTA 9/9- Findings most in keeping with cavitary pneumonia involving lingula and left lower lobe complicated by bronchopleural fistula as described with tract around the small caliber pleural pigtail catheter, extensive subcutaneous emphysema and pneumomediastinum. Nonopacification of lingular and left lower lobe pulmonary arteries as described likely secondary to cavitary pneumonia rather than thromboembolic pulmonary embolism  - AFB x 3 are negative at [facility]   - CT Sx recommended MIST protocol, held pending repeat CT /chest to evaluate if pig tail is still in place as there was no drainage from the cavitary lesion/ space, repeat CT chest 9/11 showed pigtail still in cavity.   - 9/13- flushed chest tube, noted w/ with thick secretions and small airleak at times. Will continue w/ chest tube for now as there is continued purulent drainage and air. When output improves will consider clamping trials.   - Flush chest tube BID.     # bronchopleural fistula.  - L CT in place, keep to water seal, repeat CTH completed, results as above  - CT surgery consulted- Recommend MIST protocol x1 dose through current pigtail catheter due to concern for clogged pigtail ,also rec to consult IR for image guided pigtail catheter (pneumonostomy) placement into the pneumatocele, as well as blow hole placement to evacuate subQ emphysema  holding off for now. plan to clamp pigtail tomorrow morning and assess if it can be pulled. if needs another chest tube surgical would be ideal, discussed w/ CT surgery PA.   - repeat CT /chest 9/11- Persistent left upper lobe cavitary lesion, with pigtail catheter in place, likely communicating with the lingular bronchus. 2. Right middle lobe pneumonia and scattered airspace opacities throughout the remaining lungs, as described. 3. Persistent pneumomediastinum and extensive subcutaneous emphysema of the neck, chest, abdomen, pelvis, and extremities, including the scrotum. 4. Large anorectal mass. 5. Liver metastases.  - No operative thoracic surgical interventions planned at this time    GI:   regular diet,   - cont bowel regimen w/ lactulose, senna, miralax BID  - no BM reported   - 9/12 ordered naloxegol, monitor for BM , will give resistor tomorrow if no BM    Renal:   TELMA, likely prerenal,   - Cr-- 1.27>1.38-> 1.61. Cr rising 9/12, s/p 1L NS and standing IVFs. continue w/ LR @ 100cc/hr. Cr improved slightlt to 1.53 today.   - UOP: LOS net is neg 1.1L/ 24 hrs net is +982L, voids about /hr  - continue trending renal function   - Strict Is and Os    #Hypercalcemia - ?related to malignancy  - Check PTH- in lab, PTHrp - in lab  - Vit D levels low, per heme/onc hold off supplementation until Ca normalized as can worsen hyperCa in setting of hypercalcemia of malignancy  - Ca 16.4 - 9/10, 17 corrected for low serum albumin. per Heme/onc recs, s/p (9/10) calcitonin 4u/kg x 2 doses and pamidronate 90mcg IVPB x1. Calcium level today is 14.5, continues to downtrend   - cont IVFs w/ LR @ 100cc/hr  - continue to trend levels q 24 hrs     #scrotal swelling likely due SC emphysema, with severe pain  - indwelling pedraza was placed at [facility] (Coude placed for urinary strain)   - UA positive with moderate bacteria 9/9- UCx 9/9 neg   - pain control with dilaudid and methadone, ct a/p 9/11 did not show any inflammation/hydrocele/infection .  - Urology consulted, recommended to wrap scrotum, offers no other intervention at this time.      ID:  # MSSA bacteremia at [facility]  - Sputum cultures from 9/1 grew MSSA and Blood cultures from 8/31 grew MSSA with blood cultures from 9/2 NGTD. Legionella negative. Sputum AFB negative x3. Fungitell and galactomannan negative. Patient had been empirically on Zosyn, Cefazolin and Caspofungin (which was dc'ed).  - TTE 9/9 with possible vegetation on tricuspid valve   - s/p Zosyn (9/8 - 9/9), transitioned to Cefazolin per ID recs on 9/9, plan for 6 week course  - Pleural fluid(cavitary lesion) Cx 9/9 - grew few staph aureus+  - Blood Cx neg 9/8  - UA 9/8 w/  some bacteria, UCx 9/9 neg   - CXR with L cavitary lesion, seen again on CT chest 9/9 and 9/11  - fungitell and crypto neg 9/10  - Sputum Cx  9/10 + mod klebsiella pneumoniae  - ID consulted, recs appreciated  - afebrile overnight, WBC stable 14-15s    # HIV  - Follows with Dr. Marcial in clinic  - CD4 392 and viral load < 30 on 9/2/22 as per ID note   - Viral load undetectable 9/8  - c/w home Biktarvy , monitor Cr if worsening will d/c    Hematology:   # rectal CA, possible RV mass, mets to liver and possibly bone,  # metastatic HPV related anal SCC,  -- Hem/Onc--With regards to treatment of metastatic HPV related anal SCC, treatment will be on hold pending resolution of infection. Will need reassessment after resolution of infection to evaluate performance status and decide on systemic treatment options  -Pal care consult for pain management placed on 9/10  -When stable and after the above, recommend radiation oncology consult for consideration of RT to anal mass for palliation.      Hematology   #cardiac ? mass/thrombus/vegetation   -Lovenox held at [facility] and s/p 9/7-- IVC filter    #anemia   thrombocytosis,  AOCD, ferritin 512, iron 11, TIBC-- 150, Iron -- 7 %, transferrin -- 122, ([place])  -- Haptoglobin -- 308, LDL-- 240, Urica acid-- 3.4   - H/H stable - 9.5/30.6 . plts- 466    # L external iliac vein DVT  - on heparin gtt  - ptt q 24 hrs, aPTT therapeutic-   - Duplex b/l LEs 9/10 neg for DVT    Endo:  - not diabetic, HgbA1C 5.1     Code: Full code  Palliative consult placed 9/10    Lines:   PIV, indwelling pedraza, L PTC,   31 M with PMH/ HIV, rectal cancer not on tx (followed up by Dr Frazier ([facility] oncology)), mets to liver and possibly bone,  R eye cataract, who presented to the [facility] on 8/31/22 after syncopal episode at home. Pt with cough x 2 mos, with yellow sputum and shortness of breath since 5 days PTA.   At [facility]: pt was found to be hypoxemic. 8/31/22 - L chest wall Pigtail placement. Pigtail was placed for suspected PMX with improvement in shortness of breath. CT chest was performed, which showed that pigtail is within the mass w/ DDx of TB, fungal infection, cavitary lesion, or squamous cell carcinoma. Pt was found to be anemic due to rectal bleeding with Hgb 5.9 s/p 2 U PRBC on 9/1. Blood cxs with MSSA bacteremia, and has been treated with Cefazolin and Zosyn.  Since 9/2 pt with worsening diffuse SC emphysema extending to the neck/face/all 4 extremities/scrotum/back , which has been getting progressively worse over past 2 days as per pt's statement. 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 TTE with two large RV masses and two additional small masses. 9/7- s/p L lateral chest tube to 14 Fr. 9/7 - s/p IVC Filter placement (as per [facility] statement, was placed prophylactically due to undiagnosed vegetations on the TTE. 12 cm multiloculated thick walled cavitary mass in the CHRISTINE and lingula.+ liver lesion on the CT a/p. + external iliac vein thrombosis, PE study was indeterminate     Pt was transferred to [facility] as per family request (mother) for pt to be evaluated by his outpt oncology team (Dr Frazier). As per oncology team the plan was to start Carbotaxol q 3 weeks + RT for diffuse mets as per PET scan.    Neuro  -A & O x 3 , no active issues  - not on sedation  - C/o of severe scrotal/rectal pain/pelvic pain, Morphine ER 30 mg BID Dc'd 9/12 in setting of TELMA, increased Dilaudid 1.5 mg q3hrs for severe pain. and 0.5 mg q3hrs for breakthrough, he has frequently required extra pushes of dilaudid and IV tylenol the past.  - palliative care consulted for assistance w/ pain regimen- pt was on methadone 2.5mg qd and Oxycontin oupt , will checked qtc and restarted methadone  - pt is appropriately overwhelmed by his complex medical condition, holistic RN and Behavioral health consulted.    CV:   - hemodynamically stable, remains off vasopressors   TTE with two large RV masses and two additional small masses. 9/7-- s/p L lateral chest tube to 14 Fr. 9/7 -- s/p IVC Filter placement (as per [facility] statement, was placed prophylactically due to undiagnosed vegetations on the TTE.  - pt is hemodynamically stable no pressors   - repeat TTE 9/9 - Limited and technically difficult study with views limited to the subcostal window.  Very limited views demonstrate what appears to be a mobile mass, possibly in association with the tricuspid valve. This may represent tumour, thrombus, or vegetation. Consider JENNIFER for further evaluation, if clinically indicated.  - per cardiology risk of JENNIFER outweighs benefit,  will obtain cardiac MRI instead to evaluate RV mass /RV thrombosis      Pulm:  #CHRISTINE cavitary lesion - Differential includes cavitary PNA (sputum and blood cultures positive for MSSA) vs malignancy given history of metastatic rectal CA vs atypical infection. Of note PET/CT in our system from 8/2022 with only few subcm nodules in L and GGO in R lung, making malignancy as etiology of cavitary lesion less likely.   - L pigtail catheter was placed at [facility] due to concern for L PTX. However, patient was found to have large CHRISTINE cavitary lesion instead, into which the pigtail had been placed.   -On 9/7 patient had L pigtail replaced (currently 14F) as well as IVC filter placement. ([facility])   9/10-- s/p MIST (10 mg of Alteplase injected into pleural space)   - f/u sputum cultures-- sent on 9/10  - Pleural fluid Cx neg 9/9  - f/u aspergillus,, histo, blasto, coccidio.-- in lab  - serum fungitell and crypto Ag  - neg   - s/p Zosyn , now on Cefazolin x 6 weeks per ID recs   - CT to water seal, place to suction only for transfer if off the unit, no output from chest tube   - CTA 9/9- Findings most in keeping with cavitary pneumonia involving lingula and left lower lobe complicated by bronchopleural fistula as described with tract around the small caliber pleural pigtail catheter, extensive subcutaneous emphysema and pneumomediastinum. Nonopacification of lingular and left lower lobe pulmonary arteries as described likely secondary to cavitary pneumonia rather than thromboembolic pulmonary embolism  - AFB x 3 are negative at [facility]   - CT Sx recommended MIST protocol, held pending repeat CT /chest to evaluate if pig tail is still in place as there was no drainage from the cavitary lesion/ space, repeat CT chest 9/11 showed pigtail still in cavity.   - 9/13- flushed chest tube, noted w/ with thick secretions and small airleak at times. Will continue w/ chest tube for now as there is continued purulent drainage and air. When output improves will consider clamping trials.   - Flush chest tube BID.     # bronchopleural fistula.  - L CT in place, keep to water seal, repeat CTH completed, results as above  - CT surgery consulted- Recommend MIST protocol x1 dose through current pigtail catheter due to concern for clogged pigtail ,also rec to consult IR for image guided pigtail catheter (pneumonostomy) placement into the pneumatocele, as well as blow hole placement to evacuate subQ emphysema  holding off for now. plan to clamp pigtail tomorrow morning and assess if it can be pulled. if needs another chest tube surgical would be ideal, discussed w/ CT surgery PA.   - repeat CT /chest 9/11- Persistent left upper lobe cavitary lesion, with pigtail catheter in place, likely communicating with the lingular bronchus. 2. Right middle lobe pneumonia and scattered airspace opacities throughout the remaining lungs, as described. 3. Persistent pneumomediastinum and extensive subcutaneous emphysema of the neck, chest, abdomen, pelvis, and extremities, including the scrotum. 4. Large anorectal mass. 5. Liver metastases.  - No operative thoracic surgical interventions planned at this time    GI:   regular diet,   - cont bowel regimen w/ lactulose, senna, miralax BID  - no BM reported   - 9/12 ordered naloxegol, monitor for BM , will give resistor tomorrow if no BM    Renal:   TELMA, likely prerenal,   - Cr-- 1.27>1.38-> 1.61. Cr rising 9/12, s/p 1L NS and standing IVFs. continue w/ LR @ 100cc/hr. Cr improved slightlt to 1.53 today.   - UOP: LOS net is neg 1.1L/ 24 hrs net is +982L, voids about /hr  - continue trending renal function   - Strict Is and Os    #Hypercalcemia - ?related to malignancy  - Check PTH- in lab, PTHrp - in lab  - Vit D levels low, per heme/onc hold off supplementation until Ca normalized as can worsen hyperCa in setting of hypercalcemia of malignancy  - Ca 16.4 - 9/10, 17 corrected for low serum albumin. per Heme/onc recs, s/p (9/10) calcitonin 4u/kg x 2 doses and pamidronate 90mcg IVPB x1. Calcium level today is 14.5, continues to downtrend   - cont IVFs w/ LR @ 100cc/hr  - continue to trend levels q 24 hrs     #scrotal swelling likely due SC emphysema, with severe pain  - indwelling pedraza was placed at [facility] (Coude placed for urinary strain)   - UA positive with moderate bacteria 9/9- UCx 9/9 neg   - pain control with dilaudid and methadone, ct a/p 9/11 did not show any inflammation/hydrocele/infection .  - Urology consulted, recommended to wrap scrotum, offers no other intervention at this time.      ID:  # MSSA bacteremia at [facility]  - Sputum cultures from 9/1 grew MSSA and Blood cultures from 8/31 grew MSSA with blood cultures from 9/2 NGTD. Legionella negative. Sputum AFB negative x3. Fungitell and galactomannan negative. Patient had been empirically on Zosyn, Cefazolin and Caspofungin (which was dc'ed).  - TTE 9/9 with possible vegetation on tricuspid valve   - s/p Zosyn (9/8 - 9/9), transitioned to Cefazolin per ID recs on 9/9, plan for 6 week course  - Pleural fluid(cavitary lesion) Cx 9/9 - grew few staph aureus+  - Blood Cx neg 9/8  - UA 9/8 w/  some bacteria, UCx 9/9 neg   - CXR with L cavitary lesion, seen again on CT chest 9/9 and 9/11  - fungitell and crypto neg 9/10  - Sputum Cx  9/10 + mod klebsiella pneumoniae  - ID consulted, recs appreciated  - afebrile overnight, WBC stable 14-15s    # HIV  - Follows with Dr. Marcial in clinic  - CD4 392 and viral load < 30 on 9/2/22 as per ID note   - Viral load undetectable 9/8  - c/w home Biktarvy , monitor Cr if worsening will d/c    Hematology:   # rectal CA, possible RV mass, mets to liver and possibly bone,  # metastatic HPV related anal SCC,  -- Hem/Onc--With regards to treatment of metastatic HPV related anal SCC, treatment will be on hold pending resolution of infection. Will need reassessment after resolution of infection to evaluate performance status and decide on systemic treatment options  -Pal care consult for pain management placed on 9/10  -When stable and after the above, recommend radiation oncology consult for consideration of RT to anal mass for palliation.      Hematology   #cardiac ? mass/thrombus/vegetation   -Lovenox held at [facility] and s/p 9/7-- IVC filter    #anemia   thrombocytosis,  AOCD, ferritin 512, iron 11, TIBC-- 150, Iron -- 7 %, transferrin -- 122, ([facility])  -- Haptoglobin -- 308, LDL-- 240, Urica acid-- 3.4   - H/H stable - 9.5/30.6 . plts- 466    # L external iliac vein DVT  - on heparin gtt  - ptt q 24 hrs, aPTT therapeutic-   - Duplex b/l LEs 9/10 neg for DVT    Endo:  - not diabetic, HgbA1C 5.1     Code: Full code  Palliative consult placed 9/10    Lines:   PIV, indwelling pedraza, L PTC,   31 M with PMH/ HIV, rectal cancer not on tx (followed up by Dr Frazier ([facility] oncology)), mets to liver and possibly bone,  R eye cataract, who presented to the [facility] on 8/31/22 after syncopal episode at home. Pt with cough x 2 mos, with yellow sputum and shortness of breath since 5 days PTA.   At [facility]: pt was found to be hypoxemic. 8/31/22 - L chest wall Pigtail placement. Pigtail was placed for suspected PMX with improvement in shortness of breath. CT chest was performed, which showed that pigtail is within the mass w/ DDx of TB, fungal infection, cavitary lesion, or squamous cell carcinoma. Pt was found to be anemic due to rectal bleeding with Hgb 5.9 s/p 2 U PRBC on 9/1. Blood cxs with MSSA bacteremia, and has been treated with Cefazolin and Zosyn.  Since 9/2 pt with worsening diffuse SC emphysema extending to the neck/face/all 4 extremities/scrotum/back , which has been getting progressively worse over past 2 days as per pt's statement. 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 TTE with two large RV masses and two additional small masses. 9/7- s/p L lateral chest tube to 14 Fr. 9/7 - s/p IVC Filter placement (as per [facility] statement, was placed prophylactically due to undiagnosed vegetations on the TTE. 12 cm multiloculated thick walled cavitary mass in the CHRISTINE and lingula.+ liver lesion on the CT a/p. + external iliac vein thrombosis, PE study was indeterminate     Pt was transferred to [facility] as per family request (mother) for pt to be evaluated by his outpt oncology team (Dr Frazier). As per oncology team the plan was to start Carbotaxol q 3 weeks + RT for diffuse mets as per PET scan.    Neuro  -A & O x 3 , no active issues  - not on sedation  - C/o of severe scrotal/rectal pain/pelvic pain, Morphine ER 30 mg BID Dc'd 9/12 in setting of TELMA, increased Dilaudid 1.5 mg q3hrs for severe pain. and 0.5 mg q3hrs for breakthrough, he has frequently required extra pushes of dilaudid and IV tylenol the past.  - palliative care consulted for assistance w/ pain regimen- pt was on methadone 2.5mg qd and Oxycontin oupt , will checked qtc and restarted methadone  - pt is appropriately overwhelmed by his complex medical condition, holistic RN and Behavioral health consulted.  recommended Marinol     CV:   - hemodynamically stable, remains off vasopressors   TTE with two large RV masses and two additional small masses. 9/7-- s/p L lateral chest tube to 14 Fr. 9/7 -- s/p IVC Filter placement (as per [facility] statement, was placed prophylactically due to undiagnosed vegetations on the TTE.  - pt is hemodynamically stable no pressors   - repeat TTE 9/9 - Limited and technically difficult study with views limited to the subcostal window.  Very limited views demonstrate what appears to be a mobile mass, possibly in association with the tricuspid valve. This may represent tumour, thrombus, or vegetation. Consider JENNIFER for further evaluation, if clinically indicated.  - per cardiology risk of JENNIFER outweighs benefit,  will obtain cardiac MRI instead to evaluate RV mass /RV thrombosis      Pulm:  #CHRISTINE cavitary lesion - Differential includes cavitary PNA (sputum and blood cultures positive for MSSA) vs malignancy given history of metastatic rectal CA vs atypical infection. Of note PET/CT in our system from 8/2022 with only few subcm nodules in L and GGO in R lung, making malignancy as etiology of cavitary lesion less likely.   - L pigtail catheter was placed at [facility] due to concern for L PTX. However, patient was found to have large CHRISTINE cavitary lesion instead, into which the pigtail had been placed.   -On 9/7 patient had L pigtail replaced (currently 14F) as well as IVC filter placement. ([facility])   9/10-- s/p MIST (10 mg of Alteplase injected into pleural space) __ cancelled   - f/u sputum cultures-- sent on 9/10-- Klebsiella   - Pleural fluid Cx (from cavitory lesion_ neg 9/9  - f/u aspergillus,, histo, blasto, coccidio.-- in lab  - serum fungitell and crypto Ag  - neg   - s/p Zosyn , now on Cefazolin x 6 weeks per ID recs   - CT to water seal, place to suction only for transfer if off the unit, no output from chest tube   -- continue PTC to [facility],   - CTA 9/9- Findings most in keeping with cavitary pneumonia involving lingula and left lower lobe complicated by bronchopleural fistula as described with tract around the small caliber pleural pigtail catheter, extensive subcutaneous emphysema and pneumomediastinum. Nonopacification of lingular and left lower lobe pulmonary arteries as described likely secondary to cavitary pneumonia rather than thromboembolic pulmonary embolism  - AFB x 3 are negative at [facility]   - CT Sx recommended MIST protocol, held pending repeat CT /chest to evaluate if pig tail is still in place as there was no drainage from the cavitary lesion/ space, repeat CT chest 9/11 showed pigtail still in cavitary lesion cavity.   - 9/13- flushed chest tube, noted w/ with thick secretions and small airleak at times. Will continue w/ chest tube for now as there is continued purulent drainage and air. When output improves will consider clamping trials.   -- -- pigtail catheter needs to be flushed q shift _- 9/14__ was flushed with NS and 15 cc of purulent material was drained,       # bronchopleural fistula.  - L CT in place, keep to water seal, repeat CT completed, results as above  - CT surgery consulted- Recommend MIST protocol x1 dose through current pigtail catheter due to concern for clogged pigtail ,also rec to consult IR for image guided pigtail catheter (pneumonostomy) placement into the pneumatocele, as well as blow hole placement to evacuate subQ emphysema  holding off for now. plan to clamp pigtail tomorrow morning and assess if it can be pulled. if needs another chest tube surgical would be ideal, discussed w/ CT surgery PA.   - repeat CT /chest 9/11- Persistent left upper lobe cavitary lesion, with pigtail catheter in place, likely communicating with the lingular bronchus. 2. Right middle lobe pneumonia and scattered airspace opacities throughout the remaining lungs, as described. 3. Persistent pneumomediastinum and extensive subcutaneous emphysema of the neck, chest, abdomen, pelvis, and extremities, including the scrotum. 4. Large anorectal mass. 5. Liver metastases.  - No operative thoracic surgical interventions planned at this time    GI:   regular diet,   - cont bowel regimen w/ lactulose, senna, miralax BID  -- Pt with poor PO intake _ -9/14-- started on Marinol   - no BM reported   - cont Senna, Miralax, lactulose daily, 9/12-- started on naloxegol, monitor for BM , s/p 8 mg of Relistor (9/14)    Renal:   TELMA, likely prerenal,   - Cr-- 1.27>1.38-> 1.61>1.5>1.01 Cr rising 9/12, and now improving, s/p 1L NS and standing IVFs. Continue w/ LR @ 100cc/hr. C  - UOP: LOS net is neg 1.2 L/ 24 hrs net is +982L, voids about /hr  - continue trending renal function   - Strict Is and Os    #Hypercalcemia - ?related to malignancy  - Check PTH- in lab, PTHrp - in lab  - Vit D levels low, per heme/onc hold off supplementation until Ca normalized as can worsen hyperCa in setting of hypercalcemia of malignancy  - Ca 16.4 - 9/10, 17 corrected for low serum albumin. per Heme/onc recs, s/p (9/10) calcitonin 4u/kg x 2 doses and pamidronate 90mcg IVPB x1. Calcium level today is 14.5, continues to downtrend   - cont IVFs w/ LR @ 100cc/hr  - continue to trend levels q 24 hrs     #scrotal swelling likely due SC emphysema, with severe pain  - indwelling pedraza was placed at [facility] (Coude placed for urinary strain)   - UA positive with moderate bacteria 9/9- UCx 9/9 neg   - pain control with dilaudid and methadone, ct a/p 9/11 did not show any inflammation/hydrocele/infection .  - Urology consulted, recommended to wrap scrotum, offers no other intervention at this time.      ID:  # MSSA bacteremia at [facility]  - Sputum cultures from 9/1 grew MSSA and Blood cultures from 8/31 grew MSSA with blood cultures from 9/2 NGTD. Legionella negative. Sputum AFB negative x3. Fungitell and galactomannan negative. Patient had been empirically on Zosyn, Cefazolin and Caspofungin (which was dc'ed).  - TTE 9/9 with possible vegetation on tricuspid valve   - s/p Zosyn (9/8 - 9/9), transitioned to Cefazolin per ID recs on 9/9, plan for 6 week course  - Pleural fluid(cavitary lesion) Cx 9/9 - grew few staph aureus+  - Blood Cx neg 9/8  - UA 9/8 w/  some bacteria, UCx 9/9 neg   - CXR with L cavitary lesion, seen again on CT chest 9/9 and 9/11  - fungitell and crypto neg 9/10  - Sputum Cx  9/10 + mod klebsiella pneumoniae  - ID consulted, recs appreciated  - afebrile overnight, WBC stable 14-15s    # HIV  - Follows with Dr. Marcial in clinic  - CD4 392 and viral load < 30 on 9/2/22 as per ID note   - Viral load undetectable 9/8  - c/w home Biktarvy , monitor Cr if worsening will d/c    Hematology:   # rectal CA, possible RV mass, mets to liver and possibly bone,  # metastatic HPV related anal SCC,  -- Hem/Onc--With regards to treatment of metastatic HPV related anal SCC, treatment will be on hold pending resolution of infection. Will need reassessment after resolution of infection to evaluate performance status and decide on systemic treatment options  -Pal care consult for pain management placed on 9/10  -When stable and after the above, recommend radiation oncology consult for consideration of RT to anal mass for palliation.      Hematology   #cardiac ? mass/thrombus/vegetation   -Lovenox held at [place] and s/p 9/7-- IVC filter    #anemia   thrombocytosis,  AOCD, ferritin 512, iron 11, TIBC-- 150, Iron -- 7 %, transferrin -- 122, ([place])  -- Haptoglobin -- 308, LDL-- 240, Urica acid-- 3.4   - H/H stable - 9.5/30.6 . plts- 466    # L external iliac vein DVT  - on heparin gtt  - ptt q 24 hrs, aPTT therapeutic-   - Duplex b/l LEs 9/10 neg for DVT    Endo:  - not diabetic, HgbA1C 5.1     Code: Full code  Palliative consult placed 9/10    Lines:   PIV, indwelling pedraza, L PTC,   31 M with PMH/ HIV, rectal cancer not on tx (followed up by Dr Frazier ([facility] oncology)), mets to liver and possibly bone,  R eye cataract, who presented to the [facility] on 8/31/22 after syncopal episode at home. Pt with cough x 2 mos, with yellow sputum and shortness of breath since 5 days PTA.   At [facility]: pt was found to be hypoxemic. 8/31/22 - L chest wall Pigtail placement. Pigtail was placed for suspected PMX with improvement in shortness of breath. CT chest was performed, which showed that pigtail is within the mass w/ DDx of TB, fungal infection, cavitary lesion, or squamous cell carcinoma. Pt was found to be anemic due to rectal bleeding with Hgb 5.9 s/p 2 U PRBC on 9/1. Blood cxs with MSSA bacteremia, and has been treated with Cefazolin and Zosyn.  Since 9/2 pt with worsening diffuse SC emphysema extending to the neck/face/all 4 extremities/scrotum/back , which has been getting progressively worse over past 2 days as per pt's statement. 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 TTE with two large RV masses and two additional small masses. 9/7- s/p L lateral chest tube to 14 Fr. 9/7 - s/p IVC Filter placement (as per [facility] statement, was placed prophylactically due to undiagnosed vegetations on the TTE. 12 cm multiloculated thick walled cavitary mass in the CHRISTINE and lingula.+ liver lesion on the CT a/p. + external iliac vein thrombosis, PE study was indeterminate     Pt was transferred to [facility] as per family request (mother) for pt to be evaluated by his outpt oncology team (Dr Frazier). As per oncology team the plan was to start Carbotaxol q 3 weeks + RT for diffuse mets as per PET scan.    Neuro  -A & O x 3 , no active issues  - not on sedation  - C/o of severe scrotal/rectal pain/pelvic pain, Morphine ER 30 mg BID Dc'd 9/12 in setting of TELMA, increased Dilaudid 1.5 mg q3hrs for severe pain. and 0.5 mg q3hrs for breakthrough, he has frequently required extra pushes of dilaudid and IV tylenol the past.  - palliative care consulted for assistance w/ pain regimen- pt was on methadone 2.5mg qd and Oxycontin oupt , will checked qtc and restarted methadone  - pt is appropriately overwhelmed by his complex medical condition, holistic RN and Behavioral health consulted.  recommended Marinol     CV:   - hemodynamically stable, remains off vasopressors   TTE with two large RV masses and two additional small masses. 9/7-- s/p L lateral chest tube to 14 Fr. 9/7 -- s/p IVC Filter placement (as per [facility] statement, was placed prophylactically due to undiagnosed vegetations on the TTE.  - pt is hemodynamically stable no pressors   - repeat TTE 9/9 - Limited and technically difficult study with views limited to the subcostal window.  Very limited views demonstrate what appears to be a mobile mass, possibly in association with the tricuspid valve. This may represent tumour, thrombus, or vegetation. Consider JENNIFER for further evaluation, if clinically indicated.  - per cardiology risk of JENNIFER outweighs benefit,  will obtain cardiac MRI instead to evaluate RV mass /RV thrombosis      Pulm:  #CHRISTINE cavitary lesion - Differential includes cavitary PNA (sputum and blood cultures positive for MSSA) vs malignancy given history of metastatic rectal CA vs atypical infection. Of note PET/CT in our system from 8/2022 with only few subcm nodules in L and GGO in R lung, making malignancy as etiology of cavitary lesion less likely.   - L pigtail catheter was placed at [facility] due to concern for L PTX. However, patient was found to have large CHRISTINE cavitary lesion instead, into which the pigtail had been placed.   -On 9/7 patient had L pigtail replaced (currently 14F) as well as IVC filter placement. ([facility])   9/10-- s/p MIST (10 mg of Alteplase injected into pleural space) __ cancelled   - f/u sputum cultures-- sent on 9/10-- Klebsiella __ cont cefazolin   -  fluid Cx (from cavitory lesion)_ neg 9/9  - f/u aspergillus,, histo, blasto, coccidio.-- in lab  - serum fungitell and crypto Ag  - neg   - s/p Zosyn , now on Cefazolin x 6 weeks per ID recs   - CT to water seal, place to suction only for transfer if off the unit, no output from chest tube   -- continue PTC to [facility],   - CTA 9/9- Findings most in keeping with cavitary pneumonia involving lingula and left lower lobe complicated by bronchopleural fistula as described with tract around the small caliber pleural pigtail catheter, extensive subcutaneous emphysema and pneumomediastinum. Nonopacification of lingular and left lower lobe pulmonary arteries as described likely secondary to cavitary pneumonia rather than thromboembolic pulmonary embolism  - AFB x 3 are negative at [facility]   - CT Sx recommended MIST protocol, held pending repeat CT /chest to evaluate if pig tail is still in place as there was no drainage from the cavitary lesion/ space, repeat CT chest 9/11 showed pigtail still in cavitary lesion cavity.   - 9/13- flushed chest tube, noted w/ with thick secretions and small airleak at times. Will continue w/ chest tube for now as there is continued purulent drainage and air. When output improves will consider clamping trials.   -- -- pigtail catheter needs to be flushed q shift _- 9/14__ was flushed with NS and 15 cc of purulent material was drained, __ the plan for CT to be removed once output from the PTC decreased       # bronchopleural fistula.  - L CT in place, keep to water seal, repeat CT completed, results as above  - CT surgery consulted- Recommend MIST protocol x1 dose through current pigtail catheter due to concern for clogged pigtail ,also rec to consult IR for image guided pigtail catheter (pneumonostomy) placement into the pneumatocele, as well as blow hole placement to evacuate subQ emphysema  holding off for now. plan to clamp pigtail tomorrow morning and assess if it can be pulled. if needs another chest tube surgical would be ideal, discussed w/ CT surgery PA.   - repeat CT /chest 9/11- Persistent left upper lobe cavitary lesion, with pigtail catheter in place, likely communicating with the lingular bronchus. 2. Right middle lobe pneumonia and scattered airspace opacities throughout the remaining lungs, as described. 3. Persistent pneumomediastinum and extensive subcutaneous emphysema of the neck, chest, abdomen, pelvis, and extremities, including the scrotum. 4. Large anorectal mass. 5. Liver metastases.  - No operative thoracic surgical interventions planned at this time    GI:   regular diet,   - cont bowel regimen w/ lactulose, senna, miralax BID  -- Pt with poor PO intake _ -9/14-- started on Marinol     #constipation, in the setting of opiates and only hypercalcemia   - no BM reported   - cont Senna, Miralax, lactulose daily, 9/12-- started on naloxegol, monitor for BM , s/p 8 mg of Relistor (9/14)    Renal:   TELMA, likely prerenal,   - Cr-- 1.27>1.38-> 1.61>1.5>1.01 Cr rising 9/12, and now improving, s/p 1L NS and standing IVFs. Continue w/ LR @ 100cc/hr. C  - UOP: LOS net is neg 1.2 L/ 24 hrs net is +982L, voids about /hr, voided 2.65 in 24 hrs   - continue trending renal function   - Strict Is and Os    #Hypercalcemia - ?related to malignancy  - Check PTH- sent on 9/14,, PTHrp - in lab  - Vit D levels low, per heme/onc hold off supplementation until Ca normalized as can worsen hyperCa in setting of hypercalcemia of malignancy  - Ca 16.4 - 9/10, 17 corrected for low serum albumin. per Heme/onc recs, s/p (9/10) calcitonin 4u/kg x 2 doses and pamidronate 90mcg IVPB x1. Calcium level today is 10.9__   - cont IVFs w/ LR @ 100cc/hr and continue monitoring   - continue to trend levels q 24 hrs     #scrotal swelling likely due SC emphysema, with severe pain  - indwelling pedraza was placed at [facility] (Coude placed for urinary strain)   - UA positive with moderate bacteria 9/9- UCx 9/9 neg   --  is following, __ - Recommend some type of scrotal wrap to help with scrotal swelling, use Kerlex wrap tightly around scrotum. Can be done by floor staff as demonstrated. - Scrotal elevation unlikely to help improve swelling, Keep pedraza  - No further urologic intervention needed at this time. __ Can f/u outpatient w/ Dr Prabhu Lucio, [facility] [place]  - pain control with dilaudid and methadone, ct a/p 9/11 did not show any inflammation/hydrocele/infection .  - Urology consulted, recommended to wrap scrotum, offers no other intervention at this time.      ID:  # MSSA bacteremia at [facility]  - Sputum cultures from 9/1 grew MSSA and Blood cultures from 8/31 grew MSSA with blood cultures from 9/2 NGTD. Legionella negative. Sputum AFB negative x3. Fungitell and galactomannan negative. Patient had been empirically on Zosyn, Cefazolin and Caspofungin (which was dc'ed).  - TTE 9/9 with possible vegetation on tricuspid valve   - s/p Zosyn (9/8 - 9/9), transitioned to Cefazolin per ID recs on 9/9, plan for 6 week course  -  fluid(cavitary lesion) Cx 9/9 - grew few staph aureus+  - Blood Cx neg 9/8  - UA 9/8 w/  some bacteria, UCx 9/9 neg   - CXR with L cavitary lesion, seen again on CT chest 9/9 and 9/11  - fungitell and crypto neg 9/10  - Sputum Cx  9/10 + mod klebsiella pneumoniae-- pansensitive__ continue Cefazolin   - ID consulted, recs appreciated  - afebrile overnight, WBC stable 14> 10>13, LA-- 1.7     # HIV  - Follows with Dr. Marcial in clinic  - CD4 392 and viral load < 30 on 9/2/22 as per ID note   - Viral load undetectable 9/8  - c/w home Biktarvy , monitor Cr if worsening will d/c    Hematology:   # rectal CA, possible RV mass, mets to liver and possibly bone,  # metastatic HPV related anal SCC,   -- Hem/Onc--With regards to treatment of metastatic HPV related anal SCC, treatment will be on hold pending resolution of infection. Will need reassessment after resolution of infection to evaluate performance status and decide on systemic treatment options  -Pal care consult for pain management placed on 9/10__ recommendations appreciated__ Dilaudid dose and frequency changed, started on Methadone   -When stable and after the above, recommend radiation oncology consult for consideration of RT to anal mass for palliation.      Hematology   #cardiac ? mass/thrombus/vegetation   -Lovenox held at [place] and s/p 9/7-- IVC filter  -- pt is on heparin gtt-- aPTT 60> 60-- therapeutic _  cont daily aPTT     #anemia   thrombocytosis,  AOCD, ferritin 512, iron 11, TIBC-- 150, Iron -- 7 %, transferrin -- 122, ([place])  -- Haptoglobin -- 308, LDL-- 240, Urica acid-- 3.4   - H/H stable - 9.5/30.6 >8.4/28. plts- 261>224     # L external iliac vein DVT  - on heparin gtt  - ptt q 24 hrs, aPTT therapeutic- 60   - Duplex b/l LEs 9/10 neg for DVT    Endo:  - not diabetic, HgbA1C 5.1 , FS-- 95>100>76 __ continue monitoring     Code: Full code  Palliative consult placed 9/10  -- meeting tomorrow at 12 pm with pt, pt's mother, palliative and ICU team__ everyone in agreement     Lines:   PIV, indwelling pedraza, L PTC,    31 M with PMH/ HIV, rectal cancer not on tx (followed up by Dr Frazier ([facility] oncology)), mets to liver and possibly bone,  R eye cataract, who presented to the [facility] on 8/31/22 after syncopal episode at home. Pt with cough x 2 mos, with yellow sputum and shortness of breath since 5 days PTA.   At [facility]: pt was found to be hypoxemic. 8/31/22 - L chest wall Pigtail placement. Pigtail was placed for suspected PMX with improvement in shortness of breath. CT chest was performed, which showed that pigtail is within the mass w/ DDx of TB, fungal infection, cavitary lesion, or squamous cell carcinoma. Pt was found to be anemic due to rectal bleeding with Hgb 5.9 s/p 2 U PRBC on 9/1. Blood cxs with MSSA bacteremia, and has been treated with Cefazolin and Zosyn.  Since 9/2 pt with worsening diffuse SC emphysema extending to the neck/face/all 4 extremities/scrotum/back , which has been getting progressively worse over past 2 days as per pt's statement. 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 TTE with two large RV masses and two additional small masses. 9/7- s/p L lateral chest tube to 14 Fr. 9/7 - s/p IVC Filter placement (as per [facility] statement, was placed prophylactically due to undiagnosed vegetations on the TTE. 12 cm multiloculated thick walled cavitary mass in the CHRISTINE and lingula.+ liver lesion on the CT a/p. + external iliac vein thrombosis, PE study was indeterminate     Pt was transferred to [facility] as per family request (mother) for pt to be evaluated by his outpt oncology team (Dr Frazier). As per oncology team the plan was to start Carbotaxol q 3 weeks + RT for diffuse mets as per PET scan.    Neuro  -A & O x 3 , no active issues  - not on sedation  - C/o of severe scrotal/rectal pain/pelvic pain, Morphine ER 30 mg BID Dc'd 9/12 in setting of TELMA, increased Dilaudid 1.5 mg q3hrs for severe pain. and 0.5 mg q3hrs for breakthrough, he has frequently required extra pushes of dilaudid and IV tylenol the past.  - palliative care consulted for assistance w/ pain regimen- pt was on methadone 2.5mg qd and Oxycontin oupt , will checked qtc and restarted methadone  - pt is appropriately overwhelmed by his complex medical condition, holistic RN and Behavioral health consulted.  recommended Marinol     CV:   - hemodynamically stable, remains off vasopressors   TTE with two large RV masses and two additional small masses. 9/7-- s/p L lateral chest tube to 14 Fr. 9/7 -- s/p IVC Filter placement (as per [facility] statement, was placed prophylactically due to undiagnosed vegetations on the TTE.  - pt is hemodynamically stable no pressors   - repeat TTE 9/9 - Limited and technically difficult study with views limited to the subcostal window.  Very limited views demonstrate what appears to be a mobile mass, possibly in association with the tricuspid valve. This may represent tumour, thrombus, or vegetation. Consider JENNIFER for further evaluation, if clinically indicated.  - per cardiology risk of JENNIFER outweighs benefit,  will obtain cardiac MRI instead to evaluate RV mass /RV thrombosis      Pulm:  #CHRISTINE cavitary lesion - Differential includes cavitary PNA (sputum and blood cultures positive for MSSA) vs malignancy given history of metastatic rectal CA vs atypical infection. Of note PET/CT in our system from 8/2022 with only few subcm nodules in L and GGO in R lung, making malignancy as etiology of cavitary lesion less likely.   - L pigtail catheter was placed at [facility] due to concern for L PTX. However, patient was found to have large CHRISTINE cavitary lesion instead, into which the pigtail had been placed.   -On 9/7 patient had L pigtail replaced (currently 14F) as well as IVC filter placement. ([facility])   9/10-- s/p MIST (10 mg of Alteplase injected into pleural space) __ cancelled   - f/u sputum cultures-- sent on 9/10-- Klebsiella __ cont cefazolin   -  fluid Cx (from cavitory lesion)_ neg 9/9  - f/u aspergillus,, histo, blasto, coccidio.-- in lab  - serum fungitell and crypto Ag  - neg   - s/p Zosyn , now on Cefazolin x 6 weeks per ID recs   - CT to water seal, place to suction only for transfer if off the unit, no output from chest tube   -- continue PTC to [facility],   - CTA 9/9- Findings most in keeping with cavitary pneumonia involving lingula and left lower lobe complicated by bronchopleural fistula as described with tract around the small caliber pleural pigtail catheter, extensive subcutaneous emphysema and pneumomediastinum. Nonopacification of lingular and left lower lobe pulmonary arteries as described likely secondary to cavitary pneumonia rather than thromboembolic pulmonary embolism  - AFB x 3 are negative at [facility]   - CT Sx recommended MIST protocol, held pending repeat CT /chest to evaluate if pig tail is still in place as there was no drainage from the cavitary lesion/ space, repeat CT chest 9/11 showed pigtail still in cavitary lesion cavity.   - 9/13- flushed chest tube, noted w/ with thick secretions and small airleak at times. Will continue w/ chest tube for now as there is continued purulent drainage and air. When output improves will consider clamping trials.   -- -- pigtail catheter needs to be flushed q shift _- 9/14__ was flushed with NS and 15 cc of purulent material was drained, __ the plan for CT to be removed once output from the PTC decreased       # bronchopleural fistula.  - L CT in place, keep to water seal, repeat CT completed, results as above  - CT surgery consulted- Recommend MIST protocol x1 dose through current pigtail catheter due to concern for clogged pigtail ,also rec to consult IR for image guided pigtail catheter (pneumonostomy) placement into the pneumatocele, as well as blow hole placement to evacuate subQ emphysema  holding off for now. plan to clamp pigtail tomorrow morning and assess if it can be pulled. if needs another chest tube surgical would be ideal, discussed w/ CT surgery PA.   - repeat CT /chest 9/11- Persistent left upper lobe cavitary lesion, with pigtail catheter in place, likely communicating with the lingular bronchus. 2. Right middle lobe pneumonia and scattered airspace opacities throughout the remaining lungs, as described. 3. Persistent pneumomediastinum and extensive subcutaneous emphysema of the neck, chest, abdomen, pelvis, and extremities, including the scrotum. 4. Large anorectal mass. 5. Liver metastases.  - No operative thoracic surgical interventions planned at this time    GI:   regular diet,   - cont bowel regimen w/ lactulose, senna, miralax BID  -- Pt with poor PO intake _ -9/14-- started on Marinol     #constipation, in the setting of opiates and only hypercalcemia   - no BM reported   - cont Senna, Miralax, lactulose daily, 9/12-- started on naloxegol, monitor for BM , s/p 8 mg of Relistor (9/14)    Renal:   TELMA, likely prerenal,   - Cr-- 1.27>1.38-> 1.61>1.5>1.01 Cr rising 9/12, and now improving, s/p 1L NS and standing IVFs. Continue w/ LR @ 100cc/hr. C  - UOP: LOS net is neg 1.2 L/ 24 hrs net is +982L, voids about /hr, voided 2.65 in 24 hrs   - continue trending renal function   - Strict Is and Os    #Hypercalcemia - ?related to malignancy  - Check PTH- sent on 9/14,, PTHrp - in lab  - Vit D levels low, per heme/onc hold off supplementation until Ca normalized as can worsen hyperCa in setting of hypercalcemia of malignancy  - Ca 16.4 - 9/10, 17 corrected for low serum albumin. per Heme/onc recs, s/p (9/10) calcitonin 4u/kg x 2 doses and pamidronate 90mcg IVPB x1. Calcium level today is 10.9__   - cont IVFs w/ LR @ 100cc/hr and continue monitoring   - continue to trend levels q 24 hrs     #electrolytes derangement  -- K-- 3.7, Mg -- 0.90, phos-- 1.7-- supplemented__ repeat labs sent -- in lab     #scrotal swelling likely due SC emphysema, with severe pain  - indwelling pedraza was placed at [facility] (Coude placed for urinary strain)   - UA positive with moderate bacteria 9/9- UCx 9/9 neg   --  is following, __ - Recommend some type of scrotal wrap to help with scrotal swelling, use Kerlex wrap tightly around scrotum. Can be done by floor staff as demonstrated. - Scrotal elevation unlikely to help improve swelling, Keep pedraza  - No further urologic intervention needed at this time. __ Can f/u outpatient w/ Dr Prabhu Lucio, [facility] at [place]  - pain control with dilaudid and methadone, ct a/p 9/11 did not show any inflammation/hydrocele/infection .  - Urology consulted, recommended to wrap scrotum, offers no other intervention at this time.      ID:  # MSSA bacteremia at [facility]  - Sputum cultures from 9/1 grew MSSA and Blood cultures from 8/31 grew MSSA with blood cultures from 9/2 NGTD. Legionella negative. Sputum AFB negative x3. Fungitell and galactomannan negative. Patient had been empirically on Zosyn, Cefazolin and Caspofungin (which was dc'ed).  - TTE 9/9 with possible vegetation on tricuspid valve   - s/p Zosyn (9/8 - 9/9), transitioned to Cefazolin per ID recs on 9/9, plan for 6 week course  -  fluid(cavitary lesion) Cx 9/9 - grew few staph aureus+  - Blood Cx neg 9/8  - UA 9/8 w/  some bacteria, UCx 9/9 neg   - CXR with L cavitary lesion, seen again on CT chest 9/9 and 9/11  - fungitell and crypto neg 9/10  - Sputum Cx  9/10 + mod klebsiella pneumoniae-- pansensitive__ continue Cefazolin   - ID consulted, recs appreciated  - afebrile overnight, WBC stable 14> 10>13, LA-- 1.7     # HIV  - Follows with Dr. Marcial in clinic  - CD4 392 and viral load < 30 on 9/2/22 as per ID note   - Viral load undetectable 9/8  - c/w home Biktarvy , monitor Cr if worsening will d/c    Hematology:   # rectal CA, possible RV mass, mets to liver and possibly bone,  # metastatic HPV related anal SCC,   -- Hem/Onc--With regards to treatment of metastatic HPV related anal SCC, treatment will be on hold pending resolution of infection. Will need reassessment after resolution of infection to evaluate performance status and decide on systemic treatment options  -Pal care consult for pain management placed on 9/10__ recommendations appreciated__ Dilaudid dose and frequency changed, started on Methadone   -When stable and after the above, recommend radiation oncology consult for consideration of RT to anal mass for palliation.      Hematology   #cardiac ? mass/thrombus/vegetation   -Lovenox held at [place] and s/p 9/7-- IVC filter  -- pt is on heparin gtt-- aPTT 60> 60-- therapeutic _  cont daily aPTT     #anemia   thrombocytosis,  AOCD, ferritin 512, iron 11, TIBC-- 150, Iron -- 7 %, transferrin -- 122, ([place])  -- Haptoglobin -- 308, LDL-- 240, Urica acid-- 3.4   - H/H stable - 9.5/30.6 >8.4/28. plts- 261>224     # L external iliac vein DVT  - on heparin gtt  - ptt q 24 hrs, aPTT therapeutic- 60   - Duplex b/l LEs 9/10 neg for DVT    Endo:  - not diabetic, HgbA1C 5.1 , FS-- 95>100>76 __ continue monitoring     Code: Full code  Palliative consult placed 9/10  -- meeting tomorrow at 12 pm with pt, pt's mother, palliative and ICU team__ everyone in agreement     Lines:   PIV, indwelling pedraza, L PTC,

## 2022-09-14 NOTE — PROGRESS NOTE ADULT - ASSESSMENT
31 y o M w/ HIV/AIDS (on HAART), Metastatic Rectal Ca (Not yet on chemo) who was initially admitted to [facility] on 8/31 after presenting with productive cough for 2 months and then acute SOB x 5 days. L pigtail catheter was placed due to concern for L PTX but found to be in a large cavitary lesions. Patient was also found to have MSSA PNA, bacteremia, with possible endocarditis vs clot, hx of DVT, now on hep gtt, was initially held 2/2 to GIB but cbc is now stable. Course is complicated but massive subq emphysema with involvement of the scrotum, and now kelb growing in the sputum. 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 Can be done by floor staff as demonstrated.   - Scrotal elevation unlikely to help improve swelling  - Keep pedraza  - No further urologic intervention needed at this time.    31 y o M w/ HIV/AIDS (on HAART), Metastatic Rectal Ca (Not yet on chemo) who was initially admitted to [facility] on 8/31 after presenting with productive cough for 2 months and then acute SOB x 5 days. L pigtail catheter was placed due to concern for L PTX but found to be in a large cavitary lesions. Patient was also found to have MSSA PNA, bacteremia, with possible endocarditis vs clot, hx of DVT, now on hep gtt, was initially held 2/2 to GIB but cbc is now stable. Course is complicated but massive subq emphysema with involvement of the scrotum, and now kelb growing in the sputum. 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 Can be done by floor staff as demonstrated.   - Scrotal elevation unlikely to help improve swelling  - Keep pedraza  - No further urologic intervention needed at this time.   Please reconsult as needed  Can f/u outpatient w/ Dr Prabhu Lucio  [facility] at [place]    [address],   [place] [address] [phone]

## 2022-09-14 NOTE — PROGRESS NOTE ADULT - SUBJECTIVE AND OBJECTIVE BOX
DAILY PROGRESS NOTE:         24 hr events:  s/p Kerlix wrap of scrotum, fell off overnight however swelling much improved.     Objective:    Vital Signs Last 24 Hrs  T(C): 36.1 (14 Sep 2022 12:00), Max: 36.1 (13 Sep 2022 16:00)  T(F): 97 (14 Sep 2022 12:00), Max: 97 (13 Sep 2022 16:00)  HR: 82 (14 Sep 2022 15:00) (81 - 101)  BP: 131/77 (14 Sep 2022 15:00) (123/53 - 138/76)  BP(mean): 89 (14 Sep 2022 15:00) (79 - 97)  RR: 16 (14 Sep 2022 15:00) (15 - 20)  SpO2: 100% (14 Sep 2022 15:00) (100% - 100%)    Parameters below as of 14 Sep 2022 15:00  Patient On (Oxygen Delivery Method): room air        I&O's Detail    13 Sep 2022 07:01  -  14 Sep 2022 07:00  --------------------------------------------------------  IN:    Heparin Infusion: 432 mL    IV PiggyBack: 150 mL    Lactated Ringers: 2200 mL    Lactated Ringers: 150 mL    Oral Fluid: 50 mL  Total IN: 2982 mL    OUT:    Chest Tube (mL): 10 mL    Indwelling Catheter - Urethral (mL): 2910 mL  Total OUT: 2920 mL    Total NET: 62 mL      14 Sep 2022 07:01  -  14 Sep 2022 15:53  --------------------------------------------------------  IN:    Heparin Infusion: 126 mL    IV PiggyBack: 650 mL    Lactated Ringers: 700 mL  Total IN: 1476 mL    OUT:    Chest Tube (mL): 15 mL    Indwelling Catheter - Urethral (mL): 750 mL  Total OUT: 765 mL    Total NET: 711 mL          Physical Exam:    General: NAD, well-nourished  : scrotum w/ some crepitus and edema however markedly improved from prior.   Psych: AOx3  Neuro: No focal deficits       Laboratory Results:                          8.4    13.29 )-----------( 224      ( 14 Sep 2022 04:20 )             28.0     09-14    134<L>  |  101  |  16  ----------------------------<  76  3.7   |  20<L>  |  1.01    Ca    10.9<H>      14 Sep 2022 03:36  Phos  1.7     09-14  Mg     0.90     09-14    TPro  4.8<L>  /  Alb  1.7<L>  /  TBili  0.2  /  DBili  x   /  AST  25  /  ALT  6   /  AlkPhos  178<H>  09-14    PTT - ( 14 Sep 2022 03:36 )  PTT:60.3 sec

## 2022-09-14 NOTE — PROGRESS NOTE ADULT - SUBJECTIVE AND OBJECTIVE BOX
Significant recent/past 24 hr events:    Subjective:    Review of Systems         [ ] A ten-point review of systems was otherwise negative except as noted.  [ ] Due to altered mental status/intubation, subjective information were not able to be obtained from the patient. History was obtained, to the extent possible, from review of the chart and collateral sources of information.      Patient is a 31y old  Male who presents with a chief complaint of Shortness of breath (13 Sep 2022 18:40)    HPI:  31 M with PMH/ HIV, rectal cancer not on tx (followed up by Dr Frazier ([facility] oncology)), mets to liver and possibly bone, R eye cataract, who presented to the [facility] on 8/31/22 after syncopal episode at home. Pt with cough x 2 mos, with yellow sputum and shortness of breath since 5 days PTA.   -- at [facility]: pt was found to be hypoxemic. 8/31/22 -- L chest wall Pigtail placement. Pigtail was placed for suspected PMX with improvement in shortness of breath. CT chest was performed, which showed that pigtail is within the mass__ differential is TB, fungal infection, cavitary lesion, or squamous cell carcinoma. Pt was found to be anemic due to rectal bleeding with Hgb 5.9 s/p 2 U PRBC on 9/1. Blood cxs with MSSA bacteremia, and has been treated with Cefazolin and Zosyn.  Since 9/2 pt with worsening diffuse SC emphysema extending to the neck/face/all 4 extremities/scrotum/back , which has been getting progressively worse over past 2 days as per pt's statement. 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 TTE with two large RV masses and two additional small masses. 9/7-- s/p L lateral chest tube to 14 Fr. 9/7 -- s/p IVC Filter placement (as per [facility] statement, was placed prophylactically due to undiagnosed vegetations on the TTE. 12 cm multiloculated thick walled cavitary mass in the CHRISTINE and lingula.+ liver lesion on the CT a/p. + external iliac vein thrombosis, PE study was indeterminate   __ pt was transferred to [facility] as per family request (mother) for pt to be evaluated by his outpt oncology team (Dr Frazier). As per oncology team the plan is to start Carbotaxol q 3 weeks + RT for diffuse mets as per PET scan,  (08 Sep 2022 14:44)    PAST MEDICAL & SURGICAL HISTORY:  HIV infection  6/30/2022 virus detected, switched to Biktarvy, male partners      Rectal cancer  not on tretment      Right cataract      Current smoker      History of depression  and anxiety      No significant past surgical history        FAMILY HISTORY:      Vitals   ICU Vital Signs Last 24 Hrs  T(C): 36 (14 Sep 2022 00:00), Max: 36.1 (13 Sep 2022 16:00)  T(F): 96.8 (14 Sep 2022 00:00), Max: 97 (13 Sep 2022 16:00)  HR: 97 (14 Sep 2022 04:00) (83 - 101)  BP: 126/72 (14 Sep 2022 04:00) (123/53 - 142/85)  BP(mean): 82 (14 Sep 2022 04:00) (68 - 97)  ABP: --  ABP(mean): --  RR: 19 (14 Sep 2022 04:00) (15 - 23)  SpO2: 100% (14 Sep 2022 04:00) (100% - 100%)    O2 Parameters below as of 14 Sep 2022 04:00  Patient On (Oxygen Delivery Method): room air            Physical Exam:   Constitutional: NAD, well-groomed, well-developed  HEENT: PERRLA, EOMI, no drainage or redness  Neck: supple,  No JVD, Trachea midline  Back: Normal spine flexure, No CVA tenderness, No deformity or limitation of movement  Respiratory: Breath Sounds equal & clear bilaterally to auscultation, no accessory muscle use noted  Cardiovascular: Regular rate, regular rhythm, normal S1, S2; no murmurs or rub  Gastrointestinal: Soft, non-tender, non distended, no hepatosplenomegaly, normal bowel sounds  Extremities: NEGRO x 4, no peripheral edema, no cyanosis, no clubbing   Vascular: Equal and normal pulses: 2+ peripheral pulses throughout  Neurological: A+O x 3; speech clear and intact; no sensory, motor  deficits, normal reflexes  Psychiatric: calm, normal mood, normal affect  Musculoskeletal: No joint swelling or deformity; no limitation of movement  Skin: warm, dry, well perfused, no rashes    VENT SETTINGS         I&O's Detail    12 Sep 2022 07:01  -  13 Sep 2022 07:00  --------------------------------------------------------  IN:    Heparin Infusion: 432 mL    IV PiggyBack: 300 mL    Lactated Ringers: 450 mL    Lactated Ringers: 900 mL    Oral Fluid: 300 mL    Sodium Chloride 0.9% Bolus: 1000 mL  Total IN: 3382 mL    OUT:    Chest Tube (mL): 0 mL    Indwelling Catheter - Urethral (mL): 2455 mL  Total OUT: 2455 mL    Total NET: 927 mL      13 Sep 2022 07:01  -  14 Sep 2022 06:51  --------------------------------------------------------  IN:    Heparin Infusion: 378 mL    IV PiggyBack: 150 mL    Lactated Ringers: 1900 mL    Lactated Ringers: 150 mL    Oral Fluid: 50 mL  Total IN: 2628 mL    OUT:    Chest Tube (mL): 10 mL    Indwelling Catheter - Urethral (mL): 2650 mL  Total OUT: 2660 mL    Total NET: -32 mL          LABS                        8.4    13.29 )-----------( 224      ( 14 Sep 2022 04:20 )             28.0     09-14    134<L>  |  101  |  16  ----------------------------<  76  3.7   |  20<L>  |  1.01    Ca    10.9<H>      14 Sep 2022 03:36  Phos  3.5     09-13  Mg     1.60     09-13    TPro  4.8<L>  /  Alb  1.7<L>  /  TBili  0.2  /  DBili  x   /  AST  25  /  ALT  6   /  AlkPhos  178<H>  09-14    LIVER FUNCTIONS - ( 14 Sep 2022 03:36 )  Alb: 1.7 g/dL / Pro: 4.8 g/dL / ALK PHOS: 178 U/L / ALT: 6 U/L / AST: 25 U/L / GGT: x           PTT - ( 14 Sep 2022 03:36 )  PTT:60.3 sec                MEDICATIONS  (STANDING):  bictegravir 50 mG/emtricitabine 200 mG/tenofovir alafenamide 25 mG (BIKTARVY) 1 Tablet(s) Oral daily  ceFAZolin   IVPB 2000 milliGRAM(s) IV Intermittent every 8 hours  chlorhexidine 2% Cloths 1 Application(s) Topical <User Schedule>  ferrous    sulfate 325 milliGRAM(s) Oral <User Schedule>  heparin  Infusion. 1700 Unit(s)/Hr (17 mL/Hr) IV Continuous <Continuous>  lactated ringers. 1000 milliLiter(s) (100 mL/Hr) IV Continuous <Continuous>  lactulose Syrup 10 Gram(s) Oral daily  lidocaine   4% Patch 1 Patch Transdermal daily  melatonin 3 milliGRAM(s) Oral at bedtime  methadone    Tablet 2.5 milliGRAM(s) Oral two times a day  naloxegol 25 milliGRAM(s) Oral daily  polyethylene glycol 3350 17 Gram(s) Oral two times a day  senna 2 Tablet(s) Oral at bedtime    MEDICATIONS  (PRN):  heparin   Injectable 4500 Unit(s) IV Push every 6 hours PRN For aPTT less than 40  heparin   Injectable 2000 Unit(s) IV Push every 6 hours PRN For aPTT between 40 - 57  HYDROmorphone  Injectable 1.5 milliGRAM(s) IV Push every 3 hours PRN Severe Pain (7 - 10)  HYDROmorphone  Injectable 0.5 milliGRAM(s) IV Push every 3 hours PRN Moderate Pain (4 - 6)  ondansetron Injectable 4 milliGRAM(s) IV Push every 8 hours PRN Nausea and/or Vomiting      Allergies:  ibuprofen (Angioedema)        CRITICAL CARE TIME SPENT:  minutes of critical care time spent providing medical care for patient's acute illness/conditions that impairs at least one vital organ system and/or poses a high risk of imminent or life threatening deterioration in the patient's condition. It includes time spent evaluating and treating the patient's acute illness as well as time spent reviewing labs, radiology, discussing goals of care with patient and/or patient's family, and discussing the case with a multidisciplinary team, in an effort to prevent further life threatening deterioration or end organ damage. This time is independent of any procedures performed.         Significant recent/past 24 hr events: chest tube was flushed with NS and 15 cc of purulent material was drained     Subjective: pt c/o intermittent pelvic pain     Review of Systems         [ ] A ten-point review of systems was otherwise negative except as noted.  [ ] Due to altered mental status/intubation, subjective information were not able to be obtained from the patient. History was obtained, to the extent possible, from review of the chart and collateral sources of information.      Patient is a 31y old  Male who presents with a chief complaint of Shortness of breath (13 Sep 2022 18:40)    HPI:  31 M with PMH/ HIV, rectal cancer not on tx (followed up by Dr Frazier ([facility] oncology)), mets to liver and possibly bone, R eye cataract, who presented to the [facility] on 8/31/22 after syncopal episode at home. Pt with cough x 2 mos, with yellow sputum and shortness of breath since 5 days PTA.   -- at [facility]: pt was found to be hypoxemic. 8/31/22 -- L chest wall Pigtail placement. Pigtail was placed for suspected PMX with improvement in shortness of breath. CT chest was performed, which showed that pigtail is within the mass__ differential is TB, fungal infection, cavitary lesion, or squamous cell carcinoma. Pt was found to be anemic due to rectal bleeding with Hgb 5.9 s/p 2 U PRBC on 9/1. Blood cxs with MSSA bacteremia, and has been treated with Cefazolin and Zosyn.  Since 9/2 pt with worsening diffuse SC emphysema extending to the neck/face/all 4 extremities/scrotum/back , which has been getting progressively worse over past 2 days as per pt's statement. 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 TTE with two large RV masses and two additional small masses. 9/7-- s/p L lateral chest tube to 14 Fr. 9/7 -- s/p IVC Filter placement (as per [facility] statement, was placed prophylactically due to undiagnosed vegetations on the TTE. 12 cm multiloculated thick walled cavitary mass in the CHRISTINE and lingula.+ liver lesion on the CT a/p. + external iliac vein thrombosis, PE study was indeterminate   __ pt was transferred to [facility] as per family request (mother) for pt to be evaluated by his outpt oncology team (Dr Frazier). As per oncology team the plan is to start Carbotaxol q 3 weeks + RT for diffuse mets as per PET scan,  (08 Sep 2022 14:44)    PAST MEDICAL & SURGICAL HISTORY:  HIV infection  6/30/2022 virus detected, switched to Biktarvy, male partners      Rectal cancer  not on tretment      Right cataract      Current smoker      History of depression  and anxiety      No significant past surgical history        FAMILY HISTORY:      Vitals   ICU Vital Signs Last 24 Hrs  T(C): 36 (14 Sep 2022 00:00), Max: 36.1 (13 Sep 2022 16:00)  T(F): 96.8 (14 Sep 2022 00:00), Max: 97 (13 Sep 2022 16:00)  HR: 97 (14 Sep 2022 04:00) (83 - 101)  BP: 126/72 (14 Sep 2022 04:00) (123/53 - 142/85)  BP(mean): 82 (14 Sep 2022 04:00) (68 - 97)  ABP: --  ABP(mean): --  RR: 19 (14 Sep 2022 04:00) (15 - 23)  SpO2: 100% (14 Sep 2022 04:00) (100% - 100%)    O2 Parameters below as of 14 Sep 2022 04:00  Patient On (Oxygen Delivery Method): room air            Physical Exam:   Constitutional: NAD, well-groomed, well-developed, intermittently in pain, improving SQ emphysema and scrotal emphysema,   HEENT: PERRLA, EOMI, no drainage or redness  Neck: supple,  No JVD, Trachea midline  Back: Normal spine flexure, No CVA tenderness, No deformity or limitation of movement  Respiratory: Breath Sounds equal & clear bilaterally to auscultation, no accessory muscle use noted  Cardiovascular: Regular rate, regular rhythm, normal S1, S2; no murmurs or rub  Gastrointestinal: Soft, non-tender, non distended, no hepatosplenomegaly, normal bowel sounds  Extremities: NEGRO x 4, no peripheral edema, no cyanosis, no clubbing   Vascular: Equal and normal pulses: 2+ peripheral pulses throughout  Neurological: A+O x 3; speech clear and intact; no sensory, motor  deficits, normal reflexes  Psychiatric: calm, normal mood, normal affect  Musculoskeletal: No joint swelling or deformity; no limitation of movement  Skin: warm, dry, well perfused, no rashes    VENT SETTINGS         I&O's Detail    12 Sep 2022 07:01  -  13 Sep 2022 07:00  --------------------------------------------------------  IN:    Heparin Infusion: 432 mL    IV PiggyBack: 300 mL    Lactated Ringers: 450 mL    Lactated Ringers: 900 mL    Oral Fluid: 300 mL    Sodium Chloride 0.9% Bolus: 1000 mL  Total IN: 3382 mL    OUT:    Chest Tube (mL): 0 mL    Indwelling Catheter - Urethral (mL): 2455 mL  Total OUT: 2455 mL    Total NET: 927 mL      13 Sep 2022 07:01  -  14 Sep 2022 06:51  --------------------------------------------------------  IN:    Heparin Infusion: 378 mL    IV PiggyBack: 150 mL    Lactated Ringers: 1900 mL    Lactated Ringers: 150 mL    Oral Fluid: 50 mL  Total IN: 2628 mL    OUT:    Chest Tube (mL): 10 mL    Indwelling Catheter - Urethral (mL): 2650 mL  Total OUT: 2660 mL    Total NET: -32 mL          LABS                        8.4    13.29 )-----------( 224      ( 14 Sep 2022 04:20 )             28.0     09-14    134<L>  |  101  |  16  ----------------------------<  76  3.7   |  20<L>  |  1.01    Ca    10.9<H>      14 Sep 2022 03:36  Phos  3.5     09-13  Mg     1.60     09-13    TPro  4.8<L>  /  Alb  1.7<L>  /  TBili  0.2  /  DBili  x   /  AST  25  /  ALT  6   /  AlkPhos  178<H>  09-14    LIVER FUNCTIONS - ( 14 Sep 2022 03:36 )  Alb: 1.7 g/dL / Pro: 4.8 g/dL / ALK PHOS: 178 U/L / ALT: 6 U/L / AST: 25 U/L / GGT: x           PTT - ( 14 Sep 2022 03:36 )  PTT:60.3 sec                MEDICATIONS  (STANDING):  bictegravir 50 mG/emtricitabine 200 mG/tenofovir alafenamide 25 mG (BIKTARVY) 1 Tablet(s) Oral daily  ceFAZolin   IVPB 2000 milliGRAM(s) IV Intermittent every 8 hours  chlorhexidine 2% Cloths 1 Application(s) Topical <User Schedule>  ferrous    sulfate 325 milliGRAM(s) Oral <User Schedule>  heparin  Infusion. 1700 Unit(s)/Hr (17 mL/Hr) IV Continuous <Continuous>  lactated ringers. 1000 milliLiter(s) (100 mL/Hr) IV Continuous <Continuous>  lactulose Syrup 10 Gram(s) Oral daily  lidocaine   4% Patch 1 Patch Transdermal daily  melatonin 3 milliGRAM(s) Oral at bedtime  methadone    Tablet 2.5 milliGRAM(s) Oral two times a day  naloxegol 25 milliGRAM(s) Oral daily  polyethylene glycol 3350 17 Gram(s) Oral two times a day  senna 2 Tablet(s) Oral at bedtime    MEDICATIONS  (PRN):  heparin   Injectable 4500 Unit(s) IV Push every 6 hours PRN For aPTT less than 40  heparin   Injectable 2000 Unit(s) IV Push every 6 hours PRN For aPTT between 40 - 57  HYDROmorphone  Injectable 1.5 milliGRAM(s) IV Push every 3 hours PRN Severe Pain (7 - 10)  HYDROmorphone  Injectable 0.5 milliGRAM(s) IV Push every 3 hours PRN Moderate Pain (4 - 6)  ondansetron Injectable 4 milliGRAM(s) IV Push every 8 hours PRN Nausea and/or Vomiting      Allergies:  ibuprofen (Angioedema)        CRITICAL CARE TIME SPENT: 40  minutes of critical care time spent providing medical care for patient's acute illness/conditions that impairs at least one vital organ system and/or poses a high risk of imminent or life threatening deterioration in the patient's condition. It includes time spent evaluating and treating the patient's acute illness as well as time spent reviewing labs, radiology, discussing goals of care with patient and/or patient's family, and discussing the case with a multidisciplinary team, in an effort to prevent further life threatening deterioration or end organ damage. This time is independent of any procedures performed.

## 2022-09-15 DIAGNOSIS — Z71.89 OTHER SPECIFIED COUNSELING: ICD-10-CM

## 2022-09-15 LAB
ALBUMIN SERPL ELPH-MCNC: 2.7 G/DL — LOW (ref 3.3–5)
ALP SERPL-CCNC: 234 U/L — HIGH (ref 40–120)
ALT FLD-CCNC: 5 U/L — SIGNIFICANT CHANGE UP (ref 4–41)
ANION GAP SERPL CALC-SCNC: 11 MMOL/L — SIGNIFICANT CHANGE UP (ref 7–14)
APTT BLD: 45.4 SEC — HIGH (ref 27–36.3)
APTT BLD: 60.8 SEC — HIGH (ref 27–36.3)
APTT BLD: 67.5 SEC — HIGH (ref 27–36.3)
AST SERPL-CCNC: 40 U/L — SIGNIFICANT CHANGE UP (ref 4–40)
BASOPHILS # BLD AUTO: 0.02 K/UL — SIGNIFICANT CHANGE UP (ref 0–0.2)
BASOPHILS NFR BLD AUTO: 0.2 % — SIGNIFICANT CHANGE UP (ref 0–2)
BILIRUB SERPL-MCNC: 0.4 MG/DL — SIGNIFICANT CHANGE UP (ref 0.2–1.2)
BLD GP AB SCN SERPL QL: NEGATIVE — SIGNIFICANT CHANGE UP
BUN SERPL-MCNC: 16 MG/DL — SIGNIFICANT CHANGE UP (ref 7–23)
C IMMITIS AB FLD QL CF: NEGATIVE — SIGNIFICANT CHANGE UP
C IMMITIS IGM SPEC QL IA: NEGATIVE — SIGNIFICANT CHANGE UP
CA-I BLD-SCNC: 1.3 MMOL/L — HIGH (ref 1.15–1.29)
CALCIUM SERPL-MCNC: 11.8 MG/DL — HIGH (ref 8.4–10.5)
CHLORIDE SERPL-SCNC: 98 MMOL/L — SIGNIFICANT CHANGE UP (ref 98–107)
CO2 SERPL-SCNC: 27 MMOL/L — SIGNIFICANT CHANGE UP (ref 22–31)
COCCIDIOIDES IGG SPEC QL IA: NEGATIVE — SIGNIFICANT CHANGE UP
CREAT SERPL-MCNC: 1.17 MG/DL — SIGNIFICANT CHANGE UP (ref 0.5–1.3)
CULTURE RESULTS: SIGNIFICANT CHANGE UP
EGFR: 84 ML/MIN/1.73M2 — SIGNIFICANT CHANGE UP
EOSINOPHIL # BLD AUTO: 0.2 K/UL — SIGNIFICANT CHANGE UP (ref 0–0.5)
EOSINOPHIL NFR BLD AUTO: 1.6 % — SIGNIFICANT CHANGE UP (ref 0–6)
GALACTOMANNAN AG SERPL-ACNC: 0.06 INDEX — SIGNIFICANT CHANGE UP (ref 0–0.49)
GLUCOSE SERPL-MCNC: 89 MG/DL — SIGNIFICANT CHANGE UP (ref 70–99)
H CAPSUL AG SPEC-ACNC: SIGNIFICANT CHANGE UP
H CAPSUL AG UR IA-ACNC: SIGNIFICANT CHANGE UP NG/ML
H CAPSUL AG UR QL IA: SIGNIFICANT CHANGE UP
HCT VFR BLD CALC: 32.1 % — LOW (ref 39–50)
HGB BLD-MCNC: 9.8 G/DL — LOW (ref 13–17)
IANC: 10.53 K/UL — HIGH (ref 1.8–7.4)
IMM GRANULOCYTES NFR BLD AUTO: 0.4 % — SIGNIFICANT CHANGE UP (ref 0–1.5)
LACTATE SERPL-SCNC: 1.4 MMOL/L — SIGNIFICANT CHANGE UP (ref 0.5–2)
LYMPHOCYTES # BLD AUTO: 1.04 K/UL — SIGNIFICANT CHANGE UP (ref 1–3.3)
LYMPHOCYTES # BLD AUTO: 8.4 % — LOW (ref 13–44)
MAGNESIUM SERPL-MCNC: 1.8 MG/DL — SIGNIFICANT CHANGE UP (ref 1.6–2.6)
MCHC RBC-ENTMCNC: 24.3 PG — LOW (ref 27–34)
MCHC RBC-ENTMCNC: 30.5 GM/DL — LOW (ref 32–36)
MCV RBC AUTO: 79.7 FL — LOW (ref 80–100)
MONOCYTES # BLD AUTO: 0.53 K/UL — SIGNIFICANT CHANGE UP (ref 0–0.9)
MONOCYTES NFR BLD AUTO: 4.3 % — SIGNIFICANT CHANGE UP (ref 2–14)
NEUTROPHILS # BLD AUTO: 10.53 K/UL — HIGH (ref 1.8–7.4)
NEUTROPHILS NFR BLD AUTO: 85.1 % — HIGH (ref 43–77)
NRBC # BLD: 0 /100 WBCS — SIGNIFICANT CHANGE UP (ref 0–0)
NRBC # FLD: 0 K/UL — SIGNIFICANT CHANGE UP (ref 0–0)
ORGANISM # SPEC MICROSCOPIC CNT: SIGNIFICANT CHANGE UP
ORGANISM # SPEC MICROSCOPIC CNT: SIGNIFICANT CHANGE UP
PHOSPHATE SERPL-MCNC: 2.5 MG/DL — SIGNIFICANT CHANGE UP (ref 2.5–4.5)
PLATELET # BLD AUTO: 234 K/UL — SIGNIFICANT CHANGE UP (ref 150–400)
POTASSIUM SERPL-MCNC: 4.5 MMOL/L — SIGNIFICANT CHANGE UP (ref 3.5–5.3)
POTASSIUM SERPL-SCNC: 4.5 MMOL/L — SIGNIFICANT CHANGE UP (ref 3.5–5.3)
PROT SERPL-MCNC: 7 G/DL — SIGNIFICANT CHANGE UP (ref 6–8.3)
RBC # BLD: 4.03 M/UL — LOW (ref 4.2–5.8)
RBC # FLD: 23.6 % — HIGH (ref 10.3–14.5)
RH IG SCN BLD-IMP: POSITIVE — SIGNIFICANT CHANGE UP
SODIUM SERPL-SCNC: 136 MMOL/L — SIGNIFICANT CHANGE UP (ref 135–145)
SPECIMEN SOURCE: SIGNIFICANT CHANGE UP
WBC # BLD: 12.37 K/UL — HIGH (ref 3.8–10.5)
WBC # FLD AUTO: 12.37 K/UL — HIGH (ref 3.8–10.5)

## 2022-09-15 PROCEDURE — 99498 ADVNCD CARE PLAN ADDL 30 MIN: CPT | Mod: 25

## 2022-09-15 PROCEDURE — 99497 ADVNCD CARE PLAN 30 MIN: CPT | Mod: 25

## 2022-09-15 PROCEDURE — 99233 SBSQ HOSP IP/OBS HIGH 50: CPT

## 2022-09-15 PROCEDURE — 71045 X-RAY EXAM CHEST 1 VIEW: CPT | Mod: 26

## 2022-09-15 PROCEDURE — 99291 CRITICAL CARE FIRST HOUR: CPT

## 2022-09-15 RX ORDER — HYDROMORPHONE HYDROCHLORIDE 2 MG/ML
0.5 INJECTION INTRAMUSCULAR; INTRAVENOUS; SUBCUTANEOUS
Refills: 0 | Status: DISCONTINUED | OUTPATIENT
Start: 2022-09-15 | End: 2022-09-18

## 2022-09-15 RX ORDER — SODIUM CHLORIDE 9 MG/ML
1000 INJECTION, SOLUTION INTRAVENOUS
Refills: 0 | Status: DISCONTINUED | OUTPATIENT
Start: 2022-09-15 | End: 2022-09-15

## 2022-09-15 RX ORDER — HYDROMORPHONE HYDROCHLORIDE 2 MG/ML
4 INJECTION INTRAMUSCULAR; INTRAVENOUS; SUBCUTANEOUS EVERY 4 HOURS
Refills: 0 | Status: DISCONTINUED | OUTPATIENT
Start: 2022-09-15 | End: 2022-09-18

## 2022-09-15 RX ORDER — SODIUM,POTASSIUM PHOSPHATES 278-250MG
1 POWDER IN PACKET (EA) ORAL ONCE
Refills: 0 | Status: COMPLETED | OUTPATIENT
Start: 2022-09-15 | End: 2022-09-15

## 2022-09-15 RX ORDER — METHYLNALTREXONE BROMIDE 12 MG/.6ML
8 INJECTION, SOLUTION SUBCUTANEOUS ONCE
Refills: 0 | Status: COMPLETED | OUTPATIENT
Start: 2022-09-15 | End: 2022-09-15

## 2022-09-15 RX ORDER — GABAPENTIN 400 MG/1
300 CAPSULE ORAL DAILY
Refills: 0 | Status: DISCONTINUED | OUTPATIENT
Start: 2022-09-15 | End: 2022-09-20

## 2022-09-15 RX ORDER — SODIUM CHLORIDE 9 MG/ML
1000 INJECTION, SOLUTION INTRAVENOUS
Refills: 0 | Status: COMPLETED | OUTPATIENT
Start: 2022-09-15 | End: 2022-09-16

## 2022-09-15 RX ORDER — DRONABINOL 2.5 MG
5 CAPSULE ORAL
Refills: 0 | Status: DISCONTINUED | OUTPATIENT
Start: 2022-09-15 | End: 2022-09-16

## 2022-09-15 RX ORDER — MAGNESIUM SULFATE 500 MG/ML
2 VIAL (ML) INJECTION ONCE
Refills: 0 | Status: COMPLETED | OUTPATIENT
Start: 2022-09-15 | End: 2022-09-15

## 2022-09-15 RX ORDER — HEPARIN SODIUM 5000 [USP'U]/ML
2000 INJECTION INTRAVENOUS; SUBCUTANEOUS EVERY 6 HOURS
Refills: 0 | Status: DISCONTINUED | OUTPATIENT
Start: 2022-09-15 | End: 2022-09-15

## 2022-09-15 RX ORDER — HEPARIN SODIUM 5000 [USP'U]/ML
4500 INJECTION INTRAVENOUS; SUBCUTANEOUS EVERY 6 HOURS
Refills: 0 | Status: DISCONTINUED | OUTPATIENT
Start: 2022-09-15 | End: 2022-09-15

## 2022-09-15 RX ADMIN — HEPARIN SODIUM 1900 UNIT(S)/HR: 5000 INJECTION INTRAVENOUS; SUBCUTANEOUS at 18:42

## 2022-09-15 RX ADMIN — Medication 5 MILLIGRAM(S): at 18:24

## 2022-09-15 RX ADMIN — HYDROMORPHONE HYDROCHLORIDE 0.5 MILLIGRAM(S): 2 INJECTION INTRAMUSCULAR; INTRAVENOUS; SUBCUTANEOUS at 11:00

## 2022-09-15 RX ADMIN — HYDROMORPHONE HYDROCHLORIDE 1.5 MILLIGRAM(S): 2 INJECTION INTRAMUSCULAR; INTRAVENOUS; SUBCUTANEOUS at 08:51

## 2022-09-15 RX ADMIN — HYDROMORPHONE HYDROCHLORIDE 1.5 MILLIGRAM(S): 2 INJECTION INTRAMUSCULAR; INTRAVENOUS; SUBCUTANEOUS at 01:17

## 2022-09-15 RX ADMIN — Medication 3 MILLIGRAM(S): at 21:54

## 2022-09-15 RX ADMIN — LIDOCAINE 1 PATCH: 4 CREAM TOPICAL at 11:07

## 2022-09-15 RX ADMIN — POLYETHYLENE GLYCOL 3350 17 GRAM(S): 17 POWDER, FOR SOLUTION ORAL at 18:25

## 2022-09-15 RX ADMIN — HYDROMORPHONE HYDROCHLORIDE 1.5 MILLIGRAM(S): 2 INJECTION INTRAMUSCULAR; INTRAVENOUS; SUBCUTANEOUS at 21:00

## 2022-09-15 RX ADMIN — HYDROMORPHONE HYDROCHLORIDE 0.5 MILLIGRAM(S): 2 INJECTION INTRAMUSCULAR; INTRAVENOUS; SUBCUTANEOUS at 14:59

## 2022-09-15 RX ADMIN — SENNA PLUS 2 TABLET(S): 8.6 TABLET ORAL at 21:54

## 2022-09-15 RX ADMIN — BICTEGRAVIR SODIUM, EMTRICITABINE, AND TENOFOVIR ALAFENAMIDE FUMARATE 1 TABLET(S): 30; 120; 15 TABLET ORAL at 11:06

## 2022-09-15 RX ADMIN — Medication 100 MILLIGRAM(S): at 20:39

## 2022-09-15 RX ADMIN — Medication 325 MILLIGRAM(S): at 16:45

## 2022-09-15 RX ADMIN — HYDROMORPHONE HYDROCHLORIDE 4 MILLIGRAM(S): 2 INJECTION INTRAMUSCULAR; INTRAVENOUS; SUBCUTANEOUS at 23:30

## 2022-09-15 RX ADMIN — HYDROMORPHONE HYDROCHLORIDE 0.5 MILLIGRAM(S): 2 INJECTION INTRAMUSCULAR; INTRAVENOUS; SUBCUTANEOUS at 07:44

## 2022-09-15 RX ADMIN — HYDROMORPHONE HYDROCHLORIDE 4 MILLIGRAM(S): 2 INJECTION INTRAMUSCULAR; INTRAVENOUS; SUBCUTANEOUS at 17:00

## 2022-09-15 RX ADMIN — HYDROMORPHONE HYDROCHLORIDE 4 MILLIGRAM(S): 2 INJECTION INTRAMUSCULAR; INTRAVENOUS; SUBCUTANEOUS at 16:44

## 2022-09-15 RX ADMIN — HYDROMORPHONE HYDROCHLORIDE 0.5 MILLIGRAM(S): 2 INJECTION INTRAMUSCULAR; INTRAVENOUS; SUBCUTANEOUS at 06:51

## 2022-09-15 RX ADMIN — HYDROMORPHONE HYDROCHLORIDE 4 MILLIGRAM(S): 2 INJECTION INTRAMUSCULAR; INTRAVENOUS; SUBCUTANEOUS at 23:42

## 2022-09-15 RX ADMIN — CHLORHEXIDINE GLUCONATE 1 APPLICATION(S): 213 SOLUTION TOPICAL at 05:47

## 2022-09-15 RX ADMIN — METHADONE HYDROCHLORIDE 2.5 MILLIGRAM(S): 40 TABLET ORAL at 18:25

## 2022-09-15 RX ADMIN — Medication 100 MILLIGRAM(S): at 11:06

## 2022-09-15 RX ADMIN — HYDROMORPHONE HYDROCHLORIDE 0.5 MILLIGRAM(S): 2 INJECTION INTRAMUSCULAR; INTRAVENOUS; SUBCUTANEOUS at 10:37

## 2022-09-15 RX ADMIN — HYDROMORPHONE HYDROCHLORIDE 1.5 MILLIGRAM(S): 2 INJECTION INTRAMUSCULAR; INTRAVENOUS; SUBCUTANEOUS at 04:15

## 2022-09-15 RX ADMIN — HEPARIN SODIUM 1900 UNIT(S)/HR: 5000 INJECTION INTRAVENOUS; SUBCUTANEOUS at 11:17

## 2022-09-15 RX ADMIN — SODIUM CHLORIDE 100 MILLILITER(S): 9 INJECTION, SOLUTION INTRAVENOUS at 15:10

## 2022-09-15 RX ADMIN — METHYLNALTREXONE BROMIDE 8 MILLIGRAM(S): 12 INJECTION, SOLUTION SUBCUTANEOUS at 20:39

## 2022-09-15 RX ADMIN — Medication 100 MILLIGRAM(S): at 04:11

## 2022-09-15 RX ADMIN — HYDROMORPHONE HYDROCHLORIDE 1.5 MILLIGRAM(S): 2 INJECTION INTRAMUSCULAR; INTRAVENOUS; SUBCUTANEOUS at 01:15

## 2022-09-15 RX ADMIN — HYDROMORPHONE HYDROCHLORIDE 0.5 MILLIGRAM(S): 2 INJECTION INTRAMUSCULAR; INTRAVENOUS; SUBCUTANEOUS at 15:15

## 2022-09-15 RX ADMIN — HEPARIN SODIUM 1900 UNIT(S)/HR: 5000 INJECTION INTRAVENOUS; SUBCUTANEOUS at 04:11

## 2022-09-15 RX ADMIN — Medication 25 GRAM(S): at 08:51

## 2022-09-15 RX ADMIN — Medication 1 PACKET(S): at 08:51

## 2022-09-15 RX ADMIN — Medication 5 MILLIGRAM(S): at 20:41

## 2022-09-15 RX ADMIN — HYDROMORPHONE HYDROCHLORIDE 1.5 MILLIGRAM(S): 2 INJECTION INTRAMUSCULAR; INTRAVENOUS; SUBCUTANEOUS at 04:19

## 2022-09-15 RX ADMIN — HYDROMORPHONE HYDROCHLORIDE 1.5 MILLIGRAM(S): 2 INJECTION INTRAMUSCULAR; INTRAVENOUS; SUBCUTANEOUS at 13:40

## 2022-09-15 RX ADMIN — HYDROMORPHONE HYDROCHLORIDE 1.5 MILLIGRAM(S): 2 INJECTION INTRAMUSCULAR; INTRAVENOUS; SUBCUTANEOUS at 20:40

## 2022-09-15 RX ADMIN — HYDROMORPHONE HYDROCHLORIDE 1.5 MILLIGRAM(S): 2 INJECTION INTRAMUSCULAR; INTRAVENOUS; SUBCUTANEOUS at 09:10

## 2022-09-15 RX ADMIN — Medication 2.5 MILLIGRAM(S): at 05:46

## 2022-09-15 RX ADMIN — HYDROMORPHONE HYDROCHLORIDE 0.5 MILLIGRAM(S): 2 INJECTION INTRAMUSCULAR; INTRAVENOUS; SUBCUTANEOUS at 02:45

## 2022-09-15 RX ADMIN — HYDROMORPHONE HYDROCHLORIDE 1.5 MILLIGRAM(S): 2 INJECTION INTRAMUSCULAR; INTRAVENOUS; SUBCUTANEOUS at 13:24

## 2022-09-15 RX ADMIN — METHADONE HYDROCHLORIDE 2.5 MILLIGRAM(S): 40 TABLET ORAL at 05:47

## 2022-09-15 RX ADMIN — SODIUM CHLORIDE 75 MILLILITER(S): 9 INJECTION, SOLUTION INTRAVENOUS at 16:37

## 2022-09-15 RX ADMIN — HYDROMORPHONE HYDROCHLORIDE 0.5 MILLIGRAM(S): 2 INJECTION INTRAMUSCULAR; INTRAVENOUS; SUBCUTANEOUS at 03:00

## 2022-09-15 RX ADMIN — GABAPENTIN 300 MILLIGRAM(S): 400 CAPSULE ORAL at 16:44

## 2022-09-15 RX ADMIN — LACTULOSE 10 GRAM(S): 10 SOLUTION ORAL at 11:06

## 2022-09-15 RX ADMIN — LIDOCAINE 1 PATCH: 4 CREAM TOPICAL at 21:18

## 2022-09-15 NOTE — PROGRESS NOTE ADULT - PROBLEM SELECTOR PLAN 9
Case reviewed with primary and oncology team     Thank you for allowing us to participate in your patient's care. Please page [phone] for any questions/concerns.

## 2022-09-15 NOTE — PROGRESS NOTE ADULT - ASSESSMENT
31 M with PMH/ HIV, rectal cancer not on tx (followed up by Dr Frazier ([facility] oncology)), mets to liver and possibly bone,  R eye cataract, who presented to the [facility] on 8/31/22 after syncopal episode at home. Pt with cough x 2 mos, with yellow sputum and shortness of breath since 5 days PTA.   At [facility]: pt was found to be hypoxemic. 8/31/22 - L chest wall Pigtail placement. Pigtail was placed for suspected PMX with improvement in shortness of breath. CT chest was performed, which showed that pigtail is within the mass w/ DDx of TB, fungal infection, cavitary lesion, or squamous cell carcinoma. Pt was found to be anemic due to rectal bleeding with Hgb 5.9 s/p 2 U PRBC on 9/1. Blood cxs with MSSA bacteremia, and has been treated with Cefazolin and Zosyn.  Since 9/2 pt with worsening diffuse SC emphysema extending to the neck/face/all 4 extremities/scrotum/back , which has been getting progressively worse over past 2 days as per pt's statement. 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 TTE with two large RV masses and two additional small masses. 9/7- s/p L lateral chest tube to 14 Fr. 9/7 - s/p IVC Filter placement (as per [facility] statement, was placed prophylactically due to undiagnosed vegetations on the TTE. 12 cm multiloculated thick walled cavitary mass in the CHRISTINE and lingula.+ liver lesion on the CT a/p. + external iliac vein thrombosis, PE study was indeterminate     Pt was transferred to [facility] as per family request (mother) for pt to be evaluated by his outpt oncology team (Dr Frazier). As per oncology team the plan was to start Carbotaxol q 3 weeks + RT for diffuse mets as per PET scan.    Neuro  -A & O x 3 , no active issues  - not on sedation  - C/o of severe scrotal/rectal pain/pelvic pain, Morphine ER 30 mg BID Dc'd 9/12 in setting of TELMA, increased Dilaudid 1.5 mg q3hrs for severe pain. and 0.5 mg q3hrs for breakthrough, he has frequently required extra pushes of dilaudid and IV tylenol the past.  - palliative care consulted for assistance w/ pain regimen- pt was on methadone 2.5mg qd and Oxycontin oupt , will checked qtc and restarted methadone  - pt is appropriately overwhelmed by his complex medical condition, holistic RN and Behavioral health consulted.  recommended Marinol     CV:   - hemodynamically stable, remains off vasopressors   TTE with two large RV masses and two additional small masses. 9/7-- s/p L lateral chest tube to 14 Fr. 9/7 -- s/p IVC Filter placement (as per [facility] statement, was placed prophylactically due to undiagnosed vegetations on the TTE.  - pt is hemodynamically stable no pressors   - repeat TTE 9/9 - Limited and technically difficult study with views limited to the subcostal window.  Very limited views demonstrate what appears to be a mobile mass, possibly in association with the tricuspid valve. This may represent tumour, thrombus, or vegetation. Consider JENNIFER for further evaluation, if clinically indicated.  - per cardiology risk of JENNIFER outweighs benefit,  will obtain cardiac MRI instead to evaluate RV mass /RV thrombosis      Pulm:  #CHRISTINE cavitary lesion - Differential includes cavitary PNA (sputum and blood cultures positive for MSSA) vs malignancy given history of metastatic rectal CA vs atypical infection. Of note PET/CT in our system from 8/2022 with only few subcm nodules in L and GGO in R lung, making malignancy as etiology of cavitary lesion less likely.   - L pigtail catheter was placed at [facility] due to concern for L PTX. However, patient was found to have large CHRISTINE cavitary lesion instead, into which the pigtail had been placed.   -On 9/7 patient had L pigtail replaced (currently 14F) as well as IVC filter placement. ([facility])   9/10-- s/p MIST (10 mg of Alteplase injected into pleural space) __ cancelled   - f/u sputum cultures-- sent on 9/10-- Klebsiella __ cont cefazolin   -  fluid Cx (from cavitory lesion)_ neg 9/9  - f/u aspergillus,, histo, blasto, coccidio.-- in lab  - serum fungitell and crypto Ag  - neg   - s/p Zosyn , now on Cefazolin x 6 weeks per ID recs   - CT to water seal, place to suction only for transfer if off the unit, no output from chest tube   -- continue PTC to WS,   - CTA 9/9- Findings most in keeping with cavitary pneumonia involving lingula and left lower lobe complicated by bronchopleural fistula as described with tract around the small caliber pleural pigtail catheter, extensive subcutaneous emphysema and pneumomediastinum. Nonopacification of lingular and left lower lobe pulmonary arteries as described likely secondary to cavitary pneumonia rather than thromboembolic pulmonary embolism  - AFB x 3 are negative at [facility]   - CT Sx recommended MIST protocol, held pending repeat CT /chest to evaluate if pig tail is still in place as there was no drainage from the cavitary lesion/ space, repeat CT chest 9/11 showed pigtail still in cavitary lesion cavity.   - 9/13- flushed chest tube, noted w/ with thick secretions and small airleak at times. Will continue w/ chest tube for now as there is continued purulent drainage and air. When output improves will consider clamping trials.   -- -- pigtail catheter needs to be flushed q shift _- 9/14__ was flushed with NS and 15 cc of purulent material was drained, __ the plan for CT to be removed once output from the PTC decreased       # bronchopleural fistula.  - L CT in place, keep to water seal, repeat CT completed, results as above  - CT surgery consulted- Recommend MIST protocol x1 dose through current pigtail catheter due to concern for clogged pigtail ,also rec to consult IR for image guided pigtail catheter (pneumonostomy) placement into the pneumatocele, as well as blow hole placement to evacuate subQ emphysema  holding off for now. plan to clamp pigtail tomorrow morning and assess if it can be pulled. if needs another chest tube surgical would be ideal, discussed w/ CT surgery PA.   - repeat CT /chest 9/11- Persistent left upper lobe cavitary lesion, with pigtail catheter in place, likely communicating with the lingular bronchus. 2. Right middle lobe pneumonia and scattered airspace opacities throughout the remaining lungs, as described. 3. Persistent pneumomediastinum and extensive subcutaneous emphysema of the neck, chest, abdomen, pelvis, and extremities, including the scrotum. 4. Large anorectal mass. 5. Liver metastases.  - No operative thoracic surgical interventions planned at this time    GI:   regular diet,   - cont bowel regimen w/ lactulose, senna, miralax BID  -- Pt with poor PO intake _ -9/14-- started on Marinol     #constipation, in the setting of opiates and only hypercalcemia   - no BM reported   - cont Senna, Miralax, lactulose daily, 9/12-- started on naloxegol, monitor for BM , s/p 8 mg of Relistor (9/14)    Renal:   TELMA, likely prerenal,   - Cr-- 1.27>1.38-> 1.61>1.5>1.01 Cr rising 9/12, and now improving, s/p 1L NS and standing IVFs. Continue w/ LR @ 100cc/hr. C  - UOP: LOS net is neg 1.2 L/ 24 hrs net is +982L, voids about /hr, voided 2.65 in 24 hrs   - continue trending renal function   - Strict Is and Os    #Hypercalcemia - ?related to malignancy  - Check PTH- sent on 9/14,, PTHrp - in lab  - Vit D levels low, per heme/onc hold off supplementation until Ca normalized as can worsen hyperCa in setting of hypercalcemia of malignancy  - Ca 16.4 - 9/10, 17 corrected for low serum albumin. per Heme/onc recs, s/p (9/10) calcitonin 4u/kg x 2 doses and pamidronate 90mcg IVPB x1. Calcium level today is 10.9__   - cont IVFs w/ LR @ 100cc/hr and continue monitoring   - continue to trend levels q 24 hrs     #electrolytes derangement  -- K-- 3.7, Mg -- 0.90, phos-- 1.7-- supplemented__ repeat labs sent -- in lab     #scrotal swelling likely due SC emphysema, with severe pain  - indwelling pedraza was placed at [facility] (Coude placed for urinary strain)   - UA positive with moderate bacteria 9/9- UCx 9/9 neg   --  is following, __ - Recommend some type of scrotal wrap to help with scrotal swelling, use Kerlex wrap tightly around scrotum. Can be done by floor staff as demonstrated. - Scrotal elevation unlikely to help improve swelling, Keep pedraza  - No further urologic intervention needed at this time. __ Can f/u outpatient w/ Dr Prabhu Lucio, [facility] for [facility] at [place]  - pain control with dilaudid and methadone, ct a/p 9/11 did not show any inflammation/hydrocele/infection .  - Urology consulted, recommended to wrap scrotum, offers no other intervention at this time.      ID:  # MSSA bacteremia at [facility]  - Sputum cultures from 9/1 grew MSSA and Blood cultures from 8/31 grew MSSA with blood cultures from 9/2 NGTD. Legionella negative. Sputum AFB negative x3. Fungitell and galactomannan negative. Patient had been empirically on Zosyn, Cefazolin and Caspofungin (which was dc'ed).  - TTE 9/9 with possible vegetation on tricuspid valve   - s/p Zosyn (9/8 - 9/9), transitioned to Cefazolin per ID recs on 9/9, plan for 6 week course  -  fluid(cavitary lesion) Cx 9/9 - grew few staph aureus+  - Blood Cx neg 9/8  - UA 9/8 w/  some bacteria, UCx 9/9 neg   - CXR with L cavitary lesion, seen again on CT chest 9/9 and 9/11  - fungitell and crypto neg 9/10  - Sputum Cx  9/10 + mod klebsiella pneumoniae-- pansensitive__ continue Cefazolin   - ID consulted, recs appreciated  - afebrile overnight, WBC stable 14> 10>13, LA-- 1.7     # HIV  - Follows with Dr. Marcial in clinic  - CD4 392 and viral load < 30 on 9/2/22 as per ID note   - Viral load undetectable 9/8  - c/w home Biktarvy , monitor Cr if worsening will d/c    Hematology:   # rectal CA, possible RV mass, mets to liver and possibly bone,  # metastatic HPV related anal SCC,   -- Hem/Onc--With regards to treatment of metastatic HPV related anal SCC, treatment will be on hold pending resolution of infection. Will need reassessment after resolution of infection to evaluate performance status and decide on systemic treatment options  -Pal care consult for pain management placed on 9/10__ recommendations appreciated__ Dilaudid dose and frequency changed, started on Methadone   -When stable and after the above, recommend radiation oncology consult for consideration of RT to anal mass for palliation.      Hematology   #cardiac ? mass/thrombus/vegetation   -Lovenox held at [place] and s/p 9/7-- IVC filter  -- pt is on heparin gtt-- aPTT 60> 60-- therapeutic _  cont daily aPTT     #anemia   thrombocytosis,  AOCD, ferritin 512, iron 11, TIBC-- 150, Iron -- 7 %, transferrin -- 122, ([place])  -- Haptoglobin -- 308, LDL-- 240, Urica acid-- 3.4   - H/H stable - 9.5/30.6 >8.4/28. plts- 261>224     # L external iliac vein DVT  - on heparin gtt  - ptt q 24 hrs, aPTT therapeutic- 60   - Duplex b/l LEs 9/10 neg for DVT    Endo:  - not diabetic, HgbA1C 5.1 , FS-- 95>100>76 __ continue monitoring     Code: Full code  Palliative consult placed 9/10  -- meeting tomorrow at 12 pm with pt, pt's mother, palliative and ICU team__ everyone in agreement     Lines:   PIV, indwelling pedraza, L PTC,      31 M with PMH/ HIV, rectal cancer not on tx (followed up by Dr Frazier ([facility] oncology)), mets to liver and possibly bone,  R eye cataract, who presented to the [facility] on 8/31/22 after syncopal episode at home. Pt with cough x 2 mos, with yellow sputum and shortness of breath since 5 days PTA.   At [facility]: pt was found to be hypoxemic. 8/31/22 - L chest wall Pigtail placement. Pigtail was placed for suspected PMX with improvement in shortness of breath. CT chest was performed, which showed that pigtail is within the mass w/ DDx of TB, fungal infection, cavitary lesion, or squamous cell carcinoma. Pt was found to be anemic due to rectal bleeding with Hgb 5.9 s/p 2 U PRBC on 9/1. Blood cxs with MSSA bacteremia, and has been treated with Cefazolin and Zosyn.  Since 9/2 pt with worsening diffuse SC emphysema extending to the neck/face/all 4 extremities/scrotum/back , which has been getting progressively worse over past 2 days as per pt's statement. 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 TTE with two large RV masses and two additional small masses. 9/7- s/p L lateral chest tube to 14 Fr. 9/7 - s/p IVC Filter placement (as per [facility] statement, was placed prophylactically due to undiagnosed vegetations on the TTE. 12 cm multiloculated thick walled cavitary mass in the CHRISTINE and lingula.+ liver lesion on the CT a/p. + external iliac vein thrombosis, PE study was indeterminate     Pt was transferred to [facility] as per family request (mother) for pt to be evaluated by his outpt oncology team (Dr Frazier). As per oncology team the plan was to start Carbotaxol q 3 weeks + RT for diffuse mets as per PET scan.    Neuro  -A & O x 3 , no active issues  - not on sedation  - C/o of severe scrotal/rectal pain/pelvic pain, Morphine ER 30 mg BID Dc'd 9/12 in setting of TELMA, increased Dilaudid 1.5 mg q3hrs for severe pain. and 0.5 mg q3hrs for breakthrough, he has frequently required extra pushes of dilaudid and IV tylenol the past.  -- as per discussion with palliative, added Gabapentin 300 mg daily and Dilaudid 4 mg PO Q 4 hrs PRN for severe pain   - palliative care consulted for assistance w/ pain regimen- pt was on methadone 2.5mg qd and Oxycontin oupt , will checked qtc and restarted methadone-- 9/12, The dose can be increased next week in indicated _ please check QT's on Sunday   - pt is appropriately overwhelmed by his complex medical condition, holistic RN and Behavioral health consulted.  recommended Marinol - in place     CV:   - hemodynamically stable, remains off vasopressors   TTE with two large RV masses and two additional small masses. 9/7-- s/p L lateral chest tube to 14 Fr. 9/7 -- s/p IVC Filter placement (as per [facility] statement, was placed prophylactically due to undiagnosed vegetations on the TTE.  - pt is hemodynamically stable no pressors   - repeat TTE 9/9 - Limited and technically difficult study with views limited to the subcostal window.  Very limited views demonstrate what appears to be a mobile mass, possibly in association with the tricuspid valve. This may represent tumour, thrombus, or vegetation. Consider JENNIFER for further evaluation, if clinically indicated.  - per cardiology risk of JENNIFER outweighs benefit,  will obtain cardiac MRI instead to evaluate RV mass /RV thrombosis      Pulm:  #CHRISTINE cavitary lesion - Differential includes cavitary PNA (sputum and blood cultures positive for MSSA) vs malignancy given history of metastatic rectal CA vs atypical infection. Of note PET/CT in our system from 8/2022 with only few subcm nodules in L and GGO in R lung, making malignancy as etiology of cavitary lesion less likely.   - L pigtail catheter was placed at [facility] due to concern for L PTX. However, patient was found to have large CHRISTINE cavitary lesion instead, into which the pigtail had been placed.   -On 9/7 patient had L pigtail replaced (currently 14F) as well as IVC filter placement. ([facility])   9/10-- s/p MIST (10 mg of Alteplase injected into pleural space) __ cancelled   - f/u sputum cultures-- sent on 9/10-- Klebsiella __ cont cefazolin   -  fluid Cx (from cavitory lesion)_ neg 9/9  - f/u aspergillus-- neg,, histo, blasto, coccidio.-- in lab  - serum fungitell and crypto Ag  - neg   - s/p Zosyn , now on Cefazolin x 6 weeks per ID recs   - s/pCT to water seal, place to suction only for transfer if off the unit, no output from chest tube   -- continue PTC to WS,   - CTA 9/9- Findings most in keeping with cavitary pneumonia involving lingula and left lower lobe complicated by bronchopleural fistula as described with tract around the small caliber pleural pigtail catheter, extensive subcutaneous emphysema and pneumomediastinum. Nonopacification of lingular and left lower lobe pulmonary arteries as described likely secondary to cavitary pneumonia rather than thromboembolic pulmonary embolism  - AFB x 3 are negative at [facility]   - CT Sx recommended MIST protocol, held pending repeat CT /chest to evaluate if pig tail is still in place as there was no drainage from the cavitary lesion/ space, repeat CT chest 9/11 showed pigtail still in cavitary lesion cavity.   - 9/13- flushed chest tube, noted w/ with thick secretions and small airleak at times. Will continue w/ chest tube for now as there is continued purulent drainage and air. When output improves will consider clamping trials.   -- -- pigtail catheter needs to be flushed q shift _- 9/14__ was flushed with NS and 15 cc of purulent material was drained, __ the plan for CT to be removed once output from the PTC decreased       # bronchopleural fistula.  - L CT in place, keep to water seal, repeat CT completed, results as above  - CT surgery consulted- Recommend MIST protocol x1 dose through current pigtail catheter due to concern for clogged pigtail ,also rec to consult IR for image guided pigtail catheter (pneumonostomy) placement into the pneumatocele, as well as blow hole placement to evacuate subQ emphysema  holding off for now. plan to clamp pigtail tomorrow morning and assess if it can be pulled. if needs another chest tube surgical would be ideal, discussed w/ CT surgery PA.   - repeat CT /chest 9/11- Persistent left upper lobe cavitary lesion, with pigtail catheter in place, likely communicating with the lingular bronchus. 2. Right middle lobe pneumonia and scattered airspace opacities throughout the remaining lungs, as described. 3. Persistent pneumomediastinum and extensive subcutaneous emphysema of the neck, chest, abdomen, pelvis, and extremities, including the scrotum. 4. Large anorectal mass. 5. Liver metastases.  - No operative thoracic surgical interventions planned at this time  -- PTC possibly to be removed tomorrow,     GI:   regular diet,   - cont bowel regimen w/ lactulose, senna, miralax BID,   -- Pt with poor PO intake _ -9/14-- started on Marinol. 9/14 and 9/15-- s/p Relistor   -- monitor for BM's       #constipation, in the setting of opiates and hypercalcemia   - no BM reported   - cont Senna, Miralax, lactulose daily, 9/12-- started on naloxegol, monitor for BM , s/p 8 mg of Relistor (9/14 and 9/15)    Renal:   TELMA, likely prerenal,   - Cr-- 1.27>1.38-> 1.61>1.5>1.01>1.17 Cr rising 9/12, and now improving, s/p 1L NS and standing IVFs. s/p LR @ 100cc/hr_ -decreased to 75 cc/hr.   - UOP: LOS net is pos 66 cc/ 24 hrs net is +1.2 L, voids about /hr, voided 1.85 in 24 hrs   - continue trending renal function   - Strict Is and Os    #Hypercalcemia - ?related to malignancy  - Check PTH- sent on 9/14,, PTHrp - 3>5   - Vit D levels low, per heme/onc hold off supplementation until Ca normalized as can worsen hyperCa in setting of hypercalcemia of malignancy  - Ca 16.4 - 9/10, 17 corrected for low serum albumin. per Heme/onc recs, s/p (9/10) calcitonin 4u/kg x 2 doses and pamidronate 90mcg IVPB x1. Calcium level today is 11.8   - cont IVFs w/ LR @ 75 cc/hr and continue monitoring   - continue to trend levels q 24 hrs     #electrolytes derangement  -- K-- 4.5, Mg -- 1.8, phos-- 2.5-- supplemented__ repeat labs q 24 hrs    #scrotal swelling likely due SC emphysema, with severe pain  - indwelling pedraza was placed at [facility] (Coude placed for urinary strain)   - UA positive with moderate bacteria 9/9- UCx 9/9 neg   --  is following, __ - Recommend some type of scrotal wrap to help with scrotal swelling, use Kerlex wrap tightly around scrotum. Can be done by floor staff as demonstrated. - Scrotal elevation unlikely to help improve swelling, Keep pedraza  - No further urologic intervention needed at this time. __ Can f/u outpatient w/ Dr Prabhu Lucio, [facility] for [facility] at [place]  - pain control with dilaudid and methadone, ct a/p 9/11 did not show any inflammation/hydrocele/infection .  - Urology consulted, recommended to wrap scrotum, offers no other intervention at this time.-- follow up recs     ID:  # MSSA bacteremia at [facility]  - Sputum cultures from 9/1 grew MSSA and Blood cultures from 8/31 grew MSSA with blood cultures from 9/2 NGTD. Legionella negative. Sputum AFB negative x3. Fungitell and galactomannan negative. Patient had been empirically on Zosyn, Cefazolin and Caspofungin (which was dc'ed).  - TTE 9/9 with possible vegetation on tricuspid valve   - s/p Zosyn (9/8 - 9/9), transitioned to Cefazolin per ID recs on 9/9, plan for 6 week course  -  fluid(cavitary lesion) Cx 9/9 - grew few staph aureus+  - Blood Cx neg 9/8  - UA 9/8 w/  some bacteria, UCx 9/9 neg   - CXR with L cavitary lesion, seen again on CT chest 9/9 and 9/11  - fungitell and crypto neg 9/10  - Sputum Cx  9/10 + mod klebsiella pneumoniae-- pansensitive__ continue Cefazolin x 6 weeks as per ID recs   - ID consulted, recs appreciated  - afebrile overnight, WBC stable 14> 10>13>12, LA-- 1.4    # HIV  - Follows with Dr. Marcial in clinic  - CD4 392 and viral load < 30 on 9/2/22 as per ID note   - Viral load undetectable 9/8  - c/w home Biktarvy , monitor Cr if worsening will d/c    Hematology:   # rectal CA, possible RV mass, mets to liver and possibly bone,  # metastatic HPV related anal SCC,   -- Hem/Onc--With regards to treatment of metastatic HPV related anal SCC, treatment will be on hold pending resolution of infection. Will need reassessment after resolution of infection to evaluate performance status and decide on systemic treatment options  -Pal care consult for pain management placed on 9/10__ recommendations appreciated__ Dilaudid dose and frequency changed, started on Methadone   -When stable and after the above, recommend radiation oncology consult for consideration of RT to anal mass for palliation.      Hematology   #cardiac ? mass/thrombus/vegetation   -Lovenox held at [place] and s/p 9/7-- IVC filter  -- pt is on heparin gtt-- aPTT 60> 60, repeat level in am 45, the dose of gtt was increased and repeat aPTT is 60  _  cont q 6 hrs aPTT until therapeutic     #anemia   thrombocytosis,  AOCD, ferritin 512, iron 11, TIBC-- 150, Iron -- 7 %, transferrin -- 122, ([place])  -- Haptoglobin -- 308, LDL-- 240, Urica acid-- 3.4   - H/H stable - 9.5/30.6 >8.4/28>9.8/32 plts- 261>224 >234     # L external iliac vein DVT  - on heparin gtt  - ptt q 6 hrs, aPTT - 60 >45, follow hep protocol   - Duplex b/l LEs 9/10 neg for DVT    Endo:  - not diabetic, HgbA1C 5.1 , FS-- 99>89 __ continue monitoring     Code: Full code  Palliative consult placed 9/10  -- 9/15-- had meeting at 12 pm with pt, pt's mother, pt's sister via facetime, palliative team, oncology team, and ICU team__ as per discussion, pt is very poor candidate for chemotx and or radiation tx at this time. Possibility pt will be treated outpt with chemotx or radiation tx after infection resolves and pt gets better. As per discussion, pt's pain will be treated with oral and IV medications, and meds were adjusted.   At the meeting, we discussed re: poor prognosis: rectal CA with mets to liver, bone and possibly lung. Pt is also bacteremic MSSA bacteremia with possible endocarditis and cavitary lesion of the lung. Pt is also with possible clot in the heart and acute DVT-- L external iliac.     Lines:   PIV, indwelling pedraza, L PTC,      31 M with PMH/ HIV, rectal cancer not on tx (followed up by Dr Frazier ([facility] oncology)), mets to liver and possibly bone,  R eye cataract, who presented to the [facility] on 8/31/22 after syncopal episode at home. Pt with cough x 2 mos, with yellow sputum and shortness of breath since 5 days PTA.   At [facility]: pt was found to be hypoxemic. 8/31/22 - L chest wall Pigtail placement. Pigtail was placed for suspected PMX with improvement in shortness of breath. CT chest was performed, which showed that pigtail is within the mass w/ DDx of TB, fungal infection, cavitary lesion, or squamous cell carcinoma. Pt was found to be anemic due to rectal bleeding with Hgb 5.9 s/p 2 U PRBC on 9/1. Blood cxs with MSSA bacteremia, and has been treated with Cefazolin and Zosyn.  Since 9/2 pt with worsening diffuse SC emphysema extending to the neck/face/all 4 extremities/scrotum/back , which has been getting progressively worse over past 2 days as per pt's statement. 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 TTE with two large RV masses and two additional small masses. 9/7- s/p L lateral chest tube to 14 Fr. 9/7 - s/p IVC Filter placement (as per [facility] statement, was placed prophylactically due to undiagnosed vegetations on the TTE. 12 cm multiloculated thick walled cavitary mass in the CHRISTINE and lingula.+ liver lesion on the CT a/p. + external iliac vein thrombosis, PE study was indeterminate     Pt was transferred to [facility] as per family request (mother) for pt to be evaluated by his outpt oncology team (Dr Frazier). As per oncology team the plan was to start Carbotaxol q 3 weeks + RT for diffuse mets as per PET scan.    Neuro  -A & O x 3 , no active issues  - not on sedation  - C/o of severe scrotal/rectal pain/pelvic pain, Morphine ER 30 mg BID Dc'd 9/12 in setting of TELMA, increased Dilaudid 1.5 mg q3hrs for severe pain. and 0.5 mg q3hrs for breakthrough, he has frequently required extra pushes of dilaudid and IV tylenol the past.  -- as per discussion with palliative, added Gabapentin 300 mg daily and Dilaudid 4 mg PO Q 4 hrs PRN for severe pain   - palliative care consulted for assistance w/ pain regimen- pt was on methadone 2.5mg qd and Oxycontin oupt , will checked qtc and restarted methadone-- 9/12, The dose can be increased next week in indicated _ please check QT's on Sunday   - pt is appropriately overwhelmed by his complex medical condition, holistic RN and Behavioral health consulted.  recommended Marinol - in place     CV:   - hemodynamically stable, remains off vasopressors   TTE with two large RV masses and two additional small masses. 9/7-- s/p L lateral chest tube to 14 Fr. 9/7 -- s/p IVC Filter placement (as per [facility] statement, was placed prophylactically due to undiagnosed vegetations on the TTE.  - pt is hemodynamically stable no pressors   - repeat TTE 9/9 - Limited and technically difficult study with views limited to the subcostal window.  Very limited views demonstrate what appears to be a mobile mass, possibly in association with the tricuspid valve. This may represent tumour, thrombus, or vegetation. Consider JENNIFER for further evaluation, if clinically indicated.  - per cardiology risk of JENNIFER outweighs benefit,  will obtain cardiac MRI instead to evaluate RV mass /RV thrombosis      Pulm:  #CHRISTINE cavitary lesion - Differential includes cavitary PNA (sputum and blood cultures positive for MSSA) vs malignancy given history of metastatic rectal CA vs atypical infection. Of note PET/CT in our system from 8/2022 with only few subcm nodules in L and GGO in R lung, making malignancy as etiology of cavitary lesion less likely.   - L pigtail catheter was placed at [facility] due to concern for L PTX. However, patient was found to have large CHRISTINE cavitary lesion instead, into which the pigtail had been placed.   -On 9/7 patient had L pigtail replaced (currently 14F) as well as IVC filter placement. ([facility])   9/10-- s/p MIST (10 mg of Alteplase injected into pleural space) __ cancelled   - f/u sputum cultures-- sent on 9/10-- Klebsiella __ cont cefazolin   -  fluid Cx (from cavitory lesion)_ neg 9/9  - f/u aspergillus-- neg,, histo, blasto, coccidio.-- in lab  - serum fungitell and crypto Ag  - neg   - s/p Zosyn , now on Cefazolin x 6 weeks per ID recs   - s/pCT to water seal, place to suction only for transfer if off the unit, no output from chest tube   -- continue PTC to WS,   - CTA 9/9- Findings most in keeping with cavitary pneumonia involving lingula and left lower lobe complicated by bronchopleural fistula as described with tract around the small caliber pleural pigtail catheter, extensive subcutaneous emphysema and pneumomediastinum. Nonopacification of lingular and left lower lobe pulmonary arteries as described likely secondary to cavitary pneumonia rather than thromboembolic pulmonary embolism  - AFB x 3 are negative at [facility]   - CT Sx recommended MIST protocol, held pending repeat CT /chest to evaluate if pig tail is still in place as there was no drainage from the cavitary lesion/ space, repeat CT chest 9/11 showed pigtail still in cavitary lesion cavity.   - 9/13- flushed chest tube, noted w/ with thick secretions and small airleak at times. Will continue w/ chest tube for now as there is continued purulent drainage and air. When output improves will consider clamping trials.   -- -- pigtail catheter needs to be flushed q shift _- 9/14__ was flushed with NS and 15 cc of purulent material was drained, __ the plan for CT to be removed once output from the PTC decreased       # bronchopleural fistula.  - L CT in place, keep to water seal, repeat CT completed, results as above  - CT surgery consulted- Recommend MIST protocol x1 dose through current pigtail catheter due to concern for clogged pigtail ,also rec to consult IR for image guided pigtail catheter (pneumonostomy) placement into the pneumatocele, as well as blow hole placement to evacuate subQ emphysema  holding off for now. plan to clamp pigtail tomorrow morning and assess if it can be pulled. if needs another chest tube surgical would be ideal, discussed w/ CT surgery PA.   - repeat CT /chest 9/11- Persistent left upper lobe cavitary lesion, with pigtail catheter in place, likely communicating with the lingular bronchus. 2. Right middle lobe pneumonia and scattered airspace opacities throughout the remaining lungs, as described. 3. Persistent pneumomediastinum and extensive subcutaneous emphysema of the neck, chest, abdomen, pelvis, and extremities, including the scrotum. 4. Large anorectal mass. 5. Liver metastases.  - No operative thoracic surgical interventions planned at this time  -- PTC possibly to be removed tomorrow,     GI:   regular diet,   - cont bowel regimen w/ lactulose, senna, miralax BID,   -- Pt with poor PO intake _ -9/14-- started on Marinol. 9/14 and 9/15-- s/p Relistor   -- monitor for BM's       #constipation, in the setting of opiates and hypercalcemia   - no BM reported   - cont Senna, Miralax, lactulose daily, 9/12-- started on naloxegol, monitor for BM , s/p 8 mg of Relistor (9/14 and 9/15)    Renal:   TELMA, likely prerenal,   - Cr-- 1.27>1.38-> 1.61>1.5>1.01>1.17 Cr rising 9/12, and now improving, s/p 1L NS and standing IVFs. s/p LR @ 100cc/hr_ -decreased to 75 cc/hr.   - UOP: LOS net is pos 66 cc/ 24 hrs net is +1.2 L, voids about /hr, voided 1.85 in 24 hrs   - continue trending renal function   - Strict Is and Os    #Hypercalcemia - ?related to malignancy  - Check PTH- sent on 9/14,, PTHrp - 3>5   - Vit D levels low, per heme/onc hold off supplementation until Ca normalized as can worsen hyperCa in setting of hypercalcemia of malignancy  - Ca 16.4 - 9/10, 17 corrected for low serum albumin. per Heme/onc recs, s/p (9/10) calcitonin 4u/kg x 2 doses and pamidronate 90mcg IVPB x1. Calcium level today is 11.8   - cont IVFs w/ LR @ 75 cc/hr and continue monitoring   - continue to trend levels q 24 hrs     #electrolytes derangement  -- K-- 4.5, Mg -- 1.8, phos-- 2.5-- supplemented__ repeat labs q 24 hrs    #scrotal swelling likely due SC emphysema, with severe pain  - indwelling pedraza was placed at [facility] (Coude placed for urinary strain)   - UA positive with moderate bacteria 9/9- UCx 9/9 neg   --  is following, __ - Recommend some type of scrotal wrap to help with scrotal swelling, use Kerlex wrap tightly around scrotum. Can be done by floor staff as demonstrated. - Scrotal elevation unlikely to help improve swelling, Keep pedraza  - No further urologic intervention needed at this time. __ Can f/u outpatient w/ Dr Prabhu Lucio, [facility] [place]  - pain control with dilaudid and methadone, ct a/p 9/11 did not show any inflammation/hydrocele/infection .  - Urology consulted, recommended to wrap scrotum, offers no other intervention at this time.-- follow up recs     ID:  # MSSA bacteremia at [facility]  - Sputum cultures from 9/1 grew MSSA and Blood cultures from 8/31 grew MSSA with blood cultures from 9/2 NGTD. Legionella negative. Sputum AFB negative x3. Fungitell and galactomannan negative. Patient had been empirically on Zosyn, Cefazolin and Caspofungin (which was dc'ed).  - TTE 9/9 with possible vegetation on tricuspid valve   - s/p Zosyn (9/8 - 9/9), transitioned to Cefazolin per ID recs on 9/9, plan for 6 week course  -  fluid(cavitary lesion) Cx 9/9 - grew few staph aureus+  - Blood Cx neg 9/8  - UA 9/8 w/  some bacteria, UCx 9/9 neg   - CXR with L cavitary lesion, seen again on CT chest 9/9 and 9/11  - fungitell and crypto neg 9/10  - Sputum Cx  9/10 + mod klebsiella pneumoniae-- pansensitive__ continue Cefazolin x 6 weeks as per ID recs   - ID consulted, recs appreciated  - afebrile overnight, WBC stable 14> 10>13>12, LA-- 1.4    # HIV  - Follows with Dr. Marcial in clinic  - CD4 392 and viral load < 30 on 9/2/22 as per ID note   - Viral load undetectable 9/8  - c/w home Biktarvy , monitor Cr if worsening will d/c    Hematology:   # rectal CA, possible RV mass, mets to liver and possibly bone,  # metastatic HPV related anal SCC,   -- Hem/Onc--With regards to treatment of metastatic HPV related anal SCC, treatment will be on hold pending resolution of infection. Will need reassessment after resolution of infection to evaluate performance status and decide on systemic treatment options  -Pal care consult for pain management placed on 9/10__ recommendations appreciated__ Dilaudid dose and frequency changed, started on Methadone   -When stable and after the above, recommend radiation oncology consult for consideration of RT to anal mass for palliation.      Hematology   #cardiac ? mass/thrombus/vegetation   -Lovenox held at [facility] and s/p 9/7-- IVC filter  -- pt is on heparin gtt-- aPTT 60> 60, repeat level in am 45, the dose of gtt was increased and repeat aPTT is 60  _  cont q 6 hrs aPTT until therapeutic     #anemia   thrombocytosis,  AOCD, ferritin 512, iron 11, TIBC-- 150, Iron -- 7 %, transferrin -- 122, ([facility])  -- Haptoglobin -- 308, LDL-- 240, Urica acid-- 3.4   - H/H stable - 9.5/30.6 >8.4/28>9.8/32 plts- 261>224 >234     # L external iliac vein DVT  - on heparin gtt  - ptt q 6 hrs, aPTT - 60 >45, follow hep protocol   - Duplex b/l LEs 9/10 neg for DVT    Endo:  - not diabetic, HgbA1C 5.1 , FS-- 99>89 __ continue monitoring     Code: Full code  Palliative consult placed 9/10  -- 9/15-- had meeting at 12 pm with pt, pt's mother, pt's sister via facetime, palliative team, oncology team, and ICU team__ as per discussion, pt is very poor candidate for chemotx and or radiation tx at this time in the setting of acute infection/acute clots. Possibility pt will be treated outpt with chemotx or radiation tx after infection resolves and pt gets better. As per discussion, pt's pain will be treated with oral and IV medications, and meds were adjusted.   At the meeting, we discussed re: poor prognosis: rectal CA with mets to liver, bone and possibly lung. Pt is also bacteremic MSSA bacteremia with possible endocarditis and cavitary lesion of the lung. Pt is also with clot/vegetation in the heart and acute DVT-- L external iliac. pt with PMH/ HIV and is immunocompromised   -- discussed with pt DNR/DNI__ pt expressed understanding, pt remains Full Code,   -- see palliative and oncology note  -- pt;s mother is very emotionally distraught, emotional support provided   Lines:   PIV, indwelling pedraza, L PTC, R brachial arrow      31 M with PMH/ HIV, rectal cancer not on tx (followed up by Dr Frazier ([facility] oncology)), mets to liver and possibly bone,  R eye cataract, who presented to the [facility] on 8/31/22 after syncopal episode at home. Pt with cough x 2 mos, with yellow sputum and shortness of breath since 5 days PTA.   At [facility]: pt was found to be hypoxemic. 8/31/22 - L chest wall Pigtail placement. Pigtail was placed for suspected PMX with improvement in shortness of breath. CT chest was performed, which showed that pigtail is within the mass w/ DDx of TB, fungal infection, cavitary lesion, or squamous cell carcinoma. Pt was found to be anemic due to rectal bleeding with Hgb 5.9 s/p 2 U PRBC on 9/1. Blood cxs with MSSA bacteremia, and has been treated with Cefazolin and Zosyn.  Since 9/2 pt with worsening diffuse SC emphysema extending to the neck/face/all 4 extremities/scrotum/back , which has been getting progressively worse over past 2 days as per pt's statement. 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 TTE with two large RV masses and two additional small masses. 9/7- s/p L lateral chest tube to 14 Fr. 9/7 - s/p IVC Filter placement (as per [facility] statement, was placed prophylactically due to undiagnosed vegetations on the TTE. 12 cm multiloculated thick walled cavitary mass in the CHRISTINE and lingula.+ liver lesion on the CT a/p. + external iliac vein thrombosis, PE study was indeterminate     Pt was transferred to [facility] as per family request (mother) for pt to be evaluated by his outpt oncology team (Dr Frazier). As per oncology team the plan was to start Carbotaxol q 3 weeks + RT for diffuse mets as per PET scan.    Neuro  -A & O x 3 , no active issues  - not on sedation  - C/o of severe scrotal/rectal pain/pelvic pain, Morphine ER 30 mg BID Dc'd 9/12 in setting of TELMA, increased Dilaudid 1.5 mg q3hrs for severe pain. and 0.5 mg q3hrs for breakthrough, he has frequently required extra pushes of dilaudid and IV tylenol the past.  -- as per discussion with palliative, added Gabapentin 300 mg daily and Dilaudid 4 mg PO Q 4 hrs PRN for severe pain   - palliative care consulted for assistance w/ pain regimen- pt was on methadone 2.5mg qd and Oxycontin oupt , will checked qtc and restarted methadone-- 9/12, The dose can be increased next week in indicated _ please check QT's on Sunday   - pt is appropriately overwhelmed by his complex medical condition, holistic RN and Behavioral health consulted.  recommended Marinol - in place     CV:   - hemodynamically stable, remains off vasopressors   TTE with two large RV masses and two additional small masses. 9/7-- s/p L lateral chest tube to 14 Fr. 9/7 -- s/p IVC Filter placement (as per [facility] statement, was placed prophylactically due to undiagnosed vegetations on the TTE.  - pt is hemodynamically stable no pressors   - repeat TTE 9/9 - Limited and technically difficult study with views limited to the subcostal window.  Very limited views demonstrate what appears to be a mobile mass, possibly in association with the tricuspid valve. This may represent tumour, thrombus, or vegetation. Consider JENNIFER for further evaluation, if clinically indicated.  - per cardiology risk of JENNIFER outweighs benefit,  will obtain cardiac MRI instead to evaluate RV mass /RV thrombosis  -- cardial MRI ordered __ called MRI (9/15) and was advised by MRI dptm to call Monday morning to schedule the study       Pulm:  #CHRISTINE cavitary lesion - Differential includes cavitary PNA (sputum and blood cultures positive for MSSA) vs malignancy given history of metastatic rectal CA vs atypical infection. Of note PET/CT in our system from 8/2022 with only few subcm nodules in L and GGO in R lung, making malignancy as etiology of cavitary lesion less likely.   - L pigtail catheter was placed at [facility] due to concern for L PTX. However, patient was found to have large CHRISTINE cavitary lesion instead, into which the pigtail had been placed.   -On 9/7 patient had L pigtail replaced (currently 14F) as well as IVC filter placement. ([facility])   9/10-- s/p MIST (10 mg of Alteplase injected into pleural space) __ cancelled   - f/u sputum cultures-- sent on 9/10-- Klebsiella __ cont cefazolin   -  fluid Cx (from cavitory lesion)_ neg 9/9  - f/u aspergillus-- neg,, histo, blasto, coccidio.-- in lab  - serum fungitell and crypto Ag  - neg   - s/p Zosyn , now on Cefazolin x 6 weeks per ID recs   - s/pCT to water seal, place to suction only for transfer if off the unit, no output from chest tube   -- continue PTC to WS,   - CTA 9/9- Findings most in keeping with cavitary pneumonia involving lingula and left lower lobe complicated by bronchopleural fistula as described with tract around the small caliber pleural pigtail catheter, extensive subcutaneous emphysema and pneumomediastinum. Nonopacification of lingular and left lower lobe pulmonary arteries as described likely secondary to cavitary pneumonia rather than thromboembolic pulmonary embolism  - AFB x 3 are negative at [facility]   - CT Sx recommended MIST protocol, held pending repeat CT /chest to evaluate if pig tail is still in place as there was no drainage from the cavitary lesion/ space, repeat CT chest 9/11 showed pigtail still in cavitary lesion cavity.   - 9/13- flushed chest tube, noted w/ with thick secretions and small airleak at times. Will continue w/ chest tube for now as there is continued purulent drainage and air. When output improves will consider clamping trials.   -- -- pigtail catheter needs to be flushed q shift _- 9/14__ was flushed with NS and 15 cc of purulent material was drained, __ the plan for CT to be removed once output from the PTC decreased       # bronchopleural fistula.  - L CT in place, keep to water seal, repeat CT completed, results as above  - CT surgery consulted- Recommend MIST protocol x1 dose through current pigtail catheter due to concern for clogged pigtail ,also rec to consult IR for image guided pigtail catheter (pneumonostomy) placement into the pneumatocele, as well as blow hole placement to evacuate subQ emphysema  holding off for now. plan to clamp pigtail tomorrow morning and assess if it can be pulled. if needs another chest tube surgical would be ideal, discussed w/ CT surgery PA.   - repeat CT /chest 9/11- Persistent left upper lobe cavitary lesion, with pigtail catheter in place, likely communicating with the lingular bronchus. 2. Right middle lobe pneumonia and scattered airspace opacities throughout the remaining lungs, as described. 3. Persistent pneumomediastinum and extensive subcutaneous emphysema of the neck, chest, abdomen, pelvis, and extremities, including the scrotum. 4. Large anorectal mass. 5. Liver metastases.  - No operative thoracic surgical interventions planned at this time  -- PTC possibly to be removed tomorrow,     GI:   regular diet,   - cont bowel regimen w/ lactulose, senna, miralax BID,   -- Pt with poor PO intake _ -9/14-- started on Marinol. 9/14 and 9/15-- s/p Relistor   -- monitor for BM's       #constipation, in the setting of opiates and hypercalcemia   - no BM reported   - cont Senna, Miralax, lactulose daily, 9/12-- started on naloxegol, monitor for BM , s/p 8 mg of Relistor (9/14 and 9/15)    Renal:   TELMA, likely prerenal,   - Cr-- 1.27>1.38-> 1.61>1.5>1.01>1.17 Cr rising 9/12, and now improving, s/p 1L NS and standing IVFs. s/p LR @ 100cc/hr_ -decreased to 75 cc/hr.   - UOP: LOS net is pos 66 cc/ 24 hrs net is +1.2 L, voids about /hr, voided 1.85 in 24 hrs   - continue trending renal function   - Strict Is and Os    #Hypercalcemia - ?related to malignancy  - Check PTH- sent on 9/14,, PTHrp - 3>5   - Vit D levels low, per heme/onc hold off supplementation until Ca normalized as can worsen hyperCa in setting of hypercalcemia of malignancy  - Ca 16.4 - 9/10, 17 corrected for low serum albumin. per Heme/onc recs, s/p (9/10) calcitonin 4u/kg x 2 doses and pamidronate 90mcg IVPB x1. Calcium level today is 11.8   - cont IVFs w/ LR @ 75 cc/hr and continue monitoring   - continue to trend levels q 24 hrs     #electrolytes derangement  -- K-- 4.5, Mg -- 1.8, phos-- 2.5-- supplemented__ repeat labs q 24 hrs    #scrotal swelling likely due SC emphysema, with severe pain  - indwelling pedraza was placed at [facility] (Coude placed for urinary strain)   - UA positive with moderate bacteria 9/9- UCx 9/9 neg   --  is following, __ - Recommend some type of scrotal wrap to help with scrotal swelling, use Kerlex wrap tightly around scrotum. Can be done by floor staff as demonstrated. - Scrotal elevation unlikely to help improve swelling, Keep pedraza  - No further urologic intervention needed at this time. __ Can f/u outpatient w/ Dr Prabhu Lucio, [facility] [place]  - pain control with dilaudid and methadone, ct a/p 9/11 did not show any inflammation/hydrocele/infection .  - Urology consulted, recommended to wrap scrotum, offers no other intervention at this time.-- follow up recs     ID:  # MSSA bacteremia at [facility]  - Sputum cultures from 9/1 grew MSSA and Blood cultures from 8/31 grew MSSA with blood cultures from 9/2 NGTD. Legionella negative. Sputum AFB negative x3. Fungitell and galactomannan negative. Patient had been empirically on Zosyn, Cefazolin and Caspofungin (which was dc'ed).  - TTE 9/9 with possible vegetation on tricuspid valve   - s/p Zosyn (9/8 - 9/9), transitioned to Cefazolin per ID recs on 9/9, plan for 6 week course  -  fluid(cavitary lesion) Cx 9/9 - grew few staph aureus+  - Blood Cx neg 9/8  - UA 9/8 w/  some bacteria, UCx 9/9 neg   - CXR with L cavitary lesion, seen again on CT chest 9/9 and 9/11  - fungitell and crypto neg 9/10  - Sputum Cx  9/10 + mod klebsiella pneumoniae-- pansensitive__ continue Cefazolin x 6 weeks as per ID recs   - ID consulted, recs appreciated  - afebrile overnight, WBC stable 14> 10>13>12, LA-- 1.4    # HIV  - Follows with Dr. Marcial in clinic  - CD4 392 and viral load < 30 on 9/2/22 as per ID note   - Viral load undetectable 9/8  - c/w home Biktarvy , monitor Cr if worsening will d/c    Hematology:   # rectal CA, possible RV mass, mets to liver and possibly bone,  # metastatic HPV related anal SCC,   -- Hem/Onc--With regards to treatment of metastatic HPV related anal SCC, treatment will be on hold pending resolution of infection. Will need reassessment after resolution of infection to evaluate performance status and decide on systemic treatment options  -Pal care consult for pain management placed on 9/10__ recommendations appreciated__ Dilaudid dose and frequency changed, started on Methadone   -When stable and after the above, recommend radiation oncology consult for consideration of RT to anal mass for palliation.      Hematology   #cardiac ? mass/thrombus/vegetation   -Lovenox held at [facility] and s/p 9/7-- IVC filter  -- pt is on heparin gtt-- aPTT 60> 60, repeat level in am 45, the dose of gtt was increased and repeat aPTT is 60  _  cont q 6 hrs aPTT until therapeutic     #anemia   thrombocytosis,  AOCD, ferritin 512, iron 11, TIBC-- 150, Iron -- 7 %, transferrin -- 122, ([place])  -- Haptoglobin -- 308, LDL-- 240, Urica acid-- 3.4   - H/H stable - 9.5/30.6 >8.4/28>9.8/32 plts- 261>224 >234     # L external iliac vein DVT  - on heparin gtt  - ptt q 6 hrs, aPTT - 60 >45, follow hep protocol   - Duplex b/l LEs 9/10 neg for DVT    Endo:  - not diabetic, HgbA1C 5.1 , FS-- 99>89 __ continue monitoring     Code: Full code  Palliative consult placed 9/10  -- 9/15-- had meeting at 12 pm with pt, pt's mother, pt's sister via facetime, palliative team, oncology team, and ICU team__ as per discussion, pt is very poor candidate for chemotx and or radiation tx at this time in the setting of acute infection/acute clots. Possibility pt will be treated outpt with chemotx or radiation tx after infection resolves and pt gets better. As per discussion, pt's pain will be treated with oral and IV medications, and meds were adjusted.   At the meeting, we discussed re: poor prognosis: rectal CA with mets to liver, bone and possibly lung. Pt is also bacteremic MSSA bacteremia with possible endocarditis and cavitary lesion of the lung. Pt is also with clot/vegetation in the heart and acute DVT-- L external iliac. pt with PMH/ HIV and is immunocompromised   -- discussed with pt DNR/DNI__ pt expressed understanding, pt remains Full Code,   -- see palliative and oncology note  -- pt;s mother is very emotionally distraught, emotional support provided   Lines:   PIV, indwelling pedraza, L PTC, R brachial arrow      31 M with PMH/ HIV, rectal cancer not on tx (followed up by Dr Frazier ([facility] oncology)), mets to liver and possibly bone,  R eye cataract, who presented to the [facility] on 8/31/22 after syncopal episode at home. Pt with cough x 2 mos, with yellow sputum and shortness of breath since 5 days PTA.   At [facility]: pt was found to be hypoxemic. 8/31/22 - L chest wall Pigtail placement. Pigtail was placed for suspected PMX with improvement in shortness of breath. CT chest was performed, which showed that pigtail is within the mass w/ DDx of TB, fungal infection, cavitary lesion, or squamous cell carcinoma. Pt was found to be anemic due to rectal bleeding with Hgb 5.9 s/p 2 U PRBC on 9/1. Blood cxs with MSSA bacteremia, and has been treated with Cefazolin and Zosyn.  Since 9/2 pt with worsening diffuse SC emphysema extending to the neck/face/all 4 extremities/scrotum/back , which has been getting progressively worse over past 2 days as per pt's statement. 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 TTE with two large RV masses and two additional small masses. 9/7- s/p L lateral chest tube to 14 Fr. 9/7 - s/p IVC Filter placement (as per [facility] statement, was placed prophylactically due to undiagnosed vegetations on the TTE. 12 cm multiloculated thick walled cavitary mass in the CHRISTINE and lingula.+ liver lesion on the CT a/p. + external iliac vein thrombosis, PE study was indeterminate     Pt was transferred to [facility] as per family request (mother) for pt to be evaluated by his outpt oncology team (Dr Frazier). As per oncology team the plan was to start Carbotaxol q 3 weeks + RT for diffuse mets as per PET scan.    Neuro  -A & O x 3 , no active issues  - not on sedation  - C/o of severe scrotal/rectal pain/pelvic pain, Morphine ER 30 mg BID Dc'd 9/12 in setting of TELMA, increased Dilaudid 1.5 mg q3hrs for severe pain. and 0.5 mg q3hrs for breakthrough, he has frequently required extra pushes of dilaudid and IV tylenol the past.  -- as per discussion with palliative, added Gabapentin 300 mg daily and Dilaudid 4 mg PO Q 4 hrs PRN for severe pain   - palliative care consulted for assistance w/ pain regimen- pt was on methadone 2.5mg qd and Oxycontin oupt , will checked qtc and restarted methadone-- 9/12, The dose can be increased next week in indicated _ please check QT's on Sunday   - pt is appropriately overwhelmed by his complex medical condition, holistic RN and Behavioral health consulted.  recommended Marinol - in place     CV:   - hemodynamically stable, remains off vasopressors   TTE with two large RV masses and two additional small masses. 9/7-- s/p L lateral chest tube to 14 Fr. 9/7 -- s/p IVC Filter placement (as per [facility] statement, was placed prophylactically due to undiagnosed vegetations on the TTE.  - pt is hemodynamically stable no pressors   - repeat TTE 9/9 - Limited and technically difficult study with views limited to the subcostal window.  Very limited views demonstrate what appears to be a mobile mass, possibly in association with the tricuspid valve. This may represent tumour, thrombus, or vegetation. Consider JENNIFER for further evaluation, if clinically indicated.  - per cardiology risk of JENNIFER outweighs benefit,  will obtain cardiac MRI instead to evaluate RV mass /RV thrombosis  -- cardial MRI ordered __ called MRI (9/15) and was advised by MRI dptm to call Monday morning to schedule the study       Pulm:  #CHRISTINE cavitary lesion - Differential includes cavitary PNA (sputum and blood cultures positive for MSSA) vs malignancy given history of metastatic rectal CA vs atypical infection. Of note PET/CT in our system from 8/2022 with only few subcm nodules in L and GGO in R lung, making malignancy as etiology of cavitary lesion less likely.   - L pigtail catheter was placed at [facility] due to concern for L PTX. However, patient was found to have large CHRISTINE cavitary lesion instead, into which the pigtail had been placed.   -On 9/7 patient had L pigtail replaced (currently 14F) as well as IVC filter placement. ([facility])   9/10-- s/p MIST (10 mg of Alteplase injected into pleural space) __ cancelled   - f/u sputum cultures-- sent on 9/10-- Klebsiella __ cont cefazolin   -  fluid Cx (from cavitory lesion)_ neg 9/9  - f/u aspergillus-- neg,, histo, blasto, coccidio.-- in lab  - serum fungitell and crypto Ag  - neg   - s/p Zosyn , now on Cefazolin x 6 weeks per ID recs   - s/pCT to water seal, place to suction only for transfer if off the unit, no output from chest tube   -- continue PTC to WS,   - CTA 9/9- Findings most in keeping with cavitary pneumonia involving lingula and left lower lobe complicated by bronchopleural fistula as described with tract around the small caliber pleural pigtail catheter, extensive subcutaneous emphysema and pneumomediastinum. Nonopacification of lingular and left lower lobe pulmonary arteries as described likely secondary to cavitary pneumonia rather than thromboembolic pulmonary embolism  - AFB x 3 are negative at [facility]   - CT Sx recommended MIST protocol, held pending repeat CT /chest to evaluate if pig tail is still in place as there was no drainage from the cavitary lesion/ space, repeat CT chest 9/11 showed pigtail still in cavitary lesion cavity.   - 9/13- flushed chest tube, noted w/ with thick secretions and small airleak at times. Will continue w/ chest tube for now as there is continued purulent drainage and air. When output improves will consider clamping trials.   -- -- pigtail catheter needs to be flushed q shift _- 9/14__ was flushed with NS and 15 cc of purulent material was drained, __ the plan for CT to be removed once output from the PTC decreased       # bronchopleural fistula.  - L CT in place, keep to water seal, repeat CT completed, results as above  - CT surgery consulted- Recommend MIST protocol x1 dose through current pigtail catheter due to concern for clogged pigtail ,also rec to consult IR for image guided pigtail catheter (pneumonostomy) placement into the pneumatocele, as well as blow hole placement to evacuate subQ emphysema  holding off for now. plan to clamp pigtail tomorrow morning and assess if it can be pulled. if needs another chest tube surgical would be ideal, discussed w/ CT surgery PA.   - repeat CT /chest 9/11- Persistent left upper lobe cavitary lesion, with pigtail catheter in place, likely communicating with the lingular bronchus. 2. Right middle lobe pneumonia and scattered airspace opacities throughout the remaining lungs, as described. 3. Persistent pneumomediastinum and extensive subcutaneous emphysema of the neck, chest, abdomen, pelvis, and extremities, including the scrotum. 4. Large anorectal mass. 5. Liver metastases.  - No operative thoracic surgical interventions planned at this time  -- PTC possibly to be removed tomorrow,     GI:   regular diet,   - cont bowel regimen w/ lactulose, senna, miralax BID,   -- Pt with poor PO intake _ -9/14-- started on Marinol. 9/14 and 9/15-- s/p Relistor   -- monitor for BM's   -- 9/15-- small amt of dark blood from rectum, x 1, stopped, __cont monitoring       #constipation, in the setting of opiates and hypercalcemia   - no BM reported   - cont Senna, Miralax, lactulose daily, 9/12-- started on naloxegol, monitor for BM , s/p 8 mg of Relistor (9/14 and 9/15)    Renal:   TELMA, likely prerenal,   - Cr-- 1.27>1.38-> 1.61>1.5>1.01>1.17 Cr rising 9/12, and now improving, s/p 1L NS and standing IVFs. s/p LR @ 100cc/hr_ -decreased to 75 cc/hr.   - UOP: LOS net is pos 66 cc/ 24 hrs net is +1.2 L, voids about /hr, voided 1.85 in 24 hrs   - continue trending renal function   - Strict Is and Os    #Hypercalcemia - ?related to malignancy  - Check PTH- sent on 9/14,, PTHrp - 3>5   - Vit D levels low, per heme/onc hold off supplementation until Ca normalized as can worsen hyperCa in setting of hypercalcemia of malignancy  - Ca 16.4 - 9/10, 17 corrected for low serum albumin. per Heme/onc recs, s/p (9/10) calcitonin 4u/kg x 2 doses and pamidronate 90mcg IVPB x1. Calcium level today is 11.8   - cont IVFs w/ LR @ 75 cc/hr and continue monitoring   - continue to trend levels q 24 hrs     #electrolytes derangement  -- K-- 4.5, Mg -- 1.8, phos-- 2.5-- supplemented__ repeat labs q 24 hrs    #scrotal swelling likely due SC emphysema, with severe pain  - indwelling pedraza was placed at [facility] (Coude placed for urinary strain)   - UA positive with moderate bacteria 9/9- UCx 9/9 neg   --  is following, __ - Recommend some type of scrotal wrap to help with scrotal swelling, use Kerlex wrap tightly around scrotum. Can be done by floor staff as demonstrated. - Scrotal elevation unlikely to help improve swelling, Keep pedraza  - No further urologic intervention needed at this time. __ Can f/u outpatient w/ Dr Prabhu Lucio, [facility] [place]  - pain control with dilaudid and methadone, ct a/p 9/11 did not show any inflammation/hydrocele/infection .  - Urology consulted, recommended to wrap scrotum, offers no other intervention at this time.-- follow up recs     ID:  # MSSA bacteremia at [facility]  - Sputum cultures from 9/1 grew MSSA and Blood cultures from 8/31 grew MSSA with blood cultures from 9/2 NGTD. Legionella negative. Sputum AFB negative x3. Fungitell and galactomannan negative. Patient had been empirically on Zosyn, Cefazolin and Caspofungin (which was dc'ed).  - TTE 9/9 with possible vegetation on tricuspid valve   - s/p Zosyn (9/8 - 9/9), transitioned to Cefazolin per ID recs on 9/9, plan for 6 week course  -  fluid(cavitary lesion) Cx 9/9 - grew few staph aureus+  - Blood Cx neg 9/8  - UA 9/8 w/  some bacteria, UCx 9/9 neg   - CXR with L cavitary lesion, seen again on CT chest 9/9 and 9/11  - fungitell and crypto neg 9/10  - Sputum Cx  9/10 + mod klebsiella pneumoniae-- pansensitive__ continue Cefazolin x 6 weeks as per ID recs   - ID consulted, recs appreciated  - afebrile overnight, WBC stable 14> 10>13>12, LA-- 1.4    # HIV  - Follows with Dr. Marcial in clinic  - CD4 392 and viral load < 30 on 9/2/22 as per ID note   - Viral load undetectable 9/8  - c/w home Biktarvy , monitor Cr if worsening will d/c    Hematology:   # rectal CA, possible RV mass, mets to liver and possibly bone,  # metastatic HPV related anal SCC,   -- Hem/Onc--With regards to treatment of metastatic HPV related anal SCC, treatment will be on hold pending resolution of infection. Will need reassessment after resolution of infection to evaluate performance status and decide on systemic treatment options  -Pal care consult for pain management placed on 9/10__ recommendations appreciated__ Dilaudid dose and frequency changed, started on Methadone   -When stable and after the above, recommend radiation oncology consult for consideration of RT to anal mass for palliation.  -- -- 9/15-- small amt of dark blood clot from rectum, x 1, stopped, __cont monitoring _ -cont heparin gtt for now and consider holding if another episode of bleeding, __ hold Heparin gtt at 2 am       Hematology   #cardiac ? mass/thrombus/vegetation   -Lovenox held at [place] and s/p 9/7-- IVC filter  -- pt is on heparin gtt-- aPTT 60> 60, repeat level in am 45, the dose of gtt was increased and repeat aPTT is 60  _  cont q 6 hrs aPTT until therapeutic     #anemia   thrombocytosis,  AOCD, ferritin 512, iron 11, TIBC-- 150, Iron -- 7 %, transferrin -- 122, ([place])  -- Haptoglobin -- 308, LDL-- 240, Urica acid-- 3.4   - H/H stable - 9.5/30.6 >8.4/28>9.8/32 plts- 261>224 >234     # L external iliac vein DVT  - on heparin gtt  - ptt q 6 hrs, aPTT - 60 >45, follow hep protocol   - Duplex b/l LEs 9/10 neg for DVT    Endo:  - not diabetic, HgbA1C 5.1 , FS-- 99>89 __ continue monitoring     Code: Full code  Palliative consult placed 9/10  -- 9/15-- had meeting at 12 pm with pt, pt's mother, pt's sister via facetime, palliative team, oncology team, and ICU team__ as per discussion, pt is very poor candidate for chemotx and or radiation tx at this time in the setting of acute infection/acute clots. Possibility pt will be treated outpt with chemotx or radiation tx after infection resolves and pt gets better. As per discussion, pt's pain will be treated with oral and IV medications, and meds were adjusted.   At the meeting, we discussed re: poor prognosis: rectal CA with mets to liver, bone and possibly lung. Pt is also bacteremic MSSA bacteremia with possible endocarditis and cavitary lesion of the lung. Pt is also with clot/vegetation in the heart and acute DVT-- L external iliac. pt with PMH/ HIV and is immunocompromised   -- discussed with pt DNR/DNI__ pt expressed understanding, pt remains Full Code,   -- see palliative and oncology note  -- pt;s mother is very emotionally distraught, emotional support provided   Lines:   PIV, indwelling pedraza, L PTC, R brachial arrow

## 2022-09-15 NOTE — PROGRESS NOTE ADULT - PROBLEM SELECTOR PLAN 1
- Pain from subcutaneous emphysema in setting of underlying cancer pain  - Continue PO Methadone 2.5mg q12 (started on 9/12) (QTC-444 on 9/12); monitor QTC closely while on Methadone  - Increase to IV Dilaudid 1.5mg q3 PRN (hold for hypotension, oversedation, respiratory depression).  - Patient amenable to Holistic RN referral and Behavioral Health referral - Pain from subcutaneous emphysema in setting of underlying cancer pain  - Continue PO Methadone 2.5mg q12 (started on 9/12) (QTC-444 on 9/12); monitor QTC closely while on Methadone  - Start Gabapentin 300mg QD   - Start PO Dilaudid 4mg q4 PRN moderate pain (hold for hypotension, oversedation, respiratory depression)  - IV Dilaudid 1.5mg q3 PRN (hold for hypotension, oversedation, respiratory depression).  - Extensively educated patient about pain regimen

## 2022-09-15 NOTE — PROGRESS NOTE ADULT - SUBJECTIVE AND OBJECTIVE BOX
INTERVAL HPI/OVERNIGHT EVENTS:  Patient seen at bedside.  No new complaints.   Family meeting held at bedside with pt, his mother and sister, pal care and the MICU team.    VITAL SIGNS:  T(F): 96.3 (09-15-22 @ 12:00)  HR: 83 (09-15-22 @ 13:00)  BP: 127/81 (09-15-22 @ 13:00)  RR: 15 (09-15-22 @ 13:00)  SpO2: 99% (09-15-22 @ 13:00)  Wt(kg): --    PHYSICAL EXAM:    Constitutional: NAD, resting in bed comfortably  Eyes: EOMI, sclera non-icteric  Neck: supple, no LAP  Respiratory: CTA b/l, good air entry b/l, no wheezing, rhonchi or crackels  Cardiovascular: RRR, normal S1S2, no M/R/G  Gastrointestinal: soft, NTND  Extremities: no edema  Neurological: AAOx3, non focal  Skin: Normal temperature    MEDICATIONS  (STANDING):  bictegravir 50 mG/emtricitabine 200 mG/tenofovir alafenamide 25 mG (BIKTARVY) 1 Tablet(s) Oral daily  ceFAZolin   IVPB 2000 milliGRAM(s) IV Intermittent every 8 hours  chlorhexidine 2% Cloths 1 Application(s) Topical <User Schedule>  dronabinol 5 milliGRAM(s) Oral two times a day  ferrous    sulfate 325 milliGRAM(s) Oral <User Schedule>  gabapentin 300 milliGRAM(s) Oral daily  heparin  Infusion. 1700 Unit(s)/Hr (17 mL/Hr) IV Continuous <Continuous>  lactated ringers. 1000 milliLiter(s) (100 mL/Hr) IV Continuous <Continuous>  lactulose Syrup 10 Gram(s) Oral daily  lidocaine   4% Patch 1 Patch Transdermal daily  melatonin 3 milliGRAM(s) Oral at bedtime  methadone    Tablet 2.5 milliGRAM(s) Oral two times a day  methylnaltrexone Injectable 8 milliGRAM(s) SubCutaneous once  naloxegol 25 milliGRAM(s) Oral daily  polyethylene glycol 3350 17 Gram(s) Oral two times a day  senna 2 Tablet(s) Oral at bedtime    MEDICATIONS  (PRN):  HYDROmorphone   Tablet 4 milliGRAM(s) Oral every 4 hours PRN Moderate Pain (4 - 6)  HYDROmorphone  Injectable 0.5 milliGRAM(s) IV Push every 3 hours PRN breakthrough severe pain  HYDROmorphone  Injectable 1.5 milliGRAM(s) IV Push every 3 hours PRN Severe Pain (7 - 10)      Allergies    ibuprofen (Angioedema)    Intolerances        LABS:                        9.8    12.37 )-----------( 234      ( 15 Sep 2022 02:35 )             32.1     09-15    136  |  98  |  16  ----------------------------<  89  4.5   |  27  |  1.17    Ca    11.8<H>      15 Sep 2022 02:35  Phos  2.5     09-15  Mg     1.80     09-15    TPro  7.0  /  Alb  2.7<L>  /  TBili  0.4  /  DBili  x   /  AST  40  /  ALT  5   /  AlkPhos  234<H>  09-15    PTT - ( 15 Sep 2022 09:30 )  PTT:60.8 sec      RADIOLOGY & ADDITIONAL TESTS:  Studies reviewed.

## 2022-09-15 NOTE — PROGRESS NOTE ADULT - PROBLEM SELECTOR PLAN 8
Discussed with primary team about symptom management recommendations    Thank you for allowing us to participate in your patient's care. Please page [phone] for any questions/concerns. Please see separate GOC note.  >46 minutes spent on advanced care planning with family.

## 2022-09-15 NOTE — PROGRESS NOTE ADULT - SUBJECTIVE AND OBJECTIVE BOX
Significant recent/past 24 hr events:    Subjective:    Review of Systems         [ ] A ten-point review of systems was otherwise negative except as noted.  [ ] Due to altered mental status/intubation, subjective information were not able to be obtained from the patient. History was obtained, to the extent possible, from review of the chart and collateral sources of information.      Patient is a 31y old  Male who presents with a chief complaint of Shortness of breath (14 Sep 2022 15:53)    HPI:  31 M with PMH/ HIV, rectal cancer not on tx (followed up by Dr Frazier ([facility] oncology)), mets to liver and possibly bone, R eye cataract, who presented to the [facility] on 8/31/22 after syncopal episode at home. Pt with cough x 2 mos, with yellow sputum and shortness of breath since 5 days PTA.   -- at [facility]: pt was found to be hypoxemic. 8/31/22 -- L chest wall Pigtail placement. Pigtail was placed for suspected PMX with improvement in shortness of breath. CT chest was performed, which showed that pigtail is within the mass__ differential is TB, fungal infection, cavitary lesion, or squamous cell carcinoma. Pt was found to be anemic due to rectal bleeding with Hgb 5.9 s/p 2 U PRBC on 9/1. Blood cxs with MSSA bacteremia, and has been treated with Cefazolin and Zosyn.  Since 9/2 pt with worsening diffuse SC emphysema extending to the neck/face/all 4 extremities/scrotum/back , which has been getting progressively worse over past 2 days as per pt's statement. 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 TTE with two large RV masses and two additional small masses. 9/7-- s/p L lateral chest tube to 14 Fr. 9/7 -- s/p IVC Filter placement (as per [facility] statement, was placed prophylactically due to undiagnosed vegetations on the TTE. 12 cm multiloculated thick walled cavitary mass in the CHRISTINE and lingula.+ liver lesion on the CT a/p. + external iliac vein thrombosis, PE study was indeterminate   __ pt was transferred to [facility] as per family request (mother) for pt to be evaluated by his outpt oncology team (Dr Frazier). As per oncology team the plan is to start Carbotaxol q 3 weeks + RT for diffuse mets as per PET scan,  (08 Sep 2022 14:44)    PAST MEDICAL & SURGICAL HISTORY:  HIV infection  6/30/2022 virus detected, switched to Biktarvy, male partners      Rectal cancer  not on tretment      Right cataract      Current smoker      History of depression  and anxiety      No significant past surgical history        FAMILY HISTORY:      Vitals   ICU Vital Signs Last 24 Hrs  T(C): 36.1 (14 Sep 2022 20:00), Max: 36.1 (14 Sep 2022 12:00)  T(F): 97 (14 Sep 2022 20:00), Max: 97 (14 Sep 2022 12:00)  HR: 89 (15 Sep 2022 05:00) (81 - 98)  BP: 131/67 (15 Sep 2022 04:00) (125/74 - 142/82)  BP(mean): 78 (15 Sep 2022 04:00) (78 - 91)  ABP: --  ABP(mean): --  RR: 16 (15 Sep 2022 05:00) (15 - 21)  SpO2: 97% (15 Sep 2022 05:00) (97% - 100%)    O2 Parameters below as of 15 Sep 2022 05:00  Patient On (Oxygen Delivery Method): room air            Physical Exam:   Constitutional: NAD, well-groomed, well-developed  HEENT: PERRLA, EOMI, no drainage or redness  Neck: supple,  No JVD, Trachea midline  Back: Normal spine flexure, No CVA tenderness, No deformity or limitation of movement  Respiratory: Breath Sounds equal & clear bilaterally to auscultation, no accessory muscle use noted  Cardiovascular: Regular rate, regular rhythm, normal S1, S2; no murmurs or rub  Gastrointestinal: Soft, non-tender, non distended, no hepatosplenomegaly, normal bowel sounds  Extremities: NEGRO x 4, no peripheral edema, no cyanosis, no clubbing   Vascular: Equal and normal pulses: 2+ peripheral pulses throughout  Neurological: A+O x 3; speech clear and intact; no sensory, motor  deficits, normal reflexes  Psychiatric: calm, normal mood, normal affect  Musculoskeletal: No joint swelling or deformity; no limitation of movement  Skin: warm, dry, well perfused, no rashes    VENT SETTINGS     ABG - ( 14 Sep 2022 17:45 )  pH, Arterial: 7.57  pH, Blood: x     /  pCO2: 33    /  pO2: 114   / HCO3: 30    / Base Excess: 7.8   /  SaO2: 99.7                I&O's Detail    14 Sep 2022 07:01  -  15 Sep 2022 07:00  --------------------------------------------------------  IN:    Heparin Infusion: 362 mL    IV PiggyBack: 750 mL    Lactated Ringers: 2000 mL  Total IN: 3112 mL    OUT:    Chest Tube (mL): 15 mL    Indwelling Catheter - Urethral (mL): 1850 mL  Total OUT: 1865 mL    Total NET: 1247 mL          LABS                        9.8    12.37 )-----------( 234      ( 15 Sep 2022 02:35 )             32.1     09-15    136  |  98  |  16  ----------------------------<  89  4.5   |  27  |  1.17    Ca    11.8<H>      15 Sep 2022 02:35  Phos  2.5     09-15  Mg     1.80     09-15    TPro  7.0  /  Alb  2.7<L>  /  TBili  0.4  /  DBili  x   /  AST  40  /  ALT  5   /  AlkPhos  234<H>  09-15    LIVER FUNCTIONS - ( 15 Sep 2022 02:35 )  Alb: 2.7 g/dL / Pro: 7.0 g/dL / ALK PHOS: 234 U/L / ALT: 5 U/L / AST: 40 U/L / GGT: x           PTT - ( 15 Sep 2022 02:35 )  PTT:45.4 sec                MEDICATIONS  (STANDING):  bictegravir 50 mG/emtricitabine 200 mG/tenofovir alafenamide 25 mG (BIKTARVY) 1 Tablet(s) Oral daily  ceFAZolin   IVPB 2000 milliGRAM(s) IV Intermittent every 8 hours  chlorhexidine 2% Cloths 1 Application(s) Topical <User Schedule>  dronabinol 2.5 milliGRAM(s) Oral two times a day  ferrous    sulfate 325 milliGRAM(s) Oral <User Schedule>  heparin  Infusion. 1700 Unit(s)/Hr (17 mL/Hr) IV Continuous <Continuous>  lactated ringers. 1000 milliLiter(s) (100 mL/Hr) IV Continuous <Continuous>  lactulose Syrup 10 Gram(s) Oral daily  lidocaine   4% Patch 1 Patch Transdermal daily  melatonin 3 milliGRAM(s) Oral at bedtime  methadone    Tablet 2.5 milliGRAM(s) Oral two times a day  naloxegol 25 milliGRAM(s) Oral daily  polyethylene glycol 3350 17 Gram(s) Oral two times a day  senna 2 Tablet(s) Oral at bedtime    MEDICATIONS  (PRN):  HYDROmorphone  Injectable 1.5 milliGRAM(s) IV Push every 3 hours PRN Severe Pain (7 - 10)  HYDROmorphone  Injectable 0.5 milliGRAM(s) IV Push every 3 hours PRN Moderate Pain (4 - 6)      Allergies:  ibuprofen (Angioedema)        CRITICAL CARE TIME SPENT:  minutes of critical care time spent providing medical care for patient's acute illness/conditions that impairs at least one vital organ system and/or poses a high risk of imminent or life threatening deterioration in the patient's condition. It includes time spent evaluating and treating the patient's acute illness as well as time spent reviewing labs, radiology, discussing goals of care with patient and/or patient's family, and discussing the case with a multidisciplinary team, in an effort to prevent further life threatening deterioration or end organ damage. This time is independent of any procedures performed.         Significant recent/past 24 hr events: pt c/o severe rectal pain, which improving with Dilaudid but is not lasting x 3 hrs,   -- SVT to 170's x 2 min self resolving, pt asymptomatic, hemodynamically stable and one episode of SVT to 200's, self limited, pt asymptomatic, hemodynamically stable,     Subjective:    Review of Systems         [ ] A ten-point review of systems was otherwise negative except as noted.  [ ] Due to altered mental status/intubation, subjective information were not able to be obtained from the patient. History was obtained, to the extent possible, from review of the chart and collateral sources of information.      Patient is a 31y old  Male who presents with a chief complaint of Shortness of breath (14 Sep 2022 15:53)    HPI:  31 M with PMH/ HIV, rectal cancer not on tx (followed up by Dr Frazier ([facility] oncology)), mets to liver and possibly bone, R eye cataract, who presented to the [facility] on 8/31/22 after syncopal episode at home. Pt with cough x 2 mos, with yellow sputum and shortness of breath since 5 days PTA.   -- at [facility]: pt was found to be hypoxemic. 8/31/22 -- L chest wall Pigtail placement. Pigtail was placed for suspected PMX with improvement in shortness of breath. CT chest was performed, which showed that pigtail is within the mass__ differential is TB, fungal infection, cavitary lesion, or squamous cell carcinoma. Pt was found to be anemic due to rectal bleeding with Hgb 5.9 s/p 2 U PRBC on 9/1. Blood cxs with MSSA bacteremia, and has been treated with Cefazolin and Zosyn.  Since 9/2 pt with worsening diffuse SC emphysema extending to the neck/face/all 4 extremities/scrotum/back , which has been getting progressively worse over past 2 days as per pt's statement. 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 TTE with two large RV masses and two additional small masses. 9/7-- s/p L lateral chest tube to 14 Fr. 9/7 -- s/p IVC Filter placement (as per [facility] statement, was placed prophylactically due to undiagnosed vegetations on the TTE. 12 cm multiloculated thick walled cavitary mass in the CHRISTINE and lingula.+ liver lesion on the CT a/p. + external iliac vein thrombosis, PE study was indeterminate   __ pt was transferred to [facility] as per family request (mother) for pt to be evaluated by his outpt oncology team (Dr Frazier). As per oncology team the plan is to start Carbotaxol q 3 weeks + RT for diffuse mets as per PET scan,  (08 Sep 2022 14:44)    PAST MEDICAL & SURGICAL HISTORY:  HIV infection  6/30/2022 virus detected, switched to Biktarvy, male partners      Rectal cancer  not on tretment      Right cataract      Current smoker      History of depression  and anxiety      No significant past surgical history        FAMILY HISTORY:      Vitals   ICU Vital Signs Last 24 Hrs  T(C): 36.1 (14 Sep 2022 20:00), Max: 36.1 (14 Sep 2022 12:00)  T(F): 97 (14 Sep 2022 20:00), Max: 97 (14 Sep 2022 12:00)  HR: 89 (15 Sep 2022 05:00) (81 - 98)  BP: 131/67 (15 Sep 2022 04:00) (125/74 - 142/82)  BP(mean): 78 (15 Sep 2022 04:00) (78 - 91)  ABP: --  ABP(mean): --  RR: 16 (15 Sep 2022 05:00) (15 - 21)  SpO2: 97% (15 Sep 2022 05:00) (97% - 100%)    O2 Parameters below as of 15 Sep 2022 05:00  Patient On (Oxygen Delivery Method): room air            Physical Exam:   Constitutional: NAD, well-groomed, well-developed  -- decreasing generalized SC emphysema,   HEENT: PERRLA, EOMI, no drainage or redness  Neck: supple,  No JVD, Trachea midline  Back: Normal spine flexure, No CVA tenderness, No deformity or limitation of movement  Respiratory: Breath Sounds equal & clear bilaterally to auscultation, no accessory muscle use noted  Cardiovascular: Regular rate, regular rhythm, normal S1, S2; no murmurs or rub  Gastrointestinal: Soft, non-tender, non distended, no hepatosplenomegaly, normal bowel sounds  : swollen scrotum, improving   Extremities: NEGRO x 4, no peripheral edema, no cyanosis, no clubbing   Vascular: Equal and normal pulses: 2+ peripheral pulses throughout  Neurological: A+O x 3; speech clear and intact; no sensory, motor  deficits, normal reflexes  Psychiatric: calm, normal mood, normal affect  Musculoskeletal: No joint swelling or deformity; no limitation of movement  Skin: warm, dry, well perfused, no rashes    VENT SETTINGS     ABG - ( 14 Sep 2022 17:45 )  pH, Arterial: 7.57  pH, Blood: x     /  pCO2: 33    /  pO2: 114   / HCO3: 30    / Base Excess: 7.8   /  SaO2: 99.7                I&O's Detail    14 Sep 2022 07:01  -  15 Sep 2022 07:00  --------------------------------------------------------  IN:    Heparin Infusion: 362 mL    IV PiggyBack: 750 mL    Lactated Ringers: 2000 mL  Total IN: 3112 mL    OUT:    Chest Tube (mL): 15 mL    Indwelling Catheter - Urethral (mL): 1850 mL  Total OUT: 1865 mL    Total NET: 1247 mL          LABS                        9.8    12.37 )-----------( 234      ( 15 Sep 2022 02:35 )             32.1     09-15    136  |  98  |  16  ----------------------------<  89  4.5   |  27  |  1.17    Ca    11.8<H>      15 Sep 2022 02:35  Phos  2.5     09-15  Mg     1.80     09-15    TPro  7.0  /  Alb  2.7<L>  /  TBili  0.4  /  DBili  x   /  AST  40  /  ALT  5   /  AlkPhos  234<H>  09-15    LIVER FUNCTIONS - ( 15 Sep 2022 02:35 )  Alb: 2.7 g/dL / Pro: 7.0 g/dL / ALK PHOS: 234 U/L / ALT: 5 U/L / AST: 40 U/L / GGT: x           PTT - ( 15 Sep 2022 02:35 )  PTT:45.4 sec                MEDICATIONS  (STANDING):  bictegravir 50 mG/emtricitabine 200 mG/tenofovir alafenamide 25 mG (BIKTARVY) 1 Tablet(s) Oral daily  ceFAZolin   IVPB 2000 milliGRAM(s) IV Intermittent every 8 hours  chlorhexidine 2% Cloths 1 Application(s) Topical <User Schedule>  dronabinol 2.5 milliGRAM(s) Oral two times a day  ferrous    sulfate 325 milliGRAM(s) Oral <User Schedule>  heparin  Infusion. 1700 Unit(s)/Hr (17 mL/Hr) IV Continuous <Continuous>  lactated ringers. 1000 milliLiter(s) (100 mL/Hr) IV Continuous <Continuous>  lactulose Syrup 10 Gram(s) Oral daily  lidocaine   4% Patch 1 Patch Transdermal daily  melatonin 3 milliGRAM(s) Oral at bedtime  methadone    Tablet 2.5 milliGRAM(s) Oral two times a day  naloxegol 25 milliGRAM(s) Oral daily  polyethylene glycol 3350 17 Gram(s) Oral two times a day  senna 2 Tablet(s) Oral at bedtime    MEDICATIONS  (PRN):  HYDROmorphone  Injectable 1.5 milliGRAM(s) IV Push every 3 hours PRN Severe Pain (7 - 10)  HYDROmorphone  Injectable 0.5 milliGRAM(s) IV Push every 3 hours PRN Moderate Pain (4 - 6)      Allergies:  ibuprofen (Angioedema)        CRITICAL CARE TIME SPENT: 40 minutes of critical care time spent providing medical care for patient's acute illness/conditions that impairs at least one vital organ system and/or poses a high risk of imminent or life threatening deterioration in the patient's condition. It includes time spent evaluating and treating the patient's acute illness as well as time spent reviewing labs, radiology, discussing goals of care with patient and/or patient's family, and discussing the case with a multidisciplinary team, in an effort to prevent further life threatening deterioration or end organ damage. This time is independent of any procedures performed.

## 2022-09-15 NOTE — PROGRESS NOTE ADULT - ASSESSMENT
31 y o M w/ HIV/AIDS (on HAART), Metastatic Rectal Ca (Not yet on chemo) who was initially admitted to [facility] on 8/31 after presenting with productive cough for 2 months and then acute SOB x 5 days. L pigtail catheter was placed due to concern for L PTX. However, patient was found to have large CHRISTINE cavitary lesion instead, into which the pigtail had been placed. 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

## 2022-09-15 NOTE — PROGRESS NOTE ADULT - ASSESSMENT
31m with untreated metastatic ano-rectal SCC, HPV positive, HIV infection, transferred from [facility] for continuation of care.   He presented to [facility] on 8/31/22 after syncopal episode at home.   Pt with cough x 2 mos, with yellow sputum and shortness of breath. Admitted for hypoxic respiratory failure.   8/31 CT C/A/P notable for large thick walled multiloculated CHRISTINE cavitary lesion, 10 cm ulcerated rectal mass inseperable from prostate and multiple necrotic liver mets and concern for L external iliac thrombosis.  S/p chest tube c/b severe subcutaneous emphysema  found to have MSSA bacteremia and multifocal cavitary lesions on CT chest c/f superimposed pna with klebsiella in sputum, and vegetations in RV on TTE.      Recent PET/CT 8/16/22 reviewed  1. Extensive FDG avid thickening at the anorectal junction corresponds to   known squamous cell carcinoma.  2. FDG-avid left perirectal, left pelvic, and left inguinal   lymphadenopathy is compatible with metastatic disease.  3. Difficult to delineate hypermetabolism in left lower sacrum is   suspicious for bone involvement. Extension of hypermetabolism through the   left sciatic notch is suspicious for perineural disease. Further   evaluation may be performed with pelvic MRI, with and without intravenous   contrast.  3. Multiple FDG avid bilobar hepatic hepatic metastases. A hypodense   hepatic lesion with physiologic FDG activity may represent a hemangioma.   Further evaluation may be performed with MRI. Hepatic lesions are   accessible to an ultrasound-guided percutaneous needle biopsy.  4. FDG avid patchy opacities in the right lung with additional findings   in the left lung, may be related to underlying infectious etiology.   Please correlate clinically. Dedicated CT of chest may be obtained for   further evaluation.  6. Increased radiotracer activity within the mediastinum and right   perihilar regions, indeterminate and difficult to delineate on   noncontrast CT. Contrast-enhanced CT may be obtained for further   evaluation.  7. Left lateral chest wall discrete intramuscular focus at the level of   the second/third intercostal space, indeterminate and difficult to   evaluate on CT. Differential diagnosis includes metastasis. Further   evaluation may be performed with ultrasound.      9/15/22: family meeting held today, with Pt, his mother and his sister, MICU team, Pal care team and Oncology to discuss goals of care and prognosis. Disease course reviewed. Extent of disease with metastasis to liver, LN, bone and possibly lung discussed.   Infection/endocarditis, pigtail in cavitary chest lesion with risk for pneumothorax and Poor performance status described.   Discussed that at this time, patient is not a candidate for chemotherapy given infection and poor performance status. There is a small chance he might recover enough to be able to receive chemotherapy/IO in the future. Will need ongoing evaluation of PS and candidacy for cancer treatment. Discussed that radiation can be considered for palliative purposes to decrease burden of symptoms from anal tumor.     -Appreciate care by the MICU team  -Pal care and ID input appreciated  -Severe hypercalcemia, likely in the setting of malignancy. S/p Pamidronate 9/10. Monitor levels and c/w IVF. Pamidronate dose can be repeated after a week.  -With regards to treatment of metastatic HPV related anal SCC, treatment will be on hold pending resolution of infection. Will need reassessment after resolution of infection to evaluate performance status and candidacy for treatment.  -Pal care consult for pain management  -Recommend radiation oncology consult for consideration of RT to anal mass for palliation.  -Supportive care, pain control, Nutrition, PT, DVT ppx  -Outpatient oncology f/u    Will follow. Please do not hesitate to call back with questions.     Racheal Constantino MD  Medical Oncology Attending  C: [phone]    35min time spent on direct patient care, interdisciplinary discussions and chart review.

## 2022-09-15 NOTE — GOALS OF CARE CONVERSATION - ADVANCED CARE PLANNING - CONVERSATION DETAILS
Referral to palliative care for complex decision making and symptom management in setting of advanced malignancy.     Meeting held with MICU team, oncology team and palliative care team. Discussed pt's overall medical condition including prognosis and treatment options.  MICU explained the critical nature of pt's medical status at this time and reviewed plan for further management of chest tube and plan for removal of tube is no further drainage. MICU explained this procedure is high risk.     Pt inquired about plans for his cancer treatment. Dr. Constantino shared that pt has aggressive metastatic cancer that is not curable. Pt appeared surprised at this news. Pt's mother and sister shared that he had been told his cancer was not curable however feel he has been in denial. Pt stated "I have lived with HIV and now I can live with cancer." Oncology shared that at this time, patient is not a candidate for chemotherapy given acute medical issues and infection and poor performance status. Oncology discussed that radiation can be considered for palliative purposes to decrease burden of symptoms from anal tumor. Pt and family asked about his prognosis. Dr. Constantino shared that it was difficult to pinpoint an exact time frame but did discuss that pt will eventually die from his underlying malignancy as it is not curable. Pt's family was appreciative of the honest information and felt was necessary for pt to hear so he could make plans.   Reviewed the philosophy of hospice care as an option for pt as DMT was not being offered at this time. Family agree that pt may benefit from hospice however would request facility care as pt resides alone.    Counseled patient regarding role of HCP and provided reassurance that patient will continue to make their own medical decisions and that a proxy would substitute only in the event that the patient was incapable of doing so. Patient amenable to completing HCP paperwork in which he indicated his mother Sophie as his primary HCP and sister Rayna as his alternate HCP. Patient asked provider to indicate verbal consent for HCP paperwork.     Advanced care planning extensively discussed. Reviewed the risks and benefits of resuscitative measures including cardiopulmonary resuscitation and mechanical ventilation at the end of life in the setting of advanced malignancy. Pt has shared that he wanted all measures to extend his life including resuscitation. Encouraged pt to continue to discuss these interventions with his family explaining they could cause more burden than benefit and may cause him to suffer at the end of his life. Family in agreement to continue discussions understanding these interventions would likely cause more harm than benefit.      Emotional support provided.

## 2022-09-15 NOTE — PROGRESS NOTE ADULT - SUBJECTIVE AND OBJECTIVE BOX
SUBJECTIVE AND OBJECTIVE:  Patient seen and examined at bedside. Patient being followed up for pain.  Patient reports having pain mostly in rectum but some improvement of scrotal edema.     INTERVAL HPI/OVERNIGHT EVENTS:  In past 24 hours, received 7 prn doses of IV Dilaudid 9mg     Allergies    ibuprofen (Angioedema)    Intolerances    MEDICATIONS  (STANDING):  bictegravir 50 mG/emtricitabine 200 mG/tenofovir alafenamide 25 mG (BIKTARVY) 1 Tablet(s) Oral daily  ceFAZolin   IVPB 2000 milliGRAM(s) IV Intermittent every 8 hours  chlorhexidine 2% Cloths 1 Application(s) Topical <User Schedule>  ferrous    sulfate 325 milliGRAM(s) Oral <User Schedule>  heparin  Infusion. 1700 Unit(s)/Hr (17 mL/Hr) IV Continuous <Continuous>  lactated ringers. 1000 milliLiter(s) (100 mL/Hr) IV Continuous <Continuous>  lactulose Syrup 10 Gram(s) Oral daily  lidocaine   4% Patch 1 Patch Transdermal daily  melatonin 3 milliGRAM(s) Oral at bedtime  methadone    Tablet 2.5 milliGRAM(s) Oral two times a day  naloxegol 25 milliGRAM(s) Oral daily  polyethylene glycol 3350 17 Gram(s) Oral two times a day  senna 2 Tablet(s) Oral at bedtime    MEDICATIONS  (PRN):  heparin   Injectable 4500 Unit(s) IV Push every 6 hours PRN For aPTT less than 40  heparin   Injectable 2000 Unit(s) IV Push every 6 hours PRN For aPTT between 40 - 57  HYDROmorphone  Injectable 1.5 milliGRAM(s) IV Push every 3 hours PRN Severe Pain (7 - 10)  HYDROmorphone  Injectable 0.5 milliGRAM(s) IV Push every 3 hours PRN Moderate Pain (4 - 6)  ondansetron Injectable 4 milliGRAM(s) IV Push every 8 hours PRN Nausea and/or Vomiting      ITEMS UNCHECKED ARE NOT PRESENT    PRESENT SYMPTOMS: [ ]Unable to self-report due to altered mental status- see [ ] CPOT [ ] PAINADS [ ] RDOS  Source if other than patient:  [ ]Family   [ ]Team     Pain: [x ]yes [ ]no  QOL impact - severe  Location -  scrotal                  Aggravating factors - urination, movement  Quality - sharp  Radiation - "all over my body"  Timing- intermittent  Severity (0-10 scale): 10  Minimal acceptable level (0-10 scale): 3    Dyspnea:                           [ ]Mild [ ]Moderate [ ]Severe  RDOS:  0 to 2  minimal or no respiratory distress   3  mild distress  4 to 6 moderate distress  >7 severe distress  [email or web address] (Ctrl +  left click to view)     Anxiety:                             [ ]Mild [ ]Moderate [ ]Severe  Agitation:                          [ ]Mild [ ]Moderate [ ]Severe  Fatigue:                             [ ]Mild [ ]Moderate [ ]Severe  Nausea:                             [ ]Mild [ ]Moderate [ ]Severe  Loss of appetite:              [ ]Mild [ ]Moderate [ ]Severe  Constipation:                   [ ]Mild [ ]Moderate [ ]Severe  Diarrhea:                          [ ]Mild [ ]Moderate [ ]Severe      CPOT:    https://www.[facility].org/getattachment/[email or web address]/Critical-Care-Pain-Observation-Tool-(CPOT)    PAIN AD Score:	  [email or web address]://[email or web address]/painad.pdf (Ctrl + left click to view)    PCSSQ[Palliative Care Spiritual Screening Question]   Severity (0-10):  Score of 4 or > indicate consideration of Chaplaincy referral.  Chaplaincy Referral: [ ] yes [ ] refused [ ] following    Other Symptoms:  [ x]All other review of systems negative     Palliative Performance Status Version 2:  70       %      [email or web address]/[email or web address]_performance_scale_[email or web address]    PHYSICAL EXAM:  Vital Signs Last 24 Hrs  T(C): 35.6 (13 Sep 2022 12:00), Max: 35.6 (13 Sep 2022 00:00)  T(F): 96 (13 Sep 2022 12:00), Max: 96.1 (13 Sep 2022 04:00)  HR: 99 (13 Sep 2022 16:00) (81 - 112)  BP: 127/76 (13 Sep 2022 16:00) (115/71 - 142/85)  BP(mean): 89 (13 Sep 2022 16:00) (68 - 106)  RR: 16 (13 Sep 2022 16:00) (15 - 23)  SpO2: 100% (13 Sep 2022 16:00) (98% - 100%)    Parameters below as of 13 Sep 2022 16:00  Patient On (Oxygen Delivery Method): room air         I&O's Summary    12 Sep 2022 07:01  -  13 Sep 2022 07:00  --------------------------------------------------------  IN: 3382 mL / OUT: 2455 mL / NET: 927 mL    13 Sep 2022 07:01  -  13 Sep 2022 16:24  --------------------------------------------------------  IN: 1062 mL / OUT: 1115 mL / NET: -53 mL       GENERAL:  [ x]Alert  [ x]Oriented x 3  [ ]Lethargic  [ ]Cachexia  [ ]Unarousable  [ x]Verbal  [ ]Non-Verbal    Behavioral:   [ ] Anxiety  [ ] Delirium [ ] Agitation [ ] Calm  [ x] Other - Appears uncomfortable   HEENT:  [x ]Normal  [ ] Temporal Wasting  [ ]Dry mouth   [ ]ET Tube/Trach  [ ]Oral lesions  [ ] Mucositis  PULMONARY: Left chest tube   [x ]Clear [ ]Tachypnea  [ ]Audible excessive secretions   [ ]Rhonchi        [ ]Right [ ]Left [ ]Bilateral  [ ]Crackles        [ ]Right [ ]Left [ ]Bilateral  [ ]Wheezing     [ ]Right [ ]Left [ ]Bilateral  [ ]Diminished breath sounds [ ]right [ ]left [ ]bilateral  CARDIOVASCULAR:    [x ]Regular [ ]Irregular [ ]Tachy  [ ]Jose [ ]Murmur [ ]Other  GASTROINTESTINAL:  [ x]Soft  [ ]Distended   [ ]+BS  [ x]Non tender [ ]Tender  [ ]PEG [ ]OGT/ NGT  Last BM: 9/8  GENITOURINARY:  [ ]Normal [ ] Incontinent   [ ]Oliguria/Anuria   [x ]Rosario  MUSCULOSKELETAL:   [x ]Normal   [ ]Weakness  [ ]Bed/Wheelchair bound [ ]Edema  [  ] amputation  [  ] contraction  NEUROLOGIC:   [x ]No focal deficits  [ ]Cognitive impairment  [ ]Dysphagia [ ]Dysarthria [ ]Paresis [ ]Other   SKIN: See Nursing Skin Assessment for further details  [ x]Normal    [ ]Rash  [ ]Pressure ulcer(s)       Present on admission [ ]y [ ]n   [  ]  Wound    [  ] hyperpigmentation    CRITICAL CARE:  [ ]Shock Present  [ ]Septic [ ]Cardiogenic [ ]Neurologic [ ]Hypovolemic  [ ]Vasopressors [ ]Inotropes  [ ]Respiratory failure present [ ]Mechanical Ventilation [ ]Non-invasive ventilatory support [ ]High-Flow   [ ]Acute  [ ]Chronic [ ]Hypoxic  [ ]Hypercarbic [ ]Other  [ ]Other organ failure     LABS:                        9.3    14.53 )-----------( 432      ( 13 Sep 2022 02:45 )             30.4   09-13    134<L>  |  97<L>  |  24<H>  ----------------------------<  100<H>  3.9   |  30  |  1.53<H>    Ca    14.5<HH>      13 Sep 2022 02:45  Phos  3.5     09-13  Mg     1.60     09-13    TPro  7.1  /  Alb  2.9<L>  /  TBili  0.4  /  DBili  x   /  AST  38  /  ALT  10  /  AlkPhos  268<H>  09-13  PTT - ( 13 Sep 2022 02:45 )  PTT:60.2 sec    CAPILLARY BLOOD GLUCOSE      RADIOLOGY & ADDITIONAL STUDIES: reviewed     Protein Calorie Malnutrition Present: [ ]mild [ ]moderate [ x]severe [ ]underweight [ ]morbid obesity  https://www.andeal.org/vault/2900/web/files/ONC/Table_Clinical%20Characteristics%20to%20Document%20Malnutrition-White%20JV%20et%20al%202012.pdf    Height (cm): 198.1 (09-08-22 @ 14:00), 198.2 (08-08-22 @ 12:00), 198.1 (07-26-22 @ 13:32)  Weight (kg): 57.5 (09-08-22 @ 14:00), 79.0884 (08-08-22 @ 12:00), 81.2 (07-26-22 @ 13:32)  BMI (kg/m2): 14.7 (09-08-22 @ 14:00), 20.1 (08-08-22 @ 12:00), 20.7 (07-26-22 @ 13:32)    [ ]PPSV2 < or = 30%  [ ]significant weight loss [ ]poor nutritional intake [ ]anasarca   [ ]Artificial Nutrition    REFERRALS:   [ ]Chaplaincy  [ ]Hospice  [ ]Child Life  [ ]Social Work  [x ]Case management [ ]Holistic Therapy        SUBJECTIVE AND OBJECTIVE:  Patient seen and examined at bedside. Patient being followed up for pain.  Patient reports having pain mostly in rectum but some improvement of scrotal edema.     INTERVAL HPI/OVERNIGHT EVENTS:  In past 24 hours, received 7 prn doses of IV Dilaudid 1.5mg and 6 prn doses of IV Dilaudid 0.5mg     Allergies    ibuprofen (Angioedema)    Intolerances    MEDICATIONS  (STANDING):  bictegravir 50 mG/emtricitabine 200 mG/tenofovir alafenamide 25 mG (BIKTARVY) 1 Tablet(s) Oral daily  ceFAZolin   IVPB 2000 milliGRAM(s) IV Intermittent every 8 hours  chlorhexidine 2% Cloths 1 Application(s) Topical <User Schedule>  dronabinol 5 milliGRAM(s) Oral two times a day  ferrous    sulfate 325 milliGRAM(s) Oral <User Schedule>  gabapentin 300 milliGRAM(s) Oral daily  heparin  Infusion. 1700 Unit(s)/Hr (17 mL/Hr) IV Continuous <Continuous>  lactated ringers. 1000 milliLiter(s) (75 mL/Hr) IV Continuous <Continuous>  lactulose Syrup 10 Gram(s) Oral daily  lidocaine   4% Patch 1 Patch Transdermal daily  melatonin 3 milliGRAM(s) Oral at bedtime  methadone    Tablet 2.5 milliGRAM(s) Oral two times a day  methylnaltrexone Injectable 8 milliGRAM(s) SubCutaneous once  naloxegol 25 milliGRAM(s) Oral daily  polyethylene glycol 3350 17 Gram(s) Oral two times a day  senna 2 Tablet(s) Oral at bedtime    MEDICATIONS  (PRN):  HYDROmorphone   Tablet 4 milliGRAM(s) Oral every 4 hours PRN Moderate Pain (4 - 6)  HYDROmorphone  Injectable 0.5 milliGRAM(s) IV Push every 3 hours PRN breakthrough severe pain  HYDROmorphone  Injectable 1.5 milliGRAM(s) IV Push every 3 hours PRN Severe Pain (7 - 10)    ITEMS UNCHECKED ARE NOT PRESENT    PRESENT SYMPTOMS: [ ]Unable to self-report due to altered mental status- see [ ] CPOT [ ] PAINADS [ ] RDOS  Source if other than patient:  [ ]Family   [ ]Team     Pain: [x ]yes [ ]no  QOL impact - severe  Location - rectum            Aggravating factors - urination, movement  Quality - sharp  Radiation - "all over my body"  Timing- intermittent   Severity (0-10 scale): 10  Minimal acceptable level (0-10 scale): 3    Dyspnea:                           [ ]Mild [ ]Moderate [ ]Severe  RDOS:  0 to 2  minimal or no respiratory distress   3  mild distress  4 to 6 moderate distress  >7 severe distress  [email or web address] (Ctrl +  left click to view)     Anxiety:                             [ ]Mild [ ]Moderate [ ]Severe  Agitation:                          [ ]Mild [ ]Moderate [ ]Severe  Fatigue:                             [ ]Mild [ ]Moderate [ ]Severe  Nausea:                             [ ]Mild [ ]Moderate [ ]Severe  Loss of appetite:              [ ]Mild [ ]Moderate [ ]Severe  Constipation:                   [ ]Mild [x ]Moderate [ ]Severe  Diarrhea:                          [ ]Mild [ ]Moderate [ ]Severe      CPOT:    https://www.[facility].org/getattachment/[email or web address]/Critical-Care-Pain-Observation-Tool-(CPOT)    PAIN AD Score:	  [email or web address]://[email or web address]/cog/painad.pdf (Ctrl + left click to view)    PCSSQ[Palliative Care Spiritual Screening Question]   Severity (0-10):  Score of 4 or > indicate consideration of Chaplaincy referral.  Chaplaincy Referral: [ ] yes [ ] refused [ ] following    Other Symptoms:  [ x]All other review of systems negative     Palliative Performance Status Version 2:  70       %      http://[email or web address].org/files/[email or web address]_performance_scale_[email or web address]    PHYSICAL EXAM:  Vital Signs Last 24 Hrs  T(C): 35.7 (15 Sep 2022 12:00), Max: 36.3 (15 Sep 2022 00:00)  T(F): 96.3 (15 Sep 2022 12:00), Max: 97.3 (15 Sep 2022 00:00)  HR: 90 (15 Sep 2022 15:00) (83 - 98)  BP: 127/86 (15 Sep 2022 15:00) (105/83 - 142/82)  BP(mean): 95 (15 Sep 2022 15:00) (78 - 96)  RR: 21 (15 Sep 2022 15:00) (15 - 29)  SpO2: 99% (15 Sep 2022 15:00) (97% - 100%)    Parameters below as of 15 Sep 2022 15:00  Patient On (Oxygen Delivery Method): room air    GENERAL:  [ x]Alert  [ x]Oriented x 3  [ ]Lethargic  [ ]Cachexia  [ ]Unarousable  [ x]Verbal  [ ]Non-Verbal    Behavioral:   [ x] Anxiety  [ ] Delirium [ ] Agitation [ ] Calm  [ ] Other   HEENT:  [x ]Normal  [ ] Temporal Wasting  [ ]Dry mouth   [ ]ET Tube/Trach  [ ]Oral lesions  [ ] Mucositis  PULMONARY: Left chest tube   [x ]Clear [ ]Tachypnea  [ ]Audible excessive secretions   [ ]Rhonchi        [ ]Right [ ]Left [ ]Bilateral  [ ]Crackles        [ ]Right [ ]Left [ ]Bilateral  [ ]Wheezing     [ ]Right [ ]Left [ ]Bilateral  [ ]Diminished breath sounds [ ]right [ ]left [ ]bilateral  CARDIOVASCULAR:    [x ]Regular [ ]Irregular [ ]Tachy  [ ]Jose [ ]Murmur [ ]Other  GASTROINTESTINAL:  [ x]Soft  [ ]Distended   [ ]+BS  [ x]Non tender [ ]Tender  [ ]PEG [ ]OGT/ NGT  Last BM: 9/8  GENITOURINARY:  [ ]Normal [ ] Incontinent   [ ]Oliguria/Anuria   [x ]Rosario  MUSCULOSKELETAL:   [x ]Normal   [ ]Weakness  [ ]Bed/Wheelchair bound [ ]Edema  [  ] amputation  [  ] contraction  NEUROLOGIC:   [x ]No focal deficits  [ ]Cognitive impairment  [ ]Dysphagia [ ]Dysarthria [ ]Paresis [ ]Other   SKIN: See Nursing Skin Assessment for further details  [ x]Normal    [ ]Rash  [ ]Pressure ulcer(s)       Present on admission [ ]y [ ]n   [  ]  Wound    [  ] hyperpigmentation    CRITICAL CARE:  [ ]Shock Present  [ ]Septic [ ]Cardiogenic [ ]Neurologic [ ]Hypovolemic  [ ]Vasopressors [ ]Inotropes  [ ]Respiratory failure present [ ]Mechanical Ventilation [ ]Non-invasive ventilatory support [ ]High-Flow   [ ]Acute  [ ]Chronic [ ]Hypoxic  [ ]Hypercarbic [ ]Other  [ ]Other organ failure     LABS:                                          9.8    12.37 )-----------( 234      ( 15 Sep 2022 02:35 )             32.1       09-15    136  |  98  |  16  ----------------------------<  89  4.5   |  27  |  1.17    Ca    11.8<H>      15 Sep 2022 02:35  Phos  2.5     09-15  Mg     1.80     09-15    TPro  7.0  /  Alb  2.7<L>  /  TBili  0.4  /  DBili  x   /  AST  40  /  ALT  5   /  AlkPhos  234<H>  09-15      PTT - ( 15 Sep 2022 09:30 )  PTT:60.8 sec    RADIOLOGY & ADDITIONAL STUDIES: reviewed     Protein Calorie Malnutrition Present: [ ]mild [ ]moderate [ x]severe [ ]underweight [ ]morbid obesity  https://www.andeal.org/vault/2440/web/files/ONC/Table_Clinical%20Characteristics%20to%20Document%20Malnutrition-White%20JV%20et%20al%202012.pdf    Height (cm): 198.1 (09-08-22 @ 14:00), 198.2 (08-08-22 @ 12:00), 198.1 (07-26-22 @ 13:32)  Weight (kg): 57.5 (09-08-22 @ 14:00), 79.0884 (08-08-22 @ 12:00), 81.2 (07-26-22 @ 13:32)  BMI (kg/m2): 14.7 (09-08-22 @ 14:00), 20.1 (08-08-22 @ 12:00), 20.7 (07-26-22 @ 13:32)    [ ]PPSV2 < or = 30%  [ ]significant weight loss [ ]poor nutritional intake [ ]anasarca   [ ]Artificial Nutrition    REFERRALS:   [ ]Chaplaincy  [ ]Hospice  [ ]Child Life  [ ]Social Work  [x ]Case management [ ]Holistic Therapy

## 2022-09-15 NOTE — PROGRESS NOTE ADULT - PROBLEM SELECTOR PLAN 3
- L pigtail catheter was placed at [facility] due to concern for L PTX. However, patient was found to have large CHRISTINE cavitary lesion instead, into which the pigtail had been placed.   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  - CardioThoracic recommendations appreciated  - management as per primary team.

## 2022-09-16 ENCOUNTER — NON-APPOINTMENT (OUTPATIENT)
Age: 32
End: 2022-09-16

## 2022-09-16 LAB
ALBUMIN SERPL ELPH-MCNC: 2.5 G/DL — LOW (ref 3.3–5)
ALBUMIN SERPL ELPH-MCNC: 2.5 G/DL — LOW (ref 3.3–5)
ALP SERPL-CCNC: 215 U/L — HIGH (ref 40–120)
ALP SERPL-CCNC: 219 U/L — HIGH (ref 40–120)
ALT FLD-CCNC: <5 U/L — LOW (ref 4–41)
ALT FLD-CCNC: <5 U/L — SIGNIFICANT CHANGE UP (ref 4–41)
ANION GAP SERPL CALC-SCNC: 7 MMOL/L — SIGNIFICANT CHANGE UP (ref 7–14)
ANION GAP SERPL CALC-SCNC: 9 MMOL/L — SIGNIFICANT CHANGE UP (ref 7–14)
APTT BLD: 33.6 SEC — SIGNIFICANT CHANGE UP (ref 27–36.3)
AST SERPL-CCNC: 23 U/L — SIGNIFICANT CHANGE UP (ref 4–40)
AST SERPL-CCNC: 28 U/L — SIGNIFICANT CHANGE UP (ref 4–40)
BASOPHILS # BLD AUTO: 0.02 K/UL — SIGNIFICANT CHANGE UP (ref 0–0.2)
BASOPHILS # BLD AUTO: 0.04 K/UL — SIGNIFICANT CHANGE UP (ref 0–0.2)
BASOPHILS NFR BLD AUTO: 0.2 % — SIGNIFICANT CHANGE UP (ref 0–2)
BASOPHILS NFR BLD AUTO: 0.3 % — SIGNIFICANT CHANGE UP (ref 0–2)
BILIRUB SERPL-MCNC: 0.3 MG/DL — SIGNIFICANT CHANGE UP (ref 0.2–1.2)
BILIRUB SERPL-MCNC: 0.4 MG/DL — SIGNIFICANT CHANGE UP (ref 0.2–1.2)
BUN SERPL-MCNC: 13 MG/DL — SIGNIFICANT CHANGE UP (ref 7–23)
BUN SERPL-MCNC: 16 MG/DL — SIGNIFICANT CHANGE UP (ref 7–23)
CALCIUM SERPL-MCNC: 10.7 MG/DL — HIGH (ref 8.4–10.5)
CALCIUM SERPL-MCNC: 11.1 MG/DL — HIGH (ref 8.4–10.5)
CHLORIDE SERPL-SCNC: 102 MMOL/L — SIGNIFICANT CHANGE UP (ref 98–107)
CHLORIDE SERPL-SCNC: 104 MMOL/L — SIGNIFICANT CHANGE UP (ref 98–107)
CO2 SERPL-SCNC: 26 MMOL/L — SIGNIFICANT CHANGE UP (ref 22–31)
CO2 SERPL-SCNC: 27 MMOL/L — SIGNIFICANT CHANGE UP (ref 22–31)
CREAT SERPL-MCNC: 1 MG/DL — SIGNIFICANT CHANGE UP (ref 0.5–1.3)
CREAT SERPL-MCNC: 1.19 MG/DL — SIGNIFICANT CHANGE UP (ref 0.5–1.3)
EGFR: 102 ML/MIN/1.73M2 — SIGNIFICANT CHANGE UP
EGFR: 83 ML/MIN/1.73M2 — SIGNIFICANT CHANGE UP
EOSINOPHIL # BLD AUTO: 0.18 K/UL — SIGNIFICANT CHANGE UP (ref 0–0.5)
EOSINOPHIL # BLD AUTO: 0.24 K/UL — SIGNIFICANT CHANGE UP (ref 0–0.5)
EOSINOPHIL NFR BLD AUTO: 1.6 % — SIGNIFICANT CHANGE UP (ref 0–6)
EOSINOPHIL NFR BLD AUTO: 1.9 % — SIGNIFICANT CHANGE UP (ref 0–6)
GLUCOSE SERPL-MCNC: 93 MG/DL — SIGNIFICANT CHANGE UP (ref 70–99)
GLUCOSE SERPL-MCNC: 93 MG/DL — SIGNIFICANT CHANGE UP (ref 70–99)
HCT VFR BLD CALC: 27.4 % — LOW (ref 39–50)
HCT VFR BLD CALC: 28.4 % — LOW (ref 39–50)
HGB BLD-MCNC: 8.1 G/DL — LOW (ref 13–17)
HGB BLD-MCNC: 8.7 G/DL — LOW (ref 13–17)
IANC: 10.31 K/UL — HIGH (ref 1.8–7.4)
IANC: 9.25 K/UL — HIGH (ref 1.8–7.4)
IMM GRANULOCYTES NFR BLD AUTO: 0.5 % — SIGNIFICANT CHANGE UP (ref 0–0.9)
IMM GRANULOCYTES NFR BLD AUTO: 0.5 % — SIGNIFICANT CHANGE UP (ref 0–0.9)
LYMPHOCYTES # BLD AUTO: 0.92 K/UL — LOW (ref 1–3.3)
LYMPHOCYTES # BLD AUTO: 1.01 K/UL — SIGNIFICANT CHANGE UP (ref 1–3.3)
LYMPHOCYTES # BLD AUTO: 8.2 % — LOW (ref 13–44)
LYMPHOCYTES # BLD AUTO: 8.4 % — LOW (ref 13–44)
MAGNESIUM SERPL-MCNC: 1.4 MG/DL — LOW (ref 1.6–2.6)
MAGNESIUM SERPL-MCNC: 1.7 MG/DL — SIGNIFICANT CHANGE UP (ref 1.6–2.6)
MCHC RBC-ENTMCNC: 23.7 PG — LOW (ref 27–34)
MCHC RBC-ENTMCNC: 24.6 PG — LOW (ref 27–34)
MCHC RBC-ENTMCNC: 29.6 GM/DL — LOW (ref 32–36)
MCHC RBC-ENTMCNC: 30.6 GM/DL — LOW (ref 32–36)
MCV RBC AUTO: 80.1 FL — SIGNIFICANT CHANGE UP (ref 80–100)
MCV RBC AUTO: 80.2 FL — SIGNIFICANT CHANGE UP (ref 80–100)
MONOCYTES # BLD AUTO: 0.49 K/UL — SIGNIFICANT CHANGE UP (ref 0–0.9)
MONOCYTES # BLD AUTO: 0.73 K/UL — SIGNIFICANT CHANGE UP (ref 0–0.9)
MONOCYTES NFR BLD AUTO: 4.5 % — SIGNIFICANT CHANGE UP (ref 2–14)
MONOCYTES NFR BLD AUTO: 5.9 % — SIGNIFICANT CHANGE UP (ref 2–14)
NEUTROPHILS # BLD AUTO: 10.31 K/UL — HIGH (ref 1.8–7.4)
NEUTROPHILS # BLD AUTO: 9.25 K/UL — HIGH (ref 1.8–7.4)
NEUTROPHILS NFR BLD AUTO: 83.2 % — HIGH (ref 43–77)
NEUTROPHILS NFR BLD AUTO: 84.8 % — HIGH (ref 43–77)
NRBC # BLD: 0 /100 WBCS — SIGNIFICANT CHANGE UP (ref 0–0)
NRBC # BLD: 0 /100 WBCS — SIGNIFICANT CHANGE UP (ref 0–0)
NRBC # FLD: 0 K/UL — SIGNIFICANT CHANGE UP (ref 0–0)
NRBC # FLD: 0 K/UL — SIGNIFICANT CHANGE UP (ref 0–0)
PHOSPHATE SERPL-MCNC: 2.2 MG/DL — LOW (ref 2.5–4.5)
PHOSPHATE SERPL-MCNC: 2.8 MG/DL — SIGNIFICANT CHANGE UP (ref 2.5–4.5)
PLATELET # BLD AUTO: 223 K/UL — SIGNIFICANT CHANGE UP (ref 150–400)
PLATELET # BLD AUTO: 238 K/UL — SIGNIFICANT CHANGE UP (ref 150–400)
POTASSIUM SERPL-MCNC: 3.7 MMOL/L — SIGNIFICANT CHANGE UP (ref 3.5–5.3)
POTASSIUM SERPL-MCNC: 3.8 MMOL/L — SIGNIFICANT CHANGE UP (ref 3.5–5.3)
POTASSIUM SERPL-SCNC: 3.7 MMOL/L — SIGNIFICANT CHANGE UP (ref 3.5–5.3)
POTASSIUM SERPL-SCNC: 3.8 MMOL/L — SIGNIFICANT CHANGE UP (ref 3.5–5.3)
PROT SERPL-MCNC: 6.4 G/DL — SIGNIFICANT CHANGE UP (ref 6–8.3)
PROT SERPL-MCNC: 6.4 G/DL — SIGNIFICANT CHANGE UP (ref 6–8.3)
RBC # BLD: 3.42 M/UL — LOW (ref 4.2–5.8)
RBC # BLD: 3.54 M/UL — LOW (ref 4.2–5.8)
RBC # FLD: 23.7 % — HIGH (ref 10.3–14.5)
RBC # FLD: 23.8 % — HIGH (ref 10.3–14.5)
SODIUM SERPL-SCNC: 136 MMOL/L — SIGNIFICANT CHANGE UP (ref 135–145)
SODIUM SERPL-SCNC: 139 MMOL/L — SIGNIFICANT CHANGE UP (ref 135–145)
WBC # BLD: 10.92 K/UL — HIGH (ref 3.8–10.5)
WBC # BLD: 12.39 K/UL — HIGH (ref 3.8–10.5)
WBC # FLD AUTO: 10.92 K/UL — HIGH (ref 3.8–10.5)
WBC # FLD AUTO: 12.39 K/UL — HIGH (ref 3.8–10.5)

## 2022-09-16 PROCEDURE — 99291 CRITICAL CARE FIRST HOUR: CPT

## 2022-09-16 PROCEDURE — 71045 X-RAY EXAM CHEST 1 VIEW: CPT | Mod: 26,77

## 2022-09-16 PROCEDURE — 71045 X-RAY EXAM CHEST 1 VIEW: CPT | Mod: 26

## 2022-09-16 RX ORDER — MAGNESIUM SULFATE 500 MG/ML
2 VIAL (ML) INJECTION
Refills: 0 | Status: COMPLETED | OUTPATIENT
Start: 2022-09-16 | End: 2022-09-16

## 2022-09-16 RX ORDER — POTASSIUM PHOSPHATE, MONOBASIC POTASSIUM PHOSPHATE, DIBASIC 236; 224 MG/ML; MG/ML
30 INJECTION, SOLUTION INTRAVENOUS ONCE
Refills: 0 | Status: COMPLETED | OUTPATIENT
Start: 2022-09-16 | End: 2022-09-16

## 2022-09-16 RX ORDER — SODIUM,POTASSIUM PHOSPHATES 278-250MG
1 POWDER IN PACKET (EA) ORAL ONCE
Refills: 0 | Status: COMPLETED | OUTPATIENT
Start: 2022-09-16 | End: 2022-09-16

## 2022-09-16 RX ORDER — HEPARIN SODIUM 5000 [USP'U]/ML
4500 INJECTION INTRAVENOUS; SUBCUTANEOUS EVERY 6 HOURS
Refills: 0 | Status: DISCONTINUED | OUTPATIENT
Start: 2022-09-16 | End: 2022-09-19

## 2022-09-16 RX ORDER — HYDROMORPHONE HYDROCHLORIDE 2 MG/ML
1 INJECTION INTRAMUSCULAR; INTRAVENOUS; SUBCUTANEOUS ONCE
Refills: 0 | Status: DISCONTINUED | OUTPATIENT
Start: 2022-09-16 | End: 2022-09-17

## 2022-09-16 RX ORDER — DRONABINOL 2.5 MG
5 CAPSULE ORAL ONCE
Refills: 0 | Status: DISCONTINUED | OUTPATIENT
Start: 2022-09-16 | End: 2022-09-16

## 2022-09-16 RX ORDER — HYDROMORPHONE HYDROCHLORIDE 2 MG/ML
1.5 INJECTION INTRAMUSCULAR; INTRAVENOUS; SUBCUTANEOUS ONCE
Refills: 0 | Status: DISCONTINUED | OUTPATIENT
Start: 2022-09-16 | End: 2022-09-16

## 2022-09-16 RX ORDER — HEPARIN SODIUM 5000 [USP'U]/ML
2000 INJECTION INTRAVENOUS; SUBCUTANEOUS EVERY 6 HOURS
Refills: 0 | Status: DISCONTINUED | OUTPATIENT
Start: 2022-09-16 | End: 2022-09-19

## 2022-09-16 RX ORDER — METOPROLOL TARTRATE 50 MG
12.5 TABLET ORAL
Refills: 0 | Status: DISCONTINUED | OUTPATIENT
Start: 2022-09-16 | End: 2022-09-22

## 2022-09-16 RX ORDER — HEPARIN SODIUM 5000 [USP'U]/ML
1900 INJECTION INTRAVENOUS; SUBCUTANEOUS
Qty: 25000 | Refills: 0 | Status: DISCONTINUED | OUTPATIENT
Start: 2022-09-16 | End: 2022-09-19

## 2022-09-16 RX ORDER — LIDOCAINE 4 G/100G
1 CREAM TOPICAL DAILY
Refills: 0 | Status: DISCONTINUED | OUTPATIENT
Start: 2022-09-16 | End: 2022-10-10

## 2022-09-16 RX ORDER — DRONABINOL 2.5 MG
5 CAPSULE ORAL
Refills: 0 | Status: DISCONTINUED | OUTPATIENT
Start: 2022-09-16 | End: 2022-09-23

## 2022-09-16 RX ADMIN — LIDOCAINE 1 PATCH: 4 CREAM TOPICAL at 21:02

## 2022-09-16 RX ADMIN — HYDROMORPHONE HYDROCHLORIDE 0.5 MILLIGRAM(S): 2 INJECTION INTRAMUSCULAR; INTRAVENOUS; SUBCUTANEOUS at 00:30

## 2022-09-16 RX ADMIN — Medication 100 MILLIGRAM(S): at 21:07

## 2022-09-16 RX ADMIN — HYDROMORPHONE HYDROCHLORIDE 1.5 MILLIGRAM(S): 2 INJECTION INTRAMUSCULAR; INTRAVENOUS; SUBCUTANEOUS at 04:48

## 2022-09-16 RX ADMIN — HYDROMORPHONE HYDROCHLORIDE 0.5 MILLIGRAM(S): 2 INJECTION INTRAMUSCULAR; INTRAVENOUS; SUBCUTANEOUS at 12:26

## 2022-09-16 RX ADMIN — HYDROMORPHONE HYDROCHLORIDE 0.5 MILLIGRAM(S): 2 INJECTION INTRAMUSCULAR; INTRAVENOUS; SUBCUTANEOUS at 00:00

## 2022-09-16 RX ADMIN — LIDOCAINE 1 PATCH: 4 CREAM TOPICAL at 18:47

## 2022-09-16 RX ADMIN — CHLORHEXIDINE GLUCONATE 1 APPLICATION(S): 213 SOLUTION TOPICAL at 06:00

## 2022-09-16 RX ADMIN — HYDROMORPHONE HYDROCHLORIDE 1.5 MILLIGRAM(S): 2 INJECTION INTRAMUSCULAR; INTRAVENOUS; SUBCUTANEOUS at 23:15

## 2022-09-16 RX ADMIN — Medication 5 MILLIGRAM(S): at 07:40

## 2022-09-16 RX ADMIN — Medication 12.5 MILLIGRAM(S): at 17:14

## 2022-09-16 RX ADMIN — SODIUM CHLORIDE 75 MILLILITER(S): 9 INJECTION, SOLUTION INTRAVENOUS at 20:00

## 2022-09-16 RX ADMIN — HYDROMORPHONE HYDROCHLORIDE 1.5 MILLIGRAM(S): 2 INJECTION INTRAMUSCULAR; INTRAVENOUS; SUBCUTANEOUS at 10:00

## 2022-09-16 RX ADMIN — NALOXEGOL OXALATE 25 MILLIGRAM(S): 12.5 TABLET, FILM COATED ORAL at 17:13

## 2022-09-16 RX ADMIN — Medication 5 MILLIGRAM(S): at 12:26

## 2022-09-16 RX ADMIN — HYDROMORPHONE HYDROCHLORIDE 1.5 MILLIGRAM(S): 2 INJECTION INTRAMUSCULAR; INTRAVENOUS; SUBCUTANEOUS at 20:00

## 2022-09-16 RX ADMIN — HYDROMORPHONE HYDROCHLORIDE 1.5 MILLIGRAM(S): 2 INJECTION INTRAMUSCULAR; INTRAVENOUS; SUBCUTANEOUS at 13:40

## 2022-09-16 RX ADMIN — LIDOCAINE 1 PATCH: 4 CREAM TOPICAL at 23:59

## 2022-09-16 RX ADMIN — Medication 5 MILLIGRAM(S): at 17:13

## 2022-09-16 RX ADMIN — Medication 100 MILLIGRAM(S): at 12:25

## 2022-09-16 RX ADMIN — Medication 5 MILLIGRAM(S): at 14:06

## 2022-09-16 RX ADMIN — HYDROMORPHONE HYDROCHLORIDE 0.5 MILLIGRAM(S): 2 INJECTION INTRAMUSCULAR; INTRAVENOUS; SUBCUTANEOUS at 12:45

## 2022-09-16 RX ADMIN — HYDROMORPHONE HYDROCHLORIDE 0.5 MILLIGRAM(S): 2 INJECTION INTRAMUSCULAR; INTRAVENOUS; SUBCUTANEOUS at 22:15

## 2022-09-16 RX ADMIN — SODIUM CHLORIDE 75 MILLILITER(S): 9 INJECTION, SOLUTION INTRAVENOUS at 00:01

## 2022-09-16 RX ADMIN — LIDOCAINE 1 PATCH: 4 CREAM TOPICAL at 12:26

## 2022-09-16 RX ADMIN — SENNA PLUS 2 TABLET(S): 8.6 TABLET ORAL at 21:55

## 2022-09-16 RX ADMIN — HYDROMORPHONE HYDROCHLORIDE 1.5 MILLIGRAM(S): 2 INJECTION INTRAMUSCULAR; INTRAVENOUS; SUBCUTANEOUS at 09:36

## 2022-09-16 RX ADMIN — HYDROMORPHONE HYDROCHLORIDE 1.5 MILLIGRAM(S): 2 INJECTION INTRAMUSCULAR; INTRAVENOUS; SUBCUTANEOUS at 01:37

## 2022-09-16 RX ADMIN — LACTULOSE 10 GRAM(S): 10 SOLUTION ORAL at 12:26

## 2022-09-16 RX ADMIN — BICTEGRAVIR SODIUM, EMTRICITABINE, AND TENOFOVIR ALAFENAMIDE FUMARATE 1 TABLET(S): 30; 120; 15 TABLET ORAL at 12:26

## 2022-09-16 RX ADMIN — HYDROMORPHONE HYDROCHLORIDE 1.5 MILLIGRAM(S): 2 INJECTION INTRAMUSCULAR; INTRAVENOUS; SUBCUTANEOUS at 05:00

## 2022-09-16 RX ADMIN — Medication 1 TABLET(S): at 20:00

## 2022-09-16 RX ADMIN — Medication 3 MILLIGRAM(S): at 21:55

## 2022-09-16 RX ADMIN — HYDROMORPHONE HYDROCHLORIDE 1.5 MILLIGRAM(S): 2 INJECTION INTRAMUSCULAR; INTRAVENOUS; SUBCUTANEOUS at 02:02

## 2022-09-16 RX ADMIN — GABAPENTIN 300 MILLIGRAM(S): 400 CAPSULE ORAL at 12:25

## 2022-09-16 RX ADMIN — HYDROMORPHONE HYDROCHLORIDE 0.5 MILLIGRAM(S): 2 INJECTION INTRAMUSCULAR; INTRAVENOUS; SUBCUTANEOUS at 22:00

## 2022-09-16 RX ADMIN — Medication 100 MILLIGRAM(S): at 04:54

## 2022-09-16 RX ADMIN — METHADONE HYDROCHLORIDE 2.5 MILLIGRAM(S): 40 TABLET ORAL at 17:14

## 2022-09-16 RX ADMIN — HYDROMORPHONE HYDROCHLORIDE 1.5 MILLIGRAM(S): 2 INJECTION INTRAMUSCULAR; INTRAVENOUS; SUBCUTANEOUS at 18:48

## 2022-09-16 RX ADMIN — POTASSIUM PHOSPHATE, MONOBASIC POTASSIUM PHOSPHATE, DIBASIC 83.33 MILLIMOLE(S): 236; 224 INJECTION, SOLUTION INTRAVENOUS at 08:03

## 2022-09-16 RX ADMIN — HYDROMORPHONE HYDROCHLORIDE 0.5 MILLIGRAM(S): 2 INJECTION INTRAMUSCULAR; INTRAVENOUS; SUBCUTANEOUS at 15:40

## 2022-09-16 RX ADMIN — HEPARIN SODIUM 1900 UNIT(S)/HR: 5000 INJECTION INTRAVENOUS; SUBCUTANEOUS at 20:28

## 2022-09-16 RX ADMIN — HYDROMORPHONE HYDROCHLORIDE 1.5 MILLIGRAM(S): 2 INJECTION INTRAMUSCULAR; INTRAVENOUS; SUBCUTANEOUS at 13:20

## 2022-09-16 RX ADMIN — Medication 25 GRAM(S): at 22:30

## 2022-09-16 RX ADMIN — HYDROMORPHONE HYDROCHLORIDE 1.5 MILLIGRAM(S): 2 INJECTION INTRAMUSCULAR; INTRAVENOUS; SUBCUTANEOUS at 19:06

## 2022-09-16 RX ADMIN — METHADONE HYDROCHLORIDE 2.5 MILLIGRAM(S): 40 TABLET ORAL at 07:43

## 2022-09-16 RX ADMIN — Medication 25 GRAM(S): at 20:00

## 2022-09-16 RX ADMIN — LIDOCAINE 1 PATCH: 4 CREAM TOPICAL at 20:01

## 2022-09-16 RX ADMIN — HYDROMORPHONE HYDROCHLORIDE 0.5 MILLIGRAM(S): 2 INJECTION INTRAMUSCULAR; INTRAVENOUS; SUBCUTANEOUS at 03:16

## 2022-09-16 RX ADMIN — HYDROMORPHONE HYDROCHLORIDE 0.5 MILLIGRAM(S): 2 INJECTION INTRAMUSCULAR; INTRAVENOUS; SUBCUTANEOUS at 06:04

## 2022-09-16 RX ADMIN — HYDROMORPHONE HYDROCHLORIDE 1.5 MILLIGRAM(S): 2 INJECTION INTRAMUSCULAR; INTRAVENOUS; SUBCUTANEOUS at 20:17

## 2022-09-16 RX ADMIN — HYDROMORPHONE HYDROCHLORIDE 1.5 MILLIGRAM(S): 2 INJECTION INTRAMUSCULAR; INTRAVENOUS; SUBCUTANEOUS at 23:22

## 2022-09-16 RX ADMIN — HYDROMORPHONE HYDROCHLORIDE 0.5 MILLIGRAM(S): 2 INJECTION INTRAMUSCULAR; INTRAVENOUS; SUBCUTANEOUS at 15:20

## 2022-09-16 RX ADMIN — LIDOCAINE 1 PATCH: 4 CREAM TOPICAL at 01:14

## 2022-09-16 RX ADMIN — SODIUM CHLORIDE 75 MILLILITER(S): 9 INJECTION, SOLUTION INTRAVENOUS at 08:02

## 2022-09-16 NOTE — PROGRESS NOTE ADULT - SUBJECTIVE AND OBJECTIVE BOX
INTERVAL HPI/OVERNIGHT EVENTS: Chest tube clamped yesterday CXR stable x 2.  Had one event of SVT overnight resolved with vagal maneuvers.     SUBJECTIVE: Patient seen and examined at bedside.     CONSTITUTIONAL: No weakness, fevers or chills  EYES/ENT: No visual changes;  No vertigo or throat pain   NECK: No pain or stiffness  RESPIRATORY: No cough, wheezing, hemoptysis; No shortness of breath  CARDIOVASCULAR: No chest pain or palpitations  GASTROINTESTINAL: No abdominal or epigastric pain. No nausea, vomiting, or hematemesis; No diarrhea or constipation. No melena or hematochezia.  GENITOURINARY: No dysuria, frequency or hematuria  NEUROLOGICAL: No numbness or weakness  SKIN: No itching, rashes    OBJECTIVE:    VITAL SIGNS:  ICU Vital Signs Last 24 Hrs  T(C): 36.8 (16 Sep 2022 04:00), Max: 36.8 (15 Sep 2022 20:00)  T(F): 98.3 (16 Sep 2022 04:00), Max: 98.3 (15 Sep 2022 20:00)  HR: 99 (16 Sep 2022 07:00) (83 - 99)  BP: 130/74 (16 Sep 2022 07:00) (105/83 - 141/93)  BP(mean): 85 (16 Sep 2022 07:00) (74 - 103)  ABP: --  ABP(mean): --  RR: 19 (16 Sep 2022 07:00) (15 - 29)  SpO2: 97% (16 Sep 2022 07:00) (93% - 100%)    O2 Parameters below as of 16 Sep 2022 07:00  Patient On (Oxygen Delivery Method): room air              09-15 @ 07:01  -  09-16 @ 07:00  --------------------------------------------------------  IN: 2379 mL / OUT: 2600 mL / NET: -221 mL      CAPILLARY BLOOD GLUCOSE          PHYSICAL EXAM:    General: NAD  HEENT: NC/AT; PERRL, clear conjunctiva  Neck: supple  Respiratory: CTA b/l  Cardiovascular: +S1/S2; RRR  Abdomen: soft, NT/ND; +BS x4  Extremities: WWP, 2+ peripheral pulses b/l; no LE edema  Skin: normal color and turgor; no rash  Neurological:    MEDICATIONS:  MEDICATIONS  (STANDING):  bictegravir 50 mG/emtricitabine 200 mG/tenofovir alafenamide 25 mG (BIKTARVY) 1 Tablet(s) Oral daily  ceFAZolin   IVPB 2000 milliGRAM(s) IV Intermittent every 8 hours  chlorhexidine 2% Cloths 1 Application(s) Topical <User Schedule>  dronabinol 5 milliGRAM(s) Oral two times a day  ferrous    sulfate 325 milliGRAM(s) Oral <User Schedule>  gabapentin 300 milliGRAM(s) Oral daily  heparin  Infusion. 1700 Unit(s)/Hr (17 mL/Hr) IV Continuous <Continuous>  lactated ringers. 1000 milliLiter(s) (75 mL/Hr) IV Continuous <Continuous>  lactulose Syrup 10 Gram(s) Oral daily  lidocaine   4% Patch 1 Patch Transdermal daily  melatonin 3 milliGRAM(s) Oral at bedtime  methadone    Tablet 2.5 milliGRAM(s) Oral two times a day  naloxegol 25 milliGRAM(s) Oral daily  polyethylene glycol 3350 17 Gram(s) Oral two times a day  potassium phosphate IVPB 30 milliMole(s) IV Intermittent once  senna 2 Tablet(s) Oral at bedtime    MEDICATIONS  (PRN):  bisacodyl 5 milliGRAM(s) Oral at bedtime PRN Constipation  HYDROmorphone   Tablet 4 milliGRAM(s) Oral every 4 hours PRN Moderate Pain (4 - 6)  HYDROmorphone  Injectable 0.5 milliGRAM(s) IV Push every 3 hours PRN breakthrough severe pain  HYDROmorphone  Injectable 1.5 milliGRAM(s) IV Push every 3 hours PRN Severe Pain (7 - 10)      ALLERGIES:  Allergies    ibuprofen (Angioedema)    Intolerances        LABS:                        8.1    10.92 )-----------( 238      ( 16 Sep 2022 01:06 )             27.4     09-16    136  |  102  |  16  ----------------------------<  93  3.7   |  27  |  1.19    Ca    11.1<H>      16 Sep 2022 01:06  Phos  2.2     09-16  Mg     1.70     09-16    TPro  6.4  /  Alb  2.5<L>  /  TBili  0.3  /  DBili  x   /  AST  23  /  ALT  <5<L>  /  AlkPhos  215<H>  09-16    PTT - ( 16 Sep 2022 01:06 )  PTT:33.6 sec      RADIOLOGY & ADDITIONAL TESTS: Reviewed. INTERVAL HPI/OVERNIGHT EVENTS: Chest tube clamped yesterday CXR stable x 2.  Had one event of SVT overnight resolved with vagal maneuvers.     SUBJECTIVE: Patient seen and examined at bedside.     CONSTITUTIONAL: No weakness, fevers or chills  EYES/ENT: No visual changes;  No vertigo or throat pain   NECK: No pain or stiffness  RESPIRATORY: No cough, wheezing, hemoptysis; No shortness of breath  CARDIOVASCULAR: No chest pain or palpitations  GASTROINTESTINAL: No abdominal or epigastric pain. No nausea, vomiting, or hematemesis; No diarrhea or constipation. No melena or hematochezia.  GENITOURINARY: No dysuria, frequency or hematuria  NEUROLOGICAL: No numbness or weakness  SKIN: No itching, rashes    OBJECTIVE:    VITAL SIGNS:  ICU Vital Signs Last 24 Hrs  T(C): 36.8 (16 Sep 2022 04:00), Max: 36.8 (15 Sep 2022 20:00)  T(F): 98.3 (16 Sep 2022 04:00), Max: 98.3 (15 Sep 2022 20:00)  HR: 99 (16 Sep 2022 07:00) (83 - 99)  BP: 130/74 (16 Sep 2022 07:00) (105/83 - 141/93)  BP(mean): 85 (16 Sep 2022 07:00) (74 - 103)  ABP: --  ABP(mean): --  RR: 19 (16 Sep 2022 07:00) (15 - 29)  SpO2: 97% (16 Sep 2022 07:00) (93% - 100%)    O2 Parameters below as of 16 Sep 2022 07:00  Patient On (Oxygen Delivery Method): room air              09-15 @ 07:01  -  09-16 @ 07:00  --------------------------------------------------------  IN: 2379 mL / OUT: 2600 mL / NET: -221 mL      CAPILLARY BLOOD GLUCOSE          PHYSICAL EXAM:    General: NAD  HEENT: NC/AT; PERRL, clear conjunctiva  Neck: supple  Respiratory: CTA b/l- L cT in place clamped, subcut air   Cardiovascular: +S1/S2; RRR  Abdomen: soft, NT/ND; +BS x4  : swollen testicles TTP  Extremities: WWP, 2+ peripheral pulses b/l; no LE edema  Skin: normal color and turgor; no rash  Neurological: A and O x 4 following commands     MEDICATIONS:  MEDICATIONS  (STANDING):  bictegravir 50 mG/emtricitabine 200 mG/tenofovir alafenamide 25 mG (BIKTARVY) 1 Tablet(s) Oral daily  ceFAZolin   IVPB 2000 milliGRAM(s) IV Intermittent every 8 hours  chlorhexidine 2% Cloths 1 Application(s) Topical <User Schedule>  dronabinol 5 milliGRAM(s) Oral two times a day  ferrous    sulfate 325 milliGRAM(s) Oral <User Schedule>  gabapentin 300 milliGRAM(s) Oral daily  heparin  Infusion. 1700 Unit(s)/Hr (17 mL/Hr) IV Continuous <Continuous>  lactated ringers. 1000 milliLiter(s) (75 mL/Hr) IV Continuous <Continuous>  lactulose Syrup 10 Gram(s) Oral daily  lidocaine   4% Patch 1 Patch Transdermal daily  melatonin 3 milliGRAM(s) Oral at bedtime  methadone    Tablet 2.5 milliGRAM(s) Oral two times a day  naloxegol 25 milliGRAM(s) Oral daily  polyethylene glycol 3350 17 Gram(s) Oral two times a day  potassium phosphate IVPB 30 milliMole(s) IV Intermittent once  senna 2 Tablet(s) Oral at bedtime    MEDICATIONS  (PRN):  bisacodyl 5 milliGRAM(s) Oral at bedtime PRN Constipation  HYDROmorphone   Tablet 4 milliGRAM(s) Oral every 4 hours PRN Moderate Pain (4 - 6)  HYDROmorphone  Injectable 0.5 milliGRAM(s) IV Push every 3 hours PRN breakthrough severe pain  HYDROmorphone  Injectable 1.5 milliGRAM(s) IV Push every 3 hours PRN Severe Pain (7 - 10)      ALLERGIES:  Allergies    ibuprofen (Angioedema)    Intolerances        LABS:                        8.1    10.92 )-----------( 238      ( 16 Sep 2022 01:06 )             27.4     09-16    136  |  102  |  16  ----------------------------<  93  3.7   |  27  |  1.19    Ca    11.1<H>      16 Sep 2022 01:06  Phos  2.2     09-16  Mg     1.70     09-16    TPro  6.4  /  Alb  2.5<L>  /  TBili  0.3  /  DBili  x   /  AST  23  /  ALT  <5<L>  /  AlkPhos  215<H>  09-16    PTT - ( 16 Sep 2022 01:06 )  PTT:33.6 sec      RADIOLOGY & ADDITIONAL TESTS: Reviewed.

## 2022-09-16 NOTE — CHART NOTE - NSCHARTNOTEFT_GEN_A_CORE
MICU Transfer Note    Transfer from: MICU    Transfer to: (  ) Medicine    ( x ) Telemetry     (   ) RCU        (    ) Palliative         (   ) Stroke Unit          (   ) __________________    Accepting Physician:  Signout given to:     MICU COURSE:  31 M with PMH/ HIV, rectal cancer not on tx (followed up by Dr Frazier ([facility] oncology)), mets to liver and possibly bone,  R eye cataract, who presented to the [facility] on 8/31/22 after syncopal episode at home. Pt with cough x 2 mos, with yellow sputum and shortness of breath since 5 days PTA.   At [facility]: pt was found to be hypoxemic. 8/31/22 - L chest wall Pigtail placement. Pigtail was placed for suspected PMX with improvement in shortness of breath. CT chest was performed, which showed that pigtail is within the mass w/ DDx of TB, fungal infection, cavitary lesion, or squamous cell carcinoma. Pt was found to be anemic due to rectal bleeding with Hgb 5.9 s/p 2 U PRBC on 9/1. Blood cxs with MSSA bacteremia, and has been treated with Cefazolin and Zosyn.  Since 9/2 pt with worsening diffuse SC emphysema extending to the neck/face/all 4 extremities/scrotum/back , which has been getting progressively worse over past 2 days as per pt's statement. 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 TTE with two large RV masses and two additional small masses. 9/7- s/p L lateral chest tube to 14 Fr. 9/7 - s/p IVC Filter placement (as per [facility] statement, was placed prophylactically due to undiagnosed vegetations on the TTE. 12 cm multiloculated thick walled cavitary mass in the CHRISTINE and lingula.+ liver lesion on the CT a/p. + external iliac vein thrombosis, PE study was indeterminate     Pt was transferred to [facility]  for pt to be evaluated by his outpt oncology team (Dr Frazier) also for possible intervention by Interventional Pulmonology. Patient continued treatment for MSSA bacteremia and likely endocarditis via cefepime.  Patients course was complicated by occasional episodes of SVT which all resolved without intervention.  Decision was made to plan on removing CT which was clamped 9/15 without resulting Ptx.  CT was successfully removed 9/16 with post removal xray without PTX.  Patient is stable for transfer to telemetry floor.        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      Neuro  -A & O x 3 , no active issues  - not on sedation  - C/o of severe scrotal/rectal pain/pelvic pain, Morphine ER 30 mg BID Dc'd 9/12 in setting of TELMA, increased Dilaudid 1.5 mg q3hrs for severe pain. and 0.5 mg q3hrs for breakthrough, he has frequently required extra pushes of dilaudid and IV tylenol the past.  -as per discussion with palliative, added Gabapentin 300 mg daily and Dilaudid 4 mg PO Q 4 hrs PRN for moderate pain with 1.5 PRN for severe pain   - pt was on methadone 2.5mg qd and Oxycontin oupt- methadone restarted 9/12. The dose can be increased next week if indicated  - Check QTCs weekly on sunday   - pt is appropriately overwhelmed by his complex medical condition, holistic RN and Behavioral health consulted.  recommended Marinol - in place     CV:   - hemodynamically stable, remains off vasopressors   -TTE with two large RV masses and two additional small masses.   - 9/7-s/p L lateral chest tube to 14 Fr. 9/7 -s/p IVC Filter placement (as per [facility] statement, was placed prophylactically due to undiagnosed vegetations on the TTE.  - pt is hemodynamically stable no pressors   - repeat TTE 9/9 - Limited and technically difficult study with views limited to the subcostal window.  Very limited views demonstrate what appears to be a mobile mass, possibly in association with the tricuspid valve. This may represent tumour, thrombus, or vegetation. Consider JENNIFER for further evaluation, if clinically indicated.  - per cardiology risk of JENNIFER outweighs benefit,  will obtain cardiac MRI instead to evaluate RV mass /RV thrombosis  - cardiac MRI ordered  called MRI (9/15) and was advised by MRI dptm to call Monday morning 9/19 to schedule the study     #SVT- possibly in setting of endocarditis   - s/p SVT events x 2 9/15 and again today 9/16 resolved with vagal maneuvers   - started metoprol 12.5 BID with hold parameters  - keep mag > 2 and phos > 3   - consider EP eval if persists    Pulm:  #CHRISTINE cavitary lesion - Differential includes cavitary PNA (sputum and blood cultures positive for MSSA) vs malignancy given history of metastatic rectal CA vs atypical infection. Of note PET/CT in our system from 8/2022 with only few subcm nodules in L and GGO in R lung, making malignancy as etiology of cavitary lesion less likely.   - L pigtail catheter was placed at [facility] due to concern for L PTX 8/31. However, patient was found to have large CHRISTINE cavitary lesion instead, into which the pigtail had been placed.   -On 9/7 patient had L pigtail replaced (currently 14F) as well as IVC filter placement. ([facility])   9/10-- s/p MIST (10 mg of Alteplase injected into pleural space) __ cancelled   - f/u sputum cultures-- sent on 9/10-- Klebsiella __ cont cefazolin   -  fluid Cx (from cavitory lesion)_ neg 9/9  - aspergillus-neg, histo neg, blasto neg, coccidio neg   - serum fungitel and crypto Ag  - neg   - s/p Zosyn , now on Cefazolin x 6 weeks per ID recs   - CTA 9/9- Findings most in keeping with cavitary pneumonia involving lingula and left lower lobe complicated by bronchopleural fistula as described with tract around the small caliber pleural pigtail catheter, extensive subcutaneous emphysema and pneumomediastinum. Nonopacification of lingular and left lower lobe pulmonary arteries as described likely secondary to cavitary pneumonia rather than thromboembolic pulmonary embolism  - AFB x 3 are negative at [facility]   - CT Sx recommended MIST protocol, held pending repeat CT /chest to evaluate if pig tail is still in place as there was no drainage from the cavitary lesion/ space, repeat CT chest 9/11 showed pigtail still in cavitary lesion cavity.   - 9/13- flushed chest tube, noted w/ with thick secretions and small airleak at times. Will continue w/ chest tube for now as there is continued purulent drainage and air. When output improves will consider clamping trials.   - CT removed 9/16 CXR post removal without PTX.  Will check additional CXR around 7pm tonight and again in the am to ensure without PTX  - ensure dressing has occlusive Vaseline to prevent entrapment of air     # bronchopleural fistula.  - L CT in place, keep to water seal, repeat CT completed, results as above  - CT surgery consulted- Recommend MIST protocol x1 dose through current pigtail catheter due to concern for clogged pigtail ,also rec to consult IR for image guided pigtail catheter (pneumonostomy) placement into the pneumatocele, as well as blow hole placement to evacuate subQ emphysema  holding off for now. plan to clamp pigtail tomorrow morning and assess if it can be pulled. if needs another chest tube surgical would be ideal, discussed w/ CT surgery PA.   - repeat CT /chest 9/11- Persistent left upper lobe cavitary lesion, with pigtail catheter in place, likely communicating with the lingular bronchus. 2. Right middle lobe pneumonia and scattered airspace opacities throughout the remaining lungs, as described. 3. Persistent pneumomediastinum and extensive subcutaneous emphysema of the neck, chest, abdomen, pelvis, and extremities, including the scrotum. 4. Large anorectal mass. 5. Liver metastases.  - No operative thoracic surgical interventions planned at this time  - CT removed 9/16    GI:  # regular diet  - Pt with poor PO intake   - 9/14-started on Marinol- increased dose to 5mg TID 9/16     #constipation   - patient still without BMs  - 9/14 and 9/15- s/p Relistor   - continue miralax, dulcolax, senna, naloxogel, lactulose   -9/15- small amt of dark blood from rectum, x 1, stopped -cont monitoring for blood BM     Renal:   TELMA, likely prerenal- resolved   - Cr stable   - On LR @75/hr   - continue trending renal function   - Strict Is and Os    #Hypercalcemia - ?related to malignancy  - Check PTH - 3>5  PTH related peptide in lab   - Vit D levels low, per heme/onc hold off supplementation until Ca normalized as can worsen hyperCa in setting of hypercalcemia of malignancy  - Ca 16.4 - 9/10, 17 corrected for low serum albumin. per Heme/onc recs, s/p (9/10) calcitonin 4u/kg x 2 doses and pamidronate 90mcg IVPB x1.  - calcium level stable at 11.8   - cont IVFs w/ LR @ 75 cc/hr and continue monitoring   - continue to trend levels q 24 hrs   - can repeat pamidronate after 1 week if needed as per hemonc     #scrotal swelling likely due SC emphysema, with severe pain  - indwelling pedraza was placed at [facility] (Coude placed for urinary strain)   - UA positive with moderate bacteria 9/9- UCx 9/9 neg   -  is following recommend some type of scrotal wrap to help with scrotal swelling, use Kerlex wrap tightly around scrotum. Can be done by floor staff as demonstrated. - Scrotal elevation unlikely to help improve swelling, Keep pedraza  - No further urologic intervention needed at this time.   -Can f/u outpatient w/ Dr Prabhu Lucio, [facility] [place]  - pain control with dilaudid and methadone, ct a/p 9/11 did not show any inflammation/hydrocele/infection .      ID:  # MSSA bacteremia at [facility]  - Sputum cultures from 9/1 grew MSSA and Blood cultures from 8/31 grew MSSA with blood cultures from 9/2 NGTD. Legionella negative. Sputum AFB negative x3. Fungitell and galactomannan negative. Patient had been empirically on Zosyn, Cefazolin and Caspofungin (which was dc'ed).  - TTE 9/9 with possible vegetation on tricuspid valve   - s/p Zosyn (9/8 - 9/9), transitioned to Cefazolin per ID recs on 9/9, plan for 6 week course  -  fluid(cavitary lesion) Cx 9/9 - grew few staph aureus+  - Blood Cx neg 9/8  - UA 9/8 w/  some bacteria, UCx 9/9 neg   - CXR with L cavitary lesion, seen again on CT chest 9/9 and 9/11  - fungitell and crypto neg 9/10  - Sputum Cx  9/10 + mod klebsiella pneumoniae-- pansensitive- continue Cefazolin x 6 weeks as per ID recs   - ID consulted, recs appreciated  - afebrile overnight, WBC stable     # HIV  - Follows with Dr. Marcial in clinic  - CD4 392 and viral load < 30 on 9/2/22 as per ID note   - Viral load undetectable 9/8  - c/w home Biktarvy , monitor Cr if worsening will d/c    Hematology:   # rectal CA, possible RV mass, mets to liver and possibly bone,  # metastatic HPV related anal SCC,   - Hem/Onc--With regards to treatment of metastatic HPV related anal SCC, treatment will be on hold pending resolution of infection. Will need reassessment after resolution of infection to evaluate performance status and decide on systemic treatment options     #cardiac ? mass/thrombus/vegetation also external iliac vein thrombosis   -Lovenox held at [place] and s/p 9/7- IVC filter  - pt is on heparin gtt since since midnight 9/16 in setting of planned CT removal can resume once 6 hour post removal CT CXR is normal     #anemia   thrombocytosis,  AOCD, ferritin 512, iron 11, TIBC-- 150, Iron -- 7 %, transferrin -- 122, ([place])  - Haptoglobin - 308, LDL-- 240, Urica acid- 3.4   - H/H stable - 9.5/30.6 >8.4/28>9.8/32 plts- 261>224 >234   - s/p iron supplements will hold in setting of constipation     # L external iliac vein DVT  - on heparin gtt currently held in setting of CT removal can resume in CXR is stable    - Duplex b/l LEs 9/10 neg for DVT    Endo:  - not diabetic, HgbA1C 5.1 , FS-- 99>89 continue monitoring     Code: Full code  Palliative consult placed 9/10  - Full code      FOR FOLLOW UP:  - Check QTCs weekly on sunday while on methadone   - cardiac MRI ordered  called MRI (9/15) and was advised by MRI dptm to call Monday morning 9/19 to schedule the study   - consider EP eval if SVT persists  - CT removed 9/16 CXR post removal without PTX.  Will check additional CXR around 7pm tonight and again in the am to ensure without PTX  - ensure dressing has occlusive Vaseline to prevent entrapment of air   - restart heparin gtt if CXR stable   - Hemonc recs regarding potentional treatment options   - 6 week course of cefazolin as per ID recs (9/9- )  - pedraza placed by urology due to severe scrotal swelling- Can f/u outpatient w/ Dr Prabhu Lucio, [facility] [place] MICU Transfer Note    Transfer from: MICU    Transfer to: (  ) Medicine    ( x ) Telemetry     (   ) RCU        (    ) Palliative         (   ) Stroke Unit          (   ) __________________    Accepting Physician:  Signout given to:     MICU COURSE:  31 M with PMH/ HIV, rectal cancer not on tx (followed up by Dr Frazier ([facility] oncology)), mets to liver and possibly bone,  R eye cataract, who presented to the [facility] on 8/31/22 after syncopal episode at home. Pt with cough x 2 mos, with yellow sputum and shortness of breath since 5 days PTA.   At [facility]: pt was found to be hypoxemic. 8/31/22 - L chest wall Pigtail placement. Pigtail was placed for suspected PMX with improvement in shortness of breath. CT chest was performed, which showed that pigtail is within the mass w/ DDx of TB, fungal infection, cavitary lesion, or squamous cell carcinoma. Pt was found to be anemic due to rectal bleeding with Hgb 5.9 s/p 2 U PRBC on 9/1. Blood cxs with MSSA bacteremia, and has been treated with Cefazolin and Zosyn.  Since 9/2 pt with worsening diffuse SC emphysema extending to the neck/face/all 4 extremities/scrotum/back , which has been getting progressively worse over past 2 days as per pt's statement. 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 TTE with two large RV masses and two additional small masses. 9/7- s/p L lateral chest tube to 14 Fr. 9/7 - s/p IVC Filter placement (as per [facility] statement, was placed prophylactically due to undiagnosed vegetations on the TTE. 12 cm multiloculated thick walled cavitary mass in the CHRISTINE and lingula.+ liver lesion on the CT a/p. + external iliac vein thrombosis, PE study was indeterminate     Pt was transferred to [facility]  for pt to be evaluated by his outpt oncology team (Dr Frazier) also for possible intervention by Interventional Pulmonology. Patient continued treatment for MSSA bacteremia and likely endocarditis via cefepime.  Patients course was complicated by occasional episodes of SVT which all resolved without intervention.  Decision was made to plan on removing CT which was clamped 9/15 without resulting Ptx.  CT was successfully removed 9/16 with post removal xray without PTX.  Patient is stable for transfer to telemetry floor.        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      Neuro  -A & O x 3 , no active issues  - not on sedation  - C/o of severe scrotal/rectal pain/pelvic pain, Morphine ER 30 mg BID Dc'd 9/12 in setting of TELMA, increased Dilaudid 1.5 mg q3hrs for severe pain. and 0.5 mg q3hrs for breakthrough, he has frequently required extra pushes of dilaudid and IV tylenol the past.  -as per discussion with palliative, added Gabapentin 300 mg daily and Dilaudid 4 mg PO Q 4 hrs PRN for moderate pain with 1.5 PRN for severe pain   - pt was on methadone 2.5mg qd and Oxycontin oupt- methadone restarted 9/12. The dose can be increased next week if indicated  - Check QTCs weekly on sunday   - pt is appropriately overwhelmed by his complex medical condition, holistic RN and Behavioral health consulted.  recommended Marinol - in place     CV:   - hemodynamically stable, remains off vasopressors   -TTE with two large RV masses and two additional small masses.   - 9/7-s/p L lateral chest tube to 14 Fr. 9/7 -s/p IVC Filter placement (as per [facility] statement, was placed prophylactically due to undiagnosed vegetations on the TTE.  - pt is hemodynamically stable no pressors   - repeat TTE 9/9 - Limited and technically difficult study with views limited to the subcostal window.  Very limited views demonstrate what appears to be a mobile mass, possibly in association with the tricuspid valve. This may represent tumour, thrombus, or vegetation. Consider JENNIFER for further evaluation, if clinically indicated.  - per cardiology risk of JENNIFER outweighs benefit,  will obtain cardiac MRI instead to evaluate RV mass /RV thrombosis  - cardiac MRI ordered  called MRI (9/15) and was advised by MRI dptm to call Monday morning 9/19 to schedule the study     #SVT- possibly in setting of endocarditis   - s/p SVT events x 2 9/15 and again today 9/16 resolved with vagal maneuvers   - started metoprol 12.5 BID with hold parameters  - keep mag > 2 and phos > 3   - consider EP eval if persists    Pulm:  #CHRISTINE cavitary lesion - Differential includes cavitary PNA (sputum and blood cultures positive for MSSA) vs malignancy given history of metastatic rectal CA vs atypical infection. Of note PET/CT in our system from 8/2022 with only few subcm nodules in L and GGO in R lung, making malignancy as etiology of cavitary lesion less likely.   - L pigtail catheter was placed at [facility] due to concern for L PTX 8/31. However, patient was found to have large CHRISTINE cavitary lesion instead, into which the pigtail had been placed.   -On 9/7 patient had L pigtail replaced (currently 14F) as well as IVC filter placement. ([facility])   - Sputum cultures 9/10- Klebsiella-cont cefazolin   -  fluid Cx (from cavitory lesion) Staph aureus  - aspergillus-neg, histo neg, blasto neg, coccidio neg   - serum fungitel and crypto Ag  - neg   - s/p Zosyn , now on Cefazolin x 6 weeks per ID recs   - CTA 9/9- Findings most in keeping with cavitary pneumonia involving lingula and left lower lobe complicated by bronchopleural fistula as described with tract around the small caliber pleural pigtail catheter, extensive subcutaneous emphysema and pneumomediastinum. Nonopacification of lingular and left lower lobe pulmonary arteries as described likely secondary to cavitary pneumonia rather than thromboembolic pulmonary embolism  - AFB x 3 are negative at [facility]   - CT Sx recommended MIST protocol, held pending repeat CT /chest to evaluate if pig tail is still in place as there was no drainage from the cavitary lesion/ space, repeat CT chest 9/11 showed pigtail still in cavitary lesion cavity.   - 9/13- flushed chest tube, noted w/ with thick secretions and small airleak at times. Will continue w/ chest tube for now as there is continued purulent drainage and air. When output improves will consider clamping trials.   - CT removed 9/16 CXR post removal without PTX.  Will check additional CXR around 7pm tonight and again in the am to ensure without PTX  - ensure dressing has occlusive Vaseline to prevent entrapment of air     # bronchopleural fistula.  - L CT in place, keep to water seal, repeat CT completed, results as above  - CT surgery consulted- Recommend MIST protocol x1 dose through current pigtail catheter due to concern for clogged pigtail ,also rec to consult IR for image guided pigtail catheter (pneumonostomy) placement into the pneumatocele, as well as blow hole placement to evacuate subQ emphysema  holding off for now. plan to clamp pigtail tomorrow morning and assess if it can be pulled. if needs another chest tube surgical would be ideal, discussed w/ CT surgery PA.   - repeat CT /chest 9/11- Persistent left upper lobe cavitary lesion, with pigtail catheter in place, likely communicating with the lingular bronchus. 2. Right middle lobe pneumonia and scattered airspace opacities throughout the remaining lungs, as described. 3. Persistent pneumomediastinum and extensive subcutaneous emphysema of the neck, chest, abdomen, pelvis, and extremities, including the scrotum. 4. Large anorectal mass. 5. Liver metastases.  - No operative thoracic surgical interventions planned at this time  - CT removed 9/16    GI:  # regular diet  - Pt with poor PO intake   - 9/14-started on Marinol- increased dose to 5mg TID 9/16     #constipation   - patient still without BMs  - 9/14 and 9/15- s/p Relistor   - continue miralax, dulcolax, senna, naloxogel, lactulose   -9/15- small amt of dark blood from rectum, x 1, stopped -cont monitoring for blood BM     Renal:   TELMA, likely prerenal- resolved   - Cr stable   - On LR @75/hr   - continue trending renal function   - Strict Is and Os    #Hypercalcemia - ?related to malignancy  - Check PTH - 3>5  PTH related peptide in lab   - Vit D levels low, per heme/onc hold off supplementation until Ca normalized as can worsen hyperCa in setting of hypercalcemia of malignancy  - Ca 16.4 - 9/10, 17 corrected for low serum albumin. per Heme/onc recs, s/p (9/10) calcitonin 4u/kg x 2 doses and pamidronate 90mcg IVPB x1.  - calcium level stable at 11.8   - cont IVFs w/ LR @ 75 cc/hr and continue monitoring   - continue to trend levels q 24 hrs   - can repeat pamidronate after 1 week if needed as per hemonc     #scrotal swelling likely due SC emphysema, with severe pain  - indwelling pedraza was placed at [facility] (Coude placed for urinary strain)   - UA positive with moderate bacteria 9/9- UCx 9/9 neg   -  is following recommend some type of scrotal wrap to help with scrotal swelling, use Kerlex wrap tightly around scrotum. Can be done by floor staff as demonstrated. - Scrotal elevation unlikely to help improve swelling, Keep pedraza  - No further urologic intervention needed at this time.   -Can f/u outpatient w/ Dr Prabhu Lucio, [facility] for [facility] at [place]  - pain control with dilaudid and methadone, ct a/p 9/11 did not show any inflammation/hydrocele/infection .      ID:  # MSSA bacteremia at [facility]  - Sputum cultures from 9/1 grew MSSA and Blood cultures from 8/31 grew MSSA with blood cultures from 9/2 NGTD. Legionella negative. Sputum AFB negative x3. Fungitell and galactomannan negative. Patient had been empirically on Zosyn, Cefazolin and Caspofungin (which was dc'ed).  - TTE 9/9 with possible vegetation on tricuspid valve   - s/p Zosyn (9/8 - 9/9), transitioned to Cefazolin per ID recs on 9/9, plan for 6 week course  -  fluid(cavitary lesion) Cx 9/9 - grew few staph aureus+  - Blood Cx neg 9/8  - UA 9/8 w/  some bacteria, UCx 9/9 neg   - CXR with L cavitary lesion, seen again on CT chest 9/9 and 9/11  - fungitell and crypto neg 9/10  - Sputum Cx  9/10 + mod klebsiella pneumoniae-- pansensitive- continue Cefazolin x 6 weeks as per ID recs   - ID consulted, recs appreciated  - afebrile overnight, WBC stable     # HIV  - Follows with Dr. Marcial in clinic  - CD4 392 and viral load < 30 on 9/2/22 as per ID note   - Viral load undetectable 9/8  - c/w home Biktarvy , monitor Cr if worsening will d/c    Hematology:   # rectal CA, possible RV mass, mets to liver and possibly bone,  # metastatic HPV related anal SCC,   - Hem/Onc--With regards to treatment of metastatic HPV related anal SCC, treatment will be on hold pending resolution of infection. Will need reassessment after resolution of infection to evaluate performance status and decide on systemic treatment options     #cardiac ? mass/thrombus/vegetation also external iliac vein thrombosis   -Lovenox held at [facility] and s/p 9/7- IVC filter  - pt is on heparin gtt since since midnight 9/16 in setting of planned CT removal can resume once 6 hour post removal CT CXR is normal     #anemia   thrombocytosis,  AOCD, ferritin 512, iron 11, TIBC-- 150, Iron -- 7 %, transferrin -- 122, ([facility])  - Haptoglobin - 308, LDL-- 240, Urica acid- 3.4   - H/H stable - 9.5/30.6 >8.4/28>9.8/32 plts- 261>224 >234   - s/p iron supplements will hold in setting of constipation     # L external iliac vein DVT  - on heparin gtt currently held in setting of CT removal can resume in CXR is stable    - Duplex b/l LEs 9/10 neg for DVT    Endo:  - not diabetic, HgbA1C 5.1 , FS-- 99>89 continue monitoring     Code: Full code  Palliative consult placed 9/10  - Full code      FOR FOLLOW UP:  - Check QTCs weekly on sunday while on methadone   - cardiac MRI ordered  called MRI (9/15) and was advised by MRI dptm to call Monday morning 9/19 to schedule the study   - consider EP eval if SVT persists  - CT removed 9/16 CXR post removal without PTX.  Will check additional CXR around 7pm tonight and again in the am to ensure without PTX  - ensure dressing has occlusive Vaseline to prevent entrapment of air   - restart heparin gtt if CXR stable   - Hemonc recs regarding potentional treatment options   - 6 week course of cefazolin as per ID recs (9/9- )  - pedraza placed by urology due to severe scrotal swelling- Can f/u outpatient w/ Dr rPabhu Lucio, [facility] at [place] MICU Transfer Note    Transfer from: MICU    Transfer to: (  ) Medicine    ( x ) Telemetry     (   ) RCU        (    ) Palliative         (   ) Stroke Unit          (   ) __________________    Accepting Physician:  Signout given to:     MICU COURSE:  31 M with PMH/ HIV, rectal cancer not on tx (followed up by Dr Frazier ([facility] oncology)), mets to liver and possibly bone,  R eye cataract, who presented to the [facility] on 8/31/22 after syncopal episode at home. Pt with cough x 2 mos, with yellow sputum and shortness of breath since 5 days PTA.   At [facility]: pt was found to be hypoxemic. 8/31/22 - L chest wall Pigtail placement. Pigtail was placed for suspected PMX with improvement in shortness of breath. CT chest was performed, which showed that pigtail is within the mass w/ DDx of TB, fungal infection, cavitary lesion, or squamous cell carcinoma. Pt was found to be anemic due to rectal bleeding with Hgb 5.9 s/p 2 U PRBC on 9/1. Blood cxs with MSSA bacteremia, and has been treated with Cefazolin and Zosyn.  Since 9/2 pt with worsening diffuse SC emphysema extending to the neck/face/all 4 extremities/scrotum/back , which has been getting progressively worse over past 2 days as per pt's statement. 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 TTE with two large RV masses and two additional small masses. 9/7- s/p L lateral chest tube to 14 Fr. 9/7 - s/p IVC Filter placement (as per [facility] statement, was placed prophylactically due to undiagnosed vegetations on the TTE. 12 cm multiloculated thick walled cavitary mass in the CHRISTINE and lingula.+ liver lesion on the CT a/p. + external iliac vein thrombosis, PE study was indeterminate     Pt was transferred to [facility]  for pt to be evaluated by his outpt oncology team (Dr Frazier) also for possible intervention by Interventional Pulmonology. Patient continued treatment for MSSA bacteremia and likely endocarditis via cefepime.  Patients course was complicated by occasional episodes of SVT which all resolved without intervention.  Decision was made to plan on removing CT which was clamped 9/15 without resulting Ptx.  CT was successfully removed 9/16 with post removal xray without PTX.  Patient is stable for transfer to telemetry floor.        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      Neuro  -A & O x 3 , no active issues  - not on sedation  - C/o of severe scrotal/rectal pain/pelvic pain, Morphine ER 30 mg BID Dc'd 9/12 in setting of TELMA, increased Dilaudid 1.5 mg q3hrs for severe pain. and 0.5 mg q3hrs for breakthrough, he has frequently required extra pushes of dilaudid and IV tylenol the past.  -as per discussion with palliative, added Gabapentin 300 mg daily and Dilaudid 4 mg PO Q 4 hrs PRN for moderate pain with 1.5 PRN for severe pain   - pt was on methadone 2.5mg qd and Oxycontin oupt- methadone restarted 9/12. The dose can be increased next week if indicated  - Check QTCs weekly on sunday   - pt is appropriately overwhelmed by his complex medical condition, holistic RN and Behavioral health consulted.  recommended Marinol - in place     CV:   - hemodynamically stable, remains off vasopressors   -TTE with two large RV masses and two additional small masses.   - 9/7-s/p L lateral chest tube to 14 Fr. 9/7 -s/p IVC Filter placement (as per [facility] statement, was placed prophylactically due to undiagnosed vegetations on the TTE.  - pt is hemodynamically stable no pressors   - repeat TTE 9/9 - Limited and technically difficult study with views limited to the subcostal window.  Very limited views demonstrate what appears to be a mobile mass, possibly in association with the tricuspid valve. This may represent tumour, thrombus, or vegetation. Consider JENNIFER for further evaluation, if clinically indicated.  - per cardiology risk of JENNIFER outweighs benefit,  will obtain cardiac MRI instead to evaluate RV mass /RV thrombosis  - cardiac MRI ordered  called MRI (9/15) and was advised by MRI dptm to call Monday morning 9/19 to schedule the study     #SVT- possibly in setting of endocarditis   - s/p SVT events x 2 9/15 and again today 9/16 resolved with vagal maneuvers   - started metoprol 12.5 BID with hold parameters  - keep mag > 2 and phos > 3   - consider EP eval if persists    Pulm:  #CHRISTINE cavitary lesion - Differential includes cavitary PNA (sputum and blood cultures positive for MSSA) vs malignancy given history of metastatic rectal CA vs atypical infection. Of note PET/CT in our system from 8/2022 with only few subcm nodules in L and GGO in R lung, making malignancy as etiology of cavitary lesion less likely.   - L pigtail catheter was placed at [facility] due to concern for L PTX 8/31. However, patient was found to have large CHRISTINE cavitary lesion instead, into which the pigtail had been placed.   -On 9/7 patient had L pigtail replaced (currently 14F) as well as IVC filter placement. ([facility])   - Sputum cultures 9/10- Klebsiella-cont cefazolin   -  fluid Cx (from cavitory lesion) Staph aureus  - aspergillus-neg, histo neg, blasto neg, coccidio neg   - serum fungitel and crypto Ag  - neg   - s/p Zosyn , now on Cefazolin x 6 weeks per ID recs   - CTA 9/9- Findings most in keeping with cavitary pneumonia involving lingula and left lower lobe complicated by bronchopleural fistula as described with tract around the small caliber pleural pigtail catheter, extensive subcutaneous emphysema and pneumomediastinum. Nonopacification of lingular and left lower lobe pulmonary arteries as described likely secondary to cavitary pneumonia rather than thromboembolic pulmonary embolism  - AFB x 3 are negative at [facility]   - CT Sx recommended MIST protocol, held pending repeat CT /chest to evaluate if pig tail is still in place as there was no drainage from the cavitary lesion/ space, repeat CT chest 9/11 showed pigtail still in cavitary lesion cavity.   - 9/13- flushed chest tube, noted w/ with thick secretions and small airleak at times. Will continue w/ chest tube for now as there is continued purulent drainage and air. When output improves will consider clamping trials.   - CT removed 9/16 CXR post removal without PTX.  Will check additional CXR around 7pm tonight and again in the am to ensure without PTX  - ensure dressing has occlusive Vaseline to prevent entrapment of air     # bronchopleural fistula.  - L CT in place, keep to water seal, repeat CT completed, results as above  - CT surgery consulted- Recommend MIST protocol x1 dose through current pigtail catheter due to concern for clogged pigtail ,also rec to consult IR for image guided pigtail catheter (pneumonostomy) placement into the pneumatocele, as well as blow hole placement to evacuate subQ emphysema  holding off for now. plan to clamp pigtail tomorrow morning and assess if it can be pulled. if needs another chest tube surgical would be ideal, discussed w/ CT surgery PA.   - repeat CT /chest 9/11- Persistent left upper lobe cavitary lesion, with pigtail catheter in place, likely communicating with the lingular bronchus. 2. Right middle lobe pneumonia and scattered airspace opacities throughout the remaining lungs, as described. 3. Persistent pneumomediastinum and extensive subcutaneous emphysema of the neck, chest, abdomen, pelvis, and extremities, including the scrotum. 4. Large anorectal mass. 5. Liver metastases.  - No operative thoracic surgical interventions planned at this time  - CT removed 9/16, post CXR x 2 stable.     GI:  # regular diet  - Pt with poor PO intake   - 9/14-started on Marinol- increased dose to 5mg TID 9/16     #constipation   - patient still without BMs  - 9/14 and 9/15- s/p Relistor   - continue miralax, dulcolax, senna, naloxogel, lactulose   -9/15- small amt of dark blood from rectum, x 1, stopped -cont monitoring for blood BM     Renal:   TELMA, likely prerenal- resolved   - Cr stable   - On LR @75/hr   - continue trending renal function   - Strict Is and Os    #Hypercalcemia - ?related to malignancy  - Check PTH - 3>5  PTH related peptide in lab   - Vit D levels low, per heme/onc hold off supplementation until Ca normalized as can worsen hyperCa in setting of hypercalcemia of malignancy  - Ca 16.4 - 9/10, 17 corrected for low serum albumin. per Heme/onc recs, s/p (9/10) calcitonin 4u/kg x 2 doses and pamidronate 90mcg IVPB x1.  - calcium level stable at 11.8   - cont IVFs w/ LR @ 75 cc/hr and continue monitoring   - continue to trend levels q 24 hrs   - can repeat pamidronate after 1 week if needed as per hemonc     #scrotal swelling likely due SC emphysema, with severe pain  - indwelling pedraza was placed at [facility] (Coude placed for urinary strain)   - UA positive with moderate bacteria 9/9- UCx 9/9 neg   -  is following recommend some type of scrotal wrap to help with scrotal swelling, use Kerlex wrap tightly around scrotum. Can be done by floor staff as demonstrated. - Scrotal elevation unlikely to help improve swelling, Keep pedraza  - No further urologic intervention needed at this time.   -Can f/u outpatient w/ Dr Prabhu Lucio, [facility] at [place]  - pain control with dilaudid and methadone, ct a/p 9/11 did not show any inflammation/hydrocele/infection .      ID:  # MSSA bacteremia at [facility]  - Sputum cultures from 9/1 grew MSSA and Blood cultures from 8/31 grew MSSA with blood cultures from 9/2 NGTD. Legionella negative. Sputum AFB negative x3. Fungitell and galactomannan negative. Patient had been empirically on Zosyn, Cefazolin and Caspofungin (which was dc'ed).  - TTE 9/9 with possible vegetation on tricuspid valve   - s/p Zosyn (9/8 - 9/9), transitioned to Cefazolin per ID recs on 9/9, plan for 6 week course  -  fluid(cavitary lesion) Cx 9/9 - grew few staph aureus+  - Blood Cx neg 9/8  - UA 9/8 w/  some bacteria, UCx 9/9 neg   - CXR with L cavitary lesion, seen again on CT chest 9/9 and 9/11  - fungitell and crypto neg 9/10  - Sputum Cx  9/10 + mod klebsiella pneumoniae-- pansensitive- continue Cefazolin x 6 weeks as per ID recs   - ID consulted, recs appreciated  - afebrile overnight, WBC stable     # HIV  - Follows with Dr. Marcial in clinic  - CD4 392 and viral load < 30 on 9/2/22 as per ID note   - Viral load undetectable 9/8  - c/w home Biktarvy , monitor Cr if worsening will d/c    Hematology:   # rectal CA, possible RV mass, mets to liver and possibly bone,  # metastatic HPV related anal SCC,   - Hem/Onc--With regards to treatment of metastatic HPV related anal SCC, treatment will be on hold pending resolution of infection. Will need reassessment after resolution of infection to evaluate performance status and decide on systemic treatment options     #cardiac ? mass/thrombus/vegetation also external iliac vein thrombosis   -Lovenox held at [facility] and s/p 9/7- IVC filter  - pt is on heparin gtt since since midnight 9/16 in setting of planned CT removal can resume once 6 hour post removal CT CXR is normal     #anemia   thrombocytosis,  AOCD, ferritin 512, iron 11, TIBC-- 150, Iron -- 7 %, transferrin -- 122, ([facility])  - Haptoglobin - 308, LDL-- 240, Urica acid- 3.4   - H/H stable - 9.5/30.6 >8.4/28>9.8/32 plts- 261>224 >234   - s/p iron supplements will hold in setting of constipation     # L external iliac vein DVT  - on heparin gtt, resuming 9/16/22 at 8:30 pm  - Duplex b/l LEs 9/10 neg for DVT    Endo:  - not diabetic, HgbA1C 5.1 , FS-- 99>89 continue monitoring     Code: Full code  Palliative consult placed 9/10  - Full code      FOR FOLLOW UP:  - Check QTCs weekly on sunday while on methadone   - cardiac MRI ordered  called MRI (9/15) and was advised by MRI dptm to call Monday morning 9/19 to schedule the study   - consider EP eval if SVT persists  - CT removed 9/16 CXR in AM for monitoring   - ensure dressing has occlusive Vaseline to prevent entrapment of air   - Hemonc recs regarding potential treatment options   - 6 week course of cefazolin as per ID recs (starting 9/9- )  - pedraza placed by urology due to severe scrotal swelling- Can f/u outpatient w/ Dr Prabhu Lucio, [facility] at [place] MICU Transfer Note    Transfer from: MICU    Transfer to: (  ) Medicine    ( x ) Telemetry     (   ) RCU        (    ) Palliative         (   ) Stroke Unit          (   ) __________________    Accepting Physician: Ivan Saenz MD  Signout given to: Ivan Saenz MD    MICU COURSE:  31 M with PMH/ HIV, rectal cancer not on tx (followed up by Dr Frazier ([facility] oncology)), mets to liver and possibly bone,  R eye cataract, who presented to the [facility] on 8/31/22 after syncopal episode at home. Pt with cough x 2 mos, with yellow sputum and shortness of breath since 5 days PTA.   At [facility]: pt was found to be hypoxemic. 8/31/22 - L chest wall Pigtail placement. Pigtail was placed for suspected PMX with improvement in shortness of breath. CT chest was performed, which showed that pigtail is within the mass w/ DDx of TB, fungal infection, cavitary lesion, or squamous cell carcinoma. Pt was found to be anemic due to rectal bleeding with Hgb 5.9 s/p 2 U PRBC on 9/1. Blood cxs with MSSA bacteremia, and has been treated with Cefazolin and Zosyn.  Since 9/2 pt with worsening diffuse SC emphysema extending to the neck/face/all 4 extremities/scrotum/back , which has been getting progressively worse over past 2 days as per pt's statement. 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 TTE with two large RV masses and two additional small masses. 9/7- s/p L lateral chest tube to 14 Fr. 9/7 - s/p IVC Filter placement (as per [facility] statement, was placed prophylactically due to undiagnosed vegetations on the TTE. 12 cm multiloculated thick walled cavitary mass in the CHRISTINE and lingula.+ liver lesion on the CT a/p. + external iliac vein thrombosis, PE study was indeterminate     Pt was transferred to [facility]  for pt to be evaluated by his outpt oncology team (Dr Frazier) also for possible intervention by Interventional Pulmonology. Patient continued treatment for MSSA bacteremia and likely endocarditis via cefepime.  Patients course was complicated by occasional episodes of SVT which all resolved without intervention.  Decision was made to plan on removing CT which was clamped 9/15 without resulting Ptx.  CT was successfully removed 9/16 with post removal xray without PTX.  Patient is stable for transfer to telemetry floor.        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      Neuro  -A & O x 3 , no active issues  - not on sedation  - C/o of severe scrotal/rectal pain/pelvic pain, Morphine ER 30 mg BID Dc'd 9/12 in setting of TELMA, increased Dilaudid 1.5 mg q3hrs for severe pain. and 0.5 mg q3hrs for breakthrough, he has frequently required extra pushes of dilaudid and IV tylenol the past.  -as per discussion with palliative, added Gabapentin 300 mg daily and Dilaudid 4 mg PO Q 4 hrs PRN for moderate pain with 1.5 PRN for severe pain   - pt was on methadone 2.5mg qd and Oxycontin oupt- methadone restarted 9/12. The dose can be increased next week if indicated  - Check QTCs weekly on sunday   - pt is appropriately overwhelmed by his complex medical condition, holistic RN and Behavioral health consulted.  recommended Marinol - in place     CV:   - hemodynamically stable, remains off vasopressors   -TTE with two large RV masses and two additional small masses.   - 9/7-s/p L lateral chest tube to 14 Fr. 9/7 -s/p IVC Filter placement (as per [facility] statement, was placed prophylactically due to undiagnosed vegetations on the TTE.  - pt is hemodynamically stable no pressors   - repeat TTE 9/9 - Limited and technically difficult study with views limited to the subcostal window.  Very limited views demonstrate what appears to be a mobile mass, possibly in association with the tricuspid valve. This may represent tumour, thrombus, or vegetation. Consider JENNIFER for further evaluation, if clinically indicated.  - per cardiology risk of JENNIFER outweighs benefit,  will obtain cardiac MRI instead to evaluate RV mass /RV thrombosis  - cardiac MRI ordered  called MRI (9/15) and was advised by MRI dptm to call Monday morning 9/19 to schedule the study     #SVT- possibly in setting of endocarditis   - s/p SVT events x 2 9/15 and again today 9/16 resolved with vagal maneuvers   - started metoprol 12.5 BID with hold parameters  - keep mag > 2 and phos > 3   - consider EP eval if persists    Pulm:  #CHRISTINE cavitary lesion - Differential includes cavitary PNA (sputum and blood cultures positive for MSSA) vs malignancy given history of metastatic rectal CA vs atypical infection. Of note PET/CT in our system from 8/2022 with only few subcm nodules in L and GGO in R lung, making malignancy as etiology of cavitary lesion less likely.   - L pigtail catheter was placed at [facility] due to concern for L PTX 8/31. However, patient was found to have large CHRISTINE cavitary lesion instead, into which the pigtail had been placed.   -On 9/7 patient had L pigtail replaced (currently 14F) as well as IVC filter placement. ([facility])   - Sputum cultures 9/10- Klebsiella-cont cefazolin   -  fluid Cx (from cavitory lesion) Staph aureus  - aspergillus-neg, histo neg, blasto neg, coccidio neg   - serum fungitel and crypto Ag  - neg   - s/p Zosyn , now on Cefazolin x 6 weeks per ID recs   - CTA 9/9- Findings most in keeping with cavitary pneumonia involving lingula and left lower lobe complicated by bronchopleural fistula as described with tract around the small caliber pleural pigtail catheter, extensive subcutaneous emphysema and pneumomediastinum. Nonopacification of lingular and left lower lobe pulmonary arteries as described likely secondary to cavitary pneumonia rather than thromboembolic pulmonary embolism  - AFB x 3 are negative at [facility]   - CT Sx recommended MIST protocol, held pending repeat CT /chest to evaluate if pig tail is still in place as there was no drainage from the cavitary lesion/ space, repeat CT chest 9/11 showed pigtail still in cavitary lesion cavity.   - 9/13- flushed chest tube, noted w/ with thick secretions and small airleak at times. Will continue w/ chest tube for now as there is continued purulent drainage and air. When output improves will consider clamping trials.   - CT removed 9/16 CXR post removal without PTX.  Will check additional CXR around 7pm tonight and again in the am to ensure without PTX  - ensure dressing has occlusive Vaseline to prevent entrapment of air     # bronchopleural fistula.  - L CT in place, keep to water seal, repeat CT completed, results as above  - CT surgery consulted- Recommend MIST protocol x1 dose through current pigtail catheter due to concern for clogged pigtail ,also rec to consult IR for image guided pigtail catheter (pneumonostomy) placement into the pneumatocele, as well as blow hole placement to evacuate subQ emphysema  holding off for now. plan to clamp pigtail tomorrow morning and assess if it can be pulled. if needs another chest tube surgical would be ideal, discussed w/ CT surgery PA.   - repeat CT /chest 9/11- Persistent left upper lobe cavitary lesion, with pigtail catheter in place, likely communicating with the lingular bronchus. 2. Right middle lobe pneumonia and scattered airspace opacities throughout the remaining lungs, as described. 3. Persistent pneumomediastinum and extensive subcutaneous emphysema of the neck, chest, abdomen, pelvis, and extremities, including the scrotum. 4. Large anorectal mass. 5. Liver metastases.  - No operative thoracic surgical interventions planned at this time  - CT removed 9/16, post CXR x 2 stable.     GI:  # regular diet  - Pt with poor PO intake   - 9/14-started on Marinol- increased dose to 5mg TID 9/16     #constipation   - patient still without BMs  - 9/14 and 9/15- s/p Relistor   - continue miralax, dulcolax, senna, naloxogel, lactulose   -9/15- small amt of dark blood from rectum, x 1, stopped -cont monitoring for blood BM     Renal:   TELMA, likely prerenal- resolved   - Cr stable   - On LR @75/hr   - continue trending renal function   - Strict Is and Os    #Hypercalcemia - ?related to malignancy  - Check PTH - 3>5  PTH related peptide in lab   - Vit D levels low, per heme/onc hold off supplementation until Ca normalized as can worsen hyperCa in setting of hypercalcemia of malignancy  - Ca 16.4 - 9/10, 17 corrected for low serum albumin. per Heme/onc recs, s/p (9/10) calcitonin 4u/kg x 2 doses and pamidronate 90mcg IVPB x1.  - calcium level stable at 11.8   - cont IVFs w/ LR @ 75 cc/hr and continue monitoring   - continue to trend levels q 24 hrs   - can repeat pamidronate after 1 week if needed as per hemonc     #scrotal swelling likely due SC emphysema, with severe pain  - indwelling pedraza was placed at [facility] (Coude placed for urinary strain)   - UA positive with moderate bacteria 9/9- UCx 9/9 neg   -  is following recommend some type of scrotal wrap to help with scrotal swelling, use Kerlex wrap tightly around scrotum. Can be done by floor staff as demonstrated. - Scrotal elevation unlikely to help improve swelling, Keep pedraza  - No further urologic intervention needed at this time.   -Can f/u outpatient w/ Dr Prabhu Lucio, [facility] for [facility]rology at [place]  - pain control with dilaudid and methadone, ct a/p 9/11 did not show any inflammation/hydrocele/infection .      ID:  # MSSA bacteremia at [facility]  - Sputum cultures from 9/1 grew MSSA and Blood cultures from 8/31 grew MSSA with blood cultures from 9/2 NGTD. Legionella negative. Sputum AFB negative x3. Fungitell and galactomannan negative. Patient had been empirically on Zosyn, Cefazolin and Caspofungin (which was dc'ed).  - TTE 9/9 with possible vegetation on tricuspid valve   - s/p Zosyn (9/8 - 9/9), transitioned to Cefazolin per ID recs on 9/9, plan for 6 week course  -  fluid(cavitary lesion) Cx 9/9 - grew few staph aureus+  - Blood Cx neg 9/8  - UA 9/8 w/  some bacteria, UCx 9/9 neg   - CXR with L cavitary lesion, seen again on CT chest 9/9 and 9/11  - fungitell and crypto neg 9/10  - Sputum Cx  9/10 + mod klebsiella pneumoniae-- pansensitive- continue Cefazolin x 6 weeks as per ID recs   - ID consulted, recs appreciated  - afebrile overnight, WBC stable     # HIV  - Follows with Dr. Marcial in clinic  - CD4 392 and viral load < 30 on 9/2/22 as per ID note   - Viral load undetectable 9/8  - c/w home Biktarvy , monitor Cr if worsening will d/c    Hematology:   # rectal CA, possible RV mass, mets to liver and possibly bone,  # metastatic HPV related anal SCC,   - Hem/Onc--With regards to treatment of metastatic HPV related anal SCC, treatment will be on hold pending resolution of infection. Will need reassessment after resolution of infection to evaluate performance status and decide on systemic treatment options     #cardiac ? mass/thrombus/vegetation also external iliac vein thrombosis   -Lovenox held at [place] and s/p 9/7- IVC filter  - pt is on heparin gtt since since midnight 9/16 in setting of planned CT removal can resume once 6 hour post removal CT CXR is normal     #anemia   thrombocytosis,  AOCD, ferritin 512, iron 11, TIBC-- 150, Iron -- 7 %, transferrin -- 122, ([place])  - Haptoglobin - 308, LDL-- 240, Urica acid- 3.4   - H/H stable - 9.5/30.6 >8.4/28>9.8/32 plts- 261>224 >234   - s/p iron supplements will hold in setting of constipation     # L external iliac vein DVT  - on heparin gtt, resuming 9/16/22 at 8:30 pm  - Duplex b/l LEs 9/10 neg for DVT    Endo:  - not diabetic, HgbA1C 5.1 , FS-- 99>89 continue monitoring     Code: Full code  Palliative consult placed 9/10  - Full code      FOR FOLLOW UP:  - Check QTCs weekly on sunday while on methadone   - cardiac MRI ordered  called MRI (9/15) and was advised by MRI dptm to call Monday morning 9/19 to schedule the study   - consider EP eval if SVT persists  - CT removed 9/16 CXR in AM for monitoring   - ensure dressing has occlusive Vaseline to prevent entrapment of air   - Hemonc recs regarding potential treatment options   - 6 week course of cefazolin as per ID recs (starting 9/9- )  - pedraza placed by urology due to severe scrotal swelling- Can f/u outpatient w/ Dr Prabhu Luc[facility], [facility] at [place] MICU Transfer Note    Transfer from: MICU    Transfer to: (  ) Medicine    ( x ) Telemetry     (   ) RCU        (    ) Palliative         (   ) Stroke Unit          (   ) __________________    Accepting Physician: Ivan Saenz MD  Signout given to: Ivan Saenz MD    MICU COURSE:  31 M with PMH/ HIV, rectal cancer not on tx (followed up by Dr Frazier ([facility] oncology)), mets to liver and possibly bone,  R eye cataract, who presented to the [facility] on 8/31/22 after syncopal episode at home. Pt with cough x 2 mos, with yellow sputum and shortness of breath since 5 days PTA.   At [facility]: pt was found to be hypoxemic. 8/31/22 - L chest wall Pigtail placement. Pigtail was placed for suspected PMX with improvement in shortness of breath. CT chest was performed, which showed that pigtail is within the mass w/ DDx of TB, fungal infection, cavitary lesion, or squamous cell carcinoma. Pt was found to be anemic due to rectal bleeding with Hgb 5.9 s/p 2 U PRBC on 9/1. Blood cxs with MSSA bacteremia, and has been treated with Cefazolin and Zosyn.  Since 9/2 pt with worsening diffuse SC emphysema extending to the neck/face/all 4 extremities/scrotum/back , which has been getting progressively worse over past 2 days as per pt's statement. 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 TTE with two large RV masses and two additional small masses. 9/7- s/p L lateral chest tube to 14 Fr. 9/7 - s/p IVC Filter placement (as per [facility] statement, was placed prophylactically due to undiagnosed vegetations on the TTE. 12 cm multiloculated thick walled cavitary mass in the CHRISTINE and lingula.+ liver lesion on the CT a/p. + external iliac vein thrombosis, PE study was indeterminate     Pt was transferred to [facility]  for pt to be evaluated by his outpt oncology team (Dr Frazier) also for possible intervention by Interventional Pulmonology. Patient continued treatment for MSSA bacteremia and likely endocarditis via cefepime.  Patients course was complicated by occasional episodes of SVT which all resolved without intervention.  Decision was made to plan on removing CT which was clamped 9/15 without resulting Ptx.  CT was successfully removed 9/16 with post removal xray without PTX.  Patient is stable for transfer to telemetry floor.        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      Neuro  -A & O x 3 , no active issues  - not on sedation  - C/o of severe scrotal/rectal pain/pelvic pain, Morphine ER 30 mg BID Dc'd 9/12 in setting of TELMA, increased Dilaudid 1.5 mg q3hrs for severe pain. and 0.5 mg q3hrs for breakthrough, he has frequently required extra pushes of dilaudid and IV tylenol the past.  -as per discussion with palliative, added Gabapentin 300 mg daily and Dilaudid 4 mg PO Q 4 hrs PRN for moderate pain with 1.5 PRN for severe pain   - pt was on methadone 2.5mg qd and Oxycontin oupt- methadone restarted 9/12. The dose can be increased next week if indicated  - Check QTCs weekly on sunday   - pt is appropriately overwhelmed by his complex medical condition, holistic RN and Behavioral health consulted.  recommended Marinol - in place     CV:   - hemodynamically stable, remains off vasopressors   -TTE with two large RV masses and two additional small masses.   - 9/7-s/p L lateral chest tube to 14 Fr. 9/7 -s/p IVC Filter placement (as per [facility] statement, was placed prophylactically due to undiagnosed vegetations on the TTE.  - pt is hemodynamically stable no pressors   - repeat TTE 9/9 - Limited and technically difficult study with views limited to the subcostal window.  Very limited views demonstrate what appears to be a mobile mass, possibly in association with the tricuspid valve. This may represent tumour, thrombus, or vegetation. Consider JENNIFER for further evaluation, if clinically indicated.  - per cardiology risk of JENNIFER outweighs benefit,  will obtain cardiac MRI instead to evaluate RV mass /RV thrombosis  - cardiac MRI ordered  called MRI (9/15) and was advised by MRI dptm to call Monday morning 9/19 to schedule the study     #SVT- possibly in setting of endocarditis   - s/p SVT events x 2 9/15 and again today 9/16 resolved with vagal maneuvers   - started metoprol 12.5 BID with hold parameters  - keep mag > 2 and phos > 3   - consider EP eval if persists    Pulm:  #CHRISTINE cavitary lesion - Differential includes cavitary PNA (sputum and blood cultures positive for MSSA) vs malignancy given history of metastatic rectal CA vs atypical infection. Of note PET/CT in our system from 8/2022 with only few subcm nodules in L and GGO in R lung, making malignancy as etiology of cavitary lesion less likely.   - L pigtail catheter was placed at [facility] due to concern for L PTX 8/31. However, patient was found to have large CHRISTINE cavitary lesion instead, into which the pigtail had been placed.   -On 9/7 patient had L pigtail replaced (currently 14F) as well as IVC filter placement. ([facility])   - Sputum cultures 9/10- Klebsiella-cont cefazolin   -  fluid Cx (from cavitory lesion) Staph aureus  - aspergillus-neg, histo neg, blasto neg, coccidio neg   - serum fungitel and crypto Ag  - neg   - s/p Zosyn , now on Cefazolin x 6 weeks per ID recs   - CTA 9/9- Findings most in keeping with cavitary pneumonia involving lingula and left lower lobe complicated by bronchopleural fistula as described with tract around the small caliber pleural pigtail catheter, extensive subcutaneous emphysema and pneumomediastinum. Nonopacification of lingular and left lower lobe pulmonary arteries as described likely secondary to cavitary pneumonia rather than thromboembolic pulmonary embolism  - AFB x 3 are negative at [facility]   - CT Sx recommended MIST protocol, held pending repeat CT /chest to evaluate if pig tail is still in place as there was no drainage from the cavitary lesion/ space, repeat CT chest 9/11 showed pigtail still in cavitary lesion cavity.   - 9/13- flushed chest tube, noted w/ with thick secretions and small airleak at times. Will continue w/ chest tube for now as there is continued purulent drainage and air. When output improves will consider clamping trials.   - CT removed 9/16 CXR post removal without PTX.  Will check additional CXR around 7pm tonight and again in the am to ensure without PTX  - ensure dressing has occlusive Vaseline to prevent entrapment of air     # bronchopleural fistula.  - L CT in place, keep to water seal, repeat CT completed, results as above  - CT surgery consulted- Recommend MIST protocol x1 dose through current pigtail catheter due to concern for clogged pigtail ,also rec to consult IR for image guided pigtail catheter (pneumonostomy) placement into the pneumatocele, as well as blow hole placement to evacuate subQ emphysema  holding off for now. plan to clamp pigtail tomorrow morning and assess if it can be pulled. if needs another chest tube surgical would be ideal, discussed w/ CT surgery PA.   - repeat CT /chest 9/11- Persistent left upper lobe cavitary lesion, with pigtail catheter in place, likely communicating with the lingular bronchus. 2. Right middle lobe pneumonia and scattered airspace opacities throughout the remaining lungs, as described. 3. Persistent pneumomediastinum and extensive subcutaneous emphysema of the neck, chest, abdomen, pelvis, and extremities, including the scrotum. 4. Large anorectal mass. 5. Liver metastases.  - No operative thoracic surgical interventions planned at this time  - CT removed 9/16, post CXR x 2 stable.     GI:  # regular diet  - Pt with poor PO intake   - 9/14-started on Marinol- increased dose to 5mg TID 9/16     #constipation   - patient still without BMs  - 9/14 and 9/15- s/p Relistor   - continue miralax, dulcolax, senna, naloxogel, lactulose   -9/15- small amt of dark blood from rectum, x 1, stopped -cont monitoring for blood BM     Renal:   TELMA, likely prerenal- resolved   - Cr stable   - On LR @75/hr   - continue trending renal function   - Strict Is and Os    #Hypercalcemia - ?related to malignancy  - Check PTH - 3>5  PTH related peptide in lab   - Vit D levels low, per heme/onc hold off supplementation until Ca normalized as can worsen hyperCa in setting of hypercalcemia of malignancy  - Ca 16.4 - 9/10, 17 corrected for low serum albumin. per Heme/onc recs, s/p (9/10) calcitonin 4u/kg x 2 doses and pamidronate 90mcg IVPB x1.  - calcium level stable at 11.8   - cont IVFs w/ LR @ 75 cc/hr and continue monitoring   - continue to trend levels q 24 hrs   - can repeat pamidronate after 1 week if needed as per hemonc     #scrotal swelling likely due SC emphysema, with severe pain  - indwelling pedraza was placed at [facility] (Coude placed for urinary strain)   - UA positive with moderate bacteria 9/9- UCx 9/9 neg   -  is following recommend some type of scrotal wrap to help with scrotal swelling, use Kerlex wrap tightly around scrotum. Can be done by floor staff as demonstrated. - Scrotal elevation unlikely to help improve swelling, Keep pedraza  - No further urologic intervention needed at this time.   -Can f/u outpatient w/ Dr Prabhu Lucio, [facility] for [facility]rology at [place]  - pain control with dilaudid and methadone, ct a/p 9/11 did not show any inflammation/hydrocele/infection .      ID:  # MSSA bacteremia at [facility]  - Sputum cultures from 9/1 grew MSSA and Blood cultures from 8/31 grew MSSA with blood cultures from 9/2 NGTD. Legionella negative. Sputum AFB negative x3. Fungitell and galactomannan negative. Patient had been empirically on Zosyn, Cefazolin and Caspofungin (which was dc'ed).  - TTE 9/9 with possible vegetation on tricuspid valve   - s/p Zosyn (9/8 - 9/9), transitioned to Cefazolin per ID recs on 9/9, plan for 6 week course  -  fluid(cavitary lesion) Cx 9/9 - grew few staph aureus+  - Blood Cx neg 9/8  - UA 9/8 w/  some bacteria, UCx 9/9 neg   - CXR with L cavitary lesion, seen again on CT chest 9/9 and 9/11  - fungitell and crypto neg 9/10  - Sputum Cx  9/10 + mod klebsiella pneumoniae-- pansensitive- continue Cefazolin x 6 weeks as per ID recs   - ID consulted, recs appreciated  - afebrile overnight, WBC stable     # HIV  - Follows with Dr. Marcial in clinic  - CD4 392 and viral load < 30 on 9/2/22 as per ID note   - Viral load undetectable 9/8  - c/w home Biktarvy , monitor Cr if worsening will d/c    Hematology:   # rectal CA, possible RV mass, mets to liver and possibly bone,  # metastatic HPV related anal SCC,   - Hem/Onc--With regards to treatment of metastatic HPV related anal SCC, treatment will be on hold pending resolution of infection. Will need reassessment after resolution of infection to evaluate performance status and decide on systemic treatment options     #cardiac ? mass/thrombus/vegetation also external iliac vein thrombosis   -Lovenox held at [place] and s/p 9/7- IVC filter  - pt is on heparin gtt since since midnight 9/16 in setting of planned CT removal can resume once 6 hour post removal CT CXR is normal     #anemia   thrombocytosis,  AOCD, ferritin 512, iron 11, TIBC-- 150, Iron -- 7 %, transferrin -- 122, ([place])  - Haptoglobin - 308, LDL-- 240, Urica acid- 3.4   - H/H stable - 9.5/30.6 >8.4/28>9.8/32 plts- 261>224 >234   - s/p iron supplements will hold in setting of constipation     # L external iliac vein DVT  - on heparin gtt, resuming 9/16/22 at 8:30 pm  - Duplex b/l LEs 9/10 neg for DVT    Endo:  - not diabetic, HgbA1C 5.1 , FS-- 99>89 continue monitoring     Code: Full code  Palliative consult placed 9/10  - Full code      FOR FOLLOW UP:  - Check QTCs weekly on sunday while on methadone   - cardiac MRI ordered  called MRI (9/15) and was advised by MRI dptm to call Monday morning 9/19 to schedule the study   - consider EP eval if SVT persists  - Hemonc recs regarding potential treatment options   - 6 week course of cefazolin as per ID recs (starting 9/9- )  - pedraza placed by urology due to severe scrotal swelling- Can f/u outpatient w/ Dr Prabhu Lucio, [facility] at [place] MICU Transfer Note    Transfer from: MICU    Transfer to: (  ) Medicine    ( x ) Telemetry     (   ) RCU        (    ) Palliative         (   ) Stroke Unit          (   ) __________________    Accepting Physician: Ivan Saenz MD  Signout given to: Ivan Saenz MD    MICU COURSE:  31 M with PMH/ HIV, rectal cancer not on tx (followed up by Dr Frazier ([facility] oncology)), mets to liver and possibly bone,  R eye cataract, who presented to the [facility] on 8/31/22 after syncopal episode at home. Pt with cough x 2 mos, with yellow sputum and shortness of breath since 5 days PTA.   At [facility]: pt was found to be hypoxemic. 8/31/22 - L chest wall Pigtail placement. Pigtail was placed for suspected PMX with improvement in shortness of breath. CT chest was performed, which showed that pigtail is within the mass w/ DDx of TB, fungal infection, cavitary lesion, or squamous cell carcinoma. Pt was found to be anemic due to rectal bleeding with Hgb 5.9 s/p 2 U PRBC on 9/1. Blood cxs with MSSA bacteremia, and has been treated with Cefazolin and Zosyn.  Since 9/2 pt with worsening diffuse SC emphysema extending to the neck/face/all 4 extremities/scrotum/back , which has been getting progressively worse over past 2 days as per pt's statement. 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 TTE with two large RV masses and two additional small masses. 9/7- s/p L lateral chest tube to 14 Fr. 9/7 - s/p IVC Filter placement (as per [facility] statement, was placed prophylactically due to undiagnosed vegetations on the TTE. 12 cm multiloculated thick walled cavitary mass in the CHRISTINE and lingula.+ liver lesion on the CT a/p. + external iliac vein thrombosis, PE study was indeterminate     Pt was transferred to [facility]  for pt to be evaluated by his outpt oncology team (Dr Frazier) also for possible intervention by Interventional Pulmonology. Patient continued treatment for MSSA bacteremia and likely endocarditis via cefepime.  Patients course was complicated by occasional episodes of SVT which all resolved without intervention.  Decision was made to plan on removing CT which was clamped 9/15 without resulting Ptx.  CT was successfully removed 9/16 with post removal xray without PTX.  Patient is stable for transfer to telemetry floor.        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      Neuro  -A & O x 3 , no active issues  - not on sedation  - C/o of severe scrotal/rectal pain/pelvic pain, Morphine ER 30 mg BID Dc'd 9/12 in setting of TELMA, increased Dilaudid 1.5 mg q3hrs for severe pain. and 0.5 mg q3hrs for breakthrough, he has frequently required extra pushes of dilaudid and IV tylenol the past.  -as per discussion with palliative, added Gabapentin 300 mg daily and Dilaudid 4 mg PO Q 4 hrs PRN for moderate pain with 1.5 PRN for severe pain   - pt was on methadone 2.5mg qd and Oxycontin oupt- methadone restarted 9/12. The dose can be increased next week if indicated  - Check QTCs weekly on sunday   - pt is appropriately overwhelmed by his complex medical condition, holistic RN and Behavioral health consulted.  recommended Marinol - in place     CV:   - hemodynamically stable, remains off vasopressors   -TTE with two large RV masses and two additional small masses.   - 9/7-s/p L lateral chest tube to 14 Fr. 9/7 -s/p IVC Filter placement (as per [facility] statement, was placed prophylactically due to undiagnosed vegetations on the TTE.  - pt is hemodynamically stable no pressors   - repeat TTE 9/9 - Limited and technically difficult study with views limited to the subcostal window.  Very limited views demonstrate what appears to be a mobile mass, possibly in association with the tricuspid valve. This may represent tumour, thrombus, or vegetation. Consider JENNIFER for further evaluation, if clinically indicated.  - per cardiology risk of JENNIFER outweighs benefit,  will obtain cardiac MRI instead to evaluate RV mass /RV thrombosis  - cardiac MRI ordered  called MRI (9/15) and was advised by MRI dptm to call Monday morning 9/19 to schedule the study     #SVT- possibly in setting of endocarditis   - s/p SVT events x 2 9/15 and again today 9/16 resolved with vagal maneuvers   - started metoprol 12.5 BID with hold parameters  - keep mag > 2 and phos > 3   - consider EP eval if persists    Pulm:  #CHRISTINE cavitary lesion - Differential includes cavitary PNA (sputum and blood cultures positive for MSSA) vs malignancy given history of metastatic rectal CA vs atypical infection. Of note PET/CT in our system from 8/2022 with only few subcm nodules in L and GGO in R lung, making malignancy as etiology of cavitary lesion less likely.   - L pigtail catheter was placed at [facility] due to concern for L PTX 8/31. However, patient was found to have large CHRISTINE cavitary lesion instead, into which the pigtail had been placed.   -On 9/7 patient had L pigtail replaced (currently 14F) as well as IVC filter placement. ([facility])   - Sputum cultures 9/10- Klebsiella-cont cefazolin   -  fluid Cx (from cavitory lesion) Staph aureus  - aspergillus-neg, histo neg, blasto neg, coccidio neg   - serum fungitel and crypto Ag  - neg   - s/p Zosyn , now on Cefazolin x 6 weeks per ID recs   - CTA 9/9- Findings most in keeping with cavitary pneumonia involving lingula and left lower lobe complicated by bronchopleural fistula as described with tract around the small caliber pleural pigtail catheter, extensive subcutaneous emphysema and pneumomediastinum. Nonopacification of lingular and left lower lobe pulmonary arteries as described likely secondary to cavitary pneumonia rather than thromboembolic pulmonary embolism  - AFB x 3 are negative at [facility]   - CT Sx recommended MIST protocol, held pending repeat CT /chest to evaluate if pig tail is still in place as there was no drainage from the cavitary lesion/ space, repeat CT chest 9/11 showed pigtail still in cavitary lesion cavity.   - 9/13- flushed chest tube, noted w/ with thick secretions and small airleak at times. Will continue w/ chest tube for now as there is continued purulent drainage and air. When output improves will consider clamping trials.   - CT removed 9/16 CXR post removal without PTX.  Will check additional CXR around 7pm tonight and again in the am to ensure without PTX  - ensure dressing has occlusive Vaseline to prevent entrapment of air     # bronchopleural fistula.  - L CT in place, keep to water seal, repeat CT completed, results as above  - CT surgery consulted- Recommend MIST protocol x1 dose through current pigtail catheter due to concern for clogged pigtail ,also rec to consult IR for image guided pigtail catheter (pneumonostomy) placement into the pneumatocele, as well as blow hole placement to evacuate subQ emphysema  holding off for now. plan to clamp pigtail tomorrow morning and assess if it can be pulled. if needs another chest tube surgical would be ideal, discussed w/ CT surgery PA.   - repeat CT /chest 9/11- Persistent left upper lobe cavitary lesion, with pigtail catheter in place, likely communicating with the lingular bronchus. 2. Right middle lobe pneumonia and scattered airspace opacities throughout the remaining lungs, as described. 3. Persistent pneumomediastinum and extensive subcutaneous emphysema of the neck, chest, abdomen, pelvis, and extremities, including the scrotum. 4. Large anorectal mass. 5. Liver metastases.  - No operative thoracic surgical interventions planned at this time  - CT removed 9/16, post CXR x 2 stable.     GI:  # regular diet  - Pt with poor PO intake   - 9/14-started on Marinol- increased dose to 5mg TID 9/16     #constipation   - patient still without BMs  - 9/14 and 9/15- s/p Relistor   - continue miralax, dulcolax, senna, naloxogel, lactulose   -9/15- small amt of dark blood from rectum, x 1, stopped -cont monitoring for blood BM     Renal:   TELMA, likely prerenal- resolved   - Cr stable   - On LR @75/hr   - continue trending renal function   - Strict Is and Os    #Hypercalcemia - ?related to malignancy  - Check PTH - 3>5  PTH related peptide in lab   - Vit D levels low, per heme/onc hold off supplementation until Ca normalized as can worsen hyperCa in setting of hypercalcemia of malignancy  - Ca 16.4 - 9/10, 17 corrected for low serum albumin. per Heme/onc recs, s/p (9/10) calcitonin 4u/kg x 2 doses and pamidronate 90mcg IVPB x1.  - calcium level stable at 11.8   - cont IVFs w/ LR @ 75 cc/hr and continue monitoring   - continue to trend levels q 24 hrs   - can repeat pamidronate after 1 week if needed as per hemonc     #scrotal swelling likely due SC emphysema, with severe pain  - indwelling pedraza was placed at [facility] (Coude placed for urinary strain)   - UA positive with moderate bacteria 9/9- UCx 9/9 neg   -  is following recommend some type of scrotal wrap to help with scrotal swelling, use Kerlex wrap tightly around scrotum. Can be done by floor staff as demonstrated. - Scrotal elevation unlikely to help improve swelling, Keep pedraza  - No further urologic intervention needed at this time.   -Can f/u outpatient w/ Dr Prabhu Lucio, [facility] [place]  - pain control with dilaudid and methadone, ct a/p 9/11 did not show any inflammation/hydrocele/infection .      ID:  # MSSA bacteremia at [facility]  - Sputum cultures from 9/1 grew MSSA and Blood cultures from 8/31 grew MSSA with blood cultures from 9/2 NGTD. Legionella negative. Sputum AFB negative x3. Fungitell and galactomannan negative. Patient had been empirically on Zosyn, Cefazolin and Caspofungin (which was dc'ed).  - TTE 9/9 with possible vegetation on tricuspid valve   - s/p Zosyn (9/8 - 9/9), transitioned to Cefazolin per ID recs on 9/9, plan for 6 week course  -  fluid(cavitary lesion) Cx 9/9 - grew few staph aureus+  - Blood Cx neg 9/8  - UA 9/8 w/  some bacteria, UCx 9/9 neg   - CXR with L cavitary lesion, seen again on CT chest 9/9 and 9/11  - fungitell and crypto neg 9/10  - Sputum Cx  9/10 + mod klebsiella pneumoniae-- pansensitive- continue Cefazolin x 6 weeks as per ID recs   - ID consulted, recs appreciated  - afebrile overnight, WBC stable     # HIV  - Follows with Dr. Marcial in clinic  - CD4 392 and viral load < 30 on 9/2/22 as per ID note   - Viral load undetectable 9/8  - c/w home Biktarvy , monitor Cr if worsening will d/c    Hematology:   # rectal CA, possible RV mass, mets to liver and possibly bone,  # metastatic HPV related anal SCC,   - Hem/Onc--With regards to treatment of metastatic HPV related anal SCC, treatment will be on hold pending resolution of infection. Will need reassessment after resolution of infection to evaluate performance status and decide on systemic treatment options     #cardiac ? mass/thrombus/vegetation also external iliac vein thrombosis   -Lovenox held at [place] and s/p 9/7- IVC filter  - pt is on heparin gtt since since midnight 9/16 in setting of planned CT removal can resume once 6 hour post removal CT CXR is normal     #anemia   thrombocytosis,  AOCD, ferritin 512, iron 11, TIBC-- 150, Iron -- 7 %, transferrin -- 122, ([place])  - Haptoglobin - 308, LDL-- 240, Urica acid- 3.4   - H/H stable - 9.5/30.6 >8.4/28>9.8/32 plts- 261>224 >234   - s/p iron supplements will hold in setting of constipation     # L external iliac vein DVT  - on heparin gtt, resuming 9/16/22 at 8:30 pm  - Duplex b/l LEs 9/10 neg for DVT    Endo:  - not diabetic, HgbA1C 5.1 , FS-- 99>89 continue monitoring     Code: Full code  Palliative consult placed 9/10  - Full code      FOR FOLLOW UP:  -CXR in am   - Check QTCs weekly on sunday while on methadone   - cardiac MRI ordered  called MRI (9/15) and was advised by MRI dptm to call Monday morning 9/19 to schedule the study   - consider EP eval if SVT persists  - Hemonc recs regarding potential treatment options   - 6 week course of cefazolin as per ID recs (starting 9/9- )  - pedraza placed by urology due to severe scrotal swelling- Can f/u outpatient w/ Dr Prabhu Lucio, [facility] [place]

## 2022-09-16 NOTE — PROGRESS NOTE ADULT - ASSESSMENT
31 M with PMH/ HIV, rectal cancer not on tx (followed up by Dr Frazier ([facility] oncology)), mets to liver and possibly bone,  R eye cataract, who presented to the [facility] on 8/31/22 after syncopal episode at home. Pt with cough x 2 mos, with yellow sputum and shortness of breath since 5 days PTA.   At [facility]: pt was found to be hypoxemic. 8/31/22 - L chest wall Pigtail placement. Pigtail was placed for suspected PMX with improvement in shortness of breath. CT chest was performed, which showed that pigtail is within the mass w/ DDx of TB, fungal infection, cavitary lesion, or squamous cell carcinoma. Pt was found to be anemic due to rectal bleeding with Hgb 5.9 s/p 2 U PRBC on 9/1. Blood cxs with MSSA bacteremia, and has been treated with Cefazolin and Zosyn.  Since 9/2 pt with worsening diffuse SC emphysema extending to the neck/face/all 4 extremities/scrotum/back , which has been getting progressively worse over past 2 days as per pt's statement. 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 TTE with two large RV masses and two additional small masses. 9/7- s/p L lateral chest tube to 14 Fr. 9/7 - s/p IVC Filter placement (as per [facility] statement, was placed prophylactically due to undiagnosed vegetations on the TTE. 12 cm multiloculated thick walled cavitary mass in the CHRISTINE and lingula.+ liver lesion on the CT a/p. + external iliac vein thrombosis, PE study was indeterminate     Pt was transferred to [facility] as per family request (mother) for pt to be evaluated by his outpt oncology team (Dr Frazier). As per oncology team the plan was to start Carbotaxol q 3 weeks + RT for diffuse mets as per PET scan.    Neuro  -A & O x 3 , no active issues  - not on sedation  - C/o of severe scrotal/rectal pain/pelvic pain, Morphine ER 30 mg BID Dc'd 9/12 in setting of TELMA, increased Dilaudid 1.5 mg q3hrs for severe pain. and 0.5 mg q3hrs for breakthrough, he has frequently required extra pushes of dilaudid and IV tylenol the past.  -as per discussion with palliative, added Gabapentin 300 mg daily and Dilaudid 4 mg PO Q 4 hrs PRN for moderate pain with 1.5 PRN for severe pain   - pt was on methadone 2.5mg qd and Oxycontin oupt- methadone restarted 9/12. The dose can be increased next week if indicated  - Check QTCs weekly on sunday   - pt is appropriately overwhelmed by his complex medical condition, holistic RN and Behavioral health consulted.  recommended Marinol - in place     CV:   - hemodynamically stable, remains off vasopressors   -TTE with two large RV masses and two additional small masses.   - 9/7-s/p L lateral chest tube to 14 Fr. 9/7 -s/p IVC Filter placement (as per [facility] statement, was placed prophylactically due to undiagnosed vegetations on the TTE.  - pt is hemodynamically stable no pressors   - repeat TTE 9/9 - Limited and technically difficult study with views limited to the subcostal window.  Very limited views demonstrate what appears to be a mobile mass, possibly in association with the tricuspid valve. This may represent tumour, thrombus, or vegetation. Consider JENNIFER for further evaluation, if clinically indicated.  - per cardiology risk of JENNIFER outweighs benefit,  will obtain cardiac MRI instead to evaluate RV mass /RV thrombosis  - cardiac MRI ordered  called MRI (9/15) and was advised by MRI dptm to call Monday morning 9/19 to schedule the study       Pulm:  #CHRISTINE cavitary lesion - Differential includes cavitary PNA (sputum and blood cultures positive for MSSA) vs malignancy given history of metastatic rectal CA vs atypical infection. Of note PET/CT in our system from 8/2022 with only few subcm nodules in L and GGO in R lung, making malignancy as etiology of cavitary lesion less likely.   - L pigtail catheter was placed at [facility] due to concern for L PTX 8/31. However, patient was found to have large CHRISTINE cavitary lesion instead, into which the pigtail had been placed.   -On 9/7 patient had L pigtail replaced (currently 14F) as well as IVC filter placement. ([facility])   9/10-- s/p MIST (10 mg of Alteplase injected into pleural space) __ cancelled   - f/u sputum cultures-- sent on 9/10-- Klebsiella __ cont cefazolin   -  fluid Cx (from cavitory lesion)_ neg 9/9  - f/u aspergillus-- neg,, histo, blasto, coccidio.-- in lab  - serum fungitell and crypto Ag  - neg   - s/p Zosyn , now on Cefazolin x 6 weeks per ID recs   - s/pCT to water seal, place to suction only for transfer if off the unit, no output from chest tube   -- continue PTC to WS,   - CTA 9/9- Findings most in keeping with cavitary pneumonia involving lingula and left lower lobe complicated by bronchopleural fistula as described with tract around the small caliber pleural pigtail catheter, extensive subcutaneous emphysema and pneumomediastinum. Nonopacification of lingular and left lower lobe pulmonary arteries as described likely secondary to cavitary pneumonia rather than thromboembolic pulmonary embolism  - AFB x 3 are negative at [facility]   - CT Sx recommended MIST protocol, held pending repeat CT /chest to evaluate if pig tail is still in place as there was no drainage from the cavitary lesion/ space, repeat CT chest 9/11 showed pigtail still in cavitary lesion cavity.   - 9/13- flushed chest tube, noted w/ with thick secretions and small airleak at times. Will continue w/ chest tube for now as there is continued purulent drainage and air. When output improves will consider clamping trials.   -- -- pigtail catheter needs to be flushed q shift _- 9/14__ was flushed with NS and 15 cc of purulent material was drained, __ the plan for CT to be removed once output from the PTC decreased       # bronchopleural fistula.  - L CT in place, keep to water seal, repeat CT completed, results as above  - CT surgery consulted- Recommend MIST protocol x1 dose through current pigtail catheter due to concern for clogged pigtail ,also rec to consult IR for image guided pigtail catheter (pneumonostomy) placement into the pneumatocele, as well as blow hole placement to evacuate subQ emphysema  holding off for now. plan to clamp pigtail tomorrow morning and assess if it can be pulled. if needs another chest tube surgical would be ideal, discussed w/ CT surgery PA.   - repeat CT /chest 9/11- Persistent left upper lobe cavitary lesion, with pigtail catheter in place, likely communicating with the lingular bronchus. 2. Right middle lobe pneumonia and scattered airspace opacities throughout the remaining lungs, as described. 3. Persistent pneumomediastinum and extensive subcutaneous emphysema of the neck, chest, abdomen, pelvis, and extremities, including the scrotum. 4. Large anorectal mass. 5. Liver metastases.  - No operative thoracic surgical interventions planned at this time  -- PTC possibly to be removed tomorrow,     GI:   regular diet,   - cont bowel regimen w/ lactulose, senna, miralax BID,   -- Pt with poor PO intake _ -9/14-- started on Marinol. 9/14 and 9/15-- s/p Relistor   -- monitor for BM's   -- 9/15-- small amt of dark blood from rectum, x 1, stopped, __cont monitoring       #constipation, in the setting of opiates and hypercalcemia   - no BM reported   - cont Senna, Miralax, lactulose daily, 9/12-- started on naloxegol, monitor for BM , s/p 8 mg of Relistor (9/14 and 9/15)    Renal:   TELMA, likely prerenal,   - Cr-- 1.27>1.38-> 1.61>1.5>1.01>1.17 Cr rising 9/12, and now improving, s/p 1L NS and standing IVFs. s/p LR @ 100cc/hr_ -decreased to 75 cc/hr.   - UOP: LOS net is pos 66 cc/ 24 hrs net is +1.2 L, voids about /hr, voided 1.85 in 24 hrs   - continue trending renal function   - Strict Is and Os    #Hypercalcemia - ?related to malignancy  - Check PTH- sent on 9/14,, PTHrp - 3>5   - Vit D levels low, per heme/onc hold off supplementation until Ca normalized as can worsen hyperCa in setting of hypercalcemia of malignancy  - Ca 16.4 - 9/10, 17 corrected for low serum albumin. per Heme/onc recs, s/p (9/10) calcitonin 4u/kg x 2 doses and pamidronate 90mcg IVPB x1. Calcium level today is 11.8   - cont IVFs w/ LR @ 75 cc/hr and continue monitoring   - continue to trend levels q 24 hrs     #electrolytes derangement  -- K-- 4.5, Mg -- 1.8, phos-- 2.5-- supplemented__ repeat labs q 24 hrs    #scrotal swelling likely due SC emphysema, with severe pain  - indwelling pedraza was placed at [facility] (Coude placed for urinary strain)   - UA positive with moderate bacteria 9/9- UCx 9/9 neg   --  is following, __ - Recommend some type of scrotal wrap to help with scrotal swelling, use Kerlex wrap tightly around scrotum. Can be done by floor staff as demonstrated. - Scrotal elevation unlikely to help improve swelling, Keep pedraza  - No further urologic intervention needed at this time. __ Can f/u outpatient w/ Dr Prabhu Lucio, [facility] [place]  - pain control with dilaudid and methadone, ct a/p 9/11 did not show any inflammation/hydrocele/infection .  - Urology consulted, recommended to wrap scrotum, offers no other intervention at this time.-- follow up recs     ID:  # MSSA bacteremia at [facility]  - Sputum cultures from 9/1 grew MSSA and Blood cultures from 8/31 grew MSSA with blood cultures from 9/2 NGTD. Legionella negative. Sputum AFB negative x3. Fungitell and galactomannan negative. Patient had been empirically on Zosyn, Cefazolin and Caspofungin (which was dc'ed).  - TTE 9/9 with possible vegetation on tricuspid valve   - s/p Zosyn (9/8 - 9/9), transitioned to Cefazolin per ID recs on 9/9, plan for 6 week course  -  fluid(cavitary lesion) Cx 9/9 - grew few staph aureus+  - Blood Cx neg 9/8  - UA 9/8 w/  some bacteria, UCx 9/9 neg   - CXR with L cavitary lesion, seen again on CT chest 9/9 and 9/11  - fungitell and crypto neg 9/10  - Sputum Cx  9/10 + mod klebsiella pneumoniae-- pansensitive__ continue Cefazolin x 6 weeks as per ID recs   - ID consulted, recs appreciated  - afebrile overnight, WBC stable 14> 10>13>12, LA-- 1.4    # HIV  - Follows with Dr. Marcial in clinic  - CD4 392 and viral load < 30 on 9/2/22 as per ID note   - Viral load undetectable 9/8  - c/w home Biktarvy , monitor Cr if worsening will d/c    Hematology:   # rectal CA, possible RV mass, mets to liver and possibly bone,  # metastatic HPV related anal SCC,   -- Hem/Onc--With regards to treatment of metastatic HPV related anal SCC, treatment will be on hold pending resolution of infection. Will need reassessment after resolution of infection to evaluate performance status and decide on systemic treatment options  -Pal care consult for pain management placed on 9/10__ recommendations appreciated__ Dilaudid dose and frequency changed, started on Methadone   -When stable and after the above, recommend radiation oncology consult for consideration of RT to anal mass for palliation.  -- -- 9/15-- small amt of dark blood clot from rectum, x 1, stopped, __cont monitoring _ -cont heparin gtt for now and consider holding if another episode of bleeding, __ hold Heparin gtt at 2 am       Hematology   #cardiac ? mass/thrombus/vegetation   -Lovenox held at [place] and s/p 9/7-- IVC filter  -- pt is on heparin gtt-- aPTT 60> 60, repeat level in am 45, the dose of gtt was increased and repeat aPTT is 60  _  cont q 6 hrs aPTT until therapeutic     #anemia   thrombocytosis,  AOCD, ferritin 512, iron 11, TIBC-- 150, Iron -- 7 %, transferrin -- 122, ([place])  -- Haptoglobin -- 308, LDL-- 240, Urica acid-- 3.4   - H/H stable - 9.5/30.6 >8.4/28>9.8/32 plts- 261>224 >234     # L external iliac vein DVT  - on heparin gtt  - ptt q 6 hrs, aPTT - 60 >45, follow hep protocol   - Duplex b/l LEs 9/10 neg for DVT    Endo:  - not diabetic, HgbA1C 5.1 , FS-- 99>89 __ continue monitoring     Code: Full code  Palliative consult placed 9/10  -- 9/15-- had meeting at 12 pm with pt, pt's mother, pt's sister via facetime, palliative team, oncology team, and ICU team__ as per discussion, pt is very poor candidate for chemotx and or radiation tx at this time in the setting of acute infection/acute clots. Possibility pt will be treated outpt with chemotx or radiation tx after infection resolves and pt gets better. As per discussion, pt's pain will be treated with oral and IV medications, and meds were adjusted.   At the meeting, we discussed re: poor prognosis: rectal CA with mets to liver, bone and possibly lung. Pt is also bacteremic MSSA bacteremia with possible endocarditis and cavitary lesion of the lung. Pt is also with clot/vegetation in the heart and acute DVT-- L external iliac. pt with PMH/ HIV and is immunocompromised   -- discussed with pt DNR/DNI__ pt expressed understanding, pt remains Full Code,   -- see palliative and oncology note  -- pt;s mother is very emotionally distraught, emotional support provided   Lines:   PIV, indwelling pedraza, L PTC, R brachial arrow      31 M with PMH/ HIV, rectal cancer not on tx (followed up by Dr Frazier ([facility] oncology)), mets to liver and possibly bone,  R eye cataract, who presented to the [facility] on 8/31/22 after syncopal episode at home. Pt with cough x 2 mos, with yellow sputum and shortness of breath since 5 days PTA.   At [facility]: pt was found to be hypoxemic. 8/31/22 - L chest wall Pigtail placement. Pigtail was placed for suspected PMX with improvement in shortness of breath. CT chest was performed, which showed that pigtail is within the mass w/ DDx of TB, fungal infection, cavitary lesion, or squamous cell carcinoma. Pt was found to be anemic due to rectal bleeding with Hgb 5.9 s/p 2 U PRBC on 9/1. Blood cxs with MSSA bacteremia, and has been treated with Cefazolin and Zosyn.  Since 9/2 pt with worsening diffuse SC emphysema extending to the neck/face/all 4 extremities/scrotum/back , which has been getting progressively worse over past 2 days as per pt's statement. 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 TTE with two large RV masses and two additional small masses. 9/7- s/p L lateral chest tube to 14 Fr. 9/7 - s/p IVC Filter placement (as per [facility] statement, was placed prophylactically due to undiagnosed vegetations on the TTE. 12 cm multiloculated thick walled cavitary mass in the CHRISTINE and lingula.+ liver lesion on the CT a/p. + external iliac vein thrombosis, PE study was indeterminate     Pt was transferred to [facility] as per family request (mother) for pt to be evaluated by his outpt oncology team (Dr Frazier). As per oncology team the plan was to start Carbotaxol q 3 weeks + RT for diffuse mets as per PET scan.    Neuro  -A & O x 3 , no active issues  - not on sedation  - C/o of severe scrotal/rectal pain/pelvic pain, Morphine ER 30 mg BID Dc'd 9/12 in setting of TELMA, increased Dilaudid 1.5 mg q3hrs for severe pain. and 0.5 mg q3hrs for breakthrough, he has frequently required extra pushes of dilaudid and IV tylenol the past.  -as per discussion with palliative, added Gabapentin 300 mg daily and Dilaudid 4 mg PO Q 4 hrs PRN for moderate pain with 1.5 PRN for severe pain   - pt was on methadone 2.5mg qd and Oxycontin oupt- methadone restarted 9/12. The dose can be increased next week if indicated  - Check QTCs weekly on sunday   - pt is appropriately overwhelmed by his complex medical condition, holistic RN and Behavioral health consulted.  recommended Marinol - in place     CV:   - hemodynamically stable, remains off vasopressors   -TTE with two large RV masses and two additional small masses.   - 9/7-s/p L lateral chest tube to 14 Fr. 9/7 -s/p IVC Filter placement (as per [facility] statement, was placed prophylactically due to undiagnosed vegetations on the TTE.  - pt is hemodynamically stable no pressors   - repeat TTE 9/9 - Limited and technically difficult study with views limited to the subcostal window.  Very limited views demonstrate what appears to be a mobile mass, possibly in association with the tricuspid valve. This may represent tumour, thrombus, or vegetation. Consider JENNIFER for further evaluation, if clinically indicated.  - per cardiology risk of JENNIFER outweighs benefit,  will obtain cardiac MRI instead to evaluate RV mass /RV thrombosis  - cardiac MRI ordered  called MRI (9/15) and was advised by MRI dptm to call Monday morning 9/19 to schedule the study     #SVT- possibly in setting of endocarditis   - s/p SVT events x 2 9/15 and again today 9/16 resolved with vagal maneuvers   - started metoprol 12.5 BID with hold parameters  - keep mag > 2 and phos > 3   - consider EP eval if persists    Pulm:  #CHRISTINE cavitary lesion - Differential includes cavitary PNA (sputum and blood cultures positive for MSSA) vs malignancy given history of metastatic rectal CA vs atypical infection. Of note PET/CT in our system from 8/2022 with only few subcm nodules in L and GGO in R lung, making malignancy as etiology of cavitary lesion less likely.   - L pigtail catheter was placed at [facility] due to concern for L PTX 8/31. However, patient was found to have large CHRISTINE cavitary lesion instead, into which the pigtail had been placed.   -On 9/7 patient had L pigtail replaced (currently 14F) as well as IVC filter placement. ([facility])   9/10-- s/p MIST (10 mg of Alteplase injected into pleural space) __ cancelled   - f/u sputum cultures-- sent on 9/10-- Klebsiella __ cont cefazolin   -  fluid Cx (from cavitory lesion)_ neg 9/9  - aspergillus-neg, histo neg, blasto neg, coccidio neg   - serum fungitel and crypto Ag  - neg   - s/p Zosyn , now on Cefazolin x 6 weeks per ID recs   - CTA 9/9- Findings most in keeping with cavitary pneumonia involving lingula and left lower lobe complicated by bronchopleural fistula as described with tract around the small caliber pleural pigtail catheter, extensive subcutaneous emphysema and pneumomediastinum. Nonopacification of lingular and left lower lobe pulmonary arteries as described likely secondary to cavitary pneumonia rather than thromboembolic pulmonary embolism  - AFB x 3 are negative at [facility]   - CT Sx recommended MIST protocol, held pending repeat CT /chest to evaluate if pig tail is still in place as there was no drainage from the cavitary lesion/ space, repeat CT chest 9/11 showed pigtail still in cavitary lesion cavity.   - 9/13- flushed chest tube, noted w/ with thick secretions and small airleak at times. Will continue w/ chest tube for now as there is continued purulent drainage and air. When output improves will consider clamping trials.   - CT removed 9/16 CXR post removal without PTX.  Will check additional CXR around 7pm tonight and again in the am to ensure without PTX  - ensure dressing has occlusive vasoline to prevent entrapment of air     # bronchopleural fistula.  - L CT in place, keep to water seal, repeat CT completed, results as above  - CT surgery consulted- Recommend MIST protocol x1 dose through current pigtail catheter due to concern for clogged pigtail ,also rec to consult IR for image guided pigtail catheter (pneumonostomy) placement into the pneumatocele, as well as blow hole placement to evacuate subQ emphysema  holding off for now. plan to clamp pigtail tomorrow morning and assess if it can be pulled. if needs another chest tube surgical would be ideal, discussed w/ CT surgery PA.   - repeat CT /chest 9/11- Persistent left upper lobe cavitary lesion, with pigtail catheter in place, likely communicating with the lingular bronchus. 2. Right middle lobe pneumonia and scattered airspace opacities throughout the remaining lungs, as described. 3. Persistent pneumomediastinum and extensive subcutaneous emphysema of the neck, chest, abdomen, pelvis, and extremities, including the scrotum. 4. Large anorectal mass. 5. Liver metastases.  - No operative thoracic surgical interventions planned at this time  - CT removed 9/16    GI:  # regular diet  - Pt with poor PO intake   - 9/14-started on Marinol- increased dose to 5mg TID 9/16     #constipation   - patient still without BMs  - 9/14 and 9/15- s/p Relistor   - continue miralax, dulcolax, senna, naloxogel, lactulose   -9/15- small amt of dark blood from rectum, x 1, stopped -cont monitoring for blood BM     Renal:   TELMA, likely prerenal- resolved   - Cr stable   - On LR @75/hr   - continue trending renal function   - Strict Is and Os    #Hypercalcemia - ?related to malignancy  - Check PTH - 3>5  PTH related peptide in lab   - Vit D levels low, per heme/onc hold off supplementation until Ca normalized as can worsen hyperCa in setting of hypercalcemia of malignancy  - Ca 16.4 - 9/10, 17 corrected for low serum albumin. per Heme/onc recs, s/p (9/10) calcitonin 4u/kg x 2 doses and pamidronate 90mcg IVPB x1.  - calcium level stable at 11.8   - cont IVFs w/ LR @ 75 cc/hr and continue monitoring   - continue to trend levels q 24 hrs   - can repeat pamidronate after 1 week if needed as per hemonc     #scrotal swelling likely due SC emphysema, with severe pain  - indwelling pedraza was placed at [facility] (Coude placed for urinary strain)   - UA positive with moderate bacteria 9/9- UCx 9/9 neg   -  is following recommend some type of scrotal wrap to help with scrotal swelling, use Kerlex wrap tightly around scrotum. Can be done by floor staff as demonstrated. - Scrotal elevation unlikely to help improve swelling, Keep pedraza  - No further urologic intervention needed at this time.   -Can f/u outpatient w/ Dr Prabhu Lucio, [facility] [place]  - pain control with dilaudid and methadone, ct a/p 9/11 did not show any inflammation/hydrocele/infection .      ID:  # MSSA bacteremia at [facility]  - Sputum cultures from 9/1 grew MSSA and Blood cultures from 8/31 grew MSSA with blood cultures from 9/2 NGTD. Legionella negative. Sputum AFB negative x3. Fungitell and galactomannan negative. Patient had been empirically on Zosyn, Cefazolin and Caspofungin (which was dc'ed).  - TTE 9/9 with possible vegetation on tricuspid valve   - s/p Zosyn (9/8 - 9/9), transitioned to Cefazolin per ID recs on 9/9, plan for 6 week course  -  fluid(cavitary lesion) Cx 9/9 - grew few staph aureus+  - Blood Cx neg 9/8  - UA 9/8 w/  some bacteria, UCx 9/9 neg   - CXR with L cavitary lesion, seen again on CT chest 9/9 and 9/11  - fungitell and crypto neg 9/10  - Sputum Cx  9/10 + mod klebsiella pneumoniae-- pansensitive- continue Cefazolin x 6 weeks as per ID recs   - ID consulted, recs appreciated  - afebrile overnight, WBC stable     # HIV  - Follows with Dr. Marcial in clinic  - CD4 392 and viral load < 30 on 9/2/22 as per ID note   - Viral load undetectable 9/8  - c/w home Biktarvy , monitor Cr if worsening will d/c    Hematology:   # rectal CA, possible RV mass, mets to liver and possibly bone,  # metastatic HPV related anal SCC,   - Hem/Onc--With regards to treatment of metastatic HPV related anal SCC, treatment will be on hold pending resolution of infection. Will need reassessment after resolution of infection to evaluate performance status and decide on systemic treatment options     #cardiac ? mass/thrombus/vegetation also external iliac vein thrombosis   -Lovenox held at [place] and s/p 9/7- IVC filter  - pt is on heparin gtt since since midnight 9/16 in setting of planned CT removal can resume once 6 hour post removal CT CXR is normal     #anemia   thrombocytosis,  AOCD, ferritin 512, iron 11, TIBC-- 150, Iron -- 7 %, transferrin -- 122, ([place])  - Haptoglobin - 308, LDL-- 240, Urica acid- 3.4   - H/H stable - 9.5/30.6 >8.4/28>9.8/32 plts- 261>224 >234   - s/p iron supplements will hold in setting of constipation     # L external iliac vein DVT  - on heparin gtt currently held in setting of CT removal can resume in CXR is stable    - Duplex b/l LEs 9/10 neg for DVT    Endo:  - not diabetic, HgbA1C 5.1 , FS-- 99>89 continue monitoring     Code: Full code  Palliative consult placed 9/10  - Full code       31 M with PMH/ HIV, rectal cancer not on tx (followed up by Dr Frazier ([facility] oncology)), mets to liver and possibly bone,  R eye cataract, who presented to the [facility] on 8/31/22 after syncopal episode at home. Pt with cough x 2 mos, with yellow sputum and shortness of breath since 5 days PTA.   At [facility]: pt was found to be hypoxemic. 8/31/22 - L chest wall Pigtail placement. Pigtail was placed for suspected PMX with improvement in shortness of breath. CT chest was performed, which showed that pigtail is within the mass w/ DDx of TB, fungal infection, cavitary lesion, or squamous cell carcinoma. Pt was found to be anemic due to rectal bleeding with Hgb 5.9 s/p 2 U PRBC on 9/1. Blood cxs with MSSA bacteremia, and has been treated with Cefazolin and Zosyn.  Since 9/2 pt with worsening diffuse SC emphysema extending to the neck/face/all 4 extremities/scrotum/back , which has been getting progressively worse over past 2 days as per pt's statement. 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 TTE with two large RV masses and two additional small masses. 9/7- s/p L lateral chest tube to 14 Fr. 9/7 - s/p IVC Filter placement (as per [facility] statement, was placed prophylactically due to undiagnosed vegetations on the TTE. 12 cm multiloculated thick walled cavitary mass in the CHRISTINE and lingula.+ liver lesion on the CT a/p. + external iliac vein thrombosis, PE study was indeterminate     Pt was transferred to [facility] as per family request (mother) for pt to be evaluated by his outpt oncology team (Dr Frazier). As per oncology team the plan was to start Carbotaxol q 3 weeks + RT for diffuse mets as per PET scan.    Neuro  -A & O x 3 , no active issues  - not on sedation  - C/o of severe scrotal/rectal pain/pelvic pain, Morphine ER 30 mg BID Dc'd 9/12 in setting of TELMA, increased Dilaudid 1.5 mg q3hrs for severe pain. and 0.5 mg q3hrs for breakthrough, he has frequently required extra pushes of dilaudid and IV tylenol the past.  -as per discussion with palliative, added Gabapentin 300 mg daily and Dilaudid 4 mg PO Q 4 hrs PRN for moderate pain with 1.5 PRN for severe pain   - pt was on methadone 2.5mg qd and Oxycontin oupt- methadone restarted 9/12. The dose can be increased next week if indicated  - Check QTCs weekly on sunday   - pt is appropriately overwhelmed by his complex medical condition, holistic RN and Behavioral health consulted.  recommended Marinol - in place     CV:   - hemodynamically stable, remains off vasopressors   -TTE with two large RV masses and two additional small masses.   - 9/7-s/p L lateral chest tube to 14 Fr. 9/7 -s/p IVC Filter placement (as per [facility] statement, was placed prophylactically due to undiagnosed vegetations on the TTE.  - pt is hemodynamically stable no pressors   - repeat TTE 9/9 - Limited and technically difficult study with views limited to the subcostal window.  Very limited views demonstrate what appears to be a mobile mass, possibly in association with the tricuspid valve. This may represent tumour, thrombus, or vegetation. Consider JENNIFER for further evaluation, if clinically indicated.  - per cardiology risk of JENNIFER outweighs benefit,  will obtain cardiac MRI instead to evaluate RV mass /RV thrombosis  - cardiac MRI ordered  called MRI (9/15) and was advised by MRI dptm to call Monday morning 9/19 to schedule the study     #SVT- possibly in setting of endocarditis   - s/p SVT events x 2 9/15 and again today 9/16 resolved with vagal maneuvers   - started metoprol 12.5 BID with hold parameters  - keep mag > 2 and phos > 3   - consider EP eval if persists    Pulm:  #CHRISTINE cavitary lesion - Differential includes cavitary PNA (sputum and blood cultures positive for MSSA) vs malignancy given history of metastatic rectal CA vs atypical infection. Of note PET/CT in our system from 8/2022 with only few subcm nodules in L and GGO in R lung, making malignancy as etiology of cavitary lesion less likely.   - L pigtail catheter was placed at [facility] due to concern for L PTX 8/31. However, patient was found to have large CHRISTINE cavitary lesion instead, into which the pigtail had been placed.   -On 9/7 patient had L pigtail replaced (currently 14F) as well as IVC filter placement. ([facility])   - Sputum cultures 9/10- Klebsiella-cont cefazolin   -  fluid Cx (from cavitory lesion) Staph aureus  - aspergillus-neg, histo neg, blasto neg, coccidio neg   - serum fungitel and crypto Ag  - neg   - s/p Zosyn , now on Cefazolin x 6 weeks per ID recs   - CTA 9/9- Findings most in keeping with cavitary pneumonia involving lingula and left lower lobe complicated by bronchopleural fistula as described with tract around the small caliber pleural pigtail catheter, extensive subcutaneous emphysema and pneumomediastinum. Nonopacification of lingular and left lower lobe pulmonary arteries as described likely secondary to cavitary pneumonia rather than thromboembolic pulmonary embolism  - AFB x 3 are negative at [facility]   - CT Sx recommended MIST protocol, held pending repeat CT /chest to evaluate if pig tail is still in place as there was no drainage from the cavitary lesion/ space, repeat CT chest 9/11 showed pigtail still in cavitary lesion cavity.   - 9/13- flushed chest tube, noted w/ with thick secretions and small airleak at times. Will continue w/ chest tube for now as there is continued purulent drainage and air. When output improves will consider clamping trials.   - CT removed 9/16 CXR post removal without PTX.  Will check additional CXR around 7pm tonight and again in the am to ensure without PTX  - ensure dressing has occlusive Vaseline to prevent entrapment of air     # bronchopleural fistula.  - L CT in place, keep to water seal, repeat CT completed, results as above  - CT surgery consulted- Recommend MIST protocol x1 dose through current pigtail catheter due to concern for clogged pigtail ,also rec to consult IR for image guided pigtail catheter (pneumonostomy) placement into the pneumatocele, as well as blow hole placement to evacuate subQ emphysema  holding off for now. plan to clamp pigtail tomorrow morning and assess if it can be pulled. if needs another chest tube surgical would be ideal, discussed w/ CT surgery PA.   - repeat CT /chest 9/11- Persistent left upper lobe cavitary lesion, with pigtail catheter in place, likely communicating with the lingular bronchus. 2. Right middle lobe pneumonia and scattered airspace opacities throughout the remaining lungs, as described. 3. Persistent pneumomediastinum and extensive subcutaneous emphysema of the neck, chest, abdomen, pelvis, and extremities, including the scrotum. 4. Large anorectal mass. 5. Liver metastases.  - No operative thoracic surgical interventions planned at this time  - CT removed 9/16    GI:  # regular diet  - Pt with poor PO intake   - 9/14-started on Marinol- increased dose to 5mg TID 9/16     #constipation   - patient still without BMs  - 9/14 and 9/15- s/p Relistor   - continue miralax, dulcolax, senna, naloxogel, lactulose   -9/15- small amt of dark blood from rectum, x 1, stopped -cont monitoring for blood BM     Renal:   TELMA, likely prerenal- resolved   - Cr stable   - On LR @75/hr   - continue trending renal function   - Strict Is and Os    #Hypercalcemia - ?related to malignancy  - Check PTH - 3>5  PTH related peptide in lab   - Vit D levels low, per heme/onc hold off supplementation until Ca normalized as can worsen hyperCa in setting of hypercalcemia of malignancy  - Ca 16.4 - 9/10, 17 corrected for low serum albumin. per Heme/onc recs, s/p (9/10) calcitonin 4u/kg x 2 doses and pamidronate 90mcg IVPB x1.  - calcium level stable at 11.8   - cont IVFs w/ LR @ 75 cc/hr and continue monitoring   - continue to trend levels q 24 hrs   - can repeat pamidronate after 1 week if needed as per hemonc     #scrotal swelling likely due SC emphysema, with severe pain  - indwelling pedraza was placed at [facility] (Coude placed for urinary strain)   - UA positive with moderate bacteria 9/9- UCx 9/9 neg   -  is following recommend some type of scrotal wrap to help with scrotal swelling, use Kerlex wrap tightly around scrotum. Can be done by floor staff as demonstrated. - Scrotal elevation unlikely to help improve swelling, Keep pedraza  - No further urologic intervention needed at this time.   -Can f/u outpatient w/ Dr Prabhu Lucio, [facility] [place]  - pain control with dilaudid and methadone, ct a/p 9/11 did not show any inflammation/hydrocele/infection .      ID:  # MSSA bacteremia at [facility]  - Sputum cultures from 9/1 grew MSSA and Blood cultures from 8/31 grew MSSA with blood cultures from 9/2 NGTD. Legionella negative. Sputum AFB negative x3. Fungitell and galactomannan negative. Patient had been empirically on Zosyn, Cefazolin and Caspofungin (which was dc'ed).  - TTE 9/9 with possible vegetation on tricuspid valve   - s/p Zosyn (9/8 - 9/9), transitioned to Cefazolin per ID recs on 9/9, plan for 6 week course  -  fluid(cavitary lesion) Cx 9/9 - grew few staph aureus+  - Blood Cx neg 9/8  - UA 9/8 w/  some bacteria, UCx 9/9 neg   - CXR with L cavitary lesion, seen again on CT chest 9/9 and 9/11  - fungitell and crypto neg 9/10  - Sputum Cx  9/10 + mod klebsiella pneumoniae-- pansensitive- continue Cefazolin x 6 weeks as per ID recs   - ID consulted, recs appreciated  - afebrile overnight, WBC stable     # HIV  - Follows with Dr. Marcial in clinic  - CD4 392 and viral load < 30 on 9/2/22 as per ID note   - Viral load undetectable 9/8  - c/w home Biktarvy , monitor Cr if worsening will d/c    Hematology:   # rectal CA, possible RV mass, mets to liver and possibly bone,  # metastatic HPV related anal SCC,   - Hem/Onc--With regards to treatment of metastatic HPV related anal SCC, treatment will be on hold pending resolution of infection. Will need reassessment after resolution of infection to evaluate performance status and decide on systemic treatment options     #cardiac ? mass/thrombus/vegetation also external iliac vein thrombosis   -Lovenox held at [place] and s/p 9/7- IVC filter  - pt is on heparin gtt since since midnight 9/16 in setting of planned CT removal can resume once 6 hour post removal CT CXR is normal     #anemia   thrombocytosis,  AOCD, ferritin 512, iron 11, TIBC-- 150, Iron -- 7 %, transferrin -- 122, ([place])  - Haptoglobin - 308, LDL-- 240, Urica acid- 3.4   - H/H stable - 9.5/30.6 >8.4/28>9.8/32 plts- 261>224 >234   - s/p iron supplements will hold in setting of constipation     # L external iliac vein DVT  - on heparin gtt currently held in setting of CT removal can resume in CXR is stable    - Duplex b/l LEs 9/10 neg for DVT    Endo:  - not diabetic, HgbA1C 5.1 , FS-- 99>89 continue monitoring     Code: Full code  Palliative consult placed 9/10  - Full code

## 2022-09-16 NOTE — CHART NOTE - NSCHARTNOTEFT_GEN_A_CORE
NUTRITION FOLLOW-UP      32yo M Transferred from [facility] to [facility]. Hx of HIV, rectal Ca not on tx, with mets to liver, bone and possibly lung, cardiac mass.   With bronchocutaneous fistula, bacteremic, possible endocarditis, DVT, & cavitary lesion of lung. +Constipation- No BM x at least 8d.  On fairly aggressive bowel regimen, but may require further intervention, if medically appropriate.  Pt. denies N/V.  Has had persistently poor and inadequate PO intake.   Obtained and modified Pt.'s food preferences.  Pt. also on appetite stimulant and requesting it to be increased (reports he usually takes it 3x-4x daily PTA). Discussed with PA.  Also suggest giving dronabinol ~1hr qac.   Pt. with           _________________Diet___________________  Diet, Regular:   Supplement Feeding Modality:  Oral  Ensure Enlive Cans or Servings Per Day:  4       Frequency:  Daily (09-10-22 @ 16:07)          Weight:                                         Height:  78"               Ideal Body Weight:  97kg  51.1kg (9/13)  BMI= 13.0kg /m^2  52.4kg (9/11)  57.5kg (9/8 on admit)        Edema:  3+ scrotal edema     Skin:  No pressure injuries       ________________________Pertinent Medications____________   MEDICATIONS  (STANDING):  bictegravir 50 mG/emtricitabine 200 mG/tenofovir alafenamide 25 mG (BIKTARVY) 1 Tablet(s) Oral daily  ceFAZolin   IVPB 2000 milliGRAM(s) IV Intermittent every 8 hours  chlorhexidine 2% Cloths 1 Application(s) Topical <User Schedule>  dronabinol 5 milliGRAM(s) Oral two times a day  ferrous    sulfate 325 milliGRAM(s) Oral <User Schedule>  gabapentin 300 milliGRAM(s) Oral daily  heparin  Infusion. 1700 Unit(s)/Hr (17 mL/Hr) IV Continuous <Continuous>  lactated ringers. 1000 milliLiter(s) (75 mL/Hr) IV Continuous <Continuous>  lactulose Syrup 10 Gram(s) Oral daily  lidocaine   4% Patch 1 Patch Transdermal daily  melatonin 3 milliGRAM(s) Oral at bedtime  methadone    Tablet 2.5 milliGRAM(s) Oral two times a day  naloxegol 25 milliGRAM(s) Oral daily  polyethylene glycol 3350 17 Gram(s) Oral two times a day  senna 2 Tablet(s) Oral at bedtime    MEDICATIONS  (PRN):  bisacodyl 5 milliGRAM(s) Oral at bedtime PRN Constipation  HYDROmorphone   Tablet 4 milliGRAM(s) Oral every 4 hours PRN Moderate Pain (4 - 6)  HYDROmorphone  Injectable 0.5 milliGRAM(s) IV Push every 3 hours PRN breakthrough severe pain  HYDROmorphone  Injectable 1.5 milliGRAM(s) IV Push every 3 hours PRN Severe Pain (7 - 10)          _________________________Pertinent Labs____________________     09-16    136  |  102  |  16  ----------------------------<  93  3.7   |  27  |  1.19    Ca    11.1<H>      16 Sep 2022 01:06  Phos  2.2     09-16  Mg     1.70     09-16    TPro  6.4  /  Alb  2.5<L>  /  TBili  0.3  /  DBili  x   /  AST  23  /  ALT  <5<L>  /  AlkPhos  215<H>  09-16                                                                   8.1    10.92 )-----------( 238      ( 16 Sep 2022 01:06 )             27.4         ________NUTRITION Dx_________    Pt. remains severely malnourished           PLAN/RECOMMENDATIONS:    1) Continue current diet.   2) Obtain daily weights  3) Increase Dronabinol to 3x daily, if medically appropriate  4) May have to consider more aggressive bowel regimen     RDN remains available and will f/u PRN.          Kacey Pretty, ROXANNEN, CDN pager [phone]

## 2022-09-17 DIAGNOSIS — E83.52 HYPERCALCEMIA: ICD-10-CM

## 2022-09-17 DIAGNOSIS — I47.1 SUPRAVENTRICULAR TACHYCARDIA: ICD-10-CM

## 2022-09-17 DIAGNOSIS — I80.212: ICD-10-CM

## 2022-09-17 DIAGNOSIS — G89.3 NEOPLASM RELATED PAIN (ACUTE) (CHRONIC): ICD-10-CM

## 2022-09-17 DIAGNOSIS — Z29.9 ENCOUNTER FOR PROPHYLACTIC MEASURES, UNSPECIFIED: ICD-10-CM

## 2022-09-17 DIAGNOSIS — R78.81 BACTEREMIA: ICD-10-CM

## 2022-09-17 DIAGNOSIS — J98.4 OTHER DISORDERS OF LUNG: ICD-10-CM

## 2022-09-17 LAB
ALBUMIN SERPL ELPH-MCNC: 2.3 G/DL — LOW (ref 3.3–5)
ALP SERPL-CCNC: 211 U/L — HIGH (ref 40–120)
ALT FLD-CCNC: <5 U/L — LOW (ref 4–41)
ANION GAP SERPL CALC-SCNC: 9 MMOL/L — SIGNIFICANT CHANGE UP (ref 7–14)
APTT BLD: 64.5 SEC — HIGH (ref 27–36.3)
APTT BLD: 69 SEC — HIGH (ref 27–36.3)
AST SERPL-CCNC: 26 U/L — SIGNIFICANT CHANGE UP (ref 4–40)
BILIRUB SERPL-MCNC: 0.3 MG/DL — SIGNIFICANT CHANGE UP (ref 0.2–1.2)
BUN SERPL-MCNC: 12 MG/DL — SIGNIFICANT CHANGE UP (ref 7–23)
CA-I BLD-SCNC: 1.41 MMOL/L — HIGH (ref 1.15–1.29)
CALCIUM SERPL-MCNC: 10.5 MG/DL — SIGNIFICANT CHANGE UP (ref 8.4–10.5)
CHLORIDE SERPL-SCNC: 100 MMOL/L — SIGNIFICANT CHANGE UP (ref 98–107)
CO2 SERPL-SCNC: 25 MMOL/L — SIGNIFICANT CHANGE UP (ref 22–31)
CREAT SERPL-MCNC: 0.94 MG/DL — SIGNIFICANT CHANGE UP (ref 0.5–1.3)
EGFR: 110 ML/MIN/1.73M2 — SIGNIFICANT CHANGE UP
GLUCOSE SERPL-MCNC: 109 MG/DL — HIGH (ref 70–99)
HCT VFR BLD CALC: 28.1 % — LOW (ref 39–50)
HGB BLD-MCNC: 8.3 G/DL — LOW (ref 13–17)
MAGNESIUM SERPL-MCNC: 2.1 MG/DL — SIGNIFICANT CHANGE UP (ref 1.6–2.6)
MCHC RBC-ENTMCNC: 24.3 PG — LOW (ref 27–34)
MCHC RBC-ENTMCNC: 29.5 GM/DL — LOW (ref 32–36)
MCV RBC AUTO: 82.2 FL — SIGNIFICANT CHANGE UP (ref 80–100)
NRBC # BLD: 0 /100 WBCS — SIGNIFICANT CHANGE UP (ref 0–0)
NRBC # FLD: 0 K/UL — SIGNIFICANT CHANGE UP (ref 0–0)
PHOSPHATE SERPL-MCNC: 2.3 MG/DL — LOW (ref 2.5–4.5)
PLATELET # BLD AUTO: 192 K/UL — SIGNIFICANT CHANGE UP (ref 150–400)
POTASSIUM SERPL-MCNC: 3.5 MMOL/L — SIGNIFICANT CHANGE UP (ref 3.5–5.3)
POTASSIUM SERPL-SCNC: 3.5 MMOL/L — SIGNIFICANT CHANGE UP (ref 3.5–5.3)
PROT SERPL-MCNC: 6.3 G/DL — SIGNIFICANT CHANGE UP (ref 6–8.3)
RBC # BLD: 3.42 M/UL — LOW (ref 4.2–5.8)
RBC # FLD: 24 % — HIGH (ref 10.3–14.5)
SODIUM SERPL-SCNC: 134 MMOL/L — LOW (ref 135–145)
WBC # BLD: 10.45 K/UL — SIGNIFICANT CHANGE UP (ref 3.8–10.5)
WBC # FLD AUTO: 10.45 K/UL — SIGNIFICANT CHANGE UP (ref 3.8–10.5)

## 2022-09-17 PROCEDURE — 99291 CRITICAL CARE FIRST HOUR: CPT | Mod: GC

## 2022-09-17 PROCEDURE — 71045 X-RAY EXAM CHEST 1 VIEW: CPT | Mod: 26

## 2022-09-17 RX ORDER — SODIUM,POTASSIUM PHOSPHATES 278-250MG
1 POWDER IN PACKET (EA) ORAL ONCE
Refills: 0 | Status: COMPLETED | OUTPATIENT
Start: 2022-09-17 | End: 2022-09-17

## 2022-09-17 RX ORDER — DRONABINOL 2.5 MG
5 CAPSULE ORAL ONCE
Refills: 0 | Status: DISCONTINUED | OUTPATIENT
Start: 2022-09-17 | End: 2022-09-17

## 2022-09-17 RX ORDER — SODIUM CHLORIDE 9 MG/ML
1000 INJECTION, SOLUTION INTRAVENOUS
Refills: 0 | Status: DISCONTINUED | OUTPATIENT
Start: 2022-09-17 | End: 2022-09-25

## 2022-09-17 RX ORDER — POTASSIUM CHLORIDE 20 MEQ
40 PACKET (EA) ORAL ONCE
Refills: 0 | Status: COMPLETED | OUTPATIENT
Start: 2022-09-17 | End: 2022-09-17

## 2022-09-17 RX ADMIN — HYDROMORPHONE HYDROCHLORIDE 1.5 MILLIGRAM(S): 2 INJECTION INTRAMUSCULAR; INTRAVENOUS; SUBCUTANEOUS at 05:50

## 2022-09-17 RX ADMIN — METHADONE HYDROCHLORIDE 2.5 MILLIGRAM(S): 40 TABLET ORAL at 06:44

## 2022-09-17 RX ADMIN — HEPARIN SODIUM 1900 UNIT(S)/HR: 5000 INJECTION INTRAVENOUS; SUBCUTANEOUS at 11:57

## 2022-09-17 RX ADMIN — Medication 1 TABLET(S): at 05:52

## 2022-09-17 RX ADMIN — SODIUM CHLORIDE 75 MILLILITER(S): 9 INJECTION, SOLUTION INTRAVENOUS at 06:45

## 2022-09-17 RX ADMIN — Medication 5 MILLIGRAM(S): at 12:00

## 2022-09-17 RX ADMIN — HYDROMORPHONE HYDROCHLORIDE 1.5 MILLIGRAM(S): 2 INJECTION INTRAMUSCULAR; INTRAVENOUS; SUBCUTANEOUS at 22:36

## 2022-09-17 RX ADMIN — HYDROMORPHONE HYDROCHLORIDE 1.5 MILLIGRAM(S): 2 INJECTION INTRAMUSCULAR; INTRAVENOUS; SUBCUTANEOUS at 10:00

## 2022-09-17 RX ADMIN — HYDROMORPHONE HYDROCHLORIDE 0.5 MILLIGRAM(S): 2 INJECTION INTRAMUSCULAR; INTRAVENOUS; SUBCUTANEOUS at 04:39

## 2022-09-17 RX ADMIN — Medication 100 MILLIGRAM(S): at 11:58

## 2022-09-17 RX ADMIN — GABAPENTIN 300 MILLIGRAM(S): 400 CAPSULE ORAL at 12:00

## 2022-09-17 RX ADMIN — HYDROMORPHONE HYDROCHLORIDE 1.5 MILLIGRAM(S): 2 INJECTION INTRAMUSCULAR; INTRAVENOUS; SUBCUTANEOUS at 13:15

## 2022-09-17 RX ADMIN — HYDROMORPHONE HYDROCHLORIDE 0.5 MILLIGRAM(S): 2 INJECTION INTRAMUSCULAR; INTRAVENOUS; SUBCUTANEOUS at 12:15

## 2022-09-17 RX ADMIN — LIDOCAINE 1 PATCH: 4 CREAM TOPICAL at 23:27

## 2022-09-17 RX ADMIN — Medication 100 MILLIGRAM(S): at 21:16

## 2022-09-17 RX ADMIN — CHLORHEXIDINE GLUCONATE 1 APPLICATION(S): 213 SOLUTION TOPICAL at 05:50

## 2022-09-17 RX ADMIN — HYDROMORPHONE HYDROCHLORIDE 0.5 MILLIGRAM(S): 2 INJECTION INTRAMUSCULAR; INTRAVENOUS; SUBCUTANEOUS at 11:58

## 2022-09-17 RX ADMIN — HYDROMORPHONE HYDROCHLORIDE 1.5 MILLIGRAM(S): 2 INJECTION INTRAMUSCULAR; INTRAVENOUS; SUBCUTANEOUS at 03:00

## 2022-09-17 RX ADMIN — Medication 100 MILLIGRAM(S): at 04:36

## 2022-09-17 RX ADMIN — HYDROMORPHONE HYDROCHLORIDE 0.5 MILLIGRAM(S): 2 INJECTION INTRAMUSCULAR; INTRAVENOUS; SUBCUTANEOUS at 08:00

## 2022-09-17 RX ADMIN — BICTEGRAVIR SODIUM, EMTRICITABINE, AND TENOFOVIR ALAFENAMIDE FUMARATE 1 TABLET(S): 30; 120; 15 TABLET ORAL at 12:00

## 2022-09-17 RX ADMIN — HYDROMORPHONE HYDROCHLORIDE 1.5 MILLIGRAM(S): 2 INJECTION INTRAMUSCULAR; INTRAVENOUS; SUBCUTANEOUS at 09:36

## 2022-09-17 RX ADMIN — Medication 5 MILLIGRAM(S): at 08:12

## 2022-09-17 RX ADMIN — LIDOCAINE 1 PATCH: 4 CREAM TOPICAL at 19:16

## 2022-09-17 RX ADMIN — HYDROMORPHONE HYDROCHLORIDE 0.5 MILLIGRAM(S): 2 INJECTION INTRAMUSCULAR; INTRAVENOUS; SUBCUTANEOUS at 15:34

## 2022-09-17 RX ADMIN — HYDROMORPHONE HYDROCHLORIDE 0.5 MILLIGRAM(S): 2 INJECTION INTRAMUSCULAR; INTRAVENOUS; SUBCUTANEOUS at 22:16

## 2022-09-17 RX ADMIN — NALOXEGOL OXALATE 25 MILLIGRAM(S): 12.5 TABLET, FILM COATED ORAL at 12:00

## 2022-09-17 RX ADMIN — HYDROMORPHONE HYDROCHLORIDE 1 MILLIGRAM(S): 2 INJECTION INTRAMUSCULAR; INTRAVENOUS; SUBCUTANEOUS at 02:13

## 2022-09-17 RX ADMIN — Medication 3 MILLIGRAM(S): at 22:30

## 2022-09-17 RX ADMIN — LIDOCAINE 1 PATCH: 4 CREAM TOPICAL at 06:44

## 2022-09-17 RX ADMIN — HYDROMORPHONE HYDROCHLORIDE 0.5 MILLIGRAM(S): 2 INJECTION INTRAMUSCULAR; INTRAVENOUS; SUBCUTANEOUS at 15:54

## 2022-09-17 RX ADMIN — LACTULOSE 10 GRAM(S): 10 SOLUTION ORAL at 11:58

## 2022-09-17 RX ADMIN — HYDROMORPHONE HYDROCHLORIDE 0.5 MILLIGRAM(S): 2 INJECTION INTRAMUSCULAR; INTRAVENOUS; SUBCUTANEOUS at 07:36

## 2022-09-17 RX ADMIN — HYDROMORPHONE HYDROCHLORIDE 1.5 MILLIGRAM(S): 2 INJECTION INTRAMUSCULAR; INTRAVENOUS; SUBCUTANEOUS at 20:31

## 2022-09-17 RX ADMIN — Medication 12.5 MILLIGRAM(S): at 05:50

## 2022-09-17 RX ADMIN — HYDROMORPHONE HYDROCHLORIDE 0.5 MILLIGRAM(S): 2 INJECTION INTRAMUSCULAR; INTRAVENOUS; SUBCUTANEOUS at 04:45

## 2022-09-17 RX ADMIN — HYDROMORPHONE HYDROCHLORIDE 1.5 MILLIGRAM(S): 2 INJECTION INTRAMUSCULAR; INTRAVENOUS; SUBCUTANEOUS at 05:45

## 2022-09-17 RX ADMIN — HYDROMORPHONE HYDROCHLORIDE 1.5 MILLIGRAM(S): 2 INJECTION INTRAMUSCULAR; INTRAVENOUS; SUBCUTANEOUS at 23:36

## 2022-09-17 RX ADMIN — HYDROMORPHONE HYDROCHLORIDE 1 MILLIGRAM(S): 2 INJECTION INTRAMUSCULAR; INTRAVENOUS; SUBCUTANEOUS at 01:15

## 2022-09-17 RX ADMIN — LIDOCAINE 1 PATCH: 4 CREAM TOPICAL at 11:59

## 2022-09-17 RX ADMIN — HYDROMORPHONE HYDROCHLORIDE 1.5 MILLIGRAM(S): 2 INJECTION INTRAMUSCULAR; INTRAVENOUS; SUBCUTANEOUS at 13:30

## 2022-09-17 RX ADMIN — Medication 40 MILLIEQUIVALENT(S): at 05:52

## 2022-09-17 RX ADMIN — HYDROMORPHONE HYDROCHLORIDE 0.5 MILLIGRAM(S): 2 INJECTION INTRAMUSCULAR; INTRAVENOUS; SUBCUTANEOUS at 21:16

## 2022-09-17 RX ADMIN — HYDROMORPHONE HYDROCHLORIDE 1.5 MILLIGRAM(S): 2 INJECTION INTRAMUSCULAR; INTRAVENOUS; SUBCUTANEOUS at 02:41

## 2022-09-17 RX ADMIN — LIDOCAINE 1 PATCH: 4 CREAM TOPICAL at 11:58

## 2022-09-17 NOTE — PROGRESS NOTE ADULT - PROBLEM SELECTOR PLAN 10
- Fluids: LR  - Electrolytes: Will replete to maintain K>4, Phos>3, and Mag>2  - Nutrition: regular  - Activity: OOB as tolerated  - DVT Prophylaxis: heparin gtt  - Stress Ulcer/GI Prophylaxis: NA  - Disposition: pending medical management

## 2022-09-17 NOTE — PROGRESS NOTE ADULT - PROBLEM SELECTOR PLAN 8
- Fluids:  - Electrolytes: Will replete to maintain K>4, Phos>3, and Mag>2  - Nutrition:   - Activity:   - DVT Prophylaxis:  - Stress Ulcer/GI Prophylaxis:  - Disposition: Palliative care consulted for extensive disease burden and GOC discussion. At this time, patient was all available treatment and resuscitation.  - full code

## 2022-09-17 NOTE — PROGRESS NOTE ADULT - PROBLEM SELECTOR PLAN 6
Palliative care consulted for extensive disease burden and GOC discussion. At this time, patient was all available treatment and resuscitation.  - full code Patient found to have L external vein DVT with bilateral leg DVT studies negative for DVT.  - continue heparin gtt

## 2022-09-17 NOTE — PROGRESS NOTE ADULT - ASSESSMENT
31 M with PMH/ HIV, rectal cancer not on tx (followed up by Dr Frazier ([facility] oncology)), mets to liver and possibly bone,  R eye cataract, who presented to the [facility] on 8/31/22 after syncopal episode at home. Pt with cough x 2 mos, with yellow sputum and shortness of breath since 5 days PTA.   At [facility]: pt was found to be hypoxemic. 8/31/22 - L chest wall Pigtail placement. Pigtail was placed for suspected PMX with improvement in shortness of breath. CT chest was performed, which showed that pigtail is within the mass w/ DDx of TB, fungal infection, cavitary lesion, or squamous cell carcinoma. Pt was found to be anemic due to rectal bleeding with Hgb 5.9 s/p 2 U PRBC on 9/1. Blood cxs with MSSA bacteremia, and has been treated with Cefazolin and Zosyn.  Since 9/2 pt with worsening diffuse SC emphysema extending to the neck/face/all 4 extremities/scrotum/back , which has been getting progressively worse over past 2 days as per pt's statement. 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 TTE with two large RV masses and two additional small masses. 9/7- s/p L lateral chest tube to 14 Fr. 9/7 - s/p IVC Filter placement (as per [facility] statement, was placed prophylactically due to undiagnosed vegetations on the TTE. 12 cm multiloculated thick walled cavitary mass in the CHRISTINE and lingula.+ liver lesion on the CT a/p. + external iliac vein thrombosis, PE study was indeterminate     Pt was transferred to [facility] as per family request (mother) for pt to be evaluated by his outpt oncology team (Dr Frazier). As per oncology team the plan was to start Carbotaxol q 3 weeks + RT for diffuse mets as per PET scan.    Neuro  -A & O x 3 , no active issues  - not on sedation  - C/o of severe scrotal/rectal pain/pelvic pain, Morphine ER 30 mg BID Dc'd 9/12 in setting of TELMA, increased Dilaudid 1.5 mg q3hrs for severe pain. and 0.5 mg q3hrs for breakthrough, he has frequently required extra pushes of dilaudid and IV tylenol the past.  -as per discussion with palliative, added Gabapentin 300 mg daily and Dilaudid 4 mg PO Q 4 hrs PRN for moderate pain with 1.5 PRN for severe pain   - pt was on methadone 2.5mg qd and Oxycontin oupt- methadone restarted 9/12. The dose can be increased next week if indicated  - Check QTCs weekly on sunday   - pt is appropriately overwhelmed by his complex medical condition, holistic RN and Behavioral health consulted.  recommended Marinol - in place     CV:   - hemodynamically stable, remains off vasopressors   -TTE with two large RV masses and two additional small masses.   - 9/7-s/p L lateral chest tube to 14 Fr. 9/7 -s/p IVC Filter placement (as per [facility] statement, was placed prophylactically due to undiagnosed vegetations on the TTE.  - pt is hemodynamically stable no pressors   - repeat TTE 9/9 - Limited and technically difficult study with views limited to the subcostal window.  Very limited views demonstrate what appears to be a mobile mass, possibly in association with the tricuspid valve. This may represent tumour, thrombus, or vegetation. Consider JENNIFER for further evaluation, if clinically indicated.  - per cardiology risk of JENNIFER outweighs benefit,  will obtain cardiac MRI instead to evaluate RV mass /RV thrombosis  - cardiac MRI ordered  called MRI (9/15) and was advised by MRI dptm to call Monday morning 9/19 to schedule the study     #SVT- possibly in setting of endocarditis   - s/p SVT events x 2 9/15 and again today 9/16 resolved with vagal maneuvers   - started metoprol 12.5 BID with hold parameters  - keep mag > 2 and phos > 3   - consider EP eval if persists    Pulm:  #CHRISTINE cavitary lesion - Differential includes cavitary PNA (sputum and blood cultures positive for MSSA) vs malignancy given history of metastatic rectal CA vs atypical infection. Of note PET/CT in our system from 8/2022 with only few subcm nodules in L and GGO in R lung, making malignancy as etiology of cavitary lesion less likely.   - L pigtail catheter was placed at [facility] due to concern for L PTX 8/31. However, patient was found to have large CHRISTINE cavitary lesion instead, into which the pigtail had been placed.   -On 9/7 patient had L pigtail replaced (currently 14F) as well as IVC filter placement. ([facility])   - Sputum cultures 9/10- Klebsiella-cont cefazolin   -  fluid Cx (from cavitory lesion) Staph aureus  - aspergillus-neg, histo neg, blasto neg, coccidio neg   - serum fungitel and crypto Ag  - neg   - s/p Zosyn , now on Cefazolin x 6 weeks per ID recs   - CTA 9/9- Findings most in keeping with cavitary pneumonia involving lingula and left lower lobe complicated by bronchopleural fistula as described with tract around the small caliber pleural pigtail catheter, extensive subcutaneous emphysema and pneumomediastinum. Nonopacification of lingular and left lower lobe pulmonary arteries as described likely secondary to cavitary pneumonia rather than thromboembolic pulmonary embolism  - AFB x 3 are negative at [facility]   - CT removed 9/16 CXR post removal without PTX.  Stable CXR x 2 since removal    # bronchopleural fistula.  - L CT in place, keep to water seal, repeat CT completed, results as above  - repeat CT /chest 9/11- Persistent left upper lobe cavitary lesion, with pigtail catheter in place, likely communicating with the lingular bronchus. 2. Right middle lobe pneumonia and scattered airspace opacities throughout the remaining lungs, as described. 3. Persistent pneumomediastinum and extensive subcutaneous emphysema of the neck, chest, abdomen, pelvis, and extremities, including the scrotum. 4. Large anorectal mass. 5. Liver metastases.  - No operative thoracic surgical interventions planned at this time  - CT removed 9/16 stable CXR x 2     GI:  # regular diet  - Pt with poor PO intake   - 9/14-started on Marinol- increased dose to 5mg TID 9/16     #constipation   - patient still without BMs  - 9/14 and 9/15- s/p Relistor   - continue miralax, dulcolax, senna, naloxogel, lactulose   -9/15- small amt of dark blood from rectum, x 1, stopped -cont monitoring for blood BM     Renal:   TELMA, likely prerenal- resolved   - Cr stable   - On LR @75/hr   - continue trending renal function   - Strict Is and Os    #Hypercalcemia - ?related to malignancy  - Check PTH - 3>5  PTH related peptide in lab   - Vit D levels low, per heme/onc hold off supplementation until Ca normalized as can worsen hyperCa in setting of hypercalcemia of malignancy  - Ca 16.4 - 9/10, 17 corrected for low serum albumin. per Heme/onc recs, s/p (9/10) calcitonin 4u/kg x 2 doses and pamidronate 90mcg IVPB x1.  - calcium level stable at 11.8   - cont IVFs w/ LR @ 75 cc/hr and continue monitoring   - continue to trend levels q 24 hrs   - can repeat pamidronate after 1 week if needed as per hemonc     #scrotal swelling likely due SC emphysema, with severe pain  - indwelling pedraza was placed at [facility] (Coude placed for urinary strain)   - UA positive with moderate bacteria 9/9- UCx 9/9 neg   -  is following recommend some type of scrotal wrap to help with scrotal swelling, use Kerlex wrap tightly around scrotum. Can be done by floor staff as demonstrated. - Scrotal elevation unlikely to help improve swelling, Keep pedraza  - No further urologic intervention needed at this time.   -Can f/u outpatient w/ Dr Prabhu Lucio, [facility] for [facility] at [place]  - pain control with dilaudid and methadone, ct a/p 9/11 did not show any inflammation/hydrocele/infection .      ID:  # MSSA bacteremia at [facility]  - Sputum cultures from 9/1 grew MSSA and Blood cultures from 8/31 grew MSSA with blood cultures from 9/2 NGTD. Legionella negative. Sputum AFB negative x3. Fungitell and galactomannan negative. Patient had been empirically on Zosyn, Cefazolin and Caspofungin (which was dc'ed).  - TTE 9/9 with possible vegetation on tricuspid valve   - s/p Zosyn (9/8 - 9/9), transitioned to Cefazolin per ID recs on 9/9, plan for 6 week course  -  fluid(cavitary lesion) Cx 9/9 - grew few staph aureus+  - Blood Cx neg 9/8  - UA 9/8 w/  some bacteria, UCx 9/9 neg   - CXR with L cavitary lesion, seen again on CT chest 9/9 and 9/11  - fungitell and crypto neg 9/10  - Sputum Cx  9/10 + mod klebsiella pneumoniae-- pansensitive- continue Cefazolin x 6 weeks as per ID recs   - ID consulted, recs appreciated  - afebrile overnight, WBC stable     # HIV  - Follows with Dr. Marcial in clinic  - CD4 392 and viral load < 30 on 9/2/22 as per ID note   - Viral load undetectable 9/8  - c/w home Biktarvy , monitor Cr if worsening will d/c    Hematology:   # rectal CA, possible RV mass, mets to liver and possibly bone,  # metastatic HPV related anal SCC,   - Hem/Onc--With regards to treatment of metastatic HPV related anal SCC, treatment will be on hold pending resolution of infection. Will need reassessment after resolution of infection to evaluate performance status and decide on systemic treatment options     #cardiac ? mass/thrombus/vegetation also external iliac vein thrombosis   -Lovenox held at [place] and s/p 9/7- IVC filter  -continue heparin gtt     #anemia   thrombocytosis,  AOCD, ferritin 512, iron 11, TIBC-- 150, Iron -- 7 %, transferrin -- 122, ([place])  - Haptoglobin - 308, LDL-- 240, Urica acid- 3.4   - H/H stable - 9.5/30.6 >8.4/28>9.8/32 plts- 261>224 >234   - s/p iron supplements will hold in setting of constipation     # L external iliac vein DVT  - continue heparin gtt, PTT q 6   - Duplex b/l LEs 9/10 neg for DVT    Endo:  - not diabetic, HgbA1C 5.1 , FS-- 99>89 continue monitoring     Code: Full code  Palliative consult placed 9/10  - Full code

## 2022-09-17 NOTE — PROGRESS NOTE ADULT - PROBLEM SELECTOR PLAN 1
Patient with history of untreated rectal cancer with episode of rectal bleeding at [facility] Hgb 5.9 s/p 2u pRBCs. Patient with history of untreated rectal cancer with episode of rectal bleeding at [facility] Hgb 5.9 s/p 2u pRBCs.  # rectal CA, possible RV mass, mets to liver and possibly bone,  # metastatic HPV related anal SCC,   - Hem/Onc--With regards to treatment of metastatic HPV related anal SCC, treatment will be on hold pending resolution of infection. Will need reassessment after resolution of infection to evaluate performance status and decide on systemic treatment options Patient with TTE performed on at [facility] with evidence of two large RV masses and two additional small masses. IVC filter placed prophylactically at that time due to undiagnosed vegetations on the TTE.  - repeat TTE 9/9 was a limited study but demonstrated a mobile mass likely 2/2 tumour, thrombus, or vegetation.   - per cardiology risk of JENNIFER outweighs benefit at this time  - pending cardiac MRI to evaluate to mass  - monitor on tele

## 2022-09-17 NOTE — PROGRESS NOTE ADULT - PROBLEM SELECTOR PLAN 2
Patient with TTE performed on at [facility] with evidence of two large RV masses and two additional small masses. IVC filter placed prophylactically at that time due to undiagnosed vegetations on the TTE.  - repeat TTE 9/9 was a limited study but demonstrated a mobile mass likely 2/2 tumour, thrombus, or vegetation.   - per cardiology risk of JENNIFER outweighs benefit at this time  - pending cardiac MRI to evaluate to mass  - monitor on tele - s/p SVT events x 2 9/15 and 9/16 resolved with vagal maneuvers   - c/w metoprol 12.5 BID with hold parameters  - consider EP eval if persists

## 2022-09-17 NOTE — CHART NOTE - NSCHARTNOTEFT_GEN_A_CORE
MAR Accept Note  Transfer to: [facility] w/ tele  Accepting Attending Physician: Dr. ASHVIN Dimas MD  Assigned Room: 5N 528A    Patient seen and examined.   Labs and data reviewed.   No findings precluding transfer of service.       HPI/MICU COURSE:   Please refer to MICU transfer note for full details. Briefly, this is a 31M HIV, metastatic rectal CA (liver, potentially bone; followed by Dr. Frazier of Onc, not currently tx), R eye cataract transferred to MICU from [facility], admit at that time iso AHRF s/p L PTC placement for suspected PTX, CT Chest subsequently revealed PTC w/i mass w/ ddx TB, fungal infxn, cavitary lesion, SCC course and eventually converted to L lateral chest tube c/b anemia suspected 2/2 rectal bleed requiring transfusion, MSSA bacteremia tx w/ IV abx, TTE w/ multiple RV masses, external iliac vein thrombosis s/p IVC filter placement w/ indet PE study, SC emphysema neck/face/extremities/scrotum/back c/f bronchocutaneous fistula [facility]. [facility] MICU course c/b episodes of SVT resolved w/ vagal maneuvers. Chest tubed removed 9/16, stable for downgrade to [facility] w/ tele.       FOR FOLLOW-UP (refer to MICU transfer note plan for full details)  - CXR in am   - Check QTCs weekly on sunday while on methadone   - cardiac MRI ordered  called MRI (9/15) and was advised by MRI dpt to call Monday morning 9/19 to schedule the study   - consider EP eval if SVT persists  - Heme-onc recs regarding potential treatment options   - 6 week course of cefazolin as per ID recs (starting 9/9- )  - pedraza placed by urology due to severe scrotal swelling- Can f/u outpatient w/ Dr Prabhu Lucio, [facility] for Urology at [place].      Diogo Moran MD PGY-3  Internal Medicine Resident  [facility] MAR [id number]

## 2022-09-17 NOTE — PROGRESS NOTE ADULT - SUBJECTIVE AND OBJECTIVE BOX
Sharon Carreno  PGY-1 Resident Physician   Pager [phone]/ [phone]    Patient is a 31y old  Male who presents with a chief complaint of Shortness of breath (17 Sep 2022 08:23)      SUBJECTIVE / OVERNIGHT EVENTS:  Patient seen and evaluated at bedside.    Denies any fevers, chills, CP, or SOB.    Vital Signs Last 24 Hrs  T(C): 36.7 (17 Sep 2022 12:00), Max: 36.8 (17 Sep 2022 00:00)  T(F): 98.1 (17 Sep 2022 12:00), Max: 98.2 (17 Sep 2022 00:00)  HR: 91 (17 Sep 2022 15:00) (78 - 100)  BP: 142/87 (17 Sep 2022 15:00) (117/70 - 145/91)  BP(mean): 98 (17 Sep 2022 15:00) (69 - 103)  RR: 18 (17 Sep 2022 15:00) (14 - 27)  SpO2: 98% (17 Sep 2022 15:00) (98% - 100%)    Parameters below as of 17 Sep 2022 15:00  Patient On (Oxygen Delivery Method): room air        PHYSICAL EXAM:  GENERAL: NAD, well-developed  CHEST/LUNG: Clear to auscultation bilaterally; No wheeze  HEART: Regular rate and rhythm; Normal S1 S2, No murmurs, rubs, or gallops  ABDOMEN: Soft, Nontender, Nondistended; Bowel sounds present  EXTREMITIES:  2+ Peripheral Pulses, No clubbing, cyanosis, or edema  PSYCH: AAOx3    LABS:                        8.3    10.45 )-----------( 192      ( 17 Sep 2022 02:48 )             28.1     Hgb Trend: 8.3<--, 8.7<--, 8.1<--, 9.8<--, 8.9<--  09-17    134<L>  |  100  |  12  ----------------------------<  109<H>  3.5   |  25  |  0.94    Ca    10.5      17 Sep 2022 02:48  Phos  2.3     09-17  Mg     2.10     09-17    TPro  6.3  /  Alb  2.3<L>  /  TBili  0.3  /  DBili  x   /  AST  26  /  ALT  <5<L>  /  AlkPhos  211<H>  09-17    Creatinine Trend: 0.94<--, 1.00<--, 1.19<--, 1.17<--, 1.24<--, 0.37<--  LIVER FUNCTIONS - ( 17 Sep 2022 02:48 )  Alb: 2.3 g/dL / Pro: 6.3 g/dL / ALK PHOS: 211 U/L / ALT: <5 U/L / AST: 26 U/L / GGT: x           PTT - ( 17 Sep 2022 08:43 )  PTT:69.0 sec       Sharon Carreno  PGY-1 Resident Physician   Pager [phone]/ [phone]    Patient is a 31y old  Male who presents with a chief complaint of Shortness of breath (17 Sep 2022 08:23)      SUBJECTIVE / OVERNIGHT EVENTS:  Patient seen and evaluated at bedside.  Patient states that he is having some pain in the back. Asking for his oxycodone.  Denies any fevers, chills, CP, or SOB.  Mother at bedside for some brief history. Per mother, patient did not go to his scheduled oncology appointments for rectal cancer treatment sine June when he was diagnosed. On 8/31 when he was finally on his way to his first appointment, he had a syncopal episode and was sent to [facility]. Per patient however, he reports that he was not offered any treatment. Patient appears to not be the most reliable historian.    Vital Signs Last 24 Hrs  T(C): 36.7 (17 Sep 2022 12:00), Max: 36.8 (17 Sep 2022 00:00)  T(F): 98.1 (17 Sep 2022 12:00), Max: 98.2 (17 Sep 2022 00:00)  HR: 91 (17 Sep 2022 15:00) (78 - 100)  BP: 142/87 (17 Sep 2022 15:00) (117/70 - 145/91)  BP(mean): 98 (17 Sep 2022 15:00) (69 - 103)  RR: 18 (17 Sep 2022 15:00) (14 - 27)  SpO2: 98% (17 Sep 2022 15:00) (98% - 100%)    Parameters below as of 17 Sep 2022 15:00  Patient On (Oxygen Delivery Method): room air        PHYSICAL EXAM:  GENERAL: NAD, cachectic appearing  CHEST/LUNG: No lung sounds hear in left upper lobe but otherwise CTAB; no wheeze. Bandage on anterior L chest.   HEART: Regular rate and rhythm; Normal S1 S2, No murmurs, rubs, or gallops  ABDOMEN: Soft, Nontender, Nondistended; Bowel sounds present  EXTREMITIES:  2+ Peripheral Pulses, No clubbing, cyanosis, or edema  SKIN: bandage on lower back which patient reports is his rectal mass; bandage unopened as patient reporting pain at this time  PSYCH: AAOx3    LABS:                        8.3    10.45 )-----------( 192      ( 17 Sep 2022 02:48 )             28.1     Hgb Trend: 8.3<--, 8.7<--, 8.1<--, 9.8<--, 8.9<--  09-17    134<L>  |  100  |  12  ----------------------------<  109<H>  3.5   |  25  |  0.94    Ca    10.5      17 Sep 2022 02:48  Phos  2.3     09-17  Mg     2.10     09-17    TPro  6.3  /  Alb  2.3<L>  /  TBili  0.3  /  DBili  x   /  AST  26  /  ALT  <5<L>  /  AlkPhos  211<H>  09-17    Creatinine Trend: 0.94<--, 1.00<--, 1.19<--, 1.17<--, 1.24<--, 0.37<--  LIVER FUNCTIONS - ( 17 Sep 2022 02:48 )  Alb: 2.3 g/dL / Pro: 6.3 g/dL / ALK PHOS: 211 U/L / ALT: <5 U/L / AST: 26 U/L / GGT: x           PTT - ( 17 Sep 2022 08:43 )  PTT:69.0 sec

## 2022-09-17 NOTE — PROGRESS NOTE ADULT - PROBLEM SELECTOR PLAN 4
- c/w PO Methadone 2.5mg q12 (started on 9/12) (QTC-444 on 9/12); monitor QTC closely while on Methadone  - c/w Gabapentin 300mg QD   - Start PO Dilaudid 4mg q4 PRN moderate pain (hold for hypotension, oversedation, respiratory depression)  - IV Dilaudid 1.5mg q3 PRN (hold for hypotension, oversedation, respiratory depression)  - can check QTc weekly while on pain regimen  - appreciate palliative care recommendation Sputum cultures from 9/1 grew MSSA and Blood cultures from 8/31 grew MSSA with blood cultures from 9/2 NGTD. Legionella negative. Sputum AFB negative x3. Fungitell and galactomannan negative. Patient had been empirically on Zosyn, Cefazolin and Caspofungin (which was dc'ed).  Patient found to have MSSA bacteremia at [facility].   - s/p Zosyn (9/8 - 9/9)  - sputum culture 9/10 + mod klebsiella pneumoniae-- pansensitive-   - c/w Cefazolin x 6 weeks as per ID recs   - blood Cx neg 9/8  - fungitell and crypto neg 9/10  - ID consulted, recs appreciated

## 2022-09-17 NOTE — PROGRESS NOTE ADULT - PROBLEM SELECTOR PLAN 7
- Fluids:  - Electrolytes: Will replete to maintain K>4, Phos>3, and Mag>2  - Nutrition:   - Activity:   - DVT Prophylaxis:  - Stress Ulcer/GI Prophylaxis:  - Disposition: Patient with history of HIV and follows with Dr. Marcial in clinic.  - CD4 392 and viral load < 30 on 9/2/22  - Viral load undetectable 9/8  - c/w home Biktarvy - Ca 16.4 9/10 s/p calcitonin 4u/kg x 2 doses and pamidronate 90mcg IVPB x1.  - calcium level stable at 11.8   - cont IVFs w/ LR @ 75 cc/hr and continue monitoring   - continue to trend levels q 24 hrs   - can repeat pamidronate after 1 week PRN  - vit D low but will hold supplementation in the setting of hypercalcemia of malignancy

## 2022-09-17 NOTE — PROGRESS NOTE ADULT - SUBJECTIVE AND OBJECTIVE BOX
INTERVAL HPI/OVERNIGHT EVENTS: stable CXR since CT removal 9/16.  Heparin gtt restarted.      SUBJECTIVE: Patient seen and examined at bedside.     CONSTITUTIONAL: No weakness, fevers or chills  EYES/ENT: No visual changes;  No vertigo or throat pain   NECK: No pain or stiffness  RESPIRATORY: No cough, wheezing, hemoptysis; No shortness of breath  CARDIOVASCULAR: No chest pain or palpitations  GASTROINTESTINAL: No abdominal or epigastric pain. No nausea, vomiting, or hematemesis; No diarrhea or constipation. No melena or hematochezia.  GENITOURINARY: No dysuria, frequency or hematuria  NEUROLOGICAL: No numbness or weakness  SKIN: No itching, rashes    OBJECTIVE:    VITAL SIGNS:  ICU Vital Signs Last 24 Hrs  T(C): 36.3 (17 Sep 2022 04:00), Max: 36.8 (17 Sep 2022 00:00)  T(F): 97.4 (17 Sep 2022 04:00), Max: 98.2 (17 Sep 2022 00:00)  HR: 78 (17 Sep 2022 08:00) (78 - 103)  BP: 137/96 (17 Sep 2022 08:00) (117/70 - 145/91)  BP(mean): 103 (17 Sep 2022 08:00) (69 - 103)  ABP: --  ABP(mean): --  RR: 18 (17 Sep 2022 08:00) (14 - 27)  SpO2: 100% (17 Sep 2022 08:00) (97% - 100%)    O2 Parameters below as of 17 Sep 2022 08:00  Patient On (Oxygen Delivery Method): room air              09-16 @ 07:01  -  09-17 @ 07:00  --------------------------------------------------------  IN: 2399 mL / OUT: 2150 mL / NET: 249 mL      CAPILLARY BLOOD GLUCOSE          PHYSICAL EXAM:    General: NAD  HEENT: NC/AT; PERRL, clear conjunctiva  Neck: supple  Respiratory: CTA b/l- dressing in place over old CT site   Cardiovascular: +S1/S2; RRR  Abdomen: soft, NT/ND; +BS x4  : swollen testicles TTP  Extremities: WWP, 2+ peripheral pulses b/l; no LE edema  Skin: normal color and turgor; no rash  Neurological: A and O x 4 following commands     MEDICATIONS:  MEDICATIONS  (STANDING):  bictegravir 50 mG/emtricitabine 200 mG/tenofovir alafenamide 25 mG (BIKTARVY) 1 Tablet(s) Oral daily  ceFAZolin   IVPB 2000 milliGRAM(s) IV Intermittent every 8 hours  chlorhexidine 2% Cloths 1 Application(s) Topical <User Schedule>  dronabinol 5 milliGRAM(s) Oral <User Schedule>  ferrous    sulfate 325 milliGRAM(s) Oral <User Schedule>  gabapentin 300 milliGRAM(s) Oral daily  heparin  Infusion. 1900 Unit(s)/Hr (19 mL/Hr) IV Continuous <Continuous>  lactated ringers. 1000 milliLiter(s) (75 mL/Hr) IV Continuous <Continuous>  lactulose Syrup 10 Gram(s) Oral daily  lidocaine   4% Patch 1 Patch Transdermal daily  lidocaine   4% Patch 1 Patch Transdermal daily  melatonin 3 milliGRAM(s) Oral at bedtime  methadone    Tablet 2.5 milliGRAM(s) Oral two times a day  metoprolol tartrate 12.5 milliGRAM(s) Oral two times a day  naloxegol 25 milliGRAM(s) Oral daily  polyethylene glycol 3350 17 Gram(s) Oral two times a day  senna 2 Tablet(s) Oral at bedtime    MEDICATIONS  (PRN):  bisacodyl 5 milliGRAM(s) Oral at bedtime PRN Constipation  heparin   Injectable 4500 Unit(s) IV Push every 6 hours PRN For aPTT less than 40  heparin   Injectable 2000 Unit(s) IV Push every 6 hours PRN For aPTT between 40 - 57  HYDROmorphone   Tablet 4 milliGRAM(s) Oral every 4 hours PRN Moderate Pain (4 - 6)  HYDROmorphone  Injectable 0.5 milliGRAM(s) IV Push every 3 hours PRN breakthrough severe pain  HYDROmorphone  Injectable 1.5 milliGRAM(s) IV Push every 3 hours PRN Severe Pain (7 - 10)      ALLERGIES:  Allergies    ibuprofen (Angioedema)    Intolerances        LABS:                        8.3    10.45 )-----------( 192      ( 17 Sep 2022 02:48 )             28.1     09-17    134<L>  |  100  |  12  ----------------------------<  109<H>  3.5   |  25  |  0.94    Ca    10.5      17 Sep 2022 02:48  Phos  2.3     09-17  Mg     2.10     09-17    TPro  6.3  /  Alb  2.3<L>  /  TBili  0.3  /  DBili  x   /  AST  26  /  ALT  <5<L>  /  AlkPhos  211<H>  09-17    PTT - ( 17 Sep 2022 02:48 )  PTT:64.5 sec      RADIOLOGY & ADDITIONAL TESTS: Reviewed.

## 2022-09-17 NOTE — PROGRESS NOTE ADULT - PROBLEM SELECTOR PLAN 3
Patient presented to [facility] with hypoxemia and L chest pigtail placed 8/31 due to suspected pneumothorax. Patient showed improvement in SOB and f/u CT chest performed which showed evidence of 12 cm multiloculated thick walled cavitary mass in the CHRISTINE and lingula. Patient presented to [facility] with hypoxemia and L chest pigtail placed 8/31 due to suspected pneumothorax. Patient showed improvement in SOB and f/u CT chest performed which showed evidence of 12 cm multiloculated thick walled cavitary mass in the CHRISTINE and lingula.  - sputum culture 9/10 + mod klebsiella pneumoniae pansensitive  - c/w Cefazolin x 6 weeks as per ID recs   - blood Cx neg 9/8  - fungitell and crypto neg 9/10  - ID consulted, recs appreciated

## 2022-09-17 NOTE — PROGRESS NOTE ADULT - PROBLEM SELECTOR PLAN 9
Patient with history of HIV and follows with Dr. Marcial in clinic.  - CD4 392 and viral load < 30 on 9/2/22  - Viral load undetectable 9/8  - c/w home Biktarvy

## 2022-09-17 NOTE — PROGRESS NOTE ADULT - ASSESSMENT
31 M with untreated metastatic ano-rectal SCC (followed by Dr Frazier [facility] oncology) with mets to liver and possibly bone, HPV positive, HIV infection, and R eye cataracts who presented to the [facility] on 8/31/22 after syncopal episode at home and [facility] for continued of care per family request. Pt with cough x 2 mos, with yellow sputum and shortness of breath since 5 days PTA.

## 2022-09-17 NOTE — PROGRESS NOTE ADULT - PROBLEM SELECTOR PLAN 5
Palliative care consulted for extensive disease burden and GOC discussion. At this time, patient was all available treatment and resuscitation.  - full code - c/w PO Methadone 2.5mg q12 (started on 9/12) (QTC-444 on 9/12); monitor QTC closely while on Methadone  - c/w Gabapentin 300mg QD   - Start PO Dilaudid 4mg q4 PRN moderate pain (hold for hypotension, oversedation, respiratory depression)  - IV Dilaudid 1.5mg q3 PRN (hold for hypotension, oversedation, respiratory depression)  - can check QTc weekly while on pain regimen  - appreciate palliative care recommendation Patient with history of untreated HPV related rectal cancer with episode of rectal bleeding at [facility] Hgb 5.9 s/p 2u pRBCs.  - CT with evidence of liver and bone lesions  - renal cancer treatment will be on hold pending resolution of infection  - appreciate oncology recs

## 2022-09-18 DIAGNOSIS — T79.7XXA TRAUMATIC SUBCUTANEOUS EMPHYSEMA, INITIAL ENCOUNTER: ICD-10-CM

## 2022-09-18 LAB
APTT BLD: 69.8 SEC — HIGH (ref 27–36.3)
APTT BLD: 75 SEC — HIGH (ref 27–36.3)
HCT VFR BLD CALC: 27.3 % — LOW (ref 39–50)
HGB BLD-MCNC: 8.2 G/DL — LOW (ref 13–17)
MCHC RBC-ENTMCNC: 24.3 PG — LOW (ref 27–34)
MCHC RBC-ENTMCNC: 30 GM/DL — LOW (ref 32–36)
MCV RBC AUTO: 80.8 FL — SIGNIFICANT CHANGE UP (ref 80–100)
NRBC # BLD: 0 /100 WBCS — SIGNIFICANT CHANGE UP (ref 0–0)
NRBC # FLD: 0 K/UL — SIGNIFICANT CHANGE UP (ref 0–0)
PLATELET # BLD AUTO: 129 K/UL — LOW (ref 150–400)
RBC # BLD: 3.38 M/UL — LOW (ref 4.2–5.8)
RBC # FLD: 24.1 % — HIGH (ref 10.3–14.5)
WBC # BLD: 10.71 K/UL — HIGH (ref 3.8–10.5)
WBC # FLD AUTO: 10.71 K/UL — HIGH (ref 3.8–10.5)

## 2022-09-18 PROCEDURE — 99233 SBSQ HOSP IP/OBS HIGH 50: CPT | Mod: GC

## 2022-09-18 RX ORDER — HYDROMORPHONE HYDROCHLORIDE 2 MG/ML
4 INJECTION INTRAMUSCULAR; INTRAVENOUS; SUBCUTANEOUS
Refills: 0 | Status: DISCONTINUED | OUTPATIENT
Start: 2022-09-18 | End: 2022-09-19

## 2022-09-18 RX ORDER — HYDROMORPHONE HYDROCHLORIDE 2 MG/ML
4 INJECTION INTRAMUSCULAR; INTRAVENOUS; SUBCUTANEOUS
Refills: 0 | Status: DISCONTINUED | OUTPATIENT
Start: 2022-09-18 | End: 2022-09-18

## 2022-09-18 RX ORDER — HYDROMORPHONE HYDROCHLORIDE 2 MG/ML
1 INJECTION INTRAMUSCULAR; INTRAVENOUS; SUBCUTANEOUS
Refills: 0 | Status: DISCONTINUED | OUTPATIENT
Start: 2022-09-18 | End: 2022-09-19

## 2022-09-18 RX ORDER — HYDROMORPHONE HYDROCHLORIDE 2 MG/ML
1.5 INJECTION INTRAMUSCULAR; INTRAVENOUS; SUBCUTANEOUS
Refills: 0 | Status: DISCONTINUED | OUTPATIENT
Start: 2022-09-18 | End: 2022-09-19

## 2022-09-18 RX ORDER — HYDROMORPHONE HYDROCHLORIDE 2 MG/ML
2 INJECTION INTRAMUSCULAR; INTRAVENOUS; SUBCUTANEOUS EVERY 4 HOURS
Refills: 0 | Status: DISCONTINUED | OUTPATIENT
Start: 2022-09-18 | End: 2022-09-19

## 2022-09-18 RX ADMIN — HYDROMORPHONE HYDROCHLORIDE 0.5 MILLIGRAM(S): 2 INJECTION INTRAMUSCULAR; INTRAVENOUS; SUBCUTANEOUS at 11:33

## 2022-09-18 RX ADMIN — HYDROMORPHONE HYDROCHLORIDE 0.5 MILLIGRAM(S): 2 INJECTION INTRAMUSCULAR; INTRAVENOUS; SUBCUTANEOUS at 03:53

## 2022-09-18 RX ADMIN — HYDROMORPHONE HYDROCHLORIDE 1 MILLIGRAM(S): 2 INJECTION INTRAMUSCULAR; INTRAVENOUS; SUBCUTANEOUS at 15:24

## 2022-09-18 RX ADMIN — Medication 100 MILLIGRAM(S): at 06:02

## 2022-09-18 RX ADMIN — BICTEGRAVIR SODIUM, EMTRICITABINE, AND TENOFOVIR ALAFENAMIDE FUMARATE 1 TABLET(S): 30; 120; 15 TABLET ORAL at 11:56

## 2022-09-18 RX ADMIN — HYDROMORPHONE HYDROCHLORIDE 0.5 MILLIGRAM(S): 2 INJECTION INTRAMUSCULAR; INTRAVENOUS; SUBCUTANEOUS at 11:49

## 2022-09-18 RX ADMIN — SODIUM CHLORIDE 75 MILLILITER(S): 9 INJECTION, SOLUTION INTRAVENOUS at 07:35

## 2022-09-18 RX ADMIN — HEPARIN SODIUM 1900 UNIT(S)/HR: 5000 INJECTION INTRAVENOUS; SUBCUTANEOUS at 09:34

## 2022-09-18 RX ADMIN — Medication 100 MILLIGRAM(S): at 13:13

## 2022-09-18 RX ADMIN — NALOXEGOL OXALATE 25 MILLIGRAM(S): 12.5 TABLET, FILM COATED ORAL at 11:56

## 2022-09-18 RX ADMIN — HYDROMORPHONE HYDROCHLORIDE 1.5 MILLIGRAM(S): 2 INJECTION INTRAMUSCULAR; INTRAVENOUS; SUBCUTANEOUS at 13:19

## 2022-09-18 RX ADMIN — CHLORHEXIDINE GLUCONATE 1 APPLICATION(S): 213 SOLUTION TOPICAL at 05:50

## 2022-09-18 RX ADMIN — HEPARIN SODIUM 1900 UNIT(S)/HR: 5000 INJECTION INTRAVENOUS; SUBCUTANEOUS at 23:10

## 2022-09-18 RX ADMIN — HEPARIN SODIUM 1900 UNIT(S)/HR: 5000 INJECTION INTRAVENOUS; SUBCUTANEOUS at 18:38

## 2022-09-18 RX ADMIN — HYDROMORPHONE HYDROCHLORIDE 1.5 MILLIGRAM(S): 2 INJECTION INTRAMUSCULAR; INTRAVENOUS; SUBCUTANEOUS at 23:10

## 2022-09-18 RX ADMIN — HYDROMORPHONE HYDROCHLORIDE 0.5 MILLIGRAM(S): 2 INJECTION INTRAMUSCULAR; INTRAVENOUS; SUBCUTANEOUS at 07:48

## 2022-09-18 RX ADMIN — HYDROMORPHONE HYDROCHLORIDE 1.5 MILLIGRAM(S): 2 INJECTION INTRAMUSCULAR; INTRAVENOUS; SUBCUTANEOUS at 13:04

## 2022-09-18 RX ADMIN — Medication 12.5 MILLIGRAM(S): at 17:06

## 2022-09-18 RX ADMIN — HYDROMORPHONE HYDROCHLORIDE 1.5 MILLIGRAM(S): 2 INJECTION INTRAMUSCULAR; INTRAVENOUS; SUBCUTANEOUS at 09:43

## 2022-09-18 RX ADMIN — HEPARIN SODIUM 1900 UNIT(S)/HR: 5000 INJECTION INTRAVENOUS; SUBCUTANEOUS at 07:28

## 2022-09-18 RX ADMIN — HYDROMORPHONE HYDROCHLORIDE 1 MILLIGRAM(S): 2 INJECTION INTRAMUSCULAR; INTRAVENOUS; SUBCUTANEOUS at 15:39

## 2022-09-18 RX ADMIN — Medication 100 MILLIGRAM(S): at 21:35

## 2022-09-18 RX ADMIN — HYDROMORPHONE HYDROCHLORIDE 1.5 MILLIGRAM(S): 2 INJECTION INTRAMUSCULAR; INTRAVENOUS; SUBCUTANEOUS at 03:18

## 2022-09-18 RX ADMIN — HYDROMORPHONE HYDROCHLORIDE 1 MILLIGRAM(S): 2 INJECTION INTRAMUSCULAR; INTRAVENOUS; SUBCUTANEOUS at 20:40

## 2022-09-18 RX ADMIN — HYDROMORPHONE HYDROCHLORIDE 1.5 MILLIGRAM(S): 2 INJECTION INTRAMUSCULAR; INTRAVENOUS; SUBCUTANEOUS at 05:58

## 2022-09-18 RX ADMIN — LACTULOSE 10 GRAM(S): 10 SOLUTION ORAL at 11:56

## 2022-09-18 RX ADMIN — Medication 5 MILLIGRAM(S): at 05:58

## 2022-09-18 RX ADMIN — METHADONE HYDROCHLORIDE 2.5 MILLIGRAM(S): 40 TABLET ORAL at 17:02

## 2022-09-18 RX ADMIN — HEPARIN SODIUM 1900 UNIT(S)/HR: 5000 INJECTION INTRAVENOUS; SUBCUTANEOUS at 19:17

## 2022-09-18 RX ADMIN — Medication 5 MILLIGRAM(S): at 17:02

## 2022-09-18 RX ADMIN — HYDROMORPHONE HYDROCHLORIDE 1.5 MILLIGRAM(S): 2 INJECTION INTRAMUSCULAR; INTRAVENOUS; SUBCUTANEOUS at 02:18

## 2022-09-18 RX ADMIN — HYDROMORPHONE HYDROCHLORIDE 2 MILLIGRAM(S): 2 INJECTION INTRAMUSCULAR; INTRAVENOUS; SUBCUTANEOUS at 17:17

## 2022-09-18 RX ADMIN — HYDROMORPHONE HYDROCHLORIDE 1.5 MILLIGRAM(S): 2 INJECTION INTRAMUSCULAR; INTRAVENOUS; SUBCUTANEOUS at 06:58

## 2022-09-18 RX ADMIN — HYDROMORPHONE HYDROCHLORIDE 0.5 MILLIGRAM(S): 2 INJECTION INTRAMUSCULAR; INTRAVENOUS; SUBCUTANEOUS at 01:37

## 2022-09-18 RX ADMIN — HYDROMORPHONE HYDROCHLORIDE 0.5 MILLIGRAM(S): 2 INJECTION INTRAMUSCULAR; INTRAVENOUS; SUBCUTANEOUS at 00:37

## 2022-09-18 RX ADMIN — HYDROMORPHONE HYDROCHLORIDE 2 MILLIGRAM(S): 2 INJECTION INTRAMUSCULAR; INTRAVENOUS; SUBCUTANEOUS at 21:36

## 2022-09-18 RX ADMIN — SODIUM CHLORIDE 75 MILLILITER(S): 9 INJECTION, SOLUTION INTRAVENOUS at 14:20

## 2022-09-18 RX ADMIN — HYDROMORPHONE HYDROCHLORIDE 0.5 MILLIGRAM(S): 2 INJECTION INTRAMUSCULAR; INTRAVENOUS; SUBCUTANEOUS at 07:33

## 2022-09-18 RX ADMIN — Medication 12.5 MILLIGRAM(S): at 06:02

## 2022-09-18 RX ADMIN — GABAPENTIN 300 MILLIGRAM(S): 400 CAPSULE ORAL at 11:56

## 2022-09-18 RX ADMIN — HYDROMORPHONE HYDROCHLORIDE 1 MILLIGRAM(S): 2 INJECTION INTRAMUSCULAR; INTRAVENOUS; SUBCUTANEOUS at 19:40

## 2022-09-18 RX ADMIN — HYDROMORPHONE HYDROCHLORIDE 0.5 MILLIGRAM(S): 2 INJECTION INTRAMUSCULAR; INTRAVENOUS; SUBCUTANEOUS at 04:53

## 2022-09-18 RX ADMIN — METHADONE HYDROCHLORIDE 2.5 MILLIGRAM(S): 40 TABLET ORAL at 07:06

## 2022-09-18 RX ADMIN — HYDROMORPHONE HYDROCHLORIDE 1.5 MILLIGRAM(S): 2 INJECTION INTRAMUSCULAR; INTRAVENOUS; SUBCUTANEOUS at 09:28

## 2022-09-18 RX ADMIN — HYDROMORPHONE HYDROCHLORIDE 2 MILLIGRAM(S): 2 INJECTION INTRAMUSCULAR; INTRAVENOUS; SUBCUTANEOUS at 17:02

## 2022-09-18 RX ADMIN — Medication 5 MILLIGRAM(S): at 11:56

## 2022-09-18 NOTE — CHART NOTE - NSCHARTNOTEFT_GEN_A_CORE
Resident called that pt's pain was not controlled on current regimen. He was requesting PRN Dilaudid for severe pain q3h with additional breakthrough PRN Dilaudid. He required 6 PRN IV Dilaudid for severe pain and 6 PRN IV Dilaudid for breakthrough pain (12 mg IV Dilaudid used in last 24 hours).    Recommendations:  - Start IV Dilaudid 2mg q4h ATC  - Increase frequency of IV Dilaudid 1.5mg q3h PRN severe pain to q2h  - Increase IV Dilaudid 0.5mg q3h PRN breakthrough pain to Dilaudid 1mg q2h PRN breakthrough  - Continue PO Dilaudid 4mg PRN q3h moderate pain but increase to q2h    PC team to follow up tomorrow regarding possible changes to methadone. Continue bowel regimen while on opioids. Monitor for pt lethargy/respiratory distress.

## 2022-09-18 NOTE — PROGRESS NOTE ADULT - PROBLEM SELECTOR PLAN 5
- c/w PO Methadone 2.5mg q12 (started on 9/12) (QTC-444 on 9/12); monitor QTC closely while on Methadone  - c/w Gabapentin 300mg QD   - IV dilaudid 2mg q4 ATC  - PO dilaudid 4mg q2 PRN moderate pain   - IV dilaudid 1.5mg q2 PRN severe pain  - IV dilaudid 1mg q2 PRN breakthrough pain  - can check QTc weekly while on pain regimen  - appreciate palliative care recommendation

## 2022-09-18 NOTE — PROGRESS NOTE ADULT - SUBJECTIVE AND OBJECTIVE BOX
Sharon Carreno  PGY-1 Resident Physician   Pager [phone]/ [phone]    Patient is a 31y old  Male who presents with a chief complaint of Shortness of breath (17 Sep 2022 17:42)      SUBJECTIVE / OVERNIGHT EVENTS:  Patient seen and evaluated at bedside.    Denies any fevers, chills, CP, or SOB.    Vital Signs Last 24 Hrs  T(C): 36.8 (18 Sep 2022 05:29), Max: 36.9 (17 Sep 2022 19:20)  T(F): 98.2 (18 Sep 2022 05:29), Max: 98.4 (17 Sep 2022 19:20)  HR: 104 (18 Sep 2022 05:29) (78 - 104)  BP: 139/86 (18 Sep 2022 05:29) (123/64 - 154/88)  BP(mean): 98 (17 Sep 2022 15:00) (81 - 103)  RR: 18 (18 Sep 2022 05:29) (14 - 19)  SpO2: 99% (18 Sep 2022 05:29) (98% - 100%)    Parameters below as of 18 Sep 2022 05:29  Patient On (Oxygen Delivery Method): room air        PHYSICAL EXAM:  GENERAL: NAD, well-developed  CHEST/LUNG: Clear to auscultation bilaterally; No wheeze  HEART: Regular rate and rhythm; Normal S1 S2, No murmurs, rubs, or gallops  ABDOMEN: Soft, Nontender, Nondistended; Bowel sounds present  EXTREMITIES:  2+ Peripheral Pulses, No clubbing, cyanosis, or edema  PSYCH: AAOx3    LABS:                        8.3    10.45 )-----------( 192      ( 17 Sep 2022 02:48 )             28.1     Hgb Trend: 8.3<--, 8.7<--, 8.1<--, 9.8<--, 8.9<--  09-17    134<L>  |  100  |  12  ----------------------------<  109<H>  3.5   |  25  |  0.94    Ca    10.5      17 Sep 2022 02:48  Phos  2.3     09-17  Mg     2.10     09-17    TPro  6.3  /  Alb  2.3<L>  /  TBili  0.3  /  DBili  x   /  AST  26  /  ALT  <5<L>  /  AlkPhos  211<H>  09-17    Creatinine Trend: 0.94<--, 1.00<--, 1.19<--, 1.17<--, 1.24<--, 0.37<--  LIVER FUNCTIONS - ( 17 Sep 2022 02:48 )  Alb: 2.3 g/dL / Pro: 6.3 g/dL / ALK PHOS: 211 U/L / ALT: <5 U/L / AST: 26 U/L / GGT: x           PTT - ( 17 Sep 2022 08:43 )  PTT:69.0 sec       Sharon Carreno  PGY-1 Resident Physician   Pager [phone]/ [phone]    Patient is a 31y old  Male who presents with a chief complaint of Shortness of breath (17 Sep 2022 17:42)      SUBJECTIVE / OVERNIGHT EVENTS:  Patient seen and evaluated at bedside.  States he is having pain and wanting pain medications. Otherwise does not want to cooperate with interview or exam at this time. States he just needs to "relax."    Vital Signs Last 24 Hrs  T(C): 36.8 (18 Sep 2022 05:29), Max: 36.9 (17 Sep 2022 19:20)  T(F): 98.2 (18 Sep 2022 05:29), Max: 98.4 (17 Sep 2022 19:20)  HR: 104 (18 Sep 2022 05:29) (78 - 104)  BP: 139/86 (18 Sep 2022 05:29) (123/64 - 154/88)  BP(mean): 98 (17 Sep 2022 15:00) (81 - 103)  RR: 18 (18 Sep 2022 05:29) (14 - 19)  SpO2: 99% (18 Sep 2022 05:29) (98% - 100%)    Parameters below as of 18 Sep 2022 05:29  Patient On (Oxygen Delivery Method): room air        PHYSICAL EXAM:  GENERAL: NAD, well-developed  CHEST/LUNG: Clear to auscultation bilaterally; No wheeze  HEART: Regular rate and rhythm; Normal S1 S2, No murmurs, rubs, or gallops  ABDOMEN: Soft, Nontender, Nondistended; Bowel sounds present  EXTREMITIES:  2+ Peripheral Pulses, No clubbing, cyanosis, or edema  PSYCH: AAOx3    LABS:                        8.3    10.45 )-----------( 192      ( 17 Sep 2022 02:48 )             28.1     Hgb Trend: 8.3<--, 8.7<--, 8.1<--, 9.8<--, 8.9<--  09-17    134<L>  |  100  |  12  ----------------------------<  109<H>  3.5   |  25  |  0.94    Ca    10.5      17 Sep 2022 02:48  Phos  2.3     09-17  Mg     2.10     09-17    TPro  6.3  /  Alb  2.3<L>  /  TBili  0.3  /  DBili  x   /  AST  26  /  ALT  <5<L>  /  AlkPhos  211<H>  09-17    Creatinine Trend: 0.94<--, 1.00<--, 1.19<--, 1.17<--, 1.24<--, 0.37<--  LIVER FUNCTIONS - ( 17 Sep 2022 02:48 )  Alb: 2.3 g/dL / Pro: 6.3 g/dL / ALK PHOS: 211 U/L / ALT: <5 U/L / AST: 26 U/L / GGT: x           PTT - ( 17 Sep 2022 08:43 )  PTT:69.0 sec

## 2022-09-18 NOTE — PROGRESS NOTE ADULT - PROBLEM SELECTOR PLAN 8
- Ca 16.4 9/10 s/p calcitonin 4u/kg x 2 doses and pamidronate 90mcg IVPB x1.  - calcium level stable at 11.8   - cont IVFs w/ LR @ 75 cc/hr and continue monitoring   - continue to trend levels q 24 hrs   - can repeat pamidronate after 1 week PRN  - vit D low but will hold supplementation in the setting of hypercalcemia of malignancy

## 2022-09-18 NOTE — PROGRESS NOTE ADULT - PROBLEM SELECTOR PLAN 9
- s/p SVT events x 2 9/15 and 9/16 resolved with vagal maneuvers   - c/w metoprol 12.5 BID with hold parameters  - consider EP eval if persists

## 2022-09-18 NOTE — PROGRESS NOTE ADULT - PROBLEM SELECTOR PLAN 7
Patient with history of untreated HPV related rectal cancer with episode of rectal bleeding at [facility] Hgb 5.9 s/p 2u pRBCs.  - CT with evidence of liver and bone lesions  - renal cancer treatment will be on hold pending resolution of infection  - will place radiation oncology consult in AM for potential RT for rectal cancer while patient is inpatient  - appreciate oncology recs

## 2022-09-18 NOTE — PROGRESS NOTE ADULT - PROBLEM SELECTOR PLAN 4
CT Chest 9/11 with evidence of persistent pneumomediastinum and extensive subcutaneous emphysema of the neck, chest, abdomen, pelvis, and extremities, including the scrotum.   - CANDIS per thoracic surgery  - indwelling pedraza was placed at [facility] (Coude placed for urinary strain)   - No further urologic intervention needed at this time.   - Can f/u outpatient w/ Dr Prabhu Lucio, [facility] at [place]  - pain control with dilaudid and methadone, ct a/p 9/11 did not show any inflammation/hydrocele/infection .

## 2022-09-18 NOTE — PROGRESS NOTE ADULT - PROBLEM SELECTOR PLAN 11
Palliative care consulted for extensive disease burden and GOC discussion. At this time, patient was all available treatment and resuscitation.  - full code

## 2022-09-18 NOTE — PROGRESS NOTE ADULT - PROBLEM SELECTOR PLAN 2
Patient with history of MSSA bacteremia found at [facility] with blood cultures 8/31 positive for MSSA and repeat on 9/2 NGTD.  - s/p Zosyn (9/8 - 9/9)  - transitioned to Cefazolin per ID recs on 9/9, plan for 6 week course  - will need to clarify whether 6 week course to start from date of first positive cultures or date of first negative cutlures

## 2022-09-18 NOTE — PROGRESS NOTE ADULT - PROBLEM SELECTOR PLAN 3
Patient presented to [facility] with hypoxemia and L chest pigtail placed 8/31 due to suspected pneumothorax. Patient showed improvement in SOB and f/u CT chest performed which showed evidence of 12 cm multiloculated thick walled cavitary mass in the CHRISTINE and lingula.  - sputum culture 9/10 + mod klebsiella pneumoniae pansensitive  - blood Cx neg 9/8  - fungitell and crypto neg 9/10  - ID consulted, recs appreciated

## 2022-09-18 NOTE — PROGRESS NOTE ADULT - ASSESSMENT
31 M with untreated metastatic ano-rectal SCC (followed by Dr Frazier [facility] oncology) with mets to liver and possibly bone, HPV positive, HIV infection, and R eye cataracts who presented to the [facility] on 8/31/22 after syncopal episode at home and transferred to [facility] for continued of care per family request. Pt with cough x 2 mos, with yellow sputum and shortness of breath since 5 days PTA.      31 M with untreated metastatic ano-rectal SCC (followed by Dr Frazier [facility] oncology) with mets to liver and possibly bone, HPV positive, HIV infection, and R eye cataracts who presented to the [facility] on 8/31/22 after syncopal episode at home and transferred to [facility] for continued of care per family request. Pt with cough x 2 mos, with yellow sputum and shortness of breath since 5 days PTA.

## 2022-09-18 NOTE — PROGRESS NOTE ADULT - PROBLEM SELECTOR PLAN 6
Patient found to have L external vein DVT with bilateral leg DVT studies negative for DVT.  - continue heparin gtt  - can consider switching to lovenox in AM as patient has no pending procedures at this time

## 2022-09-18 NOTE — PROGRESS NOTE ADULT - PROBLEM SELECTOR PLAN 1
Patient with TTE performed on at [facility] with evidence of two large RV masses and two additional small masses. IVC filter placed prophylactically at that time due to undiagnosed vegetations on the TTE.  - repeat TTE 9/9 was a limited study but demonstrated a mobile mass likely 2/2 tumour, thrombus, or vegetation.   - per cardiology risk of JENNIFER outweighs benefit at this time  - pending cardiac MRI to evaluate to mass  - monitor on tele

## 2022-09-19 ENCOUNTER — NON-APPOINTMENT (OUTPATIENT)
Age: 32
End: 2022-09-19

## 2022-09-19 ENCOUNTER — APPOINTMENT (OUTPATIENT)
Dept: MRI IMAGING | Facility: CLINIC | Age: 32
End: 2022-09-19

## 2022-09-19 DIAGNOSIS — R31.9 HEMATURIA, UNSPECIFIED: ICD-10-CM

## 2022-09-19 DIAGNOSIS — Z79.899 OTHER LONG TERM (CURRENT) DRUG THERAPY: ICD-10-CM

## 2022-09-19 PROBLEM — C20 MALIGNANT NEOPLASM OF RECTUM: Chronic | Status: ACTIVE | Noted: 2022-09-08

## 2022-09-19 LAB
ALBUMIN SERPL ELPH-MCNC: 2.4 G/DL — LOW (ref 3.3–5)
ALP SERPL-CCNC: 203 U/L — HIGH (ref 40–120)
ALT FLD-CCNC: 5 U/L — SIGNIFICANT CHANGE UP (ref 4–41)
ANION GAP SERPL CALC-SCNC: 10 MMOL/L — SIGNIFICANT CHANGE UP (ref 7–14)
APTT BLD: 36.2 SEC — SIGNIFICANT CHANGE UP (ref 27–36.3)
APTT BLD: 36.6 SEC — HIGH (ref 27–36.3)
APTT BLD: 57.3 SEC — HIGH (ref 27–36.3)
AST SERPL-CCNC: 24 U/L — SIGNIFICANT CHANGE UP (ref 4–40)
BASOPHILS # BLD AUTO: 0.02 K/UL — SIGNIFICANT CHANGE UP (ref 0–0.2)
BASOPHILS NFR BLD AUTO: 0.2 % — SIGNIFICANT CHANGE UP (ref 0–2)
BILIRUB SERPL-MCNC: 0.3 MG/DL — SIGNIFICANT CHANGE UP (ref 0.2–1.2)
BUN SERPL-MCNC: 7 MG/DL — SIGNIFICANT CHANGE UP (ref 7–23)
CALCIUM SERPL-MCNC: 9.4 MG/DL — SIGNIFICANT CHANGE UP (ref 8.4–10.5)
CHLORIDE SERPL-SCNC: 101 MMOL/L — SIGNIFICANT CHANGE UP (ref 98–107)
CO2 SERPL-SCNC: 25 MMOL/L — SIGNIFICANT CHANGE UP (ref 22–31)
CREAT SERPL-MCNC: 0.8 MG/DL — SIGNIFICANT CHANGE UP (ref 0.5–1.3)
EGFR: 120 ML/MIN/1.73M2 — SIGNIFICANT CHANGE UP
EOSINOPHIL # BLD AUTO: 0.47 K/UL — SIGNIFICANT CHANGE UP (ref 0–0.5)
EOSINOPHIL NFR BLD AUTO: 4.8 % — SIGNIFICANT CHANGE UP (ref 0–6)
GLUCOSE SERPL-MCNC: 112 MG/DL — HIGH (ref 70–99)
HCT VFR BLD CALC: 30.9 % — LOW (ref 39–50)
HGB BLD-MCNC: 9.2 G/DL — LOW (ref 13–17)
IANC: 7.41 K/UL — HIGH (ref 1.8–7.4)
IMM GRANULOCYTES NFR BLD AUTO: 0.4 % — SIGNIFICANT CHANGE UP (ref 0–0.9)
LYMPHOCYTES # BLD AUTO: 1.07 K/UL — SIGNIFICANT CHANGE UP (ref 1–3.3)
LYMPHOCYTES # BLD AUTO: 11 % — LOW (ref 13–44)
MAGNESIUM SERPL-MCNC: 1.2 MG/DL — LOW (ref 1.6–2.6)
MCHC RBC-ENTMCNC: 24.1 PG — LOW (ref 27–34)
MCHC RBC-ENTMCNC: 29.8 GM/DL — LOW (ref 32–36)
MCV RBC AUTO: 80.9 FL — SIGNIFICANT CHANGE UP (ref 80–100)
MONOCYTES # BLD AUTO: 0.71 K/UL — SIGNIFICANT CHANGE UP (ref 0–0.9)
MONOCYTES NFR BLD AUTO: 7.3 % — SIGNIFICANT CHANGE UP (ref 2–14)
NEUTROPHILS # BLD AUTO: 7.41 K/UL — HIGH (ref 1.8–7.4)
NEUTROPHILS NFR BLD AUTO: 76.3 % — SIGNIFICANT CHANGE UP (ref 43–77)
NRBC # BLD: 0 /100 WBCS — SIGNIFICANT CHANGE UP (ref 0–0)
NRBC # FLD: 0 K/UL — SIGNIFICANT CHANGE UP (ref 0–0)
PHOSPHATE SERPL-MCNC: 1.9 MG/DL — LOW (ref 2.5–4.5)
PLATELET # BLD AUTO: 134 K/UL — LOW (ref 150–400)
POTASSIUM SERPL-MCNC: 3.6 MMOL/L — SIGNIFICANT CHANGE UP (ref 3.5–5.3)
POTASSIUM SERPL-SCNC: 3.6 MMOL/L — SIGNIFICANT CHANGE UP (ref 3.5–5.3)
PROT SERPL-MCNC: 5.8 G/DL — LOW (ref 6–8.3)
RBC # BLD: 3.82 M/UL — LOW (ref 4.2–5.8)
RBC # FLD: 23.9 % — HIGH (ref 10.3–14.5)
SODIUM SERPL-SCNC: 136 MMOL/L — SIGNIFICANT CHANGE UP (ref 135–145)
WBC # BLD: 9.72 K/UL — SIGNIFICANT CHANGE UP (ref 3.8–10.5)
WBC # FLD AUTO: 9.72 K/UL — SIGNIFICANT CHANGE UP (ref 3.8–10.5)

## 2022-09-19 PROCEDURE — 93010 ELECTROCARDIOGRAM REPORT: CPT

## 2022-09-19 PROCEDURE — 99233 SBSQ HOSP IP/OBS HIGH 50: CPT

## 2022-09-19 PROCEDURE — 99223 1ST HOSP IP/OBS HIGH 75: CPT | Mod: GC

## 2022-09-19 PROCEDURE — 99233 SBSQ HOSP IP/OBS HIGH 50: CPT | Mod: GC

## 2022-09-19 RX ORDER — HYDROMORPHONE HYDROCHLORIDE 2 MG/ML
4 INJECTION INTRAMUSCULAR; INTRAVENOUS; SUBCUTANEOUS EVERY 4 HOURS
Refills: 0 | Status: DISCONTINUED | OUTPATIENT
Start: 2022-09-19 | End: 2022-09-22

## 2022-09-19 RX ORDER — ENOXAPARIN SODIUM 100 MG/ML
60 INJECTION SUBCUTANEOUS EVERY 12 HOURS
Refills: 0 | Status: DISCONTINUED | OUTPATIENT
Start: 2022-09-19 | End: 2022-10-12

## 2022-09-19 RX ORDER — HYDROMORPHONE HYDROCHLORIDE 2 MG/ML
1.5 INJECTION INTRAMUSCULAR; INTRAVENOUS; SUBCUTANEOUS
Refills: 0 | Status: DISCONTINUED | OUTPATIENT
Start: 2022-09-19 | End: 2022-09-19

## 2022-09-19 RX ORDER — HYDROMORPHONE HYDROCHLORIDE 2 MG/ML
1.5 INJECTION INTRAMUSCULAR; INTRAVENOUS; SUBCUTANEOUS
Refills: 0 | Status: DISCONTINUED | OUTPATIENT
Start: 2022-09-19 | End: 2022-09-21

## 2022-09-19 RX ORDER — METHADONE HYDROCHLORIDE 40 MG/1
5 TABLET ORAL THREE TIMES A DAY
Refills: 0 | Status: DISCONTINUED | OUTPATIENT
Start: 2022-09-19 | End: 2022-09-26

## 2022-09-19 RX ORDER — NALOXONE HYDROCHLORIDE 4 MG/.1ML
0.1 SPRAY NASAL
Refills: 0 | Status: DISCONTINUED | OUTPATIENT
Start: 2022-09-19 | End: 2022-10-10

## 2022-09-19 RX ORDER — LIDOCAINE 4 G/100G
1 CREAM TOPICAL ONCE
Refills: 0 | Status: COMPLETED | OUTPATIENT
Start: 2022-09-19 | End: 2022-09-19

## 2022-09-19 RX ORDER — HYDROMORPHONE HYDROCHLORIDE 2 MG/ML
4 INJECTION INTRAMUSCULAR; INTRAVENOUS; SUBCUTANEOUS EVERY 6 HOURS
Refills: 0 | Status: DISCONTINUED | OUTPATIENT
Start: 2022-09-19 | End: 2022-09-20

## 2022-09-19 RX ORDER — MAGNESIUM SULFATE 500 MG/ML
2 VIAL (ML) INJECTION ONCE
Refills: 0 | Status: COMPLETED | OUTPATIENT
Start: 2022-09-19 | End: 2022-09-19

## 2022-09-19 RX ORDER — SODIUM,POTASSIUM PHOSPHATES 278-250MG
1 POWDER IN PACKET (EA) ORAL ONCE
Refills: 0 | Status: COMPLETED | OUTPATIENT
Start: 2022-09-19 | End: 2022-09-19

## 2022-09-19 RX ORDER — HYDROMORPHONE HYDROCHLORIDE 2 MG/ML
0.5 INJECTION INTRAMUSCULAR; INTRAVENOUS; SUBCUTANEOUS
Refills: 0 | Status: DISCONTINUED | OUTPATIENT
Start: 2022-09-19 | End: 2022-09-20

## 2022-09-19 RX ADMIN — HYDROMORPHONE HYDROCHLORIDE 1.5 MILLIGRAM(S): 2 INJECTION INTRAMUSCULAR; INTRAVENOUS; SUBCUTANEOUS at 11:11

## 2022-09-19 RX ADMIN — HYDROMORPHONE HYDROCHLORIDE 1.5 MILLIGRAM(S): 2 INJECTION INTRAMUSCULAR; INTRAVENOUS; SUBCUTANEOUS at 10:56

## 2022-09-19 RX ADMIN — HYDROMORPHONE HYDROCHLORIDE 1.5 MILLIGRAM(S): 2 INJECTION INTRAMUSCULAR; INTRAVENOUS; SUBCUTANEOUS at 22:07

## 2022-09-19 RX ADMIN — Medication 25 GRAM(S): at 07:27

## 2022-09-19 RX ADMIN — HEPARIN SODIUM 1900 UNIT(S)/HR: 5000 INJECTION INTRAVENOUS; SUBCUTANEOUS at 04:00

## 2022-09-19 RX ADMIN — METHADONE HYDROCHLORIDE 2.5 MILLIGRAM(S): 40 TABLET ORAL at 05:18

## 2022-09-19 RX ADMIN — Medication 5 MILLIGRAM(S): at 17:24

## 2022-09-19 RX ADMIN — Medication 1 PACKET(S): at 07:26

## 2022-09-19 RX ADMIN — HYDROMORPHONE HYDROCHLORIDE 2 MILLIGRAM(S): 2 INJECTION INTRAMUSCULAR; INTRAVENOUS; SUBCUTANEOUS at 13:15

## 2022-09-19 RX ADMIN — Medication 5 MILLIGRAM(S): at 11:18

## 2022-09-19 RX ADMIN — HYDROMORPHONE HYDROCHLORIDE 1.5 MILLIGRAM(S): 2 INJECTION INTRAMUSCULAR; INTRAVENOUS; SUBCUTANEOUS at 03:21

## 2022-09-19 RX ADMIN — HYDROMORPHONE HYDROCHLORIDE 4 MILLIGRAM(S): 2 INJECTION INTRAMUSCULAR; INTRAVENOUS; SUBCUTANEOUS at 20:30

## 2022-09-19 RX ADMIN — HYDROMORPHONE HYDROCHLORIDE 4 MILLIGRAM(S): 2 INJECTION INTRAMUSCULAR; INTRAVENOUS; SUBCUTANEOUS at 17:32

## 2022-09-19 RX ADMIN — Medication 12.5 MILLIGRAM(S): at 17:18

## 2022-09-19 RX ADMIN — Medication 100 MILLIGRAM(S): at 05:19

## 2022-09-19 RX ADMIN — Medication 100 MILLIGRAM(S): at 13:15

## 2022-09-19 RX ADMIN — HYDROMORPHONE HYDROCHLORIDE 2 MILLIGRAM(S): 2 INJECTION INTRAMUSCULAR; INTRAVENOUS; SUBCUTANEOUS at 01:35

## 2022-09-19 RX ADMIN — HYDROMORPHONE HYDROCHLORIDE 1.5 MILLIGRAM(S): 2 INJECTION INTRAMUSCULAR; INTRAVENOUS; SUBCUTANEOUS at 00:10

## 2022-09-19 RX ADMIN — HYDROMORPHONE HYDROCHLORIDE 2 MILLIGRAM(S): 2 INJECTION INTRAMUSCULAR; INTRAVENOUS; SUBCUTANEOUS at 09:53

## 2022-09-19 RX ADMIN — HYDROMORPHONE HYDROCHLORIDE 4 MILLIGRAM(S): 2 INJECTION INTRAMUSCULAR; INTRAVENOUS; SUBCUTANEOUS at 17:17

## 2022-09-19 RX ADMIN — CHLORHEXIDINE GLUCONATE 1 APPLICATION(S): 213 SOLUTION TOPICAL at 06:02

## 2022-09-19 RX ADMIN — LACTULOSE 10 GRAM(S): 10 SOLUTION ORAL at 11:18

## 2022-09-19 RX ADMIN — METHADONE HYDROCHLORIDE 5 MILLIGRAM(S): 40 TABLET ORAL at 22:12

## 2022-09-19 RX ADMIN — HYDROMORPHONE HYDROCHLORIDE 1.5 MILLIGRAM(S): 2 INJECTION INTRAMUSCULAR; INTRAVENOUS; SUBCUTANEOUS at 15:26

## 2022-09-19 RX ADMIN — HYDROMORPHONE HYDROCHLORIDE 1.5 MILLIGRAM(S): 2 INJECTION INTRAMUSCULAR; INTRAVENOUS; SUBCUTANEOUS at 23:07

## 2022-09-19 RX ADMIN — Medication 100 MILLIGRAM(S): at 22:08

## 2022-09-19 RX ADMIN — NALOXEGOL OXALATE 25 MILLIGRAM(S): 12.5 TABLET, FILM COATED ORAL at 11:18

## 2022-09-19 RX ADMIN — POLYETHYLENE GLYCOL 3350 17 GRAM(S): 17 POWDER, FOR SOLUTION ORAL at 05:18

## 2022-09-19 RX ADMIN — Medication 3 MILLIGRAM(S): at 22:12

## 2022-09-19 RX ADMIN — HEPARIN SODIUM 2000 UNIT(S)/HR: 5000 INJECTION INTRAVENOUS; SUBCUTANEOUS at 07:07

## 2022-09-19 RX ADMIN — HYDROMORPHONE HYDROCHLORIDE 2 MILLIGRAM(S): 2 INJECTION INTRAMUSCULAR; INTRAVENOUS; SUBCUTANEOUS at 09:38

## 2022-09-19 RX ADMIN — ENOXAPARIN SODIUM 60 MILLIGRAM(S): 100 INJECTION SUBCUTANEOUS at 11:37

## 2022-09-19 RX ADMIN — Medication 12.5 MILLIGRAM(S): at 05:18

## 2022-09-19 RX ADMIN — HYDROMORPHONE HYDROCHLORIDE 4 MILLIGRAM(S): 2 INJECTION INTRAMUSCULAR; INTRAVENOUS; SUBCUTANEOUS at 23:59

## 2022-09-19 RX ADMIN — HYDROMORPHONE HYDROCHLORIDE 2 MILLIGRAM(S): 2 INJECTION INTRAMUSCULAR; INTRAVENOUS; SUBCUTANEOUS at 13:30

## 2022-09-19 RX ADMIN — HYDROMORPHONE HYDROCHLORIDE 1.5 MILLIGRAM(S): 2 INJECTION INTRAMUSCULAR; INTRAVENOUS; SUBCUTANEOUS at 07:28

## 2022-09-19 RX ADMIN — LIDOCAINE 1 PATCH: 4 CREAM TOPICAL at 22:37

## 2022-09-19 RX ADMIN — HYDROMORPHONE HYDROCHLORIDE 1.5 MILLIGRAM(S): 2 INJECTION INTRAMUSCULAR; INTRAVENOUS; SUBCUTANEOUS at 04:21

## 2022-09-19 RX ADMIN — GABAPENTIN 300 MILLIGRAM(S): 400 CAPSULE ORAL at 11:18

## 2022-09-19 RX ADMIN — HYDROMORPHONE HYDROCHLORIDE 1.5 MILLIGRAM(S): 2 INJECTION INTRAMUSCULAR; INTRAVENOUS; SUBCUTANEOUS at 15:41

## 2022-09-19 RX ADMIN — HYDROMORPHONE HYDROCHLORIDE 1.5 MILLIGRAM(S): 2 INJECTION INTRAMUSCULAR; INTRAVENOUS; SUBCUTANEOUS at 07:43

## 2022-09-19 RX ADMIN — Medication 5 MILLIGRAM(S): at 06:00

## 2022-09-19 RX ADMIN — HYDROMORPHONE HYDROCHLORIDE 2 MILLIGRAM(S): 2 INJECTION INTRAMUSCULAR; INTRAVENOUS; SUBCUTANEOUS at 05:17

## 2022-09-19 RX ADMIN — BICTEGRAVIR SODIUM, EMTRICITABINE, AND TENOFOVIR ALAFENAMIDE FUMARATE 1 TABLET(S): 30; 120; 15 TABLET ORAL at 11:18

## 2022-09-19 RX ADMIN — HYDROMORPHONE HYDROCHLORIDE 4 MILLIGRAM(S): 2 INJECTION INTRAMUSCULAR; INTRAVENOUS; SUBCUTANEOUS at 19:30

## 2022-09-19 NOTE — PROGRESS NOTE ADULT - PROBLEM SELECTOR PLAN 8
- Ca 16.4 9/10 s/p calcitonin 4u/kg x 2 doses and pamidronate 90mcg IVPB x1.  - calcium level stable at 11.8   - cont IVFs w/ LR @ 75 cc/hr and continue monitoring   - continue to trend levels q 24 hrs   - can repeat pamidronate after 1 week PRN  - vit D low but will hold supplementation in the setting of hypercalcemia of malignancy Patient with history of untreated HPV related rectal cancer with episode of rectal bleeding at [facility] Hgb 5.9 s/p 2u pRBCs.  - CT with evidence of liver and bone lesions  - renal cancer treatment will be on hold pending resolution of infection  - radiation oncology consult for RT of rectal cancer placed  - appreciate oncology recs

## 2022-09-19 NOTE — PROGRESS NOTE ADULT - PROBLEM SELECTOR PLAN 5
- c/w PO Methadone 2.5mg q12 (started on 9/12) (QTC-444 on 9/12); monitor QTC closely while on Methadone  - c/w Gabapentin 300mg QD   - IV dilaudid 2mg q4 ATC  - PO dilaudid 4mg q2 PRN moderate pain   - IV dilaudid 1.5mg q2 PRN severe pain  - IV dilaudid 1mg q2 PRN breakthrough pain  - can check QTc weekly while on pain regimen  - appreciate palliative care recommendation CT Chest 9/11 with evidence of persistent pneumomediastinum and extensive subcutaneous emphysema of the neck, chest, abdomen, pelvis, and extremities, including the scrotum.   - CANDIS per thoracic surgery  - indwelling pedraza was placed at [facility] (Coude placed for urinary strain)   - No further urologic intervention needed at this time.   - Can f/u outpatient w/ Dr Prabhu Lucio, [facility] for [facility] at [place]  - pain control with dilaudid and methadone, ct a/p 9/11 did not show any inflammation/hydrocele/infection . Patient presented to [facility] with hypoxemia and L chest pigtail placed 8/31 due to suspected pneumothorax. Patient showed improvement in SOB and f/u CT chest performed which showed evidence of 12 cm multiloculated thick walled cavitary mass in the CHRISTINE and lingula.  - sputum culture 9/10 + mod klebsiella pneumoniae pansensitive  - blood Cx neg 9/8  - fungitell and crypto neg 9/10  - ID consulted, recs appreciated

## 2022-09-19 NOTE — PROGRESS NOTE ADULT - PROBLEM SELECTOR PLAN 10
Patient with history of HIV and follows with Dr. Marcial in clinic.  - CD4 392 and viral load < 30 on 9/2/22  - Viral load undetectable 9/8  - c/w home Biktarvy - s/p SVT events x 2 9/15 and 9/16 resolved with vagal maneuvers   - c/w metoprol 12.5 BID with hold parameters  - consider EP eval if persists

## 2022-09-19 NOTE — PROVIDER CONTACT NOTE (OTHER) - RECOMMENDATIONS
HS1 will come to assess patient, will continue to monitor for clots/blood in urine. HS1 Sharon Carreno will come to assess patient, will continue to monitor for clots/blood in urine.

## 2022-09-19 NOTE — PROVIDER CONTACT NOTE (OTHER) - ACTION/TREATMENT ORDERED:
HS1 Pierre Dan came to bedside to assess pt; Will continue to monitor & continue pain management regimen.

## 2022-09-19 NOTE — PROGRESS NOTE ADULT - PROBLEM SELECTOR PLAN 8
Case reviewed with primary and oncology team     Thank you for allowing us to participate in your patient's care. Please page [phone] for any questions/concerns. Case reviewed with primary team     Thank you for allowing us to participate in your patient's care. Please page [phone] for any questions/concerns.

## 2022-09-19 NOTE — PROGRESS NOTE ADULT - ASSESSMENT
31M MSM, HIV 2003 well controlled on Biktarvy.   Widely metastatic anal SCC 7/2022, not yet on therapy.   Hospitalized 8/31 with cough, hypoxia, large left cavitary lesion, MSSA bacteremia.   Suspect malignant lesion with secondary pneumonia causing bacteremia.   Had a chest tube placed complicated by pneumomediastinum.   Bacteremia cleared since 9/1. RV mass might be thrombi but will treat as IE out of caution.   Transferred to [facility] 9/8.   Clinically stable, chest tube removed 9/16.   Poor long term prognosis. Palliative following, hospice discussed.     Suggest  -Cefazolin 2GM q8h for 6 weeks ending 10/13, upon discharge please include weekly CBC, BUN and creatinine   -continue Biktarvy   -follow up in our office     Will sign off, call back if needed   Discussed with medicine     Alphonso Dimas MD   Infectious Disease   Available on TEAMS. After 5PM and on weekends please page fellow on call or call [phone]

## 2022-09-19 NOTE — CONSULT NOTE ADULT - SUBJECTIVE AND OBJECTIVE BOX
CHIEF COMPLAINT:    HPI:  31M HIV, metastatic rectal CA (liver, potentially bone; followed by Dr. Frazier of Onc, not currently tx), R eye cataract transferred to MICU from [facility], admit at that time iso AHRF s/p L PTC placement for suspected PTX, CT Chest subsequently revealed PTC w/i mass w/ ddx TB, fungal infxn, cavitary lesion, SCC course and eventually converted to L lateral chest tube c/b anemia suspected 2/2 rectal bleed requiring transfusion, MSSA bacteremia tx w/ IV abx, TTE w/ multiple RV masses, external iliac vein thrombosis s/p IVC filter placement w/ indet PE study, SC emphysema neck/face/extremities/scrotum/back c/f bronchocutaneous fistula transferred to [facility] for interventional pulm c/s. [facility] course c/b episodes of SVT resolved w/ vagal maneuvers. Chest tubed removed 9/16, stable for downgrade to [facility] w/ tele.     since transfer and removal of chest tube, breathing has been stable, without much VILLELA, no cough, some mild chest pain on coughing, no peripheral edema. no fevers, chills, no wheezing. subq emphysema slightly improving    PAST MEDICAL & SURGICAL HISTORY:  HIV infection  6/30/2022 virus detected, switched to Biktarvy, male partners      Current smoker      History of depression  and anxiety      Rectal cancer  not on tretment      Right cataract      No significant past surgical history          FAMILY HISTORY:      SOCIAL HISTORY:  Smoking: [ ] Never Smoked [] Former Smoker (__ packs x ___ years) [x] Current Smoker  (__ packs x ___ years)  Substance Use: [ ] Never Used [ ] Used ____  EtOH Use:  Marital Status: [ ] Single [ ]  [ ]  [ ]   Sexual History:   Occupation:  Recent Travel:  Country of Birth:  Advance Directives:    Allergies    ibuprofen (Angioedema)    Intolerances        HOME MEDICATIONS:  Home Medications:  Biktarvy 50 mg-200 mg-25 mg oral tablet: 1 tab(s) orally once a day (26 Jul 2022 13:17)  Percocet 5/325 oral tablet: 1 tab(s) orally every 6 hours, As Needed anal pain  (26 Jul 2022 13:17)      REVIEW OF SYSTEMS:  Patient denies fevers, chills, chest pain, nausea, abdominal pain, diarrhea, constipation, dysuria, leg swelling, headache, light headedness    OBJECTIVE:  ICU Vital Signs Last 24 Hrs  T(C): 37 (19 Sep 2022 11:49), Max: 37 (19 Sep 2022 11:49)  T(F): 98.6 (19 Sep 2022 11:49), Max: 98.6 (19 Sep 2022 11:49)  HR: 80 (19 Sep 2022 11:49) (80 - 102)  BP: 126/58 (19 Sep 2022 11:49) (117/62 - 140/68)  BP(mean): --  ABP: --  ABP(mean): --  RR: 18 (19 Sep 2022 11:49) (18 - 18)  SpO2: 99% (19 Sep 2022 11:49) (98% - 99%)    O2 Parameters below as of 19 Sep 2022 05:19  Patient On (Oxygen Delivery Method): room air              09-18 @ 07:01  -  09-19 @ 07:00  --------------------------------------------------------  IN: 795 mL / OUT: 1250 mL / NET: -455 mL    09-19 @ 07:01  -  09-19 @ 17:22  --------------------------------------------------------  IN: 0 mL / OUT: 1725 mL / NET: -1725 mL      CAPILLARY BLOOD GLUCOSE          PHYSICAL EXAM:  General: awake and alert, nontoxic appearing lying in bed  HEENT: NC/AT, EOMI b/l, conjunctiva normal, MMM  Lymph Nodes: no cervical LAD  Neck: supple. full range of motion  Respiratory: decreased breath sounds on left no w/r/c, appears comfortable on RA no conversational dyspnea or accessory muscle use  Cardiovascular: S1 S2 present, RRR, no m/r/g  Abdomen: soft, NT/ND, +BS  Extremities: no c/c/e  Skin: no rashes or lesions noted  Neurological: AAOx3, no focal deficits  Psychiatry: calm, cooperative  pedraza in  decreased diffuse sub q air    LINES:     HOSPITAL MEDICATIONS:  Standing Meds:  bictegravir 50 mG/emtricitabine 200 mG/tenofovir alafenamide 25 mG (BIKTARVY) 1 Tablet(s) Oral daily  ceFAZolin   IVPB 2000 milliGRAM(s) IV Intermittent every 8 hours  chlorhexidine 2% Cloths 1 Application(s) Topical <User Schedule>  dronabinol 5 milliGRAM(s) Oral <User Schedule>  dronabinol 5 milliGRAM(s) Oral once  enoxaparin Injectable 60 milliGRAM(s) SubCutaneous every 12 hours  ferrous    sulfate 325 milliGRAM(s) Oral <User Schedule>  gabapentin 300 milliGRAM(s) Oral daily  HYDROmorphone   Tablet 4 milliGRAM(s) Oral every 6 hours  lactated ringers. 1000 milliLiter(s) IV Continuous <Continuous>  lactulose Syrup 10 Gram(s) Oral daily  lidocaine   4% Patch 1 Patch Transdermal daily  lidocaine   4% Patch 1 Patch Transdermal daily  melatonin 3 milliGRAM(s) Oral at bedtime  methadone    Tablet 5 milliGRAM(s) Oral three times a day  metoprolol tartrate 12.5 milliGRAM(s) Oral two times a day  naloxegol 25 milliGRAM(s) Oral daily  polyethylene glycol 3350 17 Gram(s) Oral two times a day  senna 2 Tablet(s) Oral at bedtime      PRN Meds:  bisacodyl 5 milliGRAM(s) Oral at bedtime PRN  HYDROmorphone   Tablet 4 milliGRAM(s) Oral every 4 hours PRN  HYDROmorphone  Injectable 1.5 milliGRAM(s) IV Push every 3 hours PRN  HYDROmorphone  Injectable 0.5 milliGRAM(s) IV Push every 2 hours PRN  naloxone Injectable 0.1 milliGRAM(s) IV Push every 3 minutes PRN      LABS:                        9.2    9.72  )-----------( 134      ( 19 Sep 2022 06:00 )             30.9     Hgb Trend: 9.2<--, 8.2<--, 8.3<--, 8.7<--, 8.1<--  09-19    136  |  101  |  7   ----------------------------<  112<H>  3.6   |  25  |  0.80    Ca    9.4      19 Sep 2022 06:00  Phos  1.9     09-19  Mg     1.20     09-19    TPro  5.8<L>  /  Alb  2.4<L>  /  TBili  0.3  /  DBili  x   /  AST  24  /  ALT  5   /  AlkPhos  203<H>  09-19    Creatinine Trend: 0.80<--, 0.94<--, 1.00<--, 1.19<--, 1.17<--, 1.24<--  PTT - ( 19 Sep 2022 13:29 )  PTT:36.6 sec          MICROBIOLOGY:       RADIOLOGY:  [ ] Reviewed and interpreted by me    PULMONARY FUNCTION TESTS:    EKG:

## 2022-09-19 NOTE — PROGRESS NOTE ADULT - PROBLEM SELECTOR PLAN 1
Patient with TTE performed on at [facility] with evidence of two large RV masses and two additional small masses. IVC filter placed prophylactically at that time due to undiagnosed vegetations on the TTE.  - repeat TTE 9/9 was a limited study but demonstrated a mobile mass likely 2/2 tumour, thrombus, or vegetation.   - per cardiology risk of JENNIFER outweighs benefit at this time  - pending cardiac MRI to evaluate to mass  - monitor on tele Patient with TTE performed on at [facility] with evidence of two large RV masses and two additional small masses. IVC filter placed prophylactically at that time due to undiagnosed vegetations on the TTE.  - repeat TTE 9/9 was a limited study but demonstrated a mobile mass likely 2/2 tumour, thrombus, or vegetation.   - per cardiology risk of JENNIFER outweighs benefit at this time  - pending cardiac MRI to evaluate to mass; planned for today but rescheduled for tomorrow due to scheduling  - will need to pre medicate before MRI; may need general anesthesia?  - monitor on tele

## 2022-09-19 NOTE — PROGRESS NOTE ADULT - PROBLEM SELECTOR PLAN 2
Please prescribe Naloxone for here and upon discharge. Patients with 50mg OME have 4x risk of OD and those with 100mg OME have 9x risk (prescribetoprevent.org)    Inpatient: Naoloxone 0.1mg IV q3 minutes PRN  for respiratory depression (RR < 11) or oversedation due to opioids. (Max - 4 doses)  Upon discharge: Naloxone 4mg/0.1 ml nasal spray, Spray 0.1mL into one nostril. Repeat with second spray into other nostril after 2-3 minutes if no or minimal response. (http://prescribetoprevent.org/prescribers/palliative/). Please be sure the patient's pharmacy has the medication, otherwise, be sure there is a pharmacy where the patient can get the Naloxone inhaled.    Please provide patient education prior to discharge. Resource: https://www.health.[place].gov/diseases/aids/general/opioid_overdose_prevention/overdose_facts.htm

## 2022-09-19 NOTE — PROGRESS NOTE ADULT - PROBLEM SELECTOR PLAN 6
Patient found to have L external vein DVT with bilateral leg DVT studies negative for DVT.  - continue heparin gtt  - can consider switching to lovenox in AM as patient has no pending procedures at this time CT Chest 9/11 with evidence of persistent pneumomediastinum and extensive subcutaneous emphysema of the neck, chest, abdomen, pelvis, and extremities, including the scrotum.   - CANDIS per thoracic surgery  - indwelling pedraza was placed at [facility] (Coude placed for urinary strain)   - No further urologic intervention needed at this time.   - Can f/u outpatient w/ Dr Prabhu Lucio, [facility] for [facility] at [place]  - pain control with dilaudid and methadone, ct a/p 9/11 did not show any inflammation/hydrocele/infection .

## 2022-09-19 NOTE — PROGRESS NOTE ADULT - PROBLEM SELECTOR PLAN 12
- Fluids: LR  - Electrolytes: Will replete to maintain K>4, Phos>3, and Mag>2  - Nutrition: regular  - Activity: OOB as tolerated  - DVT Prophylaxis: heparin gtt  - Stress Ulcer/GI Prophylaxis: NA  - Disposition: pending medical management Palliative care consulted for extensive disease burden and GOC discussion. At this time, patient was all available treatment and resuscitation.  - full code

## 2022-09-19 NOTE — PROGRESS NOTE ADULT - PROBLEM SELECTOR PLAN 4
CT Chest 9/11 with evidence of persistent pneumomediastinum and extensive subcutaneous emphysema of the neck, chest, abdomen, pelvis, and extremities, including the scrotum.   - CANDIS per thoracic surgery  - indwelling pedraza was placed at [facility] (Coude placed for urinary strain)   - No further urologic intervention needed at this time.   - Can f/u outpatient w/ Dr Prabhu Lucio, [facility] for Urology at [place]  - pain control with dilaudid and methadone, ct a/p 9/11 did not show any inflammation/hydrocele/infection . Patient presented to [facility] with hypoxemia and L chest pigtail placed 8/31 due to suspected pneumothorax. Patient showed improvement in SOB and f/u CT chest performed which showed evidence of 12 cm multiloculated thick walled cavitary mass in the CHRISTINE and lingula.  - sputum culture 9/10 + mod klebsiella pneumoniae pansensitive  - blood Cx neg 9/8  - fungitell and crypto neg 9/10  - ID consulted, recs appreciated - increased PO Methadone 5mg TID (QTC-444 on 9/12; QTC- 458 on 9/19); will need repeat EKG later this week to monitor QTC while on methadone  - c/w Gabapentin 300mg QD   - PO dilaudid 4mg q6 ATC   - PO dilaudid 4mg q4 PRN moderate pain   - IV dilaudid 1.5mg q3 PRN severe pain  - IV dilaudid 0.5 mg q2 PRN breakthrough pain  - hold parameters placed for all medications  - appreciate palliative care recs

## 2022-09-19 NOTE — PHYSICAL THERAPY INITIAL EVALUATION ADULT - BALANCE DISTURBANCE, IDENTIFIED IMPAIRMENT CONTRIBUTE, REHAB EVAL
[id number] [facility] Dr. Michael Noland notified of patient's chest discomfort. Order for duonebs obtained. 0700 Patient medicated for pain x 2 with Norco and claimed little relief. Bedside and Verbal shift change report given to [id number] [place] [address] (oncoming nurse) by Pedro Ibanez (offgoing nurse). Report included the following information SBAR, Kardex, Intake/Output, MAR and Recent Results. pain/decreased strength

## 2022-09-19 NOTE — PROGRESS NOTE ADULT - PROBLEM SELECTOR PLAN 3
- L pigtail catheter was placed at [facility] due to concern for L PTX. However, patient was found to have large CHRISTINE cavitary lesion instead, into which the pigtail had been placed.   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  - CardioThoracic recommendations appreciated  - s/p chest tube removal  - management as per primary team.

## 2022-09-19 NOTE — PROGRESS NOTE ADULT - ASSESSMENT
31 M with untreated metastatic ano-rectal SCC (followed by Dr Frazier [facility] oncology) with mets to liver and possibly bone, HPV positive, HIV infection, and R eye cataracts who presented to the [facility] on 8/31/22 after syncopal episode at home and transferred to [facility] for continued of care per family request. Pt with cough x 2 mos, with yellow sputum and shortness of breath since 5 days PTA.

## 2022-09-19 NOTE — PROGRESS NOTE ADULT - PROBLEM SELECTOR PLAN 11
Palliative care consulted for extensive disease burden and GOC discussion. At this time, patient was all available treatment and resuscitation.  - full code Patient with history of HIV and follows with Dr. Marcial in clinic.  - CD4 392 and viral load < 30 on 9/2/22  - Viral load undetectable 9/8  - c/w home Biktarvy

## 2022-09-19 NOTE — PROGRESS NOTE ADULT - PROBLEM SELECTOR PLAN 7
Patient with history of untreated HPV related rectal cancer with episode of rectal bleeding at [facility] Hgb 5.9 s/p 2u pRBCs.  - CT with evidence of liver and bone lesions  - renal cancer treatment will be on hold pending resolution of infection  - will place radiation oncology consult in AM for potential RT for rectal cancer while patient is inpatient  - appreciate oncology recs Patient found to have L external vein DVT with bilateral leg DVT studies negative for DVT.  - was previously on heparin gtt   - transitioned to lovenox today  - will need to monitor for signs of bleeding while on lovenox

## 2022-09-19 NOTE — PROGRESS NOTE ADULT - PROBLEM SELECTOR PLAN 2
Patient with history of MSSA bacteremia found at [facility] with blood cultures 8/31 positive for MSSA and repeat on 9/2 NGTD.  - s/p Zosyn (9/8 - 9/9)  - transitioned to Cefazolin per ID recs on 9/9, plan for 6 week course  - will need to clarify whether 6 week course to start from date of first positive cultures or date of first negative cutlures Patient with history of MSSA bacteremia found at [facility] with blood cultures 8/31 positive for MSSA and repeat on 9/2 NGTD.  - s/p Zosyn (9/8 - 9/9)  - transitioned to Cefazolin 2GM q8h for 6 weeks ending (9/9-10/13)   - upon discharge, will need weekly CBC, BUN and creatinine and ID clinic follow up Patient with new onset hematuria likely 2/2 trauma from manipulation of pedraza.  - CANDIS at this time  - recommend patient to secure pedraza to leg  - will monitor for continued signs of bleeding  - STAT CBC if bleeding persists

## 2022-09-19 NOTE — CONSULT NOTE ADULT - ATTENDING COMMENTS
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     He had a left chest tube that was initially placed for suspected PTX (although more likely was a lung abscess) with course complicated by subcutaneous emphysema extending to the scrotum and bilateral thighs, now improved after chest tube removal. Follow-up chest x-ray with no pneumothorax and improved subcutaneous emphysema.    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 Remains on Biktarvy for HIV. Also with DVT on therapeutic enoxaparin. On room air with SpO2>92%.    Will sign off, please call back with questions.
seen and examined  CT reviewed - extensive subcutaneous emphysema extending from thorax down into his legs  scrotum also with crepitus but no other signs of infection to suggest a gas forming organism, rather this appears to represent air from known pulmonary cutaneous fistula. Recommended scrotal wrap - kerlex applied, tight fitting underwear. No role for debridement or procedural / operative intervention.
31 y.o. male with left lung abscess and left pigtail catheter that was placed into th abscess cavity.  Would:  1.  t-PA the existing pigtail  2.  Have IR place a new pneumonostomy tube (large bore cath) into the abscess  3.  Keep the tubes on suction  4.  Multiple small skin incisions for s/q emphysema as needed
MSM HIV diagnosed 2003.   Uncontrolled for the last few years, viral load 42k in June of this year but most recently undetectable about a week ago.   Widely metastatic anal SCC diagnosed in July, not yet on therapy.   Admitted 8/31 with cough, hypoxia, large left cavitary lesion and concern for pneumothorax s/p chest tube.   Found to have MSSA bacteremia with possible RV mass/vegetations.   Cleared 9/2.   Transferred to [facility] 9/8.   Clinically stable.   Malignant lung lesion can look like this.   Septic pulmonary emboli that later cavitated is probably less likely.   Sputum AFB were negative before transfer. Prior Quantiferon negative outpatient 2020.   CD4 has been preserved so unlikely an OI.   Poor long term prognosis.     Suggest  -f/u blood cultures 9/8   -f/u CT chest read - discussed with radiology   -would keep him on Ancef 2GM IV q8h - he's been doing fine without broadening, unclear indication for Zosyn   -continue home Biktarvy   -anticipate a 6 week course of IV antibiotics     Discussed with ATIF Dimas MD   Infectious Disease   Available on TEAMS. After 5PM and on weekends please page fellow on call or call [phone]

## 2022-09-19 NOTE — PROGRESS NOTE ADULT - ASSESSMENT
31 y o M w/ HIV/AIDS (on HAART), Metastatic Rectal Ca (Not yet on chemo) who was initially admitted to [facility] on 8/31 after presenting with productive cough for 2 months and then acute SOB x 5 days. L pigtail catheter was placed due to concern for L PTX. However, patient was found to have large CHRISTINE cavitary lesion instead, into which the pigtail had been placed. 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

## 2022-09-19 NOTE — PROGRESS NOTE ADULT - PROBLEM SELECTOR PLAN 9
- s/p SVT events x 2 9/15 and 9/16 resolved with vagal maneuvers   - c/w metoprol 12.5 BID with hold parameters  - consider EP eval if persists - Ca 16.4 9/10 s/p calcitonin 4u/kg x 2 doses and pamidronate 90mcg IVPB x1.  - calcium level stable at 11.8   - cont IVFs w/ LR @ 75 cc/hr and continue monitoring   - continue to trend levels q 24 hrs   - can repeat pamidronate after 1 week PRN  - vit D low but will hold supplementation in the setting of hypercalcemia of malignancy

## 2022-09-19 NOTE — PROGRESS NOTE ADULT - SUBJECTIVE AND OBJECTIVE BOX
INTERVAL HPI/OVERNIGHT EVENTS:  Over the weekend, patient was started on IV Dilaudid 2mg q4 ATC.   In past 24 hours, received total of IV Dilaudid 18mg (from IV Dilaudid 2mg q4 ATC dosing + PRN dosing)     SUBJECTIVE AND OBJECTIVE:  Patient seen and examined at bedside. Patient being followed up for pain.  Patient reports having pain in rectum and scrotum. States he has difficulty laying on his back due to pain.   Denies constipation.       Allergies    ibuprofen (Angioedema)    Intolerances    MEDICATIONS  (STANDING):  bictegravir 50 mG/emtricitabine 200 mG/tenofovir alafenamide 25 mG (BIKTARVY) 1 Tablet(s) Oral daily  ceFAZolin   IVPB 2000 milliGRAM(s) IV Intermittent every 8 hours  chlorhexidine 2% Cloths 1 Application(s) Topical <User Schedule>  dronabinol 5 milliGRAM(s) Oral <User Schedule>  dronabinol 5 milliGRAM(s) Oral once  enoxaparin Injectable 60 milliGRAM(s) SubCutaneous every 12 hours  ferrous    sulfate 325 milliGRAM(s) Oral <User Schedule>  gabapentin 300 milliGRAM(s) Oral daily  HYDROmorphone  Injectable 2 milliGRAM(s) IV Push every 4 hours  lactated ringers. 1000 milliLiter(s) (75 mL/Hr) IV Continuous <Continuous>  lactulose Syrup 10 Gram(s) Oral daily  lidocaine   4% Patch 1 Patch Transdermal daily  lidocaine   4% Patch 1 Patch Transdermal daily  melatonin 3 milliGRAM(s) Oral at bedtime  methadone    Tablet 2.5 milliGRAM(s) Oral two times a day  metoprolol tartrate 12.5 milliGRAM(s) Oral two times a day  naloxegol 25 milliGRAM(s) Oral daily  polyethylene glycol 3350 17 Gram(s) Oral two times a day  senna 2 Tablet(s) Oral at bedtime    MEDICATIONS  (PRN):  bisacodyl 5 milliGRAM(s) Oral at bedtime PRN Constipation  HYDROmorphone   Tablet 4 milliGRAM(s) Oral every 2 hours PRN Moderate Pain (4 - 6)  HYDROmorphone  Injectable 1.5 milliGRAM(s) IV Push every 2 hours PRN Severe Pain (7 - 10)  HYDROmorphone  Injectable 1 milliGRAM(s) IV Push every 2 hours PRN breakthrough severe pain    ITEMS UNCHECKED ARE NOT PRESENT    PRESENT SYMPTOMS: [ ]Unable to self-report due to altered mental status- see [ ] CPOT [ ] PAINADS [ ] RDOS  Source if other than patient:  [ ]Family   [ ]Team     Pain: [x ]yes [ ]no  QOL impact - severe  Location - rectum  , scrotum          Aggravating factors - urination, movement  Quality - sharp  Radiation - "all over my body"  Timing- intermittent   Severity (0-10 scale): 10  Minimal acceptable level (0-10 scale): 3    Dyspnea:                           [ ]Mild [ ]Moderate [ ]Severe  RDOS:  0 to 2  minimal or no respiratory distress   3  mild distress  4 to 6 moderate distress  >7 severe distress  https://homecareinformation.net/handouts/hen/Respiratory_Distress_Observation_Scale.pdf (Ctrl +  left click to view)     Anxiety:                             [ ]Mild [ ]Moderate [ ]Severe  Agitation:                          [ ]Mild [ ]Moderate [ ]Severe  Fatigue:                             [ ]Mild [ ]Moderate [ ]Severe  Nausea:                             [ ]Mild [ ]Moderate [ ]Severe  Loss of appetite:              [ ]Mild [ ]Moderate [ ]Severe  Constipation:                   [ ]Mild [x ]Moderate [ ]Severe  Diarrhea:                          [ ]Mild [ ]Moderate [ ]Severe      CPOT:    https://www.Psychiatric.org/getattachment/gek89n86-8r1z-5u8p-6k7a-7724n0678o0t/Critical-Care-Pain-Observation-Tool-(CPOT)    PAIN AD Score:	  http://geriatrictoolkit.[email or web address]/cog/painad.pdf (Ctrl + left click to view)    PCSSQ[Palliative Care Spiritual Screening Question]   Severity (0-10):  Score of 4 or > indicate consideration of Chaplaincy referral.  Chaplaincy Referral: [ ] yes [ ] refused [ ] following    Other Symptoms:  [ x]All other review of systems negative     Palliative Performance Status Version 2:  60-70       %      http://npcrc.org/files/news/palliative_performance_scale_ppsv2.pdf    PHYSICAL EXAM:  Vital Signs Last 24 Hrs  T(C): 35.7 (15 Sep 2022 12:00), Max: 36.3 (15 Sep 2022 00:00)  T(F): 96.3 (15 Sep 2022 12:00), Max: 97.3 (15 Sep 2022 00:00)  HR: 90 (15 Sep 2022 15:00) (83 - 98)  BP: 127/86 (15 Sep 2022 15:00) (105/83 - 142/82)  BP(mean): 95 (15 Sep 2022 15:00) (78 - 96)  RR: 21 (15 Sep 2022 15:00) (15 - 29)  SpO2: 99% (15 Sep 2022 15:00) (97% - 100%)    Parameters below as of 15 Sep 2022 15:00  Patient On (Oxygen Delivery Method): room air    GENERAL:  [ x]Alert  [ x]Oriented x 3  [ ]Lethargic  [ ]Cachexia  [ ]Unarousable  [ x]Verbal  [ ]Non-Verbal    Behavioral:   [ x] Anxiety  [ ] Delirium [ ] Agitation [ ] Calm  [ ] Other   HEENT:  [x ]Normal  [ ] Temporal Wasting  [ ]Dry mouth   [ ]ET Tube/Trach  [ ]Oral lesions  [ ] Mucositis  PULMONARY: Left chest tube   [x ]Clear [ ]Tachypnea  [ ]Audible excessive secretions   [ ]Rhonchi        [ ]Right [ ]Left [ ]Bilateral  [ ]Crackles        [ ]Right [ ]Left [ ]Bilateral  [ ]Wheezing     [ ]Right [ ]Left [ ]Bilateral  [ ]Diminished breath sounds [ ]right [ ]left [ ]bilateral  CARDIOVASCULAR:    [x ]Regular [ ]Irregular [ ]Tachy  [ ]Jose [ ]Murmur [ ]Other  GASTROINTESTINAL:  [ x]Soft  [ ]Distended   [ ]+BS  [ x]Non tender [ ]Tender  [ ]PEG [ ]OGT/ NGT  Last BM: 9/12  GENITOURINARY: Scrotal edema  [ ]Normal [ ] Incontinent   [ ]Oliguria/Anuria   [x ]Rosario  MUSCULOSKELETAL:   [x ]Normal   [ ]Weakness  [ ]Bed/Wheelchair bound [ ]Edema  [  ] amputation  [  ] contraction  NEUROLOGIC:   [x ]No focal deficits  [ ]Cognitive impairment  [ ]Dysphagia [ ]Dysarthria [ ]Paresis [ ]Other   SKIN: See Nursing Skin Assessment for further details  [ x]Normal    [ ]Rash  [ ]Pressure ulcer(s)       Present on admission [ ]y [ ]n   [  ]  Wound    [  ] hyperpigmentation    CRITICAL CARE:  [ ]Shock Present  [ ]Septic [ ]Cardiogenic [ ]Neurologic [ ]Hypovolemic  [ ]Vasopressors [ ]Inotropes  [ ]Respiratory failure present [ ]Mechanical Ventilation [ ]Non-invasive ventilatory support [ ]High-Flow   [ ]Acute  [ ]Chronic [ ]Hypoxic  [ ]Hypercarbic [ ]Other  [ ]Other organ failure     LABS:                                                       9.2    9.72  )-----------( 134      ( 19 Sep 2022 06:00 )             30.9       09-19    136  |  101  |  7   ----------------------------<  112<H>  3.6   |  25  |  0.80    Ca    9.4      19 Sep 2022 06:00  Phos  1.9     09-19  Mg     1.20     09-19    TPro  5.8<L>  /  Alb  2.4<L>  /  TBili  0.3  /  DBili  x   /  AST  24  /  ALT  5   /  AlkPhos  203<H>  09-19      PTT - ( 19 Sep 2022 08:15 )  PTT:36.2 sec    RADIOLOGY & ADDITIONAL STUDIES: reviewed     Protein Calorie Malnutrition Present: [ ]mild [ ]moderate [ x]severe [ ]underweight [ ]morbid obesity  https://www.andeal.org/vault/2440/web/files/ONC/Table_Clinical%20Characteristics%20to%20Document%20Malnutrition-White%20JV%20et%20al%202012.pdf    Height (cm): 198.1 (09-08-22 @ 14:00), 198.2 (08-08-22 @ 12:00), 198.1 (07-26-22 @ 13:32)  Weight (kg): 57.5 (09-08-22 @ 14:00), 79.0884 (08-08-22 @ 12:00), 81.2 (07-26-22 @ 13:32)  BMI (kg/m2): 14.7 (09-08-22 @ 14:00), 20.1 (08-08-22 @ 12:00), 20.7 (07-26-22 @ 13:32)    [ ]PPSV2 < or = 30%  [ ]significant weight loss [ ]poor nutritional intake [ ]anasarca   [ ]Artificial Nutrition    REFERRALS:   [ ]Chaplaincy  [ ]Hospice  [ ]Child Life  [ ]Social Work  [x ]Case management [ ]Holistic Therapy

## 2022-09-19 NOTE — CONSULT NOTE ADULT - ASSESSMENT
31 M with PMH/ HIV, rectal cancer not on tx (followed up by Dr Frazier ([facility] oncology)), mets to liver and possibly bone,  R eye cataract, who presented to the [facility] on 22 after syncopal episode at home found to be hypoxemic. 22 - L chest wall Pigtail placement for ptx however truly placed in cavitary mass and transferred for IP eval since found to be MSSA and klebsiella in sputumwith cardiac lesions and s/p pigtail removal and on full AC for DVT. course complicated by intermittent episodes of SVT.    #cavitary mass  #cardiac mass  # subq empysema  #MSSA Lung abscess     recommendations  at this time patient stable from pulmonary standpoint  needs repeat CT chest in 2 months  needs outpatient pulm follow up  continue cefazolin as per ID reccs  no further inpatient Pulm interventions    Prior to discharge:  Please email: [email or web address] to setup an appointment prior to discharge. Include the patient's name, , MRN and contact information in the email.      Pulmonary/Sleep Clinic  [address] [place] [address]  [phone]    Buster Lepe MD  PCCM PGY4  [facility] [address], [facility] [phone]

## 2022-09-19 NOTE — PROGRESS NOTE ADULT - SUBJECTIVE AND OBJECTIVE BOX
Sharno Carreno  PGY-1 Resident Physician   Pager [phone]/ [phone]    Patient is a 31y old  Male who presents with a chief complaint of Shortness of breath (18 Sep 2022 06:17)      SUBJECTIVE / OVERNIGHT EVENTS:  Patient seen and evaluated at bedside.    Denies any fevers, chills, CP, or SOB.    Vital Signs Last 24 Hrs  T(C): 36.7 (19 Sep 2022 05:19), Max: 37.1 (18 Sep 2022 10:38)  T(F): 98 (19 Sep 2022 05:19), Max: 98.8 (18 Sep 2022 10:38)  HR: 98 (19 Sep 2022 05:19) (96 - 102)  BP: 117/62 (19 Sep 2022 05:19) (116/78 - 140/68)  BP(mean): --  RR: 18 (19 Sep 2022 05:19) (17 - 18)  SpO2: 98% (19 Sep 2022 05:19) (98% - 99%)    Parameters below as of 19 Sep 2022 05:19  Patient On (Oxygen Delivery Method): room air        PHYSICAL EXAM:  GENERAL: NAD, well-developed  CHEST/LUNG: Clear to auscultation bilaterally; No wheeze  HEART: Regular rate and rhythm; Normal S1 S2, No murmurs, rubs, or gallops  ABDOMEN: Soft, Nontender, Nondistended; Bowel sounds present  EXTREMITIES:  2+ Peripheral Pulses, No clubbing, cyanosis, or edema  PSYCH: AAOx3    LABS:                        9.2    9.72  )-----------( 134      ( 19 Sep 2022 06:00 )             30.9     Hgb Trend: 9.2<--, 8.2<--, 8.3<--, 8.7<--, 8.1<--  09-19    136  |  101  |  7   ----------------------------<  112<H>  3.6   |  25  |  0.80    Ca    9.4      19 Sep 2022 06:00  Phos  1.9     09-19  Mg     1.20     09-19    TPro  5.8<L>  /  Alb  2.4<L>  /  TBili  0.3  /  DBili  x   /  AST  24  /  ALT  5   /  AlkPhos  203<H>  09-19    Creatinine Trend: 0.80<--, 0.94<--, 1.00<--, 1.19<--, 1.17<--, 1.24<--  LIVER FUNCTIONS - ( 19 Sep 2022 06:00 )  Alb: 2.4 g/dL / Pro: 5.8 g/dL / ALK PHOS: 203 U/L / ALT: 5 U/L / AST: 24 U/L / GGT: x           PTT - ( 19 Sep 2022 06:00 )  PTT:57.3 sec       Sharon Carreno  PGY-1 Resident Physician   Pager [phone]/ [phone]    Patient is a 31y old  Male who presents with a chief complaint of Shortness of breath (18 Sep 2022 06:17)      SUBJECTIVE / OVERNIGHT EVENTS:  Patient seen and evaluated at bedside.  Patient this morning states he is feeling okay; sitting in bed on phone. Is wondering whether he will be able to tolerate MRI procedure and laying on his back for prolonged periods of time. States he will have pain and that he cannot tolerate sitting even now.  Denies any fevers, chills, CP, or SOB.    Vital Signs Last 24 Hrs  T(C): 36.7 (19 Sep 2022 05:19), Max: 37.1 (18 Sep 2022 10:38)  T(F): 98 (19 Sep 2022 05:19), Max: 98.8 (18 Sep 2022 10:38)  HR: 98 (19 Sep 2022 05:19) (96 - 102)  BP: 117/62 (19 Sep 2022 05:19) (116/78 - 140/68)  BP(mean): --  RR: 18 (19 Sep 2022 05:19) (17 - 18)  SpO2: 98% (19 Sep 2022 05:19) (98% - 99%)    Parameters below as of 19 Sep 2022 05:19  Patient On (Oxygen Delivery Method): room air        PHYSICAL EXAM:  GENERAL: NAD, cachectic appearing  CHEST/LUNG: No lung sounds hear in left upper lobe but otherwise CTAB; no wheeze. Bandage on anterior L chest.   HEART: Regular rate and rhythm; Normal S1 S2, No murmurs, rubs, or gallops  ABDOMEN: Soft, Nontender, Nondistended; Bowel sounds present  EXTREMITIES:  2+ Peripheral Pulses, No clubbing, cyanosis, or edema  : scrotal swelling unchanged from presentation  SKIN: bandage on lower back which patient reports is his rectal mass; bandage unopened as patient reporting pain at this time  PSYCH: AAOx3    LABS:                        9.2    9.72  )-----------( 134      ( 19 Sep 2022 06:00 )             30.9     Hgb Trend: 9.2<--, 8.2<--, 8.3<--, 8.7<--, 8.1<--  09-19    136  |  101  |  7   ----------------------------<  112<H>  3.6   |  25  |  0.80    Ca    9.4      19 Sep 2022 06:00  Phos  1.9     09-19  Mg     1.20     09-19    TPro  5.8<L>  /  Alb  2.4<L>  /  TBili  0.3  /  DBili  x   /  AST  24  /  ALT  5   /  AlkPhos  203<H>  09-19    Creatinine Trend: 0.80<--, 0.94<--, 1.00<--, 1.19<--, 1.17<--, 1.24<--  LIVER FUNCTIONS - ( 19 Sep 2022 06:00 )  Alb: 2.4 g/dL / Pro: 5.8 g/dL / ALK PHOS: 203 U/L / ALT: 5 U/L / AST: 24 U/L / GGT: x           PTT - ( 19 Sep 2022 06:00 )  PTT:57.3 sec

## 2022-09-19 NOTE — PROGRESS NOTE ADULT - PROBLEM SELECTOR PLAN 3
Patient presented to [facility] with hypoxemia and L chest pigtail placed 8/31 due to suspected pneumothorax. Patient showed improvement in SOB and f/u CT chest performed which showed evidence of 12 cm multiloculated thick walled cavitary mass in the CHRISTINE and lingula.  - sputum culture 9/10 + mod klebsiella pneumoniae pansensitive  - blood Cx neg 9/8  - fungitell and crypto neg 9/10  - ID consulted, recs appreciated - c/w PO Methadone 2.5mg q12 (started on 9/12) (QTC-444 on 9/12); monitor QTC closely while on Methadone  - c/w Gabapentin 300mg QD   - IV dilaudid 2mg q4 ATC  - PO dilaudid 4mg q2 PRN moderate pain   - IV dilaudid 1.5mg q2 PRN severe pain  - IV dilaudid 1mg q2 PRN breakthrough pain  - can check QTc weekly while on pain regimen  - appreciate palliative care recommendation Patient with history of MSSA bacteremia found at [facility] with blood cultures 8/31 positive for MSSA and repeat on 9/2 NGTD.  - s/p Zosyn (9/8 - 9/9)  - transitioned to Cefazolin 2GM q8h for 6 weeks ending (9/9-10/13)   - upon discharge, will need weekly CBC, BUN and creatinine and ID clinic follow up

## 2022-09-19 NOTE — PROGRESS NOTE ADULT - PROBLEM SELECTOR PLAN 1
- In past 24 hours, received total of IV Dilaudid 18mg (from IV Dilaudid 2mg q4 ATC dosing + PRN dosing)   24 MME ~ 360mg  - Increase to PO Methadone 5mg TID (QTC-444 on 9/12; QTC- 458 on 9/19); please repeat EKG later this week to monitor QTC while on methadone  - Gabapentin 300mg QD   - D/C IV Dilaudid 2mg q4 ATC; Switch to PO Dilaudid 4mg q6 ATC (hold for hypotension, oversedation, respiratory depression)  - PO Dilaudid 4mg q4 PRN moderate pain (hold for hypotension, oversedation, respiratory depression)  - IV Dilaudid 1.5mg q3 PRN (hold for hypotension, oversedation, respiratory depression).  - Extensively educated and reviewed pain regimen with patient - In past 24 hours, received total of IV Dilaudid 18mg (from IV Dilaudid 2mg q4 ATC dosing + PRN dosing)   24 MME ~ 360mg  - Increase to PO Methadone 5mg TID (QTC-444 on 9/12; QTC- 458 on 9/19); please repeat EKG later this week to monitor QTC while on methadone  - Gabapentin 300mg QD   - D/C IV Dilaudid 2mg q4 ATC; Switch to PO Dilaudid 4mg q6 ATC (hold for hypotension, oversedation, respiratory depression)  - PO Dilaudid 4mg q4 PRN moderate pain (hold for hypotension, oversedation, respiratory depression)  - IV Dilaudid 1.5mg q3 PRN (hold for hypotension, oversedation, respiratory depression).  - Extensively educated and reviewed pain regimen with patient  - Recommend Rad/Onc consult

## 2022-09-19 NOTE — PROGRESS NOTE ADULT - SUBJECTIVE AND OBJECTIVE BOX
Follow Up: HIV, MSSA    Interval History/ROS: Pain comes and goes. No fevers or chills. No cough. Feels about the same overall.     Allergies  ibuprofen (Angioedema)        ANTIMICROBIALS:  bictegravir 50 mG/emtricitabine 200 mG/tenofovir alafenamide 25 mG (BIKTARVY) 1 daily  ceFAZolin   IVPB 2000 every 8 hours      OTHER MEDS:  bisacodyl 5 milliGRAM(s) Oral at bedtime PRN  chlorhexidine 2% Cloths 1 Application(s) Topical <User Schedule>  dronabinol 5 milliGRAM(s) Oral <User Schedule>  dronabinol 5 milliGRAM(s) Oral once  enoxaparin Injectable 60 milliGRAM(s) SubCutaneous every 12 hours  ferrous    sulfate 325 milliGRAM(s) Oral <User Schedule>  gabapentin 300 milliGRAM(s) Oral daily  HYDROmorphone   Tablet 4 milliGRAM(s) Oral every 2 hours PRN  HYDROmorphone  Injectable 1.5 milliGRAM(s) IV Push every 2 hours PRN  HYDROmorphone  Injectable 1 milliGRAM(s) IV Push every 2 hours PRN  HYDROmorphone  Injectable 2 milliGRAM(s) IV Push every 4 hours  lactated ringers. 1000 milliLiter(s) IV Continuous <Continuous>  lactulose Syrup 10 Gram(s) Oral daily  lidocaine   4% Patch 1 Patch Transdermal daily  lidocaine   4% Patch 1 Patch Transdermal daily  melatonin 3 milliGRAM(s) Oral at bedtime  methadone    Tablet 2.5 milliGRAM(s) Oral two times a day  metoprolol tartrate 12.5 milliGRAM(s) Oral two times a day  naloxegol 25 milliGRAM(s) Oral daily  polyethylene glycol 3350 17 Gram(s) Oral two times a day  senna 2 Tablet(s) Oral at bedtime      Vital Signs Last 24 Hrs  T(C): 37 (19 Sep 2022 11:49), Max: 37 (19 Sep 2022 11:49)  T(F): 98.6 (19 Sep 2022 11:49), Max: 98.6 (19 Sep 2022 11:49)  HR: 80 (19 Sep 2022 11:49) (80 - 102)  BP: 126/58 (19 Sep 2022 11:49) (117/62 - 140/68)  BP(mean): --  RR: 18 (19 Sep 2022 11:49) (18 - 18)  SpO2: 99% (19 Sep 2022 11:49) (98% - 99%)    Parameters below as of 19 Sep 2022 05:19  Patient On (Oxygen Delivery Method): room air        Physical Exam:  General: non toxic  Cardio: regular rate   Respiratory: nonlabored on room air   abd: mildly distended  Skin: no rash                          9.2    9.72  )-----------( 134      ( 19 Sep 2022 06:00 )             30.9       09-19    136  |  101  |  7   ----------------------------<  112<H>  3.6   |  25  |  0.80    Ca    9.4      19 Sep 2022 06:00  Phos  1.9     09-19  Mg     1.20     09-19    TPro  5.8<L>  /  Alb  2.4<L>  /  TBili  0.3  /  DBili  x   /  AST  24  /  ALT  5   /  AlkPhos  203<H>  09-19          MICROBIOLOGY:  HIV-1 RNA Quantitative, Viral Load Log: NOT DET. lg /mL (09-08-22 @ 20:24)        RADIOLOGY:  Images below reviewed personally  Xray Chest 1 View- PORTABLE-Routine (Xray Chest 1 View- PORTABLE-Routine in AM.) (09.17.22 @ 11:48)   Right lung clearer. No pneumothorax.    CT Chest Abdomen and Pelvis No Cont (09.11.22 @ 16:54)   1. Persistent left upper lobe cavitary lesion, with pigtail catheter in   place, likely communicating with the lingular bronchus.  2. Right middle lobe pneumonia and scattered airspace opacities   throughout the remaining lungs, as described.  3. Persistent pneumomediastinum and extensive subcutaneous emphysema of   the neck, chest, abdomen, pelvis, and extremities, including the scrotum.  4. Large anorectal mass.  5. Liver metastases.

## 2022-09-20 ENCOUNTER — APPOINTMENT (OUTPATIENT)
Dept: INFECTIOUS DISEASE | Facility: CLINIC | Age: 32
End: 2022-09-20

## 2022-09-20 DIAGNOSIS — Z79.899 OTHER LONG TERM (CURRENT) DRUG THERAPY: ICD-10-CM

## 2022-09-20 LAB
ALBUMIN SERPL ELPH-MCNC: 2.4 G/DL — LOW (ref 3.3–5)
ALP SERPL-CCNC: 219 U/L — HIGH (ref 40–120)
ALT FLD-CCNC: 8 U/L — SIGNIFICANT CHANGE UP (ref 4–41)
ANION GAP SERPL CALC-SCNC: 10 MMOL/L — SIGNIFICANT CHANGE UP (ref 7–14)
AST SERPL-CCNC: 26 U/L — SIGNIFICANT CHANGE UP (ref 4–40)
BILIRUB SERPL-MCNC: 0.3 MG/DL — SIGNIFICANT CHANGE UP (ref 0.2–1.2)
BUN SERPL-MCNC: 7 MG/DL — SIGNIFICANT CHANGE UP (ref 7–23)
CALCIUM SERPL-MCNC: 9 MG/DL — SIGNIFICANT CHANGE UP (ref 8.4–10.5)
CHLORIDE SERPL-SCNC: 100 MMOL/L — SIGNIFICANT CHANGE UP (ref 98–107)
CO2 SERPL-SCNC: 25 MMOL/L — SIGNIFICANT CHANGE UP (ref 22–31)
CREAT SERPL-MCNC: 0.69 MG/DL — SIGNIFICANT CHANGE UP (ref 0.5–1.3)
EGFR: 125 ML/MIN/1.73M2 — SIGNIFICANT CHANGE UP
GLUCOSE SERPL-MCNC: 93 MG/DL — SIGNIFICANT CHANGE UP (ref 70–99)
HCT VFR BLD CALC: 26.9 % — LOW (ref 39–50)
HGB BLD-MCNC: 8.3 G/DL — LOW (ref 13–17)
MAGNESIUM SERPL-MCNC: 1.4 MG/DL — LOW (ref 1.6–2.6)
MCHC RBC-ENTMCNC: 24.5 PG — LOW (ref 27–34)
MCHC RBC-ENTMCNC: 30.9 GM/DL — LOW (ref 32–36)
MCV RBC AUTO: 79.4 FL — LOW (ref 80–100)
NRBC # BLD: 0 /100 WBCS — SIGNIFICANT CHANGE UP (ref 0–0)
NRBC # FLD: 0 K/UL — SIGNIFICANT CHANGE UP (ref 0–0)
PHOSPHATE SERPL-MCNC: 1.9 MG/DL — LOW (ref 2.5–4.5)
PLATELET # BLD AUTO: 112 K/UL — LOW (ref 150–400)
POTASSIUM SERPL-MCNC: 3.9 MMOL/L — SIGNIFICANT CHANGE UP (ref 3.5–5.3)
POTASSIUM SERPL-SCNC: 3.9 MMOL/L — SIGNIFICANT CHANGE UP (ref 3.5–5.3)
PROT SERPL-MCNC: 6.2 G/DL — SIGNIFICANT CHANGE UP (ref 6–8.3)
PTH RELATED PROT SERPL-MCNC: <2 PMOL/L — SIGNIFICANT CHANGE UP
RBC # BLD: 3.39 M/UL — LOW (ref 4.2–5.8)
RBC # FLD: 23.7 % — HIGH (ref 10.3–14.5)
SODIUM SERPL-SCNC: 135 MMOL/L — SIGNIFICANT CHANGE UP (ref 135–145)
WBC # BLD: 10.61 K/UL — HIGH (ref 3.8–10.5)
WBC # FLD AUTO: 10.61 K/UL — HIGH (ref 3.8–10.5)

## 2022-09-20 PROCEDURE — 99233 SBSQ HOSP IP/OBS HIGH 50: CPT | Mod: GC

## 2022-09-20 PROCEDURE — 99233 SBSQ HOSP IP/OBS HIGH 50: CPT

## 2022-09-20 RX ORDER — MAGNESIUM SULFATE 500 MG/ML
2 VIAL (ML) INJECTION ONCE
Refills: 0 | Status: COMPLETED | OUTPATIENT
Start: 2022-09-20 | End: 2022-09-20

## 2022-09-20 RX ORDER — SODIUM,POTASSIUM PHOSPHATES 278-250MG
1 POWDER IN PACKET (EA) ORAL
Refills: 0 | Status: COMPLETED | OUTPATIENT
Start: 2022-09-20 | End: 2022-09-22

## 2022-09-20 RX ORDER — SODIUM,POTASSIUM PHOSPHATES 278-250MG
1 POWDER IN PACKET (EA) ORAL ONCE
Refills: 0 | Status: DISCONTINUED | OUTPATIENT
Start: 2022-09-20 | End: 2022-09-20

## 2022-09-20 RX ORDER — GABAPENTIN 400 MG/1
300 CAPSULE ORAL THREE TIMES A DAY
Refills: 0 | Status: DISCONTINUED | OUTPATIENT
Start: 2022-09-20 | End: 2022-09-20

## 2022-09-20 RX ORDER — SODIUM,POTASSIUM PHOSPHATES 278-250MG
1 POWDER IN PACKET (EA) ORAL ONCE
Refills: 0 | Status: COMPLETED | OUTPATIENT
Start: 2022-09-20 | End: 2022-09-20

## 2022-09-20 RX ORDER — HYDROMORPHONE HYDROCHLORIDE 2 MG/ML
4 INJECTION INTRAMUSCULAR; INTRAVENOUS; SUBCUTANEOUS EVERY 12 HOURS
Refills: 0 | Status: DISCONTINUED | OUTPATIENT
Start: 2022-09-20 | End: 2022-09-22

## 2022-09-20 RX ORDER — GABAPENTIN 400 MG/1
300 CAPSULE ORAL
Refills: 0 | Status: DISCONTINUED | OUTPATIENT
Start: 2022-09-20 | End: 2022-09-20

## 2022-09-20 RX ORDER — GABAPENTIN 400 MG/1
300 CAPSULE ORAL
Refills: 0 | Status: DISCONTINUED | OUTPATIENT
Start: 2022-09-20 | End: 2022-09-21

## 2022-09-20 RX ADMIN — HYDROMORPHONE HYDROCHLORIDE 4 MILLIGRAM(S): 2 INJECTION INTRAMUSCULAR; INTRAVENOUS; SUBCUTANEOUS at 19:17

## 2022-09-20 RX ADMIN — LIDOCAINE 1 PATCH: 4 CREAM TOPICAL at 11:42

## 2022-09-20 RX ADMIN — HYDROMORPHONE HYDROCHLORIDE 1.5 MILLIGRAM(S): 2 INJECTION INTRAMUSCULAR; INTRAVENOUS; SUBCUTANEOUS at 10:21

## 2022-09-20 RX ADMIN — SENNA PLUS 2 TABLET(S): 8.6 TABLET ORAL at 22:14

## 2022-09-20 RX ADMIN — HYDROMORPHONE HYDROCHLORIDE 4 MILLIGRAM(S): 2 INJECTION INTRAMUSCULAR; INTRAVENOUS; SUBCUTANEOUS at 13:54

## 2022-09-20 RX ADMIN — Medication 5 MILLIGRAM(S): at 06:15

## 2022-09-20 RX ADMIN — LIDOCAINE 1 PATCH: 4 CREAM TOPICAL at 10:52

## 2022-09-20 RX ADMIN — HYDROMORPHONE HYDROCHLORIDE 0.5 MILLIGRAM(S): 2 INJECTION INTRAMUSCULAR; INTRAVENOUS; SUBCUTANEOUS at 10:16

## 2022-09-20 RX ADMIN — LIDOCAINE 1 PATCH: 4 CREAM TOPICAL at 23:58

## 2022-09-20 RX ADMIN — METHADONE HYDROCHLORIDE 5 MILLIGRAM(S): 40 TABLET ORAL at 13:03

## 2022-09-20 RX ADMIN — CHLORHEXIDINE GLUCONATE 1 APPLICATION(S): 213 SOLUTION TOPICAL at 06:18

## 2022-09-20 RX ADMIN — Medication 1 PACKET(S): at 17:38

## 2022-09-20 RX ADMIN — ENOXAPARIN SODIUM 60 MILLIGRAM(S): 100 INJECTION SUBCUTANEOUS at 22:13

## 2022-09-20 RX ADMIN — HYDROMORPHONE HYDROCHLORIDE 4 MILLIGRAM(S): 2 INJECTION INTRAMUSCULAR; INTRAVENOUS; SUBCUTANEOUS at 11:42

## 2022-09-20 RX ADMIN — HYDROMORPHONE HYDROCHLORIDE 4 MILLIGRAM(S): 2 INJECTION INTRAMUSCULAR; INTRAVENOUS; SUBCUTANEOUS at 12:50

## 2022-09-20 RX ADMIN — Medication 5 MILLIGRAM(S): at 12:03

## 2022-09-20 RX ADMIN — HYDROMORPHONE HYDROCHLORIDE 0.5 MILLIGRAM(S): 2 INJECTION INTRAMUSCULAR; INTRAVENOUS; SUBCUTANEOUS at 04:20

## 2022-09-20 RX ADMIN — Medication 1 PACKET(S): at 10:17

## 2022-09-20 RX ADMIN — HYDROMORPHONE HYDROCHLORIDE 1.5 MILLIGRAM(S): 2 INJECTION INTRAMUSCULAR; INTRAVENOUS; SUBCUTANEOUS at 08:51

## 2022-09-20 RX ADMIN — Medication 325 MILLIGRAM(S): at 16:02

## 2022-09-20 RX ADMIN — Medication 3 MILLIGRAM(S): at 22:14

## 2022-09-20 RX ADMIN — Medication 25 GRAM(S): at 10:17

## 2022-09-20 RX ADMIN — HYDROMORPHONE HYDROCHLORIDE 4 MILLIGRAM(S): 2 INJECTION INTRAMUSCULAR; INTRAVENOUS; SUBCUTANEOUS at 21:11

## 2022-09-20 RX ADMIN — HYDROMORPHONE HYDROCHLORIDE 0.5 MILLIGRAM(S): 2 INJECTION INTRAMUSCULAR; INTRAVENOUS; SUBCUTANEOUS at 11:43

## 2022-09-20 RX ADMIN — LACTULOSE 10 GRAM(S): 10 SOLUTION ORAL at 16:02

## 2022-09-20 RX ADMIN — GABAPENTIN 300 MILLIGRAM(S): 400 CAPSULE ORAL at 11:42

## 2022-09-20 RX ADMIN — BICTEGRAVIR SODIUM, EMTRICITABINE, AND TENOFOVIR ALAFENAMIDE FUMARATE 1 TABLET(S): 30; 120; 15 TABLET ORAL at 11:43

## 2022-09-20 RX ADMIN — POLYETHYLENE GLYCOL 3350 17 GRAM(S): 17 POWDER, FOR SOLUTION ORAL at 06:03

## 2022-09-20 RX ADMIN — GABAPENTIN 300 MILLIGRAM(S): 400 CAPSULE ORAL at 17:31

## 2022-09-20 RX ADMIN — HYDROMORPHONE HYDROCHLORIDE 1.5 MILLIGRAM(S): 2 INJECTION INTRAMUSCULAR; INTRAVENOUS; SUBCUTANEOUS at 02:48

## 2022-09-20 RX ADMIN — Medication 100 MILLIGRAM(S): at 22:13

## 2022-09-20 RX ADMIN — NALOXEGOL OXALATE 25 MILLIGRAM(S): 12.5 TABLET, FILM COATED ORAL at 11:43

## 2022-09-20 RX ADMIN — HYDROMORPHONE HYDROCHLORIDE 4 MILLIGRAM(S): 2 INJECTION INTRAMUSCULAR; INTRAVENOUS; SUBCUTANEOUS at 17:31

## 2022-09-20 RX ADMIN — Medication 100 MILLIGRAM(S): at 13:04

## 2022-09-20 RX ADMIN — LIDOCAINE 1 PATCH: 4 CREAM TOPICAL at 19:23

## 2022-09-20 RX ADMIN — LIDOCAINE 1 PATCH: 4 CREAM TOPICAL at 06:55

## 2022-09-20 RX ADMIN — Medication 12.5 MILLIGRAM(S): at 06:04

## 2022-09-20 RX ADMIN — Medication 12.5 MILLIGRAM(S): at 17:31

## 2022-09-20 RX ADMIN — METHADONE HYDROCHLORIDE 5 MILLIGRAM(S): 40 TABLET ORAL at 05:59

## 2022-09-20 RX ADMIN — ENOXAPARIN SODIUM 60 MILLIGRAM(S): 100 INJECTION SUBCUTANEOUS at 11:44

## 2022-09-20 RX ADMIN — HYDROMORPHONE HYDROCHLORIDE 4 MILLIGRAM(S): 2 INJECTION INTRAMUSCULAR; INTRAVENOUS; SUBCUTANEOUS at 20:11

## 2022-09-20 RX ADMIN — HYDROMORPHONE HYDROCHLORIDE 1.5 MILLIGRAM(S): 2 INJECTION INTRAMUSCULAR; INTRAVENOUS; SUBCUTANEOUS at 01:48

## 2022-09-20 RX ADMIN — HYDROMORPHONE HYDROCHLORIDE 4 MILLIGRAM(S): 2 INJECTION INTRAMUSCULAR; INTRAVENOUS; SUBCUTANEOUS at 16:00

## 2022-09-20 RX ADMIN — Medication 100 MILLIGRAM(S): at 06:03

## 2022-09-20 RX ADMIN — HYDROMORPHONE HYDROCHLORIDE 0.5 MILLIGRAM(S): 2 INJECTION INTRAMUSCULAR; INTRAVENOUS; SUBCUTANEOUS at 05:20

## 2022-09-20 RX ADMIN — METHADONE HYDROCHLORIDE 5 MILLIGRAM(S): 40 TABLET ORAL at 22:13

## 2022-09-20 RX ADMIN — HYDROMORPHONE HYDROCHLORIDE 4 MILLIGRAM(S): 2 INJECTION INTRAMUSCULAR; INTRAVENOUS; SUBCUTANEOUS at 12:44

## 2022-09-20 RX ADMIN — HYDROMORPHONE HYDROCHLORIDE 4 MILLIGRAM(S): 2 INJECTION INTRAMUSCULAR; INTRAVENOUS; SUBCUTANEOUS at 06:00

## 2022-09-20 RX ADMIN — Medication 5 MILLIGRAM(S): at 17:31

## 2022-09-20 NOTE — PROGRESS NOTE ADULT - PROBLEM SELECTOR PLAN 6
CT Chest 9/11 with evidence of persistent pneumomediastinum and extensive subcutaneous emphysema of the neck, chest, abdomen, pelvis, and extremities, including the scrotum.   - CANDIS per thoracic surgery  - indwelling pedraza was placed at [facility] (Coude placed for urinary strain)   - No further urologic intervention needed at this time.   - Can f/u outpatient w/ Dr Prabhu Lucio, [facility] [place]  - pain control with dilaudid and methadone, ct a/p 9/11 did not show any inflammation/hydrocele/infection . Patient found to have L external vein DVT with bilateral leg DVT studies negative for DVT.  - was previously on heparin gtt   - transitioned to lovenox today  - will need to monitor for signs of bleeding while on lovenox

## 2022-09-20 NOTE — PROGRESS NOTE ADULT - PROBLEM SELECTOR PLAN 1
- Rad/ Onc recommendations appreciated   - PO Methadone 5mg TID (QTC-444 on 9/12; QTC- 458 on 9/19); please repeat EKG later this week to monitor QTC while on methadone  - Increase to Gabapentin 300mg BID  - Taper to PO Dilaudid 4mg q12 ATC (hold for hypotension, oversedation, respiratory depression)  - PO Dilaudid 4mg q4 PRN moderate pain (hold for hypotension, oversedation, respiratory depression)  - IV Dilaudid 1.5mg q3 PRN (hold for hypotension, oversedation, respiratory depression).  - Extensively educated and reviewed pain regimen with patient

## 2022-09-20 NOTE — PROGRESS NOTE ADULT - SUBJECTIVE AND OBJECTIVE BOX
INTERVAL HPI/OVERNIGHT EVENTS:  In past 24 hours, received 1 prn dose of PO Dilaudid 4mg, 5 prn dose of IV Dilaudid 1.5mg, and 1 prn dose of IV Dilaudid 0.5mg,     SUBJECTIVE AND OBJECTIVE:  Patient seen and examined at bedside. Patient being followed up for pain.  Patient with some improvement of pain compared to yesterday. Had improvement of pain from 10/10 to 6/10 after Dilaudid dose earlier.   Reports having left lower extremity numbness and pain since yesterday.     Allergies    ibuprofen (Angioedema)    Intolerances    MEDICATIONS  (STANDING):  bictegravir 50 mG/emtricitabine 200 mG/tenofovir alafenamide 25 mG (BIKTARVY) 1 Tablet(s) Oral daily  ceFAZolin   IVPB 2000 milliGRAM(s) IV Intermittent every 8 hours  chlorhexidine 2% Cloths 1 Application(s) Topical <User Schedule>  dronabinol 5 milliGRAM(s) Oral <User Schedule>  enoxaparin Injectable 60 milliGRAM(s) SubCutaneous every 12 hours  ferrous    sulfate 325 milliGRAM(s) Oral <User Schedule>  gabapentin 300 milliGRAM(s) Oral daily  HYDROmorphone   Tablet 4 milliGRAM(s) Oral every 6 hours  lactated ringers. 1000 milliLiter(s) (75 mL/Hr) IV Continuous <Continuous>  lactulose Syrup 10 Gram(s) Oral daily  lidocaine   4% Patch 1 Patch Transdermal daily  lidocaine   4% Patch 1 Patch Transdermal daily  melatonin 3 milliGRAM(s) Oral at bedtime  methadone    Tablet 5 milliGRAM(s) Oral three times a day  metoprolol tartrate 12.5 milliGRAM(s) Oral two times a day  naloxegol 25 milliGRAM(s) Oral daily  polyethylene glycol 3350 17 Gram(s) Oral two times a day  potassium phosphate / sodium phosphate Powder (PHOS-NaK) 1 Packet(s) Oral two times a day  senna 2 Tablet(s) Oral at bedtime    MEDICATIONS  (PRN):  bisacodyl 5 milliGRAM(s) Oral at bedtime PRN Constipation  HYDROmorphone   Tablet 4 milliGRAM(s) Oral every 4 hours PRN Moderate Pain (4 - 6)  HYDROmorphone  Injectable 1.5 milliGRAM(s) IV Push every 3 hours PRN Severe Pain (7 - 10)  HYDROmorphone  Injectable 0.5 milliGRAM(s) IV Push every 2 hours PRN for breakthrough pain  naloxone Injectable 0.1 milliGRAM(s) IV Push every 3 minutes PRN opioid intoxication    ITEMS UNCHECKED ARE NOT PRESENT    PRESENT SYMPTOMS: [ ]Unable to self-report due to altered mental status- see [ ] CPOT [ ] PAINADS [ ] RDOS  Source if other than patient:  [ ]Family   [ ]Team     Pain: [x ]yes [ ]no  QOL impact - severe  Location - rectum  , scrotum          Aggravating factors - urination, movement  Quality - sharp  Radiation - "all over my body"  Timing- intermittent   Severity (0-10 scale): 10  Minimal acceptable level (0-10 scale): 3    Dyspnea:                           [ ]Mild [ ]Moderate [ ]Severe  RDOS:  0 to 2  minimal or no respiratory distress   3  mild distress  4 to 6 moderate distress  >7 severe distress  https://homecareinformation.net/handouts/hen/Respiratory_Distress_Observation_Scale.pdf (Ctrl +  left click to view)     Anxiety:                             [ ]Mild [ ]Moderate [ ]Severe  Agitation:                          [ ]Mild [ ]Moderate [ ]Severe  Fatigue:                             [ ]Mild [ ]Moderate [ ]Severe  Nausea:                             [ ]Mild [ ]Moderate [ ]Severe  Loss of appetite:              [ ]Mild [ ]Moderate [ ]Severe  Constipation:                   [ ]Mild [x ]Moderate [ ]Severe  Diarrhea:                          [ ]Mild [ ]Moderate [ ]Severe      CPOT:    https://www.[facility].org/getattachment/[email or web address]/Critical-Care-Pain-Observation-Tool-(CPOT)    PAIN AD Score:	  [email or web address]/painad.pdf (Ctrl + left click to view)    PCSSQ[Palliative Care Spiritual Screening Question]   Severity (0-10):  Score of 4 or > indicate consideration of Chaplaincy referral.  Chaplaincy Referral: [ ] yes [ ] refused [ ] following    Other Symptoms:  [ x]All other review of systems negative     Palliative Performance Status Version 2:  60-70       %      http://[facility].org/files/news/palliative_performance_scale_ppsv2.pdf    PHYSICAL EXAM:  Vital Signs Last 24 Hrs  T(C): 36.9 (20 Sep 2022 08:51), Max: 37.1 (19 Sep 2022 19:30)  T(F): 98.4 (20 Sep 2022 08:51), Max: 98.8 (19 Sep 2022 19:30)  HR: 100 (20 Sep 2022 10:21) (92 - 108)  BP: 125/67 (20 Sep 2022 10:21) (112/64 - 132/65)  BP(mean): --  RR: 19 (20 Sep 2022 10:21) (18 - 19)  SpO2: 100% (20 Sep 2022 10:21) (97% - 100%)    Parameters below as of 20 Sep 2022 10:21  Patient On (Oxygen Delivery Method): room air    GENERAL:  [ x]Alert  [ x]Oriented x 3  [ ]Lethargic  [ ]Cachexia  [ ]Unarousable  [ x]Verbal  [ ]Non-Verbal    Behavioral:   [ x] Anxiety  [ ] Delirium [ ] Agitation [ ] Calm  [ ] Other   HEENT:  [x ]Normal  [ ] Temporal Wasting  [ ]Dry mouth   [ ]ET Tube/Trach  [ ]Oral lesions  [ ] Mucositis  PULMONARY: Left chest tube   [x ]Clear [ ]Tachypnea  [ ]Audible excessive secretions   [ ]Rhonchi        [ ]Right [ ]Left [ ]Bilateral  [ ]Crackles        [ ]Right [ ]Left [ ]Bilateral  [ ]Wheezing     [ ]Right [ ]Left [ ]Bilateral  [ ]Diminished breath sounds [ ]right [ ]left [ ]bilateral  CARDIOVASCULAR:    [x ]Regular [ ]Irregular [ ]Tachy  [ ]Jose [ ]Murmur [ ]Other  GASTROINTESTINAL:  [ x]Soft  [ ]Distended   [ ]+BS  [ x]Non tender [ ]Tender  [ ]PEG [ ]OGT/ NGT  Last BM: 9/12  GENITOURINARY: Scrotal edema  [ ]Normal [ ] Incontinent   [ ]Oliguria/Anuria   [x ]Rosario  MUSCULOSKELETAL:   [x ]Normal   [ ]Weakness  [ ]Bed/Wheelchair bound [ ]Edema  [  ] amputation  [  ] contraction  NEUROLOGIC:   [x ]No focal deficits  [ ]Cognitive impairment  [ ]Dysphagia [ ]Dysarthria [ ]Paresis [ ]Other   SKIN: Crepitus in left lower extremity. See Nursing Skin Assessment for further details  [ x]Normal    [ ]Rash  [ ]Pressure ulcer(s)       Present on admission [ ]y [ ]n   [  ]  Wound    [  ] hyperpigmentation    CRITICAL CARE:  [ ]Shock Present  [ ]Septic [ ]Cardiogenic [ ]Neurologic [ ]Hypovolemic  [ ]Vasopressors [ ]Inotropes  [ ]Respiratory failure present [ ]Mechanical Ventilation [ ]Non-invasive ventilatory support [ ]High-Flow   [ ]Acute  [ ]Chronic [ ]Hypoxic  [ ]Hypercarbic [ ]Other  [ ]Other organ failure     LABS:                                                              8.3    10.61 )-----------( 112      ( 20 Sep 2022 06:00 )             26.9       09-20    135  |  100  |  7   ----------------------------<  93  3.9   |  25  |  0.69    Ca    9.0      20 Sep 2022 06:00  Phos  1.9     09-20  Mg     1.40     09-20    TPro  6.2  /  Alb  2.4<L>  /  TBili  0.3  /  DBili  x   /  AST  26  /  ALT  8   /  AlkPhos  219<H>  09-20      PTT - ( 19 Sep 2022 13:29 )  PTT:36.6 sec    RADIOLOGY & ADDITIONAL STUDIES: reviewed     Protein Calorie Malnutrition Present: [ ]mild [ ]moderate [ x]severe [ ]underweight [ ]morbid obesity  https://www.andeal.org/vault/2440/web/files/ONC/Table_Clinical%20Characteristics%20to%20Document%20Malnutrition-White%20JV%20et%20al%202012.pdf    Height (cm): 198.1 (09-08-22 @ 14:00), 198.2 (08-08-22 @ 12:00), 198.1 (07-26-22 @ 13:32)  Weight (kg): 57.5 (09-08-22 @ 14:00), 79.0884 (08-08-22 @ 12:00), 81.2 (07-26-22 @ 13:32)  BMI (kg/m2): 14.7 (09-08-22 @ 14:00), 20.1 (08-08-22 @ 12:00), 20.7 (07-26-22 @ 13:32)    [ ]PPSV2 < or = 30%  [ ]significant weight loss [ ]poor nutritional intake [ ]anasarca   [ ]Artificial Nutrition    REFERRALS:   [ ]Chaplaincy  [ ]Hospice  [ ]Child Life  [ ]Social Work  [x ]Case management [ ]Holistic Therapy

## 2022-09-20 NOTE — PROGRESS NOTE ADULT - PROBLEM SELECTOR PLAN 2
Patient with new onset hematuria likely 2/2 trauma from manipulation of pedraza.  - CANDIS at this time  - recommend patient to secure pedraza to leg  - will monitor for continued signs of bleeding  - STAT CBC if bleeding persists Patient with history of MSSA bacteremia found at [facility] with blood cultures 8/31 positive for MSSA and repeat on 9/2 NGTD.  - s/p Zosyn (9/8 - 9/9)  - transitioned to Cefazolin 2GM q8h for 6 weeks ending (9/9-10/13)   - upon discharge, will need weekly CBC, BUN and creatinine and ID clinic follow up

## 2022-09-20 NOTE — PROGRESS NOTE ADULT - PROBLEM SELECTOR PLAN 3
- L pigtail catheter was placed at [facility] due to concern for L PTX. However, patient was found to have large CHRISTINE cavitary lesion instead, into which the pigtail had been placed.   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  - CardioThoracic recommendations appreciated  - s/p chest tube removal  - management as per primary team.

## 2022-09-20 NOTE — PROGRESS NOTE ADULT - PROBLEM SELECTOR PLAN 12
Palliative care consulted for extensive disease burden and GOC discussion. At this time, patient was all available treatment and resuscitation.  - full code - Fluids: NA  - Electrolytes: Will replete to maintain K>4, Phos>3, and Mag>2  - Nutrition: regular  - Activity: OOB as tolerated  - DVT Prophylaxis: lovenox  - Stress Ulcer/GI Prophylaxis: NA  - Disposition: pending medical management

## 2022-09-20 NOTE — PROGRESS NOTE ADULT - PROBLEM SELECTOR PLAN 8
Discussed symptom management recommendations with  primary team     Thank you for allowing us to participate in your patient's care. Please page [phone] for any questions/concerns.

## 2022-09-20 NOTE — PROGRESS NOTE ADULT - PROBLEM SELECTOR PLAN 11
Patient with history of HIV and follows with Dr. Marcial in clinic.  - CD4 392 and viral load < 30 on 9/2/22  - Viral load undetectable 9/8  - c/w home Biktarvy Palliative care consulted for extensive disease burden and GOC discussion. At this time, patient was all available treatment and resuscitation.  - full code

## 2022-09-20 NOTE — CHART NOTE - NSCHARTNOTEFT_GEN_A_CORE
Mr. Porter is a 31 y.o. M known to our department having h/o HIV +, rectal cancer, and was transferred from [facility] to [facility] for further care after a syncopal episode.  He c/o pain to the lower pelvis and was seen laying on his right side to avoid pain when laying supine.   He had a brief stay in the MICU for respiratory compromise and appears  to have stabilized on room air without acute dyspnea.    His RT plan was for 18 curative fractions under Dr. Santiago and to be completed at [facility].   At present, he is pending a cardiac MRI to r/o tumor vs thrombus.   We await the results as it may determine his disposition status.  He also has bacteremia and will remain on antibiotics into the month of October.     Will d/w Dr. Santiago.   Will await results of cardiac MRI.  Mr. Porter did express wanting to go home and have RT as planned at [facility].             < from: CT Abdomen and Pelvis No Cont (09.11.22 @ 16:54) >    IMPRESSION:  1. Persistent left upper lobe cavitary lesion, with pigtail catheter in   place, likely communicating with the lingular bronchus.  2. Right middle lobe pneumonia and scattered airspace opacities   throughout the remaining lungs, as described.  3. Persistent pneumomediastinum and extensive subcutaneous emphysema of   the neck, chest, abdomen, pelvis, and extremities, including the scrotum.  4. Large anorectal mass.  5. Liver metastases. Mr. Porter is a 31 y.o. M known to our department having h/o HIV +, SCC rectal cancer, and was transferred from [facility] to [facility] for further care after a syncopal episode.  He c/o pain to the lower pelvis and was seen laying on his right side to avoid pain when laying supine.   He had a brief stay in the MICU for respiratory compromise and appears  to have stabilized on room air without acute dyspnea.    His RT plan was for 18 curative fractions under Dr. Santiago and to be completed at [facility].   At present, he is pending a cardiac MRI to r/o tumor vs thrombus.   We await the results as it may determine his disposition status.  He also has bacteremia and will remain on antibiotics into the month of October.     Will d/w Dr. Santiago.   Will await results of cardiac MRI.  Mr. Porter did express wanting to go home and have RT as planned at [facility].             < from: CT Abdomen and Pelvis No Cont (09.11.22 @ 16:54) >    IMPRESSION:  1. Persistent left upper lobe cavitary lesion, with pigtail catheter in   place, likely communicating with the lingular bronchus.  2. Right middle lobe pneumonia and scattered airspace opacities   throughout the remaining lungs, as described.  3. Persistent pneumomediastinum and extensive subcutaneous emphysema of   the neck, chest, abdomen, pelvis, and extremities, including the scrotum.  4. Large anorectal mass.  5. Liver metastases. Mr. Porter is a 31 y.o. M known to our department having h/o HIV +, metastatic SCC rectal cancer, and was transferred from [facility] to [facility] for further care after a syncopal episode.  He c/o pain to the lower pelvis and was seen laying on his right side to avoid pain when laying supine.   He had a brief stay in the MICU for respiratory compromise and appears  to have stabilized on room air without acute dyspnea.    His RT plan was for 18 curative fractions under Dr. Santiago and to be completed at [facility] prior to this admission.   At present, he is pending a cardiac MRI to r/o tumor vs thrombus.   We await the results as it may determine his disposition status.  He also has bacteremia and will remain on antibiotics into the month of October.     Will d/w Dr. Santiago the options of inpatient vs outpatient RT and intent being palliative having metastases to the liver metastases and possible locoregional adenopathy on imaging.   Will await results of cardiac MRI.  Mr. Porter did express wanting to go home and have RT as planned at [facility].       < from: CT Chest No Cont (09.11.22 @ 16:54) >    ABDOMEN AND PELVIS:  LIVER: There are multiple centrally hypoattenuating liver lesions   measuring up to 2.8 cm, compatible with widespread metastatic disease and   internal cavitation.  BILE DUCTS: Normal caliber.  GALLBLADDER: Layering sludge and/or tiny stones  SPLEEN: Within normal limits.  PANCREAS: Within normal limits.  ADRENALS: Within normal limits.  KIDNEYS/URETERS: No evidence of hydronephrosis orperinephric stranding   bilaterally.    BLADDER: Rosario catheter. In the bladder with moderate nondependent gas   and layering debris.  REPRODUCTIVE ORGANS:    BOWEL: Again is noted concentric wall thickening of the distal rectum and   anal canal with extra-luminal gas extending through the anorectal junction   at the 2:00 position extending to the pelvic sidewall. There is lobular   low-attenuation infiltration of the left perirectal space extending to   the pelvic sidewall, suggestive of metastatic disease and/or metastatic   adenopathy.    Stool throughout the right colon. No evidence of bowel obstruction. Scant   intra-abdominal fat.  PERITONEUM: No ascites.  VESSELS: IVC filter. The abdominal aorta is nonaneurysmal.  RETROPERITONEUM/LYMPH NODES: No lymphadenopathy.  ABDOMINAL WALL: There is extensive subcutaneous emphysema of the anterior   abdominal wall tracking into the scrotal sac and bilateral lower   extremities, left greater than right.  BONES: Within normal limits.    IMPRESSION:  1. Persistent left upper lobe cavitary lesion, with pigtail catheter in   place, likely communicating with the lingular bronchus.  2. Right middle lobe pneumonia and scattered airspace opacities   throughout the remaining lungs, as described.  3. Persistent pneumomediastinum and extensive subcutaneous emphysema of   the neck, chest, abdomen, pelvis, and extremities, including the scrotum.  4. Large anorectal mass.  5. Liver metastases. Mr. Porter is a 31 y.o. M known to our department having h/o HIV +, metastatic SCC rectal cancer, and was transferred from [facility] to [facility] for further care after a syncopal episode.  He c/o pain to the lower pelvis and was seen laying on his right side to avoid pain when laying supine.   He had a brief stay in the MICU for respiratory compromise and appears  to have stabilized on room air without acute dyspnea.    His RT plan was for 18 curative fractions under Dr. Santiago and to be completed at [facility] prior to this admission.   At present, he is pending a cardiac MRI to r/o tumor vs thrombus.   We await the results as it may determine his disposition status.  He also has bacteremia and will remain on antibiotics into the month of October.     Will d/w Dr. Santiago the options of inpatient vs outpatient RT and intent being palliative having metastases to the liver and possible locoregional adenopathy on imaging.   Will await results of cardiac MRI.  Mr. Porter did express wanting to go home and have RT as planned at [facility].       < from: CT Chest No Cont (09.11.22 @ 16:54) >    ABDOMEN AND PELVIS:  LIVER: There are multiple centrally hypoattenuating liver lesions   measuring up to 2.8 cm, compatible with widespread metastatic disease and   internal cavitation.  BILE DUCTS: Normal caliber.  GALLBLADDER: Layering sludge and/or tiny stones  SPLEEN: Within normal limits.  PANCREAS: Within normal limits.  ADRENALS: Within normal limits.  KIDNEYS/URETERS: No evidence of hydronephrosis orperinephric stranding   bilaterally.    BLADDER: Rosario catheter. In the bladder with moderate nondependent gas   and layering debris.  REPRODUCTIVE ORGANS:    BOWEL: Again is noted concentric wall thickening of the distal rectum and   anal canal with extra-luminal gas extending through the anorectal junction   at the 2:00 position extending to the pelvic sidewall. There is lobular   low-attenuation infiltration of the left perirectal space extending to   the pelvic sidewall, suggestive of metastatic disease and/or metastatic   adenopathy.    Stool throughout the right colon. No evidence of bowel obstruction. Scant   intra-abdominal fat.  PERITONEUM: No ascites.  VESSELS: IVC filter. The abdominal aorta is nonaneurysmal.  RETROPERITONEUM/LYMPH NODES: No lymphadenopathy.  ABDOMINAL WALL: There is extensive subcutaneous emphysema of the anterior   abdominal wall tracking into the scrotal sac and bilateral lower   extremities, left greater than right.  BONES: Within normal limits.    IMPRESSION:  1. Persistent left upper lobe cavitary lesion, with pigtail catheter in   place, likely communicating with the lingular bronchus.  2. Right middle lobe pneumonia and scattered airspace opacities   throughout the remaining lungs, as described.  3. Persistent pneumomediastinum and extensive subcutaneous emphysema of   the neck, chest, abdomen, pelvis, and extremities, including the scrotum.  4. Large anorectal mass.  5. Liver metastases.

## 2022-09-20 NOTE — PROGRESS NOTE ADULT - PROBLEM SELECTOR PLAN 1
Patient with TTE performed on at [facility] with evidence of two large RV masses and two additional small masses. IVC filter placed prophylactically at that time due to undiagnosed vegetations on the TTE.  - repeat TTE 9/9 was a limited study but demonstrated a mobile mass likely 2/2 tumour, thrombus, or vegetation.   - per cardiology risk of JENNIFER outweighs benefit at this time  - pending cardiac MRI to evaluate to mass; planned for today but rescheduled for tomorrow due to scheduling  - will need to pre medicate before MRI; may need general anesthesia?  - monitor on tele

## 2022-09-20 NOTE — PROGRESS NOTE ADULT - PROBLEM SELECTOR PLAN 7
Patient found to have L external vein DVT with bilateral leg DVT studies negative for DVT.  - was previously on heparin gtt   - transitioned to lovenox today  - will need to monitor for signs of bleeding while on lovenox Patient with history of untreated HPV related rectal cancer with episode of rectal bleeding at [facility] Hgb 5.9 s/p 2u pRBCs.  - CT with evidence of liver and bone lesions  - renal cancer treatment will be on hold pending resolution of infection  - radiation oncology consult for RT of rectal cancer placed  - appreciate oncology recs

## 2022-09-20 NOTE — PROGRESS NOTE ADULT - ASSESSMENT
31 M with untreated metastatic ano-rectal SCC (followed by Dr Frazier [facility] oncology) with mets to liver and possibly bone, HPV positive, HIV infection, and R eye cataracts who presented to the [facility] on 8/31/22 after syncopal episode at home and transferred to [facility] for continued of care per family request. Pt with cough x 2 mos, with yellow sputum and shortness of breath since 5 days PTA.

## 2022-09-20 NOTE — PROGRESS NOTE ADULT - PROBLEM SELECTOR PLAN 4
- increased PO Methadone 5mg TID (QTC-444 on 9/12; QTC- 458 on 9/19); will need repeat EKG later this week to monitor QTC while on methadone  - c/w Gabapentin 300mg QD   - PO dilaudid 4mg q6 ATC   - PO dilaudid 4mg q4 PRN moderate pain   - IV dilaudid 1.5mg q3 PRN severe pain  - IV dilaudid 0.5 mg q2 PRN breakthrough pain  - hold parameters placed for all medications  - appreciate palliative care recs Patient presented to [facility] with hypoxemia and L chest pigtail placed 8/31 due to suspected pneumothorax. Patient showed improvement in SOB and f/u CT chest performed which showed evidence of 12 cm multiloculated thick walled cavitary mass in the CHRISTINE and lingula.  - sputum culture 9/10 + mod klebsiella pneumoniae pansensitive  - blood Cx neg 9/8  - fungitell and crypto neg 9/10  - ID consulted, recs appreciated  - per pulm, no CANDIS. will need f/u CT in 2 months

## 2022-09-20 NOTE — PROGRESS NOTE ADULT - PROBLEM SELECTOR PLAN 5
Patient presented to [facility] with hypoxemia and L chest pigtail placed 8/31 due to suspected pneumothorax. Patient showed improvement in SOB and f/u CT chest performed which showed evidence of 12 cm multiloculated thick walled cavitary mass in the CHRISTINE and lingula.  - sputum culture 9/10 + mod klebsiella pneumoniae pansensitive  - blood Cx neg 9/8  - fungitell and crypto neg 9/10  - ID consulted, recs appreciated CT Chest 9/11 with evidence of persistent pneumomediastinum and extensive subcutaneous emphysema of the neck, chest, abdomen, pelvis, and extremities, including the scrotum.   - CANDIS per thoracic surgery  - indwelling pedraza was placed at [facility] (Coude placed for urinary strain)   - No further urologic intervention needed at this time.   - Can f/u outpatient w/ Dr Prabhu Lucio, [facility] [place]  - pain control with dilaudid and methadone, ct a/p 9/11 did not show any inflammation/hydrocele/infection .

## 2022-09-20 NOTE — PROGRESS NOTE ADULT - PROBLEM SELECTOR PLAN 6
- Patient with metastatic ano-rectal SCC  - CT- Large anorectal mass.  Liver metastases. There are multiple centrally hypoattenuating liver lesions   measuring up to 2.8 cm, compatible with widespread metastatic disease and internal cavitation.  - Oncology recommendations appreciated.  - Rad/ Onc recommendations appreciated

## 2022-09-20 NOTE — PROGRESS NOTE ADULT - PROBLEM SELECTOR PLAN 10
- s/p SVT events x 2 9/15 and 9/16 resolved with vagal maneuvers   - c/w metoprol 12.5 BID with hold parameters  - consider EP eval if persists Patient with history of HIV and follows with Dr. Marcial in clinic.  - CD4 392 and viral load < 30 on 9/2/22  - Viral load undetectable 9/8  - c/w home Biktarvy

## 2022-09-20 NOTE — PROGRESS NOTE ADULT - PROBLEM SELECTOR PLAN 8
Patient with history of untreated HPV related rectal cancer with episode of rectal bleeding at [facility] Hgb 5.9 s/p 2u pRBCs.  - CT with evidence of liver and bone lesions  - renal cancer treatment will be on hold pending resolution of infection  - radiation oncology consult for RT of rectal cancer placed  - appreciate oncology recs - Ca 16.4 9/10 s/p calcitonin 4u/kg x 2 doses and pamidronate 90mcg IVPB x1.  - calcium level stable at 11.8   - cont IVFs w/ LR @ 75 cc/hr and continue monitoring   - continue to trend levels q 24 hrs   - can repeat pamidronate after 1 week PRN  - vit D low but will hold supplementation in the setting of hypercalcemia of malignancy

## 2022-09-20 NOTE — PROGRESS NOTE ADULT - PROBLEM SELECTOR PLAN 3
Patient with history of MSSA bacteremia found at [facility] with blood cultures 8/31 positive for MSSA and repeat on 9/2 NGTD.  - s/p Zosyn (9/8 - 9/9)  - transitioned to Cefazolin 2GM q8h for 6 weeks ending (9/9-10/13)   - upon discharge, will need weekly CBC, BUN and creatinine and ID clinic follow up - increased PO Methadone 5mg TID (QTC-444 on 9/12; QTC- 458 on 9/19); will need repeat EKG later this week to monitor QTC while on methadone  - c/w Gabapentin 300mg TID  - PO dilaudid 4mg q12 ATC   - PO dilaudid 4mg q4 PRN moderate pain   - IV dilaudid 1.5mg q3 PRN severe pain  - hold parameters placed for all medications  - appreciate palliative care recs

## 2022-09-20 NOTE — PROGRESS NOTE ADULT - ASSESSMENT
31 y o M w/ HIV/AIDS (on HAART), Metastatic Rectal Ca (Not yet on chemo) who was initially admitted to [facility] on 8/31 after presenting with productive cough for 2 months and then acute SOB x 5 days. L pigtail catheter was placed due to concern for L PTX. However, patient was found to have large CHRISTINE cavitary lesion instead, into which the pigtail had been placed. 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

## 2022-09-20 NOTE — PROGRESS NOTE ADULT - PROBLEM SELECTOR PLAN 9
- Ca 16.4 9/10 s/p calcitonin 4u/kg x 2 doses and pamidronate 90mcg IVPB x1.  - calcium level stable at 11.8   - cont IVFs w/ LR @ 75 cc/hr and continue monitoring   - continue to trend levels q 24 hrs   - can repeat pamidronate after 1 week PRN  - vit D low but will hold supplementation in the setting of hypercalcemia of malignancy - s/p SVT events x 2 9/15 and 9/16 resolved with vagal maneuvers   - c/w metoprol 12.5 BID with hold parameters  - consider EP eval if persists

## 2022-09-21 LAB
ALBUMIN SERPL ELPH-MCNC: 2.5 G/DL — LOW (ref 3.3–5)
ALP SERPL-CCNC: 239 U/L — HIGH (ref 40–120)
ALT FLD-CCNC: 5 U/L — SIGNIFICANT CHANGE UP (ref 4–41)
ANION GAP SERPL CALC-SCNC: 10 MMOL/L — SIGNIFICANT CHANGE UP (ref 7–14)
AST SERPL-CCNC: 32 U/L — SIGNIFICANT CHANGE UP (ref 4–40)
BASOPHILS # BLD AUTO: 0.03 K/UL — SIGNIFICANT CHANGE UP (ref 0–0.2)
BASOPHILS NFR BLD AUTO: 0.3 % — SIGNIFICANT CHANGE UP (ref 0–2)
BILIRUB SERPL-MCNC: 0.4 MG/DL — SIGNIFICANT CHANGE UP (ref 0.2–1.2)
BUN SERPL-MCNC: 8 MG/DL — SIGNIFICANT CHANGE UP (ref 7–23)
CALCIUM SERPL-MCNC: 9.1 MG/DL — SIGNIFICANT CHANGE UP (ref 8.4–10.5)
CHLORIDE SERPL-SCNC: 98 MMOL/L — SIGNIFICANT CHANGE UP (ref 98–107)
CO2 SERPL-SCNC: 25 MMOL/L — SIGNIFICANT CHANGE UP (ref 22–31)
CREAT SERPL-MCNC: 0.67 MG/DL — SIGNIFICANT CHANGE UP (ref 0.5–1.3)
EGFR: 126 ML/MIN/1.73M2 — SIGNIFICANT CHANGE UP
EOSINOPHIL # BLD AUTO: 0.59 K/UL — HIGH (ref 0–0.5)
EOSINOPHIL NFR BLD AUTO: 5.3 % — SIGNIFICANT CHANGE UP (ref 0–6)
GLUCOSE SERPL-MCNC: 100 MG/DL — HIGH (ref 70–99)
HCT VFR BLD CALC: 28.6 % — LOW (ref 39–50)
HGB BLD-MCNC: 8.6 G/DL — LOW (ref 13–17)
IANC: 8.11 K/UL — HIGH (ref 1.8–7.4)
IMM GRANULOCYTES NFR BLD AUTO: 0.5 % — SIGNIFICANT CHANGE UP (ref 0–0.9)
LYMPHOCYTES # BLD AUTO: 1.34 K/UL — SIGNIFICANT CHANGE UP (ref 1–3.3)
LYMPHOCYTES # BLD AUTO: 12.1 % — LOW (ref 13–44)
MAGNESIUM SERPL-MCNC: 1.5 MG/DL — LOW (ref 1.6–2.6)
MCHC RBC-ENTMCNC: 24.4 PG — LOW (ref 27–34)
MCHC RBC-ENTMCNC: 30.1 GM/DL — LOW (ref 32–36)
MCV RBC AUTO: 81.3 FL — SIGNIFICANT CHANGE UP (ref 80–100)
MONOCYTES # BLD AUTO: 0.98 K/UL — HIGH (ref 0–0.9)
MONOCYTES NFR BLD AUTO: 8.8 % — SIGNIFICANT CHANGE UP (ref 2–14)
NEUTROPHILS # BLD AUTO: 8.11 K/UL — HIGH (ref 1.8–7.4)
NEUTROPHILS NFR BLD AUTO: 73 % — SIGNIFICANT CHANGE UP (ref 43–77)
NRBC # BLD: 0 /100 WBCS — SIGNIFICANT CHANGE UP (ref 0–0)
NRBC # FLD: 0 K/UL — SIGNIFICANT CHANGE UP (ref 0–0)
PHOSPHATE SERPL-MCNC: 1.9 MG/DL — LOW (ref 2.5–4.5)
PLATELET # BLD AUTO: 120 K/UL — LOW (ref 150–400)
POTASSIUM SERPL-MCNC: 4.2 MMOL/L — SIGNIFICANT CHANGE UP (ref 3.5–5.3)
POTASSIUM SERPL-SCNC: 4.2 MMOL/L — SIGNIFICANT CHANGE UP (ref 3.5–5.3)
PROT SERPL-MCNC: 6.6 G/DL — SIGNIFICANT CHANGE UP (ref 6–8.3)
RBC # BLD: 3.52 M/UL — LOW (ref 4.2–5.8)
RBC # FLD: 23.3 % — HIGH (ref 10.3–14.5)
SODIUM SERPL-SCNC: 133 MMOL/L — LOW (ref 135–145)
WBC # BLD: 11.1 K/UL — HIGH (ref 3.8–10.5)
WBC # FLD AUTO: 11.1 K/UL — HIGH (ref 3.8–10.5)

## 2022-09-21 PROCEDURE — 99232 SBSQ HOSP IP/OBS MODERATE 35: CPT | Mod: GC

## 2022-09-21 PROCEDURE — 99233 SBSQ HOSP IP/OBS HIGH 50: CPT

## 2022-09-21 RX ORDER — HYDROMORPHONE HYDROCHLORIDE 2 MG/ML
6 INJECTION INTRAMUSCULAR; INTRAVENOUS; SUBCUTANEOUS EVERY 4 HOURS
Refills: 0 | Status: DISCONTINUED | OUTPATIENT
Start: 2022-09-21 | End: 2022-09-28

## 2022-09-21 RX ORDER — MAGNESIUM SULFATE 500 MG/ML
2 VIAL (ML) INJECTION ONCE
Refills: 0 | Status: COMPLETED | OUTPATIENT
Start: 2022-09-21 | End: 2022-09-21

## 2022-09-21 RX ORDER — GABAPENTIN 400 MG/1
300 CAPSULE ORAL THREE TIMES A DAY
Refills: 0 | Status: DISCONTINUED | OUTPATIENT
Start: 2022-09-21 | End: 2022-10-10

## 2022-09-21 RX ORDER — GABAPENTIN 400 MG/1
300 CAPSULE ORAL THREE TIMES A DAY
Refills: 0 | Status: DISCONTINUED | OUTPATIENT
Start: 2022-09-21 | End: 2022-09-21

## 2022-09-21 RX ORDER — HYDROMORPHONE HYDROCHLORIDE 2 MG/ML
2 INJECTION INTRAMUSCULAR; INTRAVENOUS; SUBCUTANEOUS ONCE
Refills: 0 | Status: DISCONTINUED | OUTPATIENT
Start: 2022-09-21 | End: 2022-09-21

## 2022-09-21 RX ADMIN — GABAPENTIN 300 MILLIGRAM(S): 400 CAPSULE ORAL at 14:19

## 2022-09-21 RX ADMIN — Medication 5 MILLIGRAM(S): at 12:36

## 2022-09-21 RX ADMIN — BICTEGRAVIR SODIUM, EMTRICITABINE, AND TENOFOVIR ALAFENAMIDE FUMARATE 1 TABLET(S): 30; 120; 15 TABLET ORAL at 12:35

## 2022-09-21 RX ADMIN — HYDROMORPHONE HYDROCHLORIDE 2 MILLIGRAM(S): 2 INJECTION INTRAMUSCULAR; INTRAVENOUS; SUBCUTANEOUS at 13:10

## 2022-09-21 RX ADMIN — Medication 3 MILLIGRAM(S): at 22:30

## 2022-09-21 RX ADMIN — Medication 1 PACKET(S): at 18:25

## 2022-09-21 RX ADMIN — LIDOCAINE 1 PATCH: 4 CREAM TOPICAL at 12:43

## 2022-09-21 RX ADMIN — LIDOCAINE 1 PATCH: 4 CREAM TOPICAL at 12:41

## 2022-09-21 RX ADMIN — METHADONE HYDROCHLORIDE 5 MILLIGRAM(S): 40 TABLET ORAL at 14:19

## 2022-09-21 RX ADMIN — HYDROMORPHONE HYDROCHLORIDE 4 MILLIGRAM(S): 2 INJECTION INTRAMUSCULAR; INTRAVENOUS; SUBCUTANEOUS at 06:08

## 2022-09-21 RX ADMIN — HYDROMORPHONE HYDROCHLORIDE 4 MILLIGRAM(S): 2 INJECTION INTRAMUSCULAR; INTRAVENOUS; SUBCUTANEOUS at 10:30

## 2022-09-21 RX ADMIN — Medication 12.5 MILLIGRAM(S): at 06:09

## 2022-09-21 RX ADMIN — Medication 100 MILLIGRAM(S): at 22:31

## 2022-09-21 RX ADMIN — Medication 12.5 MILLIGRAM(S): at 18:24

## 2022-09-21 RX ADMIN — HYDROMORPHONE HYDROCHLORIDE 1.5 MILLIGRAM(S): 2 INJECTION INTRAMUSCULAR; INTRAVENOUS; SUBCUTANEOUS at 03:34

## 2022-09-21 RX ADMIN — Medication 5 MILLIGRAM(S): at 06:08

## 2022-09-21 RX ADMIN — Medication 5 MILLIGRAM(S): at 18:25

## 2022-09-21 RX ADMIN — Medication 100 MILLIGRAM(S): at 14:21

## 2022-09-21 RX ADMIN — Medication 25 GRAM(S): at 12:43

## 2022-09-21 RX ADMIN — METHADONE HYDROCHLORIDE 5 MILLIGRAM(S): 40 TABLET ORAL at 06:08

## 2022-09-21 RX ADMIN — METHADONE HYDROCHLORIDE 5 MILLIGRAM(S): 40 TABLET ORAL at 22:30

## 2022-09-21 RX ADMIN — GABAPENTIN 300 MILLIGRAM(S): 400 CAPSULE ORAL at 22:30

## 2022-09-21 RX ADMIN — HYDROMORPHONE HYDROCHLORIDE 1.5 MILLIGRAM(S): 2 INJECTION INTRAMUSCULAR; INTRAVENOUS; SUBCUTANEOUS at 02:38

## 2022-09-21 RX ADMIN — HYDROMORPHONE HYDROCHLORIDE 2 MILLIGRAM(S): 2 INJECTION INTRAMUSCULAR; INTRAVENOUS; SUBCUTANEOUS at 12:40

## 2022-09-21 RX ADMIN — HYDROMORPHONE HYDROCHLORIDE 4 MILLIGRAM(S): 2 INJECTION INTRAMUSCULAR; INTRAVENOUS; SUBCUTANEOUS at 09:59

## 2022-09-21 RX ADMIN — HYDROMORPHONE HYDROCHLORIDE 4 MILLIGRAM(S): 2 INJECTION INTRAMUSCULAR; INTRAVENOUS; SUBCUTANEOUS at 01:14

## 2022-09-21 RX ADMIN — SENNA PLUS 2 TABLET(S): 8.6 TABLET ORAL at 22:31

## 2022-09-21 RX ADMIN — GABAPENTIN 300 MILLIGRAM(S): 400 CAPSULE ORAL at 06:16

## 2022-09-21 RX ADMIN — CHLORHEXIDINE GLUCONATE 1 APPLICATION(S): 213 SOLUTION TOPICAL at 06:25

## 2022-09-21 RX ADMIN — ENOXAPARIN SODIUM 60 MILLIGRAM(S): 100 INJECTION SUBCUTANEOUS at 22:31

## 2022-09-21 RX ADMIN — LACTULOSE 10 GRAM(S): 10 SOLUTION ORAL at 12:44

## 2022-09-21 RX ADMIN — Medication 1 PACKET(S): at 06:09

## 2022-09-21 RX ADMIN — HYDROMORPHONE HYDROCHLORIDE 4 MILLIGRAM(S): 2 INJECTION INTRAMUSCULAR; INTRAVENOUS; SUBCUTANEOUS at 18:24

## 2022-09-21 RX ADMIN — Medication 100 MILLIGRAM(S): at 06:10

## 2022-09-21 RX ADMIN — HYDROMORPHONE HYDROCHLORIDE 4 MILLIGRAM(S): 2 INJECTION INTRAMUSCULAR; INTRAVENOUS; SUBCUTANEOUS at 00:14

## 2022-09-21 RX ADMIN — NALOXEGOL OXALATE 25 MILLIGRAM(S): 12.5 TABLET, FILM COATED ORAL at 12:44

## 2022-09-21 NOTE — PROGRESS NOTE ADULT - PROBLEM SELECTOR PLAN 12
- Fluids: NA  - Electrolytes: Will replete to maintain K>4, Phos>3, and Mag>2  - Nutrition: regular  - Activity: OOB as tolerated  - DVT Prophylaxis: lovenox  - Stress Ulcer/GI Prophylaxis: NA  - Disposition: pending medical management

## 2022-09-21 NOTE — PROGRESS NOTE ADULT - PROBLEM SELECTOR PLAN 7
Patient with history of untreated HPV related rectal cancer with episode of rectal bleeding at [facility] Hgb 5.9 s/p 2u pRBCs.  - CT with evidence of liver and bone lesions  - renal cancer treatment will be on hold pending resolution of infection  - radiation oncology consult for RT of rectal cancer placed  - appreciate oncology recs Patient with history of untreated HPV related rectal cancer with episode of rectal bleeding at [facility] Hgb 5.9 s/p 2u pRBCs.  - CT with evidence of liver and bone lesions  - renal cancer treatment will be on hold pending resolution of infection  - radiation oncology consult for RT of rectal cancer placed; will likely get outpatient RT   - appreciate oncology recs

## 2022-09-21 NOTE — PROGRESS NOTE ADULT - PROBLEM SELECTOR PLAN 1
Patient with TTE performed on at [facility] with evidence of two large RV masses and two additional small masses. IVC filter placed prophylactically at that time due to undiagnosed vegetations on the TTE.  - repeat TTE 9/9 was a limited study but demonstrated a mobile mass likely 2/2 tumour, thrombus, or vegetation.   - per cardiology risk of JENNIFER outweighs benefit at this time  - pending cardiac MRI to evaluate to mass; planned for today but rescheduled for tomorrow due to scheduling  - will need to pre medicate before MRI; may need general anesthesia?  - monitor on tele Patient with TTE performed on at [facility] with evidence of two large RV masses and two additional small masses. IVC filter placed prophylactically at that time due to undiagnosed vegetations on the TTE.  - repeat TTE 9/9 was a limited study but demonstrated a mobile mass likely 2/2 tumour, thrombus, or vegetation.   - per cardiology risk of JENNIFER outweighs benefit at this time  - pending cardiac MRI to evaluate to mass  - plan for MRI tomorrow under general anesthesia pending availability at [facility]  - monitor on tele

## 2022-09-21 NOTE — PROGRESS NOTE ADULT - PROBLEM SELECTOR PLAN 3
- increased PO Methadone 5mg TID (QTC-444 on 9/12; QTC- 458 on 9/19); will need repeat EKG later this week to monitor QTC while on methadone  - c/w Gabapentin 300mg TID  - PO dilaudid 4mg q12 ATC   - PO dilaudid 4mg q4 PRN moderate pain   - IV dilaudid 1.5mg q3 PRN severe pain  - hold parameters placed for all medications  - appreciate palliative care recs - increased PO Methadone 5mg TID (QTC-444 on 9/12; QTC- 458 on 9/19); will need repeat EKG later this week to monitor QTC while on methadone  - c/w Gabapentin 300mg TID  - PO dilaudid 4mg q12 ATC   - PO dilaudid 4mg q4 PRN moderate pain   - hold parameters placed for all medications  - appreciate palliative care recs

## 2022-09-21 NOTE — PROGRESS NOTE ADULT - PROBLEM SELECTOR PLAN 1
- Rad/ Onc recommendations appreciated   - PO Methadone 5mg TID (QTC-444 on 9/12; QTC- 458 on 9/19); please repeat EKG later this week to monitor QTC while on methadone  - Increase to Gabapentin 300mg TID  - Continue PO Dilaudid 4mg q12 ATC (hold for hypotension, oversedation, respiratory depression); plan to taper off tomorrow   - PO Dilaudid 4mg q4 PRN moderate pain (hold for hypotension, oversedation, respiratory depression)  - D/C IV Dilaudid 1.5mg q3 PRN (hold for hypotension, oversedation, respiratory depression).  - Start PO Dilaudid 6mg q4 PRN severe pain (hold for hypotension, oversedation, respiratory depression)  - Extensively educated and reviewed pain regimen with patient

## 2022-09-21 NOTE — CHART NOTE - NSCHARTNOTEFT_GEN_A_CORE
Source: Patient A&Ox [ 4]    Medical course: 31 M with untreated metastatic ano-rectal SCC (followed by Dr Frazier [facility] oncology) with mets to liver and possibly bone, HPV positive, HIV infection, and R eye cataracts who presented to the [facility] on 8/31/22 after syncopal episode at home and transferred to [facility] for continued of care per family request. Pt with cough x 2 mos, with yellow sputum and shortness of breath since 5 days PTA.     Nutrition course: patient seen at bed. Reports good appetite, able to consume most of his meals. Patient states he is also consuming food brought by his family. Patient continues on regular diet, tolerating well. Patient with PMH of HIV and Rectal cancer which placed him at increased demand for nutrition in setting of catabolic stress. Pt's BMI 14.7-underweight. Patient has ordered Ensure Enlive 4x daily (1400cal, 80 gm pro), and Magic Cup 2x daily (580kcal, 18gm pro).  Reports 50-75% completion by patient. Food preferences explored and honored to encourage PO intake. Denies chewing and swallowing difficulties. Ordered dronabinol. Patient c/o constipation, continues on bowel regimen. Last BM 9/15 with small amount of dark blood. Patient's previous weight 9/8-126lbs, and most recent wt 9/21- 147lbs noted with 4+ scrotum. Wt gain contributes to severe fluid retention. Labs notable with low Phos and Mg ordered PHOS-NaK and magnesium sulfate for replenish. Palliative care encountered.  RD to remain available for further nutritional interventions as indicated.      Diet, NPO after Midnight:      NPO Start Date: 21-Sep-2022,   NPO Start Time: 23:59  Except Medications (09-21-22 @ 14:06)  Diet, Regular:   Supplement Feeding Modality:  Oral  Ensure Enlive Cans or Servings Per Day:  4       Frequency:  Daily (09-10-22 @ 16:07)      GI: WDL. Last BM 9/15     PO intake:  % [x ]     Anthropometrics: Height (cm): 198.1 (09-08), 198.2 (08-08), 198.1 (07-26), 198.1 (07-25)  Weight (kg): 57.5 (09-08), 79.0884 (08-08), 81.2 (07-26), 81.2 (07-25)  BMI (kg/m2): 14.7 (09-08), 20.1 (08-08), 20.7 (07-26), 20.7 (07-25)    Edema: None reported at present.   Pressure Injuries: None reported at present.     __________________ Pertinent Medications__________________   MEDICATIONS  (STANDING):  bictegravir 50 mG/emtricitabine 200 mG/tenofovir alafenamide 25 mG (BIKTARVY) 1 Tablet(s) Oral daily  ceFAZolin   IVPB 2000 milliGRAM(s) IV Intermittent every 8 hours  chlorhexidine 2% Cloths 1 Application(s) Topical <User Schedule>  dronabinol 5 milliGRAM(s) Oral <User Schedule>  enoxaparin Injectable 60 milliGRAM(s) SubCutaneous every 12 hours  ferrous    sulfate 325 milliGRAM(s) Oral <User Schedule>  gabapentin 300 milliGRAM(s) Oral three times a day  HYDROmorphone   Tablet 4 milliGRAM(s) Oral every 12 hours  lactated ringers. 1000 milliLiter(s) (75 mL/Hr) IV Continuous <Continuous>  lactulose Syrup 10 Gram(s) Oral daily  lidocaine   4% Patch 1 Patch Transdermal daily  lidocaine   4% Patch 1 Patch Transdermal daily  melatonin 3 milliGRAM(s) Oral at bedtime  methadone    Tablet 5 milliGRAM(s) Oral three times a day  metoprolol tartrate 12.5 milliGRAM(s) Oral two times a day  naloxegol 25 milliGRAM(s) Oral daily  polyethylene glycol 3350 17 Gram(s) Oral two times a day  potassium phosphate / sodium phosphate Powder (PHOS-NaK) 1 Packet(s) Oral two times a day  senna 2 Tablet(s) Oral at bedtime    MEDICATIONS  (PRN):  bisacodyl 5 milliGRAM(s) Oral at bedtime PRN Constipation  HYDROmorphone   Tablet 4 milliGRAM(s) Oral every 4 hours PRN Moderate Pain (4 - 6)  HYDROmorphone   Tablet 6 milliGRAM(s) Oral every 4 hours PRN Severe Pain (7 - 10)  naloxone Injectable 0.1 milliGRAM(s) IV Push every 3 minutes PRN opioid intoxication      __________________ Pertinent Labs__________________   09-21 Na133 mmol/L<L> Glu 100 mg/dL<H> K+ 4.2 mmol/L Cr  0.67 mg/dL BUN 8 mg/dL 09-21 Phos 1.9 mg/dL<L> 09-21 Alb 2.5 g/dL<L>      Estimated Needs:   [x ] no change since previous assessment    Previous Nutrition Diagnosis: Malnutrition     Nutrition Diagnosis is [x ] ongoing      Recommendations:  1) Encourage PO intake and honor food preferences as able.   2) Recommend continue with current diet, which remains appropriate at this time.   3) Monitor and document % PO intake.   4) Recommend monitor and replete electrolytes (Na, Mg, Phos).   5) Monitor PO intake, Labs, weights, BMs, and skin integrity.      Monitoring and Evaluation:      [ x] Tolerance to diet prescription [x ] weights [x ] follow up per protocol  [ ] other:

## 2022-09-21 NOTE — PROGRESS NOTE ADULT - PROBLEM SELECTOR PLAN 8
Discussed symptom management recommendations with  primary team     Thank you for allowing us to participate in your patient's care. Please page [phone] for any questions/concerns.

## 2022-09-21 NOTE — PROGRESS NOTE ADULT - PROBLEM SELECTOR PLAN 4
Patient presented to [facility] with hypoxemia and L chest pigtail placed 8/31 due to suspected pneumothorax. Patient showed improvement in SOB and f/u CT chest performed which showed evidence of 12 cm multiloculated thick walled cavitary mass in the CHRISTINE and lingula.  - sputum culture 9/10 + mod klebsiella pneumoniae pansensitive  - blood Cx neg 9/8  - fungitell and crypto neg 9/10  - ID consulted, recs appreciated  - per pulm, no CANDIS. will need f/u CT in 2 months

## 2022-09-21 NOTE — PROGRESS NOTE ADULT - SUBJECTIVE AND OBJECTIVE BOX
Sharon Carreno  PGY-1 Resident Physician   Pager [phone]/ [phone]    Patient is a 31y old  Male who presents with a chief complaint of Shortness of breath (20 Sep 2022 13:07)      SUBJECTIVE / OVERNIGHT EVENTS:  Patient seen and evaluated at bedside.    Denies any fevers, chills, CP, or SOB.    Vital Signs Last 24 Hrs  T(C): 37.1 (21 Sep 2022 06:08), Max: 37.1 (21 Sep 2022 06:08)  T(F): 98.7 (21 Sep 2022 06:08), Max: 98.7 (21 Sep 2022 06:08)  HR: 100 (21 Sep 2022 06:08) (97 - 105)  BP: 130/80 (21 Sep 2022 06:08) (111/68 - 154/68)  BP(mean): --  RR: 18 (21 Sep 2022 06:08) (18 - 19)  SpO2: 99% (21 Sep 2022 06:08) (98% - 100%)    Parameters below as of 21 Sep 2022 06:08  Patient On (Oxygen Delivery Method): room air      PHYSICAL EXAM:  GENERAL: NAD, cachectic appearing  CHEST/LUNG: No lung sounds hear in left upper lobe but otherwise CTAB; no wheeze. Bandage on anterior L chest.   HEART: Regular rate and rhythm; Normal S1 S2, No murmurs, rubs, or gallops  ABDOMEN: Soft, Nontender, Nondistended; Bowel sounds present  EXTREMITIES:  2+ Peripheral Pulses, No clubbing, cyanosis, or edema  : scrotal swelling unchanged from presentation  SKIN: bandage on lower back which patient reports is his rectal mass; bandage unopened as patient reporting pain at this time  PSYCH: AAOx3    LABS:                        8.3    10.61 )-----------( 112      ( 20 Sep 2022 06:00 )             26.9     Hgb Trend: 8.3<--, 9.2<--, 8.2<--, 8.3<--, 8.7<--  09-20    135  |  100  |  7   ----------------------------<  93  3.9   |  25  |  0.69    Ca    9.0      20 Sep 2022 06:00  Phos  1.9     09-20  Mg     1.40     09-20    TPro  6.2  /  Alb  2.4<L>  /  TBili  0.3  /  DBili  x   /  AST  26  /  ALT  8   /  AlkPhos  219<H>  09-20    Creatinine Trend: 0.69<--, 0.80<--, 0.94<--, 1.00<--, 1.19<--, 1.17<--  LIVER FUNCTIONS - ( 20 Sep 2022 06:00 )  Alb: 2.4 g/dL / Pro: 6.2 g/dL / ALK PHOS: 219 U/L / ALT: 8 U/L / AST: 26 U/L / GGT: x           PTT - ( 19 Sep 2022 13:29 )  PTT:36.6 sec         Sharon Carreno  PGY-1 Resident Physician   Pager [phone]/ [phone]    Patient is a 31y old  Male who presents with a chief complaint of Shortness of breath (20 Sep 2022 13:07)      SUBJECTIVE / OVERNIGHT EVENTS:  Patient seen and evaluated at bedside.  patient this AM emotional when talking about treatment and future hospital course. States that he would like the cardiac MRI under   Denies any fevers, chills, CP, or SOB.    Vital Signs Last 24 Hrs  T(C): 37.1 (21 Sep 2022 06:08), Max: 37.1 (21 Sep 2022 06:08)  T(F): 98.7 (21 Sep 2022 06:08), Max: 98.7 (21 Sep 2022 06:08)  HR: 100 (21 Sep 2022 06:08) (97 - 105)  BP: 130/80 (21 Sep 2022 06:08) (111/68 - 154/68)  BP(mean): --  RR: 18 (21 Sep 2022 06:08) (18 - 19)  SpO2: 99% (21 Sep 2022 06:08) (98% - 100%)    Parameters below as of 21 Sep 2022 06:08  Patient On (Oxygen Delivery Method): room air      PHYSICAL EXAM:  GENERAL: NAD, cachectic appearing  CHEST/LUNG: No lung sounds hear in left upper lobe but otherwise CTAB; no wheeze. Bandage on anterior L chest.   HEART: Regular rate and rhythm; Normal S1 S2, No murmurs, rubs, or gallops  ABDOMEN: Soft, Nontender, Nondistended; Bowel sounds present  EXTREMITIES:  2+ Peripheral Pulses, No clubbing, cyanosis, or edema  : scrotal swelling unchanged from presentation  SKIN: bandage on lower back which patient reports is his rectal mass; bandage unopened as patient reporting pain at this time  PSYCH: AAOx3    LABS:                        8.3    10.61 )-----------( 112      ( 20 Sep 2022 06:00 )             26.9     Hgb Trend: 8.3<--, 9.2<--, 8.2<--, 8.3<--, 8.7<--  09-20    135  |  100  |  7   ----------------------------<  93  3.9   |  25  |  0.69    Ca    9.0      20 Sep 2022 06:00  Phos  1.9     09-20  Mg     1.40     09-20    TPro  6.2  /  Alb  2.4<L>  /  TBili  0.3  /  DBili  x   /  AST  26  /  ALT  8   /  AlkPhos  219<H>  09-20    Creatinine Trend: 0.69<--, 0.80<--, 0.94<--, 1.00<--, 1.19<--, 1.17<--  LIVER FUNCTIONS - ( 20 Sep 2022 06:00 )  Alb: 2.4 g/dL / Pro: 6.2 g/dL / ALK PHOS: 219 U/L / ALT: 8 U/L / AST: 26 U/L / GGT: x           PTT - ( 19 Sep 2022 13:29 )  PTT:36.6 sec         Sharon Carreno  PGY-1 Resident Physician   Pager [phone]/ [phone]    Patient is a 31y old  Male who presents with a chief complaint of Shortness of breath (20 Sep 2022 13:07)      SUBJECTIVE / OVERNIGHT EVENTS:  Patient seen and evaluated at bedside.  patient this AM emotional when talking about treatment and future hospital course. States that he would like the cardiac MRI under general anesthesia.   Denies any fevers, chills, CP, or SOB.    Vital Signs Last 24 Hrs  T(C): 37.1 (21 Sep 2022 06:08), Max: 37.1 (21 Sep 2022 06:08)  T(F): 98.7 (21 Sep 2022 06:08), Max: 98.7 (21 Sep 2022 06:08)  HR: 100 (21 Sep 2022 06:08) (97 - 105)  BP: 130/80 (21 Sep 2022 06:08) (111/68 - 154/68)  BP(mean): --  RR: 18 (21 Sep 2022 06:08) (18 - 19)  SpO2: 99% (21 Sep 2022 06:08) (98% - 100%)    Parameters below as of 21 Sep 2022 06:08  Patient On (Oxygen Delivery Method): room air      PHYSICAL EXAM:  GENERAL: NAD, cachectic appearing  CHEST/LUNG: No lung sounds hear in left upper lobe but otherwise CTAB; no wheeze. Bandage on anterior L chest.   HEART: Regular rate and rhythm; Normal S1 S2, No murmurs, rubs, or gallops  ABDOMEN: Soft, Nontender, Nondistended; Bowel sounds present  EXTREMITIES:  2+ Peripheral Pulses, No clubbing, cyanosis, or edema  : scrotal swelling unchanged from presentation  SKIN: bandage on lower back which patient reports is his rectal mass; bandage unopened as patient reporting pain at this time  PSYCH: AAOx3    LABS:                        8.3    10.61 )-----------( 112      ( 20 Sep 2022 06:00 )             26.9     Hgb Trend: 8.3<--, 9.2<--, 8.2<--, 8.3<--, 8.7<--  09-20    135  |  100  |  7   ----------------------------<  93  3.9   |  25  |  0.69    Ca    9.0      20 Sep 2022 06:00  Phos  1.9     09-20  Mg     1.40     09-20    TPro  6.2  /  Alb  2.4<L>  /  TBili  0.3  /  DBili  x   /  AST  26  /  ALT  8   /  AlkPhos  219<H>  09-20    Creatinine Trend: 0.69<--, 0.80<--, 0.94<--, 1.00<--, 1.19<--, 1.17<--  LIVER FUNCTIONS - ( 20 Sep 2022 06:00 )  Alb: 2.4 g/dL / Pro: 6.2 g/dL / ALK PHOS: 219 U/L / ALT: 8 U/L / AST: 26 U/L / GGT: x           PTT - ( 19 Sep 2022 13:29 )  PTT:36.6 sec

## 2022-09-21 NOTE — PROGRESS NOTE ADULT - PROBLEM SELECTOR PLAN 3
- L pigtail catheter was placed at [facility] due to concern for L PTX. However, patient was found to have large CHRISTINE cavitary lesion instead, into which the pigtail had been placed.   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  - CardioThoracic recommendations appreciated  - s/p chest tube removal  - management as per primary team.

## 2022-09-21 NOTE — PROGRESS NOTE ADULT - ASSESSMENT
31 M with untreated metastatic ano-rectal SCC (followed by Dr Frazier [facility] oncology) with mets to liver and possibly bone, HPV positive, HIV infection, and R eye cataracts who presented to the [facility] on 8/31/22 after syncopal episode at home and transferred to [facility] for continued of care per family request. Pt with cough x 2 mos, with yellow sputum and shortness of breath since 5 days PTA.

## 2022-09-21 NOTE — PROGRESS NOTE ADULT - PROBLEM SELECTOR PLAN 5
CT Chest 9/11 with evidence of persistent pneumomediastinum and extensive subcutaneous emphysema of the neck, chest, abdomen, pelvis, and extremities, including the scrotum.   - CANDIS per thoracic surgery  - indwelling pedraza was placed at [facility] (Coude placed for urinary strain)   - No further urologic intervention needed at this time.   - Can f/u outpatient w/ Dr Prabhu Lucio, [facility] [place]  - pain control with dilaudid and methadone, ct a/p 9/11 did not show any inflammation/hydrocele/infection .

## 2022-09-21 NOTE — PROGRESS NOTE ADULT - PROBLEM SELECTOR PLAN 6
Patient found to have L external vein DVT with bilateral leg DVT studies negative for DVT.  - was previously on heparin gtt   - transitioned to lovenox today  - will need to monitor for signs of bleeding while on lovenox

## 2022-09-21 NOTE — PROGRESS NOTE ADULT - PROBLEM SELECTOR PLAN 2
Patient with history of MSSA bacteremia found at [facility] with blood cultures 8/31 positive for MSSA and repeat on 9/2 NGTD.  - s/p Zosyn (9/8 - 9/9)  - transitioned to Cefazolin 2GM q8h for 6 weeks ending (9/9-10/13)   - upon discharge, will need weekly CBC, BUN and creatinine and ID clinic follow up

## 2022-09-21 NOTE — PROGRESS NOTE ADULT - SUBJECTIVE AND OBJECTIVE BOX
INTERVAL HPI/OVERNIGHT EVENTS:  In past 24 hours, received 4 prn doses of PO Dilaudid 4mg, 2 prn doses of IV Dilaudid 1.5mg, 1 prn dose of IV Dilaudid 0.5mg.      SUBJECTIVE AND OBJECTIVE:  Patient seen and examined at bedside. Patient being followed up for pain.  Patient reports having inadequate pain relief with prn dose of PO Dilaudid 4mg; pain prior to prn dose is 9/10 but improves to 4/10, which is still uncomfortable for him. Patient asking for additional pain medication during encounter as he is in pain.  Denies constipation; last bowel movement today per patient.     Allergies    ibuprofen (Angioedema)    Intolerances    MEDICATIONS  (STANDING):  bictegravir 50 mG/emtricitabine 200 mG/tenofovir alafenamide 25 mG (BIKTARVY) 1 Tablet(s) Oral daily  ceFAZolin   IVPB 2000 milliGRAM(s) IV Intermittent every 8 hours  chlorhexidine 2% Cloths 1 Application(s) Topical <User Schedule>  dronabinol 5 milliGRAM(s) Oral <User Schedule>  enoxaparin Injectable 60 milliGRAM(s) SubCutaneous every 12 hours  ferrous    sulfate 325 milliGRAM(s) Oral <User Schedule>  gabapentin 300 milliGRAM(s) Oral three times a day  HYDROmorphone   Tablet 4 milliGRAM(s) Oral every 12 hours  HYDROmorphone   Tablet 2 milliGRAM(s) Oral once  lactated ringers. 1000 milliLiter(s) (75 mL/Hr) IV Continuous <Continuous>  lactulose Syrup 10 Gram(s) Oral daily  lidocaine   4% Patch 1 Patch Transdermal daily  lidocaine   4% Patch 1 Patch Transdermal daily  magnesium sulfate  IVPB 2 Gram(s) IV Intermittent once  melatonin 3 milliGRAM(s) Oral at bedtime  methadone    Tablet 5 milliGRAM(s) Oral three times a day  metoprolol tartrate 12.5 milliGRAM(s) Oral two times a day  naloxegol 25 milliGRAM(s) Oral daily  polyethylene glycol 3350 17 Gram(s) Oral two times a day  potassium phosphate / sodium phosphate Powder (PHOS-NaK) 1 Packet(s) Oral two times a day  senna 2 Tablet(s) Oral at bedtime    MEDICATIONS  (PRN):  bisacodyl 5 milliGRAM(s) Oral at bedtime PRN Constipation  HYDROmorphone   Tablet 4 milliGRAM(s) Oral every 4 hours PRN Moderate Pain (4 - 6)  HYDROmorphone   Tablet 6 milliGRAM(s) Oral every 4 hours PRN Severe Pain (7 - 10)  naloxone Injectable 0.1 milliGRAM(s) IV Push every 3 minutes PRN opioid intoxication      ITEMS UNCHECKED ARE NOT PRESENT    PRESENT SYMPTOMS: [ ]Unable to self-report due to altered mental status- see [ ] CPOT [ ] PAINADS [ ] RDOS  Source if other than patient:  [ ]Family   [ ]Team     Pain: [x ]yes [ ]no  QOL impact - severe  Location - rectum  , scrotum          Aggravating factors - urination, movement  Quality - sharp  Radiation - "all over my body"  Timing- intermittent   Severity (0-10 scale): 10  Minimal acceptable level (0-10 scale): 3    Dyspnea:                           [ ]Mild [ ]Moderate [ ]Severe  RDOS:  0 to 2  minimal or no respiratory distress   3  mild distress  4 to 6 moderate distress  >7 severe distress  https://homecareinformation.net/handouts/hen/Respiratory_Distress_Observation_Scale.pdf (Ctrl +  left click to view)     Anxiety:                             [ ]Mild [ ]Moderate [ ]Severe  Agitation:                          [ ]Mild [ ]Moderate [ ]Severe  Fatigue:                             [ ]Mild [ ]Moderate [ ]Severe  Nausea:                             [ ]Mild [ ]Moderate [ ]Severe  Loss of appetite:              [ ]Mild [ ]Moderate [ ]Severe  Constipation:                   [ ]Mild [x ]Moderate [ ]Severe  Diarrhea:                          [ ]Mild [ ]Moderate [ ]Severe      CPOT:    https://www.[facility].org/getattachment/fnw63u85-5r8k-1w9q-2p1q-4483f2777s2g/Critical-Care-Pain-Observation-Tool-(CPOT)    PAIN AD Score:	  http://geriatrictoolkit.[place].[facility]/cog/painad.pdf (Ctrl + left click to view)    PCSSQ[Palliative Care Spiritual Screening Question]   Severity (0-10):  Score of 4 or > indicate consideration of Chaplaincy referral.  Chaplaincy Referral: [ ] yes [ ] refused [ ] following    Other Symptoms:  [ x]All other review of systems negative     Palliative Performance Status Version 2:  60-70       %      http://npcrc.org/files/news/palliative_performance_scale_ppsv2.pdf    PHYSICAL EXAM:  Vital Signs Last 24 Hrs  T(C): 37 (21 Sep 2022 11:35), Max: 37.1 (21 Sep 2022 06:08)  T(F): 98.6 (21 Sep 2022 11:35), Max: 98.7 (21 Sep 2022 06:08)  HR: 99 (21 Sep 2022 11:35) (97 - 105)  BP: 105/63 (21 Sep 2022 11:35) (105/63 - 154/68)  BP(mean): --  RR: 17 (21 Sep 2022 11:35) (17 - 19)  SpO2: 96% (21 Sep 2022 11:35) (96% - 100%)    Parameters below as of 21 Sep 2022 11:35  Patient On (Oxygen Delivery Method): room air      GENERAL:  [ x]Alert  [ x]Oriented x 3  [ ]Lethargic  [ x]Cachexia  [ ]Unarousable  [ x]Verbal  [ ]Non-Verbal    Behavioral:   [ x] Anxiety  [ ] Delirium [ ] Agitation [ ] Calm  [ ] Other   HEENT:  [x ]Normal  [ ] Temporal Wasting  [ ]Dry mouth   [ ]ET Tube/Trach  [ ]Oral lesions  [ ] Mucositis  PULMONARY: Left chest tube   [x ]Clear [ ]Tachypnea  [ ]Audible excessive secretions   [ ]Rhonchi        [ ]Right [ ]Left [ ]Bilateral  [ ]Crackles        [ ]Right [ ]Left [ ]Bilateral  [ ]Wheezing     [ ]Right [ ]Left [ ]Bilateral  [ ]Diminished breath sounds [ ]right [ ]left [ ]bilateral  CARDIOVASCULAR:    [x ]Regular [ ]Irregular [ ]Tachy  [ ]Jose [ ]Murmur [ ]Other  GASTROINTESTINAL:  [ x]Soft  [ ]Distended   [ ]+BS  [ x]Non tender [ ]Tender  [ ]PEG [ ]OGT/ NGT  Last BM: 9/21 per patient   GENITOURINARY: Scrotal edema  [ ]Normal [ ] Incontinent   [ ]Oliguria/Anuria   [x ]Rosario  MUSCULOSKELETAL:   [x ]Normal   [ ]Weakness  [ ]Bed/Wheelchair bound [ ]Edema  [  ] amputation  [  ] contraction  NEUROLOGIC:   [x ]No focal deficits  [ ]Cognitive impairment  [ ]Dysphagia [ ]Dysarthria [ ]Paresis [ ]Other   SKIN: Crepitus in left lower extremity. See Nursing Skin Assessment for further details  [ x]Normal    [ ]Rash  [ ]Pressure ulcer(s)       Present on admission [ ]y [ ]n   [  ]  Wound    [  ] hyperpigmentation    CRITICAL CARE:  [ ]Shock Present  [ ]Septic [ ]Cardiogenic [ ]Neurologic [ ]Hypovolemic  [ ]Vasopressors [ ]Inotropes  [ ]Respiratory failure present [ ]Mechanical Ventilation [ ]Non-invasive ventilatory support [ ]High-Flow   [ ]Acute  [ ]Chronic [ ]Hypoxic  [ ]Hypercarbic [ ]Other  [ ]Other organ failure     LABS:                                   8.6    11.10 )-----------( 120      ( 21 Sep 2022 07:44 )             28.6       09-21    133<L>  |  98  |  8   ----------------------------<  100<H>  4.2   |  25  |  0.67    Ca    9.1      21 Sep 2022 07:44  Phos  1.9     09-21  Mg     1.50     09-21    TPro  6.6  /  Alb  2.5<L>  /  TBili  0.4  /  DBili  x   /  AST  32  /  ALT  5   /  AlkPhos  239<H>  09-21      PTT - ( 19 Sep 2022 13:29 )  PTT:36.6 sec    RADIOLOGY & ADDITIONAL STUDIES: reviewed     Protein Calorie Malnutrition Present: [ ]mild [ ]moderate [ x]severe [ ]underweight [ ]morbid obesity  https://www.andeal.org/vault/2440/web/files/ONC/Table_Clinical%20Characteristics%20to%20Document%20Malnutrition-White%20JV%20et%20al%013083.pdf    Height (cm): 198.1 (09-08-22 @ 14:00), 198.2 (08-08-22 @ 12:00), 198.1 (07-26-22 @ 13:32)  Weight (kg): 57.5 (09-08-22 @ 14:00), 79.0884 (08-08-22 @ 12:00), 81.2 (07-26-22 @ 13:32)  BMI (kg/m2): 14.7 (09-08-22 @ 14:00), 20.1 (08-08-22 @ 12:00), 20.7 (07-26-22 @ 13:32)    [ ]PPSV2 < or = 30%  [ ]significant weight loss [ ]poor nutritional intake [ ]anasarca   [ ]Artificial Nutrition    REFERRALS:   [ ]Chaplaincy  [ ]Hospice  [ ]Child Life  [ ]Social Work  [x ]Case management [ ]Holistic Therapy

## 2022-09-21 NOTE — PROGRESS NOTE ADULT - ASSESSMENT
31 y o M w/ HIV/AIDS (on HAART), Metastatic Rectal Ca (Not yet on chemo) who was initially admitted to [facility] on 8/31 after presenting with productive cough for 2 months and then acute SOB x 5 days. L pigtail catheter was placed due to concern for L PTX. However, patient was found to have large CHRISTINE cavitary lesion instead, into which the pigtail had been placed. 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

## 2022-09-22 LAB
ALBUMIN SERPL ELPH-MCNC: 2.5 G/DL — LOW (ref 3.3–5)
ALP SERPL-CCNC: 246 U/L — HIGH (ref 40–120)
ALT FLD-CCNC: 7 U/L — SIGNIFICANT CHANGE UP (ref 4–41)
ANION GAP SERPL CALC-SCNC: 9 MMOL/L — SIGNIFICANT CHANGE UP (ref 7–14)
AST SERPL-CCNC: 30 U/L — SIGNIFICANT CHANGE UP (ref 4–40)
BASOPHILS # BLD AUTO: 0.03 K/UL — SIGNIFICANT CHANGE UP (ref 0–0.2)
BASOPHILS NFR BLD AUTO: 0.3 % — SIGNIFICANT CHANGE UP (ref 0–2)
BILIRUB SERPL-MCNC: 0.3 MG/DL — SIGNIFICANT CHANGE UP (ref 0.2–1.2)
BUN SERPL-MCNC: 9 MG/DL — SIGNIFICANT CHANGE UP (ref 7–23)
CALCIUM SERPL-MCNC: 8.6 MG/DL — SIGNIFICANT CHANGE UP (ref 8.4–10.5)
CHLORIDE SERPL-SCNC: 100 MMOL/L — SIGNIFICANT CHANGE UP (ref 98–107)
CO2 SERPL-SCNC: 25 MMOL/L — SIGNIFICANT CHANGE UP (ref 22–31)
CREAT SERPL-MCNC: 0.67 MG/DL — SIGNIFICANT CHANGE UP (ref 0.5–1.3)
EGFR: 126 ML/MIN/1.73M2 — SIGNIFICANT CHANGE UP
EOSINOPHIL # BLD AUTO: 0.44 K/UL — SIGNIFICANT CHANGE UP (ref 0–0.5)
EOSINOPHIL NFR BLD AUTO: 4.2 % — SIGNIFICANT CHANGE UP (ref 0–6)
GLUCOSE SERPL-MCNC: 108 MG/DL — HIGH (ref 70–99)
HCT VFR BLD CALC: 28.4 % — LOW (ref 39–50)
HGB BLD-MCNC: 8.4 G/DL — LOW (ref 13–17)
IANC: 7.51 K/UL — HIGH (ref 1.8–7.4)
IMM GRANULOCYTES NFR BLD AUTO: 0.5 % — SIGNIFICANT CHANGE UP (ref 0–0.9)
LYMPHOCYTES # BLD AUTO: 1.24 K/UL — SIGNIFICANT CHANGE UP (ref 1–3.3)
LYMPHOCYTES # BLD AUTO: 11.8 % — LOW (ref 13–44)
MAGNESIUM SERPL-MCNC: 1.5 MG/DL — LOW (ref 1.6–2.6)
MCHC RBC-ENTMCNC: 24.1 PG — LOW (ref 27–34)
MCHC RBC-ENTMCNC: 29.6 GM/DL — LOW (ref 32–36)
MCV RBC AUTO: 81.4 FL — SIGNIFICANT CHANGE UP (ref 80–100)
MONOCYTES # BLD AUTO: 1.22 K/UL — HIGH (ref 0–0.9)
MONOCYTES NFR BLD AUTO: 11.6 % — SIGNIFICANT CHANGE UP (ref 2–14)
NEUTROPHILS # BLD AUTO: 7.51 K/UL — HIGH (ref 1.8–7.4)
NEUTROPHILS NFR BLD AUTO: 71.6 % — SIGNIFICANT CHANGE UP (ref 43–77)
NRBC # BLD: 0 /100 WBCS — SIGNIFICANT CHANGE UP (ref 0–0)
NRBC # FLD: 0 K/UL — SIGNIFICANT CHANGE UP (ref 0–0)
PHOSPHATE SERPL-MCNC: 2.4 MG/DL — LOW (ref 2.5–4.5)
PLATELET # BLD AUTO: 142 K/UL — LOW (ref 150–400)
POTASSIUM SERPL-MCNC: 4.2 MMOL/L — SIGNIFICANT CHANGE UP (ref 3.5–5.3)
POTASSIUM SERPL-SCNC: 4.2 MMOL/L — SIGNIFICANT CHANGE UP (ref 3.5–5.3)
PROT SERPL-MCNC: 6.4 G/DL — SIGNIFICANT CHANGE UP (ref 6–8.3)
RBC # BLD: 3.49 M/UL — LOW (ref 4.2–5.8)
RBC # FLD: 22.9 % — HIGH (ref 10.3–14.5)
SODIUM SERPL-SCNC: 134 MMOL/L — LOW (ref 135–145)
WBC # BLD: 10.49 K/UL — SIGNIFICANT CHANGE UP (ref 3.8–10.5)
WBC # FLD AUTO: 10.49 K/UL — SIGNIFICANT CHANGE UP (ref 3.8–10.5)

## 2022-09-22 PROCEDURE — 99232 SBSQ HOSP IP/OBS MODERATE 35: CPT | Mod: GC

## 2022-09-22 PROCEDURE — 99233 SBSQ HOSP IP/OBS HIGH 50: CPT

## 2022-09-22 RX ORDER — METOPROLOL TARTRATE 50 MG
25 TABLET ORAL
Refills: 0 | Status: DISCONTINUED | OUTPATIENT
Start: 2022-09-22 | End: 2022-09-26

## 2022-09-22 RX ORDER — MAGNESIUM SULFATE 500 MG/ML
2 VIAL (ML) INJECTION ONCE
Refills: 0 | Status: COMPLETED | OUTPATIENT
Start: 2022-09-22 | End: 2022-09-22

## 2022-09-22 RX ORDER — HYDROMORPHONE HYDROCHLORIDE 2 MG/ML
1.5 INJECTION INTRAMUSCULAR; INTRAVENOUS; SUBCUTANEOUS ONCE
Refills: 0 | Status: DISCONTINUED | OUTPATIENT
Start: 2022-09-22 | End: 2022-09-26

## 2022-09-22 RX ORDER — POTASSIUM PHOSPHATE, MONOBASIC POTASSIUM PHOSPHATE, DIBASIC 236; 224 MG/ML; MG/ML
15 INJECTION, SOLUTION INTRAVENOUS ONCE
Refills: 0 | Status: COMPLETED | OUTPATIENT
Start: 2022-09-22 | End: 2022-09-22

## 2022-09-22 RX ADMIN — Medication 25 GRAM(S): at 07:58

## 2022-09-22 RX ADMIN — NALOXEGOL OXALATE 25 MILLIGRAM(S): 12.5 TABLET, FILM COATED ORAL at 12:05

## 2022-09-22 RX ADMIN — HYDROMORPHONE HYDROCHLORIDE 6 MILLIGRAM(S): 2 INJECTION INTRAMUSCULAR; INTRAVENOUS; SUBCUTANEOUS at 16:00

## 2022-09-22 RX ADMIN — METHADONE HYDROCHLORIDE 5 MILLIGRAM(S): 40 TABLET ORAL at 05:12

## 2022-09-22 RX ADMIN — ENOXAPARIN SODIUM 60 MILLIGRAM(S): 100 INJECTION SUBCUTANEOUS at 21:14

## 2022-09-22 RX ADMIN — POTASSIUM PHOSPHATE, MONOBASIC POTASSIUM PHOSPHATE, DIBASIC 62.5 MILLIMOLE(S): 236; 224 INJECTION, SOLUTION INTRAVENOUS at 10:21

## 2022-09-22 RX ADMIN — Medication 5 MILLIGRAM(S): at 12:04

## 2022-09-22 RX ADMIN — HYDROMORPHONE HYDROCHLORIDE 6 MILLIGRAM(S): 2 INJECTION INTRAMUSCULAR; INTRAVENOUS; SUBCUTANEOUS at 08:58

## 2022-09-22 RX ADMIN — HYDROMORPHONE HYDROCHLORIDE 6 MILLIGRAM(S): 2 INJECTION INTRAMUSCULAR; INTRAVENOUS; SUBCUTANEOUS at 21:13

## 2022-09-22 RX ADMIN — GABAPENTIN 300 MILLIGRAM(S): 400 CAPSULE ORAL at 13:19

## 2022-09-22 RX ADMIN — METHADONE HYDROCHLORIDE 5 MILLIGRAM(S): 40 TABLET ORAL at 23:42

## 2022-09-22 RX ADMIN — GABAPENTIN 300 MILLIGRAM(S): 400 CAPSULE ORAL at 23:42

## 2022-09-22 RX ADMIN — Medication 100 MILLIGRAM(S): at 05:26

## 2022-09-22 RX ADMIN — CHLORHEXIDINE GLUCONATE 1 APPLICATION(S): 213 SOLUTION TOPICAL at 05:25

## 2022-09-22 RX ADMIN — Medication 100 MILLIGRAM(S): at 21:14

## 2022-09-22 RX ADMIN — HYDROMORPHONE HYDROCHLORIDE 6 MILLIGRAM(S): 2 INJECTION INTRAMUSCULAR; INTRAVENOUS; SUBCUTANEOUS at 22:10

## 2022-09-22 RX ADMIN — HYDROMORPHONE HYDROCHLORIDE 6 MILLIGRAM(S): 2 INJECTION INTRAMUSCULAR; INTRAVENOUS; SUBCUTANEOUS at 15:00

## 2022-09-22 RX ADMIN — BICTEGRAVIR SODIUM, EMTRICITABINE, AND TENOFOVIR ALAFENAMIDE FUMARATE 1 TABLET(S): 30; 120; 15 TABLET ORAL at 12:04

## 2022-09-22 RX ADMIN — HYDROMORPHONE HYDROCHLORIDE 4 MILLIGRAM(S): 2 INJECTION INTRAMUSCULAR; INTRAVENOUS; SUBCUTANEOUS at 01:40

## 2022-09-22 RX ADMIN — Medication 5 MILLIGRAM(S): at 06:18

## 2022-09-22 RX ADMIN — HYDROMORPHONE HYDROCHLORIDE 4 MILLIGRAM(S): 2 INJECTION INTRAMUSCULAR; INTRAVENOUS; SUBCUTANEOUS at 06:14

## 2022-09-22 RX ADMIN — METHADONE HYDROCHLORIDE 5 MILLIGRAM(S): 40 TABLET ORAL at 13:18

## 2022-09-22 RX ADMIN — Medication 325 MILLIGRAM(S): at 14:01

## 2022-09-22 RX ADMIN — Medication 100 MILLIGRAM(S): at 14:00

## 2022-09-22 RX ADMIN — HYDROMORPHONE HYDROCHLORIDE 6 MILLIGRAM(S): 2 INJECTION INTRAMUSCULAR; INTRAVENOUS; SUBCUTANEOUS at 07:58

## 2022-09-22 RX ADMIN — LIDOCAINE 1 PATCH: 4 CREAM TOPICAL at 20:46

## 2022-09-22 RX ADMIN — Medication 5 MILLIGRAM(S): at 17:42

## 2022-09-22 RX ADMIN — HYDROMORPHONE HYDROCHLORIDE 4 MILLIGRAM(S): 2 INJECTION INTRAMUSCULAR; INTRAVENOUS; SUBCUTANEOUS at 01:12

## 2022-09-22 RX ADMIN — GABAPENTIN 300 MILLIGRAM(S): 400 CAPSULE ORAL at 05:25

## 2022-09-22 RX ADMIN — LIDOCAINE 1 PATCH: 4 CREAM TOPICAL at 14:10

## 2022-09-22 RX ADMIN — Medication 12.5 MILLIGRAM(S): at 05:25

## 2022-09-22 RX ADMIN — Medication 25 MILLIGRAM(S): at 17:43

## 2022-09-22 RX ADMIN — Medication 3 MILLIGRAM(S): at 23:42

## 2022-09-22 RX ADMIN — LIDOCAINE 1 PATCH: 4 CREAM TOPICAL at 20:45

## 2022-09-22 RX ADMIN — Medication 1 PACKET(S): at 05:25

## 2022-09-22 RX ADMIN — ENOXAPARIN SODIUM 60 MILLIGRAM(S): 100 INJECTION SUBCUTANEOUS at 10:22

## 2022-09-22 NOTE — PROGRESS NOTE ADULT - PROBLEM SELECTOR PLAN 1
- Rad/ Onc recommendations appreciated   - PO Methadone 5mg TID (QTC-444 on 9/12; QTC- 458 on 9/19); please repeat EKG later this week to monitor QTC while on methadone  - Continue Gabapentin 300mg TID  - Continue PO Dilaudid 4mg q12 ATC (hold for hypotension, oversedation, respiratory depression); plan to taper off tomorrow   - PO Dilaudid 4mg q4 PRN moderate pain (hold for hypotension, oversedation, respiratory depression)  - PO Dilaudid 6mg q4 PRN severe pain (hold for hypotension, oversedation, respiratory depression)  - Extensively educated and reviewed pain regimen with patient - Rad/ Onc recommendations appreciated   - No unexpected adverse effects of opiates noted: no sedation/dizziness/nausea.  - PO Methadone 5mg TID (QTC-444 on 9/12; QTC- 458 on 9/19); please repeat EKG later this week to monitor QTC while on methadone  - Continue Gabapentin 300mg TID  - D/C PO Dilaudid 4mg q12 ATC   - PO Dilaudid 4mg q4 PRN moderate pain (hold for hypotension, oversedation, respiratory depression)  - PO Dilaudid 6mg q4 PRN severe pain (hold for hypotension, oversedation, respiratory depression)  - Extensively educated and reviewed pain regimen with patient

## 2022-09-22 NOTE — PROGRESS NOTE ADULT - PROBLEM SELECTOR PLAN 4
Patient presented to [facility] with hypoxemia and L chest pigtail placed 8/31 due to suspected pneumothorax. Patient showed improvement in SOB and f/u CT chest performed which showed evidence of 12 cm multiloculated thick walled cavitary mass in the CHRISTINE and lingula.  - sputum culture 9/10 + mod klebsiella pneumoniae pansensitive  - blood Cx neg 9/8  - fungitell and crypto neg 9/10  - ID consulted, recs appreciated  - per pulm, no CANDIS. will need f/u CT in 2 months

## 2022-09-22 NOTE — PROGRESS NOTE ADULT - ASSESSMENT
31 M with untreated metastatic ano-rectal SCC (followed by Dr Frazier [facility] oncology) with mets to liver and possibly bone, HPV positive, HIV infection, and R eye cataracts who presented to the [facility] on 8/31/22 after syncopal episode at home and transferred to [facility] for continued of care per family request. Pt with cough x 2 mos, with yellow sputum and shortness of breath since 5 days PTA.

## 2022-09-22 NOTE — PROGRESS NOTE ADULT - PROBLEM SELECTOR PLAN 1
Patient with TTE performed on at [facility] with evidence of two large RV masses and two additional small masses. IVC filter placed prophylactically at that time due to undiagnosed vegetations on the TTE.  - repeat TTE 9/9 was a limited study but demonstrated a mobile mass likely 2/2 tumour, thrombus, or vegetation.   - per cardiology risk of JENNIFER outweighs benefit at this time  - pending cardiac MRI to evaluate to mass  - plan for MRI tomorrow under general anesthesia pending availability at [facility]  - monitor on tele Patient with TTE performed on at [facility] with evidence of two large RV masses and two additional small masses. IVC filter placed prophylactically at that time due to undiagnosed vegetations on the TTE.  - repeat TTE 9/9 was a limited study but demonstrated a mobile mass likely 2/2 tumour, thrombus, or vegetation.   - per cardiology risk of JENNIFER outweighs benefit at this time  - pending cardiac MRI to evaluate to mass  - can given 1.5 mg dilaudid for premedication   - monitor on tele

## 2022-09-22 NOTE — PROGRESS NOTE ADULT - PROBLEM SELECTOR PLAN 3
- L pigtail catheter was placed at [facility] due to concern for L PTX. However, patient was found to have large CHRISTINE cavitary lesion instead, into which the pigtail had been placed.   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  - CardioThoracic recommendations appreciated  - s/p chest tube removal  - management as per primary team.

## 2022-09-22 NOTE — PROGRESS NOTE ADULT - PROBLEM SELECTOR PLAN 5
CT Chest 9/11 with evidence of persistent pneumomediastinum and extensive subcutaneous emphysema of the neck, chest, abdomen, pelvis, and extremities, including the scrotum.   - CANDIS per thoracic surgery  - indwelling pedraza was placed at [facility] (Coude placed for urinary strain)   - No further urologic intervention needed at this time.   - Can f/u outpatient w/ Dr Prabhu Lucio, [facility] [place]  - pain control with dilaudid and methadone, ct a/p 9/11 did not show any inflammation/hydrocele/infection .

## 2022-09-22 NOTE — PROGRESS NOTE ADULT - PROBLEM SELECTOR PLAN 9
- s/p SVT events x 2 9/15 and 9/16 resolved with vagal maneuvers   - c/w metoprol 12.5 BID with hold parameters  - consider EP eval if persists - s/p SVT events x 2 9/15 and 9/16 resolved with vagal maneuvers   - tele w/ baseline tach low 100.  - inc metoprolol to 25 BID. Titrate PRN.  - consider EP eval if persists

## 2022-09-22 NOTE — PROGRESS NOTE ADULT - SUBJECTIVE AND OBJECTIVE BOX
Sharon Carreno  PGY-1 Resident Physician   Pager [phone]/ [phone]    Patient is a 31y old  Male who presents with a chief complaint of Shortness of breath (21 Sep 2022 12:29)      SUBJECTIVE / OVERNIGHT EVENTS:  Patient seen and evaluated at bedside.    Denies any fevers, chills, CP, or SOB.    Vital Signs Last 24 Hrs  T(C): 36.7 (22 Sep 2022 06:00), Max: 37 (21 Sep 2022 11:35)  T(F): 98.1 (22 Sep 2022 06:00), Max: 98.6 (21 Sep 2022 11:35)  HR: 100 (22 Sep 2022 06:00) (98 - 100)  BP: 128/84 (22 Sep 2022 06:00) (105/63 - 128/84)  BP(mean): --  RR: 18 (22 Sep 2022 06:00) (17 - 18)  SpO2: 98% (22 Sep 2022 06:00) (96% - 98%)    Parameters below as of 22 Sep 2022 06:00  Patient On (Oxygen Delivery Method): room air        PHYSICAL EXAM:  GENERAL: NAD, well-developed  CHEST/LUNG: Clear to auscultation bilaterally; No wheeze  HEART: Regular rate and rhythm; Normal S1 S2, No murmurs, rubs, or gallops  ABDOMEN: Soft, Nontender, Nondistended; Bowel sounds present  EXTREMITIES:  2+ Peripheral Pulses, No clubbing, cyanosis, or edema  PSYCH: AAOx3    LABS:                        8.6    11.10 )-----------( 120      ( 21 Sep 2022 07:44 )             28.6     Hgb Trend: 8.6<--, 8.3<--, 9.2<--, 8.2<--, 8.3<--  09-21    133<L>  |  98  |  8   ----------------------------<  100<H>  4.2   |  25  |  0.67    Ca    9.1      21 Sep 2022 07:44  Phos  1.9     09-21  Mg     1.50     09-21    TPro  6.6  /  Alb  2.5<L>  /  TBili  0.4  /  DBili  x   /  AST  32  /  ALT  5   /  AlkPhos  239<H>  09-21    Creatinine Trend: 0.67<--, 0.69<--, 0.80<--, 0.94<--, 1.00<--, 1.19<--  LIVER FUNCTIONS - ( 21 Sep 2022 07:44 )  Alb: 2.5 g/dL / Pro: 6.6 g/dL / ALK PHOS: 239 U/L / ALT: 5 U/L / AST: 32 U/L / GGT: x                  Sharon Carreno  PGY-1 Resident Physician   Pager [phone]/ [phone]    Patient is a 31y old  Male who presents with a chief complaint of Shortness of breath (21 Sep 2022 12:29)      SUBJECTIVE / OVERNIGHT EVENTS:  Patient seen and evaluated at bedside.  Patient with no concerns this AM. Discussed plan for potential cardiac MRI   Denies any fevers, chills, CP, or SOB.    Vital Signs Last 24 Hrs  T(C): 36.7 (22 Sep 2022 06:00), Max: 37 (21 Sep 2022 11:35)  T(F): 98.1 (22 Sep 2022 06:00), Max: 98.6 (21 Sep 2022 11:35)  HR: 100 (22 Sep 2022 06:00) (98 - 100)  BP: 128/84 (22 Sep 2022 06:00) (105/63 - 128/84)  BP(mean): --  RR: 18 (22 Sep 2022 06:00) (17 - 18)  SpO2: 98% (22 Sep 2022 06:00) (96% - 98%)    Parameters below as of 22 Sep 2022 06:00  Patient On (Oxygen Delivery Method): room air      PHYSICAL EXAM:  GENERAL: NAD, cachectic appearing  CHEST/LUNG: No lung sounds hear in left upper lobe but otherwise CTAB; no wheeze. Bandage on anterior L chest.   HEART: Regular rate and rhythm; Normal S1 S2, No murmurs, rubs, or gallops  ABDOMEN: Soft, Nontender, Nondistended; Bowel sounds present  EXTREMITIES:  2+ Peripheral Pulses, No clubbing, cyanosis, or edema  : scrotal swelling unchanged from presentation  SKIN: bandage on lower back which patient reports is his rectal mass; bandage unopened as patient reporting pain at this time  PSYCH: AAOx3    LABS:                        8.6    11.10 )-----------( 120      ( 21 Sep 2022 07:44 )             28.6     Hgb Trend: 8.6<--, 8.3<--, 9.2<--, 8.2<--, 8.3<--  09-21    133<L>  |  98  |  8   ----------------------------<  100<H>  4.2   |  25  |  0.67    Ca    9.1      21 Sep 2022 07:44  Phos  1.9     09-21  Mg     1.50     09-21    TPro  6.6  /  Alb  2.5<L>  /  TBili  0.4  /  DBili  x   /  AST  32  /  ALT  5   /  AlkPhos  239<H>  09-21    Creatinine Trend: 0.67<--, 0.69<--, 0.80<--, 0.94<--, 1.00<--, 1.19<--  LIVER FUNCTIONS - ( 21 Sep 2022 07:44 )  Alb: 2.5 g/dL / Pro: 6.6 g/dL / ALK PHOS: 239 U/L / ALT: 5 U/L / AST: 32 U/L / GGT: x

## 2022-09-22 NOTE — CONSULT NOTE ADULT - CONSULT REASON
Concern for bronchopleural fistula
cavitary lesion
sedation for mri
symptom management
Management of possible endocarditis, MSSA bacteremia, and cavitary pneumonia
Anal SCC
Scrotal Swelling and Discomfort

## 2022-09-22 NOTE — PROGRESS NOTE ADULT - SUBJECTIVE AND OBJECTIVE BOX
INTERVAL HPI/OVERNIGHT EVENTS:  In past 24 hours, received 2 prn doses of PO Dilaudid 4mg, 1 prn doses of PO Dilaudid 6mg and, 1 prn dose of PO Dilaudid 2mg.     SUBJECTIVE AND OBJECTIVE:  Patient seen and examined at bedside. Patient being followed up for pain.      Allergies    ibuprofen (Angioedema)    Intolerances    MEDICATIONS  (STANDING):  bictegravir 50 mG/emtricitabine 200 mG/tenofovir alafenamide 25 mG (BIKTARVY) 1 Tablet(s) Oral daily  ceFAZolin   IVPB 2000 milliGRAM(s) IV Intermittent every 8 hours  chlorhexidine 2% Cloths 1 Application(s) Topical <User Schedule>  dronabinol 5 milliGRAM(s) Oral <User Schedule>  enoxaparin Injectable 60 milliGRAM(s) SubCutaneous every 12 hours  ferrous    sulfate 325 milliGRAM(s) Oral <User Schedule>  gabapentin 300 milliGRAM(s) Oral three times a day  HYDROmorphone   Tablet 4 milliGRAM(s) Oral every 12 hours  lactated ringers. 1000 milliLiter(s) (75 mL/Hr) IV Continuous <Continuous>  lactulose Syrup 10 Gram(s) Oral daily  lidocaine   4% Patch 1 Patch Transdermal daily  lidocaine   4% Patch 1 Patch Transdermal daily  melatonin 3 milliGRAM(s) Oral at bedtime  methadone    Tablet 5 milliGRAM(s) Oral three times a day  metoprolol tartrate 12.5 milliGRAM(s) Oral two times a day  naloxegol 25 milliGRAM(s) Oral daily  polyethylene glycol 3350 17 Gram(s) Oral two times a day  potassium phosphate IVPB 15 milliMole(s) IV Intermittent once  senna 2 Tablet(s) Oral at bedtime    MEDICATIONS  (PRN):  bisacodyl 5 milliGRAM(s) Oral at bedtime PRN Constipation  HYDROmorphone   Tablet 4 milliGRAM(s) Oral every 4 hours PRN Moderate Pain (4 - 6)  HYDROmorphone   Tablet 6 milliGRAM(s) Oral every 4 hours PRN Severe Pain (7 - 10)  naloxone Injectable 0.1 milliGRAM(s) IV Push every 3 minutes PRN opioid intoxication      ITEMS UNCHECKED ARE NOT PRESENT    PRESENT SYMPTOMS: [ ]Unable to self-report due to altered mental status- see [ ] CPOT [ ] PAINADS [ ] RDOS  Source if other than patient:  [ ]Family   [ ]Team     Pain: [x ]yes [ ]no  QOL impact - severe  Location - rectum  , scrotum          Aggravating factors - urination, movement  Quality - sharp  Radiation - "all over my body"  Timing- intermittent   Severity (0-10 scale): 10  Minimal acceptable level (0-10 scale): 3    Dyspnea:                           [ ]Mild [ ]Moderate [ ]Severe  RDOS:  0 to 2  minimal or no respiratory distress   3  mild distress  4 to 6 moderate distress  >7 severe distress  https://[email or web address].net/handouts/hen/Respiratory_Distress_Observation_Scale.pdf (Ctrl +  left click to view)     Anxiety:                             [ ]Mild [ ]Moderate [ ]Severe  Agitation:                          [ ]Mild [ ]Moderate [ ]Severe  Fatigue:                             [ ]Mild [ ]Moderate [ ]Severe  Nausea:                             [ ]Mild [ ]Moderate [ ]Severe  Loss of appetite:              [ ]Mild [ ]Moderate [ ]Severe  Constipation:                   [ ]Mild [x ]Moderate [ ]Severe  Diarrhea:                          [ ]Mild [ ]Moderate [ ]Severe      CPOT:    https://www.[facility].org/getattachment/[email or web address]/Critical-Care-Pain-Observation-Tool-(CPOT)    PAIN AD Score:	  http://geriatrictoolkit.[email or web address]/painad.pdf (Ctrl + left click to view)    PCSSQ[Palliative Care Spiritual Screening Question]   Severity (0-10):  Score of 4 or > indicate consideration of Chaplaincy referral.  Chaplaincy Referral: [ ] yes [ ] refused [ ] following    Other Symptoms:  [ x]All other review of systems negative     Palliative Performance Status Version 2:  60-70       %      http://npcrc.org/files/news/palliative_performance_scale_ppsv2.pdf    PHYSICAL EXAM:  Vital Signs Last 24 Hrs  T(C): 36.7 (22 Sep 2022 06:00), Max: 37 (21 Sep 2022 11:35)  T(F): 98.1 (22 Sep 2022 06:00), Max: 98.6 (21 Sep 2022 11:35)  HR: 100 (22 Sep 2022 06:00) (98 - 100)  BP: 128/84 (22 Sep 2022 06:00) (105/63 - 128/84)  BP(mean): --  RR: 18 (22 Sep 2022 06:00) (17 - 18)  SpO2: 98% (22 Sep 2022 06:00) (96% - 98%)    Parameters below as of 22 Sep 2022 06:00  Patient On (Oxygen Delivery Method): room air        GENERAL:  [ x]Alert  [ x]Oriented x 3  [ ]Lethargic  [ x]Cachexia  [ ]Unarousable  [ x]Verbal  [ ]Non-Verbal    Behavioral:   [ x] Anxiety  [ ] Delirium [ ] Agitation [ ] Calm  [ ] Other   HEENT:  [x ]Normal  [ ] Temporal Wasting  [ ]Dry mouth   [ ]ET Tube/Trach  [ ]Oral lesions  [ ] Mucositis  PULMONARY: Left chest tube   [x ]Clear [ ]Tachypnea  [ ]Audible excessive secretions   [ ]Rhonchi        [ ]Right [ ]Left [ ]Bilateral  [ ]Crackles        [ ]Right [ ]Left [ ]Bilateral  [ ]Wheezing     [ ]Right [ ]Left [ ]Bilateral  [ ]Diminished breath sounds [ ]right [ ]left [ ]bilateral  CARDIOVASCULAR:    [x ]Regular [ ]Irregular [ ]Tachy  [ ]Jose [ ]Murmur [ ]Other  GASTROINTESTINAL:  [ x]Soft  [ ]Distended   [ ]+BS  [ x]Non tender [ ]Tender  [ ]PEG [ ]OGT/ NGT  Last BM: 9/21 per patient   GENITOURINARY: Scrotal edema  [ ]Normal [ ] Incontinent   [ ]Oliguria/Anuria   [x ]Rosario  MUSCULOSKELETAL:   [x ]Normal   [ ]Weakness  [ ]Bed/Wheelchair bound [ ]Edema  [  ] amputation  [  ] contraction  NEUROLOGIC:   [x ]No focal deficits  [ ]Cognitive impairment  [ ]Dysphagia [ ]Dysarthria [ ]Paresis [ ]Other   SKIN: Crepitus in left lower extremity. See Nursing Skin Assessment for further details  [ x]Normal    [ ]Rash  [ ]Pressure ulcer(s)       Present on admission [ ]y [ ]n   [  ]  Wound    [  ] hyperpigmentation    CRITICAL CARE:  [ ]Shock Present  [ ]Septic [ ]Cardiogenic [ ]Neurologic [ ]Hypovolemic  [ ]Vasopressors [ ]Inotropes  [ ]Respiratory failure present [ ]Mechanical Ventilation [ ]Non-invasive ventilatory support [ ]High-Flow   [ ]Acute  [ ]Chronic [ ]Hypoxic  [ ]Hypercarbic [ ]Other  [ ]Other organ failure     LABS:                                              8.4    10.49 )-----------( 142      ( 22 Sep 2022 06:29 )             28.4       09-22    134<L>  |  100  |  9   ----------------------------<  108<H>  4.2   |  25  |  0.67    Ca    8.6      22 Sep 2022 07:51  Phos  2.4     09-22  Mg     1.50     09-22    TPro  6.4  /  Alb  2.5<L>  /  TBili  0.3  /  DBili  x   /  AST  30  /  ALT  7   /  AlkPhos  246<H>  09-22        RADIOLOGY & ADDITIONAL STUDIES: reviewed     Protein Calorie Malnutrition Present: [ ]mild [ ]moderate [ x]severe [ ]underweight [ ]morbid obesity  https://www.andeal.org/vault/2440/web/files/ONC/Table_Clinical%20Characteristics%20to%20Document%20Malnutrition-White%20JV%20et%20al%202012.pdf    Height (cm): 198.1 (09-08-22 @ 14:00), 198.2 (08-08-22 @ 12:00), 198.1 (07-26-22 @ 13:32)  Weight (kg): 57.5 (09-08-22 @ 14:00), 79.0884 (08-08-22 @ 12:00), 81.2 (07-26-22 @ 13:32)  BMI (kg/m2): 14.7 (09-08-22 @ 14:00), 20.1 (08-08-22 @ 12:00), 20.7 (07-26-22 @ 13:32)    [ ]PPSV2 < or = 30%  [ ]significant weight loss [ ]poor nutritional intake [ ]anasarca   [ ]Artificial Nutrition    REFERRALS:   [ ]Chaplaincy  [ ]Hospice  [ ]Child Life  [ ]Social Work  [x ]Case management [ ]Holistic Therapy        INTERVAL HPI/OVERNIGHT EVENTS:  In past 24 hours, received 2 prn doses of PO Dilaudid 4mg, 1 prn doses of PO Dilaudid 6mg and, 1 prn dose of PO Dilaudid 2mg.     SUBJECTIVE AND OBJECTIVE:  Patient seen and examined at bedside. Patient being followed up for pain.  Patient reports improvement of his pain with good pain relief with prn dose of PO Dilaudid 6mg. States he was able to work with PT today and stand.       Allergies    ibuprofen (Angioedema)    Intolerances    MEDICATIONS  (STANDING):  bictegravir 50 mG/emtricitabine 200 mG/tenofovir alafenamide 25 mG (BIKTARVY) 1 Tablet(s) Oral daily  ceFAZolin   IVPB 2000 milliGRAM(s) IV Intermittent every 8 hours  chlorhexidine 2% Cloths 1 Application(s) Topical <User Schedule>  dronabinol 5 milliGRAM(s) Oral <User Schedule>  enoxaparin Injectable 60 milliGRAM(s) SubCutaneous every 12 hours  ferrous    sulfate 325 milliGRAM(s) Oral <User Schedule>  gabapentin 300 milliGRAM(s) Oral three times a day  HYDROmorphone   Tablet 4 milliGRAM(s) Oral every 12 hours  lactated ringers. 1000 milliLiter(s) (75 mL/Hr) IV Continuous <Continuous>  lactulose Syrup 10 Gram(s) Oral daily  lidocaine   4% Patch 1 Patch Transdermal daily  lidocaine   4% Patch 1 Patch Transdermal daily  melatonin 3 milliGRAM(s) Oral at bedtime  methadone    Tablet 5 milliGRAM(s) Oral three times a day  metoprolol tartrate 12.5 milliGRAM(s) Oral two times a day  naloxegol 25 milliGRAM(s) Oral daily  polyethylene glycol 3350 17 Gram(s) Oral two times a day  potassium phosphate IVPB 15 milliMole(s) IV Intermittent once  senna 2 Tablet(s) Oral at bedtime    MEDICATIONS  (PRN):  bisacodyl 5 milliGRAM(s) Oral at bedtime PRN Constipation  HYDROmorphone   Tablet 4 milliGRAM(s) Oral every 4 hours PRN Moderate Pain (4 - 6)  HYDROmorphone   Tablet 6 milliGRAM(s) Oral every 4 hours PRN Severe Pain (7 - 10)  naloxone Injectable 0.1 milliGRAM(s) IV Push every 3 minutes PRN opioid intoxication      ITEMS UNCHECKED ARE NOT PRESENT    PRESENT SYMPTOMS: [ ]Unable to self-report due to altered mental status- see [ ] CPOT [ ] PAINADS [ ] RDOS  Source if other than patient:  [ ]Family   [ ]Team     Pain: [x ]yes [ ]no  QOL impact - severe  Location - rectum  , scrotum          Aggravating factors - urination, movement  Quality - sharp  Radiation - "all over my body"  Timing- intermittent   Severity (0-10 scale): 10  Minimal acceptable level (0-10 scale): 3    Dyspnea:                           [ ]Mild [ ]Moderate [ ]Severe  RDOS:  0 to 2  minimal or no respiratory distress   3  mild distress  4 to 6 moderate distress  >7 severe distress  https://[email or web address].net/handouts/hen/Respiratory_Distress_Observation_Scale.pdf (Ctrl +  left click to view)     Anxiety:                             [ ]Mild [ ]Moderate [ ]Severe  Agitation:                          [ ]Mild [ ]Moderate [ ]Severe  Fatigue:                             [ ]Mild [ ]Moderate [ ]Severe  Nausea:                             [ ]Mild [ ]Moderate [ ]Severe  Loss of appetite:              [ ]Mild [ ]Moderate [ ]Severe  Constipation:                   [ ]Mild [x ]Moderate [ ]Severe  Diarrhea:                          [ ]Mild [ ]Moderate [ ]Severe      CPOT:    https://www.[facility].org/getattachment/[email or web address]/Critical-Care-Pain-Observation-Tool-(CPOT)    PAIN AD Score:	  http://geriatrictoolkit.[email or web address]/cog/painad.pdf (Ctrl + left click to view)    PCSSQ[Palliative Care Spiritual Screening Question]   Severity (0-10):  Score of 4 or > indicate consideration of Chaplaincy referral.  Chaplaincy Referral: [ ] yes [ ] refused [ ] following    Other Symptoms:  [ x]All other review of systems negative     Palliative Performance Status Version 2:  60-70       %      http://npcrc.org/files/news/palliative_performance_scale_ppsv2.pdf    PHYSICAL EXAM:  Vital Signs Last 24 Hrs  T(C): 36.7 (22 Sep 2022 06:00), Max: 37 (21 Sep 2022 11:35)  T(F): 98.1 (22 Sep 2022 06:00), Max: 98.6 (21 Sep 2022 11:35)  HR: 100 (22 Sep 2022 06:00) (98 - 100)  BP: 128/84 (22 Sep 2022 06:00) (105/63 - 128/84)  BP(mean): --  RR: 18 (22 Sep 2022 06:00) (17 - 18)  SpO2: 98% (22 Sep 2022 06:00) (96% - 98%)    Parameters below as of 22 Sep 2022 06:00  Patient On (Oxygen Delivery Method): room air        GENERAL:  [ x]Alert  [ x]Oriented x 3  [ ]Lethargic  [ x]Cachexia  [ ]Unarousable  [ x]Verbal  [ ]Non-Verbal    Behavioral:   [ x] Anxiety  [ ] Delirium [ ] Agitation [ ] Calm  [ ] Other   HEENT:  [x ]Normal  [ ] Temporal Wasting  [ ]Dry mouth   [ ]ET Tube/Trach  [ ]Oral lesions  [ ] Mucositis  PULMONARY: Left chest tube   [x ]Clear [ ]Tachypnea  [ ]Audible excessive secretions   [ ]Rhonchi        [ ]Right [ ]Left [ ]Bilateral  [ ]Crackles        [ ]Right [ ]Left [ ]Bilateral  [ ]Wheezing     [ ]Right [ ]Left [ ]Bilateral  [ ]Diminished breath sounds [ ]right [ ]left [ ]bilateral  CARDIOVASCULAR:    [x ]Regular [ ]Irregular [ ]Tachy  [ ]Jose [ ]Murmur [ ]Other  GASTROINTESTINAL:  [ x]Soft  [ ]Distended   [ ]+BS  [ x]Non tender [ ]Tender  [ ]PEG [ ]OGT/ NGT  Last BM: 9/21 per patient   GENITOURINARY: Scrotal edema  [ ]Normal [ ] Incontinent   [ ]Oliguria/Anuria   [x ]Rosario  MUSCULOSKELETAL:   [x ]Normal   [ ]Weakness  [ ]Bed/Wheelchair bound [ ]Edema  [  ] amputation  [  ] contraction  NEUROLOGIC:   [x ]No focal deficits  [ ]Cognitive impairment  [ ]Dysphagia [ ]Dysarthria [ ]Paresis [ ]Other   SKIN: Crepitus in left lower extremity. See Nursing Skin Assessment for further details  [ x]Normal    [ ]Rash  [ ]Pressure ulcer(s)       Present on admission [ ]y [ ]n   [  ]  Wound    [  ] hyperpigmentation    CRITICAL CARE:  [ ]Shock Present  [ ]Septic [ ]Cardiogenic [ ]Neurologic [ ]Hypovolemic  [ ]Vasopressors [ ]Inotropes  [ ]Respiratory failure present [ ]Mechanical Ventilation [ ]Non-invasive ventilatory support [ ]High-Flow   [ ]Acute  [ ]Chronic [ ]Hypoxic  [ ]Hypercarbic [ ]Other  [ ]Other organ failure     LABS:                                              8.4    10.49 )-----------( 142      ( 22 Sep 2022 06:29 )             28.4       09-22    134<L>  |  100  |  9   ----------------------------<  108<H>  4.2   |  25  |  0.67    Ca    8.6      22 Sep 2022 07:51  Phos  2.4     09-22  Mg     1.50     09-22    TPro  6.4  /  Alb  2.5<L>  /  TBili  0.3  /  DBili  x   /  AST  30  /  ALT  7   /  AlkPhos  246<H>  09-22        RADIOLOGY & ADDITIONAL STUDIES: reviewed     Protein Calorie Malnutrition Present: [ ]mild [ ]moderate [ x]severe [ ]underweight [ ]morbid obesity  https://www.andeal.org/vault/2440/web/files/ONC/Table_Clinical%20Characteristics%20to%20Document%20Malnutrition-White%20JV%20et%20al%202012.pdf    Height (cm): 198.1 (09-08-22 @ 14:00), 198.2 (08-08-22 @ 12:00), 198.1 (07-26-22 @ 13:32)  Weight (kg): 57.5 (09-08-22 @ 14:00), 79.0884 (08-08-22 @ 12:00), 81.2 (07-26-22 @ 13:32)  BMI (kg/m2): 14.7 (09-08-22 @ 14:00), 20.1 (08-08-22 @ 12:00), 20.7 (07-26-22 @ 13:32)    [ ]PPSV2 < or = 30%  [ ]significant weight loss [ ]poor nutritional intake [ ]anasarca   [ ]Artificial Nutrition    REFERRALS:   [ ]Chaplaincy  [ ]Hospice  [ ]Child Life  [ ]Social Work  [x ]Case management [ ]Holistic Therapy

## 2022-09-22 NOTE — PROGRESS NOTE ADULT - ASSESSMENT
31 y o M w/ HIV/AIDS (on HAART), Metastatic Rectal Ca (Not yet on chemo) who was initially admitted to [facility] on 8/31 after presenting with productive cough for 2 months and then acute SOB x 5 days. L pigtail catheter was placed due to concern for L PTX. However, patient was found to have large CHRISTINE cavitary lesion instead, into which the pigtail had been placed. 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

## 2022-09-22 NOTE — PROGRESS NOTE ADULT - PROBLEM SELECTOR PLAN 7
Patient with history of untreated HPV related rectal cancer with episode of rectal bleeding at [facility] Hgb 5.9 s/p 2u pRBCs.  - CT with evidence of liver and bone lesions  - renal cancer treatment will be on hold pending resolution of infection  - radiation oncology consult for RT of rectal cancer placed; will likely get outpatient RT   - appreciate oncology recs

## 2022-09-22 NOTE — PROGRESS NOTE ADULT - PROBLEM SELECTOR PLAN 3
- increased PO Methadone 5mg TID (QTC-444 on 9/12; QTC- 458 on 9/19); will need repeat EKG later this week to monitor QTC while on methadone  - c/w Gabapentin 300mg TID  - PO dilaudid 4mg q12 ATC   - PO dilaudid 4mg q4 PRN moderate pain   - hold parameters placed for all medications  - appreciate palliative care recs

## 2022-09-22 NOTE — CONSULT NOTE ADULT - SUBJECTIVE AND OBJECTIVE BOX
this patient is not a candidate for sedation in the scanner as his pain lying supine will require high levels that will put his life at risk. Would speak with cardiology and consider JENNIFER in the lateral position with mild sedation and topical

## 2022-09-22 NOTE — CONSULT NOTE ADULT - CONSULT REQUESTED DATE/TIME
09-Sep-2022 15:36
09-Sep-2022 12:46
10-Sep-2022 12:00
22-Sep-2022 11:19
12-Sep-2022 15:26
12-Sep-2022 15:53
19-Sep-2022 17:21

## 2022-09-22 NOTE — PROGRESS NOTE ADULT - PROBLEM SELECTOR PLAN 2
Patient with history of MSSA bacteremia found at [facility] with blood cultures 8/31 positive for MSSA and repeat on 9/2 NGTD.  - s/p Zosyn (9/8 - 9/9)  - transitioned to Cefazolin 2GM q8h for 6 weeks ending (9/9-10/13)   - upon discharge, will need weekly CBC, BUN and creatinine and ID clinic follow up

## 2022-09-22 NOTE — PROGRESS NOTE ADULT - PROBLEM SELECTOR PLAN 8
Discussed symptom management recommendations with  primary team     Thank you for allowing us to participate in your patient's care. Please page [phone] for any questions/concerns.

## 2022-09-23 DIAGNOSIS — D50.9 IRON DEFICIENCY ANEMIA, UNSPECIFIED: ICD-10-CM

## 2022-09-23 LAB
ALBUMIN SERPL ELPH-MCNC: 2.7 G/DL — LOW (ref 3.3–5)
ALP SERPL-CCNC: 261 U/L — HIGH (ref 40–120)
ALT FLD-CCNC: 6 U/L — SIGNIFICANT CHANGE UP (ref 4–41)
ANION GAP SERPL CALC-SCNC: 10 MMOL/L — SIGNIFICANT CHANGE UP (ref 7–14)
AST SERPL-CCNC: 34 U/L — SIGNIFICANT CHANGE UP (ref 4–40)
BILIRUB SERPL-MCNC: 0.4 MG/DL — SIGNIFICANT CHANGE UP (ref 0.2–1.2)
BUN SERPL-MCNC: 10 MG/DL — SIGNIFICANT CHANGE UP (ref 7–23)
CALCIUM SERPL-MCNC: 8.7 MG/DL — SIGNIFICANT CHANGE UP (ref 8.4–10.5)
CHLORIDE SERPL-SCNC: 99 MMOL/L — SIGNIFICANT CHANGE UP (ref 98–107)
CO2 SERPL-SCNC: 25 MMOL/L — SIGNIFICANT CHANGE UP (ref 22–31)
CREAT SERPL-MCNC: 0.69 MG/DL — SIGNIFICANT CHANGE UP (ref 0.5–1.3)
EGFR: 125 ML/MIN/1.73M2 — SIGNIFICANT CHANGE UP
FERRITIN SERPL-MCNC: 391 NG/ML — SIGNIFICANT CHANGE UP (ref 30–400)
FOLATE SERPL-MCNC: 5.2 NG/ML — SIGNIFICANT CHANGE UP (ref 3.1–17.5)
GGT SERPL-CCNC: 127 U/L — HIGH (ref 8–61)
GLUCOSE SERPL-MCNC: 101 MG/DL — HIGH (ref 70–99)
HCT VFR BLD CALC: 27.5 % — LOW (ref 39–50)
HGB BLD-MCNC: 8.5 G/DL — LOW (ref 13–17)
IRON SATN MFR SERPL: 5 % — LOW (ref 14–50)
IRON SATN MFR SERPL: 9 UG/DL — LOW (ref 45–165)
MAGNESIUM SERPL-MCNC: 1.6 MG/DL — SIGNIFICANT CHANGE UP (ref 1.6–2.6)
MCHC RBC-ENTMCNC: 24.5 PG — LOW (ref 27–34)
MCHC RBC-ENTMCNC: 30.9 GM/DL — LOW (ref 32–36)
MCV RBC AUTO: 79.3 FL — LOW (ref 80–100)
NRBC # BLD: 0 /100 WBCS — SIGNIFICANT CHANGE UP (ref 0–0)
NRBC # FLD: 0 K/UL — SIGNIFICANT CHANGE UP (ref 0–0)
PHOSPHATE SERPL-MCNC: 2.8 MG/DL — SIGNIFICANT CHANGE UP (ref 2.5–4.5)
PLATELET # BLD AUTO: 199 K/UL — SIGNIFICANT CHANGE UP (ref 150–400)
POTASSIUM SERPL-MCNC: 4.3 MMOL/L — SIGNIFICANT CHANGE UP (ref 3.5–5.3)
POTASSIUM SERPL-SCNC: 4.3 MMOL/L — SIGNIFICANT CHANGE UP (ref 3.5–5.3)
PROT SERPL-MCNC: 6.4 G/DL — SIGNIFICANT CHANGE UP (ref 6–8.3)
RBC # BLD: 3.47 M/UL — LOW (ref 4.2–5.8)
RBC # FLD: 22.7 % — HIGH (ref 10.3–14.5)
SODIUM SERPL-SCNC: 134 MMOL/L — LOW (ref 135–145)
TIBC SERPL-MCNC: 176 UG/DL — LOW (ref 220–430)
TRANSFERRIN SERPL-MCNC: 150 MG/DL — LOW (ref 200–360)
UIBC SERPL-MCNC: 167 UG/DL — SIGNIFICANT CHANGE UP (ref 110–370)
VIT B12 SERPL-MCNC: 2000 PG/ML — HIGH (ref 200–900)
WBC # BLD: 10.54 K/UL — HIGH (ref 3.8–10.5)
WBC # FLD AUTO: 10.54 K/UL — HIGH (ref 3.8–10.5)

## 2022-09-23 PROCEDURE — 99232 SBSQ HOSP IP/OBS MODERATE 35: CPT | Mod: GC

## 2022-09-23 PROCEDURE — 99233 SBSQ HOSP IP/OBS HIGH 50: CPT

## 2022-09-23 PROCEDURE — 93010 ELECTROCARDIOGRAM REPORT: CPT

## 2022-09-23 RX ADMIN — LIDOCAINE 1 PATCH: 4 CREAM TOPICAL at 22:32

## 2022-09-23 RX ADMIN — NALOXEGOL OXALATE 25 MILLIGRAM(S): 12.5 TABLET, FILM COATED ORAL at 10:38

## 2022-09-23 RX ADMIN — LIDOCAINE 1 PATCH: 4 CREAM TOPICAL at 02:38

## 2022-09-23 RX ADMIN — LIDOCAINE 1 PATCH: 4 CREAM TOPICAL at 10:39

## 2022-09-23 RX ADMIN — LIDOCAINE 1 PATCH: 4 CREAM TOPICAL at 19:23

## 2022-09-23 RX ADMIN — HYDROMORPHONE HYDROCHLORIDE 6 MILLIGRAM(S): 2 INJECTION INTRAMUSCULAR; INTRAVENOUS; SUBCUTANEOUS at 15:00

## 2022-09-23 RX ADMIN — HYDROMORPHONE HYDROCHLORIDE 6 MILLIGRAM(S): 2 INJECTION INTRAMUSCULAR; INTRAVENOUS; SUBCUTANEOUS at 19:40

## 2022-09-23 RX ADMIN — Medication 25 MILLIGRAM(S): at 06:26

## 2022-09-23 RX ADMIN — GABAPENTIN 300 MILLIGRAM(S): 400 CAPSULE ORAL at 06:26

## 2022-09-23 RX ADMIN — HYDROMORPHONE HYDROCHLORIDE 6 MILLIGRAM(S): 2 INJECTION INTRAMUSCULAR; INTRAVENOUS; SUBCUTANEOUS at 07:20

## 2022-09-23 RX ADMIN — HYDROMORPHONE HYDROCHLORIDE 6 MILLIGRAM(S): 2 INJECTION INTRAMUSCULAR; INTRAVENOUS; SUBCUTANEOUS at 11:00

## 2022-09-23 RX ADMIN — GABAPENTIN 300 MILLIGRAM(S): 400 CAPSULE ORAL at 22:11

## 2022-09-23 RX ADMIN — LIDOCAINE 1 PATCH: 4 CREAM TOPICAL at 19:24

## 2022-09-23 RX ADMIN — Medication 100 MILLIGRAM(S): at 06:38

## 2022-09-23 RX ADMIN — METHADONE HYDROCHLORIDE 5 MILLIGRAM(S): 40 TABLET ORAL at 17:07

## 2022-09-23 RX ADMIN — ENOXAPARIN SODIUM 60 MILLIGRAM(S): 100 INJECTION SUBCUTANEOUS at 22:08

## 2022-09-23 RX ADMIN — ENOXAPARIN SODIUM 60 MILLIGRAM(S): 100 INJECTION SUBCUTANEOUS at 10:37

## 2022-09-23 RX ADMIN — BICTEGRAVIR SODIUM, EMTRICITABINE, AND TENOFOVIR ALAFENAMIDE FUMARATE 1 TABLET(S): 30; 120; 15 TABLET ORAL at 10:56

## 2022-09-23 RX ADMIN — CHLORHEXIDINE GLUCONATE 1 APPLICATION(S): 213 SOLUTION TOPICAL at 06:25

## 2022-09-23 RX ADMIN — METHADONE HYDROCHLORIDE 5 MILLIGRAM(S): 40 TABLET ORAL at 07:51

## 2022-09-23 RX ADMIN — Medication 100 MILLIGRAM(S): at 14:37

## 2022-09-23 RX ADMIN — Medication 3 MILLIGRAM(S): at 22:08

## 2022-09-23 RX ADMIN — Medication 25 MILLIGRAM(S): at 17:10

## 2022-09-23 RX ADMIN — Medication 100 MILLIGRAM(S): at 22:09

## 2022-09-23 RX ADMIN — HYDROMORPHONE HYDROCHLORIDE 6 MILLIGRAM(S): 2 INJECTION INTRAMUSCULAR; INTRAVENOUS; SUBCUTANEOUS at 14:36

## 2022-09-23 RX ADMIN — HYDROMORPHONE HYDROCHLORIDE 6 MILLIGRAM(S): 2 INJECTION INTRAMUSCULAR; INTRAVENOUS; SUBCUTANEOUS at 20:40

## 2022-09-23 RX ADMIN — Medication 5 MILLIGRAM(S): at 06:37

## 2022-09-23 RX ADMIN — HYDROMORPHONE HYDROCHLORIDE 6 MILLIGRAM(S): 2 INJECTION INTRAMUSCULAR; INTRAVENOUS; SUBCUTANEOUS at 10:36

## 2022-09-23 RX ADMIN — Medication 5 MILLIGRAM(S): at 17:07

## 2022-09-23 RX ADMIN — Medication 5 MILLIGRAM(S): at 12:11

## 2022-09-23 RX ADMIN — HYDROMORPHONE HYDROCHLORIDE 6 MILLIGRAM(S): 2 INJECTION INTRAMUSCULAR; INTRAVENOUS; SUBCUTANEOUS at 06:24

## 2022-09-23 RX ADMIN — GABAPENTIN 300 MILLIGRAM(S): 400 CAPSULE ORAL at 14:37

## 2022-09-23 RX ADMIN — LACTULOSE 10 GRAM(S): 10 SOLUTION ORAL at 10:38

## 2022-09-23 NOTE — PROGRESS NOTE ADULT - PROBLEM SELECTOR PLAN 4
- TTE: mobile mass, possibly in association with the tricuspid valve This may represent tumour, thrombus, or vegetation.  - pending Cardiac MRI  - management as per primary team.

## 2022-09-23 NOTE — PROGRESS NOTE ADULT - NS ATTEST RISK PROBLEM GEN_ALL_CORE FT
In the setting of controlled substance administration, clinical monitoring required for side effects such as respiratory depression, constipation and opioid induced neurotoxicity.

## 2022-09-23 NOTE — PROGRESS NOTE ADULT - PROBLEM SELECTOR PLAN 7
Patient with history of untreated HPV related rectal cancer with episode of rectal bleeding at [facility] Hgb 5.9 s/p 2u pRBCs.  - CT with evidence of liver and bone lesions  - renal cancer treatment will be on hold pending resolution of infection  - radiation oncology consult for RT of rectal cancer placed; will likely get outpatient RT   - appreciate oncology recs

## 2022-09-23 NOTE — CHART NOTE - NSCHARTNOTEFT_GEN_A_CORE
Informed via Nely Morales from [facility] that patient has 1/3 AFB Sputum cultures positive from 03-Sep-2022. Communicated info to on-call ID fellow, Brinda Maldonado. Per ID, PCR of the AFB Culture indicated pathogen was not MTB. 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 Given that patient additionally has a known large lung cavitation, ID recommended that patient be placed on airborne isolation. Communicated this information to patient's nursing team, who contacted bedboard and arranged for patient to be put in a room with negative-pressure capabilities.

## 2022-09-23 NOTE — PROGRESS NOTE ADULT - PROBLEM SELECTOR PLAN 3
- L pigtail catheter was placed at [facility] due to concern for L PTX. However, patient was found to have large CHRISTINE cavitary lesion instead, into which the pigtail had been placed.   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  - CardioThoracic recommendations appreciated  - s/p chest tube removal  - management as per primary team.

## 2022-09-23 NOTE — PROGRESS NOTE ADULT - PROBLEM SELECTOR PLAN 1
Patient with TTE performed on at [facility] with evidence of two large RV masses and two additional small masses. IVC filter placed prophylactically at that time due to undiagnosed vegetations on the TTE.  - repeat TTE 9/9 was a limited study but demonstrated a mobile mass likely 2/2 tumour, thrombus, or vegetation.   - per cardiology risk of JENNIFER outweighs benefit at this time  - pending cardiac MRI to evaluate to mass  - can given 1.5 mg dilaudid for premedication   - monitor on tele Patient with TTE performed on at [facility] with evidence of two large RV masses and two additional small masses. IVC filter placed prophylactically at that time due to undiagnosed vegetations on the TTE.  - repeat TTE 9/9 was a limited study but demonstrated a mobile mass likely 2/2 tumour, thrombus, or vegetation.   - per cardiology risk of JENNIFER outweighs benefit at this time  - pending cardiac MRI to evaluate to mass; planned now tentatively for Monday 9 am  - can given 1.5 mg dilaudid for premedication   - monitor on tele

## 2022-09-23 NOTE — PROGRESS NOTE ADULT - PROBLEM SELECTOR PLAN 5
CT Chest 9/11 with evidence of persistent pneumomediastinum and extensive subcutaneous emphysema of the neck, chest, abdomen, pelvis, and extremities, including the scrotum.   - CANDIS per thoracic surgery  - indwelling pedraza was placed at [facility] (Coude placed for urinary strain)   - No further urologic intervention needed at this time.   - Can f/u outpatient w/ Dr Prabhu Lucio, [facility] [place]  - pain control with dilaudid and methadone, ct a/p 9/11 did not show any inflammation/hydrocele/infection .

## 2022-09-23 NOTE — PROGRESS NOTE ADULT - SUBJECTIVE AND OBJECTIVE BOX
Sharon Carreno  PGY-1 Resident Physician   Pager [phone]/ [phone]    Patient is a 31y old  Male who presents with a chief complaint of Shortness of breath (22 Sep 2022 11:19)      SUBJECTIVE / OVERNIGHT EVENTS:  Patient seen and evaluated at bedside.    Denies any fevers, chills, CP, or SOB.    Vital Signs Last 24 Hrs  T(C): 36.7 (22 Sep 2022 23:40), Max: 36.8 (22 Sep 2022 15:28)  T(F): 98 (22 Sep 2022 23:40), Max: 98.2 (22 Sep 2022 15:28)  HR: 100 (22 Sep 2022 23:40) (94 - 115)  BP: 131/70 (22 Sep 2022 23:40) (106/62 - 131/70)  BP(mean): --  RR: 18 (22 Sep 2022 23:40) (18 - 18)  SpO2: 99% (22 Sep 2022 23:40) (99% - 99%)    Parameters below as of 22 Sep 2022 23:40  Patient On (Oxygen Delivery Method): room air        PHYSICAL EXAM:  GENERAL: NAD, well-developed  CHEST/LUNG: Clear to auscultation bilaterally; No wheeze  HEART: Regular rate and rhythm; Normal S1 S2, No murmurs, rubs, or gallops  ABDOMEN: Soft, Nontender, Nondistended; Bowel sounds present  EXTREMITIES:  2+ Peripheral Pulses, No clubbing, cyanosis, or edema  PSYCH: AAOx3    LABS:                        8.5    10.54 )-----------( 199      ( 23 Sep 2022 04:23 )             27.5     Hgb Trend: 8.5<--, 8.4<--, 8.6<--, 8.3<--, 9.2<--  09-23    134<L>  |  99  |  10  ----------------------------<  101<H>  4.3   |  25  |  0.69    Ca    8.7      23 Sep 2022 04:23  Phos  2.8     09-23  Mg     1.60     09-23    TPro  6.4  /  Alb  2.7<L>  /  TBili  0.4  /  DBili  x   /  AST  34  /  ALT  6   /  AlkPhos  261<H>  09-23    Creatinine Trend: 0.69<--, 0.67<--, 0.67<--, 0.69<--, 0.80<--, 0.94<--  LIVER FUNCTIONS - ( 23 Sep 2022 04:23 )  Alb: 2.7 g/dL / Pro: 6.4 g/dL / ALK PHOS: 261 U/L / ALT: 6 U/L / AST: 34 U/L / GGT: 127 U/L              Sharon Carreno  PGY-1 Resident Physician   Pager [phone]/ [phone]    Patient is a 31y old  Male who presents with a chief complaint of Shortness of breath (22 Sep 2022 11:19)      SUBJECTIVE / OVERNIGHT EVENTS:  Patient seen and evaluated at bedside.  Patient with no complaints this morning. States he will attempt cardiac MRI with medication for pain. Explained that we will need to attempt this if he if not agreeable for JENNIFER or a candidate for anesthesia.  Denies any fevers, chills, CP, or SOB.    Vital Signs Last 24 Hrs  T(C): 36.7 (22 Sep 2022 23:40), Max: 36.8 (22 Sep 2022 15:28)  T(F): 98 (22 Sep 2022 23:40), Max: 98.2 (22 Sep 2022 15:28)  HR: 100 (22 Sep 2022 23:40) (94 - 115)  BP: 131/70 (22 Sep 2022 23:40) (106/62 - 131/70)  BP(mean): --  RR: 18 (22 Sep 2022 23:40) (18 - 18)  SpO2: 99% (22 Sep 2022 23:40) (99% - 99%)    Parameters below as of 22 Sep 2022 23:40  Patient On (Oxygen Delivery Method): room air        PHYSICAL EXAM:  GENERAL: NAD, well-developed  CHEST/LUNG: Clear to auscultation bilaterally; No wheeze  HEART: Regular rate and rhythm; Normal S1 S2, No murmurs, rubs, or gallops  ABDOMEN: Soft, Nontender, Nondistended; Bowel sounds present  EXTREMITIES:  2+ Peripheral Pulses, No clubbing, cyanosis, or edema  PSYCH: AAOx3    LABS:                        8.5    10.54 )-----------( 199      ( 23 Sep 2022 04:23 )             27.5     Hgb Trend: 8.5<--, 8.4<--, 8.6<--, 8.3<--, 9.2<--  09-23    134<L>  |  99  |  10  ----------------------------<  101<H>  4.3   |  25  |  0.69    Ca    8.7      23 Sep 2022 04:23  Phos  2.8     09-23  Mg     1.60     09-23    TPro  6.4  /  Alb  2.7<L>  /  TBili  0.4  /  DBili  x   /  AST  34  /  ALT  6   /  AlkPhos  261<H>  09-23    Creatinine Trend: 0.69<--, 0.67<--, 0.67<--, 0.69<--, 0.80<--, 0.94<--  LIVER FUNCTIONS - ( 23 Sep 2022 04:23 )  Alb: 2.7 g/dL / Pro: 6.4 g/dL / ALK PHOS: 261 U/L / ALT: 6 U/L / AST: 34 U/L / GGT: 127 U/L              Sharon Carreno  PGY-1 Resident Physician   Pager [phone]/ [phone]    Patient is a 31y old  Male who presents with a chief complaint of Shortness of breath (22 Sep 2022 11:19)      SUBJECTIVE / OVERNIGHT EVENTS:  Patient seen and evaluated at bedside.  Patient with no complaints this morning. States he will attempt cardiac MRI with medication for pain. Explained that we will need to attempt this if he if not agreeable for JENNIFER or a candidate for anesthesia.  Denies any fevers, chills, CP, or SOB.    Vital Signs Last 24 Hrs  T(C): 36.7 (22 Sep 2022 23:40), Max: 36.8 (22 Sep 2022 15:28)  T(F): 98 (22 Sep 2022 23:40), Max: 98.2 (22 Sep 2022 15:28)  HR: 100 (22 Sep 2022 23:40) (94 - 115)  BP: 131/70 (22 Sep 2022 23:40) (106/62 - 131/70)  BP(mean): --  RR: 18 (22 Sep 2022 23:40) (18 - 18)  SpO2: 99% (22 Sep 2022 23:40) (99% - 99%)    Parameters below as of 22 Sep 2022 23:40  Patient On (Oxygen Delivery Method): room air        PHYSICAL EXAM:  GENERAL: NAD, cachectic appearing  CHEST/LUNG: No lung sounds hear in left upper lobe but otherwise CTAB; no wheeze. Bandage on anterior L chest.   HEART: Regular rate and rhythm; Normal S1 S2, No murmurs, rubs, or gallops  ABDOMEN: Soft, Nontender, Nondistended; Bowel sounds present  EXTREMITIES:  2+ Peripheral Pulses, No clubbing, cyanosis, or edema  : scrotal swelling unchanged from presentation  SKIN: bandage on lower back which patient reports is his rectal mass; bandage unopened as patient reporting pain at this time  PSYCH: AAOx3    LABS:                        8.5    10.54 )-----------( 199      ( 23 Sep 2022 04:23 )             27.5     Hgb Trend: 8.5<--, 8.4<--, 8.6<--, 8.3<--, 9.2<--  09-23    134<L>  |  99  |  10  ----------------------------<  101<H>  4.3   |  25  |  0.69    Ca    8.7      23 Sep 2022 04:23  Phos  2.8     09-23  Mg     1.60     09-23    TPro  6.4  /  Alb  2.7<L>  /  TBili  0.4  /  DBili  x   /  AST  34  /  ALT  6   /  AlkPhos  261<H>  09-23    Creatinine Trend: 0.69<--, 0.67<--, 0.67<--, 0.69<--, 0.80<--, 0.94<--  LIVER FUNCTIONS - ( 23 Sep 2022 04:23 )  Alb: 2.7 g/dL / Pro: 6.4 g/dL / ALK PHOS: 261 U/L / ALT: 6 U/L / AST: 34 U/L / GGT: 127 U/L

## 2022-09-23 NOTE — PROGRESS NOTE ADULT - ASSESSMENT
31 y o M w/ HIV/AIDS (on HAART), Metastatic Rectal Ca (Not yet on chemo) who was initially admitted to [facility] on 8/31 after presenting with productive cough for 2 months and then acute SOB x 5 days. L pigtail catheter was placed due to concern for L PTX. However, patient was found to have large CHRISTINE cavitary lesion instead, into which the pigtail had been placed. 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

## 2022-09-23 NOTE — PROGRESS NOTE ADULT - PROBLEM SELECTOR PLAN 1
- No unexpected adverse effects of opiates noted: no sedation/dizziness/nausea.  - Rad/ Onc recommendations appreciated   - PO Methadone 5mg TID (QTC-444 on 9/12; QTC- 458 on 9/19); please repeat EKG later this week to monitor QTC while on methadone  - Continue Gabapentin 300mg TID  - PO Dilaudid 4mg q4 PRN moderate pain (hold for hypotension, oversedation, respiratory depression)  - PO Dilaudid 6mg q4 PRN severe pain (hold for hypotension, oversedation, respiratory depression)  - Extensively educated and reviewed pain regimen with patient    Pt to follow up at [address] [address]  as outpatient with Dr. Kiesha Calle/ Dr. Mazariegos/ NP Zoe Carvalho/ ROCIO Cruz  Please include their number [phone] in discharge paper work

## 2022-09-23 NOTE — PROGRESS NOTE ADULT - PROBLEM SELECTOR PLAN 8
- Ca 16.4 9/10 s/p calcitonin 4u/kg x 2 doses and pamidronate 90mcg IVPB x1.  - calcium level stable at 11.8   - cont IVFs w/ LR @ 75 cc/hr and continue monitoring   - continue to trend levels q 24 hrs   - can repeat pamidronate after 1 week PRN  - vit D low but will hold supplementation in the setting of hypercalcemia of malignancy Patient with microcytic anemia with Hgb 8.5 and MCV 79.3 (borderline with other MCV 80.9) likely a mixed picture of microcytic and normocytic anemia.  - iron studies with low iron levels but low iron binding capacity likely indicating a mixed picture  - ferritin wnl   - continue to monitor  - no indication for iron supplementation at this time in the setting of infection  - no overt signs of bleeding  - follow up OP with PCP and GI

## 2022-09-23 NOTE — PROGRESS NOTE ADULT - PROBLEM SELECTOR PLAN 2
Patient with history of MSSA bacteremia found at [facility] with blood cultures 8/31 positive for MSSA and repeat on 9/2 NGTD.  - s/p Zosyn (9/8 - 9/9)  - transitioned to Cefazolin 2GM q8h for 6 weeks ending (9/9-10/13)   - upon discharge, will need weekly CBC, BUN and creatinine and ID clinic follow up

## 2022-09-23 NOTE — PROGRESS NOTE ADULT - SUBJECTIVE AND OBJECTIVE BOX
INTERVAL HPI/OVERNIGHT EVENTS:  In past 24 hours, received 3 prn doses of PO Dilaudid 6mg.     SUBJECTIVE AND OBJECTIVE:  Patient seen and examined at bedside. Patient being followed up for pain.  Patient reports pain controlled with current prn dose. Reports discomfort at pedraza site; asked bedside RN to assess pedraza.   No unexpected adverse effects of opiates noted: no sedation/dizziness/nausea.  Denies constipation.       Allergies    ibuprofen (Angioedema)    Intolerances    MEDICATIONS  (STANDING):  bictegravir 50 mG/emtricitabine 200 mG/tenofovir alafenamide 25 mG (BIKTARVY) 1 Tablet(s) Oral daily  ceFAZolin   IVPB 2000 milliGRAM(s) IV Intermittent every 8 hours  chlorhexidine 2% Cloths 1 Application(s) Topical <User Schedule>  dronabinol 5 milliGRAM(s) Oral <User Schedule>  enoxaparin Injectable 60 milliGRAM(s) SubCutaneous every 12 hours  ferrous    sulfate 325 milliGRAM(s) Oral <User Schedule>  gabapentin 300 milliGRAM(s) Oral three times a day  lactated ringers. 1000 milliLiter(s) (75 mL/Hr) IV Continuous <Continuous>  lactulose Syrup 10 Gram(s) Oral daily  lidocaine   4% Patch 1 Patch Transdermal daily  lidocaine   4% Patch 1 Patch Transdermal daily  melatonin 3 milliGRAM(s) Oral at bedtime  methadone    Tablet 5 milliGRAM(s) Oral three times a day  metoprolol tartrate 25 milliGRAM(s) Oral two times a day  naloxegol 25 milliGRAM(s) Oral daily  polyethylene glycol 3350 17 Gram(s) Oral two times a day  senna 2 Tablet(s) Oral at bedtime    MEDICATIONS  (PRN):  bisacodyl 5 milliGRAM(s) Oral at bedtime PRN Constipation  HYDROmorphone   Tablet 6 milliGRAM(s) Oral every 4 hours PRN Severe Pain (7 - 10)  HYDROmorphone   Tablet 4 milliGRAM(s) Oral every 4 hours PRN Moderate Pain (4 - 6)  HYDROmorphone  Injectable 1.5 milliGRAM(s) IV Push once PRN Prior to MR  naloxone Injectable 0.1 milliGRAM(s) IV Push every 3 minutes PRN opioid intoxication        ITEMS UNCHECKED ARE NOT PRESENT    PRESENT SYMPTOMS: [ ]Unable to self-report due to altered mental status- see [ ] CPOT [ ] PAINADS [ ] RDOS  Source if other than patient:  [ ]Family   [ ]Team     Pain: [x ]yes [ ]no  QOL impact - mild-moderate  Location - rectum  , scrotum          Aggravating factors - urination, movement  Quality - sharp  Radiation - "all over my body"  Timing- intermittent   Severity (0-10 scale): 10  Minimal acceptable level (0-10 scale): 3    Dyspnea:                           [ ]Mild [ ]Moderate [ ]Severe  RDOS:  0 to 2  minimal or no respiratory distress   3  mild distress  4 to 6 moderate distress  >7 severe distress  https://homecareinformation.net/handouts/hen/Respiratory_Distress_Observation_Scale.pdf (Ctrl +  left click to view)     Anxiety:                             [ ]Mild [ ]Moderate [ ]Severe  Agitation:                          [ ]Mild [ ]Moderate [ ]Severe  Fatigue:                             [ ]Mild [ ]Moderate [ ]Severe  Nausea:                             [ ]Mild [ ]Moderate [ ]Severe  Loss of appetite:              [ ]Mild [ ]Moderate [ ]Severe  Constipation:                   [ ]Mild [x ]Moderate [ ]Severe  Diarrhea:                          [ ]Mild [ ]Moderate [ ]Severe      CPOT:    https://www.[facility].org/getattachment/coj02w86-9a6p-4v4r-8p0p-3618c8762z5s/Critical-Care-Pain-Observation-Tool-(CPOT)    PAIN AD Score:	  http://geriatrictoolkit.[facility]/cog/painad.pdf (Ctrl + left click to view)    PCSSQ[Palliative Care Spiritual Screening Question]   Severity (0-10):  Score of 4 or > indicate consideration of Chaplaincy referral.  Chaplaincy Referral: [ ] yes [ ] refused [ ] following    Other Symptoms:  [ x]All other review of systems negative     Palliative Performance Status Version 2:  60-70       %      http://npcrc.org/files/news/palliative_performance_scale_ppsv2.pdf    PHYSICAL EXAM:  Vital Signs Last 24 Hrs  T(C): 36.8 (23 Sep 2022 06:01), Max: 36.8 (22 Sep 2022 15:28)  T(F): 98.2 (23 Sep 2022 06:01), Max: 98.2 (22 Sep 2022 15:28)  HR: 95 (23 Sep 2022 06:01) (94 - 115)  BP: 125/82 (23 Sep 2022 06:01) (106/62 - 131/70)  BP(mean): --  RR: 18 (23 Sep 2022 06:01) (18 - 18)  SpO2: 98% (23 Sep 2022 06:01) (98% - 99%)    Parameters below as of 23 Sep 2022 06:01  Patient On (Oxygen Delivery Method): room air        GENERAL:  [ x]Alert  [ x]Oriented x 3  [ ]Lethargic  [ x]Cachexia  [ ]Unarousable  [ x]Verbal  [ ]Non-Verbal    Behavioral:   [ x] Anxiety  [ ] Delirium [ ] Agitation [ ] Calm  [ ] Other   HEENT:  [x ]Normal  [ ] Temporal Wasting  [ ]Dry mouth   [ ]ET Tube/Trach  [ ]Oral lesions  [ ] Mucositis  PULMONARY: Left chest tube   [x ]Clear [ ]Tachypnea  [ ]Audible excessive secretions   [ ]Rhonchi        [ ]Right [ ]Left [ ]Bilateral  [ ]Crackles        [ ]Right [ ]Left [ ]Bilateral  [ ]Wheezing     [ ]Right [ ]Left [ ]Bilateral  [ ]Diminished breath sounds [ ]right [ ]left [ ]bilateral  CARDIOVASCULAR:    [x ]Regular [ ]Irregular [ ]Tachy  [ ]Jose [ ]Murmur [ ]Other  GASTROINTESTINAL:  [ x]Soft  [ ]Distended   [ ]+BS  [ x]Non tender [ ]Tender  [ ]PEG [ ]OGT/ NGT  Last BM: 9/23 per patient   GENITOURINARY: Scrotal edema  [ ]Normal [ ] Incontinent   [ ]Oliguria/Anuria   [x ]Pedraza  MUSCULOSKELETAL:   [x ]Normal   [ ]Weakness  [ ]Bed/Wheelchair bound [ ]Edema  [  ] amputation  [  ] contraction  NEUROLOGIC:   [x ]No focal deficits  [ ]Cognitive impairment  [ ]Dysphagia [ ]Dysarthria [ ]Paresis [ ]Other   SKIN: Crepitus in left lower extremity. See Nursing Skin Assessment for further details  [ x]Normal    [ ]Rash  [ ]Pressure ulcer(s)       Present on admission [ ]y [ ]n   [  ]  Wound    [  ] hyperpigmentation    CRITICAL CARE:  [ ]Shock Present  [ ]Septic [ ]Cardiogenic [ ]Neurologic [ ]Hypovolemic  [ ]Vasopressors [ ]Inotropes  [ ]Respiratory failure present [ ]Mechanical Ventilation [ ]Non-invasive ventilatory support [ ]High-Flow   [ ]Acute  [ ]Chronic [ ]Hypoxic  [ ]Hypercarbic [ ]Other  [ ]Other organ failure     LABS:                                        8.5    10.54 )-----------( 199      ( 23 Sep 2022 04:23 )             27.5       09-23    134<L>  |  99  |  10  ----------------------------<  101<H>  4.3   |  25  |  0.69    Ca    8.7      23 Sep 2022 04:23  Phos  2.8     09-23  Mg     1.60     09-23    TPro  6.4  /  Alb  2.7<L>  /  TBili  0.4  /  DBili  x   /  AST  34  /  ALT  6   /  AlkPhos  261<H>  09-23      RADIOLOGY & ADDITIONAL STUDIES: reviewed     Protein Calorie Malnutrition Present: [ ]mild [ ]moderate [ x]severe [ ]underweight [ ]morbid obesity  https://www.andeal.org/vault/2440/web/files/ONC/Table_Clinical%20Characteristics%20to%20Document%20Malnutrition-White%20JV%20et%20al%202012.pdf    Height (cm): 198.1 (09-08-22 @ 14:00), 198.2 (08-08-22 @ 12:00), 198.1 (07-26-22 @ 13:32)  Weight (kg): 57.5 (09-08-22 @ 14:00), 79.0884 (08-08-22 @ 12:00), 81.2 (07-26-22 @ 13:32)  BMI (kg/m2): 14.7 (09-08-22 @ 14:00), 20.1 (08-08-22 @ 12:00), 20.7 (07-26-22 @ 13:32)    [ ]PPSV2 < or = 30%  [ ]significant weight loss [ ]poor nutritional intake [ ]anasarca   [ ]Artificial Nutrition    REFERRALS:   [ ]Chaplaincy  [ ]Hospice  [ ]Child Life  [ ]Social Work  [x ]Case management [ ]Holistic Therapy

## 2022-09-23 NOTE — PROGRESS NOTE ADULT - PROBLEM SELECTOR PLAN 4
Patient presented to [facility] with hypoxemia and L chest pigtail placed 8/31 due to suspected pneumothorax. Patient showed improvement in SOB and f/u CT chest performed which showed evidence of 12 cm multiloculated thick walled cavitary mass in the CHRISTINE and lingula.  - sputum culture 9/10 + mod klebsiella pneumoniae pansensitive  - blood Cx neg 9/8  - fungitell and crypto neg 9/10  - ID consulted, recs appreciated  - per pulm, no CANDIS. will need f/u CT in 2 months

## 2022-09-23 NOTE — PROGRESS NOTE ADULT - PROBLEM SELECTOR PLAN 9
- s/p SVT events x 2 9/15 and 9/16 resolved with vagal maneuvers   - tele w/ baseline tach low 100.  - inc metoprolol to 25 BID. Titrate PRN.  - consider EP eval if persists - Ca 16.4 9/10 s/p calcitonin 4u/kg x 2 doses and pamidronate 90mcg IVPB x1.  - calcium level stable at 11.8   - cont IVFs w/ LR @ 75 cc/hr and continue monitoring   - continue to trend levels q 24 hrs   - can repeat pamidronate after 1 week PRN  - vit D low but will hold supplementation in the setting of hypercalcemia of malignancy - Ca 16.4 9/10 s/p calcitonin 4u/kg x 2 doses and pamidronate 90mcg IVPB x1.  - Improve  - cont IVFs w/ LR @ 75 cc/hr and continue monitoring   - continue to trend levels q 24 hrs   - can repeat pamidronate after 1 week PRN  - vit D low but will hold supplementation in the setting of hypercalcemia of malignancy

## 2022-09-23 NOTE — PROGRESS NOTE ADULT - PROBLEM SELECTOR PLAN 10
Patient with history of HIV and follows with Dr. Marcial in clinic.  - CD4 392 and viral load < 30 on 9/2/22  - Viral load undetectable 9/8  - c/w home Biktarvy - s/p SVT events x 2 9/15 and 9/16 resolved with vagal maneuvers   - tele w/ baseline tach low 100.  - inc metoprolol to 25 BID. Titrate PRN.  - consider EP eval if persists

## 2022-09-23 NOTE — PROGRESS NOTE ADULT - PROBLEM SELECTOR PLAN 8
Discussed symptom management recommendations with primary team     Thank you for allowing us to participate in your patient's care. Please page [phone] for any questions/concerns.

## 2022-09-23 NOTE — PROGRESS NOTE ADULT - ASSESSMENT
31 M with untreated metastatic ano-rectal SCC (followed by Dr Frazier [facility] oncology) with mets to liver and possibly bone, HPV positive, HIV infection, and R eye cataracts who presented to the [facility] on 8/31/22 after syncopal episode at home and transferred to [facility] for continued of care per family request. Pt with cough x 2 mos, with yellow sputum and shortness of breath since 5 days PTA.

## 2022-09-24 DIAGNOSIS — E87.1 HYPO-OSMOLALITY AND HYPONATREMIA: ICD-10-CM

## 2022-09-24 LAB
ALBUMIN SERPL ELPH-MCNC: 2.4 G/DL — LOW (ref 3.3–5)
ALP SERPL-CCNC: 269 U/L — HIGH (ref 40–120)
ALT FLD-CCNC: <5 U/L — SIGNIFICANT CHANGE UP (ref 4–41)
ANION GAP SERPL CALC-SCNC: 10 MMOL/L — SIGNIFICANT CHANGE UP (ref 7–14)
AST SERPL-CCNC: 34 U/L — SIGNIFICANT CHANGE UP (ref 4–40)
BILIRUB SERPL-MCNC: 0.3 MG/DL — SIGNIFICANT CHANGE UP (ref 0.2–1.2)
BUN SERPL-MCNC: 11 MG/DL — SIGNIFICANT CHANGE UP (ref 7–23)
CALCIUM SERPL-MCNC: 8.9 MG/DL — SIGNIFICANT CHANGE UP (ref 8.4–10.5)
CHLORIDE SERPL-SCNC: 96 MMOL/L — LOW (ref 98–107)
CO2 SERPL-SCNC: 23 MMOL/L — SIGNIFICANT CHANGE UP (ref 22–31)
CREAT SERPL-MCNC: 0.72 MG/DL — SIGNIFICANT CHANGE UP (ref 0.5–1.3)
EGFR: 124 ML/MIN/1.73M2 — SIGNIFICANT CHANGE UP
GLUCOSE SERPL-MCNC: 93 MG/DL — SIGNIFICANT CHANGE UP (ref 70–99)
HCT VFR BLD CALC: 27.4 % — LOW (ref 39–50)
HGB BLD-MCNC: 8.2 G/DL — LOW (ref 13–17)
MAGNESIUM SERPL-MCNC: 1.6 MG/DL — SIGNIFICANT CHANGE UP (ref 1.6–2.6)
MCHC RBC-ENTMCNC: 23.9 PG — LOW (ref 27–34)
MCHC RBC-ENTMCNC: 29.9 GM/DL — LOW (ref 32–36)
MCV RBC AUTO: 79.9 FL — LOW (ref 80–100)
NRBC # BLD: 0 /100 WBCS — SIGNIFICANT CHANGE UP (ref 0–0)
NRBC # FLD: 0 K/UL — SIGNIFICANT CHANGE UP (ref 0–0)
PHOSPHATE SERPL-MCNC: 2.7 MG/DL — SIGNIFICANT CHANGE UP (ref 2.5–4.5)
PLATELET # BLD AUTO: 206 K/UL — SIGNIFICANT CHANGE UP (ref 150–400)
POTASSIUM SERPL-MCNC: 4.4 MMOL/L — SIGNIFICANT CHANGE UP (ref 3.5–5.3)
POTASSIUM SERPL-SCNC: 4.4 MMOL/L — SIGNIFICANT CHANGE UP (ref 3.5–5.3)
PROT SERPL-MCNC: 6.2 G/DL — SIGNIFICANT CHANGE UP (ref 6–8.3)
RBC # BLD: 3.43 M/UL — LOW (ref 4.2–5.8)
RBC # FLD: 22.5 % — HIGH (ref 10.3–14.5)
SODIUM SERPL-SCNC: 129 MMOL/L — LOW (ref 135–145)
WBC # BLD: 11.52 K/UL — HIGH (ref 3.8–10.5)
WBC # FLD AUTO: 11.52 K/UL — HIGH (ref 3.8–10.5)

## 2022-09-24 PROCEDURE — 99232 SBSQ HOSP IP/OBS MODERATE 35: CPT

## 2022-09-24 RX ORDER — DRONABINOL 2.5 MG
5 CAPSULE ORAL ONCE
Refills: 0 | Status: DISCONTINUED | OUTPATIENT
Start: 2022-09-24 | End: 2022-09-24

## 2022-09-24 RX ORDER — DRONABINOL 2.5 MG
5 CAPSULE ORAL
Refills: 0 | Status: DISCONTINUED | OUTPATIENT
Start: 2022-09-24 | End: 2022-10-01

## 2022-09-24 RX ADMIN — HYDROMORPHONE HYDROCHLORIDE 6 MILLIGRAM(S): 2 INJECTION INTRAMUSCULAR; INTRAVENOUS; SUBCUTANEOUS at 16:46

## 2022-09-24 RX ADMIN — CHLORHEXIDINE GLUCONATE 1 APPLICATION(S): 213 SOLUTION TOPICAL at 06:52

## 2022-09-24 RX ADMIN — Medication 100 MILLIGRAM(S): at 06:03

## 2022-09-24 RX ADMIN — LIDOCAINE 1 PATCH: 4 CREAM TOPICAL at 19:56

## 2022-09-24 RX ADMIN — GABAPENTIN 300 MILLIGRAM(S): 400 CAPSULE ORAL at 15:03

## 2022-09-24 RX ADMIN — LIDOCAINE 1 PATCH: 4 CREAM TOPICAL at 11:31

## 2022-09-24 RX ADMIN — HYDROMORPHONE HYDROCHLORIDE 6 MILLIGRAM(S): 2 INJECTION INTRAMUSCULAR; INTRAVENOUS; SUBCUTANEOUS at 03:09

## 2022-09-24 RX ADMIN — Medication 3 MILLIGRAM(S): at 21:48

## 2022-09-24 RX ADMIN — HYDROMORPHONE HYDROCHLORIDE 6 MILLIGRAM(S): 2 INJECTION INTRAMUSCULAR; INTRAVENOUS; SUBCUTANEOUS at 07:28

## 2022-09-24 RX ADMIN — METHADONE HYDROCHLORIDE 5 MILLIGRAM(S): 40 TABLET ORAL at 00:18

## 2022-09-24 RX ADMIN — LIDOCAINE 1 PATCH: 4 CREAM TOPICAL at 23:26

## 2022-09-24 RX ADMIN — HYDROMORPHONE HYDROCHLORIDE 6 MILLIGRAM(S): 2 INJECTION INTRAMUSCULAR; INTRAVENOUS; SUBCUTANEOUS at 08:28

## 2022-09-24 RX ADMIN — Medication 5 MILLIGRAM(S): at 11:30

## 2022-09-24 RX ADMIN — GABAPENTIN 300 MILLIGRAM(S): 400 CAPSULE ORAL at 06:03

## 2022-09-24 RX ADMIN — GABAPENTIN 300 MILLIGRAM(S): 400 CAPSULE ORAL at 21:48

## 2022-09-24 RX ADMIN — LACTULOSE 10 GRAM(S): 10 SOLUTION ORAL at 11:32

## 2022-09-24 RX ADMIN — METHADONE HYDROCHLORIDE 5 MILLIGRAM(S): 40 TABLET ORAL at 06:03

## 2022-09-24 RX ADMIN — LIDOCAINE 1 PATCH: 4 CREAM TOPICAL at 19:25

## 2022-09-24 RX ADMIN — Medication 25 MILLIGRAM(S): at 06:03

## 2022-09-24 RX ADMIN — METHADONE HYDROCHLORIDE 5 MILLIGRAM(S): 40 TABLET ORAL at 21:47

## 2022-09-24 RX ADMIN — Medication 5 MILLIGRAM(S): at 08:16

## 2022-09-24 RX ADMIN — BICTEGRAVIR SODIUM, EMTRICITABINE, AND TENOFOVIR ALAFENAMIDE FUMARATE 1 TABLET(S): 30; 120; 15 TABLET ORAL at 11:31

## 2022-09-24 RX ADMIN — Medication 100 MILLIGRAM(S): at 15:03

## 2022-09-24 RX ADMIN — Medication 325 MILLIGRAM(S): at 15:04

## 2022-09-24 RX ADMIN — ENOXAPARIN SODIUM 60 MILLIGRAM(S): 100 INJECTION SUBCUTANEOUS at 11:31

## 2022-09-24 RX ADMIN — NALOXEGOL OXALATE 25 MILLIGRAM(S): 12.5 TABLET, FILM COATED ORAL at 11:31

## 2022-09-24 RX ADMIN — METHADONE HYDROCHLORIDE 5 MILLIGRAM(S): 40 TABLET ORAL at 15:03

## 2022-09-24 RX ADMIN — HYDROMORPHONE HYDROCHLORIDE 6 MILLIGRAM(S): 2 INJECTION INTRAMUSCULAR; INTRAVENOUS; SUBCUTANEOUS at 02:15

## 2022-09-24 RX ADMIN — Medication 100 MILLIGRAM(S): at 21:48

## 2022-09-24 RX ADMIN — Medication 5 MILLIGRAM(S): at 16:46

## 2022-09-24 NOTE — PROGRESS NOTE ADULT - PROBLEM SELECTOR PLAN 4
Patient presented to [facility] with hypoxemia and L chest pigtail placed 8/31 due to suspected pneumothorax. Patient showed improvement in SOB and f/u CT chest performed which showed evidence of 12 cm multiloculated thick walled cavitary mass in the CHRISTINE and lingula.  - sputum culture 9/10 + mod klebsiella pneumoniae pansensitive  - blood Cx neg 9/8  - fungitell and crypto neg 9/10  - ID consulted, recs appreciated  - positive AFB in 1/3 sputum samples (sept 3) from [facility]. ID recs airborne precautions   - per pulm, no CANDIS. will need f/u CT in 2 months

## 2022-09-24 NOTE — PROGRESS NOTE ADULT - PROBLEM SELECTOR PLAN 5
CT Chest 9/11 with evidence of persistent pneumomediastinum and extensive subcutaneous emphysema of the neck, chest, abdomen, pelvis, and extremities, including the scrotum.   - CANDIS per thoracic surgery  - indwelling pedraza was placed at [facility] (Coude placed for urinary strain)   - No further urologic intervention needed at this time.   - Can f/u outpatient w/ Dr Prabhu Lucio, [facility] at [place]  - pain control with dilaudid and methadone, ct a/p 9/11 did not show any inflammation/hydrocele/infection .

## 2022-09-24 NOTE — PROGRESS NOTE ADULT - PROBLEM SELECTOR PLAN 7
Patient with history of untreated HPV related rectal cancer with episode of rectal bleeding at [facility] Hgb 5.9 s/p 2u pRBCs.  - CT with evidence of liver and bone lesions  - renal cancer treatment will be on hold pending resolution of infection  - radiation oncology consult for RT of rectal cancer placed; will likely get outpatient RT   - appreciate oncology recs

## 2022-09-24 NOTE — PROGRESS NOTE ADULT - PROBLEM SELECTOR PLAN 9
- Ca 16.4 9/10 s/p calcitonin 4u/kg x 2 doses and pamidronate 90mcg IVPB x1.  - Improve  - cont IVFs w/ LR @ 75 cc/hr and continue monitoring   - continue to trend levels q 24 hrs   - can repeat pamidronate after 1 week PRN  - vit D low but will hold supplementation in the setting of hypercalcemia of malignancy - Na 129 this AM  - patient is asymptomatic  - will check urine lytes  - suspect mixed picture due to poor PO intake and possible SIADH from pain

## 2022-09-24 NOTE — PROGRESS NOTE ADULT - PROBLEM SELECTOR PLAN 12
- Fluids: NA  - Electrolytes: Will replete to maintain K>4, Phos>3, and Mag>2  - Nutrition: regular  - Activity: OOB as tolerated  - DVT Prophylaxis: lovenox  - Stress Ulcer/GI Prophylaxis: NA  - Disposition: pending medical management Patient with history of HIV and follows with Dr. Marcial in clinic.  - CD4 392 and viral load < 30 on 9/2/22  - Viral load undetectable 9/8  - c/w home Biktarvy

## 2022-09-24 NOTE — PROGRESS NOTE ADULT - PROBLEM SELECTOR PLAN 1
Patient with TTE performed on at [facility] with evidence of two large RV masses and two additional small masses. IVC filter placed prophylactically at that time due to undiagnosed vegetations on the TTE.  - repeat TTE 9/9 was a limited study but demonstrated a mobile mass likely 2/2 tumour, thrombus, or vegetation.   - per cardiology risk of JENNIFER outweighs benefit at this time  - pending cardiac MRI to evaluate to mass; planned now tentatively for Monday 9 am  - can given 1.5 mg dilaudid for premedication   - monitor on tele

## 2022-09-24 NOTE — PROGRESS NOTE ADULT - PROBLEM SELECTOR PLAN 10
- s/p SVT events x 2 9/15 and 9/16 resolved with vagal maneuvers   - tele w/ baseline tach low 100.  - inc metoprolol to 25 BID. Titrate PRN.  - consider EP eval if persists - Ca 16.4 9/10 s/p calcitonin 4u/kg x 2 doses and pamidronate 90mcg IVPB x1.  - Improve  - cont IVFs w/ LR @ 75 cc/hr and continue monitoring   - continue to trend levels q 24 hrs   - can repeat pamidronate after 1 week PRN  - vit D low but will hold supplementation in the setting of hypercalcemia of malignancy

## 2022-09-24 NOTE — PROGRESS NOTE ADULT - ASSESSMENT
31 M with untreated metastatic ano-rectal SCC (followed by Dr Frazier [facility] oncology) with mets to liver and possibly bone, HPV positive, HIV infection, and R eye cataracts who presented to the [facility] on 8/31/22 after syncopal episode at home and transferred to [facility] for continued of care per family request. Pt with cough x 2 mos, with yellow sputum and shortness of breath since 5 days PTA.

## 2022-09-24 NOTE — PROGRESS NOTE ADULT - PROBLEM SELECTOR PLAN 3
Palliative care consulted for extensive disease burden and GOC discussion. At this time, patient was all available treatment and resuscitation. Remains full code.  - increased PO Methadone 5mg TID (QTC-444 on 9/12; QTC- 458 on 9/19); will need repeat EKG later this week to monitor QTC while on methadone  - c/w Gabapentin 300mg TID  - PO Dilaudid 4mg q4 PRN moderate pain  - PO Dilaudid 6mg q4 PRN severe pain  - hold parameters placed for all medications  - appreciate palliative care recs  - will need OP f/u at [facility] with Dr. Kiesha Calle/ Dr. Mazariegos/ NP Zoe Carvalho/ ROCIO Cruz; [phone]

## 2022-09-24 NOTE — PROGRESS NOTE ADULT - PROBLEM SELECTOR PLAN 2
Patient with history of MSSA bacteremia found at [facility] with blood cultures 8/31 positive for MSSA and repeat on 9/2 NGTD.  - s/p Zosyn (9/8 - 9/9)  - transitioned to Cefazolin 2GM q8h for 6 weeks ending (9/9-10/13)   - upon discharge, will need weekly CBC, BUN and creatinine and ID clinic follow up

## 2022-09-24 NOTE — PROGRESS NOTE ADULT - PROBLEM SELECTOR PLAN 8
Patient with microcytic anemia with Hgb 8.5 and MCV 79.3 (borderline with other MCV 80.9) likely a mixed picture of microcytic and normocytic anemia.  - iron studies with low iron levels but low iron binding capacity likely indicating a mixed picture  - ferritin wnl   - continue to monitor  - no indication for iron supplementation at this time in the setting of infection  - no overt signs of bleeding  - follow up OP with PCP and GI Patient with microcytic anemia with Hgb 8.5 and MCV 79.3 (borderline with other MCV 80.9) likely a mixed picture of microcytic and normocytic anemia.  - iron studies with low iron levels but low iron binding capacity likely indicating a mixed picture  - ferritin wnl   - continue to monitor  - no overt signs of bleeding  - follow up OP with PCP and GI  - continue PO iron supplementation 3x/week

## 2022-09-24 NOTE — PROGRESS NOTE ADULT - SUBJECTIVE AND OBJECTIVE BOX
Tacho Ignacio, PGY2    DATE OF SERVICE: 09-24-22 @ 06:25    Patient is a 31y old  Male who presents with a chief complaint of Shortness of breath (23 Sep 2022 12:15)      SUBJECTIVE / OVERNIGHT EVENTS: Patient placed on airborne precautions overnight due to +AFB sputum from outside hospital. Night float resident discussed with ID fellow on call overnight. Patient seen and examined this AM.     MEDICATIONS  (STANDING):  bictegravir 50 mG/emtricitabine 200 mG/tenofovir alafenamide 25 mG (BIKTARVY) 1 Tablet(s) Oral daily  ceFAZolin   IVPB 2000 milliGRAM(s) IV Intermittent every 8 hours  chlorhexidine 2% Cloths 1 Application(s) Topical <User Schedule>  enoxaparin Injectable 60 milliGRAM(s) SubCutaneous every 12 hours  ferrous    sulfate 325 milliGRAM(s) Oral <User Schedule>  gabapentin 300 milliGRAM(s) Oral three times a day  lactated ringers. 1000 milliLiter(s) (75 mL/Hr) IV Continuous <Continuous>  lactulose Syrup 10 Gram(s) Oral daily  lidocaine   4% Patch 1 Patch Transdermal daily  lidocaine   4% Patch 1 Patch Transdermal daily  melatonin 3 milliGRAM(s) Oral at bedtime  methadone    Tablet 5 milliGRAM(s) Oral three times a day  metoprolol tartrate 25 milliGRAM(s) Oral two times a day  naloxegol 25 milliGRAM(s) Oral daily  polyethylene glycol 3350 17 Gram(s) Oral two times a day  senna 2 Tablet(s) Oral at bedtime    MEDICATIONS  (PRN):  bisacodyl 5 milliGRAM(s) Oral at bedtime PRN Constipation  HYDROmorphone   Tablet 6 milliGRAM(s) Oral every 4 hours PRN Severe Pain (7 - 10)  HYDROmorphone   Tablet 4 milliGRAM(s) Oral every 4 hours PRN Moderate Pain (4 - 6)  HYDROmorphone  Injectable 1.5 milliGRAM(s) IV Push once PRN Prior to MR  naloxone Injectable 0.1 milliGRAM(s) IV Push every 3 minutes PRN opioid intoxication      Vital Signs Last 24 Hrs  T(C): 36.8 (24 Sep 2022 05:59), Max: 37 (23 Sep 2022 15:17)  T(F): 98.3 (24 Sep 2022 05:59), Max: 98.6 (23 Sep 2022 15:17)  HR: 105 (24 Sep 2022 05:59) (98 - 105)  BP: 142/95 (24 Sep 2022 05:59) (118/77 - 142/95)  BP(mean): --  RR: 17 (24 Sep 2022 05:59) (17 - 18)  SpO2: 98% (24 Sep 2022 05:59) (97% - 99%)    Parameters below as of 24 Sep 2022 05:59  Patient On (Oxygen Delivery Method): room air      CAPILLARY BLOOD GLUCOSE        I&O's Summary    22 Sep 2022 07:01  -  23 Sep 2022 07:00  --------------------------------------------------------  IN: 450 mL / OUT: 800 mL / NET: -350 mL    23 Sep 2022 07:01  -  24 Sep 2022 06:25  --------------------------------------------------------  IN: 550 mL / OUT: 950 mL / NET: -400 mL        PHYSICAL EXAM:  GENERAL: NAD, cachectic appearing  CHEST/LUNG: No lung sounds hear in left upper lobe but otherwise CTAB; no wheeze. Bandage on anterior L chest.   HEART: Regular rate and rhythm; Normal S1 S2, No murmurs, rubs, or gallops  ABDOMEN: Soft, Nontender, Nondistended; Bowel sounds present  EXTREMITIES:  2+ Peripheral Pulses, No clubbing, cyanosis, or edema  : scrotal swelling unchanged from presentation  SKIN: bandage on lower back which patient reports is his rectal mass; bandage unopened as patient reporting pain at this time  PSYCH: AAOx3    LABS:                        8.5    10.54 )-----------( 199      ( 23 Sep 2022 04:23 )             27.5     09-23    134<L>  |  99  |  10  ----------------------------<  101<H>  4.3   |  25  |  0.69    Ca    8.7      23 Sep 2022 04:23  Phos  2.8     09-23  Mg     1.60     09-23    TPro  6.4  /  Alb  2.7<L>  /  TBili  0.4  /  DBili  x   /  AST  34  /  ALT  6   /  AlkPhos  261<H>  09-23              RADIOLOGY & ADDITIONAL TESTS:    Imaging Personally Reviewed:    Consultant(s) Notes Reviewed:      Care Discussed with Consultants/Other Providers:     Tacho Ignacio, PGY2    DATE OF SERVICE: 09-24-22 @ 06:25    Patient is a 31y old  Male who presents with a chief complaint of Shortness of breath (23 Sep 2022 12:15)      SUBJECTIVE / OVERNIGHT EVENTS: Patient placed on airborne precautions overnight due to +AFB sputum from outside hospital. Night float resident discussed with ID fellow on call overnight. Patient seen and examined this AM. Asking about why he was moved overnight to different room. Asking for his drabinol dose. Denies chest pain, dyspnea, abd pain, n/v. Having BMs.      MEDICATIONS  (STANDING):  bictegravir 50 mG/emtricitabine 200 mG/tenofovir alafenamide 25 mG (BIKTARVY) 1 Tablet(s) Oral daily  ceFAZolin   IVPB 2000 milliGRAM(s) IV Intermittent every 8 hours  chlorhexidine 2% Cloths 1 Application(s) Topical <User Schedule>  enoxaparin Injectable 60 milliGRAM(s) SubCutaneous every 12 hours  ferrous    sulfate 325 milliGRAM(s) Oral <User Schedule>  gabapentin 300 milliGRAM(s) Oral three times a day  lactated ringers. 1000 milliLiter(s) (75 mL/Hr) IV Continuous <Continuous>  lactulose Syrup 10 Gram(s) Oral daily  lidocaine   4% Patch 1 Patch Transdermal daily  lidocaine   4% Patch 1 Patch Transdermal daily  melatonin 3 milliGRAM(s) Oral at bedtime  methadone    Tablet 5 milliGRAM(s) Oral three times a day  metoprolol tartrate 25 milliGRAM(s) Oral two times a day  naloxegol 25 milliGRAM(s) Oral daily  polyethylene glycol 3350 17 Gram(s) Oral two times a day  senna 2 Tablet(s) Oral at bedtime    MEDICATIONS  (PRN):  bisacodyl 5 milliGRAM(s) Oral at bedtime PRN Constipation  HYDROmorphone   Tablet 6 milliGRAM(s) Oral every 4 hours PRN Severe Pain (7 - 10)  HYDROmorphone   Tablet 4 milliGRAM(s) Oral every 4 hours PRN Moderate Pain (4 - 6)  HYDROmorphone  Injectable 1.5 milliGRAM(s) IV Push once PRN Prior to MR  naloxone Injectable 0.1 milliGRAM(s) IV Push every 3 minutes PRN opioid intoxication      Vital Signs Last 24 Hrs  T(C): 36.8 (24 Sep 2022 05:59), Max: 37 (23 Sep 2022 15:17)  T(F): 98.3 (24 Sep 2022 05:59), Max: 98.6 (23 Sep 2022 15:17)  HR: 105 (24 Sep 2022 05:59) (98 - 105)  BP: 142/95 (24 Sep 2022 05:59) (118/77 - 142/95)  BP(mean): --  RR: 17 (24 Sep 2022 05:59) (17 - 18)  SpO2: 98% (24 Sep 2022 05:59) (97% - 99%)    Parameters below as of 24 Sep 2022 05:59  Patient On (Oxygen Delivery Method): room air      CAPILLARY BLOOD GLUCOSE        I&O's Summary    22 Sep 2022 07:01  -  23 Sep 2022 07:00  --------------------------------------------------------  IN: 450 mL / OUT: 800 mL / NET: -350 mL    23 Sep 2022 07:01  -  24 Sep 2022 06:25  --------------------------------------------------------  IN: 550 mL / OUT: 950 mL / NET: -400 mL        PHYSICAL EXAM:  GENERAL: NAD, cachectic appearing  CHEST/LUNG: No lung sounds hear in left upper lobe but otherwise CTAB; no wheeze. Bandage on anterior L chest.   HEART: Regular rate and rhythm; Normal S1 S2, No murmurs, rubs, or gallops  ABDOMEN: Soft, Nontender, Nondistended; Bowel sounds present  EXTREMITIES:  2+ Peripheral Pulses, No clubbing, cyanosis, or edema  : scrotal swelling unchanged from presentation  SKIN: bandage on lower back which patient reports is his rectal mass; bandage unopened as patient reporting pain at this time  PSYCH: AAOx3    LABS:                        8.5    10.54 )-----------( 199      ( 23 Sep 2022 04:23 )             27.5     09-23    134<L>  |  99  |  10  ----------------------------<  101<H>  4.3   |  25  |  0.69    Ca    8.7      23 Sep 2022 04:23  Phos  2.8     09-23  Mg     1.60     09-23    TPro  6.4  /  Alb  2.7<L>  /  TBili  0.4  /  DBili  x   /  AST  34  /  ALT  6   /  AlkPhos  261<H>  09-23              RADIOLOGY & ADDITIONAL TESTS:    Imaging Personally Reviewed:    Consultant(s) Notes Reviewed:      Care Discussed with Consultants/Other Providers:

## 2022-09-25 LAB
ALBUMIN SERPL ELPH-MCNC: 2.5 G/DL — LOW (ref 3.3–5)
ALP SERPL-CCNC: 262 U/L — HIGH (ref 40–120)
ALT FLD-CCNC: 6 U/L — SIGNIFICANT CHANGE UP (ref 4–41)
ANION GAP SERPL CALC-SCNC: 9 MMOL/L — SIGNIFICANT CHANGE UP (ref 7–14)
AST SERPL-CCNC: 32 U/L — SIGNIFICANT CHANGE UP (ref 4–40)
BILIRUB SERPL-MCNC: 0.3 MG/DL — SIGNIFICANT CHANGE UP (ref 0.2–1.2)
BUN SERPL-MCNC: 12 MG/DL — SIGNIFICANT CHANGE UP (ref 7–23)
CALCIUM SERPL-MCNC: 9.4 MG/DL — SIGNIFICANT CHANGE UP (ref 8.4–10.5)
CHLORIDE SERPL-SCNC: 98 MMOL/L — SIGNIFICANT CHANGE UP (ref 98–107)
CO2 SERPL-SCNC: 24 MMOL/L — SIGNIFICANT CHANGE UP (ref 22–31)
CREAT SERPL-MCNC: 0.82 MG/DL — SIGNIFICANT CHANGE UP (ref 0.5–1.3)
EGFR: 119 ML/MIN/1.73M2 — SIGNIFICANT CHANGE UP
GLUCOSE SERPL-MCNC: 115 MG/DL — HIGH (ref 70–99)
HCT VFR BLD CALC: 26.1 % — LOW (ref 39–50)
HGB BLD-MCNC: 8.1 G/DL — LOW (ref 13–17)
MAGNESIUM SERPL-MCNC: 1.6 MG/DL — SIGNIFICANT CHANGE UP (ref 1.6–2.6)
MCHC RBC-ENTMCNC: 24.5 PG — LOW (ref 27–34)
MCHC RBC-ENTMCNC: 31 GM/DL — LOW (ref 32–36)
MCV RBC AUTO: 78.9 FL — LOW (ref 80–100)
NRBC # BLD: 0 /100 WBCS — SIGNIFICANT CHANGE UP (ref 0–0)
NRBC # FLD: 0 K/UL — SIGNIFICANT CHANGE UP (ref 0–0)
PHOSPHATE SERPL-MCNC: 2.4 MG/DL — LOW (ref 2.5–4.5)
PLATELET # BLD AUTO: 205 K/UL — SIGNIFICANT CHANGE UP (ref 150–400)
POTASSIUM SERPL-MCNC: 4.3 MMOL/L — SIGNIFICANT CHANGE UP (ref 3.5–5.3)
POTASSIUM SERPL-SCNC: 4.3 MMOL/L — SIGNIFICANT CHANGE UP (ref 3.5–5.3)
PROT SERPL-MCNC: 6.3 G/DL — SIGNIFICANT CHANGE UP (ref 6–8.3)
RBC # BLD: 3.31 M/UL — LOW (ref 4.2–5.8)
RBC # FLD: 22.3 % — HIGH (ref 10.3–14.5)
SODIUM SERPL-SCNC: 131 MMOL/L — LOW (ref 135–145)
WBC # BLD: 12.52 K/UL — HIGH (ref 3.8–10.5)
WBC # FLD AUTO: 12.52 K/UL — HIGH (ref 3.8–10.5)

## 2022-09-25 PROCEDURE — 99232 SBSQ HOSP IP/OBS MODERATE 35: CPT

## 2022-09-25 RX ORDER — SODIUM CHLORIDE 9 MG/ML
1000 INJECTION, SOLUTION INTRAVENOUS
Refills: 0 | Status: DISCONTINUED | OUTPATIENT
Start: 2022-09-25 | End: 2022-09-27

## 2022-09-25 RX ORDER — SODIUM,POTASSIUM PHOSPHATES 278-250MG
1 POWDER IN PACKET (EA) ORAL ONCE
Refills: 0 | Status: COMPLETED | OUTPATIENT
Start: 2022-09-25 | End: 2022-09-29

## 2022-09-25 RX ADMIN — HYDROMORPHONE HYDROCHLORIDE 6 MILLIGRAM(S): 2 INJECTION INTRAMUSCULAR; INTRAVENOUS; SUBCUTANEOUS at 02:06

## 2022-09-25 RX ADMIN — NALOXEGOL OXALATE 25 MILLIGRAM(S): 12.5 TABLET, FILM COATED ORAL at 11:38

## 2022-09-25 RX ADMIN — METHADONE HYDROCHLORIDE 5 MILLIGRAM(S): 40 TABLET ORAL at 06:07

## 2022-09-25 RX ADMIN — Medication 5 MILLIGRAM(S): at 11:57

## 2022-09-25 RX ADMIN — LIDOCAINE 1 PATCH: 4 CREAM TOPICAL at 22:33

## 2022-09-25 RX ADMIN — HYDROMORPHONE HYDROCHLORIDE 6 MILLIGRAM(S): 2 INJECTION INTRAMUSCULAR; INTRAVENOUS; SUBCUTANEOUS at 20:00

## 2022-09-25 RX ADMIN — ENOXAPARIN SODIUM 60 MILLIGRAM(S): 100 INJECTION SUBCUTANEOUS at 11:39

## 2022-09-25 RX ADMIN — LIDOCAINE 1 PATCH: 4 CREAM TOPICAL at 18:30

## 2022-09-25 RX ADMIN — GABAPENTIN 300 MILLIGRAM(S): 400 CAPSULE ORAL at 06:07

## 2022-09-25 RX ADMIN — HYDROMORPHONE HYDROCHLORIDE 6 MILLIGRAM(S): 2 INJECTION INTRAMUSCULAR; INTRAVENOUS; SUBCUTANEOUS at 23:00

## 2022-09-25 RX ADMIN — HYDROMORPHONE HYDROCHLORIDE 6 MILLIGRAM(S): 2 INJECTION INTRAMUSCULAR; INTRAVENOUS; SUBCUTANEOUS at 10:17

## 2022-09-25 RX ADMIN — Medication 100 MILLIGRAM(S): at 06:08

## 2022-09-25 RX ADMIN — HYDROMORPHONE HYDROCHLORIDE 6 MILLIGRAM(S): 2 INJECTION INTRAMUSCULAR; INTRAVENOUS; SUBCUTANEOUS at 13:57

## 2022-09-25 RX ADMIN — GABAPENTIN 300 MILLIGRAM(S): 400 CAPSULE ORAL at 13:59

## 2022-09-25 RX ADMIN — BICTEGRAVIR SODIUM, EMTRICITABINE, AND TENOFOVIR ALAFENAMIDE FUMARATE 1 TABLET(S): 30; 120; 15 TABLET ORAL at 11:37

## 2022-09-25 RX ADMIN — Medication 25 MILLIGRAM(S): at 18:07

## 2022-09-25 RX ADMIN — CHLORHEXIDINE GLUCONATE 1 APPLICATION(S): 213 SOLUTION TOPICAL at 06:59

## 2022-09-25 RX ADMIN — HYDROMORPHONE HYDROCHLORIDE 6 MILLIGRAM(S): 2 INJECTION INTRAMUSCULAR; INTRAVENOUS; SUBCUTANEOUS at 18:58

## 2022-09-25 RX ADMIN — METHADONE HYDROCHLORIDE 5 MILLIGRAM(S): 40 TABLET ORAL at 22:26

## 2022-09-25 RX ADMIN — LIDOCAINE 1 PATCH: 4 CREAM TOPICAL at 11:34

## 2022-09-25 RX ADMIN — Medication 25 MILLIGRAM(S): at 06:07

## 2022-09-25 RX ADMIN — HYDROMORPHONE HYDROCHLORIDE 6 MILLIGRAM(S): 2 INJECTION INTRAMUSCULAR; INTRAVENOUS; SUBCUTANEOUS at 01:06

## 2022-09-25 RX ADMIN — HYDROMORPHONE HYDROCHLORIDE 6 MILLIGRAM(S): 2 INJECTION INTRAMUSCULAR; INTRAVENOUS; SUBCUTANEOUS at 09:47

## 2022-09-25 RX ADMIN — Medication 100 MILLIGRAM(S): at 22:27

## 2022-09-25 RX ADMIN — ENOXAPARIN SODIUM 60 MILLIGRAM(S): 100 INJECTION SUBCUTANEOUS at 23:00

## 2022-09-25 RX ADMIN — HYDROMORPHONE HYDROCHLORIDE 6 MILLIGRAM(S): 2 INJECTION INTRAMUSCULAR; INTRAVENOUS; SUBCUTANEOUS at 14:27

## 2022-09-25 RX ADMIN — GABAPENTIN 300 MILLIGRAM(S): 400 CAPSULE ORAL at 22:26

## 2022-09-25 RX ADMIN — METHADONE HYDROCHLORIDE 5 MILLIGRAM(S): 40 TABLET ORAL at 13:58

## 2022-09-25 RX ADMIN — Medication 5 MILLIGRAM(S): at 06:07

## 2022-09-25 RX ADMIN — Medication 5 MILLIGRAM(S): at 18:07

## 2022-09-25 RX ADMIN — LIDOCAINE 1 PATCH: 4 CREAM TOPICAL at 11:33

## 2022-09-25 RX ADMIN — Medication 100 MILLIGRAM(S): at 13:58

## 2022-09-25 NOTE — PROGRESS NOTE ADULT - PROBLEM SELECTOR PLAN 3
Palliative care consulted for extensive disease burden and GOC discussion. At this time, patient was all available treatment and resuscitation. Remains full code.  - increased PO Methadone 5mg TID (QTC-444 on 9/12; QTC- 458 on 9/19); will need repeat EKG later this week to monitor QTC while on methadone  - c/w Gabapentin 300mg TID  - PO Dilaudid 4mg q4 PRN moderate pain  - PO Dilaudid 6mg q4 PRN severe pain  - hold parameters placed for all medications  - appreciate palliative care recs  - will need OP f/u at [facility] with Dr. Kiesha Calle/ Dr. Mazariegos/ NP Zoe Carvalho/ ROCIO Cruz; [phone]

## 2022-09-25 NOTE — PROGRESS NOTE ADULT - ASSESSMENT
31 M with untreated metastatic ano-rectal SCC (followed by Dr Frazier [facility] oncology) with mets to liver and possibly bone, HPV positive, HIV infection, and R eye cataracts who presented to the [facility] on 8/31/22 after syncopal episode at home and transferred to [facility] for continued of care per family request. Pt with cough x 2 mos, with yellow sputum and shortness of breath since 5 days PTA.

## 2022-09-25 NOTE — PROGRESS NOTE ADULT - PROBLEM SELECTOR PLAN 4
Patient presented to [facility] with hypoxemia and L chest pigtail placed 8/31 due to suspected pneumothorax. Patient showed improvement in SOB and f/u CT chest performed which showed evidence of 12 cm multiloculated thick walled cavitary mass in the CHRISTINE and lingula.  - sputum culture 9/10 + mod klebsiella pneumoniae pansensitive  - blood Cx neg 9/8  - fungitell and crypto neg 9/10  - ID consulted, recs appreciated  - positive AFB in 1/3 sputum samples (sept 3) from [facility]. ID recs airborne precautions   - per pulm, no CANDIS. will need f/u CT in 2 months

## 2022-09-25 NOTE — PROGRESS NOTE ADULT - PROBLEM SELECTOR PLAN 7
Patient with history of untreated HPV related rectal cancer with episode of rectal bleeding at [facility] Hgb 5.9 s/p 2u pRBCs.  - CT with evidence of liver and bone lesions  - renal cancer treatment will be on hold pending resolution of infection  - radiation oncology consult for RT of rectal cancer placed; will likely get outpatient RT   - appreciate oncology recs

## 2022-09-25 NOTE — PROGRESS NOTE ADULT - PROBLEM SELECTOR PLAN 1
Patient with TTE performed on at [facility] with evidence of two large RV masses and two additional small masses. IVC filter placed prophylactically at that time due to undiagnosed vegetations on the TTE.  - repeat TTE 9/9 was a limited study but demonstrated a mobile mass likely 2/2 tumour, thrombus, or vegetation.   - per cardiology risk of JENNIFER outweighs benefit at this time  - pending cardiac MRI to evaluate to mass; planned now tentatively for Monday 9 am  - can given 1.5 mg dilaudid for premedication   - monitor on tele

## 2022-09-25 NOTE — PROGRESS NOTE ADULT - PROBLEM SELECTOR PLAN 8
Patient with microcytic anemia with Hgb 8.5 and MCV 79.3 (borderline with other MCV 80.9) likely a mixed picture of microcytic and normocytic anemia.  - iron studies with low iron levels but low iron binding capacity likely indicating a mixed picture  - ferritin wnl   - continue to monitor  - no overt signs of bleeding  - follow up OP with PCP and GI  - continue PO iron supplementation 3x/week

## 2022-09-25 NOTE — PROGRESS NOTE ADULT - PROBLEM SELECTOR PLAN 2
Patient with history of MSSA bacteremia found at [facility] with blood cultures 8/31 positive for MSSA and repeat on 9/2 NGTD.  - s/p Zosyn (9/8 - 9/9)  - transitioned to Cefazolin 2GM q8h for 6 weeks ending (9/9-10/13)   - upon discharge, will need weekly CBC, BUN and creatinine and ID clinic follow up

## 2022-09-25 NOTE — PROGRESS NOTE ADULT - PROBLEM SELECTOR PLAN 10
- Ca 16.4 9/10 s/p calcitonin 4u/kg x 2 doses and pamidronate 90mcg IVPB x1.  - Improve  - cont IVFs w/ LR @ 75 cc/hr and continue monitoring   - continue to trend levels q 24 hrs   - can repeat pamidronate after 1 week PRN  - vit D low but will hold supplementation in the setting of hypercalcemia of malignancy

## 2022-09-25 NOTE — PROGRESS NOTE ADULT - SUBJECTIVE AND OBJECTIVE BOX
Sharon Carreno  PGY-1 Resident Physician   Pager [phone]/ [phone]    Patient is a 31y old  Male who presents with a chief complaint of Shortness of breath (24 Sep 2022 06:25)      SUBJECTIVE / OVERNIGHT EVENTS:  Patient seen and evaluated at bedside.    Denies any fevers, chills, CP, or SOB.    Vital Signs Last 24 Hrs  T(C): 36.9 (25 Sep 2022 06:03), Max: 37 (24 Sep 2022 21:44)  T(F): 98.4 (25 Sep 2022 06:03), Max: 98.6 (24 Sep 2022 21:44)  HR: 115 (25 Sep 2022 06:03) (112 - 115)  BP: 119/74 (25 Sep 2022 06:03) (117/70 - 119/74)  BP(mean): --  RR: 17 (25 Sep 2022 06:03) (17 - 17)  SpO2: 98% (25 Sep 2022 06:03) (98% - 98%)    Parameters below as of 25 Sep 2022 06:03  Patient On (Oxygen Delivery Method): room air      PHYSICAL EXAM:  GENERAL: NAD, cachectic appearing  CHEST/LUNG: No lung sounds hear in left upper lobe but otherwise CTAB; no wheeze. Bandage on anterior L chest.   HEART: Regular rate and rhythm; Normal S1 S2, No murmurs, rubs, or gallops  ABDOMEN: Soft, Nontender, Nondistended; Bowel sounds present  EXTREMITIES:  2+ Peripheral Pulses, No clubbing, cyanosis, or edema  : scrotal swelling unchanged from presentation  SKIN: bandage on lower back which patient reports is his rectal mass; bandage unopened as patient reporting pain at this time  PSYCH: AAOx3    LABS:                        8.2    11.52 )-----------( 206      ( 24 Sep 2022 07:10 )             27.4     Hgb Trend: 8.2<--, 8.5<--, 8.4<--, 8.6<--, 8.3<--  09-24    129<L>  |  96<L>  |  11  ----------------------------<  93  4.4   |  23  |  0.72    Ca    8.9      24 Sep 2022 07:10  Phos  2.7     09-24  Mg     1.60     09-24    TPro  6.2  /  Alb  2.4<L>  /  TBili  0.3  /  DBili  x   /  AST  34  /  ALT  <5  /  AlkPhos  269<H>  09-24    Creatinine Trend: 0.72<--, 0.69<--, 0.67<--, 0.67<--, 0.69<--, 0.80<--  LIVER FUNCTIONS - ( 24 Sep 2022 07:10 )  Alb: 2.4 g/dL / Pro: 6.2 g/dL / ALK PHOS: 269 U/L / ALT: <5 U/L / AST: 34 U/L / GGT: x                    Sharon Carreno  PGY-1 Resident Physician   Pager [phone]/ [phone]    Patient is a 31y old  Male who presents with a chief complaint of Shortness of breath (24 Sep 2022 06:25)      SUBJECTIVE / OVERNIGHT EVENTS:  Patient seen and evaluated at bedside.  States he feels well and has no concerns. Refusing to be cleaned by nursing staff.   Denies any fevers, chills, CP, or SOB.    Vital Signs Last 24 Hrs  T(C): 36.9 (25 Sep 2022 06:03), Max: 37 (24 Sep 2022 21:44)  T(F): 98.4 (25 Sep 2022 06:03), Max: 98.6 (24 Sep 2022 21:44)  HR: 115 (25 Sep 2022 06:03) (112 - 115)  BP: 119/74 (25 Sep 2022 06:03) (117/70 - 119/74)  BP(mean): --  RR: 17 (25 Sep 2022 06:03) (17 - 17)  SpO2: 98% (25 Sep 2022 06:03) (98% - 98%)    Parameters below as of 25 Sep 2022 06:03  Patient On (Oxygen Delivery Method): room air      PHYSICAL EXAM:  GENERAL: NAD, cachectic appearing  CHEST/LUNG: No lung sounds hear in left upper lobe but otherwise CTAB; no wheeze. Bandage on anterior L chest.   HEART: Regular rate and rhythm; Normal S1 S2, No murmurs, rubs, or gallops  ABDOMEN: Soft, Nontender, Nondistended; Bowel sounds present  EXTREMITIES:  2+ Peripheral Pulses, No clubbing, cyanosis, or edema  : scrotal swelling unchanged from presentation  SKIN: bandage on lower back which patient reports is his rectal mass; bandage unopened as patient reporting pain at this time  PSYCH: AAOx3    LABS:                        8.2    11.52 )-----------( 206      ( 24 Sep 2022 07:10 )             27.4     Hgb Trend: 8.2<--, 8.5<--, 8.4<--, 8.6<--, 8.3<--  09-24    129<L>  |  96<L>  |  11  ----------------------------<  93  4.4   |  23  |  0.72    Ca    8.9      24 Sep 2022 07:10  Phos  2.7     09-24  Mg     1.60     09-24    TPro  6.2  /  Alb  2.4<L>  /  TBili  0.3  /  DBili  x   /  AST  34  /  ALT  <5  /  AlkPhos  269<H>  09-24    Creatinine Trend: 0.72<--, 0.69<--, 0.67<--, 0.67<--, 0.69<--, 0.80<--  LIVER FUNCTIONS - ( 24 Sep 2022 07:10 )  Alb: 2.4 g/dL / Pro: 6.2 g/dL / ALK PHOS: 269 U/L / ALT: <5 U/L / AST: 34 U/L / GGT: x

## 2022-09-25 NOTE — PROGRESS NOTE ADULT - PROBLEM SELECTOR PLAN 11
- s/p SVT events x 2 9/15 and 9/16 resolved with vagal maneuvers   - tele w/ baseline tach low 100.  - inc metoprolol to 25 BID. Titrate PRN.  - consider EP eval if persists - s/p SVT events x 2 9/15 and 9/16 resolved with vagal maneuvers   - tele w/ baseline tach low 100.  - inc metoprolol to 25 BID. Titrate PRN.  - LR 75 cc x 10 hours on 9/25  - consider EP eval if persists

## 2022-09-25 NOTE — PROGRESS NOTE ADULT - PROBLEM SELECTOR PLAN 5
CT Chest 9/11 with evidence of persistent pneumomediastinum and extensive subcutaneous emphysema of the neck, chest, abdomen, pelvis, and extremities, including the scrotum.   - CANDIS per thoracic surgery  - indwelling pedraza was placed at [facility] (Coude placed for urinary strain)   - No further urologic intervention needed at this time.   - Can f/u outpatient w/ Dr Prabhu Lucio, [facility] at [place]  - pain control with dilaudid and methadone, ct a/p 9/11 did not show any inflammation/hydrocele/infection .

## 2022-09-25 NOTE — PROGRESS NOTE ADULT - PROBLEM SELECTOR PLAN 9
- Na 129 this AM  - patient is asymptomatic  - will check urine lytes  - suspect mixed picture due to poor PO intake and possible SIADH from pain Na 129 on 9/24 and history significant for decreased PO intake. Otherwise asymptomatic. Likely mixed picture due to poor PO intake and possible SIADH from pain  - pending urine lytes  - LR 75cc x 10 hours  - trend CMP in AM

## 2022-09-26 ENCOUNTER — NON-APPOINTMENT (OUTPATIENT)
Age: 32
End: 2022-09-26

## 2022-09-26 LAB
ALBUMIN SERPL ELPH-MCNC: 2.8 G/DL — LOW (ref 3.3–5)
ALP SERPL-CCNC: 291 U/L — HIGH (ref 40–120)
ALT FLD-CCNC: <5 U/L — SIGNIFICANT CHANGE UP (ref 4–41)
ANION GAP SERPL CALC-SCNC: 10 MMOL/L — SIGNIFICANT CHANGE UP (ref 7–14)
AST SERPL-CCNC: 31 U/L — SIGNIFICANT CHANGE UP (ref 4–40)
BILIRUB SERPL-MCNC: 0.4 MG/DL — SIGNIFICANT CHANGE UP (ref 0.2–1.2)
BUN SERPL-MCNC: 16 MG/DL — SIGNIFICANT CHANGE UP (ref 7–23)
CALCIUM SERPL-MCNC: 10.3 MG/DL — SIGNIFICANT CHANGE UP (ref 8.4–10.5)
CHLORIDE SERPL-SCNC: 96 MMOL/L — LOW (ref 98–107)
CO2 SERPL-SCNC: 25 MMOL/L — SIGNIFICANT CHANGE UP (ref 22–31)
CREAT ?TM UR-MCNC: 108 MG/DL — SIGNIFICANT CHANGE UP
CREAT SERPL-MCNC: 0.73 MG/DL — SIGNIFICANT CHANGE UP (ref 0.5–1.3)
EGFR: 123 ML/MIN/1.73M2 — SIGNIFICANT CHANGE UP
GLUCOSE SERPL-MCNC: 91 MG/DL — SIGNIFICANT CHANGE UP (ref 70–99)
HCT VFR BLD CALC: 29.3 % — LOW (ref 39–50)
HGB BLD-MCNC: 8.9 G/DL — LOW (ref 13–17)
MAGNESIUM SERPL-MCNC: 1.7 MG/DL — SIGNIFICANT CHANGE UP (ref 1.6–2.6)
MCHC RBC-ENTMCNC: 24.1 PG — LOW (ref 27–34)
MCHC RBC-ENTMCNC: 30.4 GM/DL — LOW (ref 32–36)
MCV RBC AUTO: 79.4 FL — LOW (ref 80–100)
NRBC # BLD: 0 /100 WBCS — SIGNIFICANT CHANGE UP (ref 0–0)
NRBC # FLD: 0 K/UL — SIGNIFICANT CHANGE UP (ref 0–0)
PHOSPHATE SERPL-MCNC: 3.1 MG/DL — SIGNIFICANT CHANGE UP (ref 2.5–4.5)
PLATELET # BLD AUTO: 219 K/UL — SIGNIFICANT CHANGE UP (ref 150–400)
POTASSIUM SERPL-MCNC: 4.4 MMOL/L — SIGNIFICANT CHANGE UP (ref 3.5–5.3)
POTASSIUM SERPL-SCNC: 4.4 MMOL/L — SIGNIFICANT CHANGE UP (ref 3.5–5.3)
PROT SERPL-MCNC: 6.8 G/DL — SIGNIFICANT CHANGE UP (ref 6–8.3)
RBC # BLD: 3.69 M/UL — LOW (ref 4.2–5.8)
RBC # FLD: 22.1 % — HIGH (ref 10.3–14.5)
SODIUM SERPL-SCNC: 131 MMOL/L — LOW (ref 135–145)
SODIUM UR-SCNC: 61 MMOL/L — SIGNIFICANT CHANGE UP
WBC # BLD: 12.49 K/UL — HIGH (ref 3.8–10.5)
WBC # FLD AUTO: 12.49 K/UL — HIGH (ref 3.8–10.5)

## 2022-09-26 PROCEDURE — 99232 SBSQ HOSP IP/OBS MODERATE 35: CPT | Mod: GC

## 2022-09-26 RX ORDER — METOPROLOL TARTRATE 50 MG
25 TABLET ORAL ONCE
Refills: 0 | Status: COMPLETED | OUTPATIENT
Start: 2022-09-26 | End: 2022-09-26

## 2022-09-26 RX ORDER — METOPROLOL TARTRATE 50 MG
25 TABLET ORAL
Refills: 0 | Status: DISCONTINUED | OUTPATIENT
Start: 2022-09-26 | End: 2022-09-26

## 2022-09-26 RX ORDER — HYDROMORPHONE HYDROCHLORIDE 2 MG/ML
0.5 INJECTION INTRAMUSCULAR; INTRAVENOUS; SUBCUTANEOUS ONCE
Refills: 0 | Status: DISCONTINUED | OUTPATIENT
Start: 2022-09-26 | End: 2022-09-26

## 2022-09-26 RX ORDER — METOPROLOL TARTRATE 50 MG
50 TABLET ORAL
Refills: 0 | Status: DISCONTINUED | OUTPATIENT
Start: 2022-09-26 | End: 2022-10-11

## 2022-09-26 RX ADMIN — Medication 100 MILLIGRAM(S): at 06:22

## 2022-09-26 RX ADMIN — CHLORHEXIDINE GLUCONATE 1 APPLICATION(S): 213 SOLUTION TOPICAL at 09:32

## 2022-09-26 RX ADMIN — LACTULOSE 10 GRAM(S): 10 SOLUTION ORAL at 12:13

## 2022-09-26 RX ADMIN — Medication 5 MILLIGRAM(S): at 17:17

## 2022-09-26 RX ADMIN — Medication 3 MILLIGRAM(S): at 22:29

## 2022-09-26 RX ADMIN — Medication 25 MILLIGRAM(S): at 11:23

## 2022-09-26 RX ADMIN — ENOXAPARIN SODIUM 60 MILLIGRAM(S): 100 INJECTION SUBCUTANEOUS at 12:12

## 2022-09-26 RX ADMIN — Medication 100 MILLIGRAM(S): at 12:24

## 2022-09-26 RX ADMIN — HYDROMORPHONE HYDROCHLORIDE 0.5 MILLIGRAM(S): 2 INJECTION INTRAMUSCULAR; INTRAVENOUS; SUBCUTANEOUS at 10:07

## 2022-09-26 RX ADMIN — HYDROMORPHONE HYDROCHLORIDE 6 MILLIGRAM(S): 2 INJECTION INTRAMUSCULAR; INTRAVENOUS; SUBCUTANEOUS at 07:20

## 2022-09-26 RX ADMIN — BICTEGRAVIR SODIUM, EMTRICITABINE, AND TENOFOVIR ALAFENAMIDE FUMARATE 1 TABLET(S): 30; 120; 15 TABLET ORAL at 12:13

## 2022-09-26 RX ADMIN — POLYETHYLENE GLYCOL 3350 17 GRAM(S): 17 POWDER, FOR SOLUTION ORAL at 17:21

## 2022-09-26 RX ADMIN — Medication 25 MILLIGRAM(S): at 06:23

## 2022-09-26 RX ADMIN — NALOXEGOL OXALATE 25 MILLIGRAM(S): 12.5 TABLET, FILM COATED ORAL at 12:13

## 2022-09-26 RX ADMIN — METHADONE HYDROCHLORIDE 5 MILLIGRAM(S): 40 TABLET ORAL at 12:12

## 2022-09-26 RX ADMIN — HYDROMORPHONE HYDROCHLORIDE 6 MILLIGRAM(S): 2 INJECTION INTRAMUSCULAR; INTRAVENOUS; SUBCUTANEOUS at 22:28

## 2022-09-26 RX ADMIN — GABAPENTIN 300 MILLIGRAM(S): 400 CAPSULE ORAL at 12:13

## 2022-09-26 RX ADMIN — GABAPENTIN 300 MILLIGRAM(S): 400 CAPSULE ORAL at 06:22

## 2022-09-26 RX ADMIN — Medication 50 MILLIGRAM(S): at 17:20

## 2022-09-26 RX ADMIN — METHADONE HYDROCHLORIDE 5 MILLIGRAM(S): 40 TABLET ORAL at 06:22

## 2022-09-26 RX ADMIN — METHADONE HYDROCHLORIDE 5 MILLIGRAM(S): 40 TABLET ORAL at 22:28

## 2022-09-26 RX ADMIN — GABAPENTIN 300 MILLIGRAM(S): 400 CAPSULE ORAL at 22:29

## 2022-09-26 RX ADMIN — Medication 5 MILLIGRAM(S): at 09:31

## 2022-09-26 RX ADMIN — Medication 5 MILLIGRAM(S): at 12:11

## 2022-09-26 RX ADMIN — Medication 100 MILLIGRAM(S): at 22:28

## 2022-09-26 RX ADMIN — HYDROMORPHONE HYDROCHLORIDE 6 MILLIGRAM(S): 2 INJECTION INTRAMUSCULAR; INTRAVENOUS; SUBCUTANEOUS at 00:00

## 2022-09-26 RX ADMIN — HYDROMORPHONE HYDROCHLORIDE 1.5 MILLIGRAM(S): 2 INJECTION INTRAMUSCULAR; INTRAVENOUS; SUBCUTANEOUS at 09:13

## 2022-09-26 RX ADMIN — HYDROMORPHONE HYDROCHLORIDE 6 MILLIGRAM(S): 2 INJECTION INTRAMUSCULAR; INTRAVENOUS; SUBCUTANEOUS at 17:20

## 2022-09-26 RX ADMIN — HYDROMORPHONE HYDROCHLORIDE 6 MILLIGRAM(S): 2 INJECTION INTRAMUSCULAR; INTRAVENOUS; SUBCUTANEOUS at 06:22

## 2022-09-26 RX ADMIN — HYDROMORPHONE HYDROCHLORIDE 6 MILLIGRAM(S): 2 INJECTION INTRAMUSCULAR; INTRAVENOUS; SUBCUTANEOUS at 23:25

## 2022-09-26 RX ADMIN — ENOXAPARIN SODIUM 60 MILLIGRAM(S): 100 INJECTION SUBCUTANEOUS at 22:29

## 2022-09-26 RX ADMIN — HYDROMORPHONE HYDROCHLORIDE 6 MILLIGRAM(S): 2 INJECTION INTRAMUSCULAR; INTRAVENOUS; SUBCUTANEOUS at 18:20

## 2022-09-26 NOTE — PROGRESS NOTE ADULT - PROBLEM SELECTOR PLAN 9
Na 129 on 9/24 and history significant for decreased PO intake. Otherwise asymptomatic. Likely mixed picture due to poor PO intake and possible SIADH from pain  - pending urine lytes  - LR 75cc x 10 hours  - trend CMP in AM Na 129 on 9/24 and history significant for decreased PO intake. Otherwise asymptomatic. Likely mixed picture due to poor PO intake and possible SIADH from pain  - Na this ; more likely a hypovolemic hyponatremia and SIADH from pain  - encourage PO intake

## 2022-09-26 NOTE — PROGRESS NOTE ADULT - PROBLEM SELECTOR PLAN 4
Patient presented to [facility] with hypoxemia and L chest pigtail placed 8/31 due to suspected pneumothorax. Patient showed improvement in SOB and f/u CT chest performed which showed evidence of 12 cm multiloculated thick walled cavitary mass in the CHRISTINE and lingula.  - sputum culture 9/10 + mod klebsiella pneumoniae pansensitive  - blood Cx neg 9/8  - fungitell and crypto neg 9/10  - ID consulted, recs appreciated  - positive AFB in 1/3 sputum samples (sept 3) from [facility]. ID recs airborne precautions   - per pulm, no CANDIS. will need f/u CT in 2 months Patient presented to [facility] with hypoxemia and L chest pigtail placed 8/31 due to suspected pneumothorax. Patient showed improvement in SOB and f/u CT chest performed which showed evidence of 12 cm multiloculated thick walled cavitary mass in the CHRISTINE and lingula.  - sputum culture 9/10 + mod klebsiella pneumoniae pansensitive  - blood Cx neg 9/8  - fungitell and crypto neg 9/10  - ID consulted, recs appreciated  - positive AFB in 1/3 sputum samples (sept 3) from [facility]. ID recs airborne precautions   - repeat sputum culture  - no indication for treatment at this time; pending repeat test results   - per pulm, no CANDIS. will need f/u CT in 2 months

## 2022-09-26 NOTE — PROGRESS NOTE ADULT - PROBLEM SELECTOR PLAN 3
Palliative care consulted for extensive disease burden and GOC discussion. At this time, patient was all available treatment and resuscitation. Remains full code.  - increased PO Methadone 5mg TID (QTC-444 on 9/12; QTC- 458 on 9/19); will need repeat EKG later this week to monitor QTC while on methadone  - c/w Gabapentin 300mg TID  - PO Dilaudid 4mg q4 PRN moderate pain  - PO Dilaudid 6mg q4 PRN severe pain  - hold parameters placed for all medications  - appreciate palliative care recs  - will need OP f/u at [facility] with Dr. Kiesha Calle/ Dr. Mazariegos/ NP Zoe Carvalho/ ROCIO Cruz; [phone]

## 2022-09-26 NOTE — PROGRESS NOTE ADULT - PROBLEM SELECTOR PLAN 11
- s/p SVT events x 2 9/15 and 9/16 resolved with vagal maneuvers   - tele w/ baseline tach low 100.  - inc metoprolol to 25 BID. Titrate PRN.  - LR 75 cc x 10 hours on 9/25  - consider EP eval if persists SVT events x 2 9/15 and 9/16 resolved with vagal maneuvers. Repeat SVT 9/25 with 9 beats of V tach and 13 beats V tach asymptomatic.  - inc metoprolol to 25 BID. Titrate PRN.  - s/p LR fluid replacement with HR in 109s  - consider EP eval if persists

## 2022-09-26 NOTE — PROGRESS NOTE ADULT - ASSESSMENT
31 M with untreated metastatic ano-rectal SCC (followed by Dr [facility]ier [facility] oncology) with mets to liver and possibly bone, HPV positive, HIV infection, and R eye cataracts who presented to the [facility] on 8/31/22 after syncopal episode at home and transferred [facility] for continued of care per family request. Pt with cough x 2 mos, with yellow sputum and shortness of breath since 5 days PTA.

## 2022-09-26 NOTE — PROGRESS NOTE ADULT - PROBLEM SELECTOR PLAN 1
Patient with TTE performed on at [facility] with evidence of two large RV masses and two additional small masses. IVC filter placed prophylactically at that time due to undiagnosed vegetations on the TTE.  - repeat TTE 9/9 was a limited study but demonstrated a mobile mass likely 2/2 tumour, thrombus, or vegetation.   - per cardiology risk of JENNIFER outweighs benefit at this time  - pending cardiac MRI to evaluate to mass; planned now tentatively for Monday 9 am  - can given 1.5 mg dilaudid for premedication   - monitor on tele Patient with TTE performed on at [facility] with evidence of two large RV masses and two additional small masses. IVC filter placed prophylactically at that time due to undiagnosed vegetations on the TTE.  - repeat TTE 9/9 was a limited study but demonstrated a mobile mass likely 2/2 tumour, thrombus, or vegetation.   - per cardiology risk of JENNIFER outweighs benefit at this time  - refused cardiac MRI again (3 attempts since last week; patient not a candidate for general anesthesia)  - will need to follow up outpatient for further testing   - monitor on tele

## 2022-09-26 NOTE — PROGRESS NOTE ADULT - PROBLEM SELECTOR PLAN 5
CT Chest 9/11 with evidence of persistent pneumomediastinum and extensive subcutaneous emphysema of the neck, chest, abdomen, pelvis, and extremities, including the scrotum.   - CANDIS per thoracic surgery  - indwelling pedraza was placed at [facility] (Coude placed for urinary strain)   - No further urologic intervention needed at this time.   - Can f/u outpatient w/ Dr Prabhu Lucio, [facility] [place]  - pain control with dilaudid and methadone, ct a/p 9/11 did not show any inflammation/hydrocele/infection .

## 2022-09-26 NOTE — PROGRESS NOTE ADULT - SUBJECTIVE AND OBJECTIVE BOX
Sharon Carreno  PGY-1 Resident Physician   Pager [phone]/ [phone]    Patient is a 31y old  Male who presents with a chief complaint of Shortness of breath (25 Sep 2022 07:38)      SUBJECTIVE / OVERNIGHT EVENTS:  Patient seen and evaluated at bedside.    Denies any fevers, chills, CP, or SOB.    Vital Signs Last 24 Hrs  T(C): 37.6 (26 Sep 2022 06:00), Max: 37.8 (25 Sep 2022 09:47)  T(F): 99.6 (26 Sep 2022 06:00), Max: 100 (25 Sep 2022 09:47)  HR: 109 (26 Sep 2022 06:00) (102 - 119)  BP: 109/56 (26 Sep 2022 06:00) (105/56 - 118/65)  BP(mean): --  RR: 18 (26 Sep 2022 06:00) (18 - 18)  SpO2: 99% (26 Sep 2022 06:00) (96% - 100%)    Parameters below as of 26 Sep 2022 06:00  Patient On (Oxygen Delivery Method): room air        PHYSICAL EXAM:  GENERAL: NAD, well-developed  CHEST/LUNG: Clear to auscultation bilaterally; No wheeze  HEART: Regular rate and rhythm; Normal S1 S2, No murmurs, rubs, or gallops  ABDOMEN: Soft, Nontender, Nondistended; Bowel sounds present  EXTREMITIES:  2+ Peripheral Pulses, No clubbing, cyanosis, or edema  PSYCH: AAOx3    LABS:                        8.1    12.52 )-----------( 205      ( 25 Sep 2022 07:05 )             26.1     Hgb Trend: 8.1<--, 8.2<--, 8.5<--, 8.4<--, 8.6<--  09-25    131<L>  |  98  |  12  ----------------------------<  115<H>  4.3   |  24  |  0.82    Ca    9.4      25 Sep 2022 07:05  Phos  2.4     09-25  Mg     1.60     09-25    TPro  6.3  /  Alb  2.5<L>  /  TBili  0.3  /  DBili  x   /  AST  32  /  ALT  6   /  AlkPhos  262<H>  09-25    Creatinine Trend: 0.82<--, 0.72<--, 0.69<--, 0.67<--, 0.67<--, 0.69<--  LIVER FUNCTIONS - ( 25 Sep 2022 07:05 )  Alb: 2.5 g/dL / Pro: 6.3 g/dL / ALK PHOS: 262 U/L / ALT: 6 U/L / AST: 32 U/L / GGT: x                  Sharon Carreno  PGY-1 Resident Physician   Pager [phone]/ [phone]    Patient is a 31y old  Male who presents with a chief complaint of Shortness of breath (25 Sep 2022 07:38)      SUBJECTIVE / OVERNIGHT EVENTS:  Patient seen and evaluated at bedside.  Patient with no complaints. Knows that cardiac MRI is scheduled for today.   Denies any fevers, chills, CP, or SOB.    Vital Signs Last 24 Hrs  T(C): 37.6 (26 Sep 2022 06:00), Max: 37.8 (25 Sep 2022 09:47)  T(F): 99.6 (26 Sep 2022 06:00), Max: 100 (25 Sep 2022 09:47)  HR: 109 (26 Sep 2022 06:00) (102 - 119)  BP: 109/56 (26 Sep 2022 06:00) (105/56 - 118/65)  BP(mean): --  RR: 18 (26 Sep 2022 06:00) (18 - 18)  SpO2: 99% (26 Sep 2022 06:00) (96% - 100%)    Parameters below as of 26 Sep 2022 06:00  Patient On (Oxygen Delivery Method): room air      PHYSICAL EXAM:  GENERAL: NAD, cachectic appearing  CHEST/LUNG: No lung sounds hear in left upper lobe but otherwise CTAB; no wheeze. Bandage on anterior L chest.   HEART: Regular rate and rhythm; Normal S1 S2, No murmurs, rubs, or gallops  ABDOMEN: Soft, Nontender, Nondistended; Bowel sounds present  EXTREMITIES:  2+ Peripheral Pulses, No clubbing, cyanosis, or edema  : scrotal swelling unchanged from presentation  SKIN: bandage on lower back which patient reports is his rectal mass; bandage unopened   PSYCH: AAOx3    LABS:                        8.1    12.52 )-----------( 205      ( 25 Sep 2022 07:05 )             26.1     Hgb Trend: 8.1<--, 8.2<--, 8.5<--, 8.4<--, 8.6<--  09-25    131<L>  |  98  |  12  ----------------------------<  115<H>  4.3   |  24  |  0.82    Ca    9.4      25 Sep 2022 07:05  Phos  2.4     09-25  Mg     1.60     09-25    TPro  6.3  /  Alb  2.5<L>  /  TBili  0.3  /  DBili  x   /  AST  32  /  ALT  6   /  AlkPhos  262<H>  09-25    Creatinine Trend: 0.82<--, 0.72<--, 0.69<--, 0.67<--, 0.67<--, 0.69<--  LIVER FUNCTIONS - ( 25 Sep 2022 07:05 )  Alb: 2.5 g/dL / Pro: 6.3 g/dL / ALK PHOS: 262 U/L / ALT: 6 U/L / AST: 32 U/L / GGT: x

## 2022-09-26 NOTE — PROGRESS NOTE ADULT - PROBLEM SELECTOR PLAN 2
Patient with history of MSSA bacteremia found at [facility] with blood cultures 8/31 positive for MSSA and repeat on 9/2 NGTD.  - s/p Zosyn (9/8 - 9/9)  - transitioned to Cefazolin 2GM q8h for 6 weeks ending (9/9-10/13)   - upon discharge, will need weekly CBC, BUN and creatinine and ID clinic follow up Patient with history of MSSA bacteremia found at [facility] with blood cultures 8/31 positive for MSSA and repeat on 9/2 NGTD.  - s/p Zosyn (9/8 - 9/9)  - transitioned to Cefazolin 2GM q8h for 6 weeks ending (9/9-10/13)   - may need midline placement prior to discharge for antibiotics at rehab  - upon discharge, will need weekly CBC, BUN and creatinine and ID clinic follow up

## 2022-09-26 NOTE — PROGRESS NOTE ADULT - PROBLEM SELECTOR PLAN 7
Patient with history of untreated HPV related rectal cancer with episode of rectal bleeding at [facility] Hgb 5.9 s/p 2u pRBCs.  - CT with evidence of liver and bone lesions  - renal cancer treatment will be on hold pending resolution of infection  - radiation oncology consult for RT of rectal cancer placed; will likely get outpatient RT   - appreciate oncology recs

## 2022-09-26 NOTE — PROGRESS NOTE ADULT - PROBLEM SELECTOR PLAN 8
Patient with microcytic anemia with Hgb 8.5 and MCV 79.3 (borderline with other MCV 80.9) likely a mixed picture of microcytic and normocytic anemia.  - iron studies with low iron levels but low iron binding capacity likely indicating a mixed picture  - ferritin wnl   - continue to monitor  - no overt signs of bleeding  - follow up OP with PCP and GI  - continue PO iron supplementation 3x/week Patient with microcytic anemia with Hgb 8.5 and MCV 79.3 (borderline with other MCV 80.9) likely a mixed picture of microcytic and normocytic anemia.  - iron studies with low iron levels but low iron binding capacity likely indicating a mixed picture  - ferritin wnl   - continue to monitor  - no overt signs of bleeding  - follow up OP with PCP and GI  - will hold on iron supplementation until infection clears

## 2022-09-27 ENCOUNTER — NON-APPOINTMENT (OUTPATIENT)
Age: 32
End: 2022-09-27

## 2022-09-27 LAB
ALBUMIN SERPL ELPH-MCNC: 2.2 G/DL — LOW (ref 3.3–5)
ALP SERPL-CCNC: 278 U/L — HIGH (ref 40–120)
ALT FLD-CCNC: 10 U/L — SIGNIFICANT CHANGE UP (ref 4–41)
ANION GAP SERPL CALC-SCNC: 12 MMOL/L — SIGNIFICANT CHANGE UP (ref 7–14)
AST SERPL-CCNC: 27 U/L — SIGNIFICANT CHANGE UP (ref 4–40)
BILIRUB SERPL-MCNC: 0.4 MG/DL — SIGNIFICANT CHANGE UP (ref 0.2–1.2)
BUN SERPL-MCNC: 15 MG/DL — SIGNIFICANT CHANGE UP (ref 7–23)
CALCIUM SERPL-MCNC: 10.3 MG/DL — SIGNIFICANT CHANGE UP (ref 8.4–10.5)
CHLORIDE SERPL-SCNC: 97 MMOL/L — LOW (ref 98–107)
CO2 SERPL-SCNC: 23 MMOL/L — SIGNIFICANT CHANGE UP (ref 22–31)
CREAT SERPL-MCNC: 0.71 MG/DL — SIGNIFICANT CHANGE UP (ref 0.5–1.3)
EGFR: 124 ML/MIN/1.73M2 — SIGNIFICANT CHANGE UP
GAMMA INTERFERON BACKGROUND BLD IA-ACNC: 0.02 IU/ML — SIGNIFICANT CHANGE UP
GLUCOSE SERPL-MCNC: 97 MG/DL — SIGNIFICANT CHANGE UP (ref 70–99)
HCT VFR BLD CALC: 27.5 % — LOW (ref 39–50)
HGB BLD-MCNC: 8.3 G/DL — LOW (ref 13–17)
M TB IFN-G BLD-IMP: ABNORMAL
M TB IFN-G CD4+ BCKGRND COR BLD-ACNC: 0 IU/ML — SIGNIFICANT CHANGE UP
M TB IFN-G CD4+CD8+ BCKGRND COR BLD-ACNC: 0 IU/ML — SIGNIFICANT CHANGE UP
MAGNESIUM SERPL-MCNC: 1.7 MG/DL — SIGNIFICANT CHANGE UP (ref 1.6–2.6)
MCHC RBC-ENTMCNC: 23.9 PG — LOW (ref 27–34)
MCHC RBC-ENTMCNC: 30.2 GM/DL — LOW (ref 32–36)
MCV RBC AUTO: 79.3 FL — LOW (ref 80–100)
NIGHT BLUE STAIN TISS: SIGNIFICANT CHANGE UP
NRBC # BLD: 0 /100 WBCS — SIGNIFICANT CHANGE UP (ref 0–0)
NRBC # FLD: 0 K/UL — SIGNIFICANT CHANGE UP (ref 0–0)
PHOSPHATE SERPL-MCNC: 3.2 MG/DL — SIGNIFICANT CHANGE UP (ref 2.5–4.5)
PLATELET # BLD AUTO: 182 K/UL — SIGNIFICANT CHANGE UP (ref 150–400)
POTASSIUM SERPL-MCNC: 4.4 MMOL/L — SIGNIFICANT CHANGE UP (ref 3.5–5.3)
POTASSIUM SERPL-SCNC: 4.4 MMOL/L — SIGNIFICANT CHANGE UP (ref 3.5–5.3)
PROT SERPL-MCNC: 6.2 G/DL — SIGNIFICANT CHANGE UP (ref 6–8.3)
QUANT TB PLUS MITOGEN MINUS NIL: 0.3 IU/ML — SIGNIFICANT CHANGE UP
RBC # BLD: 3.47 M/UL — LOW (ref 4.2–5.8)
RBC # FLD: 22.1 % — HIGH (ref 10.3–14.5)
SODIUM SERPL-SCNC: 132 MMOL/L — LOW (ref 135–145)
SPECIMEN SOURCE: SIGNIFICANT CHANGE UP
WBC # BLD: 11.97 K/UL — HIGH (ref 3.8–10.5)
WBC # FLD AUTO: 11.97 K/UL — HIGH (ref 3.8–10.5)

## 2022-09-27 PROCEDURE — 99233 SBSQ HOSP IP/OBS HIGH 50: CPT

## 2022-09-27 PROCEDURE — 99358 PROLONG SERVICE W/O CONTACT: CPT

## 2022-09-27 PROCEDURE — 99232 SBSQ HOSP IP/OBS MODERATE 35: CPT | Mod: GC

## 2022-09-27 RX ORDER — SODIUM CHLORIDE 9 MG/ML
1000 INJECTION INTRAMUSCULAR; INTRAVENOUS; SUBCUTANEOUS
Refills: 0 | Status: DISCONTINUED | OUTPATIENT
Start: 2022-09-27 | End: 2022-09-28

## 2022-09-27 RX ORDER — METHADONE HYDROCHLORIDE 40 MG/1
5 TABLET ORAL THREE TIMES A DAY
Refills: 0 | Status: DISCONTINUED | OUTPATIENT
Start: 2022-09-27 | End: 2022-10-04

## 2022-09-27 RX ORDER — MAGNESIUM SULFATE 500 MG/ML
2 VIAL (ML) INJECTION ONCE
Refills: 0 | Status: COMPLETED | OUTPATIENT
Start: 2022-09-27 | End: 2022-09-27

## 2022-09-27 RX ADMIN — Medication 50 MILLIGRAM(S): at 05:54

## 2022-09-27 RX ADMIN — METHADONE HYDROCHLORIDE 5 MILLIGRAM(S): 40 TABLET ORAL at 06:26

## 2022-09-27 RX ADMIN — HYDROMORPHONE HYDROCHLORIDE 6 MILLIGRAM(S): 2 INJECTION INTRAMUSCULAR; INTRAVENOUS; SUBCUTANEOUS at 10:29

## 2022-09-27 RX ADMIN — LIDOCAINE 1 PATCH: 4 CREAM TOPICAL at 23:57

## 2022-09-27 RX ADMIN — SODIUM CHLORIDE 100 MILLILITER(S): 9 INJECTION INTRAMUSCULAR; INTRAVENOUS; SUBCUTANEOUS at 23:20

## 2022-09-27 RX ADMIN — LIDOCAINE 1 PATCH: 4 CREAM TOPICAL at 12:14

## 2022-09-27 RX ADMIN — GABAPENTIN 300 MILLIGRAM(S): 400 CAPSULE ORAL at 05:54

## 2022-09-27 RX ADMIN — LIDOCAINE 1 PATCH: 4 CREAM TOPICAL at 19:26

## 2022-09-27 RX ADMIN — HYDROMORPHONE HYDROCHLORIDE 6 MILLIGRAM(S): 2 INJECTION INTRAMUSCULAR; INTRAVENOUS; SUBCUTANEOUS at 23:20

## 2022-09-27 RX ADMIN — HYDROMORPHONE HYDROCHLORIDE 6 MILLIGRAM(S): 2 INJECTION INTRAMUSCULAR; INTRAVENOUS; SUBCUTANEOUS at 11:29

## 2022-09-27 RX ADMIN — Medication 5 MILLIGRAM(S): at 10:29

## 2022-09-27 RX ADMIN — Medication 3 MILLIGRAM(S): at 23:20

## 2022-09-27 RX ADMIN — HYDROMORPHONE HYDROCHLORIDE 6 MILLIGRAM(S): 2 INJECTION INTRAMUSCULAR; INTRAVENOUS; SUBCUTANEOUS at 19:48

## 2022-09-27 RX ADMIN — Medication 50 MILLIGRAM(S): at 18:09

## 2022-09-27 RX ADMIN — Medication 5 MILLIGRAM(S): at 18:10

## 2022-09-27 RX ADMIN — GABAPENTIN 300 MILLIGRAM(S): 400 CAPSULE ORAL at 14:02

## 2022-09-27 RX ADMIN — HYDROMORPHONE HYDROCHLORIDE 6 MILLIGRAM(S): 2 INJECTION INTRAMUSCULAR; INTRAVENOUS; SUBCUTANEOUS at 18:48

## 2022-09-27 RX ADMIN — Medication 5 MILLIGRAM(S): at 08:44

## 2022-09-27 RX ADMIN — NALOXEGOL OXALATE 25 MILLIGRAM(S): 12.5 TABLET, FILM COATED ORAL at 12:15

## 2022-09-27 RX ADMIN — Medication 100 MILLIGRAM(S): at 14:00

## 2022-09-27 RX ADMIN — HYDROMORPHONE HYDROCHLORIDE 6 MILLIGRAM(S): 2 INJECTION INTRAMUSCULAR; INTRAVENOUS; SUBCUTANEOUS at 05:53

## 2022-09-27 RX ADMIN — SODIUM CHLORIDE 100 MILLILITER(S): 9 INJECTION INTRAMUSCULAR; INTRAVENOUS; SUBCUTANEOUS at 10:30

## 2022-09-27 RX ADMIN — Medication 100 MILLIGRAM(S): at 05:53

## 2022-09-27 RX ADMIN — CHLORHEXIDINE GLUCONATE 1 APPLICATION(S): 213 SOLUTION TOPICAL at 08:44

## 2022-09-27 RX ADMIN — BICTEGRAVIR SODIUM, EMTRICITABINE, AND TENOFOVIR ALAFENAMIDE FUMARATE 1 TABLET(S): 30; 120; 15 TABLET ORAL at 12:14

## 2022-09-27 RX ADMIN — LIDOCAINE 1 PATCH: 4 CREAM TOPICAL at 12:13

## 2022-09-27 RX ADMIN — Medication 25 GRAM(S): at 10:30

## 2022-09-27 RX ADMIN — HYDROMORPHONE HYDROCHLORIDE 6 MILLIGRAM(S): 2 INJECTION INTRAMUSCULAR; INTRAVENOUS; SUBCUTANEOUS at 06:50

## 2022-09-27 RX ADMIN — METHADONE HYDROCHLORIDE 5 MILLIGRAM(S): 40 TABLET ORAL at 14:00

## 2022-09-27 RX ADMIN — GABAPENTIN 300 MILLIGRAM(S): 400 CAPSULE ORAL at 23:19

## 2022-09-27 RX ADMIN — Medication 100 MILLIGRAM(S): at 23:19

## 2022-09-27 RX ADMIN — METHADONE HYDROCHLORIDE 5 MILLIGRAM(S): 40 TABLET ORAL at 23:19

## 2022-09-27 NOTE — CHART NOTE - NSCHARTNOTEFT_GEN_A_CORE
Source: Patient [x ]    Family [ ]     other [x ] Chart Review    Current Diet : Diet, Regular:   Supplement Feeding Modality:  Oral  Ensure Enlive Cans or Servings Per Day:  4       Frequency:  Daily (09-10-22 @ 16:07)    PO intake: 50-75% [x ]   Current Weight: 65.4kg/144.1lbs (9/27/2022, per flow sheet)  Weight History: 66.8kg/147.2lbs (9/20/2022, per flow sheet), 57.5kg/126.7lbs (9/8/2022).  Height: 198.12cm (9/8/2022)  BMI: 16.6 kg/m2 (9/27/2022), 17.0 kg/m2 (9/20/2022), 14.6kg/m2(9/8/2022).  Weight change: weight loss of 3.1lbs/-2% x1week, weight gain of 17.4lbs/+13.7% x ~2 weeks.     Nutrition Note:  31 year old male with untreated metastatic ano-rectal SCC (followed by Dr Frazier [facility] oncology) with mets to liver and possibly bone, HPV positive, HIV infection, and R eye cataracts who presented to the [facility] on 8/31/22 after syncopal episode at home and transferred to [facility] for continued of care per family request. Pt with cough x 2 mos, with yellow sputum and shortness of breath since 5 days PTA, per chart.  Patient reports usual body weight of 220lbs, 2 months ago. As per chart review, noted with 17.4lbs favorable weight gain x ~2 weeks towards usual and ideal body weight. Patient reports fair appetite, reports consuming ~50-75% of meals and supplement consumption. Patient denies any GI distress, any difficulty chewing or swallowing during visit. Food preferences obtained. As per chart review, patient has no edema and pressure injury noted at this time, previously patient had 4+edema to scrotum documented on 9/20/2022 flow sheet, fluid shift might contribute to weight fluctuation. Small frequent meals, nutrient dense foods encouraged.     __________________ Pertinent Medications__________________   MEDICATIONS  (STANDING):  bictegravir 50 mG/emtricitabine 200 mG/tenofovir alafenamide 25 mG (BIKTARVY) 1 Tablet(s) Oral daily  ceFAZolin   IVPB 2000 milliGRAM(s) IV Intermittent every 8 hours  chlorhexidine 2% Cloths 1 Application(s) Topical <User Schedule>  dronabinol 5 milliGRAM(s) Oral <User Schedule>  enoxaparin Injectable 60 milliGRAM(s) SubCutaneous every 12 hours  gabapentin 300 milliGRAM(s) Oral three times a day  lactulose Syrup 10 Gram(s) Oral daily  lidocaine   4% Patch 1 Patch Transdermal daily  lidocaine   4% Patch 1 Patch Transdermal daily  melatonin 3 milliGRAM(s) Oral at bedtime  methadone    Tablet 5 milliGRAM(s) Oral three times a day  metoprolol tartrate 50 milliGRAM(s) Oral two times a day  naloxegol 25 milliGRAM(s) Oral daily  polyethylene glycol 3350 17 Gram(s) Oral two times a day  potassium phosphate / sodium phosphate Powder (PHOS-NaK) 1 Packet(s) Oral once  senna 2 Tablet(s) Oral at bedtime  sodium chloride 0.9%. 1000 milliLiter(s) (100 mL/Hr) IV Continuous <Continuous>    MEDICATIONS  (PRN):  bisacodyl 5 milliGRAM(s) Oral at bedtime PRN Constipation  HYDROmorphone   Tablet 6 milliGRAM(s) Oral every 4 hours PRN Severe Pain (7 - 10)  naloxone Injectable 0.1 milliGRAM(s) IV Push every 3 minutes PRN opioid intoxication      __________________ Pertinent Labs__________________   09-27 Na132 mmol/L<L> Glu 97 mg/dL K+ 4.4 mmol/L Cr  0.71 mg/dL BUN 15 mg/dL 09-27 Phos 3.2 mg/dL 09-27 Alb 2.2 g/dL<L>      Estimated Needs:   [x ] no change since previous assessment      Previous Nutrition Diagnosis:   [x ] Malnutrition     Nutrition Diagnosis is [x ] ongoing   New Nutrition Diagnosis: [x ] not applicable    Interventions:     Recommend    [x] Continue with current diet and supplement, appropriate at this time.   Honor food preferences.      Monitoring and Evaluation:     [x ] PO intake [ x] Tolerance to diet prescription [x ] weights [ x] follow up per protocol  [x] other: Bowel Movement, labs

## 2022-09-27 NOTE — PROGRESS NOTE ADULT - SUBJECTIVE AND OBJECTIVE BOX
Sharon Carreno  PGY-1 Resident Physician   Pager [phone]/ [phone]    Patient is a 31y old  Male who presents with a chief complaint of Shortness of breath (26 Sep 2022 07:07)      SUBJECTIVE / OVERNIGHT EVENTS:  Patient seen and evaluated at bedside.    Denies any fevers, chills, CP, or SOB.  Patient refused cardiac MRI again yesterday even after receiving dilaudid 2 mg for pain control.     Vital Signs Last 24 Hrs  T(C): 36.9 (27 Sep 2022 06:00), Max: 37 (26 Sep 2022 22:36)  T(F): 98.5 (27 Sep 2022 06:00), Max: 98.6 (26 Sep 2022 22:36)  HR: 102 (27 Sep 2022 06:00) (95 - 105)  BP: 140/89 (27 Sep 2022 06:00) (108/62 - 140/89)  BP(mean): --  RR: 18 (27 Sep 2022 06:00) (18 - 18)  SpO2: 98% (27 Sep 2022 06:00) (98% - 100%)    Parameters below as of 27 Sep 2022 06:00  Patient On (Oxygen Delivery Method): room air      PHYSICAL EXAM:  GENERAL: NAD, cachectic appearing  CHEST/LUNG: No lung sounds hear in left upper lobe but otherwise CTAB; no wheeze. Bandage on anterior L chest.   HEART: Regular rate and rhythm; Normal S1 S2, No murmurs, rubs, or gallops  ABDOMEN: Soft, Nontender, Nondistended; Bowel sounds present  EXTREMITIES:  2+ Peripheral Pulses, No clubbing, cyanosis, or edema  : scrotal swelling unchanged from presentation  SKIN: bandage on lower back which patient reports is his rectal mass; bandage unopened   PSYCH: AAOx3    LABS:                        8.9    12.49 )-----------( 219      ( 26 Sep 2022 07:50 )             29.3     Hgb Trend: 8.9<--, 8.1<--, 8.2<--, 8.5<--, 8.4<--  09-26    131<L>  |  96<L>  |  16  ----------------------------<  91  4.4   |  25  |  0.73    Ca    10.3      26 Sep 2022 07:50  Phos  3.1     09-26  Mg     1.70     09-26    TPro  6.8  /  Alb  2.8<L>  /  TBili  0.4  /  DBili  x   /  AST  31  /  ALT  <5  /  AlkPhos  291<H>  09-26    Creatinine Trend: 0.73<--, 0.82<--, 0.72<--, 0.69<--, 0.67<--, 0.67<--  LIVER FUNCTIONS - ( 26 Sep 2022 07:50 )  Alb: 2.8 g/dL / Pro: 6.8 g/dL / ALK PHOS: 291 U/L / ALT: <5 U/L / AST: 31 U/L / GGT: x                    Sharon Carreno  PGY-1 Resident Physician   Pager [phone]/ [phone]    Patient is a 31y old  Male who presents with a chief complaint of Shortness of breath (26 Sep 2022 07:07)      SUBJECTIVE / OVERNIGHT EVENTS:  Patient seen and evaluated at bedside.  Patient stating he has some mild pain today; due to PRN pain med in 30 min.  Denies any fevers, chills, CP, or SOB.  Patient refused cardiac MRI again yesterday even after receiving dilaudid 2 mg for pain control.     Vital Signs Last 24 Hrs  T(C): 36.9 (27 Sep 2022 06:00), Max: 37 (26 Sep 2022 22:36)  T(F): 98.5 (27 Sep 2022 06:00), Max: 98.6 (26 Sep 2022 22:36)  HR: 102 (27 Sep 2022 06:00) (95 - 105)  BP: 140/89 (27 Sep 2022 06:00) (108/62 - 140/89)  BP(mean): --  RR: 18 (27 Sep 2022 06:00) (18 - 18)  SpO2: 98% (27 Sep 2022 06:00) (98% - 100%)    Parameters below as of 27 Sep 2022 06:00  Patient On (Oxygen Delivery Method): room air      PHYSICAL EXAM:  GENERAL: NAD, cachectic appearing  CHEST/LUNG: No lung sounds hear in left upper lobe but otherwise CTAB; no wheeze. Bandage on anterior L chest.   HEART: Regular rate and rhythm; Normal S1 S2, No murmurs, rubs, or gallops  ABDOMEN: Soft, Nontender, Nondistended; Bowel sounds present  EXTREMITIES:  2+ Peripheral Pulses, No clubbing, cyanosis, or edema  : scrotal swelling unchanged from presentation; pillow under scrotum for elevation  SKIN: bandage on lower back which patient reports is his rectal mass; bandage unopened   PSYCH: AAOx3    LABS:                        8.9    12.49 )-----------( 219      ( 26 Sep 2022 07:50 )             29.3     Hgb Trend: 8.9<--, 8.1<--, 8.2<--, 8.5<--, 8.4<--  09-26    131<L>  |  96<L>  |  16  ----------------------------<  91  4.4   |  25  |  0.73    Ca    10.3      26 Sep 2022 07:50  Phos  3.1     09-26  Mg     1.70     09-26    TPro  6.8  /  Alb  2.8<L>  /  TBili  0.4  /  DBili  x   /  AST  31  /  ALT  <5  /  AlkPhos  291<H>  09-26    Creatinine Trend: 0.73<--, 0.82<--, 0.72<--, 0.69<--, 0.67<--, 0.67<--  LIVER FUNCTIONS - ( 26 Sep 2022 07:50 )  Alb: 2.8 g/dL / Pro: 6.8 g/dL / ALK PHOS: 291 U/L / ALT: <5 U/L / AST: 31 U/L / GGT: x

## 2022-09-27 NOTE — PROGRESS NOTE ADULT - PROBLEM SELECTOR PLAN 10
- Ca 16.4 9/10 s/p calcitonin 4u/kg x 2 doses and pamidronate 90mcg IVPB x1.  - Improve  - cont IVFs w/ LR @ 75 cc/hr and continue monitoring   - continue to trend levels q 24 hrs   - can repeat pamidronate after 1 week PRN  - vit D low but will hold supplementation in the setting of hypercalcemia of malignancy - Ca 16.4 9/10 s/p calcitonin 4u/kg x 2 doses and pamidronate 90mcg IVPB x1.  - Improve  - cont IVFs w/ LR @ 75 cc/hr and continue monitoring   - continue to trend levels q 24 hrs   - can repeat pamidronate after 1 week PRN  - vit D low but will hold supplementation in the setting of hypercalcemia of malignancy  - NS for Ca 11.7 on 9/27; can hold off on pamidronate as level is between 11-12 without symptoms

## 2022-09-27 NOTE — PROGRESS NOTE ADULT - SUBJECTIVE AND OBJECTIVE BOX
Cardiology Progress Note  ------------------------------------------------------------------------------------------  SUBJECTIVE:     Patient     -------------------------------------------------------------------------------------------  ROS:  CV: chest pain (-), palpitation (-), orthopnea (-), PND (-), edema (-)  PULM: SOB (-), cough (-), wheezing (-), hemoptysis (-).   CONST: fever (-), chills (-) or fatigue (-)  GI: abdominal distension (-), abdominal pain (-) , nausea/vomiting (-), hematemesis, (-), melena (-), hematochezia (-)  : dysuria (-), frequency (-), hematuria (-).   NEURO: numbness (-), weakness (-), dizziness (-)  SKIN: itching (-), rash (-)  HEENT:  visual changes (-); vertigo or throat pain (-);  neck stiffness (-)     All other review of systems is negative unless indicated above.   -------------------------------------------------------------------------------------------  VS:  T(F): 98.5 (09-27), Max: 98.6 (09-26)  HR: 102 (09-27) (96 - 105)  BP: 140/89 (09-27) (108/62 - 140/89)  RR: 18 (09-27)  SpO2: 98% (09-27)  I&O's Summary    26 Sep 2022 07:01  -  27 Sep 2022 07:00  --------------------------------------------------------  IN: 750 mL / OUT: 800 mL / NET: -50 mL      ------------------------------------------------------------------------------------------  PHYSICAL EXAM:  GENERAL: NAD, cachectic appearing  CHEST/LUNG: No lung sounds hear in left upper lobe but otherwise CTAB; no wheeze. Bandage on anterior L chest.   HEART: Regular rate and rhythm; Normal S1 S2, No murmurs, rubs, or gallops  ABDOMEN: Soft, Nontender, Nondistended; Bowel sounds present  EXTREMITIES:  2+ Peripheral Pulses, No clubbing, cyanosis, or edema  : scrotal swelling unchanged from presentation  SKIN: bandage on lower back which patient reports is his rectal mass; bandage unopened   PSYCH: AAOx3-------------------------------------------------------------------------------------------  LABS:                          8.3    11.97 )-----------( 182      ( 27 Sep 2022 08:00 )             27.5     09-27    132<L>  |  97<L>  |  15  ----------------------------<  97  4.4   |  23  |  0.71    Ca    10.3      27 Sep 2022 08:00  Phos  3.2     09-27  Mg     1.70     09-27    TPro  6.2  /  Alb  2.2<L>  /  TBili  0.4  /  DBili  x   /  AST  27  /  ALT  10  /  AlkPhos  278<H>  09-27                -------------------------------------------------------------------------------------------  Meds:  bictegravir 50 mG/emtricitabine 200 mG/tenofovir alafenamide 25 mG (BIKTARVY) 1 Tablet(s) Oral daily  bisacodyl 5 milliGRAM(s) Oral at bedtime PRN  ceFAZolin   IVPB 2000 milliGRAM(s) IV Intermittent every 8 hours  chlorhexidine 2% Cloths 1 Application(s) Topical <User Schedule>  dronabinol 5 milliGRAM(s) Oral <User Schedule>  enoxaparin Injectable 60 milliGRAM(s) SubCutaneous every 12 hours  gabapentin 300 milliGRAM(s) Oral three times a day  HYDROmorphone   Tablet 6 milliGRAM(s) Oral every 4 hours PRN  lactulose Syrup 10 Gram(s) Oral daily  lidocaine   4% Patch 1 Patch Transdermal daily  lidocaine   4% Patch 1 Patch Transdermal daily  melatonin 3 milliGRAM(s) Oral at bedtime  methadone    Tablet 5 milliGRAM(s) Oral three times a day  metoprolol tartrate 50 milliGRAM(s) Oral two times a day  naloxegol 25 milliGRAM(s) Oral daily  naloxone Injectable 0.1 milliGRAM(s) IV Push every 3 minutes PRN  polyethylene glycol 3350 17 Gram(s) Oral two times a day  potassium phosphate / sodium phosphate Powder (PHOS-NaK) 1 Packet(s) Oral once  senna 2 Tablet(s) Oral at bedtime  sodium chloride 0.9%. 1000 milliLiter(s) IV Continuous <Continuous>    -------------------------------------------------------------------------------------------     Cardiology Progress Note  ------------------------------------------------------------------------------------------  SUBJECTIVE:     Patient examined 9/26 evening bedside. Stated he was in rectal pain, and could not sit still for MRI.     -------------------------------------------------------------------------------------------  ROS:  CV: chest pain (-), palpitation (-), orthopnea (-), PND (-), edema (-)  PULM: SOB (-), cough (-), wheezing (-), hemoptysis (-).   CONST: fever (-), chills (-) or fatigue (-)  GI: abdominal distension (-), abdominal pain (-) , nausea/vomiting (-), hematemesis, (-), melena (-), hematochezia (-)  : dysuria (-), frequency (-), hematuria (-).   NEURO: numbness (-), weakness (-), dizziness (-)  SKIN: itching (-), rash (-)  HEENT:  visual changes (-); vertigo or throat pain (-);  neck stiffness (-)     All other review of systems is negative unless indicated above.   -------------------------------------------------------------------------------------------  VS:  T(F): 98.5 (09-27), Max: 98.6 (09-26)  HR: 102 (09-27) (96 - 105)  BP: 140/89 (09-27) (108/62 - 140/89)  RR: 18 (09-27)  SpO2: 98% (09-27)  I&O's Summary    26 Sep 2022 07:01  -  27 Sep 2022 07:00  --------------------------------------------------------  IN: 750 mL / OUT: 800 mL / NET: -50 mL      ------------------------------------------------------------------------------------------  PHYSICAL EXAM:  GENERAL: NAD, cachectic appearing  CHEST/LUNG: No lung sounds hear in left upper lobe but otherwise CTAB; no wheeze. Bandage on anterior L chest.   HEART: Regular rate and rhythm; Normal S1 S2, No murmurs, rubs, or gallops  ABDOMEN: Soft, Nontender, Nondistended; Bowel sounds present  EXTREMITIES:  2+ Peripheral Pulses, No clubbing, cyanosis, or edema  : scrotal swelling unchanged from presentation  SKIN: bandage on lower back which patient reports is his rectal mass; bandage unopened   PSYCH: AAOx3-------------------------------------------------------------------------------------------  LABS:                          8.3    11.97 )-----------( 182      ( 27 Sep 2022 08:00 )             27.5     09-27    132<L>  |  97<L>  |  15  ----------------------------<  97  4.4   |  23  |  0.71    Ca    10.3      27 Sep 2022 08:00  Phos  3.2     09-27  Mg     1.70     09-27    TPro  6.2  /  Alb  2.2<L>  /  TBili  0.4  /  DBili  x   /  AST  27  /  ALT  10  /  AlkPhos  278<H>  09-27                -------------------------------------------------------------------------------------------  Meds:  bictegravir 50 mG/emtricitabine 200 mG/tenofovir alafenamide 25 mG (BIKTARVY) 1 Tablet(s) Oral daily  bisacodyl 5 milliGRAM(s) Oral at bedtime PRN  ceFAZolin   IVPB 2000 milliGRAM(s) IV Intermittent every 8 hours  chlorhexidine 2% Cloths 1 Application(s) Topical <User Schedule>  dronabinol 5 milliGRAM(s) Oral <User Schedule>  enoxaparin Injectable 60 milliGRAM(s) SubCutaneous every 12 hours  gabapentin 300 milliGRAM(s) Oral three times a day  HYDROmorphone   Tablet 6 milliGRAM(s) Oral every 4 hours PRN  lactulose Syrup 10 Gram(s) Oral daily  lidocaine   4% Patch 1 Patch Transdermal daily  lidocaine   4% Patch 1 Patch Transdermal daily  melatonin 3 milliGRAM(s) Oral at bedtime  methadone    Tablet 5 milliGRAM(s) Oral three times a day  metoprolol tartrate 50 milliGRAM(s) Oral two times a day  naloxegol 25 milliGRAM(s) Oral daily  naloxone Injectable 0.1 milliGRAM(s) IV Push every 3 minutes PRN  polyethylene glycol 3350 17 Gram(s) Oral two times a day  potassium phosphate / sodium phosphate Powder (PHOS-NaK) 1 Packet(s) Oral once  senna 2 Tablet(s) Oral at bedtime  sodium chloride 0.9%. 1000 milliLiter(s) IV Continuous <Continuous>    -------------------------------------------------------------------------------------------

## 2022-09-27 NOTE — PROGRESS NOTE ADULT - PROBLEM SELECTOR PLAN 3
Palliative care consulted for extensive disease burden and GOC discussion. At this time, patient was all available treatment and resuscitation. Remains full code.  - increased PO Methadone 5mg TID (QTC-444 on 9/12; QTC- 458 on 9/19); will need repeat EKG later this week to monitor QTC while on methadone  - c/w Gabapentin 300mg TID  - PO Dilaudid 4mg q4 PRN moderate pain  - PO Dilaudid 6mg q4 PRN severe pain  - hold parameters placed for all medications  - appreciate palliative care recs  - will need OP f/u at [facility] [place] with Dr. Kiesha Calle/ Dr. Mazariegos/ NP Zoe Carvalho/ ROCIO Cruz; [phone]

## 2022-09-27 NOTE — PROGRESS NOTE ADULT - PROBLEM SELECTOR PLAN 8
Patient with microcytic anemia with Hgb 8.5 and MCV 79.3 (borderline with other MCV 80.9) likely a mixed picture of microcytic and normocytic anemia.  - iron studies with low iron levels but low iron binding capacity likely indicating a mixed picture  - ferritin wnl   - continue to monitor  - no overt signs of bleeding  - follow up OP with PCP and GI  - will hold on iron supplementation until infection clears

## 2022-09-27 NOTE — PROGRESS NOTE ADULT - ASSESSMENT
31 M with untreated metastatic ano-rectal SCC (followed by Dr Frazier [facility] oncology) with mets to liver and possibly bone, HPV positive, HIV infection, and R eye cataracts who presented to the [facility] on 8/31/22 after syncopal episode at home and transferred to [facility] for continued of care per family request. Pt with cough x 2 mos, with yellow sputum and shortness of breath since 5 days PTA.

## 2022-09-27 NOTE — PROGRESS NOTE ADULT - PROBLEM SELECTOR PLAN 11
SVT events x 2 9/15 and 9/16 resolved with vagal maneuvers. Repeat SVT 9/25 with 9 beats of V tach and 13 beats V tach asymptomatic.  - inc metoprolol to 25 BID. Titrate PRN.  - s/p LR fluid replacement with HR in 109s  - consider EP eval if persists

## 2022-09-27 NOTE — PROGRESS NOTE ADULT - ASSESSMENT
31M with PMH of metastatic rectal carcinoma, HIV, cataracts, presents with multiple complications of his systemic disease including respiratory and renal failure, PNA with cavitary lesion, MSSA bacteremia with endocarditis, VTE with placement of IVC filter. There is concern there is cardiac lesions on imaging that could represent a mass (metastasis vs primary cardiac) vs vegetation vs thrombus. Patient was deemed too high risk for JENNIFER given his extensive co-morbidities. Patient currently refusing MRI as he cannot sit still due to his rectal pain. He is being treated with therapeutic anticoagulation, and antibiotics for his MSSA bacteremia and is clinically stable from infection perspective with. At this point, the utility of further imaging for evaluation for cardiac mass is unclear, especially since patient has stated MRI would be too uncomfortable for him and he does not want to undergo this procedure.     Discussed with hospitalist on the case Dr. Pedersen. I am available for any further questions.

## 2022-09-27 NOTE — PROGRESS NOTE ADULT - PROBLEM SELECTOR PLAN 5
CT Chest 9/11 with evidence of persistent pneumomediastinum and extensive subcutaneous emphysema of the neck, chest, abdomen, pelvis, and extremities, including the scrotum.   - CANDIS per thoracic surgery  - indwelling pedraza was placed at [facility] (Coude placed for urinary strain)   - No further urologic intervention needed at this time.   - Can f/u outpatient w/ Dr Prabhu Lucio, [facility] [place]  - pain control with dilaudid and methadone, ct a/p 9/11 did not show any inflammation/hydrocele/infection . CT Chest 9/11 with evidence of persistent pneumomediastinum and extensive subcutaneous emphysema of the neck, chest, abdomen, pelvis, and extremities, including the scrotum.   - CANDIS per thoracic surgery  - indwelling pedraza was placed at [facility] (Coude placed for urinary strain)   - continue with indwelling pedraza as scrotal swelling persists  - No further urologic intervention needed at this time.   - Can f/u outpatient w/ Dr Prabhu Lucio, [facility] at [place]  - pain control with dilaudid and methadone, ct a/p 9/11 did not show any inflammation/hydrocele/infection .

## 2022-09-27 NOTE — CHART NOTE - NSCHARTNOTEFT_GEN_A_CORE
************************************************************************  PALLIATIVE MEDICINE COORDINATION OF CARE NOTE FOR YONI GAITAN  [x] Inpatient Consult  [ ] Other:  ************************************************************************  SUMMARY OF RECOMMENDATIONS:    > Chart reviewed.   > In past 24 hours, received, 3 prn doses of PO Dilaudid 6mg.   > Patient seen and examined at bedside. Patient reports being unable to tolerate cardiac MRI yesterday, awaiting for further discussion with primary team about next steps instead. Patient reports pain has been intermittent but controlled with current regimen. Denies constipation/ nausea. Patient is hopeful to get stronger at [facility] with plans to follow up outpatient with oncology about treatment options afterwards.   > Recommendations:   - PO Methadone 5mg TID (QTC-444 on 9/12; QTC- 458 on 9/19); please monitor QTC while on methadone  - Continue Gabapentin 300mg TID  - PO Dilaudid 4mg q4 PRN moderate pain (hold for hypotension, oversedation, respiratory depression)  - PO Dilaudid 6mg q4 PRN severe pain (hold for hypotension, oversedation, respiratory depression)    - Please prescribe Naloxone for here and upon discharge. Patients with 50mg OME have 4x risk of OD and those with 100mg OME have 9x risk (prescribetoprevent.org)  Inpatient: Naoloxone 0.1mg IV q3 minutes PRN  for respiratory depression (RR < 11) or oversedation due to opioids. (Max - 4 doses)  Upon discharge: Naloxone 4mg/0.1 ml nasal spray, Spray 0.1mL into one nostril. Repeat with second spray into other nostril after 2-3 minutes if no or minimal response. (http://prescribetoprevent.org/prescribers/palliative/). Please be sure the patient's pharmacy has the medication, otherwise, be sure there is a pharmacy where the patient can get the Naloxone inhaled.  Please provide patient education prior to discharge. Resource: https://www.health.[place].gov/diseases/aids/general/opioid_overdose_prevention/overdose_facts.htm    - Pt to follow up at [facility] [address], [place] [address]  as outpatient with Dr. Kiesha Calle/ Dr. Mazariegos/ NP Zoe Carvalho/ ROCIO Cruz  Please include their number [phone] in discharge paper work.    > New symptom regimen discussed with outpatient palliative team.     Thank you for allowing us to participate in your patient's care.  Patient to be discharged from [facility] team consult service today.   Please re-consult if we can be of further assistance, page [phone].      ************************************************************************  CHART REVIEW:    HPI reviewed     ************************************************************************  MEDICATION REVIEW:  --- Pls refer to current medicatons in the body of this note  MEDICATIONS  (STANDING):  bictegravir 50 mG/emtricitabine 200 mG/tenofovir alafenamide 25 mG (BIKTARVY) 1 Tablet(s) Oral daily  ceFAZolin   IVPB 2000 milliGRAM(s) IV Intermittent every 8 hours  chlorhexidine 2% Cloths 1 Application(s) Topical <User Schedule>  dronabinol 5 milliGRAM(s) Oral <User Schedule>  enoxaparin Injectable 60 milliGRAM(s) SubCutaneous every 12 hours  gabapentin 300 milliGRAM(s) Oral three times a day  lactulose Syrup 10 Gram(s) Oral daily  lidocaine   4% Patch 1 Patch Transdermal daily  lidocaine   4% Patch 1 Patch Transdermal daily  melatonin 3 milliGRAM(s) Oral at bedtime  methadone    Tablet 5 milliGRAM(s) Oral three times a day  metoprolol tartrate 50 milliGRAM(s) Oral two times a day  naloxegol 25 milliGRAM(s) Oral daily  polyethylene glycol 3350 17 Gram(s) Oral two times a day  potassium phosphate / sodium phosphate Powder (PHOS-NaK) 1 Packet(s) Oral once  senna 2 Tablet(s) Oral at bedtime  sodium chloride 0.9%. 1000 milliLiter(s) (100 mL/Hr) IV Continuous <Continuous>    MEDICATIONS  (PRN):  bisacodyl 5 milliGRAM(s) Oral at bedtime PRN Constipation  HYDROmorphone   Tablet 6 milliGRAM(s) Oral every 4 hours PRN Severe Pain (7 - 10)  naloxone Injectable 0.1 milliGRAM(s) IV Push every 3 minutes PRN opioid intoxication  --- PRN usage: In past 24 hours, received 3 prn doses of PO Dilaudid   ------------------------------------------------------------------------  COORDINATION OF CARE:  --- Palliative Care consulted for: symptom management   --- Patient assessed: 9/27/2022  CARE PROVIDER DOCUMENTATION:  --- CAPO/ANIKET notes reviewed   --- Medicine notes reviewed   PLAN OF CARE  --- Current dispo plan: TO BE DETERMINED  ------------------------------------------------------------------------  --- Chart reviewed: 30 Minutes [including time used to gather, review and transfer data to this note]    Prolonged services rendered, as part of this patient's care provided by Palliative Medicine, include: i.chart review for provider and ancillary service documentation, ii.pertinent diagnostics including laboratory and imaging studies,iii. medication review including PRN use, iv. admission history including previous palliative care encounters and GOC notes, v.advance care planning documents including HCP and MOLST forms in [place]. Part of Palliative Medicine extended evaluation and management also involves coordination of care with our IDT, the primary and consulting dion, and unit CM/SW and Hospice if eligible. Recommendations based on the information gathered and discussed are outline in the AP of our notes.    ************************************************************************  Care coordination of YONI GAITAN was communicated with the interdisciplinary team during IDT rounds and with the primary team.

## 2022-09-27 NOTE — PROGRESS NOTE ADULT - PROBLEM SELECTOR PLAN 1
Patient with TTE performed on at [facility] with evidence of two large RV masses and two additional small masses. IVC filter placed prophylactically at that time due to undiagnosed vegetations on the TTE.  - repeat TTE 9/9 was a limited study but demonstrated a mobile mass likely 2/2 tumour, thrombus, or vegetation.   - per cardiology risk of JENNIFER outweighs benefit at this time  - refused cardiac MRI again (3 attempts since last week; patient not a candidate for general anesthesia)  - pending cardiology recs for outpatient testing with MRI or JENNIFER if patient becomes amenable to either intervention  - monitor on tele Patient with TTE performed on at [facility] with evidence of two large RV masses and two additional small masses. IVC filter placed prophylactically at that time due to undiagnosed vegetations on the TTE.  - repeat TTE 9/9 was a limited study but demonstrated a mobile mass likely 2/2 tumour, thrombus, or vegetation.   - per cardiology risk of JENNIFER outweighs benefit at this time  - refused cardiac MRI again (3 attempts since last week; patient not a candidate for general anesthesia)  - per cardiology, no acute indication to do cardiac MRI/ JENNIFER at this time given patients other active issues  - monitor on tele  - can follow up outpatient

## 2022-09-27 NOTE — PROGRESS NOTE ADULT - PROBLEM SELECTOR PLAN 4
Patient presented to [facility] with hypoxemia and L chest pigtail placed 8/31 due to suspected pneumothorax. Patient showed improvement in SOB and f/u CT chest performed which showed evidence of 12 cm multiloculated thick walled cavitary mass in the CHRISTINE and lingula.  - sputum culture 9/10 + mod klebsiella pneumoniae pansensitive  - blood Cx neg 9/8  - fungitell and crypto neg 9/10  - ID consulted, recs appreciated  - positive AFB in 1/3 sputum samples (sept 3) from [facility]. ID recs airborne precautions   - repeat sputum culture  - no indication for treatment at this time; pending repeat test results   - per pulm, no CANDIS. will need f/u CT in 2 months

## 2022-09-27 NOTE — PROGRESS NOTE ADULT - PROBLEM SELECTOR PLAN 2
Patient with history of MSSA bacteremia found at [facility] with blood cultures 8/31 positive for MSSA and repeat on 9/2 NGTD.  - s/p Zosyn (9/8 - 9/9)  - transitioned to Cefazolin 2GM q8h for 6 weeks ending (9/9-10/13)   - may need midline placement prior to discharge for antibiotics at rehab  - upon discharge, will need weekly CBC, BUN and creatinine and ID clinic follow up

## 2022-09-28 PROCEDURE — 51702 INSERT TEMP BLADDER CATH: CPT

## 2022-09-28 PROCEDURE — 99232 SBSQ HOSP IP/OBS MODERATE 35: CPT | Mod: GC

## 2022-09-28 RX ORDER — HYDROMORPHONE HYDROCHLORIDE 2 MG/ML
0.5 INJECTION INTRAMUSCULAR; INTRAVENOUS; SUBCUTANEOUS ONCE
Refills: 0 | Status: DISCONTINUED | OUTPATIENT
Start: 2022-09-28 | End: 2022-09-28

## 2022-09-28 RX ORDER — HYDROMORPHONE HYDROCHLORIDE 2 MG/ML
4 INJECTION INTRAMUSCULAR; INTRAVENOUS; SUBCUTANEOUS EVERY 4 HOURS
Refills: 0 | Status: DISCONTINUED | OUTPATIENT
Start: 2022-09-28 | End: 2022-10-05

## 2022-09-28 RX ADMIN — HYDROMORPHONE HYDROCHLORIDE 0.5 MILLIGRAM(S): 2 INJECTION INTRAMUSCULAR; INTRAVENOUS; SUBCUTANEOUS at 14:15

## 2022-09-28 RX ADMIN — Medication 100 MILLIGRAM(S): at 22:26

## 2022-09-28 RX ADMIN — CHLORHEXIDINE GLUCONATE 1 APPLICATION(S): 213 SOLUTION TOPICAL at 08:44

## 2022-09-28 RX ADMIN — NALOXEGOL OXALATE 25 MILLIGRAM(S): 12.5 TABLET, FILM COATED ORAL at 11:38

## 2022-09-28 RX ADMIN — HYDROMORPHONE HYDROCHLORIDE 6 MILLIGRAM(S): 2 INJECTION INTRAMUSCULAR; INTRAVENOUS; SUBCUTANEOUS at 21:15

## 2022-09-28 RX ADMIN — HYDROMORPHONE HYDROCHLORIDE 6 MILLIGRAM(S): 2 INJECTION INTRAMUSCULAR; INTRAVENOUS; SUBCUTANEOUS at 06:52

## 2022-09-28 RX ADMIN — METHADONE HYDROCHLORIDE 5 MILLIGRAM(S): 40 TABLET ORAL at 11:37

## 2022-09-28 RX ADMIN — HYDROMORPHONE HYDROCHLORIDE 6 MILLIGRAM(S): 2 INJECTION INTRAMUSCULAR; INTRAVENOUS; SUBCUTANEOUS at 00:20

## 2022-09-28 RX ADMIN — HYDROMORPHONE HYDROCHLORIDE 6 MILLIGRAM(S): 2 INJECTION INTRAMUSCULAR; INTRAVENOUS; SUBCUTANEOUS at 06:05

## 2022-09-28 RX ADMIN — BICTEGRAVIR SODIUM, EMTRICITABINE, AND TENOFOVIR ALAFENAMIDE FUMARATE 1 TABLET(S): 30; 120; 15 TABLET ORAL at 11:38

## 2022-09-28 RX ADMIN — GABAPENTIN 300 MILLIGRAM(S): 400 CAPSULE ORAL at 22:37

## 2022-09-28 RX ADMIN — GABAPENTIN 300 MILLIGRAM(S): 400 CAPSULE ORAL at 06:01

## 2022-09-28 RX ADMIN — POLYETHYLENE GLYCOL 3350 17 GRAM(S): 17 POWDER, FOR SOLUTION ORAL at 16:54

## 2022-09-28 RX ADMIN — HYDROMORPHONE HYDROCHLORIDE 0.5 MILLIGRAM(S): 2 INJECTION INTRAMUSCULAR; INTRAVENOUS; SUBCUTANEOUS at 15:15

## 2022-09-28 RX ADMIN — ENOXAPARIN SODIUM 60 MILLIGRAM(S): 100 INJECTION SUBCUTANEOUS at 11:37

## 2022-09-28 RX ADMIN — HYDROMORPHONE HYDROCHLORIDE 6 MILLIGRAM(S): 2 INJECTION INTRAMUSCULAR; INTRAVENOUS; SUBCUTANEOUS at 20:15

## 2022-09-28 RX ADMIN — Medication 50 MILLIGRAM(S): at 16:58

## 2022-09-28 RX ADMIN — ENOXAPARIN SODIUM 60 MILLIGRAM(S): 100 INJECTION SUBCUTANEOUS at 22:23

## 2022-09-28 RX ADMIN — METHADONE HYDROCHLORIDE 5 MILLIGRAM(S): 40 TABLET ORAL at 06:01

## 2022-09-28 RX ADMIN — Medication 100 MILLIGRAM(S): at 06:01

## 2022-09-28 RX ADMIN — GABAPENTIN 300 MILLIGRAM(S): 400 CAPSULE ORAL at 11:38

## 2022-09-28 RX ADMIN — Medication 5 MILLIGRAM(S): at 16:54

## 2022-09-28 RX ADMIN — Medication 3 MILLIGRAM(S): at 22:24

## 2022-09-28 RX ADMIN — Medication 50 MILLIGRAM(S): at 06:01

## 2022-09-28 RX ADMIN — METHADONE HYDROCHLORIDE 5 MILLIGRAM(S): 40 TABLET ORAL at 22:23

## 2022-09-28 RX ADMIN — Medication 5 MILLIGRAM(S): at 08:43

## 2022-09-28 RX ADMIN — Medication 100 MILLIGRAM(S): at 11:36

## 2022-09-28 RX ADMIN — Medication 5 MILLIGRAM(S): at 11:37

## 2022-09-28 NOTE — PROGRESS NOTE ADULT - PROBLEM SELECTOR PLAN 6
Palliative care consulted for extensive disease burden and GOC discussion. At this time, patient was all available treatment and resuscitation. Remains full code.  - increased PO Methadone 5mg TID (QTC-444 on 9/12; QTC- 458 on 9/19); will need repeat EKG later this week to monitor QTC while on methadone  - c/w Gabapentin 300mg TID  - PO Dilaudid 4mg q4 PRN moderate pain  - PO Dilaudid 6mg q4 PRN severe pain  - hold parameters placed for all medications  - appreciate palliative care recs  - will need OP f/u at [facility] with Dr. Kiesha Calle/ Dr. Mazariegos/ NP Zoe Carvalho/ ROCIO Cruz; [phone]

## 2022-09-28 NOTE — PROGRESS NOTE ADULT - PROBLEM SELECTOR PLAN 11
SVT events x 2 9/15 and 9/16 resolved with vagal maneuvers. Repeat SVT 9/25 with 9 beats of V tach and 13 beats V tach asymptomatic.  - inc metoprolol to 50 BID with improved HR 90s-low 100  - consider EP eval if persists

## 2022-09-28 NOTE — PROGRESS NOTE ADULT - PROBLEM SELECTOR PLAN 5
Patient with history of untreated HPV related rectal cancer with episode of rectal bleeding at [facility] Hgb 5.9 s/p 2u pRBCs.  - CT with evidence of liver and bone lesions  - renal cancer treatment will be on hold pending resolution of infection  - radiation oncology consult for RT of rectal cancer placed; will likely get outpatient RT   - appreciate oncology recs

## 2022-09-28 NOTE — CHART NOTE - NSCHARTNOTEFT_GEN_A_CORE
31 M with untreated metastatic ano-rectal SCC with mets to liver and possibly bone, HPV positive, HIV infection, and R eye cataracts who presented to the [facility] on 8/31/22 after syncopal episode at home and transferred to [facility] for continued of care per family request.  During stay at [facility], coude catheter was placed due to significant scrotal edema which patient was transfer to [facility].  On 9/27, multiple requests made from nursing administration and infection control for removal of pedraza catheter for TOV. At this point, pedraza had been in place for only 3 weeks; typically foleys can be replaced after 4 weeks if needed.   Patient continued to have significant scrotal swelling, however due to requests from nursing administration and infection control, TOV attempted on 9/27 - 9/28. Patient without any urinary output during TOV period. Nursing attempted bladder scans but unable to get accurate readings as patient cannot lay supine. Given patient without urine output, decision was made to reinsert pedraza catheter. Nursing attempted 2 trials of placement of straight catheter but able to place.  Urology called for assistance and coude catheter. During placement, patient with significant discomfort and back pain as patient unable to lay supine. Patient was fighting and resisting placement and required emotional support before attempt could be made. Coude was eventually placed by urology without any resistance. 600 cc urine output noted immediately after placement. Pedraza was also flushed several times. Pedraza can remain in place for 4 weeks prior to requiring a change.

## 2022-09-28 NOTE — CHART NOTE - NSCHARTNOTEFT_GEN_A_CORE
Note    Called to place Difficult Pedraza    Pt requires pedraza for urinary retention    T(C): 36.7 (09-28-22 @ 12:13), Max: 37 (09-27-22 @ 20:00)  HR: 100 (09-28-22 @ 12:13) (95 - 102)  BP: 98/56 (09-28-22 @ 12:13) (98/56 - 125/55)  RR: 18 (09-28-22 @ 12:13) (18 - 18)  SpO2: 99% (09-28-22 @ 12:13) (99% - 100%)  Wt(kg): --    PE:  GEN:- distress 2/2 back pain  ABD: soft NT ND  : Penis / Scrotum WNL    Procedure:  14f coude pedraza placed in aseptic fashion.  600cc of urine collected dark yellow color  pt tolerated well      Assessment/Plan  Continue pedraza for I& O's   Would not TOV until closer to baseline status  Change pedraza Q month

## 2022-09-28 NOTE — PROGRESS NOTE ADULT - ASSESSMENT
31 M with untreated metastatic ano-rectal SCC (followed by Dr Frazier [facility] oncology) with mets to liver and possibly bone, HPV positive, HIV infection, and R eye cataracts who presented to the [facility] on 8/31/22 after syncopal episode at home and transferred to [facility] for continued of care per family request. Pt with cough x 2 mos, with yellow sputum and shortness of breath since 5 days PTA.

## 2022-09-28 NOTE — PROGRESS NOTE ADULT - SUBJECTIVE AND OBJECTIVE BOX
Sharon Carreno  PGY-1 Resident Physician   Pager [phone]/ [phone]    Patient is a 31y old  Male who presents with a chief complaint of Shortness of breath (27 Sep 2022 13:56)      SUBJECTIVE / OVERNIGHT EVENTS:  Patient seen and evaluated at bedside.    Denies any fevers, chills, CP, or SOB.    Vital Signs Last 24 Hrs  T(C): 36.9 (28 Sep 2022 04:00), Max: 37 (27 Sep 2022 10:33)  T(F): 98.4 (28 Sep 2022 04:00), Max: 98.6 (27 Sep 2022 10:33)  HR: 95 (28 Sep 2022 04:00) (95 - 102)  BP: 118/85 (28 Sep 2022 04:00) (118/85 - 148/86)  BP(mean): --  RR: 18 (28 Sep 2022 04:00) (18 - 18)  SpO2: 99% (28 Sep 2022 04:00) (99% - 100%)    Parameters below as of 28 Sep 2022 04:00  Patient On (Oxygen Delivery Method): room air        PHYSICAL EXAM:  GENERAL: NAD, cachectic appearing  CHEST/LUNG: No lung sounds hear in left upper lobe but otherwise CTAB; no wheeze. Bandage on anterior L chest.   HEART: Regular rate and rhythm; Normal S1 S2, No murmurs, rubs, or gallops  ABDOMEN: Soft, Nontender, Nondistended; Bowel sounds present  EXTREMITIES:  2+ Peripheral Pulses, No clubbing, cyanosis, or edema  : scrotal swelling unchanged from presentation; pillow under scrotum for elevation  SKIN: bandage on lower back which patient reports is his rectal mass; bandage unopened   PSYCH: AAOx3    LABS:                        8.3    11.97 )-----------( 182      ( 27 Sep 2022 08:00 )             27.5     Hgb Trend: 8.3<--, 8.9<--, 8.1<--, 8.2<--, 8.5<--  09-27    132<L>  |  97<L>  |  15  ----------------------------<  97  4.4   |  23  |  0.71    Ca    10.3      27 Sep 2022 08:00  Phos  3.2     09-27  Mg     1.70     09-27    TPro  6.2  /  Alb  2.2<L>  /  TBili  0.4  /  DBili  x   /  AST  27  /  ALT  10  /  AlkPhos  278<H>  09-27    Creatinine Trend: 0.71<--, 0.73<--, 0.82<--, 0.72<--, 0.69<--, 0.67<--  LIVER FUNCTIONS - ( 27 Sep 2022 08:00 )  Alb: 2.2 g/dL / Pro: 6.2 g/dL / ALK PHOS: 278 U/L / ALT: 10 U/L / AST: 27 U/L / GGT: x                    Sharon Carreno  PGY-1 Resident Physician   Pager [phone]/ [phone]    Patient is a 31y old  Male who presents with a chief complaint of Shortness of breath (27 Sep 2022 13:56)      SUBJECTIVE / OVERNIGHT EVENTS:  Patient seen and evaluated at bedside.  Patient agreeable to being cleaned and have room cleaned.   Denies any fevers, chills, CP, or SOB.  Unable to void overnight with TOV    Vital Signs Last 24 Hrs  T(C): 36.9 (28 Sep 2022 04:00), Max: 37 (27 Sep 2022 10:33)  T(F): 98.4 (28 Sep 2022 04:00), Max: 98.6 (27 Sep 2022 10:33)  HR: 95 (28 Sep 2022 04:00) (95 - 102)  BP: 118/85 (28 Sep 2022 04:00) (118/85 - 148/86)  BP(mean): --  RR: 18 (28 Sep 2022 04:00) (18 - 18)  SpO2: 99% (28 Sep 2022 04:00) (99% - 100%)    Parameters below as of 28 Sep 2022 04:00  Patient On (Oxygen Delivery Method): room air        PHYSICAL EXAM:  GENERAL: NAD, cachectic appearing  CHEST/LUNG: No lung sounds hear in left upper lobe but otherwise CTAB; no wheeze. Bandage on anterior L chest.   HEART: Regular rate and rhythm; Normal S1 S2, No murmurs, rubs, or gallops  ABDOMEN: Soft, Nontender, Nondistended; Bowel sounds present  EXTREMITIES:  2+ Peripheral Pulses, No clubbing, cyanosis, or edema  : scrotal swelling unchanged from presentation; pillow under scrotum for elevation  SKIN: bandage on lower back which patient reports is his rectal mass; bandage unopened   PSYCH: AAOx3    LABS:                        8.3    11.97 )-----------( 182      ( 27 Sep 2022 08:00 )             27.5     Hgb Trend: 8.3<--, 8.9<--, 8.1<--, 8.2<--, 8.5<--  09-27    132<L>  |  97<L>  |  15  ----------------------------<  97  4.4   |  23  |  0.71    Ca    10.3      27 Sep 2022 08:00  Phos  3.2     09-27  Mg     1.70     09-27    TPro  6.2  /  Alb  2.2<L>  /  TBili  0.4  /  DBili  x   /  AST  27  /  ALT  10  /  AlkPhos  278<H>  09-27    Creatinine Trend: 0.71<--, 0.73<--, 0.82<--, 0.72<--, 0.69<--, 0.67<--  LIVER FUNCTIONS - ( 27 Sep 2022 08:00 )  Alb: 2.2 g/dL / Pro: 6.2 g/dL / ALK PHOS: 278 U/L / ALT: 10 U/L / AST: 27 U/L / GGT: x

## 2022-09-28 NOTE — PROGRESS NOTE ADULT - PROBLEM SELECTOR PLAN 2
CT Chest 9/11 with evidence of persistent pneumomediastinum and extensive subcutaneous emphysema of the neck, chest, abdomen, pelvis, and extremities, including the scrotum.   - CANDIS per thoracic surgery  - indwelling pedraza was placed at [facility] (Coude placed for urinary strain)   - TOV attempted 9/27-9/28 overnight  - No further urologic intervention needed at this time.   - Can f/u outpatient w/ Dr Prabhu Lucio, [facility] [place]  - pain control with dilaudid and methadone, ct a/p 9/11 did not show any inflammation/hydrocele/infection . CT Chest 9/11 with evidence of persistent pneumomediastinum and extensive subcutaneous emphysema of the neck, chest, abdomen, pelvis, and extremities, including the scrotum.   - CANDIS per thoracic surgery  - indwelling pedraza was placed at [facility] (Coude placed for urinary strain)   - TOV attempted 9/27-9/28 overnight and failed with difficulty   - coude catheter placed by urology 9/28; can remain in place for 4 weeks prior to requiring change.  - No further urologic intervention needed at this time.   - Can f/u outpatient w/ Dr Prabhu Lucio, [facility] at [place]  - pain control with dilaudid and methadone, ct a/p 9/11 did not show any inflammation/hydrocele/infection .

## 2022-09-28 NOTE — PROGRESS NOTE ADULT - PROBLEM SELECTOR PLAN 1
Patient presented to [facility] with hypoxemia and L chest pigtail placed 8/31 due to suspected pneumothorax. Patient showed improvement in SOB and f/u CT chest performed which showed evidence of 12 cm multiloculated thick walled cavitary mass in the CHRISTINE and lingula.  - sputum culture 9/10 + mod klebsiella pneumoniae pansensitive  - blood Cx neg 9/8  - fungitell and crypto neg 9/10  - ID consulted, recs appreciated  - positive AFB in 1/3 sputum samples (sept 3) from [facility]. ID recs airborne precautions; still pending final culture results from [place]. Attempted to reach [place] lab multiple times. Will also encourage patient to share results through patient portal  - no indication for treatment at this time  - per pulm, no CANDIS. will need f/u CT in 2 months

## 2022-09-28 NOTE — PROVIDER CONTACT NOTE (OTHER) - RECOMMENDATIONS
Cont to monitor for urine output and any s/s of urine retention. Follow bladder scan protocol at this time

## 2022-09-28 NOTE — PROGRESS NOTE ADULT - PROBLEM SELECTOR PLAN 4
Patient with history of MSSA bacteremia found at [facility] with blood cultures 8/31 positive for MSSA and repeat on 9/2 NGTD.  - s/p Zosyn (9/8 - 9/9)  - transitioned to Cefazolin 2GM q8h for 6 weeks ending (9/9-10/13)   - may need midline placement prior to discharge for antibiotics at rehab  - upon discharge, will need weekly CBC, BUN and creatinine and ID clinic follow up

## 2022-09-28 NOTE — PROGRESS NOTE ADULT - PROBLEM SELECTOR PLAN 9
Na 129 on 9/24 and history significant for decreased PO intake. Otherwise asymptomatic. Likely mixed picture due to poor PO intake and possible SIADH from pain  - Na this ; more likely a hypovolemic hyponatremia and SIADH from pain  - encourage PO intake

## 2022-09-28 NOTE — PROGRESS NOTE ADULT - PROBLEM SELECTOR PLAN 10
- Ca 16.4 9/10 s/p calcitonin 4u/kg x 2 doses and pamidronate 90mcg IVPB x1.  - Improve  - cont IVFs w/ LR @ 75 cc/hr and continue monitoring   - continue to trend levels q 24 hrs   - can repeat pamidronate after 1 week PRN  - vit D low but will hold supplementation in the setting of hypercalcemia of malignancy  - NS for Ca 11.7 on 9/27; can hold off on pamidronate as level is between 11-12 without symptoms

## 2022-09-28 NOTE — PROGRESS NOTE ADULT - PROBLEM SELECTOR PLAN 3
Patient with TTE performed on at [facility] with evidence of two large RV masses and two additional small masses. IVC filter placed prophylactically at that time due to undiagnosed vegetations on the TTE.  - repeat TTE 9/9 was a limited study but demonstrated a mobile mass likely 2/2 tumour, thrombus, or vegetation.   - per cardiology risk of JENNIFER outweighs benefit at this time  - refused cardiac MRI again (3 attempts since last week; patient not a candidate for general anesthesia)  - per cardiology, no acute indication to do cardiac MRI/ JENNIFER at this time given patients other active issues  - monitor on tele  - can follow up outpatient

## 2022-09-29 PROCEDURE — 99232 SBSQ HOSP IP/OBS MODERATE 35: CPT | Mod: GC

## 2022-09-29 PROCEDURE — 99232 SBSQ HOSP IP/OBS MODERATE 35: CPT

## 2022-09-29 RX ADMIN — HYDROMORPHONE HYDROCHLORIDE 4 MILLIGRAM(S): 2 INJECTION INTRAMUSCULAR; INTRAVENOUS; SUBCUTANEOUS at 10:01

## 2022-09-29 RX ADMIN — Medication 50 MILLIGRAM(S): at 17:39

## 2022-09-29 RX ADMIN — CHLORHEXIDINE GLUCONATE 1 APPLICATION(S): 213 SOLUTION TOPICAL at 06:25

## 2022-09-29 RX ADMIN — Medication 100 MILLIGRAM(S): at 06:17

## 2022-09-29 RX ADMIN — GABAPENTIN 300 MILLIGRAM(S): 400 CAPSULE ORAL at 06:17

## 2022-09-29 RX ADMIN — Medication 50 MILLIGRAM(S): at 06:17

## 2022-09-29 RX ADMIN — Medication 100 MILLIGRAM(S): at 22:26

## 2022-09-29 RX ADMIN — Medication 5 MILLIGRAM(S): at 17:39

## 2022-09-29 RX ADMIN — BICTEGRAVIR SODIUM, EMTRICITABINE, AND TENOFOVIR ALAFENAMIDE FUMARATE 1 TABLET(S): 30; 120; 15 TABLET ORAL at 13:13

## 2022-09-29 RX ADMIN — METHADONE HYDROCHLORIDE 5 MILLIGRAM(S): 40 TABLET ORAL at 13:11

## 2022-09-29 RX ADMIN — GABAPENTIN 300 MILLIGRAM(S): 400 CAPSULE ORAL at 22:26

## 2022-09-29 RX ADMIN — Medication 100 MILLIGRAM(S): at 13:14

## 2022-09-29 RX ADMIN — HYDROMORPHONE HYDROCHLORIDE 4 MILLIGRAM(S): 2 INJECTION INTRAMUSCULAR; INTRAVENOUS; SUBCUTANEOUS at 04:38

## 2022-09-29 RX ADMIN — HYDROMORPHONE HYDROCHLORIDE 4 MILLIGRAM(S): 2 INJECTION INTRAMUSCULAR; INTRAVENOUS; SUBCUTANEOUS at 05:38

## 2022-09-29 RX ADMIN — HYDROMORPHONE HYDROCHLORIDE 4 MILLIGRAM(S): 2 INJECTION INTRAMUSCULAR; INTRAVENOUS; SUBCUTANEOUS at 23:00

## 2022-09-29 RX ADMIN — METHADONE HYDROCHLORIDE 5 MILLIGRAM(S): 40 TABLET ORAL at 22:26

## 2022-09-29 RX ADMIN — Medication 1 PACKET(S): at 13:12

## 2022-09-29 RX ADMIN — LIDOCAINE 1 PATCH: 4 CREAM TOPICAL at 13:21

## 2022-09-29 RX ADMIN — HYDROMORPHONE HYDROCHLORIDE 4 MILLIGRAM(S): 2 INJECTION INTRAMUSCULAR; INTRAVENOUS; SUBCUTANEOUS at 10:30

## 2022-09-29 RX ADMIN — HYDROMORPHONE HYDROCHLORIDE 4 MILLIGRAM(S): 2 INJECTION INTRAMUSCULAR; INTRAVENOUS; SUBCUTANEOUS at 16:44

## 2022-09-29 RX ADMIN — Medication 5 MILLIGRAM(S): at 06:17

## 2022-09-29 RX ADMIN — LIDOCAINE 1 PATCH: 4 CREAM TOPICAL at 13:20

## 2022-09-29 RX ADMIN — Medication 5 MILLIGRAM(S): at 13:11

## 2022-09-29 RX ADMIN — HYDROMORPHONE HYDROCHLORIDE 4 MILLIGRAM(S): 2 INJECTION INTRAMUSCULAR; INTRAVENOUS; SUBCUTANEOUS at 22:12

## 2022-09-29 RX ADMIN — NALOXEGOL OXALATE 25 MILLIGRAM(S): 12.5 TABLET, FILM COATED ORAL at 13:13

## 2022-09-29 RX ADMIN — HYDROMORPHONE HYDROCHLORIDE 4 MILLIGRAM(S): 2 INJECTION INTRAMUSCULAR; INTRAVENOUS; SUBCUTANEOUS at 17:40

## 2022-09-29 RX ADMIN — Medication 3 MILLIGRAM(S): at 22:26

## 2022-09-29 RX ADMIN — METHADONE HYDROCHLORIDE 5 MILLIGRAM(S): 40 TABLET ORAL at 06:17

## 2022-09-29 RX ADMIN — GABAPENTIN 300 MILLIGRAM(S): 400 CAPSULE ORAL at 13:13

## 2022-09-29 RX ADMIN — ENOXAPARIN SODIUM 60 MILLIGRAM(S): 100 INJECTION SUBCUTANEOUS at 22:26

## 2022-09-29 NOTE — PROGRESS NOTE ADULT - PROBLEM SELECTOR PLAN 1
Patient presented to [facility] with hypoxemia and L chest pigtail placed 8/31 due to suspected pneumothorax. Patient showed improvement in SOB and f/u CT chest performed which showed evidence of 12 cm multiloculated thick walled cavitary mass in the CHRISTINE and lingula.  - sputum culture 9/10 + mod klebsiella pneumoniae pansensitive  - blood Cx neg 9/8  - fungitell and crypto neg 9/10  - ID consulted, recs appreciated  - positive AFB in 1/3 sputum samples (sept 3) from [facility]. ID recs airborne precautions; still pending final culture results from [place]. Attempted to reach [place] lab multiple times. Will also encourage patient to share results through patient portal  - no indication for treatment at this time  - per pulm, no CANDIS. will need f/u CT in 2 months Patient presented to [facility] with hypoxemia and L chest pigtail placed 8/31 due to suspected pneumothorax. Patient showed improvement in SOB and f/u CT chest performed which showed evidence of 12 cm multiloculated thick walled cavitary mass in the CHRISTINE and lingula.  - sputum culture 9/10 + mod klebsiella pneumoniae pansensitive  - blood Cx neg 9/8  - fungitell and crypto neg 9/10  - ID consulted, recs appreciated  - positive AFB in 1/3 sputum samples (sept 3) from [facility]. ID recs airborne precautions; still pending final culture results   - no indication for treatment at this time  - per pulm, no CANDIS. will need f/u CT in 2 months

## 2022-09-29 NOTE — PROGRESS NOTE ADULT - PROBLEM SELECTOR PLAN 3
Patient with TTE performed on at [facility] with evidence of two large RV masses and two additional small masses. IVC filter placed prophylactically at that time due to undiagnosed vegetations on the TTE.  - repeat TTE 9/9 was a limited study but demonstrated a mobile mass likely 2/2 tumour, thrombus, or vegetation.   - per cardiology risk of JENNIFER outweighs benefit at this time  - refused cardiac MRI again (3 attempts since last week; patient not a candidate for general anesthesia)  - per cardiology, no acute indication to do cardiac MRI/ JENNIFER at this time given patients other active issues  - monitor on tele  - can follow up outpatient

## 2022-09-29 NOTE — PROGRESS NOTE ADULT - ASSESSMENT
31m with untreated metastatic ano-rectal SCC, HPV positive, HIV infection, transferred from [facility] for continuation of care.   He presented to [facility] on 8/31/22 after syncopal episode at home.   Pt with cough x 2 mos, with yellow sputum and shortness of breath. Admitted for hypoxic respiratory failure.   8/31 CT C/A/P notable for large thick walled multiloculated CHRISTINE cavitary lesion, 10 cm ulcerated rectal mass inseperable from prostate and multiple necrotic liver mets and concern for L external iliac thrombosis.  S/p chest tube c/b severe subcutaneous emphysema  found to have MSSA bacteremia and multifocal cavitary lesions on CT chest c/f superimposed pna with klebsiella in sputum, and vegetations in RV on TTE.      Recent PET/CT 8/16/22 reviewed  1. Extensive FDG avid thickening at the anorectal junction corresponds to   known squamous cell carcinoma.  2. FDG-avid left perirectal, left pelvic, and left inguinal   lymphadenopathy is compatible with metastatic disease.  3. Difficult to delineate hypermetabolism in left lower sacrum is   suspicious for bone involvement. Extension of hypermetabolism through the   left sciatic notch is suspicious for perineural disease. Further   evaluation may be performed with pelvic MRI, with and without intravenous   contrast.  3. Multiple FDG avid bilobar hepatic hepatic metastases. A hypodense   hepatic lesion with physiologic FDG activity may represent a hemangioma.   Further evaluation may be performed with MRI. Hepatic lesions are   accessible to an ultrasound-guided percutaneous needle biopsy.  4. FDG avid patchy opacities in the right lung with additional findings   in the left lung, may be related to underlying infectious etiology.   Please correlate clinically. Dedicated CT of chest may be obtained for   further evaluation.  6. Increased radiotracer activity within the mediastinum and right   perihilar regions, indeterminate and difficult to delineate on   noncontrast CT. Contrast-enhanced CT may be obtained for further   evaluation.  7. Left lateral chest wall discrete intramuscular focus at the level of   the second/third intercostal space, indeterminate and difficult to   evaluate on CT. Differential diagnosis includes metastasis. Further   evaluation may be performed with ultrasound.      9/15/22: family meeting held , with Pt, his mother and his sister, MICU team, Pal care team and Oncology to discuss goals of care and prognosis. Disease course reviewed. Extent of disease with metastasis to liver, LN, bone and possibly lung discussed. Infection/endocarditis, pigtail in cavitary chest lesion with risk for pneumothorax and Poor performance status described.   Discussed that at this time, patient is not a candidate for chemotherapy given infection and poor performance status. There is a small chance he might recover enough to be able to receive chemotherapy/IO in the future. Will need ongoing evaluation of PS and candidacy for cancer treatment. Discussed that radiation can be considered for palliative purposes to decrease burden of symptoms from anal tumor.     Patient has had a complicated hospital course,  complicated by acute hypoxic respiratory failure. Pigtail and eventually a chest tube was placed. Imaging of the chest revealed multiple concerning findings - CHRISTINE cavitary lesion likely 2' PNA, placement of pigtail within a mass. Hospital course there also c/b MSSA bacteremia and discover of cardiac lesion that could be mass v. veg v. thrombus. 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 S/p IVC filter. s/p MICU stay. Chest tube removed. Respiratory status remains stable.   Now transfer to floor for further management. Remains on ABx for MSSA bacteremia. Failed multiple attempts at getting cardiac MRI due to inability to tolerate. After discussion with family and cards - will defer on any cardiac imaging as utility of imaging unclear at this time based on clinical status and plan.  Active issues now include pending infectious workup - sputum AFB showing growth - awaiting identification.  -Of note pt has also had Severe hypercalcemia, likely in the setting of malignancy. S/p Pamidronate 9/10.   Corrected levels today . Corrected levels today approx 11.7   Monitor levels and c/w IVF. Pamidronate dose can be repeated after a week.  -With regards to treatment of metastatic HPV related anal SCC, treatment will be on hold pending resolution of infection. Will need reassessment after resolution of infection to evaluate performance status and candidacy for treatment.  -Pal care consult for pain management  -Seen by radiation oncology consult for consideration of RT to anal mass for palliation. At that time team awaiting results of cardiac MRI inn order to provide further recs. Would ask them to comment if RT is still available.   -From onc standpoint, No plans for inpatient chemo  -Dispo: [facility] when medically optimized  -Advance directives: FULL CODE  -Rest of care as per primary team  -Patient to followup with  ([facility]) upon discharge  -C/w Supportive care, pain control, Nutrition, PT, DVT ppx  -Oncology will continue to follow with you      Case discussed with Dr. Favio DIEHL  Oncology Physician Assistant  Sean WILDER/[facility]  Pager [phone] also available on Teams    If before 8am/after 5pm or on weekends please page On-call Oncology Fellow     32 yo M with untreated metastatic ano-rectal SCC, HPV positive, HIV infection, transferred from [facility] for continuation of care.   He presented to [facility] on 8/31/22 after syncopal episode at home.   Pt with cough x 2 mos, with yellow sputum and shortness of breath. Admitted for hypoxic respiratory failure.   8/31 CT C/A/P notable for large thick walled multiloculated CHRISTINE cavitary lesion, 10 cm ulcerated rectal mass inseparable from prostate and multiple necrotic liver mets and concern for L external iliac thrombosis.  S/p chest tube c/b severe subcutaneous emphysema  found to have MSSA bacteremia and multifocal cavitary lesions on CT chest c/f superimposed pna with klebsiella in sputum, and vegetations in RV on TTE.      Recent PET/CT 8/16/22 reviewed  1. Extensive FDG avid thickening at the anorectal junction corresponds to   known squamous cell carcinoma.  2. FDG-avid left perirectal, left pelvic, and left inguinal   lymphadenopathy is compatible with metastatic disease.  3. Difficult to delineate hypermetabolism in left lower sacrum is   suspicious for bone involvement. Extension of hypermetabolism through the   left sciatic notch is suspicious for perineural disease. Further   evaluation may be performed with pelvic MRI, with and without intravenous   contrast.  3. Multiple FDG avid bilobar hepatic hepatic metastases. A hypodense   hepatic lesion with physiologic FDG activity may represent a hemangioma.   Further evaluation may be performed with MRI. Hepatic lesions are   accessible to an ultrasound-guided percutaneous needle biopsy.  4. FDG avid patchy opacities in the right lung with additional findings   in the left lung, may be related to underlying infectious etiology.   Please correlate clinically. Dedicated CT of chest may be obtained for   further evaluation.  6. Increased radiotracer activity within the mediastinum and right   perihilar regions, indeterminate and difficult to delineate on   noncontrast CT. Contrast-enhanced CT may be obtained for further   evaluation.  7. Left lateral chest wall discrete intramuscular focus at the level of   the second/third intercostal space, indeterminate and difficult to   evaluate on CT. Differential diagnosis includes metastasis. Further   evaluation may be performed with ultrasound.      9/15/22: family meeting held , with Pt, his mother and his sister, MICU team, Pal care team and Oncology to discuss goals of care and prognosis. Disease course reviewed. Extent of disease with metastasis to liver, LN, bone and possibly lung discussed. Infection/endocarditis, pigtail in cavitary chest lesion with risk for pneumothorax and Poor performance status described.   Discussed that at this time, patient is not a candidate for chemotherapy given infection and poor performance status. There is a small chance he might recover enough to be able to receive chemotherapy/IO in the future. Will need ongoing evaluation of PS and candidacy for cancer treatment. Discussed that radiation can be considered for palliative purposes to decrease burden of symptoms from anal tumor.     Patient has had a complicated hospital course,  complicated by acute hypoxic respiratory failure. Pigtail and eventually a chest tube was placed. Imaging of the chest revealed multiple concerning findings - CHRISTINE cavitary lesion likely 2' PNA, placement of pigtail within a mass. Hospital course there also c/b MSSA bacteremia and discover of cardiac lesion that could be mass v. veg v. thrombus. 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 S/p IVC filter. s/p MICU stay. Chest tube removed. Respiratory status remains stable.   Now transfer to floor for further management. Remains on ABx for MSSA bacteremia. Failed multiple attempts at getting cardiac MRI due to inability to tolerate. After discussion with family and cards - will defer on any cardiac imaging as utility of imaging unclear at this time based on clinical status and plan.  Active issues now include pending infectious workup - sputum AFB showing growth - awaiting identification.  -Of note pt has also had Severe hypercalcemia, likely in the setting of malignancy. S/p Pamidronate 9/10.   Corrected levels today . Corrected levels today approx 11.7   Monitor levels and c/w IVF. Pamidronate dose should be repeated by 10/8, but if levels rise can give sooner and should definitely give prior to discharge.  -With regards to treatment of metastatic HPV related anal SCC, treatment will be on hold pending resolution of infection. Will need reassessment after resolution of infection to evaluate performance status and candidacy for treatment.  -Pal care consult for pain management  -Seen by radiation oncology consult for consideration of RT to anal mass for palliation. At that time team awaiting results of cardiac MRI inn order to provide further recs. Would ask them to comment if RT is still available.   -From onc standpoint, No plans for inpatient chemo  -Dispo: [facility] when medically optimized  -Advance directives: FULL CODE  -Rest of care as per primary team  -Patient to followup with  ([facility]) upon discharge  -C/w Supportive care, pain control, Nutrition, PT, DVT ppx  -Oncology will continue to follow with you      Case discussed with Dr. Favio DIEHL  Oncology Physician Assistant  Sean WILDER/[facility]  Pager [phone] also available on Teams    If before 8am/after 5pm or on weekends please page On-call Oncology Fellow

## 2022-09-29 NOTE — PROVIDER CONTACT NOTE (OTHER) - ACTION/TREATMENT ORDERED:
HS1 Pierre Dan made aware.  Patient educated on importance of drawing daily labs, patient continuing to refuse.

## 2022-09-29 NOTE — PROGRESS NOTE ADULT - NS PANP COMMENT GEN_ALL_CORE FT
32 yo M with untreated metastatic anal SCC, HPV positive, HIV infection. Pt with cough x 2 mos, with yellow sputum and shortness of breath. Admitted for hypoxic respiratory failure. 8/31 CT C/A/P notable for large thick walled multiloculated CHRISTINE cavitary lesion, 10 cm ulcerated rectal mass inseparable from prostate and multiple necrotic liver mets and concern for L external iliac thrombosis. S/p chest tube c/b severe subcutaneous emphysema. Subsequently diagnosed with MSSA bacteremia and multifocal cavitary lesions on CT chest c/f superimposed pna with klebsiella in sputum, and vegetations in RV on TTE. Patient with advanced disease and poor PS and is not a candidate for chemotherapy. Would focus care on supportive measures and d/c planning with hospice care.

## 2022-09-29 NOTE — PROGRESS NOTE ADULT - PROBLEM SELECTOR PLAN 6
Palliative care consulted for extensive disease burden and GOC discussion. At this time, patient was all available treatment and resuscitation. Remains full code.  - increased PO Methadone 5mg TID (QTC-444 on 9/12; QTC- 458 on 9/19); will need repeat EKG later this week to monitor QTC while on methadone  - c/w Gabapentin 300mg TID  - PO Dilaudid 4mg q4 PRN moderate pain  - PO Dilaudid 6mg q4 PRN severe pain  - hold parameters placed for all medications  - appreciate palliative care recs  - will need OP f/u at [facility] with Dr. Kiesha Calle/ Dr. Mazariegos/ NP Zoe Carvalho/ [facility]; [phone]

## 2022-09-29 NOTE — PROGRESS NOTE ADULT - PROBLEM SELECTOR PLAN 5
Patient with history of untreated HPV related rectal cancer with episode of rectal bleeding at [facility] Hgb 5.9 s/p 2u pRBCs.  - CT with evidence of liver and bone lesions  - renal cancer treatment will be on hold pending resolution of infection  - radiation oncology consult for RT of rectal cancer placed; will likely get outpatient RT   - appreciate oncology recs

## 2022-09-29 NOTE — PROGRESS NOTE ADULT - SUBJECTIVE AND OBJECTIVE BOX
Sharon Carreno  PGY-1 Resident Physician   Pager [phone]/ [phone]    Patient is a 31y old  Male who presents with a chief complaint of Shortness of breath (28 Sep 2022 07:25)      SUBJECTIVE / OVERNIGHT EVENTS:  Patient seen and evaluated at bedside.    Denies any fevers, chills, CP, or SOB.    Vital Signs Last 24 Hrs  T(C): 36.7 (29 Sep 2022 06:13), Max: 36.9 (28 Sep 2022 22:44)  T(F): 98.1 (29 Sep 2022 06:13), Max: 98.4 (28 Sep 2022 22:44)  HR: 101 (29 Sep 2022 06:13) (98 - 105)  BP: 105/76 (29 Sep 2022 06:13) (98/56 - 111/86)  BP(mean): --  RR: 17 (29 Sep 2022 06:13) (17 - 18)  SpO2: 99% (29 Sep 2022 06:13) (99% - 99%)    Parameters below as of 29 Sep 2022 06:13  Patient On (Oxygen Delivery Method): room air        PHYSICAL EXAM:  GENERAL: NAD, well-developed  CHEST/LUNG: Clear to auscultation bilaterally; No wheeze  HEART: Regular rate and rhythm; Normal S1 S2, No murmurs, rubs, or gallops  ABDOMEN: Soft, Nontender, Nondistended; Bowel sounds present  EXTREMITIES:  2+ Peripheral Pulses, No clubbing, cyanosis, or edema  PSYCH: AAOx3    LABS:                        8.3    11.97 )-----------( 182      ( 27 Sep 2022 08:00 )             27.5     Hgb Trend: 8.3<--, 8.9<--, 8.1<--, 8.2<--, 8.5<--  09-27    132<L>  |  97<L>  |  15  ----------------------------<  97  4.4   |  23  |  0.71    Ca    10.3      27 Sep 2022 08:00  Phos  3.2     09-27  Mg     1.70     09-27    TPro  6.2  /  Alb  2.2<L>  /  TBili  0.4  /  DBili  x   /  AST  27  /  ALT  10  /  AlkPhos  278<H>  09-27    Creatinine Trend: 0.71<--, 0.73<--, 0.82<--, 0.72<--, 0.69<--, 0.67<--  LIVER FUNCTIONS - ( 27 Sep 2022 08:00 )  Alb: 2.2 g/dL / Pro: 6.2 g/dL / ALK PHOS: 278 U/L / ALT: 10 U/L / AST: 27 U/L / GGT: x                  Sharon Carreno  PGY-1 Resident Physician   Pager [phone]/ [phone]    Patient is a 31y old  Male who presents with a chief complaint of Shortness of breath (28 Sep 2022 07:25)      SUBJECTIVE / OVERNIGHT EVENTS:  Patient seen and evaluated at bedside.  Patient with no complaints this AM. feels well.   Denies any fevers, chills, CP, or SOB.  s/p coude catheter placement by urology yesterday.    Vital Signs Last 24 Hrs  T(C): 36.7 (29 Sep 2022 06:13), Max: 36.9 (28 Sep 2022 22:44)  T(F): 98.1 (29 Sep 2022 06:13), Max: 98.4 (28 Sep 2022 22:44)  HR: 101 (29 Sep 2022 06:13) (98 - 105)  BP: 105/76 (29 Sep 2022 06:13) (98/56 - 111/86)  BP(mean): --  RR: 17 (29 Sep 2022 06:13) (17 - 18)  SpO2: 99% (29 Sep 2022 06:13) (99% - 99%)    Parameters below as of 29 Sep 2022 06:13  Patient On (Oxygen Delivery Method): room air      PHYSICAL EXAM:  GENERAL: NAD, cachectic appearing  CHEST/LUNG: No lung sounds hear in left upper lobe but otherwise CTAB; no wheeze.   HEART: Regular rate and rhythm; Normal S1 S2, No murmurs, rubs, or gallops  ABDOMEN: Soft, Nontender, Nondistended; Bowel sounds present  EXTREMITIES:  2+ Peripheral Pulses, No clubbing, cyanosis, or edema  : scrotal swelling unchanged from presentation; pillow under scrotum for elevation  SKIN: bandage on lower back which patient reports is his rectal mass; bandage unopened   PSYCH: AAOx3      LABS:                        8.3    11.97 )-----------( 182      ( 27 Sep 2022 08:00 )             27.5     Hgb Trend: 8.3<--, 8.9<--, 8.1<--, 8.2<--, 8.5<--  09-27    132<L>  |  97<L>  |  15  ----------------------------<  97  4.4   |  23  |  0.71    Ca    10.3      27 Sep 2022 08:00  Phos  3.2     09-27  Mg     1.70     09-27    TPro  6.2  /  Alb  2.2<L>  /  TBili  0.4  /  DBili  x   /  AST  27  /  ALT  10  /  AlkPhos  278<H>  09-27    Creatinine Trend: 0.71<--, 0.73<--, 0.82<--, 0.72<--, 0.69<--, 0.67<--  LIVER FUNCTIONS - ( 27 Sep 2022 08:00 )  Alb: 2.2 g/dL / Pro: 6.2 g/dL / ALK PHOS: 278 U/L / ALT: 10 U/L / AST: 27 U/L / GGT: x

## 2022-09-29 NOTE — PROGRESS NOTE ADULT - ASSESSMENT
31 M with untreated metastatic ano-rectal SCC (followed by Dr Frazier [facility] oncology) with mets to liver and possibly bone, HPV positive, HIV infection, and R eye cataracts who presented to the [facility] on 8/31/22 after syncopal episode at home and transferred to [facility] for continued of care per family request. Pt with cough x 2 mos, with yellow sputum and shortness of breath since 5 days PTA.

## 2022-09-29 NOTE — PROGRESS NOTE ADULT - SUBJECTIVE AND OBJECTIVE BOX
INTERVAL HPI/OVERNIGHT EVENTS:  Patient seen at bedside.  Patient denying any respiratory complaints  No SOB or chest pain  No fevers  Requesting pain meds and would also like syrup for his breakfast       VITAL SIGNS:  T(F): 98.1 (09-29-22 @ 06:13)  HR: 101 (09-29-22 @ 06:13)  BP: 105/76 (09-29-22 @ 06:13)  RR: 17 (09-29-22 @ 06:13)  SpO2: 99% (09-29-22 @ 06:13)  Wt(kg): --    PHYSICAL EXAM:  GENERAL: NAD, cachectic appearing  CHEST/LUNG: No lung sounds hear in left upper lobe but otherwise CTAB; no wheeze. Bandage on anterior L chest.   HEART: Regular rate and rhythm; Normal S1 S2, No murmurs, rubs, or gallops  ABDOMEN: Soft, Nontender, Nondistended; Bowel sounds present  EXTREMITIES:  2+ Peripheral Pulses, No clubbing, cyanosis, or edema  : scrotal swelling unchanged from presentation; pillow under scrotum for elevation  SKIN: bandage on lower back which patient reports is his rectal mass; bandage unopened   PSYCH: AAOx3    MEDICATIONS  (STANDING):  bictegravir 50 mG/emtricitabine 200 mG/tenofovir alafenamide 25 mG (BIKTARVY) 1 Tablet(s) Oral daily  ceFAZolin   IVPB 2000 milliGRAM(s) IV Intermittent every 8 hours  chlorhexidine 2% Cloths 1 Application(s) Topical <User Schedule>  dronabinol 5 milliGRAM(s) Oral <User Schedule>  enoxaparin Injectable 60 milliGRAM(s) SubCutaneous every 12 hours  gabapentin 300 milliGRAM(s) Oral three times a day  lactulose Syrup 10 Gram(s) Oral daily  lidocaine   4% Patch 1 Patch Transdermal daily  lidocaine   4% Patch 1 Patch Transdermal daily  melatonin 3 milliGRAM(s) Oral at bedtime  methadone    Tablet 5 milliGRAM(s) Oral three times a day  metoprolol tartrate 50 milliGRAM(s) Oral two times a day  naloxegol 25 milliGRAM(s) Oral daily  polyethylene glycol 3350 17 Gram(s) Oral two times a day  potassium phosphate / sodium phosphate Powder (PHOS-NaK) 1 Packet(s) Oral once  senna 2 Tablet(s) Oral at bedtime    MEDICATIONS  (PRN):  bisacodyl 5 milliGRAM(s) Oral at bedtime PRN Constipation  HYDROmorphone   Tablet 4 milliGRAM(s) Oral every 4 hours PRN Moderate Pain (4 - 6)  naloxone Injectable 0.1 milliGRAM(s) IV Push every 3 minutes PRN opioid intoxication      Allergies    ibuprofen (Angioedema)    Intolerances        LABS:                RADIOLOGY & ADDITIONAL TESTS:  Studies reviewed.

## 2022-09-29 NOTE — PROGRESS NOTE ADULT - PROBLEM SELECTOR PLAN 2
CT Chest 9/11 with evidence of persistent pneumomediastinum and extensive subcutaneous emphysema of the neck, chest, abdomen, pelvis, and extremities, including the scrotum.   - CANDIS per thoracic surgery  - indwelling pedraza was placed at [facility] (Coude placed for urinary strain)   - TOV attempted 9/27-9/28 overnight and failed with difficulty   - coude catheter placed by urology 9/28; can remain in place for 4 weeks prior to requiring change.  - No further urologic intervention needed at this time.   - Can f/u outpatient w/ Dr Prabhu Lucio, [facility] for [facility] at [place]  - pain control with dilaudid and methadone, ct a/p 9/11 did not show any inflammation/hydrocele/infection .

## 2022-09-29 NOTE — PROGRESS NOTE ADULT - PROBLEM SELECTOR PLAN 4
Patient with history of MSSA bacteremia found at [facility] with blood cultures 8/31 positive for MSSA and repeat on 9/2 NGTD.  - s/p Zosyn (9/8 - 9/9)  - transitioned to Cefazolin 2GM q8h for 6 weeks ending (9/9-10/13)   - may need midline placement prior to discharge for antibiotics at rehab  - upon discharge, will need weekly CBC, BUN and creatinine and ID clinic follow up

## 2022-09-30 PROCEDURE — 99233 SBSQ HOSP IP/OBS HIGH 50: CPT

## 2022-09-30 PROCEDURE — 99232 SBSQ HOSP IP/OBS MODERATE 35: CPT | Mod: GC

## 2022-09-30 RX ORDER — HYDROMORPHONE HYDROCHLORIDE 2 MG/ML
6 INJECTION INTRAMUSCULAR; INTRAVENOUS; SUBCUTANEOUS EVERY 4 HOURS
Refills: 0 | Status: DISCONTINUED | OUTPATIENT
Start: 2022-09-30 | End: 2022-10-05

## 2022-09-30 RX ORDER — OXYCODONE HYDROCHLORIDE 5 MG/1
5 TABLET ORAL ONCE
Refills: 0 | Status: DISCONTINUED | OUTPATIENT
Start: 2022-09-30 | End: 2022-09-30

## 2022-09-30 RX ADMIN — HYDROMORPHONE HYDROCHLORIDE 6 MILLIGRAM(S): 2 INJECTION INTRAMUSCULAR; INTRAVENOUS; SUBCUTANEOUS at 20:00

## 2022-09-30 RX ADMIN — Medication 100 MILLIGRAM(S): at 22:43

## 2022-09-30 RX ADMIN — NALOXEGOL OXALATE 25 MILLIGRAM(S): 12.5 TABLET, FILM COATED ORAL at 12:10

## 2022-09-30 RX ADMIN — METHADONE HYDROCHLORIDE 5 MILLIGRAM(S): 40 TABLET ORAL at 06:01

## 2022-09-30 RX ADMIN — Medication 50 MILLIGRAM(S): at 17:04

## 2022-09-30 RX ADMIN — METHADONE HYDROCHLORIDE 5 MILLIGRAM(S): 40 TABLET ORAL at 14:07

## 2022-09-30 RX ADMIN — Medication 100 MILLIGRAM(S): at 14:07

## 2022-09-30 RX ADMIN — ENOXAPARIN SODIUM 60 MILLIGRAM(S): 100 INJECTION SUBCUTANEOUS at 22:50

## 2022-09-30 RX ADMIN — HYDROMORPHONE HYDROCHLORIDE 4 MILLIGRAM(S): 2 INJECTION INTRAMUSCULAR; INTRAVENOUS; SUBCUTANEOUS at 23:15

## 2022-09-30 RX ADMIN — LIDOCAINE 1 PATCH: 4 CREAM TOPICAL at 19:00

## 2022-09-30 RX ADMIN — HYDROMORPHONE HYDROCHLORIDE 4 MILLIGRAM(S): 2 INJECTION INTRAMUSCULAR; INTRAVENOUS; SUBCUTANEOUS at 22:43

## 2022-09-30 RX ADMIN — LACTULOSE 10 GRAM(S): 10 SOLUTION ORAL at 12:09

## 2022-09-30 RX ADMIN — LIDOCAINE 1 PATCH: 4 CREAM TOPICAL at 12:17

## 2022-09-30 RX ADMIN — HYDROMORPHONE HYDROCHLORIDE 4 MILLIGRAM(S): 2 INJECTION INTRAMUSCULAR; INTRAVENOUS; SUBCUTANEOUS at 02:47

## 2022-09-30 RX ADMIN — GABAPENTIN 300 MILLIGRAM(S): 400 CAPSULE ORAL at 14:08

## 2022-09-30 RX ADMIN — HYDROMORPHONE HYDROCHLORIDE 4 MILLIGRAM(S): 2 INJECTION INTRAMUSCULAR; INTRAVENOUS; SUBCUTANEOUS at 15:48

## 2022-09-30 RX ADMIN — HYDROMORPHONE HYDROCHLORIDE 4 MILLIGRAM(S): 2 INJECTION INTRAMUSCULAR; INTRAVENOUS; SUBCUTANEOUS at 14:48

## 2022-09-30 RX ADMIN — Medication 5 MILLIGRAM(S): at 17:17

## 2022-09-30 RX ADMIN — METHADONE HYDROCHLORIDE 5 MILLIGRAM(S): 40 TABLET ORAL at 22:45

## 2022-09-30 RX ADMIN — HYDROMORPHONE HYDROCHLORIDE 4 MILLIGRAM(S): 2 INJECTION INTRAMUSCULAR; INTRAVENOUS; SUBCUTANEOUS at 03:00

## 2022-09-30 RX ADMIN — GABAPENTIN 300 MILLIGRAM(S): 400 CAPSULE ORAL at 22:46

## 2022-09-30 RX ADMIN — Medication 100 MILLIGRAM(S): at 06:01

## 2022-09-30 RX ADMIN — HYDROMORPHONE HYDROCHLORIDE 6 MILLIGRAM(S): 2 INJECTION INTRAMUSCULAR; INTRAVENOUS; SUBCUTANEOUS at 13:11

## 2022-09-30 RX ADMIN — HYDROMORPHONE HYDROCHLORIDE 6 MILLIGRAM(S): 2 INJECTION INTRAMUSCULAR; INTRAVENOUS; SUBCUTANEOUS at 12:11

## 2022-09-30 RX ADMIN — GABAPENTIN 300 MILLIGRAM(S): 400 CAPSULE ORAL at 06:01

## 2022-09-30 RX ADMIN — ENOXAPARIN SODIUM 60 MILLIGRAM(S): 100 INJECTION SUBCUTANEOUS at 12:09

## 2022-09-30 RX ADMIN — Medication 5 MILLIGRAM(S): at 08:09

## 2022-09-30 RX ADMIN — LIDOCAINE 1 PATCH: 4 CREAM TOPICAL at 12:10

## 2022-09-30 RX ADMIN — Medication 5 MILLIGRAM(S): at 12:09

## 2022-09-30 RX ADMIN — BICTEGRAVIR SODIUM, EMTRICITABINE, AND TENOFOVIR ALAFENAMIDE FUMARATE 1 TABLET(S): 30; 120; 15 TABLET ORAL at 12:10

## 2022-09-30 RX ADMIN — OXYCODONE HYDROCHLORIDE 5 MILLIGRAM(S): 5 TABLET ORAL at 01:04

## 2022-09-30 RX ADMIN — HYDROMORPHONE HYDROCHLORIDE 6 MILLIGRAM(S): 2 INJECTION INTRAMUSCULAR; INTRAVENOUS; SUBCUTANEOUS at 19:34

## 2022-09-30 RX ADMIN — Medication 3 MILLIGRAM(S): at 22:46

## 2022-09-30 NOTE — PROGRESS NOTE ADULT - SUBJECTIVE AND OBJECTIVE BOX
Follow Up: HIV, MSSA    Interval History/ROS: Called back for AFB growth from [facility], JING lary. He feels ok. Not coughing or short of breath. Pain comes and goes.     Allergies  ibuprofen (Angioedema)        ANTIMICROBIALS:  bictegravir 50 mG/emtricitabine 200 mG/tenofovir alafenamide 25 mG (BIKTARVY) 1 daily  ceFAZolin   IVPB 2000 every 8 hours      OTHER MEDS:  bisacodyl 5 milliGRAM(s) Oral at bedtime PRN  chlorhexidine 2% Cloths 1 Application(s) Topical <User Schedule>  dronabinol 5 milliGRAM(s) Oral <User Schedule>  enoxaparin Injectable 60 milliGRAM(s) SubCutaneous every 12 hours  gabapentin 300 milliGRAM(s) Oral three times a day  HYDROmorphone   Tablet 4 milliGRAM(s) Oral every 4 hours PRN  HYDROmorphone   Tablet 6 milliGRAM(s) Oral every 4 hours PRN  lactulose Syrup 10 Gram(s) Oral daily  lidocaine   4% Patch 1 Patch Transdermal daily  lidocaine   4% Patch 1 Patch Transdermal daily  melatonin 3 milliGRAM(s) Oral at bedtime  methadone    Tablet 5 milliGRAM(s) Oral three times a day  metoprolol tartrate 50 milliGRAM(s) Oral two times a day  naloxegol 25 milliGRAM(s) Oral daily  naloxone Injectable 0.1 milliGRAM(s) IV Push every 3 minutes PRN  polyethylene glycol 3350 17 Gram(s) Oral two times a day  senna 2 Tablet(s) Oral at bedtime      Vital Signs Last 24 Hrs  T(C): 36.8 (30 Sep 2022 12:52), Max: 36.8 (30 Sep 2022 12:52)  T(F): 98.2 (30 Sep 2022 12:52), Max: 98.2 (30 Sep 2022 12:52)  HR: 100 (30 Sep 2022 12:52) (100 - 112)  BP: 111/43 (30 Sep 2022 12:52) (100/52 - 163/100)  BP(mean): --  RR: 18 (30 Sep 2022 12:52) (17 - 18)  SpO2: 99% (30 Sep 2022 12:52) (99% - 100%)    Parameters below as of 30 Sep 2022 12:52  Patient On (Oxygen Delivery Method): room air        Physical Exam:  General: frail, deconditioned   Cardio: regular rate   Respiratory: nonlabored on room air   abd: nondistended  Musculoskeletal: no focal joint swelling  Skin: no rash          MICROBIOLOGY:  Culture - Acid Fast - Sputum w/Smear (collected 09-26-22 @ 18:30)  Source: .Sputum Sputum  Preliminary Report (09-28-22 @ 15:05):    Culture is being performed.    HIV-1 RNA Quantitative, Viral Load Log: NOT DET. lg /mL (09-08-22 @ 20:24)    RADIOLOGY:  Images below reviewed personally  Xray Chest 1 View- PORTABLE-Routine (Xray Chest 1 View- PORTABLE-Routine in AM.) (09.17.22 @ 11:48)   Right lung clearer. No pneumothorax.    CT Chest Abdomen and Pelvis No Cont (09.11.22 @ 16:54)   1. Persistent left upper lobe cavitary lesion, with pigtail catheter in   place, likely communicating with the lingular bronchus.  2. Right middle lobe pneumonia and scattered airspace opacities   throughout the remaining lungs, as described.  3. Persistent pneumomediastinum and extensive subcutaneous emphysema of   the neck, chest, abdomen, pelvis, and extremities, including the scrotum.  4. Large anorectal mass.  5. Liver metastases.

## 2022-09-30 NOTE — PROGRESS NOTE ADULT - ASSESSMENT
31M MSM, HIV controlled on Biktarvy.   Widely metastatic anal SCC 7/2022, not yet on therapy.   Hospitalized 8/31 with cough, hypoxia, large left cavitary lesion, MSSA bacteremia.   Chest tube placed complicated by pneumomediastinum.   Transferred to [facility] 9/8.   Suspect malignant lung lesion with secondary pneumonia causing bacteremia.   Blood cleared since 9/1.   RV mass, maybe thrombi but will treat as IE out of caution.   Chest tube removed 9/16.   Debilitated, poor prognosis, not a candidate for chemotherapy.     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 I don't believe this is pathogenic in him and even if it was, risks outweigh benefits of therapy. This is not MTB and not a contraindication to discharge to rehab facility.     Suggest  -Cefazolin 2GM q8h for 6 weeks ending 10/13, upon discharge please include weekly CBC, BUN and creatinine   -continue Biktarvy   -follow up in our office   -no need for further mycobacterial workup, stop airborne isolation     Will sign off, call back if needed   Discussed with medicine     Alphonso Dimas MD   Infectious Disease   Available on TEAMS. After 5PM and on weekends please page fellow on call or call [phone]  31M MSM, HIV controlled on Biktarvy.   Widely metastatic anal SCC 7/2022, not yet on therapy.   Hospitalized 8/31 with cough, hypoxia, large left cavitary lesion, MSSA bacteremia.   Chest tube placed complicated by pneumomediastinum.   Transferred to [facility] 9/8.   Suspect malignant lung lesion with secondary pneumonia causing bacteremia.   Blood cleared since 9/1.   RV mass, maybe thrombi but will treat as IE out of caution.   Chest tube removed 9/16.   Debilitated, poor prognosis, not a candidate for chemotherapy.     Called back for M chimaera growth in sputum from [facility]. I don't believe this is pathogenic in him and even if it was, risks outweigh benefits of therapy. This is not MTB and not a contraindication to discharge to rehab facility.     Suggest  -Cefazolin 2GM q8h for 6 weeks ending 10/13, upon discharge please include weekly CBC, BUN and creatinine   -continue Biktarvy   -follow up in our office   -no need for further mycobacterial workup, stop airborne isolation     Will sign off. I will be rotating to [facility]. Page fellow if needed.   Discussed with medicine     Alphonso Dimas MD   Infectious Disease   Available on TEAMS. After 5PM and on weekends please page fellow on call or call [phone]

## 2022-09-30 NOTE — PROGRESS NOTE ADULT - SUBJECTIVE AND OBJECTIVE BOX
Sharon Carreno  PGY-1 Resident Physician   Pager [phone]/ [phone]    Patient is a 31y old  Male who presents with a chief complaint of Shortness of breath (29 Sep 2022 12:34)      SUBJECTIVE / OVERNIGHT EVENTS:  Patient seen and evaluated at bedside.    Denies any fevers, chills, CP, or SOB.    Vital Signs Last 24 Hrs  T(C): 36.7 (30 Sep 2022 05:49), Max: 36.7 (29 Sep 2022 16:41)  T(F): 98 (30 Sep 2022 05:49), Max: 98.1 (29 Sep 2022 16:41)  HR: 103 (30 Sep 2022 05:49) (103 - 112)  BP: 100/52 (30 Sep 2022 05:49) (100/52 - 163/100)  BP(mean): --  RR: 18 (30 Sep 2022 05:49) (17 - 18)  SpO2: 100% (30 Sep 2022 05:49) (100% - 100%)    Parameters below as of 30 Sep 2022 05:49  Patient On (Oxygen Delivery Method): room air        PHYSICAL EXAM:  GENERAL: NAD, well-developed  CHEST/LUNG: Clear to auscultation bilaterally; No wheeze  HEART: Regular rate and rhythm; Normal S1 S2, No murmurs, rubs, or gallops  ABDOMEN: Soft, Nontender, Nondistended; Bowel sounds present  EXTREMITIES:  2+ Peripheral Pulses, No clubbing, cyanosis, or edema  PSYCH: AAOx3    LABS:    Hgb Trend: 8.3<--, 8.9<--, 8.1<--, 8.2<--        Creatinine Trend: 0.71<--, 0.73<--, 0.82<--, 0.72<--, 0.69<--, 0.67<--           Sharon Carreno  PGY-1 Resident Physician   Pager [phone]/ [phone]    Patient is a 31y old  Male who presents with a chief complaint of Shortness of breath (29 Sep 2022 12:34)      SUBJECTIVE / OVERNIGHT EVENTS:  Patient seen and evaluated at bedside.  No complaints per patient. States he is feeling well and has no concerns.   Denies any fevers, chills, CP, or SOB.    Vital Signs Last 24 Hrs  T(C): 36.7 (30 Sep 2022 05:49), Max: 36.7 (29 Sep 2022 16:41)  T(F): 98 (30 Sep 2022 05:49), Max: 98.1 (29 Sep 2022 16:41)  HR: 103 (30 Sep 2022 05:49) (103 - 112)  BP: 100/52 (30 Sep 2022 05:49) (100/52 - 163/100)  BP(mean): --  RR: 18 (30 Sep 2022 05:49) (17 - 18)  SpO2: 100% (30 Sep 2022 05:49) (100% - 100%)    Parameters below as of 30 Sep 2022 05:49  Patient On (Oxygen Delivery Method): room air      PHYSICAL EXAM:  GENERAL: NAD, cachectic appearing; pedraza catheter in place draining clear urine  CHEST/LUNG: No lung sounds hear in left upper lobe but otherwise CTAB; no wheeze.   HEART: Regular rate and rhythm; Normal S1 S2, No murmurs, rubs, or gallops  ABDOMEN: Soft, Nontender, Nondistended; Bowel sounds present  EXTREMITIES:  2+ Peripheral Pulses, No clubbing, cyanosis, or edema  : scrotal swelling unchanged from presentation; pillow under scrotum for elevation  SKIN: bandage on lower back which patient reports is his rectal mass; bandage unopened   PSYCH: AAOx3    LABS:    Hgb Trend: 8.3<--, 8.9<--, 8.1<--, 8.2<--        Creatinine Trend: 0.71<--, 0.73<--, 0.82<--, 0.72<--, 0.69<--, 0.67<--

## 2022-09-30 NOTE — PROGRESS NOTE ADULT - PROBLEM SELECTOR PLAN 10
- Ca 16.4 9/10 s/p calcitonin 4u/kg x 2 doses and pamidronate 90mcg IVPB x1.  - Improve  - cont IVFs w/ LR @ 75 cc/hr and continue monitoring   - continue to trend levels q 24 hrs   - can repeat pamidronate after 1 week PRN  - vit D low but will hold supplementation in the setting of hypercalcemia of malignancy  - NS for Ca 11.7 on 9/27; can hold off on pamidronate as level is between 11-12 without symptoms - Ca 16.4 9/10 s/p calcitonin 4u/kg x 2 doses and pamidronate 90mcg IVPB x1.  - Improve  - cont IVFs w/ LR @ 75 cc/hr and continue monitoring   - continue to trend levels q 24 hrs   - can repeat pamidronate after 1 week PRN  - vit D low but will hold supplementation in the setting of hypercalcemia of malignancy  - NS for Ca 11.7 on 9/27; can hold off on pamidronate as level is between 11-12 without symptoms  - Have been encouraging pt to have blood work done, but he refuses. Will cont to encourage.

## 2022-09-30 NOTE — PROGRESS NOTE ADULT - ASSESSMENT
31 M with untreated metastatic ano-rectal SCC (followed by Dr Frazier [facility] oncology) with mets to liver and possibly bone, HPV positive, HIV infection, and R eye cataracts who presented to the [facility] on 8/31/22 after syncopal episode at home and transferred to [facility] for continued of care per family request. Pt with cough x 2 mos, with yellow sputum and shortness of breath since 5 days PTA.

## 2022-09-30 NOTE — PROGRESS NOTE ADULT - PROBLEM SELECTOR PLAN 2
CT Chest 9/11 with evidence of persistent pneumomediastinum and extensive subcutaneous emphysema of the neck, chest, abdomen, pelvis, and extremities, including the scrotum.   - CANDIS per thoracic surgery  - indwelling pedraza was placed at [facility] (Coude placed for urinary strain)   - TOV attempted 9/27-9/28 overnight and failed with difficulty   - coude catheter placed by urology 9/28; can remain in place for 4 weeks prior to requiring change.  - No further urologic intervention needed at this time.   - Can f/u outpatient w/ Dr Prahbu Lucio, [facility] for [facility]rology at [place]  - pain control with dilaudid and methadone, ct a/p 9/11 did not show any inflammation/hydrocele/infection .

## 2022-09-30 NOTE — PROGRESS NOTE ADULT - PROBLEM SELECTOR PLAN 5
Patient with history of untreated HPV related rectal cancer with episode of rectal bleeding at [facility] Hgb 5.9 s/p 2u pRBCs.  - CT with evidence of liver and bone lesions  - renal cancer treatment will be on hold pending resolution of infection  - radiation oncology consult for RT of rectal cancer placed; will likely get outpatient RT   - appreciate oncology recs

## 2022-09-30 NOTE — PROGRESS NOTE ADULT - PROBLEM SELECTOR PLAN 1
Patient presented to [facility] with hypoxemia and L chest pigtail placed 8/31 due to suspected pneumothorax. Patient showed improvement in SOB and f/u CT chest performed which showed evidence of 12 cm multiloculated thick walled cavitary mass in the CHRISTINE and lingula.  - sputum culture 9/10 + mod klebsiella pneumoniae pansensitive  - blood Cx neg 9/8  - fungitell and crypto neg 9/10  - ID consulted, recs appreciated  - positive AFB in 1/3 sputum samples (sept 3) from [facility]. ID recs airborne precautions; still pending final culture results   - no indication for treatment at this time  - per pulm, no CANDIS. will need f/u CT in 2 months Patient presented to [facility] with hypoxemia and L chest pigtail placed 8/31 due to suspected pneumothorax. Patient showed improvement in SOB and f/u CT chest performed which showed evidence of 12 cm multiloculated thick walled cavitary mass in the CHRISTINE and lingula.  - sputum culture 9/10 + mod klebsiella pneumoniae pansensitive  - blood Cx neg 9/8  - fungitell and crypto neg 9/10  - ID consulted, recs appreciated  - positive AFB in 1/3 sputum samples (sept 3) from [facility] with final speciation Mycobacterium chimaera (avium complex)  - no need for isolation precautions at this time  - no indication for treatment at this time  - per pulm, no CANDIS. will need f/u CT in 2 months

## 2022-09-30 NOTE — PROGRESS NOTE ADULT - PROBLEM SELECTOR PLAN 3
Patient with TTE performed on at [facility] with evidence of two large RV masses and two additional small masses. IVC filter placed prophylactically at that time due to undiagnosed vegetations on the TTE.  - repeat TTE 9/9 was a limited study but demonstrated a mobile mass likely 2/2 tumour, thrombus, or vegetation.   - per cardiology risk of JENNIFER outweighs benefit at this time  - refused cardiac MRI again (3 attempts since last week; patient not a candidate for general anesthesia)  - per cardiology, no acute indication to do cardiac MRI/ JENNIFER at this time given patients other active issues  - monitor on tele  - can follow up outpatient

## 2022-09-30 NOTE — PROGRESS NOTE ADULT - PROBLEM SELECTOR PLAN 7
Patient found to have L external vein DVT with bilateral leg DVT studies negative for DVT.  - was previously on heparin gtt   - transitioned to lovenox today  - will need to monitor for signs of bleeding while on lovenox Patient found to have L external vein DVT with bilateral leg DVT studies negative for DVT.  - cont lovenox full dose.  - will need to monitor for signs of bleeding while on lovenox

## 2022-09-30 NOTE — PROGRESS NOTE ADULT - TIME BILLING
Counseling and coordinating care
Counseling and coordinating care. Reviewed case with outpatient providers
As above
time spent coordinating care and time spent discussing pain regimen

## 2022-09-30 NOTE — PROGRESS NOTE ADULT - PROBLEM SELECTOR PLAN 6
Palliative care consulted for extensive disease burden and GOC discussion. At this time, patient was all available treatment and resuscitation. Remains full code.  - increased PO Methadone 5mg TID (QTC-444 on 9/12; QTC- 458 on 9/19); will need repeat EKG later this week to monitor QTC while on methadone  - c/w Gabapentin 300mg TID  - PO Dilaudid 4mg q4 PRN moderate pain  - PO Dilaudid 6mg q4 PRN severe pain  - hold parameters placed for all medications  - appreciate palliative care recs  - will need OP f/u at [facility] with Dr. Kiesha Calle/ Dr. Mazariegos/ NP Zoe Carvalho/ ROCIO Cruz; [phone]

## 2022-09-30 NOTE — PROGRESS NOTE ADULT - PROBLEM SELECTOR PLAN 4
Patient with history of MSSA bacteremia found at [facility] with blood cultures 8/31 positive for MSSA and repeat on 9/2 NGTD.  - s/p Zosyn (9/8 - 9/9)  - transitioned to Cefazolin 2GM q8h for 6 weeks ending (9/9-10/13)   - may need midline placement prior to discharge for antibiotics at rehab  - upon discharge, will need weekly CBC, BUN and creatinine and ID clinic follow up

## 2022-10-01 LAB
ALBUMIN SERPL ELPH-MCNC: 2.6 G/DL — LOW (ref 3.3–5)
ALP SERPL-CCNC: 366 U/L — HIGH (ref 40–120)
ALT FLD-CCNC: <5 U/L — SIGNIFICANT CHANGE UP (ref 4–41)
ANION GAP SERPL CALC-SCNC: 11 MMOL/L — SIGNIFICANT CHANGE UP (ref 7–14)
AST SERPL-CCNC: 27 U/L — SIGNIFICANT CHANGE UP (ref 4–40)
BILIRUB SERPL-MCNC: 0.4 MG/DL — SIGNIFICANT CHANGE UP (ref 0.2–1.2)
BUN SERPL-MCNC: 26 MG/DL — HIGH (ref 7–23)
CALCIUM SERPL-MCNC: 12.4 MG/DL — HIGH (ref 8.4–10.5)
CHLORIDE SERPL-SCNC: 99 MMOL/L — SIGNIFICANT CHANGE UP (ref 98–107)
CO2 SERPL-SCNC: 24 MMOL/L — SIGNIFICANT CHANGE UP (ref 22–31)
CREAT SERPL-MCNC: 0.6 MG/DL — SIGNIFICANT CHANGE UP (ref 0.5–1.3)
EGFR: 131 ML/MIN/1.73M2 — SIGNIFICANT CHANGE UP
GLUCOSE SERPL-MCNC: 93 MG/DL — SIGNIFICANT CHANGE UP (ref 70–99)
MAGNESIUM SERPL-MCNC: 1.9 MG/DL — SIGNIFICANT CHANGE UP (ref 1.6–2.6)
PHOSPHATE SERPL-MCNC: 4.2 MG/DL — SIGNIFICANT CHANGE UP (ref 2.5–4.5)
POTASSIUM SERPL-MCNC: 5.2 MMOL/L — SIGNIFICANT CHANGE UP (ref 3.5–5.3)
POTASSIUM SERPL-SCNC: 5.2 MMOL/L — SIGNIFICANT CHANGE UP (ref 3.5–5.3)
PROT SERPL-MCNC: 6.5 G/DL — SIGNIFICANT CHANGE UP (ref 6–8.3)
SODIUM SERPL-SCNC: 134 MMOL/L — LOW (ref 135–145)

## 2022-10-01 PROCEDURE — 99233 SBSQ HOSP IP/OBS HIGH 50: CPT | Mod: GC

## 2022-10-01 RX ORDER — SODIUM CHLORIDE 9 MG/ML
1000 INJECTION INTRAMUSCULAR; INTRAVENOUS; SUBCUTANEOUS
Refills: 0 | Status: DISCONTINUED | OUTPATIENT
Start: 2022-10-01 | End: 2022-10-03

## 2022-10-01 RX ORDER — PAMIDRONATE DISODIUM 9 MG/ML
90 INJECTION, SOLUTION INTRAVENOUS ONCE
Refills: 0 | Status: COMPLETED | OUTPATIENT
Start: 2022-10-01 | End: 2022-10-02

## 2022-10-01 RX ADMIN — NALOXEGOL OXALATE 25 MILLIGRAM(S): 12.5 TABLET, FILM COATED ORAL at 12:17

## 2022-10-01 RX ADMIN — GABAPENTIN 300 MILLIGRAM(S): 400 CAPSULE ORAL at 05:38

## 2022-10-01 RX ADMIN — Medication 100 MILLIGRAM(S): at 22:51

## 2022-10-01 RX ADMIN — LIDOCAINE 1 PATCH: 4 CREAM TOPICAL at 00:00

## 2022-10-01 RX ADMIN — LACTULOSE 10 GRAM(S): 10 SOLUTION ORAL at 12:16

## 2022-10-01 RX ADMIN — BICTEGRAVIR SODIUM, EMTRICITABINE, AND TENOFOVIR ALAFENAMIDE FUMARATE 1 TABLET(S): 30; 120; 15 TABLET ORAL at 12:17

## 2022-10-01 RX ADMIN — Medication 50 MILLIGRAM(S): at 17:40

## 2022-10-01 RX ADMIN — METHADONE HYDROCHLORIDE 5 MILLIGRAM(S): 40 TABLET ORAL at 14:36

## 2022-10-01 RX ADMIN — LIDOCAINE 1 PATCH: 4 CREAM TOPICAL at 19:00

## 2022-10-01 RX ADMIN — METHADONE HYDROCHLORIDE 5 MILLIGRAM(S): 40 TABLET ORAL at 05:37

## 2022-10-01 RX ADMIN — LIDOCAINE 1 PATCH: 4 CREAM TOPICAL at 12:21

## 2022-10-01 RX ADMIN — HYDROMORPHONE HYDROCHLORIDE 4 MILLIGRAM(S): 2 INJECTION INTRAMUSCULAR; INTRAVENOUS; SUBCUTANEOUS at 13:25

## 2022-10-01 RX ADMIN — HYDROMORPHONE HYDROCHLORIDE 6 MILLIGRAM(S): 2 INJECTION INTRAMUSCULAR; INTRAVENOUS; SUBCUTANEOUS at 22:58

## 2022-10-01 RX ADMIN — GABAPENTIN 300 MILLIGRAM(S): 400 CAPSULE ORAL at 22:51

## 2022-10-01 RX ADMIN — Medication 100 MILLIGRAM(S): at 14:36

## 2022-10-01 RX ADMIN — Medication 5 MILLIGRAM(S): at 17:40

## 2022-10-01 RX ADMIN — ENOXAPARIN SODIUM 60 MILLIGRAM(S): 100 INJECTION SUBCUTANEOUS at 12:16

## 2022-10-01 RX ADMIN — GABAPENTIN 300 MILLIGRAM(S): 400 CAPSULE ORAL at 14:36

## 2022-10-01 RX ADMIN — LIDOCAINE 1 PATCH: 4 CREAM TOPICAL at 12:20

## 2022-10-01 RX ADMIN — HYDROMORPHONE HYDROCHLORIDE 4 MILLIGRAM(S): 2 INJECTION INTRAMUSCULAR; INTRAVENOUS; SUBCUTANEOUS at 12:25

## 2022-10-01 RX ADMIN — CHLORHEXIDINE GLUCONATE 1 APPLICATION(S): 213 SOLUTION TOPICAL at 05:39

## 2022-10-01 RX ADMIN — HYDROMORPHONE HYDROCHLORIDE 6 MILLIGRAM(S): 2 INJECTION INTRAMUSCULAR; INTRAVENOUS; SUBCUTANEOUS at 18:55

## 2022-10-01 RX ADMIN — Medication 50 MILLIGRAM(S): at 05:37

## 2022-10-01 RX ADMIN — POLYETHYLENE GLYCOL 3350 17 GRAM(S): 17 POWDER, FOR SOLUTION ORAL at 05:37

## 2022-10-01 RX ADMIN — METHADONE HYDROCHLORIDE 5 MILLIGRAM(S): 40 TABLET ORAL at 22:57

## 2022-10-01 RX ADMIN — ENOXAPARIN SODIUM 60 MILLIGRAM(S): 100 INJECTION SUBCUTANEOUS at 22:52

## 2022-10-01 RX ADMIN — Medication 3 MILLIGRAM(S): at 22:52

## 2022-10-01 RX ADMIN — SODIUM CHLORIDE 75 MILLILITER(S): 9 INJECTION INTRAMUSCULAR; INTRAVENOUS; SUBCUTANEOUS at 12:18

## 2022-10-01 RX ADMIN — POLYETHYLENE GLYCOL 3350 17 GRAM(S): 17 POWDER, FOR SOLUTION ORAL at 17:40

## 2022-10-01 RX ADMIN — Medication 100 MILLIGRAM(S): at 05:38

## 2022-10-01 RX ADMIN — HYDROMORPHONE HYDROCHLORIDE 6 MILLIGRAM(S): 2 INJECTION INTRAMUSCULAR; INTRAVENOUS; SUBCUTANEOUS at 07:03

## 2022-10-01 RX ADMIN — Medication 5 MILLIGRAM(S): at 05:37

## 2022-10-01 RX ADMIN — Medication 5 MILLIGRAM(S): at 12:16

## 2022-10-01 NOTE — PROGRESS NOTE ADULT - PROBLEM SELECTOR PLAN 1
Patient presented to [facility] with hypoxemia and L chest pigtail placed 8/31 due to suspected pneumothorax. Patient showed improvement in SOB and f/u CT chest performed which showed evidence of 12 cm multiloculated thick walled cavitary mass in the CHRISTINE and lingula.  - sputum culture 9/10 + mod klebsiella pneumoniae pansensitive  - blood Cx neg 9/8  - fungitell and crypto neg 9/10  - ID consulted, recs appreciated  - positive AFB in 1/3 sputum samples (sept 3) from [facility] with final speciation Mycobacterium chimaera (avium complex)  - no need for isolation precautions at this time  - no indication for treatment at this time  - per pulm, no CANDIS. will need f/u CT in 2 months

## 2022-10-01 NOTE — PROGRESS NOTE ADULT - PROBLEM SELECTOR PLAN 6
Palliative care consulted for extensive disease burden and GOC discussion. At this time, patient was all available treatment and resuscitation. Remains full code.  - increased PO Methadone 5mg TID (QTC-444 on 9/12; QTC- 458 on 9/19); will need repeat EKG later this week to monitor QTC while on methadone  - c/w Gabapentin 300mg TID  - PO Dilaudid 4mg q4 PRN moderate pain  - PO Dilaudid 6mg q4 PRN severe pain  - hold parameters placed for all medications  - appreciate palliative care recs  - will need OP f/u at [facility] with Dr. Kiesha Calle/ Dr. Mazariegos/ NP Zoe Carvalho/ ROCIO Cruz; [phone]

## 2022-10-01 NOTE — PROGRESS NOTE ADULT - PROBLEM SELECTOR PLAN 3
Patient with TTE performed on at [facility] with evidence of two large RV masses and two additional small masses. IVC filter placed prophylactically at that time due to undiagnosed vegetations on the TTE.  - repeat TTE 9/9 was a limited study but demonstrated a mobile mass likely 2/2 tumour, thrombus, or vegetation.   - per cardiology risk of JENNIFER outweighs benefit at this time  - refused cardiac MRI again (3 attempts since last week; patient not a candidate for general anesthesia)  - per cardiology, no acute indication to do cardiac MRI/ JENNIFER at this time given patients other active issues  - monitor on tele  - can follow up outpatient

## 2022-10-01 NOTE — PROGRESS NOTE ADULT - PROBLEM SELECTOR PLAN 2
CT Chest 9/11 with evidence of persistent pneumomediastinum and extensive subcutaneous emphysema of the neck, chest, abdomen, pelvis, and extremities, including the scrotum.   - CANDIS per thoracic surgery  - indwelling pedraza was placed at [facility] (Coude placed for urinary strain)   - TOV attempted 9/27-9/28 overnight and failed with difficulty   - coude catheter placed by urology 9/28; can remain in place for 4 weeks prior to requiring change.  - No further urologic intervention needed at this time.   - Can f/u outpatient w/ Dr Prabhu Lucio, [facility] for [facility] at [place]  - pain control with dilaudid and methadone, ct a/p 9/11 did not show any inflammation/hydrocele/infection .

## 2022-10-01 NOTE — PROGRESS NOTE ADULT - PROBLEM SELECTOR PLAN 10
- Ca 16.4 9/10 s/p calcitonin 4u/kg x 2 doses and pamidronate 90mcg IVPB x1.  - Improve  - cont IVFs w/ LR @ 75 cc/hr and continue monitoring   - continue to trend levels q 24 hrs   - can repeat pamidronate after 1 week PRN  - vit D low but will hold supplementation in the setting of hypercalcemia of malignancy  - NS for Ca 11.7 on 9/27; can hold off on pamidronate as level is between 11-12 without symptoms  - Have been encouraging pt to have blood work done, but he refuses. Will cont to encourage. Ca 16.4 9/10 s/p calcitonin 4u/kg x 2 doses and pamidronate 90mcg IVPB x1.  - Ca 13.5 corrected 10/1. pamidronate 90 x 1 and IVF 75 cc  - continue to trend levels q 24 hrs   - can repeat pamidronate after 1 week PRN  - vit D low but will hold supplementation in the setting of hypercalcemia of malignancy

## 2022-10-01 NOTE — PROGRESS NOTE ADULT - PROBLEM SELECTOR PLAN 5
Patient with history of untreated HPV related rectal cancer with episode of rectal bleeding at [facility] Hgb 5.9 s/p 2u pRBCs.  - CT with evidence of liver and bone lesions  - renal cancer treatment will be on hold pending resolution of infection  - radiation oncology consult for RT of rectal cancer placed; will likely get outpatient RT   - appreciate oncology recs

## 2022-10-01 NOTE — PROGRESS NOTE ADULT - SUBJECTIVE AND OBJECTIVE BOX
Sharon Carreno  PGY-1 Resident Physician   Pager [phone]/ [phone]    Patient is a 31y old  Male who presents with a chief complaint of Shortness of breath (30 Sep 2022 16:40)      SUBJECTIVE / OVERNIGHT EVENTS:  Patient seen and evaluated at bedside.  Patient with no concerns this AM. States he is feeling fine.   Denies any fevers, chills, CP, or SOB.  AFB results from [place] finalized yesterday as JING barth. Taken off isolation. Started dispo planning.     Vital Signs Last 24 Hrs  T(C): 36.6 (30 Sep 2022 18:40), Max: 36.8 (30 Sep 2022 12:52)  T(F): 97.9 (30 Sep 2022 18:40), Max: 98.2 (30 Sep 2022 12:52)  HR: 95 (30 Sep 2022 18:40) (95 - 100)  BP: 102/88 (30 Sep 2022 18:40) (102/88 - 112/89)  BP(mean): --  RR: 17 (30 Sep 2022 18:40) (17 - 18)  SpO2: 98% (30 Sep 2022 18:40) (98% - 99%)    Parameters below as of 30 Sep 2022 18:40  Patient On (Oxygen Delivery Method): room air        PHYSICAL EXAM:  GENERAL: NAD, well-developed  CHEST/LUNG: Clear to auscultation bilaterally; No wheeze  HEART: Regular rate and rhythm; Normal S1 S2, No murmurs, rubs, or gallops  ABDOMEN: Soft, Nontender, Nondistended; Bowel sounds present  EXTREMITIES:  2+ Peripheral Pulses, No clubbing, cyanosis, or edema  PSYCH: AAOx3    LABS:    Hgb Trend: 8.3<--, 8.9<--, 8.1<--        Creatinine Trend: 0.71<--, 0.73<--, 0.82<--, 0.72<--, 0.69<--, 0.67<--           Sharon Carreno  PGY-1 Resident Physician   Pager [phone]    Patient is a 31y old  Male who presents with a chief complaint of Shortness of breath (30 Sep 2022 16:40)      SUBJECTIVE / OVERNIGHT EVENTS:  Patient seen and evaluated at bedside.  Patient with no concerns this AM. States he is feeling fine.   Denies any fevers, chills, CP, or SOB.  AFB results from [place] finalized yesterday as M lary. Taken off isolation. Started dispo planning.     Vital Signs Last 24 Hrs  T(C): 36.6 (30 Sep 2022 18:40), Max: 36.8 (30 Sep 2022 12:52)  T(F): 97.9 (30 Sep 2022 18:40), Max: 98.2 (30 Sep 2022 12:52)  HR: 95 (30 Sep 2022 18:40) (95 - 100)  BP: 102/88 (30 Sep 2022 18:40) (102/88 - 112/89)  BP(mean): --  RR: 17 (30 Sep 2022 18:40) (17 - 18)  SpO2: 98% (30 Sep 2022 18:40) (98% - 99%)    Parameters below as of 30 Sep 2022 18:40  Patient On (Oxygen Delivery Method): room air        PHYSICAL EXAM:  GENERAL: NAD, cachectic appearing; pedraza catheter in place draining clear urine  CHEST/LUNG: No lung sounds hear in left upper lobe but otherwise CTAB; no wheeze.   HEART: Regular rate and rhythm; Normal S1 S2, No murmurs, rubs, or gallops  ABDOMEN: Soft, Nontender, Nondistended; Bowel sounds present  EXTREMITIES:  2+ Peripheral Pulses, No clubbing, cyanosis, or edema  : scrotal swelling unchanged from presentation; pillow under scrotum for elevation  SKIN: bandage on lower back which patient reports is his rectal mass; bandage unopened   PSYCH: AAOx3    LABS:    Hgb Trend: 8.3<--, 8.9<--, 8.1<--        Creatinine Trend: 0.71<--, 0.73<--, 0.82<--, 0.72<--, 0.69<--, 0.67<--             Sharon Carreno  PGY-1 Resident Physician   Pager [phone]/ [phone]    Patient is a 31y old  Male who presents with a chief complaint of Shortness of breath (30 Sep 2022 16:40)      SUBJECTIVE / OVERNIGHT EVENTS:  Patient seen and evaluated at bedside.  Patient with no concerns this AM. States he is feeling fine.   Denies any fevers, chills, CP, or SOB.  AFB results from [place] finalized yesterday as JING barth. Taken off isolation. Started dispo planning.     Vital Signs Last 24 Hrs  T(C): 36.6 (30 Sep 2022 18:40), Max: 36.8 (30 Sep 2022 12:52)  T(F): 97.9 (30 Sep 2022 18:40), Max: 98.2 (30 Sep 2022 12:52)  HR: 95 (30 Sep 2022 18:40) (95 - 100)  BP: 102/88 (30 Sep 2022 18:40) (102/88 - 112/89)  BP(mean): --  RR: 17 (30 Sep 2022 18:40) (17 - 18)  SpO2: 98% (30 Sep 2022 18:40) (98% - 99%)    Parameters below as of 30 Sep 2022 18:40  Patient On (Oxygen Delivery Method): room air        PHYSICAL EXAM:  GENERAL: NAD, cachectic appearing; pedraza catheter in place draining clear urine  CHEST/LUNG: No lung sounds hear in left upper lobe but otherwise CTAB; no wheeze.   HEART: Regular rate and rhythm; Normal S1 S2, No murmurs, rubs, or gallops  ABDOMEN: Soft, Nontender, Nondistended; Bowel sounds present  EXTREMITIES:  2+ Peripheral Pulses, No clubbing, cyanosis, or edema  : scrotal swelling unchanged from presentation; pillow under scrotum for elevation  SKIN: bandage on lower back which patient reports is his rectal mass; bandage unopened   PSYCH: AAOx3    LABS:      10-01    134<L>  |  99  |  26<H>  ----------------------------<  93  5.2   |  24  |  0.60    Ca    12.4<H>      01 Oct 2022 09:27  Phos  4.2     10-01  Mg     1.90     10-01    TPro  6.5  /  Alb  2.6<L>  /  TBili  0.4  /  DBili  x   /  AST  27  /  ALT  <5  /  AlkPhos  366<H>  10-01      Hgb Trend: 8.3<--, 8.9<--, 8.1<--        Creatinine Trend: 0.71<--, 0.73<--, 0.82<--, 0.72<--, 0.69<--, 0.67<--      Consultant notes reviewed: ID.

## 2022-10-01 NOTE — PROGRESS NOTE ADULT - PROBLEM SELECTOR PLAN 9
Na 129 on 9/24 and history significant for decreased PO intake. Otherwise asymptomatic. Likely mixed picture due to poor PO intake and possible SIADH from pain  - Na this ; more likely a hypovolemic hyponatremia and SIADH from pain  - encourage PO intake Na 129 on 9/24 and history significant for decreased PO intake. Otherwise asymptomatic. Likely mixed picture due to poor PO intake and possible SIADH from pain  -; more likely a hypovolemic hyponatremia and SIADH from pain  -  on 10/1  - encourage PO intake

## 2022-10-01 NOTE — PROGRESS NOTE ADULT - PROBLEM SELECTOR PLAN 7
Patient found to have L external vein DVT with bilateral leg DVT studies negative for DVT.  - cont lovenox full dose.  - will need to monitor for signs of bleeding while on lovenox

## 2022-10-01 NOTE — PROGRESS NOTE ADULT - ASSESSMENT
31 M with untreated metastatic ano-rectal SCC (followed by Dr Frazier [facility] oncology) with mets to liver and possibly bone, HPV positive, HIV infection, and R eye cataracts who presented to the [facility] on 8/31/22 after syncopal episode at home and transferred to [facility] for continued of care per family request. Pt with cough x 2 mos, with yellow sputum and shortness of breath since 5 days PTA.          31 M with untreated metastatic ano-rectal SCC (followed by Dr Frazier [facility] oncology) with mets to liver and possibly bone, HPV positive, HIV infection, and R eye cataracts who presented to the [facility] on 8/31/22 after syncopal episode at home and transferred to [facility] for continued of care per family request. Being treated for MSSA bacteremia. 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

## 2022-10-01 NOTE — PROGRESS NOTE ADULT - PROBLEM SELECTOR PLAN 4
Patient with history of MSSA bacteremia found at [facility] with blood cultures 8/31 positive for MSSA and repeat on 9/2 NGTD.  - s/p Zosyn (9/8 - 9/9)  - transitioned to Cefazolin 2GM q8h for 6 weeks ending (9/9-10/13)   - may need midline placement prior to discharge for antibiotics at rehab  - upon discharge, will need weekly CBC, BUN and creatinine and ID clinic follow up

## 2022-10-02 PROCEDURE — 99232 SBSQ HOSP IP/OBS MODERATE 35: CPT | Mod: GC

## 2022-10-02 RX ADMIN — METHADONE HYDROCHLORIDE 5 MILLIGRAM(S): 40 TABLET ORAL at 22:26

## 2022-10-02 RX ADMIN — ENOXAPARIN SODIUM 60 MILLIGRAM(S): 100 INJECTION SUBCUTANEOUS at 13:38

## 2022-10-02 RX ADMIN — BICTEGRAVIR SODIUM, EMTRICITABINE, AND TENOFOVIR ALAFENAMIDE FUMARATE 1 TABLET(S): 30; 120; 15 TABLET ORAL at 13:39

## 2022-10-02 RX ADMIN — Medication 100 MILLIGRAM(S): at 06:34

## 2022-10-02 RX ADMIN — METHADONE HYDROCHLORIDE 5 MILLIGRAM(S): 40 TABLET ORAL at 13:44

## 2022-10-02 RX ADMIN — HYDROMORPHONE HYDROCHLORIDE 6 MILLIGRAM(S): 2 INJECTION INTRAMUSCULAR; INTRAVENOUS; SUBCUTANEOUS at 17:30

## 2022-10-02 RX ADMIN — HYDROMORPHONE HYDROCHLORIDE 6 MILLIGRAM(S): 2 INJECTION INTRAMUSCULAR; INTRAVENOUS; SUBCUTANEOUS at 16:52

## 2022-10-02 RX ADMIN — PAMIDRONATE DISODIUM 65 MILLIGRAM(S): 9 INJECTION, SOLUTION INTRAVENOUS at 06:34

## 2022-10-02 RX ADMIN — METHADONE HYDROCHLORIDE 5 MILLIGRAM(S): 40 TABLET ORAL at 06:35

## 2022-10-02 RX ADMIN — Medication 3 MILLIGRAM(S): at 22:27

## 2022-10-02 RX ADMIN — SENNA PLUS 2 TABLET(S): 8.6 TABLET ORAL at 22:27

## 2022-10-02 RX ADMIN — GABAPENTIN 300 MILLIGRAM(S): 400 CAPSULE ORAL at 22:27

## 2022-10-02 RX ADMIN — LIDOCAINE 1 PATCH: 4 CREAM TOPICAL at 00:00

## 2022-10-02 RX ADMIN — CHLORHEXIDINE GLUCONATE 1 APPLICATION(S): 213 SOLUTION TOPICAL at 06:33

## 2022-10-02 RX ADMIN — Medication 50 MILLIGRAM(S): at 06:35

## 2022-10-02 RX ADMIN — GABAPENTIN 300 MILLIGRAM(S): 400 CAPSULE ORAL at 13:38

## 2022-10-02 RX ADMIN — LACTULOSE 10 GRAM(S): 10 SOLUTION ORAL at 13:39

## 2022-10-02 RX ADMIN — HYDROMORPHONE HYDROCHLORIDE 6 MILLIGRAM(S): 2 INJECTION INTRAMUSCULAR; INTRAVENOUS; SUBCUTANEOUS at 06:33

## 2022-10-02 RX ADMIN — Medication 100 MILLIGRAM(S): at 13:44

## 2022-10-02 RX ADMIN — GABAPENTIN 300 MILLIGRAM(S): 400 CAPSULE ORAL at 06:34

## 2022-10-02 RX ADMIN — Medication 100 MILLIGRAM(S): at 22:27

## 2022-10-02 NOTE — DISCHARGE NOTE PROVIDER - YES NO FOR MLM POSITIVE OR NEGATIVE COVID RESULT
, Principal Discharge DX:	Acute suppurative otitis media of left ear without spontaneous rupture of tympanic membrane, recurrence not specified  Secondary Diagnosis:	Otalgia of left ear

## 2022-10-02 NOTE — PROGRESS NOTE ADULT - PROBLEM SELECTOR PLAN 2
CT Chest 9/11 with evidence of persistent pneumomediastinum and extensive subcutaneous emphysema of the neck, chest, abdomen, pelvis, and extremities, including the scrotum.   - CANDIS per thoracic surgery  - indwelling pedraza was placed at [facility] (Coude placed for urinary strain)   - TOV attempted 9/27-9/28 overnight and failed with difficulty   - coude catheter placed by urology 9/28; can remain in place for 4 weeks prior to requiring change.  - No further urologic intervention needed at this time.   - Can f/u outpatient w/ Dr Prabhu Lucio, [facility] for Urology at [place]  - pain control with dilaudid and methadone, ct a/p 9/11 did not show any inflammation/hydrocele/infection .

## 2022-10-02 NOTE — DISCHARGE NOTE PROVIDER - HOSPITAL COURSE
HPI:  31 M with PMH/ HIV, rectal cancer not on tx (followed up by Dr Frazier ([facility] oncology)), mets to liver and possibly bone, R eye cataract, who presented to the [facility] on 8/31/22 after syncopal episode at home. Pt with cough x 2 mos, with yellow sputum and shortness of breath since 5 days PTA.   -- at [facility]: pt was found to be hypoxemic. 8/31/22 -- L chest wall Pigtail placement. Pigtail was placed for suspected PMX with improvement in shortness of breath. CT chest was performed, which showed that pigtail is within the mass__ differential is TB, fungal infection, cavitary lesion, or squamous cell carcinoma. Pt was found to be anemic due to rectal bleeding with Hgb 5.9 s/p 2 U PRBC on 9/1. Blood cxs with MSSA bacteremia, and has been treated with Cefazolin and Zosyn.  Since 9/2 pt with worsening diffuse SC emphysema extending to the neck/face/all 4 extremities/scrotum/back , which has been getting progressively worse over past 2 days as per pt's statement. 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 TTE with two large RV masses and two additional small masses. 9/7-- s/p L lateral chest tube to 14 Fr. 9/7 -- s/p IVC Filter placement (as per [facility] statement, was placed prophylactically due to undiagnosed vegetations on the TTE. 12 cm multiloculated thick walled cavitary mass in the CHRISTINE and lingula.+ liver lesion on the CT a/p. + external iliac vein thrombosis, PE study was indeterminate   __ pt was transferred to [facility] as per family request (mother) for pt to be evaluated by his outpt oncology team (Dr Frazier). As per oncology team the plan is to start Carbotaxol q 3 weeks + RT for diffuse mets as per PET scan,  (08 Sep 2022 14:44)   HPI:  31 M with PMH/ HIV, rectal cancer not on tx (followed up by Dr Frazier ([facility] oncology)), mets to liver and possibly bone, R eye cataract, who presented to the [facility] on 8/31/22 after syncopal episode at home. Pt with cough x 2 mos, with yellow sputum and shortness of breath since 5 days PTA.   -- at [facility]: pt was found to be hypoxemic. 8/31/22 -- L chest wall Pigtail placement. Pigtail was placed for suspected PMX with improvement in shortness of breath. CT chest was performed, which showed that pigtail is within the mass__ differential is TB, fungal infection, cavitary lesion, or squamous cell carcinoma. Pt was found to be anemic due to rectal bleeding with Hgb 5.9 s/p 2 U PRBC on 9/1. Blood cxs with MSSA bacteremia, and has been treated with Cefazolin and Zosyn.  Since 9/2 pt with worsening diffuse SC emphysema extending to the neck/face/all 4 extremities/scrotum/back , which has been getting progressively worse over past 2 days as per pt's statement. 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 TTE with two large RV masses and two additional small masses. 9/7-- s/p L lateral chest tube to 14 Fr. 9/7 -- s/p IVC Filter placement (as per [facility] statement, was placed prophylactically due to undiagnosed vegetations on the TTE. 12 cm multiloculated thick walled cavitary mass in the CHRISTINE and lingula.+ liver lesion on the CT a/p. + external iliac vein thrombosis, PE study was indeterminate   __ pt was transferred to [facility] as per family request (mother) for pt to be evaluated by his outpt oncology team (Dr Frazier). As per oncology team the plan is to start Carbotaxol q 3 weeks + RT for diffuse mets as per PET scan,  (08 Sep 2022 14:44)    Hospital Course:  During admission, patient continued on treatment for MSSA bacteremia ( / -10/13). Repeat TTE performed redemonstrating RV masses of unclear etiology. Plan was for cardiac MRI to evaluated further. Attempted cardiac MRI x 1 week but patient refusing due to pain and not a candidate for general anesthesia for procedure. Per cardiology, patient can have masses evaluated outpatient. For rectal cancer, no plan for inpatient chemotherapy; radiation oncology consulted and plan for curative RT outpatient after discharge. Had intermittent episodes of elevated Ca levels requiring pamidronate x 2 and IVF. Palliative care followed with recommendations for pain control. 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 Per pulmonology, patient will require follow up in 2 months of discharge for repeat CT scan to evaluate cavitary lesion. FOR L external iliac thrombosis, patient on lovenox full AC dose and should continue and follow up with PCP. Also with several episodes of SVT and placed on tele without any significant events. Uptitrated to metoprolol 50 BID with improved HR 90s- [id number]. Patient is otherwise medically optimized for discharge to rehab facility.

## 2022-10-02 NOTE — PROGRESS NOTE ADULT - PROBLEM SELECTOR PLAN 1
Patient presented to [facility] with hypoxemia and L chest pigtail placed 8/31 due to suspected pneumothorax. Patient showed improvement in SOB and f/u CT chest performed which showed evidence of 12 cm multiloculated thick walled cavitary mass in the CHRISTINE and lingula.  - sputum culture 9/10 + mod klebsiella pneumoniae pansensitive  - blood Cx neg 9/8  - fungitell and crypto neg 9/10  - ID consulted, recs appreciated  - positive AFB in 1/3 sputum samples (sept 3) from [facility] with final speciation Mycobacterium chimaera (avium complex)  - no need for isolation precautions at this time  - no indication for treatment at this time  - per pulm, no CANDIS. will need f/u CT in 2 months

## 2022-10-02 NOTE — PROGRESS NOTE ADULT - PROBLEM SELECTOR PLAN 6
Palliative care consulted for extensive disease burden and GOC discussion. At this time, patient was all available treatment and resuscitation. Remains full code.  - increased PO Methadone 5mg TID (QTC-444 on 9/12; QTC- 458 on 9/19); will need repeat EKG later this week to monitor QTC while on methadone  - c/w Gabapentin 300mg TID  - PO Dilaudid 4mg q4 PRN moderate pain  - PO Dilaudid 6mg q4 PRN severe pain  - hold parameters placed for all medications  - appreciate palliative care recs  - will need OP f/u at [facility] with Dr. Kiesha Calle/ Dr. Mazariegos/ NP Zoe Carvalho/ ROCIO Cruz; [phone]

## 2022-10-02 NOTE — DISCHARGE NOTE PROVIDER - PROVIDER TOKENS
PROVIDER:[TOKEN:[[id number]]],PROVIDER:[TOKEN:[[id number]],ESTABLISHEDPATIENT:[T]] PROVIDER:[TOKEN:[[id number]]],PROVIDER:[TOKEN:[[id number]],ESTABLISHEDPATIENT:[T]],PROVIDER:[TOKEN:[[id number]],ESTABLISHEDPATIENT:[T]]

## 2022-10-02 NOTE — PROGRESS NOTE ADULT - PROBLEM SELECTOR PLAN 3
Patient with TTE performed on at [facility] with evidence of two large RV masses and two additional small masses. IVC filter placed prophylactically at that time due to undiagnosed vegetations on the TTE.  - repeat TTE 9/9 was a limited study but demonstrated a mobile mass likely 2/2 tumour, thrombus, or vegetation.   - per cardiology risk of JENNIFER outweighs benefit at this time  - refused cardiac MRI again (3 attempts since last week; patient not a candidate for general anesthesia)  - per cardiology, no acute indication to do cardiac MRI/ JENNIFER at this time given patients other active issues  - monitor on tele  - can follow up outpatient

## 2022-10-02 NOTE — DISCHARGE NOTE PROVIDER - NSDCCPCAREPLAN_GEN_ALL_CORE_FT
PRINCIPAL DISCHARGE DIAGNOSIS  Diagnosis: Cavitary lesion of lung  Assessment and Plan of Treatment: follow up with pulm. CT scan 2 months      SECONDARY DISCHARGE DIAGNOSES  Diagnosis: Cardiac mass  Assessment and Plan of Treatment: cardiac mri or ta when done with rehab    Diagnosis: Rectal cancer  Assessment and Plan of Treatment: follow with RT     PRINCIPAL DISCHARGE DIAGNOSIS  Diagnosis: Cavitary lesion of lung  Assessment and Plan of Treatment: During your admission to [facility], a CT chest showed evidence of a cavitary lesion in your lung. For this reason, you had sputum studied performed to evaluate if this was secondary to Tuberculosis. Your sputum studies did not show any evidence of TB and so you did not require any treatment for that. You were evaluated by the pulmonology team and you will need to follow up with them in 2 months of discharge in order to have a repeat CT chest performed to evaluate.      SECONDARY DISCHARGE DIAGNOSES  Diagnosis: Cardiac mass  Assessment and Plan of Treatment: During your stay at [facility], you were found to have MSSA bacteremia which you were started on IV antibiotics for. You also had a echocardiogram of your heart performed which showed several masses in the right ventricle of your heart. An IVC filter was placed at [facility] prophylactically to prevent these masses from traveling to your brain. The echocardiogram was repeat at [facility] and redemonstrated the same finding. In order to evaluate these masses further for their etiology, a cardiac MRI was recommended, However, you were unable to tolerate the procedure. On discharge from your rehab facility, you hould follow up with a cardiologist and discuss having a cardiac MRI performed.    Diagnosis: Rectal cancer  Assessment and Plan of Treatment: During your admission to the hospital, you were evaluated by the oncology and radiation oncology team. Given your bacteremia, inpatient chemotherapy was not pursued. Radiation oncology also evaluated you and recommeneded outpatient radiation therapy which you were already scheduled for prior to your hospitalization. ON discharge from your rehab facility, please follow up with Dr. Santiago to get your radiation therapy. Please also follow up with [facility] for palliative care.    Diagnosis: Thrombophlebitis of left external iliac vein  Assessment and Plan of Treatment: During your admission to [facility], you were found to have a blood clot in one of your veins and started on medications via IV to prevent progression. During your stay at [facility], the medication was converted to lovenox which is an oral formulation. Please continue to take this medication on discharge. Please follow up with your PCP to determine any additional steps and duration of treatment.

## 2022-10-02 NOTE — DISCHARGE NOTE PROVIDER - NSFOLLOWUPCLINICS_GEN_ALL_ED_FT
[facility] Pulmonolgy and Sleep Medicine  Pulmonology  [address], [facility] [address]  [place] [address]  Phone: [phone]  Fax:     [facility]  Cardiology  [address] [place] [address]  Phone: [phone]  Fax:      [facility]  Cardiology  [address] [place] [address]  Phone: [phone]  Fax:     [facility] Pulmonolgy and Sleep Medicine  Pulmonology  [address] [place] [address]  Phone: [phone]  Fax:     [facility] - Infectious Disease  Infectious Disease  [address], [place] [address]  Phone: [phone]  Fax:

## 2022-10-02 NOTE — PROVIDER CONTACT NOTE (OTHER) - REASON
HR >100
Patient BP 89/45
Patient refused blood draw 9/30
patient unable to lay on back for bladder scan
Blood in Rosario
Pt refusing bladder scan
Patients heart rate is 115
Pt had 6 beats of VTACH
Blood tinged water/stool on brayden
Pain management
Pt complaining of numbness, tingling & pain of the L leg from knee to toes
Pt refusing AM labs

## 2022-10-02 NOTE — PROGRESS NOTE ADULT - ASSESSMENT
31 M with untreated metastatic ano-rectal SCC (followed by Dr Frazier [facility] oncology) with mets to liver and possibly bone, HPV positive, HIV infection, and R eye cataracts who presented to the [facility] on 8/31/22 after syncopal episode at home and transferred to [facility] for continued of care per family request. Being treated for MSSA bacteremia. c/b scrotal swelling s/p coude catheter, hypercalcemia s/p pamidronate and IVF, and AFB from [place] w M chimaera not requiring any intervention.

## 2022-10-02 NOTE — PROGRESS NOTE ADULT - PROBLEM SELECTOR PLAN 5
Patient with history of untreated HPV related rectal cancer with episode of rectal bleeding at [facility] Hgb 5.9 s/p 2u pRBCs.  - CT with evidence of liver and bone lesions  - renal cancer treatment will be on hold pending resolution of infection  - radiation oncology consult for RT of rectal cancer placed; will likely get outpatient RT   - appreciate oncology recs

## 2022-10-02 NOTE — PROGRESS NOTE ADULT - PROBLEM SELECTOR PLAN 4
Patient with history of MSSA bacteremia found at [facility] with blood cultures 8/31 positive for MSSA and repeat on 9/2 NGTD.  - s/p Zosyn (9/8 - 9/9)  - transitioned to Cefazolin 2GM q8h for 6 weeks ending (9/9-10/13)   - may need midline placement prior to discharge for antibiotics at rehab  - upon discharge, will need weekly CBC, BUN and creatinine and ID clinic follow up

## 2022-10-02 NOTE — PROGRESS NOTE ADULT - PROBLEM SELECTOR PLAN 9
Na 129 on 9/24 and history significant for decreased PO intake. Otherwise asymptomatic. Likely mixed picture due to poor PO intake and possible SIADH from pain  -; more likely a hypovolemic hyponatremia and SIADH from pain  -  on 10/1  - encourage PO intake

## 2022-10-02 NOTE — DISCHARGE NOTE PROVIDER - CARE PROVIDER_API CALL
Kiesha Hendrix)  [facility] Medicine; Internal Medicine  [address] [place] [address]  Phone: [phone]  Fax: [phone]  Follow Up Time:     Rambo Santiago)  Radiation Oncology  [address] [place] [address]  Phone: [phone]  Fax: [phone]  Established Patient  Follow Up Time:    Kies[facility] Hendrix)  [facility]  [address] [place] [address]  Phone: [phone]  Fax: [phone]  Follow Up Time:     Rambo Santiago)  Radiation Oncology  [address] [place] [address]  Phone: [phone]  Fax: [phone]  Established Patient  Follow Up Time:     Joe Frazier)  Hematology; Internal Medicine; Medical Oncology  [address] [place] [address]  Phone: [phone]  Fax: [phone]  Established Patient  Follow Up Time:

## 2022-10-02 NOTE — PROVIDER CONTACT NOTE (OTHER) - SITUATION
Patient refusing AM labs
Pt visibly upset, reported slight burning with blood present in pedraza.
heart rate 103 blood pressure 108/69
Pt admitted with Rosario from another hospital, pt was TOV overnight. Rosario taken out 2330. Pt has not voided overnight. Bladder scan was completed with 0ml, patient unable to lay in back for accurance
Pt admitted with Rosario from another hospital, pt was TOV overnight. Rosario taken out 2330. Pt has not voided overnight. Bladder scan was to be done this AM.
Pt complaining of numbness, tingling & pain of the L leg from knee to toes
Pt had 6 beats of VTACH
Pt cleaned during AM, noticed small brown/red blood tinged substance on brayden. No active rectal bleeding noted.
Airborne Procaution R/O TB
J tube beginning to drain with odor
Patient is a 31 year old male who transferred from [facility] after being admitted for syncopal episode and found to have MSSA/bacteremia with L pigtail chest tube placed for suspected PMX

## 2022-10-02 NOTE — PROGRESS NOTE ADULT - PROBLEM SELECTOR PLAN 10
Ca 16.4 9/10 s/p calcitonin 4u/kg x 2 doses and pamidronate 90mcg IVPB x1.  - Ca 13.5 corrected 10/1. pamidronate 90 x 1 and IVF 75 cc  - continue to trend levels q 24 hrs   - can repeat pamidronate after 1 week PRN  - vit D low but will hold supplementation in the setting of hypercalcemia of malignancy

## 2022-10-02 NOTE — DISCHARGE NOTE PROVIDER - CARE PROVIDERS DIRECT ADDRESSES
,[email or web address],DirectAddress_Unknown [email or web address],DirectAddress_Unknown,[email or web address]

## 2022-10-02 NOTE — PROGRESS NOTE ADULT - SUBJECTIVE AND OBJECTIVE BOX
**************************************  Francisco Little, PGY-2  After 7PM, please contact night float at #[phone] or #[phone]  **************************************    INTERVAL HPI/OVERNIGHT EVENTS:  Patient was seen and examined at bedside. As per nurse and patient, no o/n events, patient resting comfortably. No complaints at this time.     VITAL SIGNS:  T(F): 98.2 (10-02-22 @ 05:33)  HR: 98 (10-02-22 @ 05:33)  BP: 97/50 (10-02-22 @ 05:33)  RR: 18 (10-02-22 @ 05:33)  SpO2: 100% (10-02-22 @ 05:33)  Wt(kg): --    PHYSICAL EXAM:  GENERAL: NAD, cachectic appearing; pedraza catheter in place draining clear urine  CHEST/LUNG: No lung sounds hear in left upper lobe but otherwise CTAB; no wheeze.   HEART: Regular rate and rhythm; Normal S1 S2, No murmurs, rubs, or gallops  ABDOMEN: Soft, Nontender, Nondistended; Bowel sounds present  EXTREMITIES:  2+ Peripheral Pulses, No clubbing, cyanosis, or edema  : scrotal swelling unchanged from presentation; pillow under scrotum for elevation  SKIN: bandage on lower back which patient reports is his rectal mass; bandage unopened   PSYCH: AAOx3    MEDICATIONS  (STANDING):  bictegravir 50 mG/emtricitabine 200 mG/tenofovir alafenamide 25 mG (BIKTARVY) 1 Tablet(s) Oral daily  ceFAZolin   IVPB 2000 milliGRAM(s) IV Intermittent every 8 hours  chlorhexidine 2% Cloths 1 Application(s) Topical <User Schedule>  enoxaparin Injectable 60 milliGRAM(s) SubCutaneous every 12 hours  gabapentin 300 milliGRAM(s) Oral three times a day  lactulose Syrup 10 Gram(s) Oral daily  lidocaine   4% Patch 1 Patch Transdermal daily  lidocaine   4% Patch 1 Patch Transdermal daily  melatonin 3 milliGRAM(s) Oral at bedtime  methadone    Tablet 5 milliGRAM(s) Oral three times a day  metoprolol tartrate 50 milliGRAM(s) Oral two times a day  naloxegol 25 milliGRAM(s) Oral daily  polyethylene glycol 3350 17 Gram(s) Oral two times a day  senna 2 Tablet(s) Oral at bedtime  sodium chloride 0.9%. 1000 milliLiter(s) (75 mL/Hr) IV Continuous <Continuous>    MEDICATIONS  (PRN):  bisacodyl 5 milliGRAM(s) Oral at bedtime PRN Constipation  HYDROmorphone   Tablet 4 milliGRAM(s) Oral every 4 hours PRN Moderate Pain (4 - 6)  HYDROmorphone   Tablet 6 milliGRAM(s) Oral every 4 hours PRN Severe Pain (7 - 10)  naloxone Injectable 0.1 milliGRAM(s) IV Push every 3 minutes PRN opioid intoxication      Allergies    ibuprofen (Angioedema)    Intolerances        LABS:    10-01    134<L>  |  99  |  26<H>  ----------------------------<  93  5.2   |  24  |  0.60    Ca    12.4<H>      01 Oct 2022 09:27  Phos  4.2     10-01  Mg     1.90     10-01    TPro  6.5  /  Alb  2.6<L>  /  TBili  0.4  /  DBili  x   /  AST  27  /  ALT  <5  /  AlkPhos  366<H>  10-01          RADIOLOGY & ADDITIONAL TESTS:  Reviewed

## 2022-10-02 NOTE — DISCHARGE NOTE PROVIDER - NSFOLLOWUPCLINICSTOKEN_GEN_ALL_ED_FT
[id number]: || ||00\01||False;[id number]: || ||00\01||False; [id number]: || ||00\01||False;[id number]: || ||00\01||False;[id number]: || ||00\01||False;

## 2022-10-02 NOTE — PROVIDER CONTACT NOTE (OTHER) - ASSESSMENT
No active rectal bleeding noted, pt denies any other prior reports of rectal bleeding.
J tube beginning to drain with odor
Pt complains of frequent 7-10/10 pain all over his body, especially in groin/penile area. Pt moans, cries out and becomes very rigid writhing in pain. Pt has had frequent pushes of dilaudid, with changes to the dosage and frequency of the medicine. Methadone 2.5mg orally was restarted as well but pain continues to progress even with increase frequency of pain medication.
Patient has had cough X 2months
Pt complaining of numbness, tingling & pain of the L leg from knee to toes; VSS; receiving meds around the clock for pain. Pulses present, extremity warm, color normal. Pt reports still having feeling in the extremity.
Pt visibly upset, reported slight burning with blood present in pedraza.
Patient laying comfortably in bed. Asymptomatic
Patient A&Ox4, in no acute distress, denies chest pain, shortness of breath, discomfort at this time.  Patient refusing AM labs
Pt stable at this time. Bladder not distended.
heart rate 103 blood pressure 108/69. Patient asymptomatic, will be administered due medicine as ordered
Pt stable at this time. Bladder not distended. No pain reported, bladder scan taken when patient is laying on side and it was 0ml
Patient asymptomatic.

## 2022-10-02 NOTE — PROVIDER CONTACT NOTE (OTHER) - BACKGROUND
(Admit Diagnosis) Disorder of respiratory system  (PMH) Rectal cancer with mets  (PMH) Right cataract  (PMH) History of depression  (PMH) HIV infection
(Admit Diagnosis) Disorder of respiratory system  (PMH) Rectal cancer with mets  (PMH) Right cataract  (PMH) History of depression  (PMH) HIV infection
30 y/o M admitted for disorder of respiratory system.
(Admit Diagnosis) Disorder of respiratory system  (PMH) Rectal cancer  (PMH) Right cataract  (PMH) History of depression  (PMH) HIV infection
(Admit Diagnosis) Disorder of respiratory system  (PMH) Rectal cancer  (PMH) Right cataract  (PMH) History of depression  (PMH) HIV infection
Admit Diagnosis) Disorder of respiratory system  (PMH) Rectal cancer  (PMH) Right cataract  (PMH) History of depression  (PMH) HIV infection
Patient admitted with disorder of respiratory system
Patient diagnosed with  disorder of respiratory system
Patient is 31 years old PMH HIV Rectal Cancer mets to liver and possible bone Right eye cataract
Patient's PMHx includes HIV, rectal cancer with mets to liver and bone (no tx)
Pt is refusing to lay on his back. Pt has rectal cancer. He said its too painful. Pt did receive pain medication this morning. Pt was educated on importance of bladder scan.
Pt is refusing to lay on his back. Pt has rectal cancer. He said its too painful. Pt did receive pain medication this morning. Pt was educated on importance of bladder scan.

## 2022-10-02 NOTE — DISCHARGE NOTE PROVIDER - DETAILS OF MALNUTRITION DIAGNOSIS/DIAGNOSES
This patient has been assessed with a concern for Malnutrition and was treated during this hospitalization for the following Nutrition diagnosis/diagnoses:     -  09/10/2022: Severe protein-calorie malnutrition   -  09/10/2022: Underweight (BMI < 19)

## 2022-10-02 NOTE — DISCHARGE NOTE PROVIDER - NSDCMRMEDTOKEN_GEN_ALL_CORE_FT
Biktarvy 50 mg-200 mg-25 mg oral tablet: 1 tab(s) orally once a day  oxyCODONE 5 mg oral tablet: 1 tab(s) orally every 6 hours, As Needed for severe pain  MDD:4   Percocet 5/325 oral tablet: 1 tab(s) orally every 6 hours, As Needed anal pain

## 2022-10-02 NOTE — PROVIDER CONTACT NOTE (OTHER) - DATE AND TIME:
13-Sep-2022 15:28
19-Sep-2022 09:45
19-Sep-2022 22:00
25-Sep-2022 19:03
28-Sep-2022 06:20
02-Oct-2022 22:24
28-Sep-2022 09:00
18-Sep-2022 08:00
29-Sep-2022 07:39
30-Sep-2022 08:00
02-Oct-2022 15:28
22-Sep-2022 15:33

## 2022-10-03 DIAGNOSIS — D72.829 ELEVATED WHITE BLOOD CELL COUNT, UNSPECIFIED: ICD-10-CM

## 2022-10-03 LAB
ALBUMIN SERPL ELPH-MCNC: 2.6 G/DL — LOW (ref 3.3–5)
ALP SERPL-CCNC: 357 U/L — HIGH (ref 40–120)
ALT FLD-CCNC: 6 U/L — SIGNIFICANT CHANGE UP (ref 4–41)
ANION GAP SERPL CALC-SCNC: 9 MMOL/L — SIGNIFICANT CHANGE UP (ref 7–14)
AST SERPL-CCNC: 29 U/L — SIGNIFICANT CHANGE UP (ref 4–40)
BASOPHILS # BLD AUTO: 0.01 K/UL — SIGNIFICANT CHANGE UP (ref 0–0.2)
BASOPHILS NFR BLD AUTO: 0.1 % — SIGNIFICANT CHANGE UP (ref 0–2)
BILIRUB SERPL-MCNC: 0.4 MG/DL — SIGNIFICANT CHANGE UP (ref 0.2–1.2)
BUN SERPL-MCNC: 25 MG/DL — HIGH (ref 7–23)
CALCIUM SERPL-MCNC: 13 MG/DL — CRITICAL HIGH (ref 8.4–10.5)
CHLORIDE SERPL-SCNC: 102 MMOL/L — SIGNIFICANT CHANGE UP (ref 98–107)
CO2 SERPL-SCNC: 26 MMOL/L — SIGNIFICANT CHANGE UP (ref 22–31)
CREAT SERPL-MCNC: 0.45 MG/DL — LOW (ref 0.5–1.3)
EGFR: 143 ML/MIN/1.73M2 — SIGNIFICANT CHANGE UP
EOSINOPHIL # BLD AUTO: 0.03 K/UL — SIGNIFICANT CHANGE UP (ref 0–0.5)
EOSINOPHIL NFR BLD AUTO: 0.2 % — SIGNIFICANT CHANGE UP (ref 0–6)
GLUCOSE SERPL-MCNC: 95 MG/DL — SIGNIFICANT CHANGE UP (ref 70–99)
HCT VFR BLD CALC: 32.3 % — LOW (ref 39–50)
HGB BLD-MCNC: 9.4 G/DL — LOW (ref 13–17)
IANC: 13.87 K/UL — HIGH (ref 1.8–7.4)
IMM GRANULOCYTES NFR BLD AUTO: 0.9 % — SIGNIFICANT CHANGE UP (ref 0–0.9)
LYMPHOCYTES # BLD AUTO: 1.39 K/UL — SIGNIFICANT CHANGE UP (ref 1–3.3)
LYMPHOCYTES # BLD AUTO: 8.6 % — LOW (ref 13–44)
MAGNESIUM SERPL-MCNC: 1.8 MG/DL — SIGNIFICANT CHANGE UP (ref 1.6–2.6)
MCHC RBC-ENTMCNC: 23.6 PG — LOW (ref 27–34)
MCHC RBC-ENTMCNC: 29.1 GM/DL — LOW (ref 32–36)
MCV RBC AUTO: 81 FL — SIGNIFICANT CHANGE UP (ref 80–100)
MONOCYTES # BLD AUTO: 0.78 K/UL — SIGNIFICANT CHANGE UP (ref 0–0.9)
MONOCYTES NFR BLD AUTO: 4.8 % — SIGNIFICANT CHANGE UP (ref 2–14)
NEUTROPHILS # BLD AUTO: 13.87 K/UL — HIGH (ref 1.8–7.4)
NEUTROPHILS NFR BLD AUTO: 85.4 % — HIGH (ref 43–77)
NRBC # BLD: 0 /100 WBCS — SIGNIFICANT CHANGE UP (ref 0–0)
NRBC # FLD: 0 K/UL — SIGNIFICANT CHANGE UP (ref 0–0)
PHOSPHATE SERPL-MCNC: 2.9 MG/DL — SIGNIFICANT CHANGE UP (ref 2.5–4.5)
PLATELET # BLD AUTO: 184 K/UL — SIGNIFICANT CHANGE UP (ref 150–400)
POTASSIUM SERPL-MCNC: 4.2 MMOL/L — SIGNIFICANT CHANGE UP (ref 3.5–5.3)
POTASSIUM SERPL-SCNC: 4.2 MMOL/L — SIGNIFICANT CHANGE UP (ref 3.5–5.3)
PROT SERPL-MCNC: 6.7 G/DL — SIGNIFICANT CHANGE UP (ref 6–8.3)
RBC # BLD: 3.99 M/UL — LOW (ref 4.2–5.8)
RBC # FLD: 22 % — HIGH (ref 10.3–14.5)
SODIUM SERPL-SCNC: 137 MMOL/L — SIGNIFICANT CHANGE UP (ref 135–145)
WBC # BLD: 16.22 K/UL — HIGH (ref 3.8–10.5)
WBC # FLD AUTO: 16.22 K/UL — HIGH (ref 3.8–10.5)

## 2022-10-03 PROCEDURE — 93010 ELECTROCARDIOGRAM REPORT: CPT

## 2022-10-03 PROCEDURE — 99233 SBSQ HOSP IP/OBS HIGH 50: CPT | Mod: GC

## 2022-10-03 RX ORDER — SODIUM CHLORIDE 9 MG/ML
1000 INJECTION, SOLUTION INTRAVENOUS
Refills: 0 | Status: DISCONTINUED | OUTPATIENT
Start: 2022-10-03 | End: 2022-10-04

## 2022-10-03 RX ORDER — PAMIDRONATE DISODIUM 9 MG/ML
90 INJECTION, SOLUTION INTRAVENOUS ONCE
Refills: 0 | Status: DISCONTINUED | OUTPATIENT
Start: 2022-10-03 | End: 2022-10-03

## 2022-10-03 RX ADMIN — LIDOCAINE 1 PATCH: 4 CREAM TOPICAL at 13:05

## 2022-10-03 RX ADMIN — LIDOCAINE 1 PATCH: 4 CREAM TOPICAL at 13:04

## 2022-10-03 RX ADMIN — METHADONE HYDROCHLORIDE 5 MILLIGRAM(S): 40 TABLET ORAL at 21:51

## 2022-10-03 RX ADMIN — HYDROMORPHONE HYDROCHLORIDE 6 MILLIGRAM(S): 2 INJECTION INTRAMUSCULAR; INTRAVENOUS; SUBCUTANEOUS at 09:33

## 2022-10-03 RX ADMIN — HYDROMORPHONE HYDROCHLORIDE 6 MILLIGRAM(S): 2 INJECTION INTRAMUSCULAR; INTRAVENOUS; SUBCUTANEOUS at 14:05

## 2022-10-03 RX ADMIN — GABAPENTIN 300 MILLIGRAM(S): 400 CAPSULE ORAL at 13:06

## 2022-10-03 RX ADMIN — ENOXAPARIN SODIUM 60 MILLIGRAM(S): 100 INJECTION SUBCUTANEOUS at 23:29

## 2022-10-03 RX ADMIN — Medication 100 MILLIGRAM(S): at 13:11

## 2022-10-03 RX ADMIN — METHADONE HYDROCHLORIDE 5 MILLIGRAM(S): 40 TABLET ORAL at 13:11

## 2022-10-03 RX ADMIN — GABAPENTIN 300 MILLIGRAM(S): 400 CAPSULE ORAL at 05:59

## 2022-10-03 RX ADMIN — Medication 100 MILLIGRAM(S): at 06:00

## 2022-10-03 RX ADMIN — LIDOCAINE 1 PATCH: 4 CREAM TOPICAL at 19:25

## 2022-10-03 RX ADMIN — HYDROMORPHONE HYDROCHLORIDE 6 MILLIGRAM(S): 2 INJECTION INTRAMUSCULAR; INTRAVENOUS; SUBCUTANEOUS at 21:30

## 2022-10-03 RX ADMIN — Medication 100 MILLIGRAM(S): at 21:52

## 2022-10-03 RX ADMIN — HYDROMORPHONE HYDROCHLORIDE 6 MILLIGRAM(S): 2 INJECTION INTRAMUSCULAR; INTRAVENOUS; SUBCUTANEOUS at 13:10

## 2022-10-03 RX ADMIN — SODIUM CHLORIDE 75 MILLILITER(S): 9 INJECTION, SOLUTION INTRAVENOUS at 10:42

## 2022-10-03 RX ADMIN — ENOXAPARIN SODIUM 60 MILLIGRAM(S): 100 INJECTION SUBCUTANEOUS at 00:23

## 2022-10-03 RX ADMIN — Medication 3 MILLIGRAM(S): at 21:53

## 2022-10-03 RX ADMIN — Medication 50 MILLIGRAM(S): at 17:07

## 2022-10-03 RX ADMIN — GABAPENTIN 300 MILLIGRAM(S): 400 CAPSULE ORAL at 21:53

## 2022-10-03 RX ADMIN — CHLORHEXIDINE GLUCONATE 1 APPLICATION(S): 213 SOLUTION TOPICAL at 06:03

## 2022-10-03 RX ADMIN — HYDROMORPHONE HYDROCHLORIDE 6 MILLIGRAM(S): 2 INJECTION INTRAMUSCULAR; INTRAVENOUS; SUBCUTANEOUS at 09:03

## 2022-10-03 RX ADMIN — ENOXAPARIN SODIUM 60 MILLIGRAM(S): 100 INJECTION SUBCUTANEOUS at 13:05

## 2022-10-03 RX ADMIN — NALOXEGOL OXALATE 25 MILLIGRAM(S): 12.5 TABLET, FILM COATED ORAL at 13:05

## 2022-10-03 RX ADMIN — BICTEGRAVIR SODIUM, EMTRICITABINE, AND TENOFOVIR ALAFENAMIDE FUMARATE 1 TABLET(S): 30; 120; 15 TABLET ORAL at 13:06

## 2022-10-03 RX ADMIN — METHADONE HYDROCHLORIDE 5 MILLIGRAM(S): 40 TABLET ORAL at 05:59

## 2022-10-03 RX ADMIN — HYDROMORPHONE HYDROCHLORIDE 6 MILLIGRAM(S): 2 INJECTION INTRAMUSCULAR; INTRAVENOUS; SUBCUTANEOUS at 22:20

## 2022-10-03 NOTE — PROGRESS NOTE ADULT - PROBLEM SELECTOR PLAN 5
Patient with history of untreated HPV related rectal cancer with episode of rectal bleeding at [facility] Hgb 5.9 s/p 2u pRBCs.  - CT with evidence of liver and bone lesions  - renal cancer treatment will be on hold pending resolution of infection  - radiation oncology consult for RT of rectal cancer placed; will likely get outpatient RT   - appreciate oncology recs Patient with history of untreated HPV related rectal cancer with episode of rectal bleeding at [facility] Hgb 5.9 s/p 2u pRBCs.  - per rad/onc: will undergo trial of RT inpatient starting tomorrow; target of 5 fractions but may be fewer if clinical improvement  - CT with evidence of liver and bone lesions Patient with history of MSSA bacteremia found at [facility] with blood cultures 8/31 positive for MSSA and repeat on 9/2 NGTD.  - s/p Zosyn (9/8 - 9/9)  - transitioned to Cefazolin 2GM q8h for 6 weeks ending (9/9-10/13)   - may need midline placement prior to discharge for antibiotics at rehab  - upon discharge, will need weekly CBC, BUN and creatinine and ID clinic follow up

## 2022-10-03 NOTE — PROGRESS NOTE ADULT - PROBLEM SELECTOR PLAN 2
CT Chest 9/11 with evidence of persistent pneumomediastinum and extensive subcutaneous emphysema of the neck, chest, abdomen, pelvis, and extremities, including the scrotum.   - CANDIS per thoracic surgery  - indwelling pedraza was placed at [facility] (Coude placed for urinary strain)   - TOV attempted 9/27-9/28 overnight and failed with difficulty   - coude catheter placed by urology 9/28; can remain in place for 4 weeks prior to requiring change.  - No further urologic intervention needed at this time.   - Can f/u outpatient w/ Dr Prabhu Lucio, [facility] [place]  - pain control with dilaudid and methadone, ct a/p 9/11 did not show any inflammation/hydrocele/infection . - WBC inc to 16.2 today 10/3 from 12 9/27  - inc in hgb as well from 8.3 to 9.4  - no concern for new infection at this time; afebrile, no clinical worsening  - likely reactive 2/2 to underlying tumor burden vs hemoconcentration in the setting of poor po intake

## 2022-10-03 NOTE — PROGRESS NOTE ADULT - PROBLEM SELECTOR PLAN 3
Patient with TTE performed on at [facility] with evidence of two large RV masses and two additional small masses. IVC filter placed prophylactically at that time due to undiagnosed vegetations on the TTE.  - repeat TTE 9/9 was a limited study but demonstrated a mobile mass likely 2/2 tumour, thrombus, or vegetation.   - per cardiology risk of JENNIFER outweighs benefit at this time  - refused cardiac MRI again (3 attempts since last week; patient not a candidate for general anesthesia)  - per cardiology, no acute indication to do cardiac MRI/ JENNIFER at this time given patients other active issues  - monitor on tele  - can follow up outpatient Patient with TTE performed on at [facility] with evidence of two large RV masses and two additional small masses. IVC filter placed prophylactically at that time due to undiagnosed vegetations on the TTE.  - repeat TTE 9/9 was a limited study but demonstrated a mobile mass likely 2/2 tumour, thrombus, or vegetation.   - per cardiology risk of JENNIFER outweighs benefit at this time  - refused cardiac MRI again (3 attempts since last week; patient not a candidate for general anesthesia)  - per cardiology, no acute indication to do cardiac MRI/ JENNIFER at this time given patients other active issues  - monitor on tele  - can follow up outpatient for serial TTEs or JENNIFER once medically stable CT Chest 9/11 with evidence of persistent pneumomediastinum and extensive subcutaneous emphysema of the neck, chest, abdomen, pelvis, and extremities, including the scrotum.   - CANDIS per thoracic surgery  - indwelling pedraza was placed at [facility] (Coude placed for urinary strain)   - TOV attempted 9/27-9/28 overnight and failed with difficulty   - coude catheter placed by urology 9/28; can remain in place for 4 weeks prior to requiring change.  - No further urologic intervention needed at this time.   - Can f/u outpatient w/ Dr Prabhu Lucio, [facility] for [facility]rology at [place]  - pain control with dilaudid and methadone, ct a/p 9/11 did not show any inflammation/hydrocele/infection .

## 2022-10-03 NOTE — CHART NOTE - NSCHARTNOTEFT_GEN_A_CORE
Case d/w Dr. Santiago again today.    Mr. Porter seen in bed, KPS 50, laying on his right side, dozing off during our discussion but   still described pelvic pains, rectal pains as 7/10.    Now with elevated calcium and elevated WBC, pedraza catheter in place.  While discharge was in discussion, along with cardiac MRI that he refused 3x,  we will attempt a short palliative course of pelvic / rectal RT starting tomorrow  and see if he can tolerate a 3-5 fraction course of pelvic RT.     Will reassess tolerance tomorrow for RT while inpatient.

## 2022-10-03 NOTE — PROGRESS NOTE ADULT - PROBLEM SELECTOR PLAN 7
Patient found to have L external vein DVT with bilateral leg DVT studies negative for DVT.  - cont lovenox full dose.  - will need to monitor for signs of bleeding while on lovenox Palliative care consulted for extensive disease burden and GOC discussion. At this time, patient was all available treatment and resuscitation. Remains full code.  - increased PO Methadone 5mg TID (QTC- 443 on 9/23); repeat EKG today  - c/w Gabapentin 300mg TID  - PO Dilaudid 4mg q4 PRN moderate pain  - PO Dilaudid 6mg q4 PRN severe pain  - hold parameters placed for all medications  - appreciate palliative care recs  - will need OP f/u at [facility] with Dr. Kiesha Calle/ Dr. Mazariegos/ NP Zoe Carvalho/ ROCIO Cruz; [phone]

## 2022-10-03 NOTE — PROGRESS NOTE ADULT - PROBLEM SELECTOR PLAN 6
Palliative care consulted for extensive disease burden and GOC discussion. At this time, patient was all available treatment and resuscitation. Remains full code.  - increased PO Methadone 5mg TID (QTC-444 on 9/12; QTC- 458 on 9/19); will need repeat EKG later this week to monitor QTC while on methadone  - c/w Gabapentin 300mg TID  - PO Dilaudid 4mg q4 PRN moderate pain  - PO Dilaudid 6mg q4 PRN severe pain  - hold parameters placed for all medications  - appreciate palliative care recs  - will need OP f/u at [facility] with Dr. Kiesha Calle/ Dr. Mazariegos/ NP Zoe Carvalho/ ROCIO Cruz; [phone] Palliative care consulted for extensive disease burden and GOC discussion. At this time, patient was all available treatment and resuscitation. Remains full code.  - increased PO Methadone 5mg TID (QTC- 443 on 9/23); repeat EKG today  - c/w Gabapentin 300mg TID  - PO Dilaudid 4mg q4 PRN moderate pain  - PO Dilaudid 6mg q4 PRN severe pain  - hold parameters placed for all medications  - appreciate palliative care recs  - will need OP f/u at [facility] with Dr. Kiesha Calle/ Dr. Mazariegos/ NP Zoe Carvalho/ ROCIO Cruz; [phone] Patient with history of untreated HPV related rectal cancer with episode of rectal bleeding at [facility] Hgb 5.9 s/p 2u pRBCs.  - per rad/onc: will undergo trial of RT inpatient starting tomorrow; target of 5 fractions but may be fewer if clinical improvement  - CT with evidence of liver and bone lesions

## 2022-10-03 NOTE — PROGRESS NOTE ADULT - SUBJECTIVE AND OBJECTIVE BOX
PROGRESS NOTE:     Patient is a 31y old  Male who presents with a chief complaint of Shortness of breath (02 Oct 2022 10:45)      SUBJECTIVE / OVERNIGHT EVENTS:    No acute events overnight. Patient examined at bedside with no acute complaints.     Pain:  Bowel Movements:  Urination:  OOB:  PT:    REVIEW OF SYSTEMS:    CONSTITUTIONAL: No weakness, fevers or chills  EYES/ENT: No visual changes;  No vertigo or throat pain   NECK: No pain or stiffness  RESPIRATORY: No cough, wheezing, hemoptysis; No shortness of breath  CARDIOVASCULAR: No chest pain or palpitations  GASTROINTESTINAL: No abdominal or epigastric pain. No nausea, vomiting, or hematemesis; No diarrhea or constipation. No melena or hematochezia.  GENITOURINARY: No dysuria, frequency or hematuria  NEUROLOGICAL: No numbness or weakness  SKIN: No itching, rashes      MEDICATIONS  (STANDING):  bictegravir 50 mG/emtricitabine 200 mG/tenofovir alafenamide 25 mG (BIKTARVY) 1 Tablet(s) Oral daily  ceFAZolin   IVPB 2000 milliGRAM(s) IV Intermittent every 8 hours  chlorhexidine 2% Cloths 1 Application(s) Topical <User Schedule>  enoxaparin Injectable 60 milliGRAM(s) SubCutaneous every 12 hours  gabapentin 300 milliGRAM(s) Oral three times a day  lactulose Syrup 10 Gram(s) Oral daily  lidocaine   4% Patch 1 Patch Transdermal daily  lidocaine   4% Patch 1 Patch Transdermal daily  melatonin 3 milliGRAM(s) Oral at bedtime  methadone    Tablet 5 milliGRAM(s) Oral three times a day  metoprolol tartrate 50 milliGRAM(s) Oral two times a day  naloxegol 25 milliGRAM(s) Oral daily  polyethylene glycol 3350 17 Gram(s) Oral two times a day  senna 2 Tablet(s) Oral at bedtime  sodium chloride 0.9%. 1000 milliLiter(s) (75 mL/Hr) IV Continuous <Continuous>    MEDICATIONS  (PRN):  bisacodyl 5 milliGRAM(s) Oral at bedtime PRN Constipation  HYDROmorphone   Tablet 4 milliGRAM(s) Oral every 4 hours PRN Moderate Pain (4 - 6)  HYDROmorphone   Tablet 6 milliGRAM(s) Oral every 4 hours PRN Severe Pain (7 - 10)  naloxone Injectable 0.1 milliGRAM(s) IV Push every 3 minutes PRN opioid intoxication      CAPILLARY BLOOD GLUCOSE        I&O's Summary    01 Oct 2022 07:01  -  02 Oct 2022 07:00  --------------------------------------------------------  IN: 650 mL / OUT: 1500 mL / NET: -850 mL    02 Oct 2022 07:01  -  03 Oct 2022 06:31  --------------------------------------------------------  IN: 255 mL / OUT: 1800 mL / NET: -1545 mL        VITALS:   T(C): 36.6 (10-02-22 @ 22:12), Max: 36.6 (10-02-22 @ 22:12)  HR: 101 (10-03-22 @ 05:41) (82 - 103)  BP: 99/65 (10-03-22 @ 05:41) (89/45 - 108/69)  RR: 18 (10-03-22 @ 05:41) (18 - 19)  SpO2: 97% (10-03-22 @ 05:41) (91% - 98%)    GENERAL: NAD, lying in bed comfortably  HEAD:  Atraumatic, normocephalic  EYES: EOMI, PERRLA, conjunctiva and sclera clear  ENT: Moist mucous membranes  NECK: Supple, no JVD  HEART: Regular rate and rhythm, no murmurs, rubs, or gallops  LUNGS: Unlabored respirations.  Clear to auscultation bilaterally, no crackles, wheezing, or rhonchi  ABDOMEN: Soft, nontender, nondistended, +BS  EXTREMITIES: 2+ peripheral pulses bilaterally. No clubbing, cyanosis, or edema  NERVOUS SYSTEM:  A&Ox3, no focal deficits   SKIN: No rashes or lesions    LABS:    10-01    134<L>  |  99  |  26<H>  ----------------------------<  93  5.2   |  24  |  0.60    Ca    12.4<H>      01 Oct 2022 09:27  Phos  4.2     10-01  Mg     1.90     10-01    TPro  6.5  /  Alb  2.6<L>  /  TBili  0.4  /  DBili  x   /  AST  27  /  ALT  <5  /  AlkPhos  366<H>  10-01                RADIOLOGY & ADDITIONAL TESTS:  Results Reviewed:   Imaging Personally Reviewed:  Electrocardiogram Personally Reviewed:    COORDINATION OF CARE:  Care Discussed with Consultants/Other Providers [Y/N]:  Prior or Outpatient Records Reviewed [Y/N]:   PROGRESS NOTE:     Patient is a 31y old  Male who presents with a chief complaint of Shortness of breath (02 Oct 2022 10:45)      SUBJECTIVE / OVERNIGHT EVENTS:    No acute events overnight. Patient examined at bedside,    Pain:  Bowel Movements:  Urination:  OOB:  PT:    REVIEW OF SYSTEMS:    CONSTITUTIONAL: No weakness, fevers or chills  EYES/ENT: No visual changes;  No vertigo or throat pain   NECK: No pain or stiffness  RESPIRATORY: No cough, wheezing, hemoptysis; No shortness of breath  CARDIOVASCULAR: No chest pain or palpitationsg  GASTROINTESTINAL: No abdominal or epigastric pain. No nausea, vomiting, or hematemesis; No diarrhea or constipation. No melena or hematochezia.  GENITOURINARY: No dysuria, frequency or hematuria  NEUROLOGICAL: No numbness or weakness  SKIN: No itching, rashes      MEDICATIONS  (STANDING):  bictegravir 50 mG/emtricitabine 200 mG/tenofovir alafenamide 25 mG (BIKTARVY) 1 Tablet(s) Oral daily  ceFAZolin   IVPB 2000 milliGRAM(s) IV Intermittent every 8 hours  chlorhexidine 2% Cloths 1 Application(s) Topical <User Schedule>  enoxaparin Injectable 60 milliGRAM(s) SubCutaneous every 12 hours  gabapentin 300 milliGRAM(s) Oral three times a day  lactulose Syrup 10 Gram(s) Oral daily  lidocaine   4% Patch 1 Patch Transdermal daily  lidocaine   4% Patch 1 Patch Transdermal daily  melatonin 3 milliGRAM(s) Oral at bedtime  methadone    Tablet 5 milliGRAM(s) Oral three times a day  metoprolol tartrate 50 milliGRAM(s) Oral two times a day  naloxegol 25 milliGRAM(s) Oral daily  polyethylene glycol 3350 17 Gram(s) Oral two times a day  senna 2 Tablet(s) Oral at bedtime  sodium chloride 0.9%. 1000 milliLiter(s) (75 mL/Hr) IV Continuous <Continuous>    MEDICATIONS  (PRN):  bisacodyl 5 milliGRAM(s) Oral at bedtime PRN Constipation  HYDROmorphone   Tablet 4 milliGRAM(s) Oral every 4 hours PRN Moderate Pain (4 - 6)  HYDROmorphone   Tablet 6 milliGRAM(s) Oral every 4 hours PRN Severe Pain (7 - 10)  naloxone Injectable 0.1 milliGRAM(s) IV Push every 3 minutes PRN opioid intoxication      CAPILLARY BLOOD GLUCOSE        I&O's Summary    01 Oct 2022 07:01  -  02 Oct 2022 07:00  --------------------------------------------------------  IN: 650 mL / OUT: 1500 mL / NET: -850 mL    02 Oct 2022 07:01  -  03 Oct 2022 06:31  --------------------------------------------------------  IN: 255 mL / OUT: 1800 mL / NET: -1545 mL        VITALS:   T(C): 36.6 (10-02-22 @ 22:12), Max: 36.6 (10-02-22 @ 22:12)  HR: 101 (10-03-22 @ 05:41) (82 - 103)  BP: 99/65 (10-03-22 @ 05:41) (89/45 - 108/69)  RR: 18 (10-03-22 @ 05:41) (18 - 19)  SpO2: 97% (10-03-22 @ 05:41) (91% - 98%)    GENERAL: NAD, lying in bed comfortably  HEAD:  Atraumatic, normocephalic  EYES: EOMI, PERRLA, conjunctiva and sclera clear  ENT: Moist mucous membranes  NECK: Supple, no JVD  HEART: Regular rate and rhythm, no murmurs, rubs, or gallops  LUNGS: Unlabored respirations.  Clear to auscultation bilaterally, no crackles, wheezing, or rhonchi  ABDOMEN: Soft, nontender, nondistended, +BS  EXTREMITIES: 2+ peripheral pulses bilaterally. No clubbing, cyanosis, or edema  NERVOUS SYSTEM:  A&Ox3, no focal deficits   SKIN: No rashes or lesions    LABS:    10-01    134<L>  |  99  |  26<H>  ----------------------------<  93  5.2   |  24  |  0.60    Ca    12.4<H>      01 Oct 2022 09:27  Phos  4.2     10-01  Mg     1.90     10-01    TPro  6.5  /  Alb  2.6<L>  /  TBili  0.4  /  DBili  x   /  AST  27  /  ALT  <5  /  AlkPhos  366<H>  10-01                RADIOLOGY & ADDITIONAL TESTS:  Results Reviewed:   Imaging Personally Reviewed:  Electrocardiogram Personally Reviewed:    COORDINATION OF CARE:  Care Discussed with Consultants/Other Providers [Y/N]:  Prior or Outpatient Records Reviewed [Y/N]:   PROGRESS NOTE:     Patient is a 31y old  Male who presents with a chief complaint of Shortness of breath (02 Oct 2022 10:45)      SUBJECTIVE / OVERNIGHT EVENTS:    No acute events overnight. Patient examined at bedside, noted to be moaning but denies any pain or acute complaints aside from feeling cold.       REVIEW OF SYSTEMS:    CONSTITUTIONAL: No weakness, fevers or chills  RESPIRATORY: No cough, wheezing, hemoptysis; No shortness of breath  CARDIOVASCULAR: No chest pain or palpitations  GASTROINTESTINAL: No abdominal or epigastric pain. No nausea, vomiting, or hematemesis; No diarrhea or constipation. No melena or hematochezia.  NEUROLOGICAL: No numbness or weakness      MEDICATIONS  (STANDING):  bictegravir 50 mG/emtricitabine 200 mG/tenofovir alafenamide 25 mG (BIKTARVY) 1 Tablet(s) Oral daily  ceFAZolin   IVPB 2000 milliGRAM(s) IV Intermittent every 8 hours  chlorhexidine 2% Cloths 1 Application(s) Topical <User Schedule>  enoxaparin Injectable 60 milliGRAM(s) SubCutaneous every 12 hours  gabapentin 300 milliGRAM(s) Oral three times a day  lactulose Syrup 10 Gram(s) Oral daily  lidocaine   4% Patch 1 Patch Transdermal daily  lidocaine   4% Patch 1 Patch Transdermal daily  melatonin 3 milliGRAM(s) Oral at bedtime  methadone    Tablet 5 milliGRAM(s) Oral three times a day  metoprolol tartrate 50 milliGRAM(s) Oral two times a day  naloxegol 25 milliGRAM(s) Oral daily  polyethylene glycol 3350 17 Gram(s) Oral two times a day  senna 2 Tablet(s) Oral at bedtime  sodium chloride 0.9%. 1000 milliLiter(s) (75 mL/Hr) IV Continuous <Continuous>    MEDICATIONS  (PRN):  bisacodyl 5 milliGRAM(s) Oral at bedtime PRN Constipation  HYDROmorphone   Tablet 4 milliGRAM(s) Oral every 4 hours PRN Moderate Pain (4 - 6)  HYDROmorphone   Tablet 6 milliGRAM(s) Oral every 4 hours PRN Severe Pain (7 - 10)  naloxone Injectable 0.1 milliGRAM(s) IV Push every 3 minutes PRN opioid intoxication      CAPILLARY BLOOD GLUCOSE        I&O's Summary    01 Oct 2022 07:01  -  02 Oct 2022 07:00  --------------------------------------------------------  IN: 650 mL / OUT: 1500 mL / NET: -850 mL    02 Oct 2022 07:01  -  03 Oct 2022 06:31  --------------------------------------------------------  IN: 255 mL / OUT: 1800 mL / NET: -1545 mL        VITALS:   T(C): 36.6 (10-02-22 @ 22:12), Max: 36.6 (10-02-22 @ 22:12)  HR: 101 (10-03-22 @ 05:41) (82 - 103)  BP: 99/65 (10-03-22 @ 05:41) (89/45 - 108/69)  RR: 18 (10-03-22 @ 05:41) (18 - 19)  SpO2: 97% (10-03-22 @ 05:41) (91% - 98%)    PHYSICAL EXAM:  GENERAL: NAD, cachectic appearing; pedraza catheter in place draining clear urine  CHEST/LUNG: CTAB; no wheeze, crackles.   HEART: Regular rate and rhythm; Normal S1 S2, No murmurs, rubs, or gallops  ABDOMEN: Soft, Nontender, Nondistended; Bowel sounds present  EXTREMITIES:  2+ Peripheral Pulses, No clubbing, cyanosis, or edema  : notable scrotal swelling  PSYCH: AAOx3, though reserved and guarded        LABS:                          9.4    16.22 )-----------( 184      ( 03 Oct 2022 07:20 )             32.3     10-03    137  |  102  |  25<H>  ----------------------------<  95  4.2   |  26  |  0.45<L>    Ca    13.0<HH>      03 Oct 2022 07:20  Phos  2.9     10-03  Mg     1.80     10-03    TPro  6.7  /  Alb  2.6<L>  /  TBili  0.4  /  DBili  x   /  AST  29  /  ALT  6   /  AlkPhos  357<H>  10-03

## 2022-10-03 NOTE — PROGRESS NOTE ADULT - PROBLEM SELECTOR PLAN 9
Na 129 on 9/24 and history significant for decreased PO intake. Otherwise asymptomatic. Likely mixed picture due to poor PO intake and possible SIADH from pain  -; more likely a hypovolemic hyponatremia and SIADH from pain  -  on 10/1  - encourage PO intake Na now improving, 137 today 10/3, previously 120 with history significant for decreased PO intake. Otherwise asymptomatic. Likely mixed picture due to poor PO intake and possible SIADH from pain  -; more likely a hypovolemic hyponatremia and SIADH from pain  - encourage PO intake Patient with microcytic anemia with Hgb 9.4 and MCV 81, likely a mixed picture of microcytic and normocytic anemia.  - iron studies with low iron levels but low iron binding capacity likely indicating a mixed picture  - ferritin wnl   - continue to monitor  - no overt signs of bleeding  - follow up OP with PCP and GI  - will hold on iron supplementation until infection clears

## 2022-10-03 NOTE — PROGRESS NOTE ADULT - PROBLEM SELECTOR PLAN 8
Patient with microcytic anemia with Hgb 8.5 and MCV 79.3 (borderline with other MCV 80.9) likely a mixed picture of microcytic and normocytic anemia.  - iron studies with low iron levels but low iron binding capacity likely indicating a mixed picture  - ferritin wnl   - continue to monitor  - no overt signs of bleeding  - follow up OP with PCP and GI  - will hold on iron supplementation until infection clears Patient with microcytic anemia with Hgb 9.4 and MCV 81, likely a mixed picture of microcytic and normocytic anemia.  - iron studies with low iron levels but low iron binding capacity likely indicating a mixed picture  - ferritin wnl   - continue to monitor  - no overt signs of bleeding  - follow up OP with PCP and GI  - will hold on iron supplementation until infection clears Patient found to have L external vein DVT with bilateral leg DVT studies negative for DVT.  - cont lovenox full dose.  - will need to monitor for signs of bleeding while on lovenox

## 2022-10-03 NOTE — PROGRESS NOTE ADULT - ASSESSMENT
31 M with untreated metastatic ano-rectal SCC (followed by Dr Frazier [facility] oncology) with mets to liver and possibly bone, HPV positive, HIV infection, and R eye cataracts who presented to the [facility] on 8/31/22 after syncopal episode at home and transferred to [facility] for continued of care per family request. Being treated for MSSA bacteremia. c/b scrotal swelling s/p coude catheter, hypercalcemia s/p pamidronate and IVF, and AFB from [facility] w M chimaera not requiring any intervention.

## 2022-10-03 NOTE — PROGRESS NOTE ADULT - PROBLEM SELECTOR PLAN 1
Patient presented to [facility] with hypoxemia and L chest pigtail placed 8/31 due to suspected pneumothorax. Patient showed improvement in SOB and f/u CT chest performed which showed evidence of 12 cm multiloculated thick walled cavitary mass in the CHRISTINE and lingula.  - sputum culture 9/10 + mod klebsiella pneumoniae pansensitive  - blood Cx neg 9/8  - fungitell and crypto neg 9/10  - ID consulted, recs appreciated  - positive AFB in 1/3 sputum samples (sept 3) from [facility] with final speciation Mycobacterium chimaera (avium complex)  - no need for isolation precautions at this time  - no indication for treatment at this time  - per pulm, no CANDIS. will need f/u CT in 2 months

## 2022-10-03 NOTE — PROGRESS NOTE ADULT - PROBLEM SELECTOR PLAN 4
Patient with history of MSSA bacteremia found at [facility] with blood cultures 8/31 positive for MSSA and repeat on 9/2 NGTD.  - s/p Zosyn (9/8 - 9/9)  - transitioned to Cefazolin 2GM q8h for 6 weeks ending (9/9-10/13)   - may need midline placement prior to discharge for antibiotics at rehab  - upon discharge, will need weekly CBC, BUN and creatinine and ID clinic follow up Patient with TTE performed on at [facility] with evidence of two large RV masses and two additional small masses. IVC filter placed prophylactically at that time due to undiagnosed vegetations on the TTE.  - repeat TTE 9/9 was a limited study but demonstrated a mobile mass likely 2/2 tumour, thrombus, or vegetation.   - per cardiology risk of JENNIFER outweighs benefit at this time  - refused cardiac MRI again (3 attempts since last week; patient not a candidate for general anesthesia)  - per cardiology, no acute indication to do cardiac MRI/ JENNIFER at this time given patients other active issues  - monitor on tele  - can follow up outpatient for serial TTEs or JENNIFER once medically stable

## 2022-10-03 NOTE — PROGRESS NOTE ADULT - PROBLEM SELECTOR PLAN 12
Patient with history of HIV and follows with Dr. Marcial in clinic.  - CD4 392 and viral load < 30 on 9/2/22  - Viral load undetectable 9/8  - c/w home Biktarvy SVT events x 2 9/15 and 9/16 resolved with vagal maneuvers. Repeat SVT 9/25 with 9 beats of V tach and 13 beats V tach asymptomatic.  - inc metoprolol to 50 BID with improved HR 90s-low 100; however, often held in the setting of hypotension  - consider EP eval if persists

## 2022-10-03 NOTE — PROGRESS NOTE ADULT - PROBLEM SELECTOR PLAN 10
Ca 16.4 9/10 s/p calcitonin 4u/kg x 2 doses and pamidronate 90mcg IVPB x1.  - Ca 13.5 corrected 10/1. pamidronate 90 x 1 and IVF 75 cc  - continue to trend levels q 24 hrs   - can repeat pamidronate after 1 week PRN  - vit D low but will hold supplementation in the setting of hypercalcemia of malignancy Ca 14.1 10/3; previously received calcitonin x 2 doses and pamidronate 90mcg IVPB x1.  - will resume IV fluids; can try calcitonin again tomorrow if less than ideal improvement  - continue to trend levels q 24 hrs   - can repeat pamidronate after 1 week PRN  - vit D low but will hold supplementation in the setting of hypercalcemia of malignancy Na now improving, 137 today 10/3, previously 120 with history significant for decreased PO intake. Otherwise asymptomatic. Likely mixed picture due to poor PO intake and possible SIADH from pain  -; more likely a hypovolemic hyponatremia and SIADH from pain  - encourage PO intake

## 2022-10-03 NOTE — PROVIDER CONTACT NOTE (CRITICAL VALUE NOTIFICATION) - ACTION/TREATMENT ORDERED:
Prepare to replace electrolyte loss
No actions at this time. MD notified and aware. Will continue to monitor patient.

## 2022-10-03 NOTE — PROGRESS NOTE ADULT - PROBLEM SELECTOR PLAN 11
SVT events x 2 9/15 and 9/16 resolved with vagal maneuvers. Repeat SVT 9/25 with 9 beats of V tach and 13 beats V tach asymptomatic.  - inc metoprolol to 50 BID with improved HR 90s-low 100  - consider EP eval if persists SVT events x 2 9/15 and 9/16 resolved with vagal maneuvers. Repeat SVT 9/25 with 9 beats of V tach and 13 beats V tach asymptomatic.  - inc metoprolol to 50 BID with improved HR 90s-low 100; however, often held in the setting of hypotension  - consider EP eval if persists Ca 14.1 10/3; previously received calcitonin x 2 doses and pamidronate 90mcg IVPB x1.  - will resume IV fluids; can try calcitonin again tomorrow if less than ideal improvement  - continue to trend levels q 24 hrs   - can repeat pamidronate after 1 week PRN  - vit D low but will hold supplementation in the setting of hypercalcemia of malignancy

## 2022-10-04 LAB
ANION GAP SERPL CALC-SCNC: 9 MMOL/L — SIGNIFICANT CHANGE UP (ref 7–14)
BUN SERPL-MCNC: 30 MG/DL — HIGH (ref 7–23)
CALCIUM SERPL-MCNC: 12.3 MG/DL — HIGH (ref 8.4–10.5)
CHLORIDE SERPL-SCNC: 103 MMOL/L — SIGNIFICANT CHANGE UP (ref 98–107)
CO2 SERPL-SCNC: 27 MMOL/L — SIGNIFICANT CHANGE UP (ref 22–31)
CREAT SERPL-MCNC: 0.41 MG/DL — LOW (ref 0.5–1.3)
EGFR: 147 ML/MIN/1.73M2 — SIGNIFICANT CHANGE UP
GLUCOSE SERPL-MCNC: 89 MG/DL — SIGNIFICANT CHANGE UP (ref 70–99)
HCT VFR BLD CALC: 31.9 % — LOW (ref 39–50)
HGB BLD-MCNC: 9.7 G/DL — LOW (ref 13–17)
MCHC RBC-ENTMCNC: 23.8 PG — LOW (ref 27–34)
MCHC RBC-ENTMCNC: 30.4 GM/DL — LOW (ref 32–36)
MCV RBC AUTO: 78.4 FL — LOW (ref 80–100)
NRBC # BLD: 0 /100 WBCS — SIGNIFICANT CHANGE UP (ref 0–0)
NRBC # FLD: 0 K/UL — SIGNIFICANT CHANGE UP (ref 0–0)
PLATELET # BLD AUTO: 180 K/UL — SIGNIFICANT CHANGE UP (ref 150–400)
POTASSIUM SERPL-MCNC: 4.2 MMOL/L — SIGNIFICANT CHANGE UP (ref 3.5–5.3)
POTASSIUM SERPL-SCNC: 4.2 MMOL/L — SIGNIFICANT CHANGE UP (ref 3.5–5.3)
RBC # BLD: 4.07 M/UL — LOW (ref 4.2–5.8)
RBC # FLD: 22.1 % — HIGH (ref 10.3–14.5)
SODIUM SERPL-SCNC: 139 MMOL/L — SIGNIFICANT CHANGE UP (ref 135–145)
WBC # BLD: 15.98 K/UL — HIGH (ref 3.8–10.5)
WBC # FLD AUTO: 15.98 K/UL — HIGH (ref 3.8–10.5)

## 2022-10-04 PROCEDURE — 99233 SBSQ HOSP IP/OBS HIGH 50: CPT | Mod: GC

## 2022-10-04 RX ORDER — METHADONE HYDROCHLORIDE 40 MG/1
5 TABLET ORAL THREE TIMES A DAY
Refills: 0 | Status: DISCONTINUED | OUTPATIENT
Start: 2022-10-04 | End: 2022-10-10

## 2022-10-04 RX ORDER — SODIUM CHLORIDE 9 MG/ML
1000 INJECTION, SOLUTION INTRAVENOUS
Refills: 0 | Status: DISCONTINUED | OUTPATIENT
Start: 2022-10-04 | End: 2022-10-10

## 2022-10-04 RX ADMIN — CHLORHEXIDINE GLUCONATE 1 APPLICATION(S): 213 SOLUTION TOPICAL at 05:59

## 2022-10-04 RX ADMIN — Medication 3 MILLIGRAM(S): at 22:06

## 2022-10-04 RX ADMIN — HYDROMORPHONE HYDROCHLORIDE 6 MILLIGRAM(S): 2 INJECTION INTRAMUSCULAR; INTRAVENOUS; SUBCUTANEOUS at 18:45

## 2022-10-04 RX ADMIN — Medication 100 MILLIGRAM(S): at 05:48

## 2022-10-04 RX ADMIN — NALOXEGOL OXALATE 25 MILLIGRAM(S): 12.5 TABLET, FILM COATED ORAL at 18:39

## 2022-10-04 RX ADMIN — Medication 100 MILLIGRAM(S): at 13:46

## 2022-10-04 RX ADMIN — LIDOCAINE 1 PATCH: 4 CREAM TOPICAL at 01:38

## 2022-10-04 RX ADMIN — Medication 100 MILLIGRAM(S): at 22:04

## 2022-10-04 RX ADMIN — HYDROMORPHONE HYDROCHLORIDE 6 MILLIGRAM(S): 2 INJECTION INTRAMUSCULAR; INTRAVENOUS; SUBCUTANEOUS at 14:30

## 2022-10-04 RX ADMIN — GABAPENTIN 300 MILLIGRAM(S): 400 CAPSULE ORAL at 22:04

## 2022-10-04 RX ADMIN — ENOXAPARIN SODIUM 60 MILLIGRAM(S): 100 INJECTION SUBCUTANEOUS at 13:36

## 2022-10-04 RX ADMIN — GABAPENTIN 300 MILLIGRAM(S): 400 CAPSULE ORAL at 05:48

## 2022-10-04 RX ADMIN — METHADONE HYDROCHLORIDE 5 MILLIGRAM(S): 40 TABLET ORAL at 22:04

## 2022-10-04 RX ADMIN — POLYETHYLENE GLYCOL 3350 17 GRAM(S): 17 POWDER, FOR SOLUTION ORAL at 05:49

## 2022-10-04 RX ADMIN — METHADONE HYDROCHLORIDE 5 MILLIGRAM(S): 40 TABLET ORAL at 05:47

## 2022-10-04 RX ADMIN — METHADONE HYDROCHLORIDE 5 MILLIGRAM(S): 40 TABLET ORAL at 13:36

## 2022-10-04 RX ADMIN — HYDROMORPHONE HYDROCHLORIDE 6 MILLIGRAM(S): 2 INJECTION INTRAMUSCULAR; INTRAVENOUS; SUBCUTANEOUS at 18:07

## 2022-10-04 RX ADMIN — HYDROMORPHONE HYDROCHLORIDE 6 MILLIGRAM(S): 2 INJECTION INTRAMUSCULAR; INTRAVENOUS; SUBCUTANEOUS at 13:35

## 2022-10-04 RX ADMIN — GABAPENTIN 300 MILLIGRAM(S): 400 CAPSULE ORAL at 13:37

## 2022-10-04 RX ADMIN — SODIUM CHLORIDE 75 MILLILITER(S): 9 INJECTION, SOLUTION INTRAVENOUS at 19:07

## 2022-10-04 RX ADMIN — POLYETHYLENE GLYCOL 3350 17 GRAM(S): 17 POWDER, FOR SOLUTION ORAL at 18:08

## 2022-10-04 RX ADMIN — BICTEGRAVIR SODIUM, EMTRICITABINE, AND TENOFOVIR ALAFENAMIDE FUMARATE 1 TABLET(S): 30; 120; 15 TABLET ORAL at 13:37

## 2022-10-04 NOTE — PROGRESS NOTE ADULT - PROBLEM SELECTOR PLAN 6
Patient with history of untreated HPV related rectal cancer with episode of rectal bleeding at [facility] Hgb 5.9 s/p 2u pRBCs.  - per rad/onc: will undergo trial of RT inpatient starting tomorrow; target of 5 fractions but may be fewer if clinical improvement  - CT with evidence of liver and bone lesions Patient with history of untreated HPV related rectal cancer with episode of rectal bleeding at [facility] Hgb 5.9 s/p 2u pRBCs.  - patient is now ongoing trial of RT inpatientl; s/p 1 of 5 fractions but may be fewer if clinical improvement  - CT with evidence of liver and bone lesions

## 2022-10-04 NOTE — PROGRESS NOTE ADULT - PROBLEM SELECTOR PLAN 12
SVT events x 2 9/15 and 9/16 resolved with vagal maneuvers. Repeat SVT 9/25 with 9 beats of V tach and 13 beats V tach asymptomatic.  - inc metoprolol to 50 BID with improved HR 90s-low 100; however, often held in the setting of hypotension  - consider EP eval if persists

## 2022-10-04 NOTE — PROGRESS NOTE ADULT - PROBLEM SELECTOR PLAN 1
Patient presented to [facility] with hypoxemia and L chest pigtail placed 8/31 due to suspected pneumothorax. Patient showed improvement in SOB and f/u CT chest performed which showed evidence of 12 cm multiloculated thick walled cavitary mass in the CHRISTINE and lingula.  - sputum culture 9/10 + mod klebsiella pneumoniae pansensitive  - blood Cx neg 9/8  - fungitell and crypto neg 9/10  - ID consulted, recs appreciated  - positive AFB in 1/3 sputum samples (sept 3) from [facility] with final speciation Mycobacterium chimaera (avium complex)  - no need for isolation precautions at this time  - no indication for treatment at this time  - per pulm, no CANDIS. will need f/u CT in 2 months

## 2022-10-04 NOTE — PROGRESS NOTE ADULT - PROBLEM SELECTOR PLAN 2
- WBC inc to 16.2 today 10/3 from 12 9/27  - inc in hgb as well from 8.3 to 9.4  - no concern for new infection at this time; afebrile, no clinical worsening  - likely reactive 2/2 to underlying tumor burden vs hemoconcentration in the setting of poor po intake

## 2022-10-04 NOTE — PROGRESS NOTE ADULT - SUBJECTIVE AND OBJECTIVE BOX
PROGRESS NOTE:     Patient is a 31y old  Male who presents with a chief complaint of Shortness of breath (03 Oct 2022 06:30)      SUBJECTIVE / OVERNIGHT EVENTS:    No acute events overnight. Patient examined at bedside with no acute complaints.     Pain:  Bowel Movements:  Urination:  OOB:  PT:    REVIEW OF SYSTEMS:    CONSTITUTIONAL: No weakness, fevers or chills  EYES/ENT: No visual changes;  No vertigo or throat pain   NECK: No pain or stiffness  RESPIRATORY: No cough, wheezing, hemoptysis; No shortness of breath  CARDIOVASCULAR: No chest pain or palpitations  GASTROINTESTINAL: No abdominal or epigastric pain. No nausea, vomiting, or hematemesis; No diarrhea or constipation. No melena or hematochezia.  GENITOURINARY: No dysuria, frequency or hematuria  NEUROLOGICAL: No numbness or weakness  SKIN: No itching, rashes      MEDICATIONS  (STANDING):  bictegravir 50 mG/emtricitabine 200 mG/tenofovir alafenamide 25 mG (BIKTARVY) 1 Tablet(s) Oral daily  ceFAZolin   IVPB 2000 milliGRAM(s) IV Intermittent every 8 hours  chlorhexidine 2% Cloths 1 Application(s) Topical <User Schedule>  enoxaparin Injectable 60 milliGRAM(s) SubCutaneous every 12 hours  gabapentin 300 milliGRAM(s) Oral three times a day  lactated ringers. 1000 milliLiter(s) (75 mL/Hr) IV Continuous <Continuous>  lactulose Syrup 10 Gram(s) Oral daily  lidocaine   4% Patch 1 Patch Transdermal daily  lidocaine   4% Patch 1 Patch Transdermal daily  melatonin 3 milliGRAM(s) Oral at bedtime  methadone    Tablet 5 milliGRAM(s) Oral three times a day  metoprolol tartrate 50 milliGRAM(s) Oral two times a day  naloxegol 25 milliGRAM(s) Oral daily  polyethylene glycol 3350 17 Gram(s) Oral two times a day  senna 2 Tablet(s) Oral at bedtime    MEDICATIONS  (PRN):  bisacodyl 5 milliGRAM(s) Oral at bedtime PRN Constipation  HYDROmorphone   Tablet 4 milliGRAM(s) Oral every 4 hours PRN Moderate Pain (4 - 6)  HYDROmorphone   Tablet 6 milliGRAM(s) Oral every 4 hours PRN Severe Pain (7 - 10)  naloxone Injectable 0.1 milliGRAM(s) IV Push every 3 minutes PRN opioid intoxication      CAPILLARY BLOOD GLUCOSE        I&O's Summary    03 Oct 2022 07:01  -  04 Oct 2022 07:00  --------------------------------------------------------  IN: 0 mL / OUT: 600 mL / NET: -600 mL        VITALS:   T(C): 36.6 (10-04-22 @ 05:04), Max: 36.6 (10-04-22 @ 05:04)  HR: 64 (10-04-22 @ 05:04) (64 - 100)  BP: 97/62 (10-04-22 @ 05:04) (97/62 - 117/69)  RR: 18 (10-03-22 @ 21:13) (17 - 18)  SpO2: 98% (10-04-22 @ 05:04) (92% - 99%)    GENERAL: NAD, lying in bed comfortably  HEAD:  Atraumatic, normocephalic  EYES: EOMI, PERRLA, conjunctiva and sclera clear  ENT: Moist mucous membranes  NECK: Supple, no JVD  HEART: Regular rate and rhythm, no murmurs, rubs, or gallops  LUNGS: Unlabored respirations.  Clear to auscultation bilaterally, no crackles, wheezing, or rhonchi  ABDOMEN: Soft, nontender, nondistended, +BS  EXTREMITIES: 2+ peripheral pulses bilaterally. No clubbing, cyanosis, or edema  NERVOUS SYSTEM:  A&Ox3, no focal deficits   SKIN: No rashes or lesions    LABS:                        9.4    16.22 )-----------( 184      ( 03 Oct 2022 07:20 )             32.3     10-03    137  |  102  |  25<H>  ----------------------------<  95  4.2   |  26  |  0.45<L>    Ca    13.0<HH>      03 Oct 2022 07:20  Phos  2.9     10-03  Mg     1.80     10-03    TPro  6.7  /  Alb  2.6<L>  /  TBili  0.4  /  DBili  x   /  AST  29  /  ALT  6   /  AlkPhos  357<H>  10-03                RADIOLOGY & ADDITIONAL TESTS:  Results Reviewed:   Imaging Personally Reviewed:  Electrocardiogram Personally Reviewed:    COORDINATION OF CARE:  Care Discussed with Consultants/Other Providers [Y/N]:  Prior or Outpatient Records Reviewed [Y/N]:   PROGRESS NOTE:     Patient is a 31y old  Male who presents with a chief complaint of Shortness of breath (03 Oct 2022 06:30)      SUBJECTIVE / OVERNIGHT EVENTS:    No acute events overnight. Patient examined at bedside, complaining of rectal pain but reports unchanged from day prior. Requesting pain meds.      REVIEW OF SYSTEMS:    CONSTITUTIONAL: No weakness, fevers or chills  RESPIRATORY: No cough, wheezing, hemoptysis; No shortness of breath  CARDIOVASCULAR: No chest pain or palpitations  GASTROINTESTINAL: No abdominal or epigastric pain. No nausea, vomiting, or hematemesis; No diarrhea or constipation. No melena or hematochezia.  NEUROLOGICAL: No numbness or weakness    MEDICATIONS  (STANDING):  bictegravir 50 mG/emtricitabine 200 mG/tenofovir alafenamide 25 mG (BIKTARVY) 1 Tablet(s) Oral daily  ceFAZolin   IVPB 2000 milliGRAM(s) IV Intermittent every 8 hours  chlorhexidine 2% Cloths 1 Application(s) Topical <User Schedule>  enoxaparin Injectable 60 milliGRAM(s) SubCutaneous every 12 hours  gabapentin 300 milliGRAM(s) Oral three times a day  lactated ringers. 1000 milliLiter(s) (75 mL/Hr) IV Continuous <Continuous>  lactulose Syrup 10 Gram(s) Oral daily  lidocaine   4% Patch 1 Patch Transdermal daily  lidocaine   4% Patch 1 Patch Transdermal daily  melatonin 3 milliGRAM(s) Oral at bedtime  methadone    Tablet 5 milliGRAM(s) Oral three times a day  metoprolol tartrate 50 milliGRAM(s) Oral two times a day  naloxegol 25 milliGRAM(s) Oral daily  polyethylene glycol 3350 17 Gram(s) Oral two times a day  senna 2 Tablet(s) Oral at bedtime    MEDICATIONS  (PRN):  bisacodyl 5 milliGRAM(s) Oral at bedtime PRN Constipation  HYDROmorphone   Tablet 4 milliGRAM(s) Oral every 4 hours PRN Moderate Pain (4 - 6)  HYDROmorphone   Tablet 6 milliGRAM(s) Oral every 4 hours PRN Severe Pain (7 - 10)  naloxone Injectable 0.1 milliGRAM(s) IV Push every 3 minutes PRN opioid intoxication      CAPILLARY BLOOD GLUCOSE        I&O's Summary    03 Oct 2022 07:01  -  04 Oct 2022 07:00  --------------------------------------------------------  IN: 0 mL / OUT: 600 mL / NET: -600 mL        VITALS:   T(C): 36.6 (10-04-22 @ 05:04), Max: 36.6 (10-04-22 @ 05:04)  HR: 64 (10-04-22 @ 05:04) (64 - 100)  BP: 97/62 (10-04-22 @ 05:04) (97/62 - 117/69)  RR: 18 (10-03-22 @ 21:13) (17 - 18)  SpO2: 98% (10-04-22 @ 05:04) (92% - 99%)    GENERAL: NAD, cachectic appearing; pedraza catheter in place draining clear urine  CHEST/LUNG: CTAB; no wheeze, crackles.   HEART: Regular rate and rhythm; Normal S1 S2, No murmurs, rubs, or gallops  ABDOMEN: Soft, Nontender, Nondistended; Bowel sounds present  EXTREMITIES:  2+ Peripheral Pulses, No clubbing, cyanosis, or edema  : notable scrotal swelling  PSYCH: AAOx3, though reserved and guarded    LABS:                        9.4    16.22 )-----------( 184      ( 03 Oct 2022 07:20 )             32.3     10-03    137  |  102  |  25<H>  ----------------------------<  95  4.2   |  26  |  0.45<L>    Ca    13.0<HH>      03 Oct 2022 07:20  Phos  2.9     10-03  Mg     1.80     10-03    TPro  6.7  /  Alb  2.6<L>  /  TBili  0.4  /  DBili  x   /  AST  29  /  ALT  6   /  AlkPhos  357<H>  10-03                RADIOLOGY & ADDITIONAL TESTS:  Results Reviewed:   Imaging Personally Reviewed:  Electrocardiogram Personally Reviewed:    COORDINATION OF CARE:  Care Discussed with Consultants/Other Providers [Y/N]:  Prior or Outpatient Records Reviewed [Y/N]:

## 2022-10-04 NOTE — PROGRESS NOTE ADULT - PROBLEM SELECTOR PLAN 5
Patient with history of MSSA bacteremia found at [facility] with blood cultures 8/31 positive for MSSA and repeat on 9/2 NGTD.  - s/p Zosyn (9/8 - 9/9)  - transitioned to Cefazolin 2GM q8h for 6 weeks ending (9/9-10/13)   - may need midline placement prior to discharge for antibiotics at rehab  - upon discharge, will need weekly CBC, BUN and creatinine and ID clinic follow up

## 2022-10-04 NOTE — PROGRESS NOTE ADULT - PROBLEM SELECTOR PLAN 10
Na now improving, 137 today 10/3, previously 120 with history significant for decreased PO intake. Otherwise asymptomatic. Likely mixed picture due to poor PO intake and possible SIADH from pain  -; more likely a hypovolemic hyponatremia and SIADH from pain  - encourage PO intake

## 2022-10-04 NOTE — PROGRESS NOTE ADULT - PROBLEM SELECTOR PLAN 11
Ca 14.1 10/3; previously received calcitonin x 2 doses and pamidronate 90mcg IVPB x1.  - will resume IV fluids; can try calcitonin again tomorrow if less than ideal improvement  - continue to trend levels q 24 hrs   - can repeat pamidronate after 1 week PRN  - vit D low but will hold supplementation in the setting of hypercalcemia of malignancy Ca 14.1 10/3; previously received calcitonin x 2 doses and pamidronate 90mcg IVPB x1.  - will resume IV fluids; can try calcitonin again tomorrow if less than ideal improvement  - continue to trend levels q 24 hrs; patient refused AM labs this morning, will try again in the afternoon  - can repeat pamidronate after 1 week PRN  - vit D low but will hold supplementation in the setting of hypercalcemia of malignancy

## 2022-10-04 NOTE — PROGRESS NOTE ADULT - PROBLEM SELECTOR PLAN 9
Patient with microcytic anemia with Hgb 9.4 and MCV 81, likely a mixed picture of microcytic and normocytic anemia.  - iron studies with low iron levels but low iron binding capacity likely indicating a mixed picture  - ferritin wnl   - continue to monitor  - no overt signs of bleeding  - follow up OP with PCP and GI  - will hold on iron supplementation until infection clears

## 2022-10-04 NOTE — PROGRESS NOTE ADULT - PROBLEM SELECTOR PLAN 4
Patient with TTE performed on at [facility] with evidence of two large RV masses and two additional small masses. IVC filter placed prophylactically at that time due to undiagnosed vegetations on the TTE.  - repeat TTE 9/9 was a limited study but demonstrated a mobile mass likely 2/2 tumour, thrombus, or vegetation.   - per cardiology risk of JENNIFER outweighs benefit at this time  - refused cardiac MRI again (3 attempts since last week; patient not a candidate for general anesthesia)  - per cardiology, no acute indication to do cardiac MRI/ JENNIFER at this time given patients other active issues  - monitor on tele  - can follow up outpatient for serial TTEs or JENNIFER once medically stable

## 2022-10-04 NOTE — PROGRESS NOTE ADULT - PROBLEM SELECTOR PLAN 3
CT Chest 9/11 with evidence of persistent pneumomediastinum and extensive subcutaneous emphysema of the neck, chest, abdomen, pelvis, and extremities, including the scrotum.   - CANDIS per thoracic surgery  - indwelling pedraza was placed at [facility] (Coude placed for urinary strain)   - TOV attempted 9/27-9/28 overnight and failed with difficulty   - coude catheter placed by urology 9/28; can remain in place for 4 weeks prior to requiring change.  - No further urologic intervention needed at this time.   - Can f/u outpatient w/ Dr Prabhu Lucio, [facility] for [facility]rology at [place]  - pain control with dilaudid and methadone, ct a/p 9/11 did not show any inflammation/hydrocele/infection .

## 2022-10-04 NOTE — PROGRESS NOTE ADULT - ASSESSMENT
31 M with untreated metastatic ano-rectal SCC (followed by Dr Frazier [facility] oncology) with mets to liver and possibly bone, HPV positive, HIV infection, and R eye cataracts who presented to the [facility] on 8/31/22 after syncopal episode at home and transferred to [facility] for continued of care per family request. Being treated for MSSA bacteremia. c/b scrotal swelling s/p coude catheter, hypercalcemia s/p pamidronate and IVF, and AFB from [place] w M chimaera not requiring any intervention.          31 M with untreated metastatic ano-rectal SCC (followed by Dr Frazier [facility] oncology) with mets to liver and possibly bone, HPV positive, HIV infection, and R eye cataracts who presented to the [facility] on 8/31/22 after syncopal episode at home and transferred to [facility] for continued of care per family request. Being treated for MSSA bacteremia. c/b scrotal swelling s/p coude catheter, hypercalcemia s/p pamidronate and IVF, and AFB from [place] w M chimaera not requiring any intervention. Patient is now undergoing trial of RT

## 2022-10-04 NOTE — PROGRESS NOTE ADULT - PROBLEM SELECTOR PLAN 7
Palliative care consulted for extensive disease burden and GOC discussion. At this time, patient was all available treatment and resuscitation. Remains full code.  - increased PO Methadone 5mg TID (QTC- 443 on 9/23); repeat EKG today  - c/w Gabapentin 300mg TID  - PO Dilaudid 4mg q4 PRN moderate pain  - PO Dilaudid 6mg q4 PRN severe pain  - hold parameters placed for all medications  - appreciate palliative care recs  - will need OP f/u at [facility] with Dr. Kiesha Calle/ Dr. Mazariegos/ NP Zoe Carvalho/ ROCIO Cruz; [phone]

## 2022-10-05 PROCEDURE — 99233 SBSQ HOSP IP/OBS HIGH 50: CPT | Mod: GC

## 2022-10-05 RX ORDER — HYDROMORPHONE HYDROCHLORIDE 2 MG/ML
4 INJECTION INTRAMUSCULAR; INTRAVENOUS; SUBCUTANEOUS EVERY 4 HOURS
Refills: 0 | Status: DISCONTINUED | OUTPATIENT
Start: 2022-10-05 | End: 2022-10-10

## 2022-10-05 RX ORDER — HYDROMORPHONE HYDROCHLORIDE 2 MG/ML
6 INJECTION INTRAMUSCULAR; INTRAVENOUS; SUBCUTANEOUS EVERY 4 HOURS
Refills: 0 | Status: DISCONTINUED | OUTPATIENT
Start: 2022-10-05 | End: 2022-10-10

## 2022-10-05 RX ORDER — HYDROMORPHONE HYDROCHLORIDE 2 MG/ML
8 INJECTION INTRAMUSCULAR; INTRAVENOUS; SUBCUTANEOUS ONCE
Refills: 0 | Status: DISCONTINUED | OUTPATIENT
Start: 2022-10-05 | End: 2022-10-10

## 2022-10-05 RX ADMIN — HYDROMORPHONE HYDROCHLORIDE 6 MILLIGRAM(S): 2 INJECTION INTRAMUSCULAR; INTRAVENOUS; SUBCUTANEOUS at 18:06

## 2022-10-05 RX ADMIN — Medication 50 MILLIGRAM(S): at 06:06

## 2022-10-05 RX ADMIN — Medication 100 MILLIGRAM(S): at 21:51

## 2022-10-05 RX ADMIN — Medication 100 MILLIGRAM(S): at 06:07

## 2022-10-05 RX ADMIN — HYDROMORPHONE HYDROCHLORIDE 6 MILLIGRAM(S): 2 INJECTION INTRAMUSCULAR; INTRAVENOUS; SUBCUTANEOUS at 00:54

## 2022-10-05 RX ADMIN — GABAPENTIN 300 MILLIGRAM(S): 400 CAPSULE ORAL at 06:06

## 2022-10-05 RX ADMIN — GABAPENTIN 300 MILLIGRAM(S): 400 CAPSULE ORAL at 21:51

## 2022-10-05 RX ADMIN — CHLORHEXIDINE GLUCONATE 1 APPLICATION(S): 213 SOLUTION TOPICAL at 06:06

## 2022-10-05 RX ADMIN — Medication 50 MILLIGRAM(S): at 18:06

## 2022-10-05 RX ADMIN — METHADONE HYDROCHLORIDE 5 MILLIGRAM(S): 40 TABLET ORAL at 21:51

## 2022-10-05 RX ADMIN — ENOXAPARIN SODIUM 60 MILLIGRAM(S): 100 INJECTION SUBCUTANEOUS at 13:33

## 2022-10-05 RX ADMIN — POLYETHYLENE GLYCOL 3350 17 GRAM(S): 17 POWDER, FOR SOLUTION ORAL at 06:06

## 2022-10-05 RX ADMIN — GABAPENTIN 300 MILLIGRAM(S): 400 CAPSULE ORAL at 13:33

## 2022-10-05 RX ADMIN — Medication 100 MILLIGRAM(S): at 13:35

## 2022-10-05 RX ADMIN — METHADONE HYDROCHLORIDE 5 MILLIGRAM(S): 40 TABLET ORAL at 13:33

## 2022-10-05 RX ADMIN — NALOXEGOL OXALATE 25 MILLIGRAM(S): 12.5 TABLET, FILM COATED ORAL at 13:32

## 2022-10-05 RX ADMIN — ENOXAPARIN SODIUM 60 MILLIGRAM(S): 100 INJECTION SUBCUTANEOUS at 21:51

## 2022-10-05 RX ADMIN — ENOXAPARIN SODIUM 60 MILLIGRAM(S): 100 INJECTION SUBCUTANEOUS at 00:54

## 2022-10-05 RX ADMIN — METHADONE HYDROCHLORIDE 5 MILLIGRAM(S): 40 TABLET ORAL at 06:06

## 2022-10-05 RX ADMIN — HYDROMORPHONE HYDROCHLORIDE 6 MILLIGRAM(S): 2 INJECTION INTRAMUSCULAR; INTRAVENOUS; SUBCUTANEOUS at 01:54

## 2022-10-05 RX ADMIN — BICTEGRAVIR SODIUM, EMTRICITABINE, AND TENOFOVIR ALAFENAMIDE FUMARATE 1 TABLET(S): 30; 120; 15 TABLET ORAL at 13:33

## 2022-10-05 RX ADMIN — SENNA PLUS 2 TABLET(S): 8.6 TABLET ORAL at 21:51

## 2022-10-05 RX ADMIN — HYDROMORPHONE HYDROCHLORIDE 6 MILLIGRAM(S): 2 INJECTION INTRAMUSCULAR; INTRAVENOUS; SUBCUTANEOUS at 19:06

## 2022-10-05 RX ADMIN — Medication 3 MILLIGRAM(S): at 21:51

## 2022-10-05 NOTE — PROGRESS NOTE ADULT - PROBLEM SELECTOR PLAN 5
Patient with history of MSSA bacteremia found at [facility] with blood cultures 8/31 positive for MSSA and repeat on 9/2 NGTD.  - s/p Zosyn (9/8 - 9/9)  - transitioned to Cefazolin 2GM q8h for 6 weeks ending (9/9-10/13)   - may need midline placement prior to discharge for antibiotics at rehab  - upon discharge, will need weekly CBC, BUN and creatinine and ID clinic follow up CT Chest 9/11 with evidence of persistent pneumomediastinum and extensive subcutaneous emphysema of the neck, chest, abdomen, pelvis, and extremities, including the scrotum.   - CANDIS per thoracic surgery  - indwelling pedraza was placed at [facility] (Coude placed for urinary strain)   - TOV attempted 9/27-9/28 overnight and failed with difficulty   - coude catheter placed by urology 9/28; can remain in place for 4 weeks prior to requiring change.  - No further urologic intervention needed at this time.   - Can f/u outpatient w/ Dr Prabhu Lucio, [facility] for Urology at [place]  - pain control with dilaudid and methadone, ct a/p 9/11 did not show any inflammation/hydrocele/infection .

## 2022-10-05 NOTE — PROGRESS NOTE ADULT - PROBLEM SELECTOR PLAN 3
CT Chest 9/11 with evidence of persistent pneumomediastinum and extensive subcutaneous emphysema of the neck, chest, abdomen, pelvis, and extremities, including the scrotum.   - CANDIS per thoracic surgery  - indwelling pedraza was placed at [facility] (Coude placed for urinary strain)   - TOV attempted 9/27-9/28 overnight and failed with difficulty   - coude catheter placed by urology 9/28; can remain in place for 4 weeks prior to requiring change.  - No further urologic intervention needed at this time.   - Can f/u outpatient w/ Dr Prabhu Lucio, [facility] for Urology at [place]  - pain control with dilaudid and methadone, ct a/p 9/11 did not show any inflammation/hydrocele/infection . Ca 12.3 10/5; previously received calcitonin x 2 doses and pamidronate 90mcg IVPB x1.  - will c/w IV fluids; can try calcitonin again if less than ideal improvement  - continue to trend levels q 24 hrs; patient refused AM labs this morning, will try again in the afternoon  - can repeat pamidronate after 1 week PRN (last received 10/2)  - vit D low but will hold supplementation in the setting of hypercalcemia of malignancy

## 2022-10-05 NOTE — PROGRESS NOTE ADULT - PROBLEM SELECTOR PLAN 4
Patient with TTE performed on at [facility] with evidence of two large RV masses and two additional small masses. IVC filter placed prophylactically at that time due to undiagnosed vegetations on the TTE.  - repeat TTE 9/9 was a limited study but demonstrated a mobile mass likely 2/2 tumour, thrombus, or vegetation.   - per cardiology risk of JENNIFER outweighs benefit at this time  - refused cardiac MRI again (3 attempts since last week; patient not a candidate for general anesthesia)  - per cardiology, no acute indication to do cardiac MRI/ JENNIFER at this time given patients other active issues  - monitor on tele  - can follow up outpatient for serial TTEs or JENNIFER once medically stable Patient presented to [facility] with hypoxemia and L chest pigtail placed 8/31 due to suspected pneumothorax. Patient showed improvement in SOB and f/u CT chest performed which showed evidence of 12 cm multiloculated thick walled cavitary mass in the CHRISTINE and lingula.  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 will need f/u CT in 2 months

## 2022-10-05 NOTE — PROGRESS NOTE ADULT - PROBLEM SELECTOR PLAN 8
Patient found to have L external vein DVT with bilateral leg DVT studies negative for DVT.  - cont lovenox full dose.  - will need to monitor for signs of bleeding while on lovenox Palliative care consulted for extensive disease burden and GOC discussion. At this time, patient was all available treatment and resuscitation. Remains full code.  - increased PO Methadone 5mg TID (QTC- 443 on 9/23); repeat EKG today  - c/w Gabapentin 300mg TID  - PO Dilaudid 4mg q4 PRN moderate pain  - PO Dilaudid 6mg q4 PRN severe pain  - hold parameters placed for all medications  - appreciate palliative care recs  - will need OP f/u at [facility] with Dr. Kiesha Calle/ Dr. Mazariegos/ NP Zoe Carvalho/ ROCIO Cruz; [phone]

## 2022-10-05 NOTE — PROGRESS NOTE ADULT - PROBLEM SELECTOR PLAN 1
Patient presented to [facility] with hypoxemia and L chest pigtail placed 8/31 due to suspected pneumothorax. Patient showed improvement in SOB and f/u CT chest performed which showed evidence of 12 cm multiloculated thick walled cavitary mass in the CHRISTINE and lingula.  - sputum culture 9/10 + mod klebsiella pneumoniae pansensitive  - blood Cx neg 9/8  - fungitell and crypto neg 9/10  - ID consulted, recs appreciated  - positive AFB in 1/3 sputum samples (sept 3) from [facility] with final speciation Mycobacterium chimaera (avium complex)  - no need for isolation precautions at this time  - no indication for treatment at this time  - per pulm, no CANDIS. will need f/u CT in 2 months Patient with history of untreated HPV related rectal cancer with episode of rectal bleeding at [facility] Hgb 5.9 s/p 2u pRBCs.  - patient is now ongoing trial of RT inpatientl; s/p 1 of 5 fractions but may be fewer if clinical improvement  - patient did not adequately tolerate first fraction of RT yesterday, was unable to lay flat given pain  - plan discussed with rad onc director Dr. Mathew who mentioned that they will reassess the patient later today and determine need for repeat inpatient vs outpatient fractions   - will attempt repeat RT today with increased dose of dilaudid to ensure adequate pain control prior to treatment  - CT with evidence of liver and bone lesions

## 2022-10-05 NOTE — PROGRESS NOTE ADULT - ASSESSMENT
31 M with untreated metastatic ano-rectal SCC (followed by Dr Frazier [facility] oncology) with mets to liver and possibly bone, HPV positive, HIV infection, and R eye cataracts who presented to the [facility] on 8/31/22 after syncopal episode at home and transferred to [facility] for continued of care per family request. Being treated for MSSA bacteremia. c/b scrotal swelling s/p coude catheter, hypercalcemia s/p pamidronate and IVF, and AFB from [place] w M chimaera not requiring any intervention. Patient is now undergoing trial of RT         31 M with untreated metastatic ano-rectal SCC (followed by Dr Frazier [facility] oncology) with mets to liver and possibly bone, HPV positive, HIV infection, and R eye cataracts who presented to the [facility] on 8/31/22 after syncopal episode at home and transferred to [facility] for continued of care per family request. Being treated for MSSA bacteremia. c/b scrotal swelling s/p coude catheter, hypercalcemia s/p pamidronate and IVF, and AFB from [place] w M chimaera not requiring any intervention. Patient is now undergoing trial of RT, did not tolerate first fraction yesterday; pending repeat today with increase dose of premedication.

## 2022-10-05 NOTE — PROGRESS NOTE ADULT - PROBLEM SELECTOR PLAN 11
Ca 14.1 10/3; previously received calcitonin x 2 doses and pamidronate 90mcg IVPB x1.  - will resume IV fluids; can try calcitonin again tomorrow if less than ideal improvement  - continue to trend levels q 24 hrs; patient refused AM labs this morning, will try again in the afternoon  - can repeat pamidronate after 1 week PRN  - vit D low but will hold supplementation in the setting of hypercalcemia of malignancy Na now improving, 137 today 10/3, previously 120 with history significant for decreased PO intake. Otherwise asymptomatic. Likely mixed picture due to poor PO intake and possible SIADH from pain  -; more likely a hypovolemic hyponatremia and SIADH from pain  - encourage PO intake

## 2022-10-05 NOTE — PROGRESS NOTE ADULT - PROBLEM SELECTOR PLAN 6
Patient with history of untreated HPV related rectal cancer with episode of rectal bleeding at [facility] Hgb 5.9 s/p 2u pRBCs.  - patient is now ongoing trial of RT inpatientl; s/p 1 of 5 fractions but may be fewer if clinical improvement  - CT with evidence of liver and bone lesions Patient with TTE performed on at [facility] with evidence of two large RV masses and two additional small masses. IVC filter placed prophylactically at that time due to undiagnosed vegetations on the TTE.  - repeat TTE 9/9 was a limited study but demonstrated a mobile mass likely 2/2 tumour, thrombus, or vegetation.   - per cardiology risk of JENNIFER outweighs benefit at this time  - refused cardiac MRI again (3 attempts since last week; patient not a candidate for general anesthesia)  - per cardiology, no acute indication to do cardiac MRI/ JENNIFER at this time given patients other active issues  - monitor on tele  - can follow up outpatient for serial TTEs or JENNIFER once medically stable

## 2022-10-05 NOTE — PROGRESS NOTE ADULT - PROBLEM SELECTOR PLAN 10
Na now improving, 137 today 10/3, previously 120 with history significant for decreased PO intake. Otherwise asymptomatic. Likely mixed picture due to poor PO intake and possible SIADH from pain  -; more likely a hypovolemic hyponatremia and SIADH from pain  - encourage PO intake Patient with microcytic anemia with Hgb 9.4 and MCV 81, likely a mixed picture of microcytic and normocytic anemia.  - iron studies with low iron levels but low iron binding capacity likely indicating a mixed picture  - ferritin wnl   - continue to monitor  - no overt signs of bleeding  - follow up OP with PCP and GI  - will hold on iron supplementation until infection clears

## 2022-10-05 NOTE — PROGRESS NOTE ADULT - PROBLEM SELECTOR PLAN 9
Patient with microcytic anemia with Hgb 9.4 and MCV 81, likely a mixed picture of microcytic and normocytic anemia.  - iron studies with low iron levels but low iron binding capacity likely indicating a mixed picture  - ferritin wnl   - continue to monitor  - no overt signs of bleeding  - follow up OP with PCP and GI  - will hold on iron supplementation until infection clears Patient found to have L external vein DVT with bilateral leg DVT studies negative for DVT.  - cont lovenox full dose.  - will need to monitor for signs of bleeding while on lovenox

## 2022-10-05 NOTE — PROGRESS NOTE ADULT - PROBLEM SELECTOR PLAN 7
Palliative care consulted for extensive disease burden and GOC discussion. At this time, patient was all available treatment and resuscitation. Remains full code.  - increased PO Methadone 5mg TID (QTC- 443 on 9/23); repeat EKG today  - c/w Gabapentin 300mg TID  - PO Dilaudid 4mg q4 PRN moderate pain  - PO Dilaudid 6mg q4 PRN severe pain  - hold parameters placed for all medications  - appreciate palliative care recs  - will need OP f/u at [facility] with Dr. Kiesha Calle/ Dr. Mazariegos/ NP Zoe Carvalho/ ROCIO Cruz; [phone] Patient with history of MSSA bacteremia found at [facility] with blood cultures 8/31 positive for MSSA and repeat on 9/2 NGTD.  - s/p Zosyn (9/8 - 9/9)  - transitioned to Cefazolin 2GM q8h for 6 weeks ending (9/9-10/13)   - may need midline placement prior to discharge for antibiotics at rehab  - upon discharge, will need weekly CBC, BUN and creatinine and ID clinic follow up

## 2022-10-05 NOTE — PROGRESS NOTE ADULT - SUBJECTIVE AND OBJECTIVE BOX
PROGRESS NOTE:     Patient is a 32y old  Male who presents with a chief complaint of Shortness of breath (04 Oct 2022 07:34)      SUBJECTIVE / OVERNIGHT EVENTS:    No acute events overnight. Patient examined at bedside with no acute complaints.     Pain:  Bowel Movements:  Urination:  OOB:  PT:    REVIEW OF SYSTEMS:    CONSTITUTIONAL: No weakness, fevers or chills  EYES/ENT: No visual changes;  No vertigo or throat pain   NECK: No pain or stiffness  RESPIRATORY: No cough, wheezing, hemoptysis; No shortness of breath  CARDIOVASCULAR: No chest pain or palpitations  GASTROINTESTINAL: No abdominal or epigastric pain. No nausea, vomiting, or hematemesis; No diarrhea or constipation. No melena or hematochezia.  GENITOURINARY: No dysuria, frequency or hematuria  NEUROLOGICAL: No numbness or weakness  SKIN: No itching, rashes      MEDICATIONS  (STANDING):  bictegravir 50 mG/emtricitabine 200 mG/tenofovir alafenamide 25 mG (BIKTARVY) 1 Tablet(s) Oral daily  ceFAZolin   IVPB 2000 milliGRAM(s) IV Intermittent every 8 hours  chlorhexidine 2% Cloths 1 Application(s) Topical <User Schedule>  enoxaparin Injectable 60 milliGRAM(s) SubCutaneous every 12 hours  gabapentin 300 milliGRAM(s) Oral three times a day  lactated ringers. 1000 milliLiter(s) (75 mL/Hr) IV Continuous <Continuous>  lactulose Syrup 10 Gram(s) Oral daily  lidocaine   4% Patch 1 Patch Transdermal daily  lidocaine   4% Patch 1 Patch Transdermal daily  melatonin 3 milliGRAM(s) Oral at bedtime  methadone    Tablet 5 milliGRAM(s) Oral three times a day  metoprolol tartrate 50 milliGRAM(s) Oral two times a day  naloxegol 25 milliGRAM(s) Oral daily  polyethylene glycol 3350 17 Gram(s) Oral two times a day  senna 2 Tablet(s) Oral at bedtime    MEDICATIONS  (PRN):  bisacodyl 5 milliGRAM(s) Oral at bedtime PRN Constipation  HYDROmorphone   Tablet 4 milliGRAM(s) Oral every 4 hours PRN Moderate Pain (4 - 6)  HYDROmorphone   Tablet 6 milliGRAM(s) Oral every 4 hours PRN Severe Pain (7 - 10)  naloxone Injectable 0.1 milliGRAM(s) IV Push every 3 minutes PRN opioid intoxication      CAPILLARY BLOOD GLUCOSE        I&O's Summary    03 Oct 2022 07:01  -  04 Oct 2022 07:00  --------------------------------------------------------  IN: 0 mL / OUT: 600 mL / NET: -600 mL    04 Oct 2022 07:01  -  05 Oct 2022 06:46  --------------------------------------------------------  IN: 0 mL / OUT: 900 mL / NET: -900 mL        VITALS:   T(C): 36.5 (10-05-22 @ 05:23), Max: 36.9 (10-04-22 @ 22:05)  HR: 95 (10-05-22 @ 05:23) (89 - 99)  BP: 102/63 (10-05-22 @ 05:23) (102/63 - 111/77)  RR: 17 (10-05-22 @ 05:23) (17 - 17)  SpO2: 98% (10-05-22 @ 05:23) (94% - 98%)    GENERAL: NAD, lying in bed comfortably  HEAD:  Atraumatic, normocephalic  EYES: EOMI, PERRLA, conjunctiva and sclera clear  ENT: Moist mucous membranes  NECK: Supple, no JVD  HEART: Regular rate and rhythm, no murmurs, rubs, or gallops  LUNGS: Unlabored respirations.  Clear to auscultation bilaterally, no crackles, wheezing, or rhonchi  ABDOMEN: Soft, nontender, nondistended, +BS  EXTREMITIES: 2+ peripheral pulses bilaterally. No clubbing, cyanosis, or edema  NERVOUS SYSTEM:  A&Ox3, no focal deficits   SKIN: No rashes or lesions    LABS:                        9.7    15.98 )-----------( 180      ( 04 Oct 2022 16:38 )             31.9     10-04    139  |  103  |  30<H>  ----------------------------<  89  4.2   |  27  |  0.41<L>    Ca    12.3<H>      04 Oct 2022 16:38  Phos  2.9     10-03  Mg     1.80     10-03    TPro  6.7  /  Alb  2.6<L>  /  TBili  0.4  /  DBili  x   /  AST  29  /  ALT  6   /  AlkPhos  357<H>  10-03                RADIOLOGY & ADDITIONAL TESTS:  Results Reviewed:   Imaging Personally Reviewed:  Electrocardiogram Personally Reviewed:    COORDINATION OF CARE:  Care Discussed with Consultants/Other Providers [Y/N]:  Prior or Outpatient Records Reviewed [Y/N]:   PROGRESS NOTE:     Patient is a 32y old  Male who presents with a chief complaint of Shortness of breath (04 Oct 2022 07:34)      SUBJECTIVE / OVERNIGHT EVENTS:    No acute events overnight. Patient examined at bedside, with reported improvement in rectal pain. Patient expressing desire to go home.    Interval History:  The patient was taken to radiation medicine yesterday for his first RT; however, patient was unable to tolerate 2/2 to pain and inability to lay flat despite premedication with dilaudid. Spoke to Dr. Mathew from radiation oncology who reported that rad/onc team will reassess and reattempt today. Will premedicate with a higher dose of dilaudid today prior to radiation therapy.      REVIEW OF SYSTEMS:    CONSTITUTIONAL: No weakness, fevers or chills  RESPIRATORY: No cough, wheezing, hemoptysis; No shortness of breath  CARDIOVASCULAR: No chest pain or palpitations  GASTROINTESTINAL: No abdominal or epigastric pain. No nausea, vomiting, or hematemesis; No diarrhea or constipation. No melena or hematochezia.  NEUROLOGICAL: No numbness or weakness      MEDICATIONS  (STANDING):  bictegravir 50 mG/emtricitabine 200 mG/tenofovir alafenamide 25 mG (BIKTARVY) 1 Tablet(s) Oral daily  ceFAZolin   IVPB 2000 milliGRAM(s) IV Intermittent every 8 hours  chlorhexidine 2% Cloths 1 Application(s) Topical <User Schedule>  enoxaparin Injectable 60 milliGRAM(s) SubCutaneous every 12 hours  gabapentin 300 milliGRAM(s) Oral three times a day  lactated ringers. 1000 milliLiter(s) (75 mL/Hr) IV Continuous <Continuous>  lactulose Syrup 10 Gram(s) Oral daily  lidocaine   4% Patch 1 Patch Transdermal daily  lidocaine   4% Patch 1 Patch Transdermal daily  melatonin 3 milliGRAM(s) Oral at bedtime  methadone    Tablet 5 milliGRAM(s) Oral three times a day  metoprolol tartrate 50 milliGRAM(s) Oral two times a day  naloxegol 25 milliGRAM(s) Oral daily  polyethylene glycol 3350 17 Gram(s) Oral two times a day  senna 2 Tablet(s) Oral at bedtime    MEDICATIONS  (PRN):  bisacodyl 5 milliGRAM(s) Oral at bedtime PRN Constipation  HYDROmorphone   Tablet 4 milliGRAM(s) Oral every 4 hours PRN Moderate Pain (4 - 6)  HYDROmorphone   Tablet 6 milliGRAM(s) Oral every 4 hours PRN Severe Pain (7 - 10)  naloxone Injectable 0.1 milliGRAM(s) IV Push every 3 minutes PRN opioid intoxication      CAPILLARY BLOOD GLUCOSE        I&O's Summary    03 Oct 2022 07:01  -  04 Oct 2022 07:00  --------------------------------------------------------  IN: 0 mL / OUT: 600 mL / NET: -600 mL    04 Oct 2022 07:01  -  05 Oct 2022 06:46  --------------------------------------------------------  IN: 0 mL / OUT: 900 mL / NET: -900 mL        VITALS:   T(C): 36.5 (10-05-22 @ 05:23), Max: 36.9 (10-04-22 @ 22:05)  HR: 95 (10-05-22 @ 05:23) (89 - 99)  BP: 102/63 (10-05-22 @ 05:23) (102/63 - 111/77)  RR: 17 (10-05-22 @ 05:23) (17 - 17)  SpO2: 98% (10-05-22 @ 05:23) (94% - 98%)    GENERAL: NAD, cachectic appearing; pedraza catheter in place draining clear urine  CHEST/LUNG: CTAB; no wheeze, crackles.   HEART: Regular rate and rhythm; Normal S1 S2, No murmurs, rubs, or gallops  ABDOMEN: Soft, Nontender, Nondistended; Bowel sounds present  : notable scrotal swelling  PSYCH: AAOx3, though reserved and guarded    LABS:                        9.7    15.98 )-----------( 180      ( 04 Oct 2022 16:38 )             31.9     10-04    139  |  103  |  30<H>  ----------------------------<  89  4.2   |  27  |  0.41<L>    Ca    12.3<H>      04 Oct 2022 16:38  Phos  2.9     10-03  Mg     1.80     10-03    TPro  6.7  /  Alb  2.6<L>  /  TBili  0.4  /  DBili  x   /  AST  29  /  ALT  6   /  AlkPhos  357<H>  10-03

## 2022-10-05 NOTE — PROGRESS NOTE ADULT - PROBLEM SELECTOR PLAN 2
- WBC inc to 16.2 today 10/3 from 12 9/27  - inc in hgb as well from 8.3 to 9.4  - no concern for new infection at this time; afebrile, no clinical worsening  - likely reactive 2/2 to underlying tumor burden vs hemoconcentration in the setting of poor po intake - WBC stable at 16, increased over the past 2 days  - inc in hgb as well from 8.3 to 9.4  - no concern for new infection at this time; afebrile, no clinical worsening  - likely reactive 2/2 to underlying tumor burden vs hemoconcentration in the setting of poor po intake

## 2022-10-06 LAB — SARS-COV-2 RNA SPEC QL NAA+PROBE: SIGNIFICANT CHANGE UP

## 2022-10-06 PROCEDURE — 99233 SBSQ HOSP IP/OBS HIGH 50: CPT

## 2022-10-06 PROCEDURE — 99233 SBSQ HOSP IP/OBS HIGH 50: CPT | Mod: GC

## 2022-10-06 RX ADMIN — POLYETHYLENE GLYCOL 3350 17 GRAM(S): 17 POWDER, FOR SOLUTION ORAL at 06:19

## 2022-10-06 RX ADMIN — LACTULOSE 10 GRAM(S): 10 SOLUTION ORAL at 13:41

## 2022-10-06 RX ADMIN — HYDROMORPHONE HYDROCHLORIDE 6 MILLIGRAM(S): 2 INJECTION INTRAMUSCULAR; INTRAVENOUS; SUBCUTANEOUS at 18:35

## 2022-10-06 RX ADMIN — Medication 100 MILLIGRAM(S): at 06:19

## 2022-10-06 RX ADMIN — Medication 100 MILLIGRAM(S): at 13:41

## 2022-10-06 RX ADMIN — Medication 3 MILLIGRAM(S): at 22:19

## 2022-10-06 RX ADMIN — GABAPENTIN 300 MILLIGRAM(S): 400 CAPSULE ORAL at 13:40

## 2022-10-06 RX ADMIN — GABAPENTIN 300 MILLIGRAM(S): 400 CAPSULE ORAL at 06:19

## 2022-10-06 RX ADMIN — BICTEGRAVIR SODIUM, EMTRICITABINE, AND TENOFOVIR ALAFENAMIDE FUMARATE 1 TABLET(S): 30; 120; 15 TABLET ORAL at 13:41

## 2022-10-06 RX ADMIN — ENOXAPARIN SODIUM 60 MILLIGRAM(S): 100 INJECTION SUBCUTANEOUS at 13:41

## 2022-10-06 RX ADMIN — CHLORHEXIDINE GLUCONATE 1 APPLICATION(S): 213 SOLUTION TOPICAL at 06:20

## 2022-10-06 RX ADMIN — METHADONE HYDROCHLORIDE 5 MILLIGRAM(S): 40 TABLET ORAL at 22:19

## 2022-10-06 RX ADMIN — SENNA PLUS 2 TABLET(S): 8.6 TABLET ORAL at 22:19

## 2022-10-06 RX ADMIN — METHADONE HYDROCHLORIDE 5 MILLIGRAM(S): 40 TABLET ORAL at 13:40

## 2022-10-06 RX ADMIN — GABAPENTIN 300 MILLIGRAM(S): 400 CAPSULE ORAL at 22:19

## 2022-10-06 RX ADMIN — HYDROMORPHONE HYDROCHLORIDE 6 MILLIGRAM(S): 2 INJECTION INTRAMUSCULAR; INTRAVENOUS; SUBCUTANEOUS at 19:27

## 2022-10-06 RX ADMIN — ENOXAPARIN SODIUM 60 MILLIGRAM(S): 100 INJECTION SUBCUTANEOUS at 22:20

## 2022-10-06 RX ADMIN — Medication 100 MILLIGRAM(S): at 22:20

## 2022-10-06 RX ADMIN — Medication 50 MILLIGRAM(S): at 17:23

## 2022-10-06 RX ADMIN — POLYETHYLENE GLYCOL 3350 17 GRAM(S): 17 POWDER, FOR SOLUTION ORAL at 17:24

## 2022-10-06 RX ADMIN — Medication 50 MILLIGRAM(S): at 06:18

## 2022-10-06 RX ADMIN — NALOXEGOL OXALATE 25 MILLIGRAM(S): 12.5 TABLET, FILM COATED ORAL at 13:41

## 2022-10-06 RX ADMIN — METHADONE HYDROCHLORIDE 5 MILLIGRAM(S): 40 TABLET ORAL at 06:18

## 2022-10-06 NOTE — PROGRESS NOTE ADULT - PROBLEM SELECTOR PLAN 4
Patient presented to [facility] with hypoxemia and L chest pigtail placed 8/31 due to suspected pneumothorax. Patient showed improvement in SOB and f/u CT chest performed which showed evidence of 12 cm multiloculated thick walled cavitary mass in the CHRISTINE and lingula.  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 will need f/u CT in 2 months

## 2022-10-06 NOTE — PROGRESS NOTE ADULT - PROBLEM SELECTOR PLAN 8
Palliative care consulted for extensive disease burden and GOC discussion. At this time, patient was all available treatment and resuscitation. Remains full code.  - increased PO Methadone 5mg TID (QTC- 443 on 9/23); repeat EKG today  - c/w Gabapentin 300mg TID  - PO Dilaudid 4mg q4 PRN moderate pain  - PO Dilaudid 6mg q4 PRN severe pain  - hold parameters placed for all medications  - appreciate palliative care recs  - will need OP f/u at [facility] with Dr. Kiesha Calle/ Dr. Mazariegos/ NP Zoe Carvalho/ ROCIO Cruz; [phone]

## 2022-10-06 NOTE — PROGRESS NOTE ADULT - NS ATTEND AMEND GEN_ALL_CORE FT
Patient seen at bedside with FELA WOMACK discussed with patient.   He is aware that given his poor performance status he is not a candidate for disease modifying treatment.  He is an appropriate candidate for hospice services.   Can consider [facility] as well, given wounds.
Agree with above. Will check CT chest non contrast. Some output from chest tube, SC air improved today. No blowholes placed. Pain localized to scrotal area, will check US of scrotum.

## 2022-10-06 NOTE — PROGRESS NOTE ADULT - PROBLEM SELECTOR PLAN 1
Patient with history of untreated HPV related rectal cancer with episode of rectal bleeding at [facility] Hgb 5.9 s/p 2u pRBCs.  - patient is now ongoing trial of RT inpatientl; s/p 1 of 5 fractions but may be fewer if clinical improvement  - patient did not adequately tolerate first fraction of RT yesterday, was unable to lay flat given pain  - plan discussed with rad onc director Dr. Mathew who mentioned that they will reassess the patient later today and determine need for repeat inpatient vs outpatient fractions   - will attempt repeat RT today with increased dose of dilaudid to ensure adequate pain control prior to treatment  - CT with evidence of liver and bone lesions Patient with history of untreated HPV related rectal cancer with episode of rectal bleeding at [facility] Hgb 5.9 s/p 2u pRBCs.  - CT with evidence of liver and bone lesions  - Pt initially planned for 5 fractions, w/ 1st fraction on 10/04 and 2nd fraction on 10/05, however pt did not tolerate both times d/t pain, despite receiving increased dose of Dilaudid prior to 2nd fraction  - Will discuss w/ rad onc team regarding next steps/plan to continue attempting RT

## 2022-10-06 NOTE — PROGRESS NOTE ADULT - PROBLEM SELECTOR PLAN 2
- WBC stable at 16, increased over the past 2 days  - inc in hgb as well from 8.3 to 9.4  - no concern for new infection at this time; afebrile, no clinical worsening  - likely reactive 2/2 to underlying tumor burden vs hemoconcentration in the setting of poor po intake

## 2022-10-06 NOTE — PROGRESS NOTE ADULT - PROBLEM SELECTOR PLAN 6
Patient with TTE performed on at [facility] with evidence of two large RV masses and two additional small masses. IVC filter placed prophylactically at that time due to undiagnosed vegetations on the TTE.  - repeat TTE 9/9 was a limited study but demonstrated a mobile mass likely 2/2 tumour, thrombus, or vegetation.   - per cardiology risk of JENNIFER outweighs benefit at this time  - refused cardiac MRI again (3 attempts since last week; patient not a candidate for general anesthesia)  - per cardiology, no acute indication to do cardiac MRI/ JENNIFER at this time given patients other active issues  - monitor on tele  - can follow up outpatient for serial TTEs or JENNIFER once medically stable

## 2022-10-06 NOTE — PROGRESS NOTE ADULT - SUBJECTIVE AND OBJECTIVE BOX
INTERVAL HPI/OVERNIGHT EVENTS:  Patient seen at bedside.  Patient with no acute complaints  Resting comfortably in bed      VITAL SIGNS:  T(F): 97.1 (10-06-22 @ 13:00)  HR: 87 (10-06-22 @ 13:00)  BP: 101/64 (10-06-22 @ 13:00)  RR: 16 (10-06-22 @ 13:00)  SpO2: 94% (10-06-22 @ 13:00)  Wt(kg): --    PHYSICAL EXAM:  GENERAL: NAD, AOx3, cachectic appearing  HEENT: temporal wasting  CHEST/LUNG: CTA b/l  HEART: Regular rate and rhythm; Normal S1 S2, No murmurs, rubs, or gallops  ABDOMEN: Soft, Nontender, Nondistended; Bowel sounds present  EXTREMITIES:  2+ Peripheral Pulses, No clubbing, cyanosis, or edema  : Rosario in place, draining clear urine      MEDICATIONS  (STANDING):  bictegravir 50 mG/emtricitabine 200 mG/tenofovir alafenamide 25 mG (BIKTARVY) 1 Tablet(s) Oral daily  ceFAZolin   IVPB 2000 milliGRAM(s) IV Intermittent every 8 hours  chlorhexidine 2% Cloths 1 Application(s) Topical <User Schedule>  enoxaparin Injectable 60 milliGRAM(s) SubCutaneous every 12 hours  gabapentin 300 milliGRAM(s) Oral three times a day  HYDROmorphone   Tablet 8 milliGRAM(s) Oral once  lactated ringers. 1000 milliLiter(s) (75 mL/Hr) IV Continuous <Continuous>  lactulose Syrup 10 Gram(s) Oral daily  lidocaine   4% Patch 1 Patch Transdermal daily  lidocaine   4% Patch 1 Patch Transdermal daily  melatonin 3 milliGRAM(s) Oral at bedtime  methadone    Tablet 5 milliGRAM(s) Oral three times a day  metoprolol tartrate 50 milliGRAM(s) Oral two times a day  naloxegol 25 milliGRAM(s) Oral daily  polyethylene glycol 3350 17 Gram(s) Oral two times a day  senna 2 Tablet(s) Oral at bedtime    MEDICATIONS  (PRN):  bisacodyl 5 milliGRAM(s) Oral at bedtime PRN Constipation  HYDROmorphone   Tablet 4 milliGRAM(s) Oral every 4 hours PRN Moderate Pain (4 - 6)  HYDROmorphone   Tablet 6 milliGRAM(s) Oral every 4 hours PRN Severe Pain (7 - 10)  naloxone Injectable 0.1 milliGRAM(s) IV Push every 3 minutes PRN opioid intoxication      Allergies    ibuprofen (Angioedema)    Intolerances        LABS:                        9.7    15.98 )-----------( 180      ( 04 Oct 2022 16:38 )             31.9     10-04    139  |  103  |  30<H>  ----------------------------<  89  4.2   |  27  |  0.41<L>    Ca    12.3<H>      04 Oct 2022 16:38            RADIOLOGY & ADDITIONAL TESTS:  Studies reviewed.

## 2022-10-06 NOTE — CHART NOTE - NSCHARTNOTEFT_GEN_A_CORE
Mr. Porter, attempted for palliative RT but could not hold still due to pain and was not wearing a diaper during RT attempt.  He was given an increased dose of dilaudid prior to RT but this was not effective to remain still for RT.     Discussing with Dr. Santiago but comfort care measures may be appropriate over repeated attempts at RT.

## 2022-10-06 NOTE — PROGRESS NOTE ADULT - PROBLEM SELECTOR PLAN 5
CT Chest 9/11 with evidence of persistent pneumomediastinum and extensive subcutaneous emphysema of the neck, chest, abdomen, pelvis, and extremities, including the scrotum.   - CANDIS per thoracic surgery  - indwelling pedraza was placed at [facility] (Coude placed for urinary strain)   - TOV attempted 9/27-9/28 overnight and failed with difficulty   - coude catheter placed by urology 9/28; can remain in place for 4 weeks prior to requiring change.  - No further urologic intervention needed at this time.   - Can f/u outpatient w/ Dr Prabhu Lucio, [facility] [place]  - pain control with dilaudid and methadone, ct a/p 9/11 did not show any inflammation/hydrocele/infection .

## 2022-10-06 NOTE — PROGRESS NOTE ADULT - SUBJECTIVE AND OBJECTIVE BOX
PROGRESS NOTE:   Authored by Blanca Maldonado, PGY-1   Patient is a 32y old  Male who presents with a chief complaint of Shortness of breath (05 Oct 2022 06:45)      SUBJECTIVE / OVERNIGHT EVENTS: No overnight events. Patient was seen and examined at bedside this morning. No complaints.     PHYSICAL EXAM:  Vital Signs Last 24 Hrs  T(C): 36.8 (06 Oct 2022 05:47), Max: 36.8 (06 Oct 2022 05:47)  T(F): 98.2 (06 Oct 2022 05:47), Max: 98.2 (06 Oct 2022 05:47)  HR: 88 (06 Oct 2022 05:47) (88 - 100)  BP: 102/61 (06 Oct 2022 05:47) (102/61 - 110/73)  BP(mean): --  RR: 17 (06 Oct 2022 05:47) (17 - 17)  SpO2: 95% (06 Oct 2022 05:47) (94% - 98%)    Parameters below as of 06 Oct 2022 05:47  Patient On (Oxygen Delivery Method): room air        GENERAL: No acute distress, well-developed  HEAD:  Atraumatic, Normocephalic  EYES: EOMI, PERRLA, conjunctiva and sclera clear  NECK: Supple, no lymphadenopathy, no JVD  CHEST/LUNG: CTAB; No wheezes, rales, or rhonchi  HEART: Regular rate and rhythm; No murmurs, rubs, or gallops  ABDOMEN: Soft, non-tender, non-distended; normal bowel sounds, no organomegaly  EXTREMITIES:  2+ peripheral pulses b/l, No clubbing, cyanosis, or edema  NEUROLOGY: A&O x 3, no focal deficits  SKIN: No rashes or lesions    CAPILLARY BLOOD GLUCOSE        I&O's Summary    05 Oct 2022 07:01  -  06 Oct 2022 07:00  --------------------------------------------------------  IN: 0 mL / OUT: 1500 mL / NET: -1500 mL        MEDICATIONS  (STANDING):  bictegravir 50 mG/emtricitabine 200 mG/tenofovir alafenamide 25 mG (BIKTARVY) 1 Tablet(s) Oral daily  ceFAZolin   IVPB 2000 milliGRAM(s) IV Intermittent every 8 hours  chlorhexidine 2% Cloths 1 Application(s) Topical <User Schedule>  enoxaparin Injectable 60 milliGRAM(s) SubCutaneous every 12 hours  gabapentin 300 milliGRAM(s) Oral three times a day  HYDROmorphone   Tablet 8 milliGRAM(s) Oral once  lactated ringers. 1000 milliLiter(s) (75 mL/Hr) IV Continuous <Continuous>  lactulose Syrup 10 Gram(s) Oral daily  lidocaine   4% Patch 1 Patch Transdermal daily  lidocaine   4% Patch 1 Patch Transdermal daily  melatonin 3 milliGRAM(s) Oral at bedtime  methadone    Tablet 5 milliGRAM(s) Oral three times a day  metoprolol tartrate 50 milliGRAM(s) Oral two times a day  naloxegol 25 milliGRAM(s) Oral daily  polyethylene glycol 3350 17 Gram(s) Oral two times a day  senna 2 Tablet(s) Oral at bedtime    MEDICATIONS  (PRN):  bisacodyl 5 milliGRAM(s) Oral at bedtime PRN Constipation  HYDROmorphone   Tablet 4 milliGRAM(s) Oral every 4 hours PRN Moderate Pain (4 - 6)  HYDROmorphone   Tablet 6 milliGRAM(s) Oral every 4 hours PRN Severe Pain (7 - 10)  naloxone Injectable 0.1 milliGRAM(s) IV Push every 3 minutes PRN opioid intoxication      LABS:                        9.7    15.98 )-----------( 180      ( 04 Oct 2022 16:38 )             31.9     10-04    139  |  103  |  30<H>  ----------------------------<  89  4.2   |  27  |  0.41<L>    Ca    12.3<H>      04 Oct 2022 16:38                  RADIOLOGY & ADDITIONAL TESTS: Reviewed.     COORDINATION OF CARE:  Care Discussed with Consultants/Other Providers [Y/N]:  Prior or Outpatient Records Reviewed [Y/N]:   PROGRESS NOTE:   Authored by Blanca Maldonado, PGY-1   Patient is a 32y old  Male who presents with a chief complaint of Shortness of breath (05 Oct 2022 06:45)      SUBJECTIVE / OVERNIGHT EVENTS: No overnight events. Patient was seen and examined at bedside this morning. No complaints.     PHYSICAL EXAM:  Vital Signs Last 24 Hrs  T(C): 36.8 (06 Oct 2022 05:47), Max: 36.8 (06 Oct 2022 05:47)  T(F): 98.2 (06 Oct 2022 05:47), Max: 98.2 (06 Oct 2022 05:47)  HR: 88 (06 Oct 2022 05:47) (88 - 100)  BP: 102/61 (06 Oct 2022 05:47) (102/61 - 110/73)  BP(mean): --  RR: 17 (06 Oct 2022 05:47) (17 - 17)  SpO2: 95% (06 Oct 2022 05:47) (94% - 98%)    Parameters below as of 06 Oct 2022 05:47  Patient On (Oxygen Delivery Method): room air      GENERAL: NAD, AOx3, cachectic appearing  CHEST/LUNG: No lung sounds hear in left upper lobe; otherwise CTA b/l  HEART: Regular rate and rhythm; Normal S1 S2, No murmurs, rubs, or gallops  ABDOMEN: Soft, Nontender, Nondistended; Bowel sounds present  EXTREMITIES:  2+ Peripheral Pulses, No clubbing, cyanosis, or edema    CAPILLARY BLOOD GLUCOSE        I&O's Summary    05 Oct 2022 07:01  -  06 Oct 2022 07:00  --------------------------------------------------------  IN: 0 mL / OUT: 1500 mL / NET: -1500 mL        MEDICATIONS  (STANDING):  bictegravir 50 mG/emtricitabine 200 mG/tenofovir alafenamide 25 mG (BIKTARVY) 1 Tablet(s) Oral daily  ceFAZolin   IVPB 2000 milliGRAM(s) IV Intermittent every 8 hours  chlorhexidine 2% Cloths 1 Application(s) Topical <User Schedule>  enoxaparin Injectable 60 milliGRAM(s) SubCutaneous every 12 hours  gabapentin 300 milliGRAM(s) Oral three times a day  HYDROmorphone   Tablet 8 milliGRAM(s) Oral once  lactated ringers. 1000 milliLiter(s) (75 mL/Hr) IV Continuous <Continuous>  lactulose Syrup 10 Gram(s) Oral daily  lidocaine   4% Patch 1 Patch Transdermal daily  lidocaine   4% Patch 1 Patch Transdermal daily  melatonin 3 milliGRAM(s) Oral at bedtime  methadone    Tablet 5 milliGRAM(s) Oral three times a day  metoprolol tartrate 50 milliGRAM(s) Oral two times a day  naloxegol 25 milliGRAM(s) Oral daily  polyethylene glycol 3350 17 Gram(s) Oral two times a day  senna 2 Tablet(s) Oral at bedtime    MEDICATIONS  (PRN):  bisacodyl 5 milliGRAM(s) Oral at bedtime PRN Constipation  HYDROmorphone   Tablet 4 milliGRAM(s) Oral every 4 hours PRN Moderate Pain (4 - 6)  HYDROmorphone   Tablet 6 milliGRAM(s) Oral every 4 hours PRN Severe Pain (7 - 10)  naloxone Injectable 0.1 milliGRAM(s) IV Push every 3 minutes PRN opioid intoxication      LABS:                        9.7    15.98 )-----------( 180      ( 04 Oct 2022 16:38 )             31.9     10-04    139  |  103  |  30<H>  ----------------------------<  89  4.2   |  27  |  0.41<L>    Ca    12.3<H>      04 Oct 2022 16:38                  RADIOLOGY & ADDITIONAL TESTS: Reviewed.     COORDINATION OF CARE:  Care Discussed with Consultants/Other Providers [Y/N]:  Prior or Outpatient Records Reviewed [Y/N]:     PROGRESS NOTE:   Authored by Blanca Maldonado, PGY-1   Patient is a 32y old  Male who presents with a chief complaint of Shortness of breath (05 Oct 2022 06:45)      SUBJECTIVE / OVERNIGHT EVENTS: No overnight events. Patient was seen and examined at bedside this morning. No complaints.     PHYSICAL EXAM:  Vital Signs Last 24 Hrs  T(C): 36.8 (06 Oct 2022 05:47), Max: 36.8 (06 Oct 2022 05:47)  T(F): 98.2 (06 Oct 2022 05:47), Max: 98.2 (06 Oct 2022 05:47)  HR: 88 (06 Oct 2022 05:47) (88 - 100)  BP: 102/61 (06 Oct 2022 05:47) (102/61 - 110/73)  BP(mean): --  RR: 17 (06 Oct 2022 05:47) (17 - 17)  SpO2: 95% (06 Oct 2022 05:47) (94% - 98%)    Parameters below as of 06 Oct 2022 05:47  Patient On (Oxygen Delivery Method): room air      GENERAL: NAD, AOx3, cachectic appearing  CHEST/LUNG: CTA b/l  HEART: Regular rate and rhythm; Normal S1 S2, No murmurs, rubs, or gallops  ABDOMEN: Soft, Nontender, Nondistended; Bowel sounds present  EXTREMITIES:  2+ Peripheral Pulses, No clubbing, cyanosis, or edema  : Rosario in place, draining clear urine    CAPILLARY BLOOD GLUCOSE        I&O's Summary    05 Oct 2022 07:01  -  06 Oct 2022 07:00  --------------------------------------------------------  IN: 0 mL / OUT: 1500 mL / NET: -1500 mL        MEDICATIONS  (STANDING):  bictegravir 50 mG/emtricitabine 200 mG/tenofovir alafenamide 25 mG (BIKTARVY) 1 Tablet(s) Oral daily  ceFAZolin   IVPB 2000 milliGRAM(s) IV Intermittent every 8 hours  chlorhexidine 2% Cloths 1 Application(s) Topical <User Schedule>  enoxaparin Injectable 60 milliGRAM(s) SubCutaneous every 12 hours  gabapentin 300 milliGRAM(s) Oral three times a day  HYDROmorphone   Tablet 8 milliGRAM(s) Oral once  lactated ringers. 1000 milliLiter(s) (75 mL/Hr) IV Continuous <Continuous>  lactulose Syrup 10 Gram(s) Oral daily  lidocaine   4% Patch 1 Patch Transdermal daily  lidocaine   4% Patch 1 Patch Transdermal daily  melatonin 3 milliGRAM(s) Oral at bedtime  methadone    Tablet 5 milliGRAM(s) Oral three times a day  metoprolol tartrate 50 milliGRAM(s) Oral two times a day  naloxegol 25 milliGRAM(s) Oral daily  polyethylene glycol 3350 17 Gram(s) Oral two times a day  senna 2 Tablet(s) Oral at bedtime    MEDICATIONS  (PRN):  bisacodyl 5 milliGRAM(s) Oral at bedtime PRN Constipation  HYDROmorphone   Tablet 4 milliGRAM(s) Oral every 4 hours PRN Moderate Pain (4 - 6)  HYDROmorphone   Tablet 6 milliGRAM(s) Oral every 4 hours PRN Severe Pain (7 - 10)  naloxone Injectable 0.1 milliGRAM(s) IV Push every 3 minutes PRN opioid intoxication      LABS:                        9.7    15.98 )-----------( 180      ( 04 Oct 2022 16:38 )             31.9     10-04    139  |  103  |  30<H>  ----------------------------<  89  4.2   |  27  |  0.41<L>    Ca    12.3<H>      04 Oct 2022 16:38                  RADIOLOGY & ADDITIONAL TESTS: Reviewed.     COORDINATION OF CARE:  Care Discussed with Consultants/Other Providers [Y/N]:  Prior or Outpatient Records Reviewed [Y/N]:     PROGRESS NOTE:   Authored by Blanca Maldonado, PGY-1   Patient is a 32y old  Male who presents with a chief complaint of Shortness of breath (05 Oct 2022 06:45)      SUBJECTIVE / OVERNIGHT EVENTS: No overnight events. Patient was seen and examined at bedside this morning. Pt continues to have rectal pain, otherwise no complaints. Pt was scheduled for 2nd radiation session yesterday but reports that he was in too much pain at the time to undergo it.     PHYSICAL EXAM:  Vital Signs Last 24 Hrs  T(C): 36.8 (06 Oct 2022 05:47), Max: 36.8 (06 Oct 2022 05:47)  T(F): 98.2 (06 Oct 2022 05:47), Max: 98.2 (06 Oct 2022 05:47)  HR: 88 (06 Oct 2022 05:47) (88 - 100)  BP: 102/61 (06 Oct 2022 05:47) (102/61 - 110/73)  BP(mean): --  RR: 17 (06 Oct 2022 05:47) (17 - 17)  SpO2: 95% (06 Oct 2022 05:47) (94% - 98%)    Parameters below as of 06 Oct 2022 05:47  Patient On (Oxygen Delivery Method): room air      GENERAL: NAD, AOx3, cachectic appearing  CHEST/LUNG: CTA b/l  HEART: Regular rate and rhythm; Normal S1 S2, No murmurs, rubs, or gallops  ABDOMEN: Soft, Nontender, Nondistended; Bowel sounds present  EXTREMITIES:  2+ Peripheral Pulses, No clubbing, cyanosis, or edema  : Rosario in place, draining clear urine    CAPILLARY BLOOD GLUCOSE        I&O's Summary    05 Oct 2022 07:01  -  06 Oct 2022 07:00  --------------------------------------------------------  IN: 0 mL / OUT: 1500 mL / NET: -1500 mL        MEDICATIONS  (STANDING):  bictegravir 50 mG/emtricitabine 200 mG/tenofovir alafenamide 25 mG (BIKTARVY) 1 Tablet(s) Oral daily  ceFAZolin   IVPB 2000 milliGRAM(s) IV Intermittent every 8 hours  chlorhexidine 2% Cloths 1 Application(s) Topical <User Schedule>  enoxaparin Injectable 60 milliGRAM(s) SubCutaneous every 12 hours  gabapentin 300 milliGRAM(s) Oral three times a day  HYDROmorphone   Tablet 8 milliGRAM(s) Oral once  lactated ringers. 1000 milliLiter(s) (75 mL/Hr) IV Continuous <Continuous>  lactulose Syrup 10 Gram(s) Oral daily  lidocaine   4% Patch 1 Patch Transdermal daily  lidocaine   4% Patch 1 Patch Transdermal daily  melatonin 3 milliGRAM(s) Oral at bedtime  methadone    Tablet 5 milliGRAM(s) Oral three times a day  metoprolol tartrate 50 milliGRAM(s) Oral two times a day  naloxegol 25 milliGRAM(s) Oral daily  polyethylene glycol 3350 17 Gram(s) Oral two times a day  senna 2 Tablet(s) Oral at bedtime    MEDICATIONS  (PRN):  bisacodyl 5 milliGRAM(s) Oral at bedtime PRN Constipation  HYDROmorphone   Tablet 4 milliGRAM(s) Oral every 4 hours PRN Moderate Pain (4 - 6)  HYDROmorphone   Tablet 6 milliGRAM(s) Oral every 4 hours PRN Severe Pain (7 - 10)  naloxone Injectable 0.1 milliGRAM(s) IV Push every 3 minutes PRN opioid intoxication      LABS:                        9.7    15.98 )-----------( 180      ( 04 Oct 2022 16:38 )             31.9     10-04    139  |  103  |  30<H>  ----------------------------<  89  4.2   |  27  |  0.41<L>    Ca    12.3<H>      04 Oct 2022 16:38                  RADIOLOGY & ADDITIONAL TESTS: Reviewed.     COORDINATION OF CARE:  Care Discussed with Consultants/Other Providers [Y/N]:  Prior or Outpatient Records Reviewed [Y/N]:     PROGRESS NOTE:   Authored by Blanca Maldonado, PGY-1   Patient is a 32y old  Male who presents with a chief complaint of Shortness of breath (05 Oct 2022 06:45)      SUBJECTIVE / OVERNIGHT EVENTS: No overnight events. Patient was seen and examined at bedside this morning. Pt continues to have rectal pain, otherwise no complaints. Pt was scheduled for 2nd radiation session yesterday but reports that he was in too much pain at the time to undergo it. Denies fevers/chills, HA, chest pain, SOB, abdominal pain.     PHYSICAL EXAM:  Vital Signs Last 24 Hrs  T(C): 36.8 (06 Oct 2022 05:47), Max: 36.8 (06 Oct 2022 05:47)  T(F): 98.2 (06 Oct 2022 05:47), Max: 98.2 (06 Oct 2022 05:47)  HR: 88 (06 Oct 2022 05:47) (88 - 100)  BP: 102/61 (06 Oct 2022 05:47) (102/61 - 110/73)  BP(mean): --  RR: 17 (06 Oct 2022 05:47) (17 - 17)  SpO2: 95% (06 Oct 2022 05:47) (94% - 98%)    Parameters below as of 06 Oct 2022 05:47  Patient On (Oxygen Delivery Method): room air      GENERAL: NAD, AOx3, cachectic appearing  CHEST/LUNG: CTA b/l  HEART: Regular rate and rhythm; Normal S1 S2, No murmurs, rubs, or gallops  ABDOMEN: Soft, Nontender, Nondistended; Bowel sounds present  EXTREMITIES:  2+ Peripheral Pulses, No clubbing, cyanosis, or edema  : Rosario in place, draining clear urine    CAPILLARY BLOOD GLUCOSE        I&O's Summary    05 Oct 2022 07:01  -  06 Oct 2022 07:00  --------------------------------------------------------  IN: 0 mL / OUT: 1500 mL / NET: -1500 mL        MEDICATIONS  (STANDING):  bictegravir 50 mG/emtricitabine 200 mG/tenofovir alafenamide 25 mG (BIKTARVY) 1 Tablet(s) Oral daily  ceFAZolin   IVPB 2000 milliGRAM(s) IV Intermittent every 8 hours  chlorhexidine 2% Cloths 1 Application(s) Topical <User Schedule>  enoxaparin Injectable 60 milliGRAM(s) SubCutaneous every 12 hours  gabapentin 300 milliGRAM(s) Oral three times a day  HYDROmorphone   Tablet 8 milliGRAM(s) Oral once  lactated ringers. 1000 milliLiter(s) (75 mL/Hr) IV Continuous <Continuous>  lactulose Syrup 10 Gram(s) Oral daily  lidocaine   4% Patch 1 Patch Transdermal daily  lidocaine   4% Patch 1 Patch Transdermal daily  melatonin 3 milliGRAM(s) Oral at bedtime  methadone    Tablet 5 milliGRAM(s) Oral three times a day  metoprolol tartrate 50 milliGRAM(s) Oral two times a day  naloxegol 25 milliGRAM(s) Oral daily  polyethylene glycol 3350 17 Gram(s) Oral two times a day  senna 2 Tablet(s) Oral at bedtime    MEDICATIONS  (PRN):  bisacodyl 5 milliGRAM(s) Oral at bedtime PRN Constipation  HYDROmorphone   Tablet 4 milliGRAM(s) Oral every 4 hours PRN Moderate Pain (4 - 6)  HYDROmorphone   Tablet 6 milliGRAM(s) Oral every 4 hours PRN Severe Pain (7 - 10)  naloxone Injectable 0.1 milliGRAM(s) IV Push every 3 minutes PRN opioid intoxication      LABS:                        9.7    15.98 )-----------( 180      ( 04 Oct 2022 16:38 )             31.9     10-04    139  |  103  |  30<H>  ----------------------------<  89  4.2   |  27  |  0.41<L>    Ca    12.3<H>      04 Oct 2022 16:38                  RADIOLOGY & ADDITIONAL TESTS: Reviewed.     COORDINATION OF CARE:  Care Discussed with Consultants/Other Providers [Y/N]:  Prior or Outpatient Records Reviewed [Y/N]:

## 2022-10-06 NOTE — PROGRESS NOTE ADULT - PROBLEM SELECTOR PLAN 3
Ca 12.3 10/5; previously received calcitonin x 2 doses and pamidronate 90mcg IVPB x1.  - will c/w IV fluids; can try calcitonin again if less than ideal improvement  - continue to trend levels q 24 hrs; patient refused AM labs this morning, will try again in the afternoon  - can repeat pamidronate after 1 week PRN (last received 10/2)  - vit D low but will hold supplementation in the setting of hypercalcemia of malignancy

## 2022-10-06 NOTE — PROGRESS NOTE ADULT - ASSESSMENT
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 Being treated for MSSA bacteremia. c/b scrotal swelling s/p coude catheter, hypercalcemia s/p pamidronate and IVF, and AFB from [place] w M chimaera not requiring any intervention. Patient is now undergoing trial of RT, did not tolerate first fraction 10/4; pending repeat 10/5 with increased dose of premedication.

## 2022-10-06 NOTE — PROGRESS NOTE ADULT - PROBLEM SELECTOR PROBLEM 8
MEDICARE WELLNESS VISIT NOTE    HISTORY OF PRESENT ILLNESS:   García Valdes presents for his Subsequent Annual Medicare Wellness Visit.   He has no current complaints or concerns.      Patient Care Team:  Noah Pavon MD as PCP - General (Family Practice)  Robert Moss MD (Hematology & Oncology)  Sara Sanchez MD (Nephrology)  Kaci Cho MD (Hematology & Oncology)        Patient Active Problem List   Diagnosis   • Chronic lymphocytic leukemia (CMS/HCC)   • Osteopenia   • Sleep apnea   • Morbid obesity (CMS/[facility])   • HLD (hyperlipidemia)   • Hyperuricemia   • Dermatophytosis of nail   • Porokeratosis   • Oropeza's neuroma   • Metatarsalgia of left foot   • Pneumonia due to infectious organism   • Other constitutional aplastic anemia (CMS/[facility])   • Anemia in stage 4 chronic kidney disease (CMS/[facility])   • Chronic kidney disease, stage IV (severe) (CMS/[facility])   • Primary hypertension   • Exertional dyspnea   • Coronary artery disease involving native coronary artery of native heart without angina pectoris   • Bradycardia         Past Medical History:   Diagnosis Date   • CAD (coronary artery disease)    • Chronic lymphocytic leukemia (CMS/HCC)    • Chronic rhinitis    • DM (diabetes mellitus) (CMS/[facility])     Type 2 diagnosed ~ 2011   • Gout    • HLD (hyperlipidemia)    • HTN (hypertension)    • Hypercalcuria    • Hyperparathyroidism 12/15/2004    s/p single adenoma resected   • Morbid obesity (CMS/[facility])    • Osteopenia    • Sleep apnea     uses CPAP   • Thyroid condition          Past Surgical History:   Procedure Laterality Date   • Excis lesn tendon shealth leg/ankle  01/01/1972    right ankle tendon repair   • Parathyroidectomy  12/15/2004    Solitary right inferior parathyroid adenoma resected, wighing 410 mg.   • Repair of nasal septum  09/09/2007    Septoplasty/ Bilateral nasal turbinates   • Upper arm/elbow surgery unlisted  01/01/1972    Unspecified Right Elbow Procedure         Social History      Tobacco Use   • Smoking status: Never Smoker   • Smokeless tobacco: Never Used   Vaping Use   • Vaping Use: never used   Substance Use Topics   • Alcohol use: Yes     Alcohol/week: 5.0 - 10.0 standard drinks     Types: 5 - 10 Standard drinks or equivalent per week     Comment: 1-2 beer 5xweek   • Drug use: No     Drug use:    Drug Use:    No              Family History   Problem Relation Age of Onset   • Other Mother         Brain Tumor    • Myocardial Infarction Father         CAD   • Heart disease Father    • Leukemia Sister         currently in remission   • Obesity Daughter    • Diabetes Daughter         on insulin   • Obesity Daughter    • Stroke Maternal Grandmother        Current Outpatient Medications   Medication Sig Dispense Refill   • ZINC SULFATE PO      • bumetanide (BUMEX) 2 MG tablet Take 0.5 tablets by mouth daily. 90 tablet 3   • sodium zirconium cyclosilicate (Lokelma) 10 g packet for oral suspension Take 1 packet by mouth daily. 60 packet 3   • terazosin (HYTRIN) 5 MG capsule Take 1 capsule by mouth 2 times daily. 180 capsule 1   • amLODIPine (NORVASC) 10 MG tablet Take 1 tablet by mouth daily. 90 tablet 1   • allopurinol (ZYLOPRIM) 100 MG tablet Take 1 tablet by mouth daily. 90 tablet 1   • cloNIDine (CATAPRES) 0.1 MG tablet Take 1 tablet by mouth 3 times daily. (Patient taking differently: Take 0.2 mg by mouth 2 times daily. ) 270 tablet 1   • Coenzyme Q10 (CO Q-10) 300 MG Cap Take 100 mg by mouth 3 times daily.     • methylPREDNISolone (MEDROL DOSEPAK) 4 MG tablet FOLLOW PACKAGE DIRECTIONS 21 tablet 0   • aspirin 81 MG tablet Take 81 mg by mouth daily.     • Omega-3 Fatty Acids (FISH OIL) 1000 MG capsule Take 2,400 g by mouth 2 times daily.      • cholecalciferol (VITAMIN D3) 1000 UNIT tablet Take 2,000 Units by mouth daily.      • Multiple Vitamin (MULTIVITAMINS PO) Take 1 tablet by mouth daily.      • olmesartan (BENICAR) 20 MG tablet Take 1/2 tab daily = 10 mg 45 tablet 1     No current  facility-administered medications for this visit.        The following items on the Medicare Health Risk Assessment were found to be positive            Vision and Hearing screens: Both screenings were performed and reviewed    Advance Directive:   The patient has the following documents:  Power of  for Health Care    Cognitive/Functional Status: no evidence of cognitive dysfunction by direct observation    Opioid Review: García is not taking opioid medications.    Recent PHQ 2/9 Score:    PHQ 2:  Date Adult PHQ 2 Score Adult PHQ 2 Interpretation   9/7/2021 0 No further screening needed       PHQ 9:       DEPRESSION ASSESSMENT/PLAN:  Depression screening is negative no further plan needed.     Body mass index is 35.99 kg/m².    BMI ASSESSMENT/PLAN:  Patient is obese.    Journal food intake daily and Join health club      Needed follow up:  None    See orders.   See Patient Instructions section.   Return in about 6 months (around 8/8/2022) for In-Office Visit F/U, Needs Lab Work Today.      ASSESSMENT/PLAN:  1. Medicare annual wellness visit, subsequent    2. Other constitutional aplastic anemia (CMS/HCC)  Stable.    3. Chronic lymphocytic leukemia (CMS/HCC)  Stable.    4. Morbid obesity (CMS/HCC)    5. Anemia in stage 4 chronic kidney disease (CMS/HCC)  Stable.    6. Screening PSA (prostate specific antigen)    7. Primary hypertension  Stable.    SUBJECTIVE:  Chief Complaint   Patient presents with   • Establish Care     former Dr VAUGHAN patient       1. Medicare annual wellness visit, subsequent    2. Other constitutional aplastic anemia (CMS/HCC)  Stable.    3. Chronic lymphocytic leukemia (CMS/HCC)  Stable.    4. Morbid obesity (CMS/HCC)    5. Anemia in stage 4 chronic kidney disease (CMS/HCC)  Stable.    6. Screening PSA (prostate specific antigen)    7. Primary hypertension  Stable.       I have reviewed the patient's medications and allergies, past medical, surgical, social and family history, updating  these as appropriate.  See Histories section of the Electronic Medical Record for a display of this information.    Review of Systems:   As above, per the History of Present Illness, otherwise essentially negative.  Constitutional: Negative for fever and chills.   Respiratory: Negative for cough and shortness of breath.    Cardiovascular: Negative for chest pain or chest pressure.   Gastrointestinal: Negative for nausea, vomiting, abdominal pain and diarrhea.   Genitourinary: Negative for dysuria, urgency, frequency, flank pain or hematuria.   Skin: Negative for rash.   Neurological: Negative for headaches, vertigo, change in sensory or motor function.   All other systems reviewed and are negative.    OBJECTIVE:  Blood pressure 127/77, pulse 64, height 5' 6\" (1.676 m), weight 101.2 kg (223 lb), SpO2 96 %. Body mass index is 35.99 kg/m².  General: The patient is alert, coopertive and conversive.   Skin:  Warm, dry and intact without evidence of rash.    Head:  Normocephalic-atraumatic.   Neck:  The trachea is midline. No evidence of jugular venous distention.  Normal symmetrical thyroid without mass or tenderness.  Eye:  Pupils are equal, round and reactive to light.  Bilateral extraocular movements are intact.  Normal conjunctivae and sclerae bilaterally.     EENT:  Tympanic membranes and extenal auditory canals are normal bilaterally.  No pharyngeal erythema or exudate.  No facial tenderness.  Normal nasal mucosa.   Chest wall:  No deformity or tenderness to palpation.  Cardiovascular:  Regular rate and rhythm without murmur.  Normal perfusion with symmetrical pulses.   Respiratory:  Clear to auscultation with symmetrical  breath sounds.  Gastrointestinal:  Non-distended, soft and nontender.  Normal bowel sounds.  No hepatomegaly or splenomegaly.  Back:  No costovertebral angle tenderness or midline bony tenderness.  Normal alignment.      Musculoskeletal:  No deformity, edema or joint effusion.  No tenderness to  palpation.     Neurological:  Alert and oriented times 4.  Cranial nerves 2-12 intact.  No focal neurological deficits.  No lateralizing signs.  Normal tandem gait without ataxia.  Psychiatric:  Cooperative.  Appropriate mood and affect.     Neoplasm related pain

## 2022-10-06 NOTE — PROGRESS NOTE ADULT - PROBLEM SELECTOR PLAN 7
Patient with history of MSSA bacteremia found at [facility] with blood cultures 8/31 positive for MSSA and repeat on 9/2 NGTD.  - s/p Zosyn (9/8 - 9/9)  - transitioned to Cefazolin 2GM q8h for 6 weeks ending (9/9-10/13)   - may need midline placement prior to discharge for antibiotics at rehab  - upon discharge, will need weekly CBC, BUN and creatinine and ID clinic follow up

## 2022-10-06 NOTE — PROGRESS NOTE ADULT - ASSESSMENT
32 yo M with untreated metastatic ano-rectal SCC, HPV positive, HIV infection, transferred from [facility] for continuation of care.   He presented to [facility] on 8/31/22 after syncopal episode at home.   Pt with cough x 2 mos, with yellow sputum and shortness of breath. Admitted for hypoxic respiratory failure.   8/31 CT C/A/P notable for large thick walled multiloculated CHRISTINE cavitary lesion, 10 cm ulcerated rectal mass inseparable from prostate and multiple necrotic liver mets and concern for L external iliac thrombosis.  S/p chest tube c/b severe subcutaneous emphysema  found to have MSSA bacteremia and multifocal cavitary lesions on CT chest c/f superimposed pna with klebsiella in sputum, and vegetations in RV on TTE.      Recent PET/CT 8/16/22 reviewed  1. Extensive FDG avid thickening at the anorectal junction corresponds to   known squamous cell carcinoma.  2. FDG-avid left perirectal, left pelvic, and left inguinal   lymphadenopathy is compatible with metastatic disease.  3. Difficult to delineate hypermetabolism in left lower sacrum is   suspicious for bone involvement. Extension of hypermetabolism through the   left sciatic notch is suspicious for perineural disease. Further   evaluation may be performed with pelvic MRI, with and without intravenous   contrast.  3. Multiple FDG avid bilobar hepatic hepatic metastases. A hypodense   hepatic lesion with physiologic FDG activity may represent a hemangioma.   Further evaluation may be performed with MRI. Hepatic lesions are   accessible to an ultrasound-guided percutaneous needle biopsy.  4. FDG avid patchy opacities in the right lung with additional findings   in the left lung, may be related to underlying infectious etiology.   Please correlate clinically. Dedicated CT of chest may be obtained for   further evaluation.  6. Increased radiotracer activity within the mediastinum and right   perihilar regions, indeterminate and difficult to delineate on   noncontrast CT. Contrast-enhanced CT may be obtained for further   evaluation.  7. Left lateral chest wall discrete intramuscular focus at the level of   the second/third intercostal space, indeterminate and difficult to   evaluate on CT. Differential diagnosis includes metastasis. Further   evaluation may be performed with ultrasound.      9/15/22: family meeting held , with Pt, his mother and his sister, MICU team, Pal care team and Oncology to discuss goals of care and prognosis. Disease course reviewed. Extent of disease with metastasis to liver, LN, bone and possibly lung discussed. Infection/endocarditis, pigtail in cavitary chest lesion with risk for pneumothorax and Poor performance status described.   Discussed that at this time, patient is not a candidate for chemotherapy given infection and poor performance status. There is a small chance he might recover enough to be able to receive chemotherapy/IO in the future. Will need ongoing evaluation of PS and candidacy for cancer treatment. Discussed that radiation can be considered for palliative purposes to decrease burden of symptoms from anal tumor.     Patient has had a complicated hospital course,  complicated by acute hypoxic respiratory failure. Pigtail and eventually a chest tube was placed. Imaging of the chest revealed multiple concerning findings - CHRISTINE cavitary lesion likely 2' PNA, placement of pigtail within a mass. Hospital course there also c/b MSSA bacteremia and discover of cardiac lesion that could be mass v. veg v. thrombus. 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 S/p IVC filter. s/p MICU stay. Chest tube removed. Respiratory status remains stable.   Now transfer to floor for further management. Remains on ABx for MSSA bacteremia. Failed multiple attempts at getting cardiac MRI due to inability to tolerate. After discussion with family and cards - will defer on any cardiac imaging as utility of imaging unclear at this time based on clinical status and plan.  Active issues now include pending infectious workup - sputum AFB showing growth - awaiting identification.  -Of note pt has also had Severe hypercalcemia, likely in the setting of malignancy. S/p Pamidronate 9/10.   Corrected levels today . Corrected levels today approx 11.7, s/p repeat pamidronate on 10/1, no labs documented today   Monitor levels and c/w IVF.   -With regards to treatment of metastatic HPV related anal SCC,given patient poor performance and nutritional status, pt is not a candidate for systemic therapy for cancer. Patient unable to lie  flat for RT treatment as well. Unable to lie flat for cardiac imaging for further evaluation of "cardiac mass"  -Appreciate [facility] care consult for pain management and GOC discussions  -Dispo: [facility] when medically optimized  -Advance directives: FULL CODE  -Rest of care as per primary team  -Patient to followup with Dr. Frazier ([facility]) upon discharge  -C/w Supportive care, pain control, Nutrition, PT, DVT ppx  -Oncology will continue to follow with you      Case discussed with Dr. Dougie DIEHL  Oncology Physician Assistant  Sean WILDER/[facility]  Pager [phone] also available on Teams    If before 8am/after 5pm or on weekends please page On-call Oncology Fellow     30 yo M with untreated metastatic ano-rectal SCC, HPV positive, HIV infection, transferred from [facility] for continuation of care.   He presented to [facility] on 8/31/22 after syncopal episode at home.   Pt with cough x 2 mos, with yellow sputum and shortness of breath. Admitted for hypoxic respiratory failure.   8/31 CT C/A/P notable for large thick walled multiloculated CHRISTINE cavitary lesion, 10 cm ulcerated rectal mass inseparable from prostate and multiple necrotic liver mets and concern for L external iliac thrombosis.  S/p chest tube c/b severe subcutaneous emphysema  found to have MSSA bacteremia and multifocal cavitary lesions on CT chest c/f superimposed pna with klebsiella in sputum, and vegetations in RV on TTE.      Recent PET/CT 8/16/22 reviewed  1. Extensive FDG avid thickening at the anorectal junction corresponds to   known squamous cell carcinoma.  2. FDG-avid left perirectal, left pelvic, and left inguinal   lymphadenopathy is compatible with metastatic disease.  3. Difficult to delineate hypermetabolism in left lower sacrum is   suspicious for bone involvement. Extension of hypermetabolism through the   left sciatic notch is suspicious for perineural disease. Further   evaluation may be performed with pelvic MRI, with and without intravenous   contrast.  3. Multiple FDG avid bilobar hepatic hepatic metastases. A hypodense   hepatic lesion with physiologic FDG activity may represent a hemangioma.   Further evaluation may be performed with MRI. Hepatic lesions are   accessible to an ultrasound-guided percutaneous needle biopsy.  4. FDG avid patchy opacities in the right lung with additional findings   in the left lung, may be related to underlying infectious etiology.   Please correlate clinically. Dedicated CT of chest may be obtained for   further evaluation.  6. Increased radiotracer activity within the mediastinum and right   perihilar regions, indeterminate and difficult to delineate on   noncontrast CT. Contrast-enhanced CT may be obtained for further   evaluation.  7. Left lateral chest wall discrete intramuscular focus at the level of   the second/third intercostal space, indeterminate and difficult to   evaluate on CT. Differential diagnosis includes metastasis. Further   evaluation may be performed with ultrasound.      9/15/22: family meeting held , with Pt, his mother and his sister, MICU team, Pal care team and Oncology to discuss goals of care and prognosis. Disease course reviewed. Extent of disease with metastasis to liver, LN, bone and possibly lung discussed. Infection/endocarditis, pigtail in cavitary chest lesion with risk for pneumothorax and Poor performance status described.   Discussed that at this time, patient is not a candidate for chemotherapy given infection and poor performance status. There is a small chance he might recover enough to be able to receive chemotherapy/IO in the future. Will need ongoing evaluation of PS and candidacy for cancer treatment. Discussed that radiation can be considered for palliative purposes to decrease burden of symptoms from anal tumor.     Patient has had a complicated hospital course,  complicated by acute hypoxic respiratory failure. Pigtail and eventually a chest tube was placed. Imaging of the chest revealed multiple concerning findings - CHRISTINE cavitary lesion likely 2' PNA, placement of pigtail within a mass. Hospital course there also c/b MSSA bacteremia and discover of cardiac lesion that could be mass v. veg v. thrombus. 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 S/p IVC filter. s/p MICU stay. Chest tube removed. Respiratory status remains stable.   Now transfer to floor for further management. Remains on ABx for MSSA bacteremia. Failed multiple attempts at getting cardiac MRI due to inability to tolerate. After discussion with family and cards - will defer on any cardiac imaging as utility of imaging unclear at this time based on clinical status and plan.  Active issues now include pending infectious workup - sputum AFB showing growth - awaiting identification.  -Of note pt has also had Severe hypercalcemia, likely in the setting of malignancy. S/p Pamidronate 9/10.   Corrected levels today . Corrected levels today approx 11.7, s/p repeat pamidronate on 10/1, no labs documented today   Monitor levels and c/w IVF.   -With regards to treatment of metastatic HPV related anal SCC, given patient poor performance and nutritional status, pt is not a candidate for systemic therapy for cancer. Patient unable to lie  flat for RT treatment as well. Unable to lie flat for cardiac imaging for further evaluation of "cardiac mass". We discussed this with the patient, he is aware of poor prognosis.  -Appreciate Pal care consult for pain management and GOC discussions  -Dispo: [facility] when medically optimized, he is an appropriate candidate for hospice services if this is in line with GOC. Can consider [place] also given wounds.  -Advance directives: FULL CODE  -Rest of care as per primary team  -Patient to followup with Dr. Frazier ([facility]) upon discharge  -C/w Supportive care, pain control, Nutrition, PT, DVT ppx  -Oncology will continue to follow with you      Case discussed with Dr. Dougie DIEHL  Oncology Physician Assistant  Sean WILDER/[facility]  Pager [phone] also available on Teams    If before 8am/after 5pm or on weekends please page On-call Oncology Fellow

## 2022-10-07 LAB
MRSA PCR RESULT.: SIGNIFICANT CHANGE UP
S AUREUS DNA NOSE QL NAA+PROBE: SIGNIFICANT CHANGE UP

## 2022-10-07 PROCEDURE — 99232 SBSQ HOSP IP/OBS MODERATE 35: CPT | Mod: GC

## 2022-10-07 RX ORDER — SODIUM CHLORIDE 9 MG/ML
500 INJECTION, SOLUTION INTRAVENOUS ONCE
Refills: 0 | Status: COMPLETED | OUTPATIENT
Start: 2022-10-07 | End: 2022-10-07

## 2022-10-07 RX ORDER — CHLORHEXIDINE GLUCONATE 213 G/1000ML
1 SOLUTION TOPICAL
Refills: 0 | Status: DISCONTINUED | OUTPATIENT
Start: 2022-10-07 | End: 2022-10-07

## 2022-10-07 RX ADMIN — Medication 50 MILLIGRAM(S): at 18:01

## 2022-10-07 RX ADMIN — Medication 100 MILLIGRAM(S): at 05:11

## 2022-10-07 RX ADMIN — POLYETHYLENE GLYCOL 3350 17 GRAM(S): 17 POWDER, FOR SOLUTION ORAL at 05:12

## 2022-10-07 RX ADMIN — SENNA PLUS 2 TABLET(S): 8.6 TABLET ORAL at 21:11

## 2022-10-07 RX ADMIN — GABAPENTIN 300 MILLIGRAM(S): 400 CAPSULE ORAL at 21:11

## 2022-10-07 RX ADMIN — SODIUM CHLORIDE 500 MILLILITER(S): 9 INJECTION, SOLUTION INTRAVENOUS at 09:28

## 2022-10-07 RX ADMIN — METHADONE HYDROCHLORIDE 5 MILLIGRAM(S): 40 TABLET ORAL at 13:37

## 2022-10-07 RX ADMIN — HYDROMORPHONE HYDROCHLORIDE 6 MILLIGRAM(S): 2 INJECTION INTRAMUSCULAR; INTRAVENOUS; SUBCUTANEOUS at 01:02

## 2022-10-07 RX ADMIN — HYDROMORPHONE HYDROCHLORIDE 6 MILLIGRAM(S): 2 INJECTION INTRAMUSCULAR; INTRAVENOUS; SUBCUTANEOUS at 17:31

## 2022-10-07 RX ADMIN — ENOXAPARIN SODIUM 60 MILLIGRAM(S): 100 INJECTION SUBCUTANEOUS at 10:46

## 2022-10-07 RX ADMIN — GABAPENTIN 300 MILLIGRAM(S): 400 CAPSULE ORAL at 13:37

## 2022-10-07 RX ADMIN — LACTULOSE 10 GRAM(S): 10 SOLUTION ORAL at 13:37

## 2022-10-07 RX ADMIN — HYDROMORPHONE HYDROCHLORIDE 6 MILLIGRAM(S): 2 INJECTION INTRAMUSCULAR; INTRAVENOUS; SUBCUTANEOUS at 22:00

## 2022-10-07 RX ADMIN — METHADONE HYDROCHLORIDE 5 MILLIGRAM(S): 40 TABLET ORAL at 05:12

## 2022-10-07 RX ADMIN — METHADONE HYDROCHLORIDE 5 MILLIGRAM(S): 40 TABLET ORAL at 22:30

## 2022-10-07 RX ADMIN — Medication 50 MILLIGRAM(S): at 05:12

## 2022-10-07 RX ADMIN — Medication 100 MILLIGRAM(S): at 21:09

## 2022-10-07 RX ADMIN — BICTEGRAVIR SODIUM, EMTRICITABINE, AND TENOFOVIR ALAFENAMIDE FUMARATE 1 TABLET(S): 30; 120; 15 TABLET ORAL at 14:16

## 2022-10-07 RX ADMIN — Medication 100 MILLIGRAM(S): at 13:34

## 2022-10-07 RX ADMIN — NALOXEGOL OXALATE 25 MILLIGRAM(S): 12.5 TABLET, FILM COATED ORAL at 14:16

## 2022-10-07 RX ADMIN — HYDROMORPHONE HYDROCHLORIDE 4 MILLIGRAM(S): 2 INJECTION INTRAMUSCULAR; INTRAVENOUS; SUBCUTANEOUS at 10:42

## 2022-10-07 RX ADMIN — GABAPENTIN 300 MILLIGRAM(S): 400 CAPSULE ORAL at 05:12

## 2022-10-07 RX ADMIN — CHLORHEXIDINE GLUCONATE 1 APPLICATION(S): 213 SOLUTION TOPICAL at 05:12

## 2022-10-07 RX ADMIN — HYDROMORPHONE HYDROCHLORIDE 6 MILLIGRAM(S): 2 INJECTION INTRAMUSCULAR; INTRAVENOUS; SUBCUTANEOUS at 05:33

## 2022-10-07 RX ADMIN — HYDROMORPHONE HYDROCHLORIDE 6 MILLIGRAM(S): 2 INJECTION INTRAMUSCULAR; INTRAVENOUS; SUBCUTANEOUS at 21:10

## 2022-10-07 RX ADMIN — HYDROMORPHONE HYDROCHLORIDE 4 MILLIGRAM(S): 2 INJECTION INTRAMUSCULAR; INTRAVENOUS; SUBCUTANEOUS at 11:42

## 2022-10-07 RX ADMIN — POLYETHYLENE GLYCOL 3350 17 GRAM(S): 17 POWDER, FOR SOLUTION ORAL at 18:01

## 2022-10-07 RX ADMIN — Medication 3 MILLIGRAM(S): at 21:09

## 2022-10-07 RX ADMIN — HYDROMORPHONE HYDROCHLORIDE 6 MILLIGRAM(S): 2 INJECTION INTRAMUSCULAR; INTRAVENOUS; SUBCUTANEOUS at 06:00

## 2022-10-07 RX ADMIN — HYDROMORPHONE HYDROCHLORIDE 6 MILLIGRAM(S): 2 INJECTION INTRAMUSCULAR; INTRAVENOUS; SUBCUTANEOUS at 01:30

## 2022-10-07 RX ADMIN — HYDROMORPHONE HYDROCHLORIDE 6 MILLIGRAM(S): 2 INJECTION INTRAMUSCULAR; INTRAVENOUS; SUBCUTANEOUS at 16:57

## 2022-10-07 NOTE — PROGRESS NOTE ADULT - PROBLEM SELECTOR PLAN 4
Patient presented to [facility] with hypoxemia and L chest pigtail placed 8/31 due to suspected pneumothorax. Patient showed improvement in SOB and f/u CT chest performed which showed evidence of 12 cm multiloculated thick walled cavitary mass in the CHRISTINE and lingula.  - sputum culture 9/10 + mod klebsiella pneumoniae pansensitive  - blood Cx neg 9/8  - fungitell and crypto neg 9/10  - ID consulted, recs appreciated  - positive AFB in 1/3 sputum samples (sept 3) from [facility] with final speciation Mycobacterium chimaera (avium complex)  - no need for isolation precautions at this time  - no indication for treatment at this time  - per pulm, no CANDIS. will need f/u CT in 2 months

## 2022-10-07 NOTE — PROGRESS NOTE ADULT - PROBLEM SELECTOR PLAN 8
Palliative care consulted for extensive disease burden and GOC discussion. At this time, patient was all available treatment and resuscitation. Remains full code.  - increased PO Methadone 5mg TID (QTC- 443 on 9/23); repeat EKG today  - c/w Gabapentin 300mg TID  - PO Dilaudid 4mg q4 PRN moderate pain  - PO Dilaudid 6mg q4 PRN severe pain  - hold parameters placed for all medications  - appreciate palliative care recs  - will need OP f/u at [facility] with Dr. Kiesha Calle/ Dr. Mazariegos/ NP Zoe Carvalho/ [facility]; [phone]

## 2022-10-07 NOTE — PROGRESS NOTE ADULT - PROBLEM SELECTOR PLAN 3
Ca 12.3 10/5; previously received calcitonin x 2 doses and pamidronate 90mcg IVPB x1.  - will c/w IV fluids; can try calcitonin again if less than ideal improvement  - continue to trend levels q 24 hrs; patient refused AM labs this morning, will try again in the afternoon  - can repeat pamidronate after 1 week PRN (last received 10/2)  - vit D low but will hold supplementation in the setting of hypercalcemia of malignancy Unable to obtain labs due to likely volume depletion- will re-attempt after LR bolus    Ca 12.3 10/5; previously received calcitonin x 2 doses and pamidronate 90mcg IVPB x1.  - will c/w IV fluids; can try calcitonin again if less than ideal improvement  - continue to trend levels q 24 hrs; patient refused AM labs this morning, will try again in the afternoon  - can repeat pamidronate after 1 week PRN (last received 10/2)  - vit D low but will hold supplementation in the setting of hypercalcemia of malignancy

## 2022-10-07 NOTE — PHYSICAL THERAPY INITIAL EVALUATION ADULT - LEVEL OF INDEPENDENCE: SIT/STAND, REHAB EVAL
deferred 2/2 decreased strength and pain sitting edge of bed/unable to perform
Pt deferred despite (+) encouragement, due to pain (despite pre-medication). Pt dismissive towards PT. Will re-assess as appropriate.

## 2022-10-07 NOTE — PROGRESS NOTE ADULT - PROBLEM SELECTOR PLAN 1
Patient with history of untreated HPV related rectal cancer with episode of rectal bleeding at [facility] Hgb 5.9 s/p 2u pRBCs.  - CT with evidence of liver and bone lesions  - Pt initially planned for 5 fractions, w/ 1st fraction on 10/04 and 2nd fraction on 10/05, however pt did not tolerate both times d/t pain, despite receiving increased dose of Dilaudid prior to 2nd fraction  - Will discuss w/ rad onc team regarding next steps/plan to continue attempting RT Patient with history of untreated HPV related rectal cancer with episode of rectal bleeding at [facility] Hgb 5.9 s/p 2u pRBCs.  - CT with evidence of liver and bone lesions  - Pt initially planned for 5 fractions, w/ 1st fraction on 10/04 and 2nd fraction on 10/05, however pt did not tolerate both times d/t pain, despite receiving increased dose of Dilaudid prior to 2nd fraction  - Will discuss w/ rad onc team regarding next steps/plan to continue attempting RT  - will discuss w/ family regarding GOC moving forward Patient with history of untreated HPV related rectal cancer with episode of rectal bleeding at [facility] Hgb 5.9 s/p 2u pRBCs.  - CT with evidence of liver and bone lesions  - Pt initially planned for 5 fractions, w/ 1st fraction on 10/04 and 2nd fraction on 10/05, however pt did not tolerate both times d/t pain, despite receiving increased dose of Dilaudid prior to 2nd fraction  - discussing with heme-onc to discuss prognosis of cancer with family before proceeding with GOC discussion of hospice vs. [facility]

## 2022-10-07 NOTE — PHYSICAL THERAPY INITIAL EVALUATION ADULT - PATIENT PROFILE REVIEW, REHAB EVAL
PT evaluate and treat orders received: Increase as tolerated. Consult with RN Zehra RICO, pt may participate in PT evaluation./yes
ACTIVITY: increase as tolerated/yes

## 2022-10-07 NOTE — PHYSICAL THERAPY INITIAL EVALUATION ADULT - NSPTDISCHREC_GEN_A_CORE
[facility]
Anticipated discharge to rehab facility to address current functional limitation to optimize safety to allow pt. to reach their optimal level of function.

## 2022-10-07 NOTE — PHYSICAL THERAPY INITIAL EVALUATION ADULT - ADDITIONAL COMMENTS
Pt lives alone in an apartment with elevator access. prior to admission pt used a straight cane for ambulation.    Pt. left comfortable in bed with all tubes/lines intact, call bell in reach and in NAD.
Pt lives in an apartment with elevator access. At baseline, pt ambulated independently. Recently purchased cane due to pain with ambulation.

## 2022-10-07 NOTE — PROGRESS NOTE ADULT - PROBLEM SELECTOR PLAN 5
CT Chest 9/11 with evidence of persistent pneumomediastinum and extensive subcutaneous emphysema of the neck, chest, abdomen, pelvis, and extremities, including the scrotum.   - CANDIS per thoracic surgery  - indwelling pedraza was placed at [facility] (Coude placed for urinary strain)   - TOV attempted 9/27-9/28 overnight and failed with difficulty   - coude catheter placed by urology 9/28; can remain in place for 4 weeks prior to requiring change.  - No further urologic intervention needed at this time.   - Can f/u outpatient w/ Dr Prabhu Lucio, [facility] [place]  - pain control with dilaudid and methadone, ct a/p 9/11 did not show any inflammation/hydrocele/infection .

## 2022-10-07 NOTE — PROGRESS NOTE ADULT - CONVERSATION DETAILS
Spoke to mother (Sophie Schulte), who is in understanding that the palliative radiation therapy is causing more pain and discomfort than comfort, and states that she wants him to no longer be in any pain for the short remaining time that he does have left. She also understands that he would not tolerate any chemotherapy, even if it were offered. She states that she wants a hospice referral. However, when told that that also consists of stopping antibiotics, she seemed hesitant. However, she expressed understanding that his poor prognosis is independent of his bacteremia and is related to his cancer, and stated that she would like to proceed with a hospice referral. She states that she is unable to help him at home for hospice, nor do they have the financial ability to afford a facility if it were to be out of pocket. Reached out to  for hospice referral to be offered to family.

## 2022-10-07 NOTE — PROGRESS NOTE ADULT - SUBJECTIVE AND OBJECTIVE BOX
PROGRESS NOTE:     Patient is a 32y old  Male who presents with a chief complaint of Shortness of breath (06 Oct 2022 14:39)          MEDICATIONS  (STANDING):  bictegravir 50 mG/emtricitabine 200 mG/tenofovir alafenamide 25 mG (BIKTARVY) 1 Tablet(s) Oral daily  ceFAZolin   IVPB 2000 milliGRAM(s) IV Intermittent every 8 hours  chlorhexidine 2% Cloths 1 Application(s) Topical <User Schedule>  enoxaparin Injectable 60 milliGRAM(s) SubCutaneous every 12 hours  gabapentin 300 milliGRAM(s) Oral three times a day  HYDROmorphone   Tablet 8 milliGRAM(s) Oral once  lactated ringers. 1000 milliLiter(s) (75 mL/Hr) IV Continuous <Continuous>  lactulose Syrup 10 Gram(s) Oral daily  lidocaine   4% Patch 1 Patch Transdermal daily  lidocaine   4% Patch 1 Patch Transdermal daily  melatonin 3 milliGRAM(s) Oral at bedtime  methadone    Tablet 5 milliGRAM(s) Oral three times a day  metoprolol tartrate 50 milliGRAM(s) Oral two times a day  naloxegol 25 milliGRAM(s) Oral daily  polyethylene glycol 3350 17 Gram(s) Oral two times a day  senna 2 Tablet(s) Oral at bedtime    MEDICATIONS  (PRN):  bisacodyl 5 milliGRAM(s) Oral at bedtime PRN Constipation  HYDROmorphone   Tablet 4 milliGRAM(s) Oral every 4 hours PRN Moderate Pain (4 - 6)  HYDROmorphone   Tablet 6 milliGRAM(s) Oral every 4 hours PRN Severe Pain (7 - 10)  naloxone Injectable 0.1 milliGRAM(s) IV Push every 3 minutes PRN opioid intoxication      CAPILLARY BLOOD GLUCOSE        I&O's Summary      PHYSICAL EXAM:  Vital Signs Last 24 Hrs  T(C): 36.4 (07 Oct 2022 05:00), Max: 36.9 (06 Oct 2022 21:18)  T(F): 97.6 (07 Oct 2022 05:00), Max: 98.4 (06 Oct 2022 21:18)  HR: 98 (07 Oct 2022 05:00) (80 - 100)  BP: 110/74 (07 Oct 2022 05:00) (100/61 - 113/77)  BP(mean): --  RR: 17 (07 Oct 2022 05:00) (16 - 17)  SpO2: 98% (07 Oct 2022 05:00) (94% - 98%)    Parameters below as of 07 Oct 2022 05:00  Patient On (Oxygen Delivery Method): room air        CONSTITUTIONAL: NAD, well-developed  HEENT:   RESPIRATORY: Normal respiratory effort; lungs are clear to auscultation bilaterally  CARDIOVASCULAR: Regular rate and rhythm, normal S1 and S2, no murmur/rub/gallop; No lower extremity edema; Peripheral pulses are 2+ bilaterally  ABDOMEN: Nontender to palpation, normoactive bowel sounds, no rebound/guarding; No hepatosplenomegaly  MUSCULOSKELETAL: no clubbing or cyanosis of digits; no joint swelling or tenderness to palpation  PSYCH: A+O to person, place, and time; affect appropriate    LABS:                      RADIOLOGY:        ******************************  Authored By: Risa Dimas MD PGY1  Internal Medicine  MS Teams  ******************************   PROGRESS NOTE:     Patient is a 32y old  Male who presents with a chief complaint of Shortness of breath.    INTERVAL: NAEO. Nurses attempted blood draws this AM but no blood was able to be drawn, may be component of low volume status. Pt didn't tolerate RT yesterday. H/O's note yesterday mentioned no further intervention. Mother will come in later today, and plan for GOC discussion regarding next steps moving forward: midline for bacteremia until 10/13 vs. home hospice.      MEDICATIONS  (STANDING):  bictegravir 50 mG/emtricitabine 200 mG/tenofovir alafenamide 25 mG (BIKTARVY) 1 Tablet(s) Oral daily  ceFAZolin   IVPB 2000 milliGRAM(s) IV Intermittent every 8 hours  chlorhexidine 2% Cloths 1 Application(s) Topical <User Schedule>  enoxaparin Injectable 60 milliGRAM(s) SubCutaneous every 12 hours  gabapentin 300 milliGRAM(s) Oral three times a day  HYDROmorphone   Tablet 8 milliGRAM(s) Oral once  lactated ringers. 1000 milliLiter(s) (75 mL/Hr) IV Continuous <Continuous>  lactulose Syrup 10 Gram(s) Oral daily  lidocaine   4% Patch 1 Patch Transdermal daily  lidocaine   4% Patch 1 Patch Transdermal daily  melatonin 3 milliGRAM(s) Oral at bedtime  methadone    Tablet 5 milliGRAM(s) Oral three times a day  metoprolol tartrate 50 milliGRAM(s) Oral two times a day  naloxegol 25 milliGRAM(s) Oral daily  polyethylene glycol 3350 17 Gram(s) Oral two times a day  senna 2 Tablet(s) Oral at bedtime    MEDICATIONS  (PRN):  bisacodyl 5 milliGRAM(s) Oral at bedtime PRN Constipation  HYDROmorphone   Tablet 4 milliGRAM(s) Oral every 4 hours PRN Moderate Pain (4 - 6)  HYDROmorphone   Tablet 6 milliGRAM(s) Oral every 4 hours PRN Severe Pain (7 - 10)  naloxone Injectable 0.1 milliGRAM(s) IV Push every 3 minutes PRN opioid intoxication      CAPILLARY BLOOD GLUCOSE        I&O's Summary      PHYSICAL EXAM:  Vital Signs Last 24 Hrs  T(C): 36.4 (07 Oct 2022 05:00), Max: 36.9 (06 Oct 2022 21:18)  T(F): 97.6 (07 Oct 2022 05:00), Max: 98.4 (06 Oct 2022 21:18)  HR: 98 (07 Oct 2022 05:00) (80 - 100)  BP: 110/74 (07 Oct 2022 05:00) (100/61 - 113/77)  BP(mean): --  RR: 17 (07 Oct 2022 05:00) (16 - 17)  SpO2: 98% (07 Oct 2022 05:00) (94% - 98%)    Parameters below as of 07 Oct 2022 05:00  Patient On (Oxygen Delivery Method): room air        CONSTITUTIONAL: NAD, thin appearing  RESPIRATORY: Normal respiratory effort; lungs are clear to auscultation bilaterally; crackles heard at midsternal area (known emphysema hx) only   CARDIOVASCULAR: Regular rate and rhythm, normal S1 and S2, no murmur/rub/gallop; No lower extremity edema; Peripheral pulses are 2+ bilaterally  ABDOMEN: Nontender to palpation, normoactive bowel sounds, no rebound/guarding; No hepatosplenomegaly  MUSCULOSKELETAL: no clubbing or cyanosis of digits; no joint swelling or tenderness to palpation  PSYCH: A+O to person, place, and time; affect appropriate        ******************************  Authored By: Risa Dimas MD PGY1  Internal Medicine  MS Teams  ******************************   PROGRESS NOTE:     Patient is a 32y old  Male who presents with a chief complaint of Shortness of breath.    INTERVAL: NAEO. Nurses attempted blood draws this AM but no blood was able to be drawn, may be component of low volume status and administering LR bolus currently. Pt didn't tolerate RT yesterday. H/O's note yesterday mentioned no further intervention.     MEDICATIONS  (STANDING):  bictegravir 50 mG/emtricitabine 200 mG/tenofovir alafenamide 25 mG (BIKTARVY) 1 Tablet(s) Oral daily  ceFAZolin   IVPB 2000 milliGRAM(s) IV Intermittent every 8 hours  chlorhexidine 2% Cloths 1 Application(s) Topical <User Schedule>  enoxaparin Injectable 60 milliGRAM(s) SubCutaneous every 12 hours  gabapentin 300 milliGRAM(s) Oral three times a day  HYDROmorphone   Tablet 8 milliGRAM(s) Oral once  lactated ringers. 1000 milliLiter(s) (75 mL/Hr) IV Continuous <Continuous>  lactulose Syrup 10 Gram(s) Oral daily  lidocaine   4% Patch 1 Patch Transdermal daily  lidocaine   4% Patch 1 Patch Transdermal daily  melatonin 3 milliGRAM(s) Oral at bedtime  methadone    Tablet 5 milliGRAM(s) Oral three times a day  metoprolol tartrate 50 milliGRAM(s) Oral two times a day  naloxegol 25 milliGRAM(s) Oral daily  polyethylene glycol 3350 17 Gram(s) Oral two times a day  senna 2 Tablet(s) Oral at bedtime    MEDICATIONS  (PRN):  bisacodyl 5 milliGRAM(s) Oral at bedtime PRN Constipation  HYDROmorphone   Tablet 4 milliGRAM(s) Oral every 4 hours PRN Moderate Pain (4 - 6)  HYDROmorphone   Tablet 6 milliGRAM(s) Oral every 4 hours PRN Severe Pain (7 - 10)  naloxone Injectable 0.1 milliGRAM(s) IV Push every 3 minutes PRN opioid intoxication      CAPILLARY BLOOD GLUCOSE        I&O's Summary      PHYSICAL EXAM:  Vital Signs Last 24 Hrs  T(C): 36.4 (07 Oct 2022 05:00), Max: 36.9 (06 Oct 2022 21:18)  T(F): 97.6 (07 Oct 2022 05:00), Max: 98.4 (06 Oct 2022 21:18)  HR: 98 (07 Oct 2022 05:00) (80 - 100)  BP: 110/74 (07 Oct 2022 05:00) (100/61 - 113/77)  BP(mean): --  RR: 17 (07 Oct 2022 05:00) (16 - 17)  SpO2: 98% (07 Oct 2022 05:00) (94% - 98%)    Parameters below as of 07 Oct 2022 05:00  Patient On (Oxygen Delivery Method): room air        CONSTITUTIONAL: NAD, thin appearing  RESPIRATORY: Normal respiratory effort; lungs are clear to auscultation bilaterally; crackles heard at midsternal area (known emphysema hx) only   CARDIOVASCULAR: Regular rate and rhythm, normal S1 and S2, no murmur/rub/gallop; No lower extremity edema; Peripheral pulses are 2+ bilaterally  ABDOMEN: Nontender to palpation, normoactive bowel sounds, no rebound/guarding; No hepatosplenomegaly  MUSCULOSKELETAL: no clubbing or cyanosis of digits; no joint swelling or tenderness to palpation  PSYCH: A+O to person, place, and time; affect appropriate        ******************************  Authored By: Risa Dimas MD PGY1  Internal Medicine  MS Teams  ******************************   PROGRESS NOTE:     Patient is a 32y old  Male who presents with a chief complaint of Shortness of breath.    INTERVAL: NAEO. Nurses attempted blood draws this AM but no blood was able to be drawn, may be component of low volume status and administering LR bolus currently. Pt didn't tolerate RT yesterday. H/O's note yesterday mentioned no further intervention.     MEDICATIONS  (STANDING):  bictegravir 50 mG/emtricitabine 200 mG/tenofovir alafenamide 25 mG (BIKTARVY) 1 Tablet(s) Oral daily  ceFAZolin   IVPB 2000 milliGRAM(s) IV Intermittent every 8 hours  chlorhexidine 2% Cloths 1 Application(s) Topical <User Schedule>  enoxaparin Injectable 60 milliGRAM(s) SubCutaneous every 12 hours  gabapentin 300 milliGRAM(s) Oral three times a day  HYDROmorphone   Tablet 8 milliGRAM(s) Oral once  lactated ringers. 1000 milliLiter(s) (75 mL/Hr) IV Continuous <Continuous>  lactulose Syrup 10 Gram(s) Oral daily  lidocaine   4% Patch 1 Patch Transdermal daily  lidocaine   4% Patch 1 Patch Transdermal daily  melatonin 3 milliGRAM(s) Oral at bedtime  methadone    Tablet 5 milliGRAM(s) Oral three times a day  metoprolol tartrate 50 milliGRAM(s) Oral two times a day  naloxegol 25 milliGRAM(s) Oral daily  polyethylene glycol 3350 17 Gram(s) Oral two times a day  senna 2 Tablet(s) Oral at bedtime    MEDICATIONS  (PRN):  bisacodyl 5 milliGRAM(s) Oral at bedtime PRN Constipation  HYDROmorphone   Tablet 4 milliGRAM(s) Oral every 4 hours PRN Moderate Pain (4 - 6)  HYDROmorphone   Tablet 6 milliGRAM(s) Oral every 4 hours PRN Severe Pain (7 - 10)  naloxone Injectable 0.1 milliGRAM(s) IV Push every 3 minutes PRN opioid intoxication      CAPILLARY BLOOD GLUCOSE        I&O's Summary      PHYSICAL EXAM:  Vital Signs Last 24 Hrs  T(C): 36.4 (07 Oct 2022 05:00), Max: 36.9 (06 Oct 2022 21:18)  T(F): 97.6 (07 Oct 2022 05:00), Max: 98.4 (06 Oct 2022 21:18)  HR: 98 (07 Oct 2022 05:00) (80 - 100)  BP: 110/74 (07 Oct 2022 05:00) (100/61 - 113/77)  BP(mean): --  RR: 17 (07 Oct 2022 05:00) (16 - 17)  SpO2: 98% (07 Oct 2022 05:00) (94% - 98%)    Parameters below as of 07 Oct 2022 05:00  Patient On (Oxygen Delivery Method): room air    CONSTITUTIONAL: NAD, thin appearing  RESPIRATORY: Normal respiratory effort; lungs are clear to auscultation bilaterally; crackles heard at midsternal area (known emphysema hx) only   CARDIOVASCULAR: Regular rate and rhythm, normal S1 and S2, no murmur/rub/gallop; No lower extremity edema; Peripheral pulses are 2+ bilaterally  ABDOMEN: Nontender to palpation, normoactive bowel sounds, no rebound/guarding; No hepatosplenomegaly  MUSCULOSKELETAL: no clubbing or cyanosis of digits; no joint swelling or tenderness to palpation  PSYCH: A+O to person, place, and time; affect appropriate    ******************************  Authored By: Risa Dimas MD PGY1  Internal Medicine  MS Teams  ******************************

## 2022-10-07 NOTE — PROGRESS NOTE ADULT - PROBLEM SELECTOR PLAN 6
Patient with TTE performed on at [facility] with evidence of two large RV masses and two additional small masses. IVC filter placed prophylactically at that time due to undiagnosed vegetations on the TTE.  - repeat TTE 9/9 was a limited study but demonstrated a mobile mass likely 2/2 tumour, thrombus, or vegetation.   - per cardiology risk of JENNIFER outweighs benefit at this time  - refused cardiac MRI again (3 attempts since last week; patient not a candidate for general anesthesia)  - per cardiology, no acute indication to do cardiac MRI/ JENNIFER at this time given patients other active issues  - monitor on tele  - can follow up outpatient for serial TTEs or JENNIFER once medically stable

## 2022-10-07 NOTE — PROGRESS NOTE ADULT - PROBLEM SELECTOR PLAN 11
Na now improving, 137 today 10/3, previously 120 with history significant for decreased PO intake. Otherwise asymptomatic. Likely mixed picture due to poor PO intake and possible SIADH from pain  -; more likely a hypovolemic hyponatremia and SIADH from pain  - encourage PO intake Unable to obtain labs due to likely volume depletion- will re-attempt after LR bolus    Na now improving, 137 on 10/3, previously 120 with history significant for decreased PO intake. Otherwise asymptomatic. Likely mixed picture due to poor PO intake and possible SIADH from pain  - more likely a hypovolemic hyponatremia and SIADH from pain  - encourage PO intake

## 2022-10-07 NOTE — PHYSICAL THERAPY INITIAL EVALUATION ADULT - GENERAL OBSERVATIONS, REHAB EVAL
Pt received semisupine in bed, +pedraza, +telemetry, in NAD. Pt agreeable to participate in PT evaluation. Pt left semisupine in bed as found, all lines intact and RN aware.
Pt received semi-supine, +IV, NAD.

## 2022-10-07 NOTE — PHYSICAL THERAPY INITIAL EVALUATION ADULT - PERTINENT HX OF CURRENT PROBLEM, REHAB EVAL
Pt is a 31 year old male initially presented to [facility] after syncopal episode and c/o SOB. Pigtail was placed for suspected PTX, with improvement of symptoms. Chest x-ray revealed large CHRISTINE cavitary lesion instead. Also found to be anemic due to rectal bleeding, s/p PRBCs. Since 9/2 pt with worsening diffuse SC emphysema, extending all over body. Pt was transferred to [facility] per family request to be evaluated by outpatient oncology team.
Pt is a 31 year old male who presented to [facility] as a transfer s/p syncopal episode and SOB. hx of metastatic ano-rectal SCC, further PMH below.

## 2022-10-07 NOTE — PROGRESS NOTE ADULT - PROBLEM SELECTOR PLAN 7
Patient with history of MSSA bacteremia found at [facility] with blood cultures 8/31 positive for MSSA and repeat on 9/2 NGTD.  - s/p Zosyn (9/8 - 9/9)  - transitioned to Cefazolin 2GM q8h for 6 weeks ending (9/9-10/13)   - may need midline placement prior to discharge for antibiotics at rehab  - upon discharge, will need weekly CBC, BUN and creatinine and ID clinic follow up

## 2022-10-07 NOTE — PROGRESS NOTE ADULT - PROBLEM SELECTOR PLAN 2
- WBC stable at 16, increased over the past 2 days  - inc in hgb as well from 8.3 to 9.4  - no concern for new infection at this time; afebrile, no clinical worsening  - likely reactive 2/2 to underlying tumor burden vs hemoconcentration in the setting of poor po intake - WBC stable at 16, increased over the past 2 days. Unable to draw labs this AM  - inc in hgb as well from 8.3 to 9.4  - no concern for new infection at this time; afebrile, no clinical worsening  - likely reactive 2/2 to underlying tumor burden vs hemoconcentration in the setting of poor po intake

## 2022-10-07 NOTE — PROGRESS NOTE ADULT - ASSESSMENT
31 M with untreated metastatic ano-rectal SCC (followed by Dr Frazier [facility] oncology) with mets to liver and possibly bone, HPV positive, HIV infection, and R eye cataracts who presented to the [facility] on 8/31/22 after syncopal episode at home and transferred to [facility] for continued of care per family request. Being treated for MSSA bacteremia. 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 Patient is now undergoing trial of RT, did not tolerate first fraction 10/4; pending repeat 10/5 with increased dose of premedication.         31 M with untreated metastatic ano-rectal SCC (followed by Dr Frazier [facility] oncology) with mets to liver and possibly bone, HPV positive, HIV infection, and R eye cataracts who presented to the [facility] on 8/31/22 after syncopal episode at home and transferred to [facility] for continued of care per family request. Being treated for MSSA bacteremia. 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 Patient is now undergoing trial of RT, did not tolerate first fraction 10/4 and didn't tolerate session on 10/5 even with increased pain meds before session.

## 2022-10-08 LAB
ANION GAP SERPL CALC-SCNC: 15 MMOL/L — HIGH (ref 7–14)
BUN SERPL-MCNC: 57 MG/DL — HIGH (ref 7–23)
CALCIUM SERPL-MCNC: 9.4 MG/DL — SIGNIFICANT CHANGE UP (ref 8.4–10.5)
CHLORIDE SERPL-SCNC: 102 MMOL/L — SIGNIFICANT CHANGE UP (ref 98–107)
CO2 SERPL-SCNC: 22 MMOL/L — SIGNIFICANT CHANGE UP (ref 22–31)
CREAT SERPL-MCNC: 0.54 MG/DL — SIGNIFICANT CHANGE UP (ref 0.5–1.3)
CULTURE RESULTS: SIGNIFICANT CHANGE UP
EGFR: 134 ML/MIN/1.73M2 — SIGNIFICANT CHANGE UP
GLUCOSE SERPL-MCNC: 100 MG/DL — HIGH (ref 70–99)
HCT VFR BLD CALC: 33.8 % — LOW (ref 39–50)
HGB BLD-MCNC: 10.1 G/DL — LOW (ref 13–17)
MAGNESIUM SERPL-MCNC: 1.9 MG/DL — SIGNIFICANT CHANGE UP (ref 1.6–2.6)
MCHC RBC-ENTMCNC: 23.7 PG — LOW (ref 27–34)
MCHC RBC-ENTMCNC: 29.9 GM/DL — LOW (ref 32–36)
MCV RBC AUTO: 79.3 FL — LOW (ref 80–100)
NRBC # BLD: 0 /100 WBCS — SIGNIFICANT CHANGE UP (ref 0–0)
NRBC # FLD: 0 K/UL — SIGNIFICANT CHANGE UP (ref 0–0)
PHOSPHATE SERPL-MCNC: 4.7 MG/DL — HIGH (ref 2.5–4.5)
PLATELET # BLD AUTO: 168 K/UL — SIGNIFICANT CHANGE UP (ref 150–400)
POTASSIUM SERPL-MCNC: 4.3 MMOL/L — SIGNIFICANT CHANGE UP (ref 3.5–5.3)
POTASSIUM SERPL-SCNC: 4.3 MMOL/L — SIGNIFICANT CHANGE UP (ref 3.5–5.3)
RBC # BLD: 4.26 M/UL — SIGNIFICANT CHANGE UP (ref 4.2–5.8)
RBC # FLD: 21.8 % — HIGH (ref 10.3–14.5)
SODIUM SERPL-SCNC: 139 MMOL/L — SIGNIFICANT CHANGE UP (ref 135–145)
SPECIMEN SOURCE: SIGNIFICANT CHANGE UP
WBC # BLD: 18.37 K/UL — HIGH (ref 3.8–10.5)
WBC # FLD AUTO: 18.37 K/UL — HIGH (ref 3.8–10.5)

## 2022-10-08 PROCEDURE — 99232 SBSQ HOSP IP/OBS MODERATE 35: CPT | Mod: GC

## 2022-10-08 RX ADMIN — HYDROMORPHONE HYDROCHLORIDE 6 MILLIGRAM(S): 2 INJECTION INTRAMUSCULAR; INTRAVENOUS; SUBCUTANEOUS at 03:41

## 2022-10-08 RX ADMIN — HYDROMORPHONE HYDROCHLORIDE 6 MILLIGRAM(S): 2 INJECTION INTRAMUSCULAR; INTRAVENOUS; SUBCUTANEOUS at 04:40

## 2022-10-08 RX ADMIN — HYDROMORPHONE HYDROCHLORIDE 6 MILLIGRAM(S): 2 INJECTION INTRAMUSCULAR; INTRAVENOUS; SUBCUTANEOUS at 08:09

## 2022-10-08 RX ADMIN — BICTEGRAVIR SODIUM, EMTRICITABINE, AND TENOFOVIR ALAFENAMIDE FUMARATE 1 TABLET(S): 30; 120; 15 TABLET ORAL at 13:10

## 2022-10-08 RX ADMIN — Medication 100 MILLIGRAM(S): at 13:19

## 2022-10-08 RX ADMIN — HYDROMORPHONE HYDROCHLORIDE 4 MILLIGRAM(S): 2 INJECTION INTRAMUSCULAR; INTRAVENOUS; SUBCUTANEOUS at 19:37

## 2022-10-08 RX ADMIN — HYDROMORPHONE HYDROCHLORIDE 6 MILLIGRAM(S): 2 INJECTION INTRAMUSCULAR; INTRAVENOUS; SUBCUTANEOUS at 23:30

## 2022-10-08 RX ADMIN — GABAPENTIN 300 MILLIGRAM(S): 400 CAPSULE ORAL at 21:55

## 2022-10-08 RX ADMIN — CHLORHEXIDINE GLUCONATE 1 APPLICATION(S): 213 SOLUTION TOPICAL at 05:48

## 2022-10-08 RX ADMIN — HYDROMORPHONE HYDROCHLORIDE 4 MILLIGRAM(S): 2 INJECTION INTRAMUSCULAR; INTRAVENOUS; SUBCUTANEOUS at 18:37

## 2022-10-08 RX ADMIN — LIDOCAINE 1 PATCH: 4 CREAM TOPICAL at 19:03

## 2022-10-08 RX ADMIN — METHADONE HYDROCHLORIDE 5 MILLIGRAM(S): 40 TABLET ORAL at 13:07

## 2022-10-08 RX ADMIN — NALOXEGOL OXALATE 25 MILLIGRAM(S): 12.5 TABLET, FILM COATED ORAL at 12:59

## 2022-10-08 RX ADMIN — Medication 100 MILLIGRAM(S): at 05:46

## 2022-10-08 RX ADMIN — HYDROMORPHONE HYDROCHLORIDE 4 MILLIGRAM(S): 2 INJECTION INTRAMUSCULAR; INTRAVENOUS; SUBCUTANEOUS at 13:53

## 2022-10-08 RX ADMIN — LIDOCAINE 1 PATCH: 4 CREAM TOPICAL at 12:57

## 2022-10-08 RX ADMIN — ENOXAPARIN SODIUM 60 MILLIGRAM(S): 100 INJECTION SUBCUTANEOUS at 13:23

## 2022-10-08 RX ADMIN — SENNA PLUS 2 TABLET(S): 8.6 TABLET ORAL at 21:56

## 2022-10-08 RX ADMIN — GABAPENTIN 300 MILLIGRAM(S): 400 CAPSULE ORAL at 13:07

## 2022-10-08 RX ADMIN — METHADONE HYDROCHLORIDE 5 MILLIGRAM(S): 40 TABLET ORAL at 21:55

## 2022-10-08 RX ADMIN — METHADONE HYDROCHLORIDE 5 MILLIGRAM(S): 40 TABLET ORAL at 05:46

## 2022-10-08 RX ADMIN — HYDROMORPHONE HYDROCHLORIDE 6 MILLIGRAM(S): 2 INJECTION INTRAMUSCULAR; INTRAVENOUS; SUBCUTANEOUS at 22:37

## 2022-10-08 RX ADMIN — POLYETHYLENE GLYCOL 3350 17 GRAM(S): 17 POWDER, FOR SOLUTION ORAL at 18:38

## 2022-10-08 RX ADMIN — Medication 100 MILLIGRAM(S): at 21:54

## 2022-10-08 RX ADMIN — Medication 3 MILLIGRAM(S): at 21:55

## 2022-10-08 RX ADMIN — HYDROMORPHONE HYDROCHLORIDE 6 MILLIGRAM(S): 2 INJECTION INTRAMUSCULAR; INTRAVENOUS; SUBCUTANEOUS at 07:54

## 2022-10-08 RX ADMIN — LIDOCAINE 1 PATCH: 4 CREAM TOPICAL at 12:56

## 2022-10-08 RX ADMIN — GABAPENTIN 300 MILLIGRAM(S): 400 CAPSULE ORAL at 05:46

## 2022-10-08 RX ADMIN — HYDROMORPHONE HYDROCHLORIDE 4 MILLIGRAM(S): 2 INJECTION INTRAMUSCULAR; INTRAVENOUS; SUBCUTANEOUS at 12:53

## 2022-10-08 RX ADMIN — LACTULOSE 10 GRAM(S): 10 SOLUTION ORAL at 12:58

## 2022-10-08 RX ADMIN — Medication 50 MILLIGRAM(S): at 18:40

## 2022-10-08 NOTE — PROGRESS NOTE ADULT - PROBLEM SELECTOR PLAN 10
Patient with microcytic anemia with Hgb 9.4 and MCV 81, likely a mixed picture of microcytic and normocytic anemia.  - iron studies with low iron levels but low iron binding capacity likely indicating a mixed picture  - ferritin wnl   - continue to monitor  - no overt signs of bleeding  - follow up OP with PCP and GI  - will hold on iron supplementation until infection clears On presentation, patient with microcytic anemia with Hgb 9.4 and MCV 81, likely a mixed picture of microcytic and normocytic anemia.  - iron studies with low iron levels but low iron binding capacity likely indicating a mixed picture  - ferritin wnl   - continue to monitor  - no overt signs of bleeding  - follow up OP with PCP and GI  - will hold on iron supplementation until infection clears

## 2022-10-08 NOTE — PROGRESS NOTE ADULT - PROBLEM SELECTOR PLAN 1
Patient with history of untreated HPV related rectal cancer with episode of rectal bleeding at [facility] Hgb 5.9 s/p 2u pRBCs.  - CT with evidence of liver and bone lesions  - Pt initially planned for 5 fractions, w/ 1st fraction on 10/04 and 2nd fraction on 10/05, however pt did not tolerate both times d/t pain, despite receiving increased dose of Dilaudid prior to 2nd fraction  - discussing with heme-onc to discuss prognosis of cancer with family before proceeding with GOC discussion of hospice vs. [facility] Patient with history of untreated HPV related rectal cancer with episode of rectal bleeding at [facility] Hgb 5.9 s/p 2u pRBCs.  - CT with evidence of liver and bone lesions  - Pt initially planned for 5 fractions, w/ 1st fraction on 10/04 and 2nd fraction on 10/05, however pt did not tolerate both times d/t pain, despite receiving increased dose of Dilaudid prior to 2nd fraction  - per heme-onc, already discussed with family/pt of no more treatment being offered  - primary team contacted mom and mom expressed wish for hospice referral      - explained to pt of this and pt requires some time to think about this - 10/8

## 2022-10-08 NOTE — PROGRESS NOTE ADULT - PROBLEM SELECTOR PLAN 8
Palliative care consulted for extensive disease burden and GOC discussion. At this time, patient was all available treatment and resuscitation. Remains full code.  - increased PO Methadone 5mg TID (QTC- 443 on 9/23); repeat EKG today  - c/w Gabapentin 300mg TID  - PO Dilaudid 4mg q4 PRN moderate pain  - PO Dilaudid 6mg q4 PRN severe pain  - hold parameters placed for all medications  - appreciate palliative care recs  - will need OP f/u at [facility] with Dr. Kiesha Calle/ Dr. Mazariegos/ NP Zoe Carvalho/ ROCIO Cruz; [phone]

## 2022-10-08 NOTE — PROGRESS NOTE ADULT - PROBLEM SELECTOR PLAN 11
Unable to obtain labs due to likely volume depletion- will re-attempt after LR bolus    Na now improving, 137 on 10/3, previously 120 with history significant for decreased PO intake. Otherwise asymptomatic. Likely mixed picture due to poor PO intake and possible SIADH from pain  - more likely a hypovolemic hyponatremia and SIADH from pain  - encourage PO intake Na 139. On admission, previously 120 with history significant for decreased PO intake. Otherwise asymptomatic. Likely mixed picture due to poor PO intake and possible SIADH from pain  - more likely a hypovolemic hyponatremia and SIADH from pain  - encourage PO intake

## 2022-10-08 NOTE — PROGRESS NOTE ADULT - PROBLEM SELECTOR PLAN 7
Patient with history of MSSA bacteremia found at [facility] with blood cultures 8/31 positive for MSSA and repeat on 9/2 NGTD.  - s/p Zosyn (9/8 - 9/9)  - transitioned to Cefazolin 2GM q8h for 6 weeks ending (9/9-10/13)   - may need midline placement prior to discharge for antibiotics at rehab  - upon discharge, will need weekly CBC, BUN and creatinine and ID clinic follow up Patient with history of MSSA bacteremia found at [facility] with blood cultures 8/31 positive for MSSA and repeat on 9/2 NGTD.  - s/p Zosyn (9/8 - 9/9)  - transitioned to Cefazolin 2GM q8h for 6 weeks ending (9/9-10/13)   - may need midline placement prior to discharge for antibiotics at rehab -- but currently not in lines with goals of care as discussed with mom on 10/7. Currently awaiting pt decision regarding this       - upon discharge, will need weekly CBC, BUN and creatinine and ID clinic follow up -- if in lines with goals of care

## 2022-10-08 NOTE — PROGRESS NOTE ADULT - PROBLEM SELECTOR PLAN 4
Patient presented to [facility] with hypoxemia and L chest pigtail placed 8/31 due to suspected pneumothorax. Patient showed improvement in SOB and f/u CT chest performed which showed evidence of 12 cm multiloculated thick walled cavitary mass in the CHRISTINE and lingula.  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 will need f/u CT in 2 months

## 2022-10-08 NOTE — PROGRESS NOTE ADULT - ASSESSMENT
31 M with untreated metastatic ano-rectal SCC (followed by Dr Frazier [facility] oncology) with mets to liver and possibly bone, HPV positive, HIV infection, and R eye cataracts who presented to the [facility] on 8/31/22 after syncopal episode at home and transferred to [facility] for continued of care per family request. Being treated for MSSA bacteremia. c/b scrotal swelling s/p coude catheter, hypercalcemia s/p pamidronate and IVF, and AFB from [place] w M chimaera not requiring any intervention. Patient is now undergoing trial of RT, did not tolerate first fraction 10/4 and didn't tolerate session on 10/5 even with increased pain meds before session.          31 M with untreated metastatic ano-rectal SCC (followed by Dr Frazier [facility] oncology) with mets to liver and possibly bone, HPV positive, HIV infection, and R eye cataracts who presented to the [facility] on 8/31/22 after syncopal episode at home and transferred to [facility] for continued of care per family request. Being treated for MSSA bacteremia. c/b scrotal swelling s/p coude catheter, hypercalcemia s/p pamidronate and IVF, and AFB from [place] w M chimaera not requiring any intervention. Patient is now undergoing trial of RT, did not tolerate first fraction 10/4 and didn't tolerate session on 10/5 even with increased pain meds before session. Pending [facility] discussion/hospice referral, requested by mom.

## 2022-10-08 NOTE — PROGRESS NOTE ADULT - PROBLEM SELECTOR PLAN 5
CT Chest 9/11 with evidence of persistent pneumomediastinum and extensive subcutaneous emphysema of the neck, chest, abdomen, pelvis, and extremities, including the scrotum.   - CANDIS per thoracic surgery  - indwelling pedraza was placed at [facility] (Coude placed for urinary strain)   - TOV attempted 9/27-9/28 overnight and failed with difficulty   - coude catheter placed by urology 9/28; can remain in place for 4 weeks prior to requiring change.  - No further urologic intervention needed at this time.   - Can f/u outpatient w/ Dr Prabhu Lucio, [facility] at [place]  - pain control with dilaudid and methadone, ct a/p 9/11 did not show any inflammation/hydrocele/infection .

## 2022-10-08 NOTE — PROGRESS NOTE ADULT - PROBLEM SELECTOR PLAN 3
Unable to obtain labs due to likely volume depletion- will re-attempt after LR bolus    Ca 12.3 10/5; previously received calcitonin x 2 doses and pamidronate 90mcg IVPB x1.  - will c/w IV fluids; can try calcitonin again if less than ideal improvement  - continue to trend levels q 24 hrs; patient refused AM labs this morning, will try again in the afternoon  - can repeat pamidronate after 1 week PRN (last received 10/2)  - vit D low but will hold supplementation in the setting of hypercalcemia of malignancy Ca 9. 10/8; previously received calcitonin x 2 doses and pamidronate 90mcg IVPB x1.  - will c/w IV fluids; can try calcitonin again if less than ideal improvement  - continue to trend levels q 24 hrs  - can repeat pamidronate after 1 week PRN (last received 10/2) if remains hyperCa  - vit D low but will hold supplementation in the setting of hypercalcemia of malignancy

## 2022-10-08 NOTE — PROGRESS NOTE ADULT - SUBJECTIVE AND OBJECTIVE BOX
PROGRESS NOTE:     Patient is a 32y old  Male who presents with a chief complaint of Shortness of breath (07 Oct 2022 08:14)          MEDICATIONS  (STANDING):  bictegravir 50 mG/emtricitabine 200 mG/tenofovir alafenamide 25 mG (BIKTARVY) 1 Tablet(s) Oral daily  ceFAZolin   IVPB 2000 milliGRAM(s) IV Intermittent every 8 hours  chlorhexidine 2% Cloths 1 Application(s) Topical <User Schedule>  enoxaparin Injectable 60 milliGRAM(s) SubCutaneous every 12 hours  gabapentin 300 milliGRAM(s) Oral three times a day  HYDROmorphone   Tablet 8 milliGRAM(s) Oral once  lactated ringers. 1000 milliLiter(s) (75 mL/Hr) IV Continuous <Continuous>  lactulose Syrup 10 Gram(s) Oral daily  lidocaine   4% Patch 1 Patch Transdermal daily  lidocaine   4% Patch 1 Patch Transdermal daily  melatonin 3 milliGRAM(s) Oral at bedtime  methadone    Tablet 5 milliGRAM(s) Oral three times a day  metoprolol tartrate 50 milliGRAM(s) Oral two times a day  naloxegol 25 milliGRAM(s) Oral daily  polyethylene glycol 3350 17 Gram(s) Oral two times a day  senna 2 Tablet(s) Oral at bedtime    MEDICATIONS  (PRN):  bisacodyl 5 milliGRAM(s) Oral at bedtime PRN Constipation  HYDROmorphone   Tablet 4 milliGRAM(s) Oral every 4 hours PRN Moderate Pain (4 - 6)  HYDROmorphone   Tablet 6 milliGRAM(s) Oral every 4 hours PRN Severe Pain (7 - 10)  naloxone Injectable 0.1 milliGRAM(s) IV Push every 3 minutes PRN opioid intoxication      CAPILLARY BLOOD GLUCOSE        I&O's Summary      PHYSICAL EXAM:  Vital Signs Last 24 Hrs  T(C): 36.7 (08 Oct 2022 05:30), Max: 36.7 (07 Oct 2022 14:44)  T(F): 98 (08 Oct 2022 05:30), Max: 98.1 (07 Oct 2022 14:44)  HR: 89 (08 Oct 2022 05:30) (89 - 92)  BP: 107/64 (08 Oct 2022 05:30) (97/56 - 107/64)  BP(mean): --  RR: 18 (08 Oct 2022 05:30) (17 - 18)  SpO2: 98% (08 Oct 2022 05:30) (95% - 100%)    Parameters below as of 08 Oct 2022 05:30  Patient On (Oxygen Delivery Method): room air        CONSTITUTIONAL: NAD, well-developed  HEENT:   RESPIRATORY: Normal respiratory effort; lungs are clear to auscultation bilaterally  CARDIOVASCULAR: Regular rate and rhythm, normal S1 and S2, no murmur/rub/gallop; No lower extremity edema; Peripheral pulses are 2+ bilaterally  ABDOMEN: Nontender to palpation, normoactive bowel sounds, no rebound/guarding; No hepatosplenomegaly  MUSCULOSKELETAL: no clubbing or cyanosis of digits; no joint swelling or tenderness to palpation  PSYCH: A+O to person, place, and time; affect appropriate    LABS:                        10.1   18.37 )-----------( 168      ( 08 Oct 2022 07:07 )             33.8     10-08    139  |  102  |  57<H>  ----------------------------<  100<H>  4.3   |  22  |  0.54    Ca    9.4      08 Oct 2022 07:07  Phos  4.7     10-08  Mg     1.90     10-08                  RADIOLOGY:        ******************************  Authored By: Risa Dimas MD PGY1  Internal Medicine  MS Teams  ******************************   PROGRESS NOTE:     Patient is a 32y old  Male who presents with a chief complaint of Shortness of breath.    INTERVAL HISTORY: NAEO. Remains afebrile, normotensive. Spoke to pt this AM about prognosis and that H/O and rad/onc no longer offering tx. Pt expresses surprise that this is the first time he's hearing of all this, and requires time to process this. Pt's mom didn't come to the hospital yesterday, as what she had expressed yesterday. Pt expressed desire to continue to live and get stronger, but also expressed understanding that no other therapies are advised at this point.         MEDICATIONS  (STANDING):  bictegravir 50 mG/emtricitabine 200 mG/tenofovir alafenamide 25 mG (BIKTARVY) 1 Tablet(s) Oral daily  ceFAZolin   IVPB 2000 milliGRAM(s) IV Intermittent every 8 hours  chlorhexidine 2% Cloths 1 Application(s) Topical <User Schedule>  enoxaparin Injectable 60 milliGRAM(s) SubCutaneous every 12 hours  gabapentin 300 milliGRAM(s) Oral three times a day  HYDROmorphone   Tablet 8 milliGRAM(s) Oral once  lactated ringers. 1000 milliLiter(s) (75 mL/Hr) IV Continuous <Continuous>  lactulose Syrup 10 Gram(s) Oral daily  lidocaine   4% Patch 1 Patch Transdermal daily  lidocaine   4% Patch 1 Patch Transdermal daily  melatonin 3 milliGRAM(s) Oral at bedtime  methadone    Tablet 5 milliGRAM(s) Oral three times a day  metoprolol tartrate 50 milliGRAM(s) Oral two times a day  naloxegol 25 milliGRAM(s) Oral daily  polyethylene glycol 3350 17 Gram(s) Oral two times a day  senna 2 Tablet(s) Oral at bedtime    MEDICATIONS  (PRN):  bisacodyl 5 milliGRAM(s) Oral at bedtime PRN Constipation  HYDROmorphone   Tablet 4 milliGRAM(s) Oral every 4 hours PRN Moderate Pain (4 - 6)  HYDROmorphone   Tablet 6 milliGRAM(s) Oral every 4 hours PRN Severe Pain (7 - 10)  naloxone Injectable 0.1 milliGRAM(s) IV Push every 3 minutes PRN opioid intoxication      CAPILLARY BLOOD GLUCOSE        I&O's Summary      PHYSICAL EXAM:  Vital Signs Last 24 Hrs  T(C): 36.7 (08 Oct 2022 05:30), Max: 36.7 (07 Oct 2022 14:44)  T(F): 98 (08 Oct 2022 05:30), Max: 98.1 (07 Oct 2022 14:44)  HR: 89 (08 Oct 2022 05:30) (89 - 92)  BP: 107/64 (08 Oct 2022 05:30) (97/56 - 107/64)  BP(mean): --  RR: 18 (08 Oct 2022 05:30) (17 - 18)  SpO2: 98% (08 Oct 2022 05:30) (95% - 100%)    Parameters below as of 08 Oct 2022 05:30  Patient On (Oxygen Delivery Method): room air        CONSTITUTIONAL: NAD, thin-appearing  RESPIRATORY: Normal respiratory effort; lungs are clear to auscultation bilaterally; crackles heard- emphysematous changes present   CARDIOVASCULAR: Regular rate and rhythm, normal S1 and S2, no murmur/rub/gallop; No lower extremity edema; Peripheral pulses are 2+ bilaterally  ABDOMEN: Nontender to palpation, normoactive bowel sounds, no rebound/guarding; No hepatosplenomegaly  MUSCULOSKELETAL: no clubbing or cyanosis of digits; no joint swelling or tenderness to palpation  PSYCH: A+O to person, place, and time; affect appropriate    LABS:                        10.1   18.37 )-----------( 168      ( 08 Oct 2022 07:07 )             33.8     10-08    139  |  102  |  57<H>  ----------------------------<  100<H>  4.3   |  22  |  0.54    Ca    9.4      08 Oct 2022 07:07  Phos  4.7     10-08  Mg     1.90     10-08        ******************************  Authored By: Risa Dimas MD PGY1  Internal Medicine  MS Teams  ******************************   PROGRESS NOTE: Patient is a 32y old  Male who presents with a chief complaint of Shortness of breath.    INTERVAL HISTORY: NAEO. Remains afebrile, normotensive. Spoke to pt this AM about prognosis and that H/O and rad/onc no longer offering tx. Pt expresses surprise that this is the first time he's hearing of all this, and requires time to process this. Pt's mom didn't come to the hospital yesterday, as what she had expressed yesterday. Pt expressed desire to continue to live and get stronger, but also expressed understanding that no other therapies are advised at this point.     MEDICATIONS  (STANDING):  bictegravir 50 mG/emtricitabine 200 mG/tenofovir alafenamide 25 mG (BIKTARVY) 1 Tablet(s) Oral daily  ceFAZolin   IVPB 2000 milliGRAM(s) IV Intermittent every 8 hours  chlorhexidine 2% Cloths 1 Application(s) Topical <User Schedule>  enoxaparin Injectable 60 milliGRAM(s) SubCutaneous every 12 hours  gabapentin 300 milliGRAM(s) Oral three times a day  HYDROmorphone   Tablet 8 milliGRAM(s) Oral once  lactated ringers. 1000 milliLiter(s) (75 mL/Hr) IV Continuous <Continuous>  lactulose Syrup 10 Gram(s) Oral daily  lidocaine   4% Patch 1 Patch Transdermal daily  lidocaine   4% Patch 1 Patch Transdermal daily  melatonin 3 milliGRAM(s) Oral at bedtime  methadone    Tablet 5 milliGRAM(s) Oral three times a day  metoprolol tartrate 50 milliGRAM(s) Oral two times a day  naloxegol 25 milliGRAM(s) Oral daily  polyethylene glycol 3350 17 Gram(s) Oral two times a day  senna 2 Tablet(s) Oral at bedtime    MEDICATIONS  (PRN):  bisacodyl 5 milliGRAM(s) Oral at bedtime PRN Constipation  HYDROmorphone   Tablet 4 milliGRAM(s) Oral every 4 hours PRN Moderate Pain (4 - 6)  HYDROmorphone   Tablet 6 milliGRAM(s) Oral every 4 hours PRN Severe Pain (7 - 10)  naloxone Injectable 0.1 milliGRAM(s) IV Push every 3 minutes PRN opioid intoxication      CAPILLARY BLOOD GLUCOSE        I&O's Summary      PHYSICAL EXAM:  Vital Signs Last 24 Hrs  T(C): 36.7 (08 Oct 2022 05:30), Max: 36.7 (07 Oct 2022 14:44)  T(F): 98 (08 Oct 2022 05:30), Max: 98.1 (07 Oct 2022 14:44)  HR: 89 (08 Oct 2022 05:30) (89 - 92)  BP: 107/64 (08 Oct 2022 05:30) (97/56 - 107/64)  BP(mean): --  RR: 18 (08 Oct 2022 05:30) (17 - 18)  SpO2: 98% (08 Oct 2022 05:30) (95% - 100%)    Parameters below as of 08 Oct 2022 05:30  Patient On (Oxygen Delivery Method): room air    CONSTITUTIONAL: NAD, thin-appearing  RESPIRATORY: Normal respiratory effort; lungs are clear to auscultation bilaterally; crackles heard- emphysematous changes present   CARDIOVASCULAR: Regular rate and rhythm, normal S1 and S2, no murmur/rub/gallop; No lower extremity edema; Peripheral pulses are 2+ bilaterally  ABDOMEN: Nontender to palpation, normoactive bowel sounds, no rebound/guarding; No hepatosplenomegaly  MUSCULOSKELETAL: no clubbing or cyanosis of digits; no joint swelling or tenderness to palpation  PSYCH: A+O to person, place, and time; affect appropriate    LABS:                        10.1   18.37 )-----------( 168      ( 08 Oct 2022 07:07 )             33.8     10-08    139  |  102  |  57<H>  ----------------------------<  100<H>  4.3   |  22  |  0.54    Ca    9.4      08 Oct 2022 07:07  Phos  4.7     10-08  Mg     1.90     10-08        ******************************  Authored By: Risa Dimas MD PGY1  Internal Medicine  MS Teams  ******************************

## 2022-10-08 NOTE — PROGRESS NOTE ADULT - PROBLEM SELECTOR PLAN 2
- WBC stable at 16, increased over the past 2 days. Unable to draw labs this AM  - inc in hgb as well from 8.3 to 9.4  - no concern for new infection at this time; afebrile, no clinical worsening  - likely reactive 2/2 to underlying tumor burden vs hemoconcentration in the setting of poor po intake - WBC increased since last lab drawn  - inc in hgb as well  - no concern for new infection at this time; afebrile, no clinical worsening, remains afebrile   - likely reactive 2/2 to underlying tumor burden vs hemoconcentration in the setting of poor po intake

## 2022-10-08 NOTE — PROGRESS NOTE ADULT - PROBLEM SELECTOR PLAN 6
Patient with TTE performed on at [facility] with evidence of two large RV masses and two additional small masses. IVC filter placed prophylactically at that time due to undiagnosed vegetations on the TTE.  - repeat TTE 9/9 was a limited study but demonstrated a mobile mass likely 2/2 tumour, thrombus, or vegetation.   - per cardiology risk of JENNIFER outweighs benefit at this time  - refused cardiac MRI again (3 attempts since last week; patient not a candidate for general anesthesia)  - per cardiology, no acute indication to do cardiac MRI/ JENNIFER at this time given patients other active issues  - monitor on tele  - can follow up outpatient for serial TTEs or JENNIFER once medically stable

## 2022-10-09 LAB
ANION GAP SERPL CALC-SCNC: 16 MMOL/L — HIGH (ref 7–14)
BUN SERPL-MCNC: 60 MG/DL — HIGH (ref 7–23)
CALCIUM SERPL-MCNC: 8.5 MG/DL — SIGNIFICANT CHANGE UP (ref 8.4–10.5)
CHLORIDE SERPL-SCNC: 101 MMOL/L — SIGNIFICANT CHANGE UP (ref 98–107)
CO2 SERPL-SCNC: 20 MMOL/L — LOW (ref 22–31)
CREAT SERPL-MCNC: 0.54 MG/DL — SIGNIFICANT CHANGE UP (ref 0.5–1.3)
EGFR: 134 ML/MIN/1.73M2 — SIGNIFICANT CHANGE UP
GLUCOSE SERPL-MCNC: 92 MG/DL — SIGNIFICANT CHANGE UP (ref 70–99)
HCT VFR BLD CALC: 39.5 % — SIGNIFICANT CHANGE UP (ref 39–50)
HGB BLD-MCNC: 11.5 G/DL — LOW (ref 13–17)
MAGNESIUM SERPL-MCNC: 2 MG/DL — SIGNIFICANT CHANGE UP (ref 1.6–2.6)
MCHC RBC-ENTMCNC: 23.7 PG — LOW (ref 27–34)
MCHC RBC-ENTMCNC: 29.1 GM/DL — LOW (ref 32–36)
MCV RBC AUTO: 81.3 FL — SIGNIFICANT CHANGE UP (ref 80–100)
NRBC # BLD: 0 /100 WBCS — SIGNIFICANT CHANGE UP (ref 0–0)
NRBC # FLD: 0 K/UL — SIGNIFICANT CHANGE UP (ref 0–0)
PHOSPHATE SERPL-MCNC: 4.8 MG/DL — HIGH (ref 2.5–4.5)
PLATELET # BLD AUTO: 160 K/UL — SIGNIFICANT CHANGE UP (ref 150–400)
POTASSIUM SERPL-MCNC: 5.2 MMOL/L — SIGNIFICANT CHANGE UP (ref 3.5–5.3)
POTASSIUM SERPL-SCNC: 5.2 MMOL/L — SIGNIFICANT CHANGE UP (ref 3.5–5.3)
RBC # BLD: 4.86 M/UL — SIGNIFICANT CHANGE UP (ref 4.2–5.8)
RBC # FLD: 22 % — HIGH (ref 10.3–14.5)
SODIUM SERPL-SCNC: 137 MMOL/L — SIGNIFICANT CHANGE UP (ref 135–145)
WBC # BLD: 20.93 K/UL — HIGH (ref 3.8–10.5)
WBC # FLD AUTO: 20.93 K/UL — HIGH (ref 3.8–10.5)

## 2022-10-09 PROCEDURE — 99232 SBSQ HOSP IP/OBS MODERATE 35: CPT | Mod: GC

## 2022-10-09 RX ADMIN — ENOXAPARIN SODIUM 60 MILLIGRAM(S): 100 INJECTION SUBCUTANEOUS at 11:47

## 2022-10-09 RX ADMIN — NALOXEGOL OXALATE 25 MILLIGRAM(S): 12.5 TABLET, FILM COATED ORAL at 11:30

## 2022-10-09 RX ADMIN — HYDROMORPHONE HYDROCHLORIDE 4 MILLIGRAM(S): 2 INJECTION INTRAMUSCULAR; INTRAVENOUS; SUBCUTANEOUS at 04:00

## 2022-10-09 RX ADMIN — GABAPENTIN 300 MILLIGRAM(S): 400 CAPSULE ORAL at 22:08

## 2022-10-09 RX ADMIN — Medication 100 MILLIGRAM(S): at 13:10

## 2022-10-09 RX ADMIN — GABAPENTIN 300 MILLIGRAM(S): 400 CAPSULE ORAL at 13:10

## 2022-10-09 RX ADMIN — GABAPENTIN 300 MILLIGRAM(S): 400 CAPSULE ORAL at 06:57

## 2022-10-09 RX ADMIN — HYDROMORPHONE HYDROCHLORIDE 4 MILLIGRAM(S): 2 INJECTION INTRAMUSCULAR; INTRAVENOUS; SUBCUTANEOUS at 11:35

## 2022-10-09 RX ADMIN — Medication 100 MILLIGRAM(S): at 22:08

## 2022-10-09 RX ADMIN — LIDOCAINE 1 PATCH: 4 CREAM TOPICAL at 00:30

## 2022-10-09 RX ADMIN — HYDROMORPHONE HYDROCHLORIDE 4 MILLIGRAM(S): 2 INJECTION INTRAMUSCULAR; INTRAVENOUS; SUBCUTANEOUS at 17:35

## 2022-10-09 RX ADMIN — HYDROMORPHONE HYDROCHLORIDE 4 MILLIGRAM(S): 2 INJECTION INTRAMUSCULAR; INTRAVENOUS; SUBCUTANEOUS at 05:00

## 2022-10-09 RX ADMIN — METHADONE HYDROCHLORIDE 5 MILLIGRAM(S): 40 TABLET ORAL at 13:10

## 2022-10-09 RX ADMIN — HYDROMORPHONE HYDROCHLORIDE 4 MILLIGRAM(S): 2 INJECTION INTRAMUSCULAR; INTRAVENOUS; SUBCUTANEOUS at 12:35

## 2022-10-09 RX ADMIN — HYDROMORPHONE HYDROCHLORIDE 4 MILLIGRAM(S): 2 INJECTION INTRAMUSCULAR; INTRAVENOUS; SUBCUTANEOUS at 18:35

## 2022-10-09 RX ADMIN — HYDROMORPHONE HYDROCHLORIDE 4 MILLIGRAM(S): 2 INJECTION INTRAMUSCULAR; INTRAVENOUS; SUBCUTANEOUS at 23:08

## 2022-10-09 RX ADMIN — Medication 3 MILLIGRAM(S): at 22:08

## 2022-10-09 RX ADMIN — BICTEGRAVIR SODIUM, EMTRICITABINE, AND TENOFOVIR ALAFENAMIDE FUMARATE 1 TABLET(S): 30; 120; 15 TABLET ORAL at 11:30

## 2022-10-09 RX ADMIN — METHADONE HYDROCHLORIDE 5 MILLIGRAM(S): 40 TABLET ORAL at 22:08

## 2022-10-09 RX ADMIN — METHADONE HYDROCHLORIDE 5 MILLIGRAM(S): 40 TABLET ORAL at 06:57

## 2022-10-09 RX ADMIN — CHLORHEXIDINE GLUCONATE 1 APPLICATION(S): 213 SOLUTION TOPICAL at 06:57

## 2022-10-09 RX ADMIN — Medication 100 MILLIGRAM(S): at 06:58

## 2022-10-09 RX ADMIN — HYDROMORPHONE HYDROCHLORIDE 4 MILLIGRAM(S): 2 INJECTION INTRAMUSCULAR; INTRAVENOUS; SUBCUTANEOUS at 22:08

## 2022-10-09 RX ADMIN — ENOXAPARIN SODIUM 60 MILLIGRAM(S): 100 INJECTION SUBCUTANEOUS at 01:42

## 2022-10-09 NOTE — PROGRESS NOTE ADULT - ASSESSMENT
31 M with untreated metastatic ano-rectal SCC (followed by Dr Frazier [facility] oncology) with mets to liver and possibly bone, HPV positive, HIV infection, and R eye cataracts who presented to the [facility] on 8/31/22 after syncopal episode at home and transferred to [facility] for continued of care per family request. Being treated for MSSA bacteremia. c/b scrotal swelling s/p coude catheter, hypercalcemia s/p pamidronate and IVF, and AFB from [place] w M chimaera not requiring any intervention. Patient is now undergoing trial of RT, did not tolerate first fraction 10/4 and didn't tolerate session on 10/5 even with increased pain meds before session. Pending [facility] discussion/hospice referral, requested by mom.

## 2022-10-09 NOTE — PROGRESS NOTE ADULT - PROBLEM SELECTOR PLAN 1
Patient with history of untreated HPV related rectal cancer with episode of rectal bleeding at [facility] Hgb 5.9 s/p 2u pRBCs.  - CT with evidence of liver and bone lesions  - Pt initially planned for 5 fractions, w/ 1st fraction on 10/04 and 2nd fraction on 10/05, however pt did not tolerate both times d/t pain, despite receiving increased dose of Dilaudid prior to 2nd fraction  - per heme-onc, already discussed with family/pt of no more treatment being offered  - primary team contacted mom and mom expressed wish for hospice referral      - explained to pt of this and pt requires some time to think about this - 10/8

## 2022-10-09 NOTE — PROGRESS NOTE ADULT - PROBLEM SELECTOR PLAN 10
On presentation, patient with microcytic anemia with Hgb 9.4 and MCV 81, likely a mixed picture of microcytic and normocytic anemia.  - iron studies with low iron levels but low iron binding capacity likely indicating a mixed picture  - ferritin wnl   - continue to monitor  - no overt signs of bleeding  - follow up OP with PCP and GI  - will hold on iron supplementation until infection clears

## 2022-10-09 NOTE — PROGRESS NOTE ADULT - PROBLEM SELECTOR PLAN 5
CT Chest 9/11 with evidence of persistent pneumomediastinum and extensive subcutaneous emphysema of the neck, chest, abdomen, pelvis, and extremities, including the scrotum.   - CANDIS per thoracic surgery  - indwelling pedraza was placed at [facility] (Coude placed for urinary strain)   - TOV attempted 9/27-9/28 overnight and failed with difficulty   - coude catheter placed by urology 9/28; can remain in place for 4 weeks prior to requiring change.  - No further urologic intervention needed at this time.   - Can f/u outpatient w/ Dr Prabhu Lucio, [facility] for [facility] at [place]  - pain control with dilaudid and methadone, ct a/p 9/11 did not show any inflammation/hydrocele/infection .

## 2022-10-09 NOTE — PROGRESS NOTE ADULT - PROBLEM SELECTOR PLAN 3
Ca 9. 10/8; previously received calcitonin x 2 doses and pamidronate 90mcg IVPB x1.  - will c/w IV fluids; can try calcitonin again if less than ideal improvement  - continue to trend levels q 24 hrs  - can repeat pamidronate after 1 week PRN (last received 10/2) if remains hyperCa  - vit D low but will hold supplementation in the setting of hypercalcemia of malignancy previously received calcitonin x 2 doses and pamidronate 90mcg IVPB x1.  -off IVF  - continue to trend levels q 24 hrs  - can repeat pamidronate after 1 week PRN (last received 10/2) if remains hyperCa  - vit D low but will hold supplementation in the setting of hypercalcemia of malignancy

## 2022-10-09 NOTE — PROGRESS NOTE ADULT - PROBLEM SELECTOR PLAN 2
- WBC increased since last lab drawn  - inc in hgb as well  - no concern for new infection at this time; afebrile, no clinical worsening, remains afebrile   - likely reactive 2/2 to underlying tumor burden vs hemoconcentration in the setting of poor po intake

## 2022-10-09 NOTE — PROGRESS NOTE ADULT - PROBLEM SELECTOR PLAN 11
Na 139. On admission, previously 120 with history significant for decreased PO intake. Otherwise asymptomatic. Likely mixed picture due to poor PO intake and possible SIADH from pain  - more likely a hypovolemic hyponatremia and SIADH from pain  - encourage PO intake Na 137. On admission, previously 120 with history significant for decreased PO intake. Otherwise asymptomatic. Likely mixed picture due to poor PO intake and possible SIADH from pain  - more likely a hypovolemic hyponatremia and SIADH from pain  - encourage PO intake

## 2022-10-09 NOTE — PROGRESS NOTE ADULT - PROBLEM SELECTOR PLAN 7
Patient with history of MSSA bacteremia found at [facility] with blood cultures 8/31 positive for MSSA and repeat on 9/2 NGTD.  - s/p Zosyn (9/8 - 9/9)  - transitioned to Cefazolin 2GM q8h for 6 weeks ending (9/9-10/13)   - may need midline placement prior to discharge for antibiotics at rehab -- but currently not in lines with goals of care as discussed with mom on 10/7. Currently awaiting pt decision regarding this       - upon discharge, will need weekly CBC, BUN and creatinine and ID clinic follow up -- if in lines with goals of care

## 2022-10-09 NOTE — PROGRESS NOTE ADULT - PROBLEM SELECTOR PLAN 4
Patient presented to [facility] with hypoxemia and L chest pigtail placed 8/31 due to suspected pneumothorax. Patient showed improvement in SOB and f/u CT chest performed which showed evidence of 12 cm multiloculated thick walled cavitary mass in the CHRISTINE and lingula.  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 will need f/u CT in 2 months

## 2022-10-09 NOTE — PROGRESS NOTE ADULT - SUBJECTIVE AND OBJECTIVE BOX
***************************************************************  Vinicio Strickland, PGY2  Internal Medicine   pager: [phone]  ***************************************************************    YONI GAITAN  32y  MRN: [id number]    Patient is a 32y old  Male who presents with a chief complaint of Shortness of breath (08 Oct 2022 08:44)      Subjective: no events ON. Denies fever, CP, SOB, abn pain, N/V, dysuria. Tolerating diet.      MEDICATIONS  (STANDING):  bictegravir 50 mG/emtricitabine 200 mG/tenofovir alafenamide 25 mG (BIKTARVY) 1 Tablet(s) Oral daily  ceFAZolin   IVPB 2000 milliGRAM(s) IV Intermittent every 8 hours  chlorhexidine 2% Cloths 1 Application(s) Topical <User Schedule>  enoxaparin Injectable 60 milliGRAM(s) SubCutaneous every 12 hours  gabapentin 300 milliGRAM(s) Oral three times a day  HYDROmorphone   Tablet 8 milliGRAM(s) Oral once  lactated ringers. 1000 milliLiter(s) (75 mL/Hr) IV Continuous <Continuous>  lactulose Syrup 10 Gram(s) Oral daily  lidocaine   4% Patch 1 Patch Transdermal daily  lidocaine   4% Patch 1 Patch Transdermal daily  melatonin 3 milliGRAM(s) Oral at bedtime  methadone    Tablet 5 milliGRAM(s) Oral three times a day  metoprolol tartrate 50 milliGRAM(s) Oral two times a day  naloxegol 25 milliGRAM(s) Oral daily  polyethylene glycol 3350 17 Gram(s) Oral two times a day  senna 2 Tablet(s) Oral at bedtime    MEDICATIONS  (PRN):  bisacodyl 5 milliGRAM(s) Oral at bedtime PRN Constipation  HYDROmorphone   Tablet 4 milliGRAM(s) Oral every 4 hours PRN Moderate Pain (4 - 6)  HYDROmorphone   Tablet 6 milliGRAM(s) Oral every 4 hours PRN Severe Pain (7 - 10)  naloxone Injectable 0.1 milliGRAM(s) IV Push every 3 minutes PRN opioid intoxication      Objective:    Vitals: Vital Signs Last 24 Hrs  T(C): 36.5 (10-09-22 @ 06:40), Max: 37.2 (10-08-22 @ 20:56)  T(F): 97.7 (10-09-22 @ 06:40), Max: 98.9 (10-08-22 @ 20:56)  HR: 84 (10-09-22 @ 06:40) (84 - 99)  BP: 100/64 (10-09-22 @ 06:40) (100/64 - 105/59)  BP(mean): --  RR: 17 (10-09-22 @ 06:40) (17 - 18)  SpO2: 96% (10-09-22 @ 06:40) (95% - 98%)            I&O's Summary    08 Oct 2022 07:01  -  09 Oct 2022 07:00  --------------------------------------------------------  IN: 0 mL / OUT: 700 mL / NET: -700 mL        PHYSICAL EXAM:  GENERAL: NAD  HEAD:  Atraumatic, Normocephalic  EYES: EOMI, conjunctiva and sclera clear  CHEST/LUNG: Clear to percussion bilaterally; No rales, rhonchi, wheezing, or rubs  HEART: Regular rate and rhythm; No murmurs, rubs, or gallops  ABDOMEN: Soft, Nontender, Nondistended;   SKIN: No rashes or lesions  NERVOUS SYSTEM:  Alert & Oriented X3, no focal deficit    LABS:  10-08    139  |  102  |  57<H>  ----------------------------<  100<H>  4.3   |  22  |  0.54    Ca    9.4      08 Oct 2022 07:07  Phos  4.7     10-08  Mg     1.90     10-08                                                10.1   18.37 )-----------( 168      ( 08 Oct 2022 07:07 )             33.8     CAPILLARY BLOOD GLUCOSE          RADIOLOGY & ADDITIONAL TESTS:    Imaging Personally Reviewed:  [x ] YES  [ ] NO    Consultants involved in case:   Consultant(s) Notes Reviewed:  [ x] YES  [ ] NO:   Care Discussed with Consultants/Other Providers [x ] YES  [ ] NO         ***************************************************************  Vinicio Strickland, PGY2  Internal Medicine   pager: [phone]  ***************************************************************    YONI GAITAN  32y  MRN: [id number]    Patient is a 32y old  Male who presents with a chief complaint of Shortness of breath (08 Oct 2022 08:44)      Subjective: no events overnight. Dilaudid 6mg prn last night and 4mg prn this morning. This morning endorsing pain in the rectum. Tolerating diet. Denies n/v/d/f/c.     MEDICATIONS  (STANDING):  bictegravir 50 mG/emtricitabine 200 mG/tenofovir alafenamide 25 mG (BIKTARVY) 1 Tablet(s) Oral daily  ceFAZolin   IVPB 2000 milliGRAM(s) IV Intermittent every 8 hours  chlorhexidine 2% Cloths 1 Application(s) Topical <User Schedule>  enoxaparin Injectable 60 milliGRAM(s) SubCutaneous every 12 hours  gabapentin 300 milliGRAM(s) Oral three times a day  HYDROmorphone   Tablet 8 milliGRAM(s) Oral once  lactated ringers. 1000 milliLiter(s) (75 mL/Hr) IV Continuous <Continuous>  lactulose Syrup 10 Gram(s) Oral daily  lidocaine   4% Patch 1 Patch Transdermal daily  lidocaine   4% Patch 1 Patch Transdermal daily  melatonin 3 milliGRAM(s) Oral at bedtime  methadone    Tablet 5 milliGRAM(s) Oral three times a day  metoprolol tartrate 50 milliGRAM(s) Oral two times a day  naloxegol 25 milliGRAM(s) Oral daily  polyethylene glycol 3350 17 Gram(s) Oral two times a day  senna 2 Tablet(s) Oral at bedtime    MEDICATIONS  (PRN):  bisacodyl 5 milliGRAM(s) Oral at bedtime PRN Constipation  HYDROmorphone   Tablet 4 milliGRAM(s) Oral every 4 hours PRN Moderate Pain (4 - 6)  HYDROmorphone   Tablet 6 milliGRAM(s) Oral every 4 hours PRN Severe Pain (7 - 10)  naloxone Injectable 0.1 milliGRAM(s) IV Push every 3 minutes PRN opioid intoxication      Objective:    Vitals: Vital Signs Last 24 Hrs  T(C): 36.5 (10-09-22 @ 06:40), Max: 37.2 (10-08-22 @ 20:56)  T(F): 97.7 (10-09-22 @ 06:40), Max: 98.9 (10-08-22 @ 20:56)  HR: 84 (10-09-22 @ 06:40) (84 - 99)  BP: 100/64 (10-09-22 @ 06:40) (100/64 - 105/59)  BP(mean): --  RR: 17 (10-09-22 @ 06:40) (17 - 18)  SpO2: 96% (10-09-22 @ 06:40) (95% - 98%)            I&O's Summary    08 Oct 2022 07:01  -  09 Oct 2022 07:00  --------------------------------------------------------  IN: 0 mL / OUT: 700 mL / NET: -700 mL        PHYSICAL EXAM:  GENERAL: cachetic appearing male lying in bed eating breakfast   HEAD:  temporal wasting   EYES: EOMI, conjunctiva and sclera clear  CHEST/LUNG: Clear to percussion bilaterally; No rales, rhonchi, wheezing, or rubs  HEART: Regular rate and rhythm; No murmurs, rubs, or gallops  ABDOMEN: Soft, Nontender, Nondistended;   SKIN: No rashes or lesions  NERVOUS SYSTEM:  Alert & Oriented X3,    LABS:  10-08    139  |  102  |  57<H>  ----------------------------<  100<H>  4.3   |  22  |  0.54    Ca    9.4      08 Oct 2022 07:07  Phos  4.7     10-08  Mg     1.90     10-08                                                10.1   18.37 )-----------( 168      ( 08 Oct 2022 07:07 )             33.8     CAPILLARY BLOOD GLUCOSE          RADIOLOGY & ADDITIONAL TESTS:    Imaging Personally Reviewed:  [x ] YES  [ ] NO    Consultants involved in case:   Consultant(s) Notes Reviewed:  [ x] YES  [ ] NO:   Care Discussed with Consultants/Other Providers [x ] YES  [ ] NO         ***************************************************************  Vinicio Strickland, PGY2  Internal Medicine   pager: [phone]  ***************************************************************    YONI GAITAN  32y  MRN: [id number]    Patient is a 32y old  Male who presents with a chief complaint of Shortness of breath (08 Oct 2022 08:44)    Subjective: no events overnight. Dilaudid 6mg prn last night and 4mg prn this morning. This morning endorsing pain in the rectum. Tolerating diet. Denies n/v/d/f/c.     MEDICATIONS  (STANDING):  bictegravir 50 mG/emtricitabine 200 mG/tenofovir alafenamide 25 mG (BIKTARVY) 1 Tablet(s) Oral daily  ceFAZolin   IVPB 2000 milliGRAM(s) IV Intermittent every 8 hours  chlorhexidine 2% Cloths 1 Application(s) Topical <User Schedule>  enoxaparin Injectable 60 milliGRAM(s) SubCutaneous every 12 hours  gabapentin 300 milliGRAM(s) Oral three times a day  HYDROmorphone   Tablet 8 milliGRAM(s) Oral once  lactated ringers. 1000 milliLiter(s) (75 mL/Hr) IV Continuous <Continuous>  lactulose Syrup 10 Gram(s) Oral daily  lidocaine   4% Patch 1 Patch Transdermal daily  lidocaine   4% Patch 1 Patch Transdermal daily  melatonin 3 milliGRAM(s) Oral at bedtime  methadone    Tablet 5 milliGRAM(s) Oral three times a day  metoprolol tartrate 50 milliGRAM(s) Oral two times a day  naloxegol 25 milliGRAM(s) Oral daily  polyethylene glycol 3350 17 Gram(s) Oral two times a day  senna 2 Tablet(s) Oral at bedtime    MEDICATIONS  (PRN):  bisacodyl 5 milliGRAM(s) Oral at bedtime PRN Constipation  HYDROmorphone   Tablet 4 milliGRAM(s) Oral every 4 hours PRN Moderate Pain (4 - 6)  HYDROmorphone   Tablet 6 milliGRAM(s) Oral every 4 hours PRN Severe Pain (7 - 10)  naloxone Injectable 0.1 milliGRAM(s) IV Push every 3 minutes PRN opioid intoxication    Objective:    Vitals: Vital Signs Last 24 Hrs  T(C): 36.5 (10-09-22 @ 06:40), Max: 37.2 (10-08-22 @ 20:56)  T(F): 97.7 (10-09-22 @ 06:40), Max: 98.9 (10-08-22 @ 20:56)  HR: 84 (10-09-22 @ 06:40) (84 - 99)  BP: 100/64 (10-09-22 @ 06:40) (100/64 - 105/59)  BP(mean): --  RR: 17 (10-09-22 @ 06:40) (17 - 18)  SpO2: 96% (10-09-22 @ 06:40) (95% - 98%)            I&O's Summary    08 Oct 2022 07:01  -  09 Oct 2022 07:00  --------------------------------------------------------  IN: 0 mL / OUT: 700 mL / NET: -700 mL    PHYSICAL EXAM:  GENERAL: cachetic appearing male lying in bed eating breakfast   HEAD:  temporal wasting   EYES: EOMI, conjunctiva and sclera clear  CHEST/LUNG: Clear to percussion bilaterally; No rales, rhonchi, wheezing, or rubs  HEART: Regular rate and rhythm; No murmurs, rubs, or gallops  ABDOMEN: Soft, Nontender, Nondistended;   SKIN: No rashes or lesions  NERVOUS SYSTEM:  Alert & Oriented X3,    LABS:  10-08    139  |  102  |  57<H>  ----------------------------<  100<H>  4.3   |  22  |  0.54    Ca    9.4      08 Oct 2022 07:07  Phos  4.7     10-08  Mg     1.90     10-08                           10.1   18.37 )-----------( 168      ( 08 Oct 2022 07:07 )             33.8     CAPILLARY BLOOD GLUCOSE    RADIOLOGY & ADDITIONAL TESTS:    Imaging Personally Reviewed:  [x ] YES  [ ] NO    Consultants involved in case:   Consultant(s) Notes Reviewed:  [ x] YES  [ ] NO:   Care Discussed with Consultants/Other Providers [x ] YES  [ ] NO

## 2022-10-09 NOTE — PROGRESS NOTE ADULT - PROBLEM SELECTOR PLAN 8
Palliative care consulted for extensive disease burden and GOC discussion. At this time, patient was all available treatment and resuscitation. Remains full code.  - increased PO Methadone 5mg TID (QTC- 443 on 9/23); repeat EKG today  - c/w Gabapentin 300mg TID  - PO Dilaudid 4mg q4 PRN moderate pain  - PO Dilaudid 6mg q4 PRN severe pain  - hold parameters placed for all medications  - appreciate palliative care recs  - will [facility] Dr. Kiesha Calle/ Dr. Mazariegos/ NP Zoe Carvalho/ ROCIO Cruz; [phone]

## 2022-10-09 NOTE — PROGRESS NOTE ADULT - PROBLEM SELECTOR PLAN 6
Patient with TTE performed on at [facility] with evidence of two large RV masses and two additional small masses. IVC filter placed prophylactically at that time due to undiagnosed vegetations on the TTE.  - repeat TTE 9/9 was a limited study but demonstrated a mobile mass likely 2/2 tumour, thrombus, or vegetation.   - per cardiology risk of JENNIFER outweighs benefit at this time  - refused cardiac MRI again (3 attempts since last week; patient not a candidate for general anesthesia)  - per cardiology, no acute indication to do cardiac MRI/ JENNIFER at this time given patients other active issues  - monitor on tele  - can follow up outpatient for serial TTEs or JENNIFER once medically stable

## 2022-10-10 LAB
ALBUMIN SERPL ELPH-MCNC: 2.1 G/DL — LOW (ref 3.3–5)
ALP SERPL-CCNC: 380 U/L — HIGH (ref 40–120)
ALT FLD-CCNC: 17 U/L — SIGNIFICANT CHANGE UP (ref 4–41)
ANION GAP SERPL CALC-SCNC: 14 MMOL/L — SIGNIFICANT CHANGE UP (ref 7–14)
ANION GAP SERPL CALC-SCNC: 17 MMOL/L — HIGH (ref 7–14)
APTT BLD: 32.6 SEC — SIGNIFICANT CHANGE UP (ref 27–36.3)
AST SERPL-CCNC: 120 U/L — HIGH (ref 4–40)
BASOPHILS # BLD AUTO: 0.03 K/UL — SIGNIFICANT CHANGE UP (ref 0–0.2)
BASOPHILS NFR BLD AUTO: 0.1 % — SIGNIFICANT CHANGE UP (ref 0–2)
BILIRUB SERPL-MCNC: 0.3 MG/DL — SIGNIFICANT CHANGE UP (ref 0.2–1.2)
BLOOD GAS ARTERIAL - LYTES,HGB,ICA,LACT RESULT: SIGNIFICANT CHANGE UP
BUN SERPL-MCNC: 71 MG/DL — HIGH (ref 7–23)
BUN SERPL-MCNC: 89 MG/DL — HIGH (ref 7–23)
CALCIUM SERPL-MCNC: 6.9 MG/DL — LOW (ref 8.4–10.5)
CALCIUM SERPL-MCNC: 7.9 MG/DL — LOW (ref 8.4–10.5)
CHLORIDE SERPL-SCNC: 102 MMOL/L — SIGNIFICANT CHANGE UP (ref 98–107)
CHLORIDE SERPL-SCNC: 110 MMOL/L — HIGH (ref 98–107)
CO2 SERPL-SCNC: 17 MMOL/L — LOW (ref 22–31)
CO2 SERPL-SCNC: 20 MMOL/L — LOW (ref 22–31)
CREAT SERPL-MCNC: 0.57 MG/DL — SIGNIFICANT CHANGE UP (ref 0.5–1.3)
CREAT SERPL-MCNC: 0.7 MG/DL — SIGNIFICANT CHANGE UP (ref 0.5–1.3)
EGFR: 124 ML/MIN/1.73M2 — SIGNIFICANT CHANGE UP
EGFR: 132 ML/MIN/1.73M2 — SIGNIFICANT CHANGE UP
EOSINOPHIL # BLD AUTO: 0 K/UL — SIGNIFICANT CHANGE UP (ref 0–0.5)
EOSINOPHIL NFR BLD AUTO: 0 % — SIGNIFICANT CHANGE UP (ref 0–6)
GLUCOSE SERPL-MCNC: 42 MG/DL — CRITICAL LOW (ref 70–99)
GLUCOSE SERPL-MCNC: 93 MG/DL — SIGNIFICANT CHANGE UP (ref 70–99)
HCT VFR BLD CALC: 34.1 % — LOW (ref 39–50)
HCT VFR BLD CALC: 38.3 % — LOW (ref 39–50)
HGB BLD-MCNC: 10 G/DL — LOW (ref 13–17)
HGB BLD-MCNC: 11.5 G/DL — LOW (ref 13–17)
IANC: 17.59 K/UL — HIGH (ref 1.8–7.4)
IMM GRANULOCYTES NFR BLD AUTO: 3.6 % — HIGH (ref 0–0.9)
INR BLD: 2 RATIO — HIGH (ref 0.88–1.16)
LYMPHOCYTES # BLD AUTO: 1.83 K/UL — SIGNIFICANT CHANGE UP (ref 1–3.3)
LYMPHOCYTES # BLD AUTO: 8.6 % — LOW (ref 13–44)
MAGNESIUM SERPL-MCNC: 2 MG/DL — SIGNIFICANT CHANGE UP (ref 1.6–2.6)
MAGNESIUM SERPL-MCNC: 2.1 MG/DL — SIGNIFICANT CHANGE UP (ref 1.6–2.6)
MCHC RBC-ENTMCNC: 23.6 PG — LOW (ref 27–34)
MCHC RBC-ENTMCNC: 24 PG — LOW (ref 27–34)
MCHC RBC-ENTMCNC: 29.3 GM/DL — LOW (ref 32–36)
MCHC RBC-ENTMCNC: 30 GM/DL — LOW (ref 32–36)
MCV RBC AUTO: 79.8 FL — LOW (ref 80–100)
MCV RBC AUTO: 80.4 FL — SIGNIFICANT CHANGE UP (ref 80–100)
MONOCYTES # BLD AUTO: 0.97 K/UL — HIGH (ref 0–0.9)
MONOCYTES NFR BLD AUTO: 4.6 % — SIGNIFICANT CHANGE UP (ref 2–14)
NEUTROPHILS # BLD AUTO: 17.59 K/UL — HIGH (ref 1.8–7.4)
NEUTROPHILS NFR BLD AUTO: 83.1 % — HIGH (ref 43–77)
NRBC # BLD: 0 /100 WBCS — SIGNIFICANT CHANGE UP (ref 0–0)
NRBC # BLD: 0 /100 WBCS — SIGNIFICANT CHANGE UP (ref 0–0)
NRBC # FLD: 0 K/UL — SIGNIFICANT CHANGE UP (ref 0–0)
NRBC # FLD: 0 K/UL — SIGNIFICANT CHANGE UP (ref 0–0)
PHOSPHATE SERPL-MCNC: 5 MG/DL — HIGH (ref 2.5–4.5)
PHOSPHATE SERPL-MCNC: 7.9 MG/DL — HIGH (ref 2.5–4.5)
PLATELET # BLD AUTO: 158 K/UL — SIGNIFICANT CHANGE UP (ref 150–400)
PLATELET # BLD AUTO: 164 K/UL — SIGNIFICANT CHANGE UP (ref 150–400)
POTASSIUM SERPL-MCNC: 4.3 MMOL/L — SIGNIFICANT CHANGE UP (ref 3.5–5.3)
POTASSIUM SERPL-MCNC: 4.6 MMOL/L — SIGNIFICANT CHANGE UP (ref 3.5–5.3)
POTASSIUM SERPL-SCNC: 4.3 MMOL/L — SIGNIFICANT CHANGE UP (ref 3.5–5.3)
POTASSIUM SERPL-SCNC: 4.6 MMOL/L — SIGNIFICANT CHANGE UP (ref 3.5–5.3)
PROT SERPL-MCNC: 5 G/DL — LOW (ref 6–8.3)
PROTHROM AB SERPL-ACNC: 23.4 SEC — HIGH (ref 10.5–13.4)
RBC # BLD: 4.24 M/UL — SIGNIFICANT CHANGE UP (ref 4.2–5.8)
RBC # BLD: 4.8 M/UL — SIGNIFICANT CHANGE UP (ref 4.2–5.8)
RBC # FLD: 21.5 % — HIGH (ref 10.3–14.5)
RBC # FLD: 22 % — HIGH (ref 10.3–14.5)
SODIUM SERPL-SCNC: 136 MMOL/L — SIGNIFICANT CHANGE UP (ref 135–145)
SODIUM SERPL-SCNC: 144 MMOL/L — SIGNIFICANT CHANGE UP (ref 135–145)
WBC # BLD: 20.41 K/UL — HIGH (ref 3.8–10.5)
WBC # BLD: 21.19 K/UL — HIGH (ref 3.8–10.5)
WBC # FLD AUTO: 20.41 K/UL — HIGH (ref 3.8–10.5)
WBC # FLD AUTO: 21.19 K/UL — HIGH (ref 3.8–10.5)

## 2022-10-10 PROCEDURE — 99233 SBSQ HOSP IP/OBS HIGH 50: CPT | Mod: GC

## 2022-10-10 PROCEDURE — 99291 CRITICAL CARE FIRST HOUR: CPT | Mod: GC

## 2022-10-10 RX ORDER — LIDOCAINE 4 G/100G
1 CREAM TOPICAL DAILY
Refills: 0 | Status: DISCONTINUED | OUTPATIENT
Start: 2022-10-10 | End: 2022-10-10

## 2022-10-10 RX ORDER — HYDROMORPHONE HYDROCHLORIDE 2 MG/ML
6 INJECTION INTRAMUSCULAR; INTRAVENOUS; SUBCUTANEOUS EVERY 4 HOURS
Refills: 0 | Status: DISCONTINUED | OUTPATIENT
Start: 2022-10-10 | End: 2022-10-10

## 2022-10-10 RX ORDER — CHLORHEXIDINE GLUCONATE 213 G/1000ML
1 SOLUTION TOPICAL
Refills: 0 | Status: DISCONTINUED | OUTPATIENT
Start: 2022-10-10 | End: 2022-10-12

## 2022-10-10 RX ORDER — HYDROMORPHONE HYDROCHLORIDE 2 MG/ML
4 INJECTION INTRAMUSCULAR; INTRAVENOUS; SUBCUTANEOUS EVERY 4 HOURS
Refills: 0 | Status: DISCONTINUED | OUTPATIENT
Start: 2022-10-10 | End: 2022-10-10

## 2022-10-10 RX ORDER — HYDROMORPHONE HYDROCHLORIDE 2 MG/ML
1 INJECTION INTRAMUSCULAR; INTRAVENOUS; SUBCUTANEOUS ONCE
Refills: 0 | Status: DISCONTINUED | OUTPATIENT
Start: 2022-10-10 | End: 2022-10-10

## 2022-10-10 RX ORDER — METHADONE HYDROCHLORIDE 40 MG/1
5 TABLET ORAL THREE TIMES A DAY
Refills: 0 | Status: DISCONTINUED | OUTPATIENT
Start: 2022-10-10 | End: 2022-10-10

## 2022-10-10 RX ORDER — SODIUM CHLORIDE 9 MG/ML
1000 INJECTION INTRAMUSCULAR; INTRAVENOUS; SUBCUTANEOUS ONCE
Refills: 0 | Status: DISCONTINUED | OUTPATIENT
Start: 2022-10-10 | End: 2022-10-11

## 2022-10-10 RX ORDER — NOREPINEPHRINE BITARTRATE/D5W 8 MG/250ML
0.3 PLASTIC BAG, INJECTION (ML) INTRAVENOUS
Qty: 8 | Refills: 0 | Status: DISCONTINUED | OUTPATIENT
Start: 2022-10-10 | End: 2022-10-12

## 2022-10-10 RX ORDER — PROPOFOL 10 MG/ML
10 INJECTION, EMULSION INTRAVENOUS ONCE
Refills: 0 | Status: DISCONTINUED | OUTPATIENT
Start: 2022-10-10 | End: 2022-10-11

## 2022-10-10 RX ORDER — PROPOFOL 10 MG/ML
10 INJECTION, EMULSION INTRAVENOUS
Qty: 1000 | Refills: 0 | Status: DISCONTINUED | OUTPATIENT
Start: 2022-10-10 | End: 2022-10-11

## 2022-10-10 RX ORDER — DEXTROSE 50 % IN WATER 50 %
50 SYRINGE (ML) INTRAVENOUS ONCE
Refills: 0 | Status: COMPLETED | OUTPATIENT
Start: 2022-10-10 | End: 2022-10-10

## 2022-10-10 RX ADMIN — LIDOCAINE 1 PATCH: 4 CREAM TOPICAL at 20:11

## 2022-10-10 RX ADMIN — HYDROMORPHONE HYDROCHLORIDE 1 MILLIGRAM(S): 2 INJECTION INTRAMUSCULAR; INTRAVENOUS; SUBCUTANEOUS at 16:19

## 2022-10-10 RX ADMIN — CHLORHEXIDINE GLUCONATE 1 APPLICATION(S): 213 SOLUTION TOPICAL at 05:26

## 2022-10-10 RX ADMIN — Medication 100 MILLIGRAM(S): at 05:22

## 2022-10-10 RX ADMIN — POLYETHYLENE GLYCOL 3350 17 GRAM(S): 17 POWDER, FOR SOLUTION ORAL at 18:43

## 2022-10-10 RX ADMIN — Medication 50 MILLILITER(S): at 23:00

## 2022-10-10 RX ADMIN — HYDROMORPHONE HYDROCHLORIDE 4 MILLIGRAM(S): 2 INJECTION INTRAMUSCULAR; INTRAVENOUS; SUBCUTANEOUS at 03:54

## 2022-10-10 RX ADMIN — HYDROMORPHONE HYDROCHLORIDE 1 MILLIGRAM(S): 2 INJECTION INTRAMUSCULAR; INTRAVENOUS; SUBCUTANEOUS at 16:04

## 2022-10-10 RX ADMIN — Medication 100 MILLIGRAM(S): at 23:00

## 2022-10-10 RX ADMIN — LIDOCAINE 1 PATCH: 4 CREAM TOPICAL at 12:22

## 2022-10-10 RX ADMIN — NALOXEGOL OXALATE 25 MILLIGRAM(S): 12.5 TABLET, FILM COATED ORAL at 14:27

## 2022-10-10 RX ADMIN — GABAPENTIN 300 MILLIGRAM(S): 400 CAPSULE ORAL at 05:22

## 2022-10-10 RX ADMIN — Medication 50 MILLIGRAM(S): at 18:40

## 2022-10-10 RX ADMIN — Medication 50 MILLIGRAM(S): at 05:22

## 2022-10-10 RX ADMIN — HYDROMORPHONE HYDROCHLORIDE 6 MILLIGRAM(S): 2 INJECTION INTRAMUSCULAR; INTRAVENOUS; SUBCUTANEOUS at 11:03

## 2022-10-10 RX ADMIN — HYDROMORPHONE HYDROCHLORIDE 6 MILLIGRAM(S): 2 INJECTION INTRAMUSCULAR; INTRAVENOUS; SUBCUTANEOUS at 18:34

## 2022-10-10 RX ADMIN — HYDROMORPHONE HYDROCHLORIDE 6 MILLIGRAM(S): 2 INJECTION INTRAMUSCULAR; INTRAVENOUS; SUBCUTANEOUS at 19:04

## 2022-10-10 RX ADMIN — LACTULOSE 10 GRAM(S): 10 SOLUTION ORAL at 12:16

## 2022-10-10 RX ADMIN — Medication 100 MILLIGRAM(S): at 14:27

## 2022-10-10 RX ADMIN — HYDROMORPHONE HYDROCHLORIDE 6 MILLIGRAM(S): 2 INJECTION INTRAMUSCULAR; INTRAVENOUS; SUBCUTANEOUS at 10:33

## 2022-10-10 RX ADMIN — GABAPENTIN 300 MILLIGRAM(S): 400 CAPSULE ORAL at 14:38

## 2022-10-10 RX ADMIN — LIDOCAINE 1 PATCH: 4 CREAM TOPICAL at 12:23

## 2022-10-10 RX ADMIN — ENOXAPARIN SODIUM 60 MILLIGRAM(S): 100 INJECTION SUBCUTANEOUS at 00:06

## 2022-10-10 RX ADMIN — METHADONE HYDROCHLORIDE 5 MILLIGRAM(S): 40 TABLET ORAL at 14:38

## 2022-10-10 RX ADMIN — METHADONE HYDROCHLORIDE 5 MILLIGRAM(S): 40 TABLET ORAL at 05:22

## 2022-10-10 RX ADMIN — HYDROMORPHONE HYDROCHLORIDE 4 MILLIGRAM(S): 2 INJECTION INTRAMUSCULAR; INTRAVENOUS; SUBCUTANEOUS at 02:54

## 2022-10-10 RX ADMIN — BICTEGRAVIR SODIUM, EMTRICITABINE, AND TENOFOVIR ALAFENAMIDE FUMARATE 1 TABLET(S): 30; 120; 15 TABLET ORAL at 14:27

## 2022-10-10 RX ADMIN — ENOXAPARIN SODIUM 60 MILLIGRAM(S): 100 INJECTION SUBCUTANEOUS at 12:17

## 2022-10-10 NOTE — ADVANCED PRACTICE NURSE CONSULT - RECOMMEDATIONS
Topical recommendations:     Sacrum - Apply silicone foam with border for protection. Inspect daily, change every other day.     Perineum - Cleanse with skin cleanser, pat dry. Apply Critic-aid moisture barrier cream twice a day and PRN with incontinent episodes.     Bilateral feet - Apply sween24 moisturizer daily after cleansing, avoid in between the toes.    Continue low air loss bed therapy, continue heel elevation, continue to turn & reposition per protocol, soft pillow between bony prominences, continue moisture management with barrier creams & single breathable pad, continue measures to decrease friction/shear/pressure. Continue with nutritional support as per dietary/orders.     Plan discussed with primary RN Mary Jane Shea.    Please contact Wound/Ostomy Care Service Line if we can be of further assistance (ext [phone]).      Topical recommendations:     Sacrum - Apply silicone foam with border for protection. Inspect daily, change every other day.     Perineum - Cleanse with skin cleanser, pat dry. Apply Critic-aid moisture barrier cream twice a day and PRN with incontinent episodes.     Bilateral feet - Apply sween24 moisturizer daily after cleansing, avoid in between the toes.    Continue low air loss bed therapy, continue heel elevation with complete CAIR boots, continue to turn & reposition per protocol, soft pillow between bony prominences, continue moisture management with barrier creams & single breathable pad, continue measures to decrease friction/shear/pressure. Continue with nutritional support as per dietary/orders.     Plan discussed with primary RN Mary Jane Shea.    Please contact Wound/Ostomy Care Service Line if we can be of further assistance (ext [phone]).

## 2022-10-10 NOTE — PROGRESS NOTE ADULT - ASSESSMENT
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 Being treated for MSSA bacteremia. c/b scrotal swelling s/p coude catheter, hypercalcemia s/p pamidronate and IVF, and AFB from [place] w M chimaera not requiring any intervention. Patient is now undergoing trial of RT, did not tolerate first fraction 10/4 and didn't tolerate session on 10/5 even with increased pain meds before session. Pending [facility] discussion/hospice referral, requested by mom.         31 M with untreated metastatic ano-rectal SCC (followed by Dr Frazier [facility] oncology) with mets to liver and possibly bone, HPV positive, HIV infection, and R eye cataracts who presented to the [facility] on 8/31/22 after syncopal episode at home and transferred to [facility] for continued of care per family request. Being treated for MSSA bacteremia. c/b scrotal swelling s/p coude catheter, hypercalcemia s/p pamidronate and IVF, and AFB from [place] w M chimaera not requiring any intervention. Patient is now undergoing trial of RT, did not tolerate first fraction 10/4 and didn't tolerate session on 10/5 even with increased pain meds before session. Pending hospice referral

## 2022-10-10 NOTE — PROGRESS NOTE ADULT - ATTENDING COMMENTS
31M with PMH of metastatic rectal carcinoma, HIV, cataracts who was admitted to [facility] s/p syncope. Hospital complicated by acute hypoxic respiratory failure. Pigtail and eventually a chest tube was placed. Imaging of the chest revealed multiple concerning findings - CHRISTINE cavitary lesion likely 2' PNA, placement of pigtail within a mass. Hospital course there also c/b MSSA bacteremia and discover of cardiac lesion that could be mass v. veg v. thrombus. 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 S/p IVC filter. Pt now transferred to [facility] MICU for further care. Thoracic, ID, onc, pall care on board. Chest tube removed. Respiratory status remains stable. Now transfer to floor for further management.     #Metastatic rectal ca  #Neoplasm related pain  #Cavitary lung lesion  #MSSA bacteremia  #Iliac vein thrombosis  #HIV    -Appreciate pall care recs - ongoing titrate of pain meds. Monitor for adverse effects.  -Rad onc c/s.  -Cardiac MRI - will coordinate w/ MRI dept.  -Cont abx - appreciate ID recs.  -Cont heparin drip     Rest of plan as above.
Patient seen and examined, d/w Dr. Carreno, agree w/ above.    32 yo M w/ metastatic rectal cancer, HIV admitted w/ acute hypoxic respiratory failure w/ cavitary lung lesion, MSSA bacteremia, cardiac lesion, external iliac vein thrombosis, transferred to [facility] for further care, s/p removal of chest tube 9/16, course c/b subcutaneous emphysema, SVT, neoplasm related pain, hypercalcemia of malignancy, hyponatremia. Prolonged hospital course, plan for IV Ancef til 10/13 to complete course for MSSA bacteremia, patient has been unable to tolerate cardiac MRI to eval mass. 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 Currently pending rehab placement. Patient cachetic on exam, noted to have severe protein calorie malnutrition. Received treatment recently for hypercalcemia, asked patient if he would please allow bloodwork for monitoring (declined). Nods head no when asked if having any complaints.     # MSSA bacteremia w/ suspected MSSA PNA and cavitary lung lesion, cardiac mass  - unable to tolerate cardiac MRI, plan for IV Ancef til 10/13, likely will need line placement for [facility]   # Metastatic rectal cancer c/b neoplasm related pain  - otpt f/u for radiation therapy, f/u otpt for further oncological therapy  - c/w pain medications (methadone, dilaudid, gabepentin, lidocaine patch etc), bowel regimen to prevent opioid induced constipation, otpt palliative followup for further pain management  # Hypercalcemia of malignancy  - c/w IV fluids, s/p pamidronate, continue to monitor, encourage patient to allow for blood draws  #  HIV   - c/w biktarvy  # Iliac vein thrombosis  - on therapeutic lovenox  # Severe protein calorie malnutrition  - c/w diet as tolerated, supplements  # hyponatremia   - Na 134 on 10/1, continue to monitor (patient declining bloodwork today)  # SVT   - on metoprolol, continue to monitor HR    Dispo: [facility] with pedraza in place (otpt f/u for removal) .
31M with PMH of metastatic rectal carcinoma, HIV, cataracts who was admitted to [facility] s/p syncope. Hospital complicated by acute hypoxic respiratory failure. Pigtail and eventually a chest tube was placed. Imaging of the chest revealed multiple concerning findings - CHRISTINE cavitary lesion likely 2' PNA, placement of pigtail within a mass. Hospital course there also c/b MSSA bacteremia and discover of cardiac lesion that could be mass v. veg v. thrombus. 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 S/p IVC filter. Pt now transferred to [facility] MICU for further care. Thoracic, ID, onc, pall care on board. Chest tube removed. Respiratory status remains stable. Now transfer to floor for further management. Currently on ABx for MSSA bacteremia. Failed multiple attempts at getting cardiac MRI due to inability to tolerate. After discussion with family and cards - will defer on any cardiac imaging as utility of imaging unclear at this time based on clinical status and plan. Hospital course prolonged at this time due to pending infectious workup - sputum AFB showing growth - awaiting identification.    Pt seen and evaluated at bedside. Rosario in place. Doing well today. No complaints. Exam unchanged. No new labs. Pt refused today.     #Metastatic rectal ca  #Neoplasm related pain  #MSSA bacteremia 2/2 suspected MSSA PNA and cavitary lung lesion  #Iliac vein thrombosis  #HIV  #SQ emphysema  #R/o cardiac mass  #Severe protein calorie malnutrition  #Hypomagnesemia/hypophosphatemia    -Cont Cefazolin IV till 10/13.   -HR better. Cont metoprolol.  -AFB pos 1 out of 4 [OSH specimens] on 9/3. Final cx not yet available. Lab status available in HIE. As per discussion w/ lab - expect results later this wk or next.  -Replete lytes as ordered.  -Rosario to be kept in place - f/u as OP for removal.  -Dispo planning to [facility] - however, that will depend on OSH cx results. Complex case. F/u SW/CM.    Rest of plan as above.
31M with PMH of metastatic rectal carcinoma, HIV, cataracts who was admitted to [facility] s/p syncope. Hospital complicated by acute hypoxic respiratory failure. Pigtail and eventually a chest tube was placed. Imaging of the chest revealed multiple concerning findings - CHRISTINE cavitary lesion likely 2' PNA, placement of pigtail within a mass. Hospital course there also c/b MSSA bacteremia and discover of cardiac lesion that could be mass v. veg v. thrombus. 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 S/p IVC filter. Pt now transferred to [facility] MICU for further care. Thoracic, ID, onc, pall care on board. Chest tube removed. Respiratory status remains stable. Now transfer to floor for further management.       Pt seen and evaluated at bedside. Upset over delay in testing. Labs reviewed.    #Metastatic rectal cA  #Neoplasm related pain  #mSSA bacteremia 2' suspected MSSA PNA and cavitary lung lesion  #IlAic vein thrombosis  #HIV  #SQ emphysema  #R/o cardiac mass  #Severe protein calorie malnutrition  #Hypomagnesemia/hypophosphatemia       -MRI w/ anesthesia not done. Anesthesia states degree of sedation would put his life at risk. Pt now in agreement to proceed w/o anesthesia. Will attempt to premedicate to mitigate sx. Will recoordinate w/ MRI dept.  -Inc metoprolol 25 BID.  -Cont cefazolin till 10/13  -Replete electrolytes as ordered.  -Maintain pedraza catheter for now. Cont to reassess pending improve conditioning and scrotal swelling.  -Cont lovenox full dose for VTE.  -RT planning - pending MR.  -Dispo planning consider RT/[facility] needs. Will f/u [facility].
31M with PMH of metastatic rectal carcinoma, HIV, cataracts who was admitted to [facility] s/p syncope. Hospital complicated by acute hypoxic respiratory failure. Pigtail and eventually a chest tube was placed. Imaging of the chest revealed multiple concerning findings - CHRISTINE cavitary lesion likely 2' PNA, placement of pigtail within a mass. Hospital course there also c/b MSSA bacteremia and discover of cardiac lesion that could be mass v. veg v. thrombus. 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 S/p IVC filter. Pt now transferred to [facility] MICU for further care. Thoracic, ID, onc, pall care on board. Chest tube removed. Respiratory status remains stable. Now transfer to floor for further management. Currently on ABx for MSSA bacteremia. Pending Cardiac MRI for evaluation of cardiac mass.       Patient evaluated at bedside with mother present. He is upset about having been placed in an isolation room, feels that he caught TB from sharing a room with a coughing patient. Assured that organism isolated from lung is non-TB mycobacterium, likely in setting of immunocompromise. Otherwise, has chronic pain in rectum from malignancy but no other new acute complaints noted.    #Metastatic rectal ca  #Neoplasm related pain  #MSSA bacteremia 2/2 suspected MSSA PNA and cavitary lung lesion  #Iliac vein thrombosis  #HIV  #SQ emphysema  #R/o cardiac mass  #Severe protein calorie malnutrition  #Hypomagnesemia/hypophosphatemia     -Patient to undergo cardiac MRI w/ premedication, tentative for 9/26.   -Monitor HR (high 90s-mid 100s). Titrate metoprolol as tolerated - last inc 9/22. Potentially 2/2 volume dehydration given suboptimal PO intake, will provide fluids and reassess HR. If still elevated despite fluids, will uptitrate metoprolol  -C/w cefazolin through 10/13  -Maintain Rosario catheter for now. Cont to reassess pending improve conditioning and scrotal swelling.  -C/w Lovenox full dose for VTE  -Pain control for malignancy related pain  -Dispo to [facility] pending cardiac MRI results. Explained to pt that if he goes to rehab, he may need to complete rehab first before starting RT. He understood. Will f/u SW.
31M with PMH of metastatic rectal carcinoma, HIV, cataracts who was admitted to [facility] s/p syncope. Hospital complicated by acute hypoxic respiratory failure. Pigtail and eventually a chest tube was placed. Imaging of the chest revealed multiple concerning findings - CHRISTINE cavitary lesion likely 2' PNA, placement of pigtail within a mass. Hospital course there also c/b MSSA bacteremia and discover of cardiac lesion that could be mass v. veg v. thrombus. 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 S/p IVC filter. Pt now transferred to [facility] MICU for further care. Thoracic, ID, onc, pall care on board. Chest tube removed. Respiratory status remains stable. Now transfer to floor for further management. Currently on ABx for MSSA bacteremia. Failed multiple attempts at getting cardiac MRI due to inability to tolerate. After discussion with family and cards - will defer on any cardiac imaging as utility of imaging unclear at this time based on clinical status and plan. Hospital course prolonged at this time due to pending infectious workup - sputum AFB showing growth - awaiting identification.     Pt seen and evaluated at bedside. Yesterday had pedraza removed. Pt states he's voided. However, nursing unable to verify. Remains challenging to obtain Bscan due to pt intolerance. No changes to exam. Still w/ scrotal swelling. Labs stable.     #Metastatic rectal ca  #Neoplasm related pain  #MSSA bacteremia 2/2 suspected MSSA PNA and cavitary lung lesion  #Iliac vein thrombosis  #HIV  #SQ emphysema  #R/o cardiac mass  #Severe protein calorie malnutrition  #Hypomagnesemia/hypophosphatemia    -Cont Cefazolin IV till 10/13.  -HR better. Cont metoprolol.  -AFB pos 1 out of 4 [OSH specimens] on 9/3. Final cx not yet available. Lab status available in HIE.  -Replete lytes as ordered.  -Pedraza removed - failed TOV. Pedraza replaced.  -Dispo planning to [facility] - however, that will depend on OSH cx results. Complex case. F/u SW/CM.     Rest of plan as above.
32 yo M PMH metastatic rectal cancer, HIV admitted w/ acute hypoxic respiratory failure w/ cavitary lung lesion, MSSA bacteremia, cardiac lesion, external iliac vein thrombosis on therapeutic lovenox, transferred to [facility] for further care, s/p removal of chest tube 9/16. Course c/b subcutaneous emphysema, SVT, neoplasm related pain, hypercalcemia of malignancy, and hyponatremia.    chronically ill appearing, frail and cachectic, emaciated    IV Ancef til 10/13 to complete course for MSSA bacteremia with suspected MSSA PNA  Metastatic rectal cancer c/b neoplasm related pain: unable to tolerate inpatient RT, hospice referral.  Hypercalcemia resolved  Leukocytosis: reactive 2/2 malignancy? afebrile  Overall poor prognosis with multiple comorbidities  c/w pedraza    Dispo: pending hospice referral
31M with PMH of metastatic rectal carcinoma, HIV, cataracts who was admitted to [facility] s/p syncope. Hospital complicated by acute hypoxic respiratory failure. Pigtail and eventually a chest tube was placed. Imaging of the chest revealed multiple concerning findings - CHRISTINE cavitary lesion likely 2' PNA, placement of pigtail within a mass. Hospital course there also c/b MSSA bacteremia and discover of cardiac lesion that could be mass v. veg v. thrombus. 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 S/p IVC filter. Pt now transferred to [facility] MICU for further care. Thoracic, ID, onc, pall care on board. Chest tube removed. Respiratory status remains stable. Now transfer to floor for further management. Currently on ABx for MSSA bacteremia. Failed multiple attempts at getting cardiac MRI. Remains hospitalized pending final decision re: cardiac workup.     Patient evaluated at bedside. No O/N events. Returned to bedside after failed attempt at MRI. Exam unchanged. Labs reviewed.    #Metastatic rectal ca  #Neoplasm related pain  #MSSA bacteremia 2/2 suspected MSSA PNA and cavitary lung lesion  #Iliac vein thrombosis  #HIV  #SQ emphysema  #R/o cardiac mass  #Severe protein calorie malnutrition  #Hypomagnesemia/hypophosphatemia     -Cont Cefazolin IV till 10/13.   -HR better. Cont metoprolol.  -AFB pos for non-TB. Final cx not yet available. Will reach out to [facility] micro for additional detail. In the mean time, pt's mother to assist with EMR/patient side access online to get latest/up-to-date information.   -Unable to performed cardiac MRI. Appreciate cards input. D/w Dr Plaza. Utility of additional imaging for the heart is unclear. Pt appropriately anticoagulated for iliac vein thrombosis. On appropriate therapy for bacteremia. He is otherwise clinically stable. Patient's comorbidities put him at higher risk for additional diagnostics (i.e. JENNIFER) and other more invasive interventions depending on findings. Based on current course and discussion with patient's mother - would hold off any additional imaging at this time.   -Replete lytes as ordered.  -TOV depending on scrotal swelling.  -Dispo planning to [facility] - however, that will depend on OSH cx results. Complex case. F/u SW/CM.    Rest of plan as above.
Patient seen and examined, d/w Dr. Carreno, agree w/ above.    30 yo M w/ metastatic rectal cancer, HIV admitted w/ acute hypoxic respiratory failure w/ cavitary lung lesion, MSSA bacteremia, cardiac lesion, external iliac vein thrombosis, transferred to [facility] for further care, s/p removal of chest tube 9/16, course c/b subcutaneous emphysema, SVT, neoplasm related pain, hypercalcemia of malignancy, hyponatremia. Prolonged hospital course, plan for IV Ancef til 10/13 to complete course for MSSA bacteremia, patient has been unable to tolerate cardiac MRI to eval mass. 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 Currently pending rehab placement. Patient cachetic on exam, noted to have severe protein calorie malnutrition. Is expressing frustration as PCA "taking too long" to clean him, RN brought in to assist.     # MSSA bacteremia w/ suspected MSSA PNA and cavitary lung lesion, cardiac mass  - unable to tolerate cardiac MRI, plan for IV Ancef til 10/13, likely will need line placement for [facility]   # Metastatic rectal cancer c/b neoplasm related pain  - otpt f/u for radiation therapy, f/u otpt for further oncological therapy  - c/w pain medications (methadone, dilaudid, gabepentin, lidocaine patch etc), bowel regimen to prevent opioid induced constipation, otpt palliative followup for further pain management  # Hypercalcemia of malignancy  - c/w IV fluids, pamidronate, continue to monitor  #  HIV   - c/w biktarvy  # Iliac vein thrombosis  - on therapeutic lovenox  # Severe protein calorie malnutrition  - c/w diet as tolerated, supplements  # hyponatremia   - Na 134, continue to monitor  # SVT   - on metoprolol, continue to monitor HR    Dispo: [facility] with pedraza in place (otpt f/u for removal)
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 Course c/b subcutaneous emphysema, SVT, neoplasm related pain, hypercalcemia of malignancy, hyponatremia.    IV Ancef til 10/13 to complete course for MSSA bacteremia with suspected MSSA PNA  Metastatic rectal cancer c/b neoplasm related pain: Rad Onc planning to start inpatient RT 10/4, if tolerating then plan to complete 5 fractions.   Hold off midline placement until DC planning  Hypercalcemia of malignancy: recurrent and Ca increased to 13.0, c/w IVF, recently received pamidronate  Leukocytosis also noted to 16 today, afebrile, no new infectious symptoms, ? reactive from underlying malignancy, hemeconcentration?   Repeat labs once patient is agreeable    Dispo: pending inpatient RT, eventual [facility] with pedraza in place (outpt f/u for removal) .
31M with PMH of metastatic rectal carcinoma, HIV, cataracts who was admitted to [facility] s/p syncope. Hospital complicated by acute hypoxic respiratory failure. Pigtail and eventually a chest tube was placed. Imaging of the chest revealed multiple concerning findings - CHRISTINE cavitary lesion likely 2' PNA, placement of pigtail within a mass. Hospital course there also c/b MSSA bacteremia and discover of cardiac lesion that could be mass v. veg v. thrombus. 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 S/p IVC filter. Pt now transferred to [facility] MICU for further care. Thoracic, ID, onc, pall care on board. Chest tube removed. Respiratory status remains stable. Now transfer to floor for further management. Currently on ABx for MSSA bacteremia. Failed multiple attempts at getting cardiac MRI due to inability to tolerate. After discussion with family and cards - will defer on any cardiac imaging as utility of imaging unclear at this time based on clinical status and plan. Hospital course prolonged at this time due to pending infectious workup. AFB result finally back 9/30 - showing "Mycobacterium chimaera/intercellulare group isolated." As per ID - no need to keep on iso. No need to treat. No contraindication to DC. Dispo planning in progress.     Pt seen and evaluated at bedside. Rosario in place. No complaints. Pain is controlled. Exam unchanged. No new labs. Pt continues to refuse.     #Metastatic rectal ca  #Neoplasm related pain  #MSSA bacteremia 2/2 suspected MSSA PNA and cavitary lung lesion  #Hypercalcemia likely 2' malignancy  #Iliac vein thrombosis  #HIV  #SQ emphysema  #R/o cardiac mass  #Severe protein calorie malnutrition  #Hypomagnesemia/hypophosphatemia    -Pt refusing blood work - discussed w/ pt to allow draws to allow us to monitor hypercalcemia. Will cont to encourage cooperation.  -Cont Cefazolin IV till 10/13.   -HR better. Cont metoprolol.  -Rosario to be kept in place, already failed TOV - f/u as OP for removal.  -Dispo planning to [facility] - f/u SW.    Rest of plan as above.
31M with PMH of metastatic rectal carcinoma, HIV, cataracts who was admitted to [facility] s/p syncope. Hospital complicated by acute hypoxic respiratory failure. Pigtail and eventually a chest tube was placed. Imaging of the chest revealed multiple concerning findings - CHRISTINE cavitary lesion likely 2' PNA, placement of pigtail within a mass. Hospital course there also c/b MSSA bacteremia and discover of cardiac lesion that could be mass v. veg v. thrombus. 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 S/p IVC filter. Pt now transferred to [facility] MICU for further care. Thoracic, ID, onc, pall care on board. Chest tube removed. Respiratory status remains stable. Now transfer to floor for further management.    Pt seen and evaluated at bedside. No new complaints. Labs reviewed.     #Metastatic rectal ca  #Neoplasm related pain  #MSSA bacteremia 2' suspected MSSA PNA and cavitary lung lesion  #Iliac vein thrombosis  #HIV  #SQ emphysema  #R/o cardiac mass  #Severe protein calorie malnutrition  #Hypomagnesemia/hypophosphatemia       -MRI w/ anesthesia pending - hopefully tomorrow.  -Cont cefazolin till 10/13  -Replete electrolytes as ordered.  -Maintain pedraza catheter for now. Cont to reassess pending improve conditioning and scrotal swelling.  -Cont lovenox full dose for VTE.  -RT planning - pending MR.  -Dispo planning consider RT/[facility] needs. Will f/u SW.       Rest of plan as above.
32 yo M PMH metastatic rectal cancer, HIV admitted w/ acute hypoxic respiratory failure w/ cavitary lung lesion, MSSA bacteremia, cardiac lesion, external iliac vein thrombosis on therapeutic lovenox, transferred to [facility] for further care, s/p removal of chest tube 9/16. Course c/b subcutaneous emphysema, SVT, neoplasm related pain, hypercalcemia of malignancy, and hyponatremia. No acute events. Remains on IV ancef planned until 10/13 for MSSA bacteremia with c/f MSSA pna. Unable to tolerate RT for metastatic rectal ca c/b neoplasm related pain. Appreciate Oncology input, continue GOC discussions given poor prognosis. mother agreeable to hospice referral. Remainder as above.
32 yo M PMH metastatic rectal cancer, HIV admitted w/ acute hypoxic respiratory failure w/ cavitary lung lesion, MSSA bacteremia, cardiac lesion, external iliac vein thrombosis on therapeutic lovenox, transferred to [facility] for further care, s/p removal of chest tube 9/16. Course c/b subcutaneous emphysema, SVT, neoplasm related pain, hypercalcemia of malignancy, and hyponatremia.    Remains chronically ill appearing, frail and cachectic, emaciated  Somnolent but arousable on exam    IV Ancef til 10/13 to complete course for MSSA bacteremia with suspected MSSA PNA  Metastatic rectal cancer c/b neoplasm related pain: unable to tolerate inpatient RT, would be appropriate for hospice given overall poor functional status. Discussed with Oncology, Dr. Constantino. Onc team to discuss with patient and mother.  Hold off midline placement until DC time. May just need PIV  Hypercalcemia of malignancy: recurrent and Ca slowly downtrending, c/w IVF, recently received pamidronate, patient intermittently refusing labs  Leukocytosis slightly decreased to 15, afebrile, no new infectious symptoms, ? reactive  Overall poor prognosis with multiple comorbidities    Dispo: DC planning to [facility] if can tolerate with pedraza in place (outpt f/u for removal)
31M with PMH of metastatic rectal carcinoma, HIV, cataracts who was admitted to [facility] s/p syncope. Hospital complicated by acute hypoxic respiratory failure. Pigtail and eventually a chest tube was placed. Imaging of the chest revealed multiple concerning findings - CHRISTINE cavitary lesion likely 2' PNA, placement of pigtail within a mass. Hospital course there also c/b MSSA bacteremia and discover of cardiac lesion that could be mass v. veg v. thrombus. 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 S/p IVC filter. Pt now transferred to [facility] MICU for further care. Thoracic, ID, onc, pall care on board. Chest tube removed. Respiratory status remains stable. Now transfer to floor for further management. Currently on ABx for MSSA bacteremia. Failed multiple attempts at getting cardiac MRI. Remains hospitalized pending final decision re: cardiac workup.     Patient evaluated at bedside. No O/N events. Returned to bedside after failed attempt at MRI. Exam unchanged. Labs reviewed.    #Metastatic rectal ca  #Neoplasm related pain  #MSSA bacteremia 2/2 suspected MSSA PNA and cavitary lung lesion  #Iliac vein thrombosis  #HIV  #SQ emphysema  #R/o cardiac mass  #Severe protein calorie malnutrition  #Hypomagnesemia/hypophosphatemia     -Unable to perform cardiac MRI as pt could not tolerate despite attempts to optimize regimen. Anesthesia unable to assist given amt of sedation required would be dangerous. Will discuss with cards for further recommendations.   -Still w/ episodes of NSVT. Volume optimized. Pt is drinking. He has also received IVF. Would inc metoprolol as BP allows. Unclear if arrythmia may be triggered by presence of cardiac lesion.   -C/w cefazolin through 10/13  -Maintain Rosario catheter for now. Cont to reassess pending improve conditioning and scrotal swelling.  -C/w Lovenox full dose for VTE  -Pain control for malignancy related pain  -F/u ID re: positive AFB from OSH (non-TB).  -Dispo to [facility] pending final decision re: cardiac lesion workup.     Rest of rosy as above.
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 Course c/b subcutaneous emphysema, SVT, neoplasm related pain, hypercalcemia of malignancy, and hyponatremia. No acute events. Remains on IV ancef planned until 10/13 for MSSA bacteremia with c/f MSSA pna. Unable to tolerate RT for metastatic rectal ca c/b neoplasm related pain. Appreciate Oncology input, continue GOC discussions given poor prognosis. mother agreeable to hospice referral.
31M with PMH of metastatic rectal carcinoma, HIV, cataracts who was admitted to [facility] s/p syncope. Hospital complicated by acute hypoxic respiratory failure. Pigtail and eventually a chest tube was placed. Imaging of the chest revealed multiple concerning findings - CHRISTINE cavitary lesion likely 2' PNA, placement of pigtail within a mass. Hospital course there also c/b MSSA bacteremia and discover of cardiac lesion that could be mass v. veg v. thrombus. 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 S/p IVC filter. Pt now transferred to [facility] MICU for further care. Thoracic, ID, onc, pall care on board. Chest tube removed. Respiratory status remains stable. Now transfer to floor for further management. Currently on ABx for MSSA bacteremia. Pending Cardiac MRI for evaluation of cardiac mass.       Patient evaluated at bedside, complaining of residual rectal pain. Otherwise, no new acute complaints noted.    #Metastatic rectal ca  #Neoplasm related pain  #MSSA bacteremia 2/2 suspected MSSA PNA and cavitary lung lesion  #Iliac vein thrombosis  #HIV  #SQ emphysema  #R/o cardiac mass  #Severe protein calorie malnutrition  #Hypomagnesemia/hypophosphatemia     Patient to undergo cardiac MRI w/ premedication, tentative for 9/26. Appreciate pall care assistance w/ optimizing.  -Monitor HR (high 90s-mid 100s). Titrate metoprolol as tolerated - last inc 9/22.  -Cont cefazolin through 10/13  -Maintain pedraza catheter for now. Cont to reassess pending improve conditioning and scrotal swelling.  -Cont lovenox full dose for VTE  -Dispo to [facility] pending cardiac MRI results. Explained to pt that if he goes to rehab, he may need to complete rehab first before starting RT. He understood. Will f/u SW.
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 Course c/b subcutaneous emphysema, SVT, neoplasm related pain, hypercalcemia of malignancy, and hyponatremia. Remains on IV ancef planned until 10/13 for MSSA bacteremia with c/f MSSA pna. Unable to tolerate RT for metastatic rectal ca c/b neoplasm related pain. Appreciate Oncology input, continue GOC discussions given poor prognosis, mother agreeable to hospice referral.
31M with PMH of metastatic rectal carcinoma, HIV, cataracts who was admitted to [facility] s/p syncope. Hospital complicated by acute hypoxic respiratory failure. Pigtail and eventually a chest tube was placed. Imaging of the chest revealed multiple concerning findings - CHRISTINE cavitary lesion likely 2' PNA, placement of pigtail within a mass. Hospital course there also c/b MSSA bacteremia and discover of cardiac lesion that could be mass v. veg v. thrombus. 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 S/p IVC filter. Pt now transferred to [facility] MICU for further care. Thoracic, ID, onc, pall care on board. Chest tube removed. Respiratory status remains stable. Now transfer to floor for further management.       Pt seen and evaluated at bedside. Upset over delay in testing. Labs reviewed.    #Metastatic rectal cA  #Neoplasm related pain  #mSSA bacteremia 2' suspected MSSA PNA and cavitary lung lesion  #IlAic vein thrombosis  #HIV  #SQ emphysema  #R/o cardiac mass  #Severe protein calorie malnutrition  #Hypomagnesemia/hypophosphatemia     -Anesthesia would not be able to assist with sedation w/ cardiac MRI given concerns of amt of sedation required. Patient now agreeable w/ undergoing MRI w/ premedication. Appreciate pall care assistance w/ optimizing. STudy anticipated on MONDAY.  -Monitor HR - titrate metoprolol as tolerated - last inc 9/22.  -Cont cefazolin till 10/13  -Maintain pedraza catheter for now. Cont to reassess pending improve conditioning and scrotal swelling.  -Cont lovenox full dose for VTE.  -Dispo to [facility] pending cardiac MRI results. Explained to pt that if he goes to rehab, he may need to complete rehab first before starting RT. He understood. Will f/u SW.
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 Course c/b subcutaneous emphysema, SVT, neoplasm related pain, hypercalcemia of malignancy, and hyponatremia.    Remains chronically ill appearing, frail and cachectic    IV Ancef til 10/13 to complete course for MSSA bacteremia with suspected MSSA PNA  Metastatic rectal cancer c/b neoplasm related pain: Rad Onc to reattempt inpatient RT and if tolerating then plan to complete 5 fractions.   Hold off midline placement until DC planning  Hypercalcemia of malignancy: recurrent and Ca slowly downtrending, c/w IVF, recently received pamidronate, patient intermittently refusing labs  Leukocytosis slightly decreased to 15, afebrile, no new infectious symptoms, ? reactive from underlying malignancy, hemeconcentration?   Overall poor prognosis with multiple comorbidities    Dispo: pending inpatient RT, eventual HAYDEN with pedraza in place (outpt f/u for removal)    Discussed with palliative attending, Dr. Alfaro and patient's mother, Sophie Bradley at bedside: [phone], [phone]
31M with PMH of metastatic rectal carcinoma, HIV, cataracts who was admitted to [facility] s/p syncope. Hospital complicated by acute hypoxic respiratory failure. Pigtail and eventually a chest tube was placed. Imaging of the chest revealed multiple concerning findings - CHRISTINE cavitary lesion likely 2' PNA, placement of pigtail within a mass. Hospital course there also c/b MSSA bacteremia and discover of cardiac lesion that could be mass v. veg v. thrombus. 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 S/p IVC filter. Pt now transferred to [facility] MICU for further care. Thoracic, ID, onc, pall care on board. Chest tube removed. Respiratory status remains stable. Now transfer to floor for further management.     Pt seen at bedside. No o/n events. Still reporting pain. Concern he won't be able to stay still for MRI.     #Metastatic rectal ca  #Neoplasm related pain  #Cavitary lung lesion  #MSSA bacteremia 2' suspected MSSA PNA  #Iliac vein thrombosis  #HIV  #SQ emphysema  #R/o cardiac mass   #Severe protein calorie malnutrition  #Hypomagnesemia/hypophosphatemia    -Appreciate pall care recs - multimodal pain control - ongoing titration - closely monitor for adverse effects given high requirement.  -Rad onc recs appreciated.   -Cardiac MRI pending - will f/u with cards re: JENNIFER given pt's clinical improvement. .   -Cont cefazolin till 10/13  -Replete electrolytes as ordered.   -Maintain pedraza catheter for now. Cont to reassess pending improve conditioning and scrotal swelling.   -Cont lovenox full dose for VTE.    Rest of plan as above.
31M with PMH of metastatic rectal carcinoma, HIV, cataracts who was admitted to [facility] s/p syncope. Hospital complicated by acute hypoxic respiratory failure. Pigtail and eventually a chest tube was placed. Imaging of the chest revealed multiple concerning findings - CHRISTINE cavitary lesion likely 2' PNA, placement of pigtail within a mass. Hospital course there also c/b MSSA bacteremia and discover of cardiac lesion that could be mass v. veg v. thrombus. 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 S/p IVC filter. Pt now transferred to [facility] MICU for further care. Thoracic, ID, onc, pall care on board. Chest tube removed. Respiratory status remains stable. Now transfer to floor for further management.     Pt seen at bedside. No o/n events.     #Metastatic rectal ca  #Neoplasm related pain  #Cavitary lung lesion  #MSSA bacteremia 2' suspected MSSA PNA  #Iliac vein thrombosis  #HIV  #SQ emphysema  #R/o cardiac mass   #Severe protein calorie malnutrition  #Hypomagnesemia/hypophosphatemia    -Appreciate pall care recs - multimodal pain control - increasing methadone. Adjust dilaudid per pall care.   -Rad onc consulted - await input.   -Cardiac MRI pending.   -Cont cefazolin till 10/13  -Replete electrolytes as ordered.   -Maintain pedraza catheter for now. Cont to reassess pending improve conditioning and scrotal swelling.   -DC heparin drip, switch to lovenox 1mg/kg q12h.    Rest of plan as above.
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 Course c/b subcutaneous emphysema, SVT, neoplasm related pain, hypercalcemia of malignancy, hyponatremia.     Extremely frail and cachectic appearing man  limited interaction/speech  + pedraza    Plan for IV Ancef til 10/13 to complete course for MSSA bacteremia with suspected MSSA PNA, midline placement planned  Patient has been unable to tolerate cardiac MRI to eval mass.   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  Metastatic rectal cancer c/b neoplasm related pain: plan to start palliative inpatient RT on 10/4 per Rad-Onc  Hypercalcemia of malignancy: recurrent and Ca increased to 13.0, resume IVF, recently received pamidronate  Leukocytosis also noted to 16 today, afebrile, no new infectious symptoms, ? reactive from underlying malignancy, hemeconcentration?  Rpt CBC, BMP after IVF  Hyponatremia improved    Dispo: pending inpatient RT, midline placement then HAYDEN with pedraza in place (otpt f/u for removal) .

## 2022-10-10 NOTE — PROGRESS NOTE ADULT - PROBLEM SELECTOR PLAN 11
Na 137. On admission, previously 120 with history significant for decreased PO intake. Otherwise asymptomatic. Likely mixed picture due to poor PO intake and possible SIADH from pain  - more likely a hypovolemic hyponatremia and SIADH from pain  - encourage PO intake

## 2022-10-10 NOTE — PROGRESS NOTE ADULT - PROBLEM SELECTOR PLAN 6
Patient with TTE performed on at [facility] with evidence of two large RV masses and two additional small masses. IVC filter placed prophylactically at that time due to undiagnosed vegetations on the TTE.  - repeat TTE 9/9 was a limited study but demonstrated a mobile mass likely 2/2 tumour, thrombus, or vegetation.   - per cardiology risk of JENNIFER outweighs benefit at this time  - refused cardiac MRI again (3 attempts since last week; patient not a candidate for general anesthesia)  - per cardiology, no acute indication to do cardiac MRI/ JENNIFER at this time given patients other active issues  - monitor on tele  - can follow up outpatient for serial TTEs or JENNIFER once medically stable

## 2022-10-10 NOTE — PROGRESS NOTE ADULT - PROBLEM SELECTOR PLAN 1
Patient with history of untreated HPV related rectal cancer with episode of rectal bleeding at [facility] Hgb 5.9 s/p 2u pRBCs.  - CT with evidence of liver and bone lesions  - Pt initially planned for 5 fractions, w/ 1st fraction on 10/04 and 2nd fraction on 10/05, however pt did not tolerate both times d/t pain, despite receiving increased dose of Dilaudid prior to 2nd fraction  - per heme-onc, already discussed with family/pt of no more treatment being offered  - primary team contacted mom and mom expressed wish for hospice referral      - explained to pt of this and pt requires some time to think about this - 10/8 Patient with history of untreated HPV related rectal cancer with episode of rectal bleeding at [facility] Hgb 5.9 s/p 2u pRBCs.  - CT with evidence of liver and bone lesions  - Pt initially planned for 5 fractions, w/ 1st fraction on 10/04 and 2nd fraction on 10/05, however pt did not tolerate both times d/t pain, despite receiving increased dose of Dilaudid prior to 2nd fraction  - per heme-onc, already discussed with family/pt of no more treatment being offered  - hospice referral pending

## 2022-10-10 NOTE — PROGRESS NOTE ADULT - PROBLEM SELECTOR PLAN 7
Patient with history of MSSA bacteremia found at [facility] with blood cultures 8/31 positive for MSSA and repeat on 9/2 NGTD.  - s/p Zosyn (9/8 - 9/9)  - transitioned to Cefazolin 2GM q8h for 6 weeks ending (9/9-10/13)   - may need midline placement prior to discharge for antibiotics at rehab -- but currently not in lines with goals of care as discussed with mom on 10/7. Currently awaiting pt decision regarding this       - upon discharge, will need weekly CBC, BUN and creatinine and ID clinic follow up -- if in lines with goals of care

## 2022-10-10 NOTE — PROGRESS NOTE ADULT - SUBJECTIVE AND OBJECTIVE BOX
Estella OfeWilian | PGY-2  Internal Medicine    OVERNIGHT EVENTS: no overnight events      SUBJECTIVE: Patient was examined at bedside this morning. Appeared comfortable. Overnight vitals and monitoring results were reviewed. Denied chest pain, SOB, abdominal pain, vomiting, diarrhea, constipation.       MEDICATIONS  (STANDING):  bictegravir 50 mG/emtricitabine 200 mG/tenofovir alafenamide 25 mG (BIKTARVY) 1 Tablet(s) Oral daily  ceFAZolin   IVPB 2000 milliGRAM(s) IV Intermittent every 8 hours  chlorhexidine 2% Cloths 1 Application(s) Topical <User Schedule>  enoxaparin Injectable 60 milliGRAM(s) SubCutaneous every 12 hours  gabapentin 300 milliGRAM(s) Oral three times a day  HYDROmorphone   Tablet 8 milliGRAM(s) Oral once  lactated ringers. 1000 milliLiter(s) (75 mL/Hr) IV Continuous <Continuous>  lactulose Syrup 10 Gram(s) Oral daily  lidocaine   4% Patch 1 Patch Transdermal daily  lidocaine   4% Patch 1 Patch Transdermal daily  melatonin 3 milliGRAM(s) Oral at bedtime  methadone    Tablet 5 milliGRAM(s) Oral three times a day  metoprolol tartrate 50 milliGRAM(s) Oral two times a day  naloxegol 25 milliGRAM(s) Oral daily  polyethylene glycol 3350 17 Gram(s) Oral two times a day  senna 2 Tablet(s) Oral at bedtime    MEDICATIONS  (PRN):  bisacodyl 5 milliGRAM(s) Oral at bedtime PRN Constipation  HYDROmorphone   Tablet 4 milliGRAM(s) Oral every 4 hours PRN Moderate Pain (4 - 6)  HYDROmorphone   Tablet 6 milliGRAM(s) Oral every 4 hours PRN Severe Pain (7 - 10)  naloxone Injectable 0.1 milliGRAM(s) IV Push every 3 minutes PRN opioid intoxication        T(F): 97.8 (10-10-22 @ 05:25), Max: 98.5 (10-09-22 @ 11:20)  HR: 82 (10-10-22 @ 05:25) (82 - 100)  BP: 112/66 (10-10-22 @ 05:25) (99/69 - 112/66)  BP(mean): --  RR: 16 (10-10-22 @ 05:25) (14 - 16)  SpO2: 95% (10-10-22 @ 05:25) (95% - 99%)    PHYSICAL EXAM:     RESPIRATORY: Normal respiratory effort; lungs are clear to auscultation bilaterally; crackles heard- emphysematous changes present   CARDIOVASCULAR: Regular rate and rhythm, normal S1 and S2, no murmur/rub/gallop; No lower extremity edema; Peripheral pulses are 2+ bilaterally  ABDOMEN: Nontender to palpation, normoactive bowel sounds, no rebound/guarding; No hepatosplenomegaly  MUSCULOSKELETAL: no clubbing or cyanosis of digits; no joint swelling or tenderness to palpation  PSYCH: A+O to person, place, and time; affect appropriate    TELEMETRY:    LABS:                        11.5   20.93 )-----------( 160      ( 09 Oct 2022 06:15 )             39.5     10-09    137  |  101  |  60<H>  ----------------------------<  92  5.2   |  20<L>  |  0.54    Ca    8.5      09 Oct 2022 06:15  Phos  4.8     10-09  Mg     2.00     10-09              Creatinine Trend: 0.54<--, 0.54<--, 0.41<--, 0.45<--, 0.60<--, 0.71<--  I&O's Summary    BNP    RADIOLOGY & ADDITIONAL STUDIES:

## 2022-10-10 NOTE — CHART NOTE - NSCHARTNOTESELECT_GEN_ALL_CORE
pedraza/Event Note
Event Note
Follow-up/Nutrition Services
MAR Accept/Event Note
Nutrition Services
Off Service Note
Thoracic f/u/Event Note
Transfer Note
Treatment Refusal/Event Note
rad onc/Event Note
rad onc/Event Note
Follow Up/Nutrition Services
Follow Up/Nutrition Services
MICU Transfer note/Transfer Note
Rad onc/Event Note
Rosario Cath
Thoracic Surgery/Event Note
Thoracic Surgery/Off Service Note
Thoracic surgery
palliative care/Event Note

## 2022-10-10 NOTE — PROGRESS NOTE ADULT - PROBLEM SELECTOR PLAN 3
previously received calcitonin x 2 doses and pamidronate 90mcg IVPB x1.  -off IVF  - continue to trend levels q 24 hrs  - can repeat pamidronate after 1 week PRN (last received 10/2) if remains hyperCa  - vit D low but will hold supplementation in the setting of hypercalcemia of malignancy

## 2022-10-10 NOTE — ADVANCED PRACTICE NURSE CONSULT - REASON FOR CONSULT
Patient seen on skin care rounds after wound care referral received for assessment of skin impairment and recommendations of topical management. Patient is a 31 M with untreated metastatic ano-rectal SCC (followed by Dr Frazier [facility] oncology) with mets to liver and possibly bone, HPV positive, HIV infection, and R eye cataracts who presented to the [facility] on 8/31/22 after syncopal episode at home and transferred to [facility] for continued of care per family request. Patient is on IV antibiotic therapy for MSSA bacteremia,  s/p pamidronate and IVF for hypercalcemia, consulted by heme/onc for Anal SCC, pulmonology for Cavitary lesion of lung. Chart reviewed: WBC 20.41 , H/H 11.5/38.3, INR 1.21 , Platelets 164, hA1c 5.1, BMI 15.3, roxana 13. Pending acceptance to inpatient hospice.    Patient seen on skin care rounds after wound care referral received for assessment of skin impairment and recommendations of topical management. Patient is a 31 y/o M with untreated metastatic ano-rectal SCC (followed by Dr Frazier [facility] oncology) with mets to liver and possibly bone, HPV positive, HIV infection, and R eye cataracts who presented to the [facility] on 8/31/22 after syncopal episode at home and transferred to [facility] for continued of care per family request. Patient is on IV antibiotic therapy for MSSA bacteremia,  s/p pamidronate and IVF for hypercalcemia, consulted by heme/onc for Anal SCC, pulmonology for Cavitary lesion of lung. Chart reviewed: WBC 20.41 , H/H 11.5/38.3, INR 1.21 , Platelets 164, hA1c 5.1, BMI 15.3, roxana 13. Pending acceptance to inpatient hospice.    Patient seen on skin care rounds after wound care referral received for assessment of skin impairment and recommendations of topical management. Patient is a 33 y/o M with untreated metastatic ano-rectal SCC (followed by Dr Frazier [facility] oncology) with mets to liver and possibly bone, HPV positive, HIV infection, and R eye cataracts who presented to the [facility] on 8/31/22 after syncopal episode at home and transferred to [facility] for continued of care per family request. Patient is on IV antibiotic therapy for MSSA bacteremia,  s/p pamidronate and IVF for hypercalcemia, consulted by heme/onc for Anal SCC, pulmonology for Cavitary lesion of lung. Chart reviewed: WBC 20.41 , H/H 11.5/38.3, INR 1.21 , Platelets 164, hA1c 5.1, BMI 15.3, roxana 13. Pending palliative care consult.

## 2022-10-10 NOTE — CHART NOTE - NSCHARTNOTEFT_GEN_A_CORE
MICU Accept Note    CHIEF COMPLAINT:   shortness of breath    HPI / INTERVAL HISTORY:  31 M with PMH/ HIV, rectal cancer not on tx (followed up by Dr Frazier ([facility] oncology)), mets to liver and possibly bone,  R eye cataract, who presented to the [facility] on 8/31/22 after syncopal episode at home. Pt with cough x 2 mos, with yellow sputum and shortness of breath since 5 days PTA.   -- at [facility]: pt was found to be hypoxemic. 8/31/22 -- L chest wall Pigtail placement. Pigtail was placed for suspected PMX with improvement in shortness of breath. CT chest was performed, which showed that pigtail is within the mass__ differential is TB, fungal infection, cavitary lesion, or squamous cell carcinoma. Pt was found to be anemic due to rectal bleeding with Hgb 5.9 s/p 2 U PRBC on 9/1. Blood cxs with MSSA bacteremia, and has been treated with Cefazolin and Zosyn.  Since 9/2 pt with worsening diffuse SC emphysema extending to the neck/face/all 4 extremities/scrotum/back , which has been getting progressively worse over past 2 days as per pt's statement. 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 TTE with two large RV masses and two additional small masses. 9/7-- s/p L lateral chest tube to 14 Fr. 9/7 -- s/p IVC Filter placement (as per [facility] statement, was placed prophylactically due to undiagnosed vegetations on the TTE. 12 cm multiloculated thick walled cavitary mass in the CHRISTINE and lingula.+ liver lesion on the CT a/p. + external iliac vein thrombosis, PE study was indeterminate   __ pt was transferred to [facility] as per family request (mother) for pt to be evaluated by his outpt oncology team (Dr Frazier). As per oncology team the plan is to start Carbotaxol q 3 weeks + RT for diffuse mets as per PET scan,     RRT was called on 10/10 for altered mental status and respiratory distress. Patient was successfully intubated and transferred to the MICU for further management and care.       PAST MEDICAL & SURGICAL HISTORY:  HIV infection  6/30/2022 virus detected, switched to Biktarvy, male partners    Current smoker      History of depression  and anxiety      Rectal cancer  not on treatment      Right cataract      No significant past surgical history          FAMILY HISTORY:      SOCIAL HISTORY:  Smoking: current smoker   Substance Use:   EtOH Use:   Marital Status:   Sexual History:   Occupation:  Recent Travel:  Country of Birth:   Advance Directives:     HOME MEDICATIONS:      Allergies    ibuprofen (Angioedema)    Intolerances          REVIEW OF SYSTEMS:  Constitutional: No fevers, chills, weight loss, weight gain  HEENT: No vision problems, eye pain, nasal congestion, rhinorrhea, sore throat, dysphagia  CV: No chest pain, orthopnea, palpitations  Resp: No cough, dyspnea, wheezing, hemoptysis  GI: No nausea, vomiting, diarrhea, constipation, abdominal pain  : [ ] dysuria [ ] nocturia [ ] hematuria [ ] increased urinary frequency  Musculoskeletal: [ ] back pain [ ] myalgias [ ] arthralgias [ ] fracture  Skin: [ ] rash [ ] itch  Neurological: [ ] headache [ ] dizziness [ ] syncope [ ] weakness [ ] numbness  Psychiatric: [ ] anxiety [ ] depression  Endocrine: [ ] diabetes [ ] thyroid problem  Hematologic/Lymphatic: [ ] anemia [ ] bleeding problem  Allergic/Immunologic: [ ] itchy eyes [ ] nasal discharge [ ] hives [ ] angioedema  [ ] All other systems negative  [x ] Unable to assess ROS because pt is sedated    OBJECTIVE:  ICU Vital Signs Last 24 Hrs  T(C): 36.7 (10 Oct 2022 13:25), Max: 36.7 (10 Oct 2022 13:25)  T(F): 98 (10 Oct 2022 13:25), Max: 98 (10 Oct 2022 13:25)  HR: 79 (10 Oct 2022 18:43) (60 - 100)  BP: 114/74 (10 Oct 2022 18:43) (103/72 - 114/74)  BP(mean): --  ABP: --  ABP(mean): --  RR: 18 (10 Oct 2022 18:43) (16 - 18)  SpO2: 95% (10 Oct 2022 18:43) (95% - 97%)    O2 Parameters below as of 10 Oct 2022 18:43  Patient On (Oxygen Delivery Method): room air              10-10 @ 07:01  -  10-10 @ 21:16  --------------------------------------------------------  IN: 100 mL / OUT: 500 mL / NET: -400 mL      CAPILLARY BLOOD GLUCOSE      POCT Blood Glucose.: 99 mg/dL (10 Oct 2022 20:36)      PHYSICAL EXAM:  GENERAL: young man, intubated  RESPIRATORY: Intubated, lungs are clear to auscultation bilaterally; crackles heard- emphysematous changes present   CARDIOVASCULAR: Regular rate and rhythm, normal S1 and S2, no murmur/rub/gallop; No lower extremity edema; Peripheral pulses are 2+ bilaterally  ABDOMEN: Nontender to palpation, normoactive bowel sounds, no rebound/guarding; No hepatosplenomegaly  MUSCULOSKELETAL: no clubbing or cyanosis of digits; no joint swelling or tenderness to palpation  PSYCH: A+Ox0, sedated  LINES:     HOSPITAL MEDICATIONS:  MEDICATIONS  (STANDING):  bictegravir 50 mG/emtricitabine 200 mG/tenofovir alafenamide 25 mG (BIKTARVY) 1 Tablet(s) Oral daily  ceFAZolin   IVPB 2000 milliGRAM(s) IV Intermittent every 8 hours  chlorhexidine 2% Cloths 1 Application(s) Topical <User Schedule>  enoxaparin Injectable 60 milliGRAM(s) SubCutaneous every 12 hours  gabapentin 300 milliGRAM(s) Oral three times a day  lactated ringers. 1000 milliLiter(s) (75 mL/Hr) IV Continuous <Continuous>  lactulose Syrup 10 Gram(s) Oral daily  lidocaine   4% Patch 1 Patch Transdermal daily  lidocaine   4% Patch 1 Patch Transdermal daily  melatonin 3 milliGRAM(s) Oral at bedtime  methadone    Tablet 5 milliGRAM(s) Oral three times a day  metoprolol tartrate 50 milliGRAM(s) Oral two times a day  naloxegol 25 milliGRAM(s) Oral daily  norepinephrine Infusion 0.3 MICROgram(s)/kG/Min (33.8 mL/Hr) IV Continuous <Continuous>  polyethylene glycol 3350 17 Gram(s) Oral two times a day  propofol Injectable 10 milliGRAM(s) IV Push once  senna 2 Tablet(s) Oral at bedtime  sodium chloride 0.9% Bolus 1000 milliLiter(s) IV Bolus once    MEDICATIONS  (PRN):  bisacodyl 5 milliGRAM(s) Oral at bedtime PRN Constipation  HYDROmorphone   Tablet 4 milliGRAM(s) Oral every 4 hours PRN Moderate Pain (4 - 6)  HYDROmorphone   Tablet 6 milliGRAM(s) Oral every 4 hours PRN Severe Pain (7 - 10)  naloxone Injectable 0.1 milliGRAM(s) IV Push every 3 minutes PRN opioid intoxication      LABS:                        11.5   20.41 )-----------( 164      ( 10 Oct 2022 07:15 )             38.3     Hgb Trend: 11.5<--, 11.5<--, 10.1<--, 9.7<--  10-10    136  |  102  |  71<H>  ----------------------------<  93  4.6   |  17<L>  |  0.57    Ca    7.9<L>      10 Oct 2022 07:15  Phos  5.0     10-10  Mg     2.00     10-10      Creatinine Trend: 0.57<--, 0.54<--, 0.54<--, 0.41<--, 0.45<--, 0.60<--            MICROBIOLOGY:     RADIOLOGY & ADDITIONAL TESTS:      ASSESSMENT AND PLAN:  Patient is a 31 M with untreated metastatic ano-rectal SCC (followed by Dr Frazier [facility] oncology) with mets to liver and possibly bone, HPV positive, HIV infection, and R eye cataracts who presented to the [facility] on 8/31/22 after syncopal episode at home and transferred to [facility] for continued of care per family request. Being treated for MSSA bacteremia. c/b scrotal swelling s/p coude catheter, hypercalcemia s/p pamidronate and IVF, and AFB from [place] w  chimaera not requiring any intervention. Patient is now undergoing trial of RT, did not tolerate first fraction 10/4 and didn't tolerate session on 10/5 even with increased pain meds before session. Pending hospice referral    #Neuro  AOx0  sedated, propofol    #Cardiovascular  -Cardiac mass.   -Patient with TTE performed on at [facility] with evidence of two large RV masses and two additional small masses. IVC filter placed prophylactically at that time due to undiagnosed vegetations on the TTE.  - repeat TTE 9/9 was a limited study but demonstrated a mobile mass likely 2/2 tumour, thrombus, or vegetation.   - per cardiology risk of JENNIFER outweighs benefit at this time  - refused cardiac MRI again (3 attempts since last week; patient not a candidate for general anesthesia)  - per cardiology, no acute indication to do cardiac MRI/ JENNIFER at this time given patients other active issues  - monitor on tele  - can follow up outpatient for serial TTEs or JENNIFER once medically stable.      #Respiratory    #GI/Nutrition    #/Renal    #Skin    #ID    #Endocrine    #Hematologic/DVT ppx    #Ethics MICU Accept Note    CHIEF COMPLAINT:   shortness of breath    HPI / INTERVAL HISTORY:  31 M with PMH/ HIV, rectal cancer not on tx (followed up by Dr Frazier ([facility] oncology)), mets to liver and possibly bone,  R eye cataract, who presented to the [facility] on 8/31/22 after syncopal episode at home. Pt with cough x 2 mos, with yellow sputum and shortness of breath since 5 days PTA.   -- at [facility]: pt was found to be hypoxemic. 8/31/22 -- L chest wall Pigtail placement. Pigtail was placed for suspected PMX with improvement in shortness of breath. CT chest was performed, which showed that pigtail is within the mass__ differential is TB, fungal infection, cavitary lesion, or squamous cell carcinoma. Pt was found to be anemic due to rectal bleeding with Hgb 5.9 s/p 2 U PRBC on 9/1. Blood cxs with MSSA bacteremia, and has been treated with Cefazolin and Zosyn.  Since 9/2 pt with worsening diffuse SC emphysema extending to the neck/face/all 4 extremities/scrotum/back , which has been getting progressively worse over past 2 days as per pt's statement. 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 TTE with two large RV masses and two additional small masses. 9/7-- s/p L lateral chest tube to 14 Fr. 9/7 -- s/p IVC Filter placement (as per [facility] statement, was placed prophylactically due to undiagnosed vegetations on the TTE. 12 cm multiloculated thick walled cavitary mass in the CHRISTINE and lingula.+ liver lesion on the CT a/p. + external iliac vein thrombosis, PE study was indeterminate   __ pt was transferred to [facility] as per family request (mother) for pt to be evaluated by his outpt oncology team (Dr Frazier). As per oncology team the plan is to start Carbotaxol q 3 weeks + RT for diffuse mets as per PET scan,     RRT was called on 10/10 for altered mental status and respiratory distress. Patient was successfully intubated and transferred to the MICU for further management and care.       PAST MEDICAL & SURGICAL HISTORY:  HIV infection  6/30/2022 virus detected, switched to Biktarvy, male partners    Current smoker      History of depression  and anxiety      Rectal cancer  not on treatment      Right cataract      No significant past surgical history          FAMILY HISTORY:      SOCIAL HISTORY:  Smoking: current smoker   Substance Use:   EtOH Use:   Marital Status:   Sexual History:  male partners, virus detected, switched to Biktarvy   Occupation:  Recent Travel:  Country of Birth:   Advance Directives:     HOME MEDICATIONS:      Allergies    ibuprofen (Angioedema)    Intolerances          REVIEW OF SYSTEMS:  Constitutional: No fevers, chills, weight loss, weight gain  HEENT: No vision problems, eye pain, nasal congestion, rhinorrhea, sore throat, dysphagia  CV: No chest pain, orthopnea, palpitations  Resp: No cough, dyspnea, wheezing, hemoptysis  GI: No nausea, vomiting, diarrhea, constipation, abdominal pain  : [ ] dysuria [ ] nocturia [ ] hematuria [ ] increased urinary frequency  Musculoskeletal: [ ] back pain [ ] myalgias [ ] arthralgias [ ] fracture  Skin: [ ] rash [ ] itch  Neurological: [ ] headache [ ] dizziness [ ] syncope [ ] weakness [ ] numbness  Psychiatric: [ ] anxiety [ ] depression  Endocrine: [ ] diabetes [ ] thyroid problem  Hematologic/Lymphatic: [ ] anemia [ ] bleeding problem  Allergic/Immunologic: [ ] itchy eyes [ ] nasal discharge [ ] hives [ ] angioedema  [ ] All other systems negative  [x ] Unable to assess ROS because pt is sedated    OBJECTIVE:  ICU Vital Signs Last 24 Hrs  T(C): 36.7 (10 Oct 2022 13:25), Max: 36.7 (10 Oct 2022 13:25)  T(F): 98 (10 Oct 2022 13:25), Max: 98 (10 Oct 2022 13:25)  HR: 79 (10 Oct 2022 18:43) (60 - 100)  BP: 114/74 (10 Oct 2022 18:43) (103/72 - 114/74)  BP(mean): --  ABP: --  ABP(mean): --  RR: 18 (10 Oct 2022 18:43) (16 - 18)  SpO2: 95% (10 Oct 2022 18:43) (95% - 97%)    O2 Parameters below as of 10 Oct 2022 18:43  Patient On (Oxygen Delivery Method): room air              10-10 @ 07:01  -  10-10 @ 21:16  --------------------------------------------------------  IN: 100 mL / OUT: 500 mL / NET: -400 mL      CAPILLARY BLOOD GLUCOSE      POCT Blood Glucose.: 99 mg/dL (10 Oct 2022 20:36)      PHYSICAL EXAM:  GENERAL: young man, intubated  RESPIRATORY: Intubated, lungs are clear to auscultation bilaterally; crackles heard- emphysematous changes present   CARDIOVASCULAR: Regular rate and rhythm, normal S1 and S2, no murmur/rub/gallop; No lower extremity edema; Peripheral pulses are 2+ bilaterally  ABDOMEN: Nontender to palpation, normoactive bowel sounds, no rebound/guarding; No hepatosplenomegaly  MUSCULOSKELETAL: no clubbing or cyanosis of digits; no joint swelling or tenderness to palpation  PSYCH: A+Ox0, sedated  LINES:     HOSPITAL MEDICATIONS:  MEDICATIONS  (STANDING):  bictegravir 50 mG/emtricitabine 200 mG/tenofovir alafenamide 25 mG (BIKTARVY) 1 Tablet(s) Oral daily  ceFAZolin   IVPB 2000 milliGRAM(s) IV Intermittent every 8 hours  chlorhexidine 2% Cloths 1 Application(s) Topical <User Schedule>  enoxaparin Injectable 60 milliGRAM(s) SubCutaneous every 12 hours  gabapentin 300 milliGRAM(s) Oral three times a day  lactated ringers. 1000 milliLiter(s) (75 mL/Hr) IV Continuous <Continuous>  lactulose Syrup 10 Gram(s) Oral daily  lidocaine   4% Patch 1 Patch Transdermal daily  lidocaine   4% Patch 1 Patch Transdermal daily  melatonin 3 milliGRAM(s) Oral at bedtime  methadone    Tablet 5 milliGRAM(s) Oral three times a day  metoprolol tartrate 50 milliGRAM(s) Oral two times a day  naloxegol 25 milliGRAM(s) Oral daily  norepinephrine Infusion 0.3 MICROgram(s)/kG/Min (33.8 mL/Hr) IV Continuous <Continuous>  polyethylene glycol 3350 17 Gram(s) Oral two times a day  propofol Injectable 10 milliGRAM(s) IV Push once  senna 2 Tablet(s) Oral at bedtime  sodium chloride 0.9% Bolus 1000 milliLiter(s) IV Bolus once    MEDICATIONS  (PRN):  bisacodyl 5 milliGRAM(s) Oral at bedtime PRN Constipation  HYDROmorphone   Tablet 4 milliGRAM(s) Oral every 4 hours PRN Moderate Pain (4 - 6)  HYDROmorphone   Tablet 6 milliGRAM(s) Oral every 4 hours PRN Severe Pain (7 - 10)  naloxone Injectable 0.1 milliGRAM(s) IV Push every 3 minutes PRN opioid intoxication      LABS:                        11.5   20.41 )-----------( 164      ( 10 Oct 2022 07:15 )             38.3     Hgb Trend: 11.5<--, 11.5<--, 10.1<--, 9.7<--  10-10    136  |  102  |  71<H>  ----------------------------<  93  4.6   |  17<L>  |  0.57    Ca    7.9<L>      10 Oct 2022 07:15  Phos  5.0     10-10  Mg     2.00     10-10      Creatinine Trend: 0.57<--, 0.54<--, 0.54<--, 0.41<--, 0.45<--, 0.60<--            MICROBIOLOGY:     RADIOLOGY & ADDITIONAL TESTS:      ASSESSMENT AND PLAN:  Patient is a 31 M with untreated metastatic ano-rectal SCC (followed by Dr Frazier [facility] oncology) with mets to liver and possibly bone, HPV positive, HIV infection, and R eye cataracts who presented to the [facility] on 8/31/22 after syncopal episode at home and transferred to [facility] for continued of care per family request. Being treated for MSSA bacteremia. c/b scrotal swelling s/p coude catheter, hypercalcemia s/p pamidronate and IVF, and AFB from [place] w M chimaera not requiring any intervention. Patient is now undergoing trial of RT, did not tolerate first fraction 10/4 and didn't tolerate session on 10/5 even with increased pain meds before session. Pending hospice referral    #Neuro  AOx0  sedated on propofol    #Cardiovascular  Cardiac mass.   - TTE: two large RV masses and two additional small masses. IVC filter placed prophylactically for undiagnosed vegetations on the TTE.  - repeat TTE 9/9 limited study but demonstrated a mobile mass likely 2/2 tumour, thrombus, or vegetation   - monitor on tele    SVT (supraventricular tachycardia).   -SVT events x 2 9/15 and 9/16 resolved with vagal maneuvers. Repeat SVT 9/25 with 9 beats of V tach and 13 beats V tach asymptomatic.  - inc metoprolol to 50 BID with improved HR 90s-low 100; however, often held in the setting of hypotension    #Respiratory  Rapid Response called on 10/10 for AMS and respiratory distress  -pt sedated and intubated (7.5mm)    Cavitary lesion of lung.   -Patient presented to OSH with hypoxemia and L chest pigtail placed 8/31 due to suspected pneumothorax. Patient showed improvement in SOB and f/u CT chest performed which showed evidence of 12 cm multiloculated thick walled cavitary mass in the CHRISTINE and lingula.  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   -CT Chest 9/11 with evidence of persistent pneumomediastinum and extensive subcutaneous emphysema of the neck, chest, abdomen, pelvis, and extremities, including the scrotum.   - CANDIS per thoracic surgery    #GI/Nutrition  NPO    #/Renal  No active issues    #Skin  No active issues    #ID  MSSA bacteremia   - Hx of MSSA bacteremia with blood cultures 8/31 positive for MSSA and repeat on 9/2 NGTD.  - s/p Zosyn (9/8 - 9/9)  - transitioned to Cefazolin 2GM q8h for 6 weeks (9/9-10/13)     #Endocrine  Hypoglycemia  -was noted to be hypoglycemic (42)  during rapid response  -POCT 3x: 42, 105, 42  -dextrose ordered    #Hematologic/DVT ppx  Rectal cancer   - history of untreated HPV related rectal cancer with episode of rectal bleeding   - CT with evidence of liver and bone lesions  - Pt initially planned for RT-  5 fractions, w/ 1st fraction on 10/04 and 2nd fraction on 10/05, however pt did not tolerate both times d/t pain, despite receiving increased dose of Dilaudid prior to 2nd fraction  - per heme-onc, already discussed with family/pt of no more treatment being offered  - hospice referral pending    Neoplasm related pain.   - Palliative care consulted for extensive disease burden and GOC discussion, pt remains full code  - increased PO Methadone 5mg TID (QTC- 443 on 9/23)  - c/w Gabapentin 300mg TID  - PO Dilaudid 4mg q4 PRN moderate pain  - PO Dilaudid 6mg q4 PRN severe pain    Leukocytosis.   - WBC uptrending but no concern for new infection  - likely reactive 2/2 to underlying tumor burden vs hemoconcentration in the setting of poor po intake.    Microcytic anemia.   - microcytic anemia with Hgb 9.4 and MCV 81, likely a mixed picture of microcytic and normocytic anemia.  - iron studies with low iron levels but low iron binding capacity likely indicating a mixed picture  - ferritin wnl   - continue to monitor    #Ethics  full code but pending hospice referral (see note 10/7/22) MICU Accept Note    CHIEF COMPLAINT:   shortness of breath    HPI / INTERVAL HISTORY:  31 M with PMH/ HIV, untreated metastatic ano-rectal SCC (mets to liver and possibly bone),  R eye cataract, presented to OSH on 8/31/22 after syncopal episode at home. Pt had cough x 2 mos with yellow sputum and shortness of breath since 5 days PTA.   - OSH: pt was found to be hypoxemic. 8/31/22 -- L chest wall Pigtail placement. Pigtail was placed for suspected PMX with improvement in shortness of breath. CT chest was performed, which showed that pigtail is within the mass__ differential is TB, fungal infection, cavitary lesion, or squamous cell carcinoma. Pt was found to be anemic due to rectal bleeding with Hgb 5.9 s/p 2 U PRBC on 9/1. Blood cxs with MSSA bacteremia, and has been treated with Cefazolin and Zosyn.  Since 9/2 pt with worsening diffuse SC emphysema extending to the neck/face/all 4 extremities/scrotum/back , which has been getting progressively worse over past 2 days as per pt's statement. 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 TTE with two large RV masses and two additional small masses. 9/7-- s/p L lateral chest tube to 14 Fr. 9/7 -- s/p IVC Filter placement (as per [facility] statement, was placed prophylactically due to undiagnosed vegetations on the TTE. 12 cm multiloculated thick walled cavitary mass in the CHRISTINE and lingula.+ liver lesion on the CT a/p. + external iliac vein thrombosis, PE study was indeterminate   __ pt was transferred to [facility] as per family request (mother) for pt to be evaluated by his outpt oncology team (Dr Frazier). As per oncology team the plan is to start Carbotaxol q 3 weeks + RT for diffuse mets as per PET scan,     RRT was called on 10/10 for altered mental status and respiratory distress. Patient was successfully intubated and transferred to the MICU for further management and care.       PAST MEDICAL & SURGICAL HISTORY:  HIV infection  6/30/2022 virus detected, switched to Biktarvy, male partners    Current smoker      History of depression  and anxiety      Rectal cancer  not on treatment      Right cataract      No significant past surgical history          FAMILY HISTORY:      SOCIAL HISTORY:  Smoking: current smoker   Substance Use:   EtOH Use:   Marital Status:   Sexual History:  male partners, virus detected, switched to Biktarvy   Occupation:  Recent Travel:  Country of Birth:   Advance Directives:     HOME MEDICATIONS:      Allergies    ibuprofen (Angioedema)    Intolerances          REVIEW OF SYSTEMS:  Constitutional: No fevers, chills, weight loss, weight gain  HEENT: No vision problems, eye pain, nasal congestion, rhinorrhea, sore throat, dysphagia  CV: No chest pain, orthopnea, palpitations  Resp: No cough, dyspnea, wheezing, hemoptysis  GI: No nausea, vomiting, diarrhea, constipation, abdominal pain  : [ ] dysuria [ ] nocturia [ ] hematuria [ ] increased urinary frequency  Musculoskeletal: [ ] back pain [ ] myalgias [ ] arthralgias [ ] fracture  Skin: [ ] rash [ ] itch  Neurological: [ ] headache [ ] dizziness [ ] syncope [ ] weakness [ ] numbness  Psychiatric: [ ] anxiety [ ] depression  Endocrine: [ ] diabetes [ ] thyroid problem  Hematologic/Lymphatic: [ ] anemia [ ] bleeding problem  Allergic/Immunologic: [ ] itchy eyes [ ] nasal discharge [ ] hives [ ] angioedema  [ ] All other systems negative  [x ] Unable to assess ROS because pt is sedated    OBJECTIVE:  ICU Vital Signs Last 24 Hrs  T(C): 36.7 (10 Oct 2022 13:25), Max: 36.7 (10 Oct 2022 13:25)  T(F): 98 (10 Oct 2022 13:25), Max: 98 (10 Oct 2022 13:25)  HR: 79 (10 Oct 2022 18:43) (60 - 100)  BP: 114/74 (10 Oct 2022 18:43) (103/72 - 114/74)  BP(mean): --  ABP: --  ABP(mean): --  RR: 18 (10 Oct 2022 18:43) (16 - 18)  SpO2: 95% (10 Oct 2022 18:43) (95% - 97%)    O2 Parameters below as of 10 Oct 2022 18:43  Patient On (Oxygen Delivery Method): room air              10-10 @ 07:01  -  10-10 @ 21:16  --------------------------------------------------------  IN: 100 mL / OUT: 500 mL / NET: -400 mL      CAPILLARY BLOOD GLUCOSE      POCT Blood Glucose.: 99 mg/dL (10 Oct 2022 20:36)      PHYSICAL EXAM:  GENERAL: young man, intubated  RESPIRATORY: Intubated, lungs are clear to auscultation bilaterally; crackles heard- emphysematous changes present   CARDIOVASCULAR: Regular rate and rhythm, normal S1 and S2, no murmur/rub/gallop; No lower extremity edema; Peripheral pulses are 2+ bilaterally  ABDOMEN: Nontender to palpation, normoactive bowel sounds, no rebound/guarding; No hepatosplenomegaly  MUSCULOSKELETAL: no clubbing or cyanosis of digits; no joint swelling or tenderness to palpation  PSYCH: A+Ox0, sedated  LINES:     HOSPITAL MEDICATIONS:  MEDICATIONS  (STANDING):  bictegravir 50 mG/emtricitabine 200 mG/tenofovir alafenamide 25 mG (BIKTARVY) 1 Tablet(s) Oral daily  ceFAZolin   IVPB 2000 milliGRAM(s) IV Intermittent every 8 hours  chlorhexidine 2% Cloths 1 Application(s) Topical <User Schedule>  enoxaparin Injectable 60 milliGRAM(s) SubCutaneous every 12 hours  gabapentin 300 milliGRAM(s) Oral three times a day  lactated ringers. 1000 milliLiter(s) (75 mL/Hr) IV Continuous <Continuous>  lactulose Syrup 10 Gram(s) Oral daily  lidocaine   4% Patch 1 Patch Transdermal daily  lidocaine   4% Patch 1 Patch Transdermal daily  melatonin 3 milliGRAM(s) Oral at bedtime  methadone    Tablet 5 milliGRAM(s) Oral three times a day  metoprolol tartrate 50 milliGRAM(s) Oral two times a day  naloxegol 25 milliGRAM(s) Oral daily  norepinephrine Infusion 0.3 MICROgram(s)/kG/Min (33.8 mL/Hr) IV Continuous <Continuous>  polyethylene glycol 3350 17 Gram(s) Oral two times a day  propofol Injectable 10 milliGRAM(s) IV Push once  senna 2 Tablet(s) Oral at bedtime  sodium chloride 0.9% Bolus 1000 milliLiter(s) IV Bolus once    MEDICATIONS  (PRN):  bisacodyl 5 milliGRAM(s) Oral at bedtime PRN Constipation  HYDROmorphone   Tablet 4 milliGRAM(s) Oral every 4 hours PRN Moderate Pain (4 - 6)  HYDROmorphone   Tablet 6 milliGRAM(s) Oral every 4 hours PRN Severe Pain (7 - 10)  naloxone Injectable 0.1 milliGRAM(s) IV Push every 3 minutes PRN opioid intoxication      LABS:                        11.5   20.41 )-----------( 164      ( 10 Oct 2022 07:15 )             38.3     Hgb Trend: 11.5<--, 11.5<--, 10.1<--, 9.7<--  10-10    136  |  102  |  71<H>  ----------------------------<  93  4.6   |  17<L>  |  0.57    Ca    7.9<L>      10 Oct 2022 07:15  Phos  5.0     10-10  Mg     2.00     10-10      Creatinine Trend: 0.57<--, 0.54<--, 0.54<--, 0.41<--, 0.45<--, 0.60<--            MICROBIOLOGY:     RADIOLOGY & ADDITIONAL TESTS:      ASSESSMENT AND PLAN:  Patient is a 31 M with untreated metastatic ano-rectal SCC (followed by Dr Frazier [facility] oncology) with mets to liver and possibly bone, HPV positive, HIV infection, and R eye cataracts who presented to the [facility] on 8/31/22 after syncopal episode at home and transferred to [facility] for continued of care per family request. Being treated for MSSA bacteremia. c/b scrotal swelling s/p coude catheter, hypercalcemia s/p pamidronate and IVF, and AFB from [facility] chimaera not requiring any intervention. Patient is now undergoing trial of RT, did not tolerate first fraction 10/4 and didn't tolerate session on 10/5 even with increased pain meds before session. Pending hospice referral    #Neuro  AOx0  sedated on propofol    #Cardiovascular  Cardiac mass.   - TTE: two large RV masses and two additional small masses. IVC filter placed prophylactically for undiagnosed vegetations on the TTE.  - repeat TTE 9/9 limited study but demonstrated a mobile mass likely 2/2 tumour, thrombus, or vegetation   - monitor on tele    SVT (supraventricular tachycardia).   -SVT events x 2 9/15 and 9/16 resolved with vagal maneuvers. Repeat SVT 9/25 with 9 beats of V tach and 13 beats V tach asymptomatic.  - inc metoprolol to 50 BID with improved HR 90s-low 100; however, often held in the setting of hypotension    #Respiratory  Rapid Response called on 10/10 for AMS and respiratory distress  -pt sedated and intubated (7.5mm)    Cavitary lesion of lung.   -Patient presented to OSH with hypoxemia and L chest pigtail placed 8/31 due to suspected pneumothorax. Patient showed improvement in SOB and f/u CT chest performed which showed evidence of 12 cm multiloculated thick walled cavitary mass in the CHRISTINE and lingula.  - sputum culture 9/10 + mod klebsiella pneumoniae pansensitive  - blood Cx neg 9/8  - fungitell and crypto neg 9/10  - positive AFB in 1/3 sputum samples (sept 3) from [facility] with final speciation Mycobacterium chimaera (avium complex)    Subcutaneous emphysema.   -CT Chest 9/11 with evidence of persistent pneumomediastinum and extensive subcutaneous emphysema of the neck, chest, abdomen, pelvis, and extremities, including the scrotum.   - CANDIS per thoracic surgery    #GI/Nutrition  NPO    #/Renal  No active issues    #Skin  No active issues    #ID  MSSA bacteremia   - Hx of MSSA bacteremia with blood cultures 8/31 positive for MSSA and repeat on 9/2 NGTD.  - s/p Zosyn (9/8 - 9/9)  - transitioned to Cefazolin 2GM q8h for 6 weeks (9/9-10/13)     #Endocrine  Hypoglycemia  -was noted to be hypoglycemic (42)  during rapid response  -POCT 3x: 42, 105, 42  -dextrose ordered    #Hematologic/DVT ppx  Rectal cancer   - history of untreated HPV related rectal cancer with episode of rectal bleeding   - CT with evidence of liver and bone lesions  - Pt initially planned for RT-  5 fractions, w/ 1st fraction on 10/04 and 2nd fraction on 10/05, however pt did not tolerate both times d/t pain, despite receiving increased dose of Dilaudid prior to 2nd fraction  - per heme-onc, already discussed with family/pt of no more treatment being offered  - hospice referral pending    Neoplasm related pain.   - Palliative care consulted for extensive disease burden and GOC discussion, pt remains full code  - increased PO Methadone 5mg TID (QTC- 443 on 9/23)  - c/w Gabapentin 300mg TID  - PO Dilaudid 4mg q4 PRN moderate pain  - PO Dilaudid 6mg q4 PRN severe pain    Leukocytosis.   - WBC uptrending but no concern for new infection  - likely reactive 2/2 to underlying tumor burden vs hemoconcentration in the setting of poor po intake.    Microcytic anemia.   - microcytic anemia with Hgb 9.4 and MCV 81, likely a mixed picture of microcytic and normocytic anemia.  - iron studies with low iron levels but low iron binding capacity likely indicating a mixed picture  - ferritin wnl   - continue to monitor    #Ethics  full code but pending hospice referral (see note 10/7/22) MICU Accept Note    CHIEF COMPLAINT:   shortness of breath    HPI / INTERVAL HISTORY:  31 M with PMH/ HIV, untreated metastatic ano-rectal SCC (mets to liver and possibly bone),  R eye cataract, presented to OSH on 8/31/22 after syncopal episode at home. Pt had cough x 2 mos with yellow sputum and shortness of breath since 5 days PTA.   - OSH: pt was found to be hypoxemic. 8/31/22 -- L chest wall Pigtail placement. Pigtail was placed for suspected PMX with improvement in shortness of breath. CT chest was performed, which showed that pigtail is within the mass__ differential is TB, fungal infection, cavitary lesion, or squamous cell carcinoma. Pt was found to be anemic due to rectal bleeding with Hgb 5.9 s/p 2 U PRBC on 9/1. Blood cxs with MSSA bacteremia, and has been treated with Cefazolin and Zosyn.  Since 9/2 pt with worsening diffuse SC emphysema extending to the neck/face/all 4 extremities/scrotum/back , which has been getting progressively worse over past 2 days as per pt's statement. 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 TTE with two large RV masses and two additional small masses. 9/7-- s/p L lateral chest tube to 14 Fr. 9/7 -- s/p IVC Filter placement (as per [facility] statement, was placed prophylactically due to undiagnosed vegetations on the TTE. 12 cm multiloculated thick walled cavitary mass in the CHRISTINE and lingula.+ liver lesion on the CT a/p. + external iliac vein thrombosis, PE study was indeterminate   __ pt was transferred to [facility] as per family request (mother) for pt to be evaluated by his outpt oncology team (Dr Frazier). As per oncology team the plan is to start Carbotaxol q 3 weeks + RT for diffuse mets as per PET scan,     RRT was called on 10/10 for altered mental status and respiratory distress. Patient was successfully intubated and transferred to the MICU for further management and care.       PAST MEDICAL & SURGICAL HISTORY:  HIV infection  6/30/2022 virus detected, switched to Biktarvy, male partners    Current smoker      History of depression  and anxiety      Rectal cancer  not on treatment      Right cataract      No significant past surgical history          FAMILY HISTORY:      SOCIAL HISTORY:  Smoking: current smoker   Substance Use:   EtOH Use:   Marital Status:   Sexual History:  male partners, virus detected, switched to Biktarvy   Occupation:  Recent Travel:  Country of Birth:   Advance Directives:     HOME MEDICATIONS:      Allergies    ibuprofen (Angioedema)    Intolerances          REVIEW OF SYSTEMS:  Constitutional: No fevers, chills, weight loss, weight gain  HEENT: No vision problems, eye pain, nasal congestion, rhinorrhea, sore throat, dysphagia  CV: No chest pain, orthopnea, palpitations  Resp: No cough, dyspnea, wheezing, hemoptysis  GI: No nausea, vomiting, diarrhea, constipation, abdominal pain  : [ ] dysuria [ ] nocturia [ ] hematuria [ ] increased urinary frequency  Musculoskeletal: [ ] back pain [ ] myalgias [ ] arthralgias [ ] fracture  Skin: [ ] rash [ ] itch  Neurological: [ ] headache [ ] dizziness [ ] syncope [ ] weakness [ ] numbness  Psychiatric: [ ] anxiety [ ] depression  Endocrine: [ ] diabetes [ ] thyroid problem  Hematologic/Lymphatic: [ ] anemia [ ] bleeding problem  Allergic/Immunologic: [ ] itchy eyes [ ] nasal discharge [ ] hives [ ] angioedema  [ ] All other systems negative  [x ] Unable to assess ROS because pt is sedated    OBJECTIVE:  ICU Vital Signs Last 24 Hrs  T(C): 36.7 (10 Oct 2022 13:25), Max: 36.7 (10 Oct 2022 13:25)  T(F): 98 (10 Oct 2022 13:25), Max: 98 (10 Oct 2022 13:25)  HR: 79 (10 Oct 2022 18:43) (60 - 100)  BP: 114/74 (10 Oct 2022 18:43) (103/72 - 114/74)  BP(mean): --  ABP: --  ABP(mean): --  RR: 18 (10 Oct 2022 18:43) (16 - 18)  SpO2: 95% (10 Oct 2022 18:43) (95% - 97%)    O2 Parameters below as of 10 Oct 2022 18:43  Patient On (Oxygen Delivery Method): room air              10-10 @ 07:01  -  10-10 @ 21:16  --------------------------------------------------------  IN: 100 mL / OUT: 500 mL / NET: -400 mL      CAPILLARY BLOOD GLUCOSE      POCT Blood Glucose.: 99 mg/dL (10 Oct 2022 20:36)      PHYSICAL EXAM:  GENERAL: young man, intubated  RESPIRATORY: Intubated, lungs are clear to auscultation bilaterally; crackles heard- emphysematous changes present   CARDIOVASCULAR: Regular rate and rhythm, normal S1 and S2, no murmur/rub/gallop; No lower extremity edema; Peripheral pulses are 2+ bilaterally  ABDOMEN: Nontender to palpation, normoactive bowel sounds, no rebound/guarding; No hepatosplenomegaly  MUSCULOSKELETAL: no clubbing or cyanosis of digits; no joint swelling or tenderness to palpation  PSYCH: A+Ox0, sedated  LINES:     HOSPITAL MEDICATIONS:  MEDICATIONS  (STANDING):  bictegravir 50 mG/emtricitabine 200 mG/tenofovir alafenamide 25 mG (BIKTARVY) 1 Tablet(s) Oral daily  ceFAZolin   IVPB 2000 milliGRAM(s) IV Intermittent every 8 hours  chlorhexidine 2% Cloths 1 Application(s) Topical <User Schedule>  enoxaparin Injectable 60 milliGRAM(s) SubCutaneous every 12 hours  gabapentin 300 milliGRAM(s) Oral three times a day  lactated ringers. 1000 milliLiter(s) (75 mL/Hr) IV Continuous <Continuous>  lactulose Syrup 10 Gram(s) Oral daily  lidocaine   4% Patch 1 Patch Transdermal daily  lidocaine   4% Patch 1 Patch Transdermal daily  melatonin 3 milliGRAM(s) Oral at bedtime  methadone    Tablet 5 milliGRAM(s) Oral three times a day  metoprolol tartrate 50 milliGRAM(s) Oral two times a day  naloxegol 25 milliGRAM(s) Oral daily  norepinephrine Infusion 0.3 MICROgram(s)/kG/Min (33.8 mL/Hr) IV Continuous <Continuous>  polyethylene glycol 3350 17 Gram(s) Oral two times a day  propofol Injectable 10 milliGRAM(s) IV Push once  senna 2 Tablet(s) Oral at bedtime  sodium chloride 0.9% Bolus 1000 milliLiter(s) IV Bolus once    MEDICATIONS  (PRN):  bisacodyl 5 milliGRAM(s) Oral at bedtime PRN Constipation  HYDROmorphone   Tablet 4 milliGRAM(s) Oral every 4 hours PRN Moderate Pain (4 - 6)  HYDROmorphone   Tablet 6 milliGRAM(s) Oral every 4 hours PRN Severe Pain (7 - 10)  naloxone Injectable 0.1 milliGRAM(s) IV Push every 3 minutes PRN opioid intoxication      LABS:                        11.5   20.41 )-----------( 164      ( 10 Oct 2022 07:15 )             38.3     Hgb Trend: 11.5<--, 11.5<--, 10.1<--, 9.7<--  10-10    136  |  102  |  71<H>  ----------------------------<  93  4.6   |  17<L>  |  0.57    Ca    7.9<L>      10 Oct 2022 07:15  Phos  5.0     10-10  Mg     2.00     10-10      Creatinine Trend: 0.57<--, 0.54<--, 0.54<--, 0.41<--, 0.45<--, 0.60<--            MICROBIOLOGY:     RADIOLOGY & ADDITIONAL TESTS:      ASSESSMENT AND PLAN:  Patient is a 31 M with untreated metastatic ano-rectal SCC (followed by Dr Frazier [facility] oncology) with mets to liver and possibly bone, HPV positive, HIV infection, and R eye cataracts who presented to the [facility] on 8/31/22 after syncopal episode at home and transferred to [facility] for continued of care per family request. Being treated for MSSA bacteremia. c/b scrotal swelling s/p coude catheter, hypercalcemia s/p pamidronate and IVF, and AFB from [place] w  chimaera not requiring any intervention. Patient is now undergoing trial of RT, did not tolerate first fraction 10/4 and didn't tolerate session on 10/5 even with increased pain meds before session. Pending hospice referral    #Neuro  AOx0  sedated on propofol    #Cardiovascular  Shock, undifferentiated  - continue with levo, wean as tolerated, MAP > 65  - repeat blood cx ordered  - f/u cortisol levels    Cardiac mass.   - TTE: two large RV masses and two additional small masses. IVC filter placed prophylactically for undiagnosed vegetations on the TTE.  - repeat TTE 9/9 limited study but demonstrated a mobile mass likely 2/2 tumour, thrombus, or vegetation   - repeat TTE ordered 10/10  - monitor on tele    #Respiratory  Rapid Response called on 10/10 for AMS and respiratory distress  -pt sedated and intubated (7.5mm)  -SBT as tolerated   -f/u chest XR post intubation    Cavitary lesion of lung.   -Patient presented to OSH with hypoxemia and L chest pigtail placed 8/31 due to suspected pneumothorax. Patient showed improvement in SOB and f/u CT chest performed which showed evidence of 12 cm multiloculated thick walled cavitary mass in the CHRISTINE and lingula.  - sputum culture 9/10 + mod klebsiella pneumoniae pansensitive  - blood Cx neg 9/8  - fungitell and crypto neg 9/10  - positive AFB in 1/3 sputum samples (sept 3) from [facility] with final speciation Mycobacterium chimaera (avium complex)    Subcutaneous emphysema.   -CT Chest 9/11 with evidence of persistent pneumomediastinum and extensive subcutaneous emphysema of the neck, chest, abdomen, pelvis, and extremities, including the scrotum.   - CANDIS per thoracic surgery    #GI/Nutrition  -OG tube  -start feeds    #/Renal  No active issues    #Skin  No active issues    #ID  MSSA bacteremia   - Hx of MSSA bacteremia with blood cultures 8/31 positive for MSSA and repeat on 9/2 NGTD.  - s/p Zosyn (9/8 - 9/9)  - transitioned to Cefazolin 2GM q8h for 6 weeks (9/9-10/13)     #Endocrine  Hypoglycemia  -was noted to be hypoglycemic (42)  during rapid response  -POCT 3x: 42, 105, 42  -dextrose ordered    #Hematologic/DVT ppx  Rectal cancer   - history of untreated HPV related rectal cancer with episode of rectal bleeding   - CT with evidence of liver and bone lesions  - Pt initially planned for RT-  5 fractions, w/ 1st fraction on 10/04 and 2nd fraction on 10/05, however pt did not tolerate both times d/t pain, despite receiving increased dose of Dilaudid prior to 2nd fraction  - per heme-onc, already discussed with family/pt of no more treatment being offered  - hospice referral pending    Neoplasm related pain.   - will hold oral pain regimen: -PO Methadone 5mg TID (QTC- 443 on 9/23),  Gabapentin 300mg TID, Dilaudid 4mg q4 PRN moderate pain, PO Dilaudid 6mg q4 PRN severe pain    Leukocytosis.   - WBC uptrending but no concern for new infection  - likely reactive 2/2 to underlying tumor burden vs hemoconcentration in the setting of poor po intake.  - f/u repeat blood cx    Microcytic anemia.   - microcytic anemia with Hgb 9.4 and MCV 81, likely a mixed picture of microcytic and normocytic anemia.  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 Pt had cough x 2 mos with yellow sputum and shortness of breath for 5 days prior to admission  - OSH: pt was found to be hypoxemic. 8/31/22 -- L chest wall Pigtail placement. Pigtail was placed for suspected PMX with improvement in shortness of breath. CT chest was performed, which showed that pigtail is within the mass__ differential is TB, fungal infection, cavitary lesion, or squamous cell carcinoma. Pt was found to be anemic due to rectal bleeding with Hgb 5.9 s/p 2 U PRBC on 9/1. Blood cxs with MSSA bacteremia, and has been treated with Cefazolin and Zosyn.  Since 9/2 pt with worsening diffuse SC emphysema extending to the neck/face/all 4 extremities/scrotum/back , which has been getting progressively worse over past 2 days as per pt's statement. 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 TTE with two large RV masses and two additional small masses. 9/7-- s/p L lateral chest tube to 14 Fr. 9/7 -- s/p IVC Filter placement (as per [facility] statement, was placed prophylactically due to undiagnosed vegetations on the TTE. 12 cm multiloculated thick walled cavitary mass in the CHRISTINE and lingula.+ liver lesion on the CT a/p. + external iliac vein thrombosis, PE study was indeterminate   __ pt was transferred to [facility] as per family request (mother) for pt to be evaluated by his outpt oncology team (Dr Frazier). As per oncology team the plan is to start Carbotaxol q 3 weeks + RT for diffuse mets as per PET scan,     RRT was called on 10/10 for altered mental status and respiratory distress. Patient was successfully intubated and transferred to the MICU for further management and care.       PAST MEDICAL & SURGICAL HISTORY:  HIV infection  6/30/2022 virus detected, switched to Biktarvy, male partners    Current smoker      History of depression  and anxiety      Rectal cancer  not on treatment      Right cataract      No significant past surgical history          FAMILY HISTORY:      SOCIAL HISTORY:  Smoking: current smoker   Substance Use:   EtOH Use:   Marital Status:   Sexual History:  male partners, virus detected, switched to Biktarvy   Occupation:  Recent Travel:  Country of Birth:   Advance Directives:     HOME MEDICATIONS:      Allergies    ibuprofen (Angioedema)    Intolerances          REVIEW OF SYSTEMS:  Constitutional: No fevers, chills, weight loss, weight gain  HEENT: No vision problems, eye pain, nasal congestion, rhinorrhea, sore throat, dysphagia  CV: No chest pain, orthopnea, palpitations  Resp: No cough, dyspnea, wheezing, hemoptysis  GI: No nausea, vomiting, diarrhea, constipation, abdominal pain  : [ ] dysuria [ ] nocturia [ ] hematuria [ ] increased urinary frequency  Musculoskeletal: [ ] back pain [ ] myalgias [ ] arthralgias [ ] fracture  Skin: [ ] rash [ ] itch  Neurological: [ ] headache [ ] dizziness [ ] syncope [ ] weakness [ ] numbness  Psychiatric: [ ] anxiety [ ] depression  Endocrine: [ ] diabetes [ ] thyroid problem  Hematologic/Lymphatic: [ ] anemia [ ] bleeding problem  Allergic/Immunologic: [ ] itchy eyes [ ] nasal discharge [ ] hives [ ] angioedema  [ ] All other systems negative  [x ] Unable to assess ROS because pt is sedated    OBJECTIVE:  ICU Vital Signs Last 24 Hrs  T(C): 36.7 (10 Oct 2022 13:25), Max: 36.7 (10 Oct 2022 13:25)  T(F): 98 (10 Oct 2022 13:25), Max: 98 (10 Oct 2022 13:25)  HR: 79 (10 Oct 2022 18:43) (60 - 100)  BP: 114/74 (10 Oct 2022 18:43) (103/72 - 114/74)  BP(mean): --  ABP: --  ABP(mean): --  RR: 18 (10 Oct 2022 18:43) (16 - 18)  SpO2: 95% (10 Oct 2022 18:43) (95% - 97%)    O2 Parameters below as of 10 Oct 2022 18:43  Patient On (Oxygen Delivery Method): room air              10-10 @ 07:01  -  10-10 @ 21:16  --------------------------------------------------------  IN: 100 mL / OUT: 500 mL / NET: -400 mL      CAPILLARY BLOOD GLUCOSE      POCT Blood Glucose.: 99 mg/dL (10 Oct 2022 20:36)      PHYSICAL EXAM:  GENERAL: young man, intubated  RESPIRATORY: Intubated, lungs are clear to auscultation bilaterally; crackles heard- emphysematous changes present   CARDIOVASCULAR: Regular rate and rhythm, normal S1 and S2, no murmur/rub/gallop; No lower extremity edema; Peripheral pulses are 2+ bilaterally  ABDOMEN: Nontender to palpation, normoactive bowel sounds, no rebound/guarding; No hepatosplenomegaly  MUSCULOSKELETAL: no clubbing or cyanosis of digits; no joint swelling or tenderness to palpation  PSYCH: A+Ox0, sedated  LINES:     HOSPITAL MEDICATIONS:  MEDICATIONS  (STANDING):  bictegravir 50 mG/emtricitabine 200 mG/tenofovir alafenamide 25 mG (BIKTARVY) 1 Tablet(s) Oral daily  ceFAZolin   IVPB 2000 milliGRAM(s) IV Intermittent every 8 hours  chlorhexidine 2% Cloths 1 Application(s) Topical <User Schedule>  enoxaparin Injectable 60 milliGRAM(s) SubCutaneous every 12 hours  gabapentin 300 milliGRAM(s) Oral three times a day  lactated ringers. 1000 milliLiter(s) (75 mL/Hr) IV Continuous <Continuous>  lactulose Syrup 10 Gram(s) Oral daily  lidocaine   4% Patch 1 Patch Transdermal daily  lidocaine   4% Patch 1 Patch Transdermal daily  melatonin 3 milliGRAM(s) Oral at bedtime  methadone    Tablet 5 milliGRAM(s) Oral three times a day  metoprolol tartrate 50 milliGRAM(s) Oral two times a day  naloxegol 25 milliGRAM(s) Oral daily  norepinephrine Infusion 0.3 MICROgram(s)/kG/Min (33.8 mL/Hr) IV Continuous <Continuous>  polyethylene glycol 3350 17 Gram(s) Oral two times a day  propofol Injectable 10 milliGRAM(s) IV Push once  senna 2 Tablet(s) Oral at bedtime  sodium chloride 0.9% Bolus 1000 milliLiter(s) IV Bolus once    MEDICATIONS  (PRN):  bisacodyl 5 milliGRAM(s) Oral at bedtime PRN Constipation  HYDROmorphone   Tablet 4 milliGRAM(s) Oral every 4 hours PRN Moderate Pain (4 - 6)  HYDROmorphone   Tablet 6 milliGRAM(s) Oral every 4 hours PRN Severe Pain (7 - 10)  naloxone Injectable 0.1 milliGRAM(s) IV Push every 3 minutes PRN opioid intoxication      LABS:                        11.5   20.41 )-----------( 164      ( 10 Oct 2022 07:15 )             38.3     Hgb Trend: 11.5<--, 11.5<--, 10.1<--, 9.7<--  10-10    136  |  102  |  71<H>  ----------------------------<  93  4.6   |  17<L>  |  0.57    Ca    7.9<L>      10 Oct 2022 07:15  Phos  5.0     10-10  Mg     2.00     10-10      Creatinine Trend: 0.57<--, 0.54<--, 0.54<--, 0.41<--, 0.45<--, 0.60<--            MICROBIOLOGY:     RADIOLOGY & ADDITIONAL TESTS:      ASSESSMENT AND PLAN:  Patient is a 31 M with untreated metastatic ano-rectal SCC (followed by Dr Frazier [facility] oncology) with mets to liver and possibly bone, HPV positive, HIV infection, and R eye cataracts who presented to the [facility] on 8/31/22 after syncopal episode at home and transferred to [facility] for continued of care per family request. Being treated for MSSA bacteremia. c/b scrotal swelling s/p coude catheter, hypercalcemia s/p pamidronate and IVF, and AFB from [place] w  chimaera not requiring any intervention. Patient is now undergoing trial of RT, did not tolerate first fraction 10/4 and didn't tolerate session on 10/5 even with increased pain meds before session. Pending hospice referral    #Neuro  AOx0  sedated on propofol    #Cardiovascular  Shock, undifferentiated  - continue with levo, wean as tolerated, MAP > 65  - repeat blood cx ordered  - f/u cortisol levels    Cardiac mass.   - TTE: two large RV masses and two additional small masses. IVC filter placed prophylactically for undiagnosed vegetations on the TTE.  - repeat TTE 9/9 limited study but demonstrated a mobile mass likely 2/2 tumour, thrombus, or vegetation   - repeat TTE ordered 10/10  - monitor on tele    #Respiratory  Rapid Response called on 10/10 for AMS and respiratory distress  -pt sedated and intubated (7.5mm)  -SBT as tolerated   -f/u chest XR post intubation    Cavitary lesion of lung.   -Patient presented to OSH with hypoxemia and L chest pigtail placed 8/31 due to suspected pneumothorax. Patient showed improvement in SOB and f/u CT chest performed which showed evidence of 12 cm multiloculated thick walled cavitary mass in the CHRISTINE and lingula.  - sputum culture 9/10 + mod klebsiella pneumoniae pansensitive  - blood Cx neg 9/8  - fungitell and crypto neg 9/10  - positive AFB in 1/3 sputum samples (sept 3) from [facility] with final speciation Mycobacterium chimaera (avium complex)    Subcutaneous emphysema.   -CT Chest 9/11 with evidence of persistent pneumomediastinum and extensive subcutaneous emphysema of the neck, chest, abdomen, pelvis, and extremities, including the scrotum.   - CANDIS per thoracic surgery    #GI/Nutrition  -OG tube  -start feeds    #/Renal  No active issues    #Skin  No active issues    #ID  MSSA bacteremia   - Hx of MSSA bacteremia with blood cultures 8/31 positive for MSSA and repeat on 9/2 NGTD.  - s/p Zosyn (9/8 - 9/9)  - transitioned to Cefazolin 2GM q8h for 6 weeks (9/9-10/13)     #Endocrine  Hypoglycemia  -was noted to be hypoglycemic (42)  during rapid response  -POCT 3x: 42, 105, 42  -dextrose ordered    #Hematologic/DVT ppx  Rectal cancer   - history of untreated HPV related rectal cancer with episode of rectal bleeding   - CT with evidence of liver and bone lesions  - Pt initially planned for RT-  5 fractions, w/ 1st fraction on 10/04 and 2nd fraction on 10/05, however pt did not tolerate both times d/t pain, despite receiving increased dose of Dilaudid prior to 2nd fraction  - per heme-onc, already discussed with family/pt of no more treatment being offered  - hospice referral pending    Neoplasm related pain.   - will hold oral pain regimen: -PO Methadone 5mg TID (QTC- 443 on 9/23),  Gabapentin 300mg TID, Dilaudid 4mg q4 PRN moderate pain, PO Dilaudid 6mg q4 PRN severe pain    Leukocytosis.   - WBC uptrending but no concern for new infection  - likely reactive 2/2 to underlying tumor burden vs hemoconcentration in the setting of poor po intake.  - f/u repeat blood cx    Microcytic anemia.   - microcytic anemia with Hgb 9.4 and MCV 81, likely a mixed picture of microcytic and normocytic anemia.  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 Pt had cough x 2 mos with yellow sputum and shortness of breath for 5 days prior to admission  - OSH: pt was found to be hypoxemic. 8/31/22 -- L chest wall Pigtail placement for suspected PMX with clinical improvement. CT chest was s/p procedure showed that pigtail is within the lung mass of unknown etiology. Pt became anemic due to rectal bleeding with Hgb 5.9 s/p 2 U PRBC on 9/1. Blood cxs revealed MSSA bacteremia, was treated with Cefazolin and Zosyn. TTE revealed with two large RV masses and two additional small masses. Pt received chest tube (Left) and IVC placement due to visualized and undiagnosed vegetations on echo. Pt was transferred to [facility] on 9/8/22 per family request (mother) for pt to be evaluated by his outpt oncology team (Dr Frazier). Pt admitted to the MICU on 9/8-9/17, transferred to medicine 9/17. Oncology recommended resolution of bacteriemia prior to considering further treatment. Radiation Oncology planned for 3-5 fraction course of palliative pelvic radiation therapy with 1st fraction on 10/04 and 2nd fraction on 10/05. However, pt did not tolerate both times d/t pain, despite receiving increased pain medication. During [facility]OC discussion with pt's mother, she states that she wants a hospice referral.    RRT was called on 10/10 for altered mental status, respiratory distress, and hypotension. Pt was sedated, started on levo, successfully intubated and transferred to the MICU for further management and care.       PAST MEDICAL & SURGICAL HISTORY:  HIV infection  6/30/2022 virus detected, switched to Biktarvy, male partners    Current smoker      History of depression  and anxiety      Rectal cancer  not on treatment      Right cataract      No significant past surgical history          FAMILY HISTORY:      SOCIAL HISTORY:  Smoking: current smoker   Substance Use:   EtOH Use:   Marital Status:   Sexual History:  male partners, virus detected, switched to Biktarvy   Occupation:  Recent Travel:  Country of Birth:   Advance Directives:     HOME MEDICATIONS:      Allergies    ibuprofen (Angioedema)    Intolerances          REVIEW OF SYSTEMS:  Constitutional: No fevers, chills, weight loss, weight gain  HEENT: No vision problems, eye pain, nasal congestion, rhinorrhea, sore throat, dysphagia  CV: No chest pain, orthopnea, palpitations  Resp: No cough, dyspnea, wheezing, hemoptysis  GI: No nausea, vomiting, diarrhea, constipation, abdominal pain  : [ ] dysuria [ ] nocturia [ ] hematuria [ ] increased urinary frequency  Musculoskeletal: [ ] back pain [ ] myalgias [ ] arthralgias [ ] fracture  Skin: [ ] rash [ ] itch  Neurological: [ ] headache [ ] dizziness [ ] syncope [ ] weakness [ ] numbness  Psychiatric: [ ] anxiety [ ] depression  Endocrine: [ ] diabetes [ ] thyroid problem  Hematologic/Lymphatic: [ ] anemia [ ] bleeding problem  Allergic/Immunologic: [ ] itchy eyes [ ] nasal discharge [ ] hives [ ] angioedema  [ ] All other systems negative  [x ] Unable to assess ROS because pt is sedated    OBJECTIVE:  ICU Vital Signs Last 24 Hrs  T(C): 36.7 (10 Oct 2022 13:25), Max: 36.7 (10 Oct 2022 13:25)  T(F): 98 (10 Oct 2022 13:25), Max: 98 (10 Oct 2022 13:25)  HR: 79 (10 Oct 2022 18:43) (60 - 100)  BP: 114/74 (10 Oct 2022 18:43) (103/72 - 114/74)  BP(mean): --  ABP: --  ABP(mean): --  RR: 18 (10 Oct 2022 18:43) (16 - 18)  SpO2: 95% (10 Oct 2022 18:43) (95% - 97%)    O2 Parameters below as of 10 Oct 2022 18:43  Patient On (Oxygen Delivery Method): room air              10-10 @ 07:01  -  10-10 @ 21:16  --------------------------------------------------------  IN: 100 mL / OUT: 500 mL / NET: -400 mL      CAPILLARY BLOOD GLUCOSE      POCT Blood Glucose.: 99 mg/dL (10 Oct 2022 20:36)      PHYSICAL EXAM:  GENERAL: young man, intubated  RESPIRATORY: Intubated, lungs are clear to auscultation bilaterally; crackles heard- emphysematous changes present   CARDIOVASCULAR: Regular rate and rhythm, normal S1 and S2, no murmur/rub/gallop; No lower extremity edema; Peripheral pulses are 2+ bilaterally  ABDOMEN: Nontender to palpation, normoactive bowel sounds, no rebound/guarding; No hepatosplenomegaly  MUSCULOSKELETAL: no clubbing or cyanosis of digits; no joint swelling or tenderness to palpation  PSYCH: A+Ox0, sedated  LINES:     HOSPITAL MEDICATIONS:  MEDICATIONS  (STANDING):  bictegravir 50 mG/emtricitabine 200 mG/tenofovir alafenamide 25 mG (BIKTARVY) 1 Tablet(s) Oral daily  ceFAZolin   IVPB 2000 milliGRAM(s) IV Intermittent every 8 hours  chlorhexidine 2% Cloths 1 Application(s) Topical <User Schedule>  enoxaparin Injectable 60 milliGRAM(s) SubCutaneous every 12 hours  gabapentin 300 milliGRAM(s) Oral three times a day  lactated ringers. 1000 milliLiter(s) (75 mL/Hr) IV Continuous <Continuous>  lactulose Syrup 10 Gram(s) Oral daily  lidocaine   4% Patch 1 Patch Transdermal daily  lidocaine   4% Patch 1 Patch Transdermal daily  melatonin 3 milliGRAM(s) Oral at bedtime  methadone    Tablet 5 milliGRAM(s) Oral three times a day  metoprolol tartrate 50 milliGRAM(s) Oral two times a day  naloxegol 25 milliGRAM(s) Oral daily  norepinephrine Infusion 0.3 MICROgram(s)/kG/Min (33.8 mL/Hr) IV Continuous <Continuous>  polyethylene glycol 3350 17 Gram(s) Oral two times a day  propofol Injectable 10 milliGRAM(s) IV Push once  senna 2 Tablet(s) Oral at bedtime  sodium chloride 0.9% Bolus 1000 milliLiter(s) IV Bolus once    MEDICATIONS  (PRN):  bisacodyl 5 milliGRAM(s) Oral at bedtime PRN Constipation  HYDROmorphone   Tablet 4 milliGRAM(s) Oral every 4 hours PRN Moderate Pain (4 - 6)  HYDROmorphone   Tablet 6 milliGRAM(s) Oral every 4 hours PRN Severe Pain (7 - 10)  naloxone Injectable 0.1 milliGRAM(s) IV Push every 3 minutes PRN opioid intoxication      LABS:                        11.5   20.41 )-----------( 164      ( 10 Oct 2022 07:15 )             38.3     Hgb Trend: 11.5<--, 11.5<--, 10.1<--, 9.7<--  10-10    136  |  102  |  71<H>  ----------------------------<  93  4.6   |  17<L>  |  0.57    Ca    7.9<L>      10 Oct 2022 07:15  Phos  5.0     10-10  Mg     2.00     10-10      Creatinine Trend: 0.57<--, 0.54<--, 0.54<--, 0.41<--, 0.45<--, 0.60<--            MICROBIOLOGY:     RADIOLOGY & ADDITIONAL TESTS:      ASSESSMENT AND PLAN:  Patient is a 31 M with untreated metastatic ano-rectal SCC (followed by Dr Frazier [facility] oncology) with mets to liver and possibly bone, HPV positive, HIV infection, and R eye cataracts who presented to the [facility] on 8/31/22 after syncopal episode at home and transferred to [facility] for continued of care per family request. Being treated for MSSA bacteremia. c/b scrotal swelling s/p coude catheter, hypercalcemia s/p pamidronate and IVF, and AFB from [facility] chimaera not requiring any intervention. Patient is now undergoing trial of RT, did not tolerate first fraction 10/4 and didn't tolerate session on 10/5 even with increased pain meds before session. Pending hospice referral    #Neuro  AOx0  sedated on propofol    #Cardiovascular  Shock, undifferentiated  - continue with levo, wean as tolerated, MAP > 65  - repeat blood cx ordered  - f/u cortisol levels    Cardiac mass.   - TTE: two large RV masses and two additional small masses. IVC filter placed prophylactically for undiagnosed vegetations on the TTE.  - repeat TTE 9/9 limited study but demonstrated a mobile mass likely 2/2 tumour, thrombus, or vegetation   - repeat TTE ordered 10/10  - monitor on tele    #Respiratory  Rapid Response called on 10/10 for AMS and respiratory distress  -pt sedated and intubated (7.5mm)  -SBT as tolerated   -f/u chest XR post intubation    Cavitary lesion of lung.   -Patient presented to OSH with hypoxemia and L chest pigtail placed 8/31 due to suspected pneumothorax. Patient showed improvement in SOB and f/u CT chest performed which showed evidence of 12 cm multiloculated thick walled cavitary mass in the CHRISTINE and lingula.  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   -CT Chest 9/11 with evidence of persistent pneumomediastinum and extensive subcutaneous emphysema of the neck, chest, abdomen, pelvis, and extremities, including the scrotum.   - CANDIS per thoracic surgery    #GI/Nutrition  -OG tube  -start feeds    #/Renal  No active issues    #Skin  No active issues    #ID  MSSA bacteremia   - Hx of MSSA bacteremia with blood cultures 8/31 positive for MSSA and repeat on 9/2 NGTD.  - s/p Zosyn (9/8 - 9/9)  - transitioned to Cefazolin 2GM q8h for 6 weeks (9/9-10/13)     #Endocrine  Hypoglycemia  -was noted to be hypoglycemic (42)  during rapid response  -POCT 3x: 42, 105, 42  -dextrose ordered    #Hematologic/DVT ppx  Rectal cancer   - history of untreated HPV related rectal cancer with episode of rectal bleeding   - CT with evidence of liver and bone lesions  - Pt initially planned for RT-  5 fractions, w/ 1st fraction on 10/04 and 2nd fraction on 10/05, however pt did not tolerate both times d/t pain, despite receiving increased dose of Dilaudid prior to 2nd fraction  - per heme-onc, already discussed with family/pt of no more treatment being offered  - hospice referral pending    Neoplasm related pain.   - will hold oral pain regimen: -PO Methadone 5mg TID (QTC- 443 on 9/23),  Gabapentin 300mg TID, Dilaudid 4mg q4 PRN moderate pain, PO Dilaudid 6mg q4 PRN severe pain    Leukocytosis.   - WBC uptrending but no concern for new infection  - likely reactive 2/2 to underlying tumor burden vs hemoconcentration in the setting of poor po intake.  - f/u repeat blood cx    Microcytic anemia.   - microcytic anemia with Hgb 9.4 and MCV 81, likely a mixed picture of microcytic and normocytic anemia.  - iron studies with low iron levels but low iron binding capacity likely indicating a mixed picture  - ferritin wnl   - continue to monitor    #Ethics  full code but pending hospice referral (see note 10/7/22) MICU Accept Note    CHIEF COMPLAINT:   shortness of breath    HPI / INTERVAL HISTORY:     32M with PMH/ HIV, untreated metastatic anorectal SCC (dx 7/2022, mets to liver and bone), R eye cataract, transferred from [facility] after presenting on 8/31 after syncopal episode. Pt was admitted for AHRF for suspected PTX s/p L PTC placement. CT Chest subsequently revealed pigtail catheter was in within a lung mass (w/ ddx TB, fungal infxn, cavitary lesion, SCC). PTC was eventually converted to L lateral chest tube. 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 Chest tubed removed 9/16 and pt downgraded back to floors on 9/17.      Pt was transferred to [facility] on 9/8/22 per family request (mother) for oncology eval (Dr Frazier). Oncology recommended resolution of bacteremia prior to considering further treatment. Radiation Oncology planned for 3-5 fraction course of palliative pelvic radiation therapy with 1st fraction on 10/04 and 2nd fraction on 10/05. However, pt did not tolerate both times d/t pain, despite receiving increased pain medication. During [facility] discussion with pt's mother, she states that she wants a hospice referral.       RRT was called on 10/10 for altered mental status, respiratory distress, and hypotension. Pt was intubated, sedated, started on levo, and transferred to the MICU.           PAST MEDICAL & SURGICAL HISTORY:  HIV infection  6/30/2022 virus detected, switched to Biktarvy, male partners    Current smoker      History of depression  and anxiety      Rectal cancer  not on treatment      Right cataract      No significant past surgical history          FAMILY HISTORY:      SOCIAL HISTORY:  Smoking: current smoker   Substance Use:   EtOH Use:   Marital Status:   Sexual History:  male partners, virus detected, switched to Biktarvy   Occupation:  Recent Travel:  Country of Birth:   Advance Directives:     HOME MEDICATIONS:      Allergies    ibuprofen (Angioedema)    Intolerances          REVIEW OF SYSTEMS:  Constitutional: No fevers, chills, weight loss, weight gain  HEENT: No vision problems, eye pain, nasal congestion, rhinorrhea, sore throat, dysphagia  CV: No chest pain, orthopnea, palpitations  Resp: No cough, dyspnea, wheezing, hemoptysis  GI: No nausea, vomiting, diarrhea, constipation, abdominal pain  : [ ] dysuria [ ] nocturia [ ] hematuria [ ] increased urinary frequency  Musculoskeletal: [ ] back pain [ ] myalgias [ ] arthralgias [ ] fracture  Skin: [ ] rash [ ] itch  Neurological: [ ] headache [ ] dizziness [ ] syncope [ ] weakness [ ] numbness  Psychiatric: [ ] anxiety [ ] depression  Endocrine: [ ] diabetes [ ] thyroid problem  Hematologic/Lymphatic: [ ] anemia [ ] bleeding problem  Allergic/Immunologic: [ ] itchy eyes [ ] nasal discharge [ ] hives [ ] angioedema  [ ] All other systems negative  [x ] Unable to assess ROS because pt is sedated    OBJECTIVE:  ICU Vital Signs Last 24 Hrs  T(C): 36.7 (10 Oct 2022 13:25), Max: 36.7 (10 Oct 2022 13:25)  T(F): 98 (10 Oct 2022 13:25), Max: 98 (10 Oct 2022 13:25)  HR: 79 (10 Oct 2022 18:43) (60 - 100)  BP: 114/74 (10 Oct 2022 18:43) (103/72 - 114/74)  BP(mean): --  ABP: --  ABP(mean): --  RR: 18 (10 Oct 2022 18:43) (16 - 18)  SpO2: 95% (10 Oct 2022 18:43) (95% - 97%)    O2 Parameters below as of 10 Oct 2022 18:43  Patient On (Oxygen Delivery Method): room air              10-10 @ 07:01  -  10-10 @ 21:16  --------------------------------------------------------  IN: 100 mL / OUT: 500 mL / NET: -400 mL      CAPILLARY BLOOD GLUCOSE      POCT Blood Glucose.: 99 mg/dL (10 Oct 2022 20:36)      PHYSICAL EXAM:  GENERAL: young man, intubated  RESPIRATORY: Intubated, lungs are clear to auscultation bilaterally; crackles heard- emphysematous changes present   CARDIOVASCULAR: Regular rate and rhythm, normal S1 and S2, no murmur/rub/gallop; No lower extremity edema; Peripheral pulses are 2+ bilaterally  ABDOMEN: Nontender to palpation, normoactive bowel sounds, no rebound/guarding; No hepatosplenomegaly  MUSCULOSKELETAL: no clubbing or cyanosis of digits; no joint swelling or tenderness to palpation  PSYCH: A+Ox0, sedated  LINES:     HOSPITAL MEDICATIONS:  MEDICATIONS  (STANDING):  bictegravir 50 mG/emtricitabine 200 mG/tenofovir alafenamide 25 mG (BIKTARVY) 1 Tablet(s) Oral daily  ceFAZolin   IVPB 2000 milliGRAM(s) IV Intermittent every 8 hours  chlorhexidine 2% Cloths 1 Application(s) Topical <User Schedule>  enoxaparin Injectable 60 milliGRAM(s) SubCutaneous every 12 hours  gabapentin 300 milliGRAM(s) Oral three times a day  lactated ringers. 1000 milliLiter(s) (75 mL/Hr) IV Continuous <Continuous>  lactulose Syrup 10 Gram(s) Oral daily  lidocaine   4% Patch 1 Patch Transdermal daily  lidocaine   4% Patch 1 Patch Transdermal daily  melatonin 3 milliGRAM(s) Oral at bedtime  methadone    Tablet 5 milliGRAM(s) Oral three times a day  metoprolol tartrate 50 milliGRAM(s) Oral two times a day  naloxegol 25 milliGRAM(s) Oral daily  norepinephrine Infusion 0.3 MICROgram(s)/kG/Min (33.8 mL/Hr) IV Continuous <Continuous>  polyethylene glycol 3350 17 Gram(s) Oral two times a day  propofol Injectable 10 milliGRAM(s) IV Push once  senna 2 Tablet(s) Oral at bedtime  sodium chloride 0.9% Bolus 1000 milliLiter(s) IV Bolus once    MEDICATIONS  (PRN):  bisacodyl 5 milliGRAM(s) Oral at bedtime PRN Constipation  HYDROmorphone   Tablet 4 milliGRAM(s) Oral every 4 hours PRN Moderate Pain (4 - 6)  HYDROmorphone   Tablet 6 milliGRAM(s) Oral every 4 hours PRN Severe Pain (7 - 10)  naloxone Injectable 0.1 milliGRAM(s) IV Push every 3 minutes PRN opioid intoxication      LABS:                        11.5   20.41 )-----------( 164      ( 10 Oct 2022 07:15 )             38.3     Hgb Trend: 11.5<--, 11.5<--, 10.1<--, 9.7<--  10-10    136  |  102  |  71<H>  ----------------------------<  93  4.6   |  17<L>  |  0.57    Ca    7.9<L>      10 Oct 2022 07:15  Phos  5.0     10-10  Mg     2.00     10-10      Creatinine Trend: 0.57<--, 0.54<--, 0.54<--, 0.41<--, 0.45<--, 0.60<--            MICROBIOLOGY:     RADIOLOGY & ADDITIONAL TESTS:      ASSESSMENT AND PLAN:  Patient is a 31 M with untreated metastatic ano-rectal SCC (followed by Dr Frazier [facility] oncology) with mets to liver and possibly bone, HPV positive, HIV infection, and R eye cataracts who presented to the [facility] on 8/31/22 after syncopal episode at home and transferred to [facility] for continued of care per family request. Being treated for MSSA bacteremia. c/b scrotal swelling s/p coude catheter, hypercalcemia s/p pamidronate and IVF, and AFB from [place] w  chimaera not requiring any intervention. Patient is now undergoing trial of RT, did not tolerate first fraction 10/4 and didn't tolerate session on 10/5 even with increased pain meds before session. Pending hospice referral    #Neuro  AOx0  sedated on propofol    #Cardiovascular  Shock, undifferentiated  - continue with levo, wean as tolerated, MAP > 65  - repeat blood cx ordered  - f/u cortisol levels    Cardiac mass.   - TTE: two large RV masses and two additional small masses. IVC filter placed prophylactically for undiagnosed vegetations on the TTE.  - repeat TTE 9/9 limited study but demonstrated a mobile mass likely 2/2 tumour, thrombus, or vegetation   - repeat TTE ordered 10/10  - monitor on tele    #Respiratory  Rapid Response called on 10/10 for AMS and respiratory distress  -pt sedated and intubated (7.5mm)  -SBT as tolerated   -f/u chest XR post intubation    Cavitary lesion of lung.   -Patient presented to OSH with hypoxemia and L chest pigtail placed 8/31 due to suspected pneumothorax. Patient showed improvement in SOB and f/u CT chest performed which showed evidence of 12 cm multiloculated thick walled cavitary mass in the CHRISTINE and lingula.  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   -CT Chest 9/11 with evidence of persistent pneumomediastinum and extensive subcutaneous emphysema of the neck, chest, abdomen, pelvis, and extremities, including the scrotum.   - CANDIS per thoracic surgery    #GI/Nutrition  -OG tube  -start feeds    #/Renal  No active issues    #Skin  No active issues    #ID  MSSA bacteremia   - Hx of MSSA bacteremia with blood cultures 8/31 positive for MSSA and repeat on 9/2 NGTD.  - s/p Zosyn (9/8 - 9/9)  - transitioned to Cefazolin 2GM q8h for 6 weeks (9/9-10/13)     #Endocrine  Hypoglycemia  -was noted to be hypoglycemic (42)  during rapid response  -POCT 3x: 42, 105, 42  -dextrose ordered    #Hematologic/DVT ppx  Rectal cancer   - history of untreated HPV related rectal cancer with episode of rectal bleeding   - CT with evidence of liver and bone lesions  - Pt initially planned for RT-  5 fractions, w/ 1st fraction on 10/04 and 2nd fraction on 10/05, however pt did not tolerate both times d/t pain, despite receiving increased dose of Dilaudid prior to 2nd fraction  - per heme-onc, already discussed with family/pt of no more treatment being offered  - hospice referral pending    Neoplasm related pain.   - will hold oral pain regimen: -PO Methadone 5mg TID (QTC- 443 on 9/23),  Gabapentin 300mg TID, Dilaudid 4mg q4 PRN moderate pain, PO Dilaudid 6mg q4 PRN severe pain    Leukocytosis.   - WBC uptrending but no concern for new infection  - likely reactive 2/2 to underlying tumor burden vs hemoconcentration in the setting of poor po intake.  - f/u repeat blood cx    Microcytic anemia.   - microcytic anemia with Hgb 9.4 and MCV 81, likely a mixed picture of microcytic and normocytic anemia.  - iron studies with low iron levels but low iron binding capacity likely indicating a mixed picture  - ferritin wnl   - continue to monitor    #Ethics  full code but pending hospice referral (see note 10/7/22) MICU Accept Note    CHIEF COMPLAINT:   shortness of breath    HPI / INTERVAL HISTORY:     32M smoker with PMH significant for HIV, untreated metastatic anorectal SCC (dx 7/2022, mets to liver and bone), R eye cataract, transferred from [facility] after presenting on 8/31 after syncopal episode. Pt was admitted for AHRF for suspected PTX s/p L PTC placement. CT Chest subsequently revealed pigtail catheter was in within a cavitary lung lesion. PTC was eventually converted to L lateral chest tube. 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 Chest tubed removed 9/16 and pt downgraded back to floors on 9/17. Course further c/b hypercalcemia of malignancy and hyponatremia.      Pt was initially transferred to [facility] on 9/8/22 per family request (mother) for oncology eval (Dr Frazier). Oncology recommended resolution of bacteremia prior to considering further treatment, however, due to poor performance status, not deemed a candidate for disease modifying agents. Radiation Oncology planned palliative pelvic radiation therapy, however, pt did not tolerate both times d/t pain, despite receiving increased pain medication. Ongoing [facility] discussions with pt's mother, she's agreed to inpt hospice referral (placed 10/10)      RRT was called on 10/10 for altered mental status, respiratory distress, and hypotension. Pt was intubated, sedated, started on levophed, and transferred to the MICU.           PAST MEDICAL & SURGICAL HISTORY:  HIV infection  6/30/2022 virus detected, switched to Biktarvy, male partners    Current smoker      History of depression  and anxiety      Rectal cancer  not on treatment      Right cataract      No significant past surgical history          FAMILY HISTORY:      SOCIAL HISTORY:  Smoking: current smoker   Substance Use:   EtOH Use:   Marital Status:   Sexual History:  male partners, virus detected, switched to Biktarvy   Occupation:  Recent Travel:  Country of Birth:   Advance Directives:     HOME MEDICATIONS:      Allergies    ibuprofen (Angioedema)    Intolerances          REVIEW OF SYSTEMS:  Constitutional: No fevers, chills, weight loss, weight gain  HEENT: No vision problems, eye pain, nasal congestion, rhinorrhea, sore throat, dysphagia  CV: No chest pain, orthopnea, palpitations  Resp: No cough, dyspnea, wheezing, hemoptysis  GI: No nausea, vomiting, diarrhea, constipation, abdominal pain  : [ ] dysuria [ ] nocturia [ ] hematuria [ ] increased urinary frequency  Musculoskeletal: [ ] back pain [ ] myalgias [ ] arthralgias [ ] fracture  Skin: [ ] rash [ ] itch  Neurological: [ ] headache [ ] dizziness [ ] syncope [ ] weakness [ ] numbness  Psychiatric: [ ] anxiety [ ] depression  Endocrine: [ ] diabetes [ ] thyroid problem  Hematologic/Lymphatic: [ ] anemia [ ] bleeding problem  Allergic/Immunologic: [ ] itchy eyes [ ] nasal discharge [ ] hives [ ] angioedema  [ ] All other systems negative  [x ] Unable to assess ROS because pt is sedated    OBJECTIVE:  ICU Vital Signs Last 24 Hrs  T(C): 36.7 (10 Oct 2022 13:25), Max: 36.7 (10 Oct 2022 13:25)  T(F): 98 (10 Oct 2022 13:25), Max: 98 (10 Oct 2022 13:25)  HR: 79 (10 Oct 2022 18:43) (60 - 100)  BP: 114/74 (10 Oct 2022 18:43) (103/72 - 114/74)  BP(mean): --  ABP: --  ABP(mean): --  RR: 18 (10 Oct 2022 18:43) (16 - 18)  SpO2: 95% (10 Oct 2022 18:43) (95% - 97%)    O2 Parameters below as of 10 Oct 2022 18:43  Patient On (Oxygen Delivery Method): room air              10-10 @ 07:01  -  10-10 @ 21:16  --------------------------------------------------------  IN: 100 mL / OUT: 500 mL / NET: -400 mL      CAPILLARY BLOOD GLUCOSE      POCT Blood Glucose.: 99 mg/dL (10 Oct 2022 20:36)      PHYSICAL EXAM:  GENERAL: young man, intubated  RESPIRATORY: Intubated, lungs are clear to auscultation bilaterally; crackles heard- emphysematous changes present   CARDIOVASCULAR: Regular rate and rhythm, normal S1 and S2, no murmur/rub/gallop; No lower extremity edema; Peripheral pulses are 2+ bilaterally  ABDOMEN: Nontender to palpation, normoactive bowel sounds, no rebound/guarding; No hepatosplenomegaly  MUSCULOSKELETAL: no clubbing or cyanosis of digits; no joint swelling or tenderness to palpation  PSYCH: A+Ox0, sedated  LINES:     HOSPITAL MEDICATIONS:  MEDICATIONS  (STANDING):  bictegravir 50 mG/emtricitabine 200 mG/tenofovir alafenamide 25 mG (BIKTARVY) 1 Tablet(s) Oral daily  ceFAZolin   IVPB 2000 milliGRAM(s) IV Intermittent every 8 hours  chlorhexidine 2% Cloths 1 Application(s) Topical <User Schedule>  enoxaparin Injectable 60 milliGRAM(s) SubCutaneous every 12 hours  gabapentin 300 milliGRAM(s) Oral three times a day  lactated ringers. 1000 milliLiter(s) (75 mL/Hr) IV Continuous <Continuous>  lactulose Syrup 10 Gram(s) Oral daily  lidocaine   4% Patch 1 Patch Transdermal daily  lidocaine   4% Patch 1 Patch Transdermal daily  melatonin 3 milliGRAM(s) Oral at bedtime  methadone    Tablet 5 milliGRAM(s) Oral three times a day  metoprolol tartrate 50 milliGRAM(s) Oral two times a day  naloxegol 25 milliGRAM(s) Oral daily  norepinephrine Infusion 0.3 MICROgram(s)/kG/Min (33.8 mL/Hr) IV Continuous <Continuous>  polyethylene glycol 3350 17 Gram(s) Oral two times a day  propofol Injectable 10 milliGRAM(s) IV Push once  senna 2 Tablet(s) Oral at bedtime  sodium chloride 0.9% Bolus 1000 milliLiter(s) IV Bolus once    MEDICATIONS  (PRN):  bisacodyl 5 milliGRAM(s) Oral at bedtime PRN Constipation  HYDROmorphone   Tablet 4 milliGRAM(s) Oral every 4 hours PRN Moderate Pain (4 - 6)  HYDROmorphone   Tablet 6 milliGRAM(s) Oral every 4 hours PRN Severe Pain (7 - 10)  naloxone Injectable 0.1 milliGRAM(s) IV Push every 3 minutes PRN opioid intoxication      LABS:                        11.5   20.41 )-----------( 164      ( 10 Oct 2022 07:15 )             38.3     Hgb Trend: 11.5<--, 11.5<--, 10.1<--, 9.7<--  10-10    136  |  102  |  71<H>  ----------------------------<  93  4.6   |  17<L>  |  0.57    Ca    7.9<L>      10 Oct 2022 07:15  Phos  5.0     10-10  Mg     2.00     10-10      Creatinine Trend: 0.57<--, 0.54<--, 0.54<--, 0.41<--, 0.45<--, 0.60<--            MICROBIOLOGY:     RADIOLOGY & ADDITIONAL TESTS:      ASSESSMENT AND PLAN:  Patient is a 31 M with untreated metastatic ano-rectal SCC (followed by Dr Frazier [facility] oncology) with mets to liver and possibly bone, HPV positive, HIV infection, and R eye cataracts who presented to the [facility] on 8/31/22 after syncopal episode at home and transferred to [facility] for continued of care per family request. Being treated for MSSA bacteremia. c/b scrotal swelling s/p coude catheter, hypercalcemia s/p pamidronate and IVF, and AFB from [place] w M chimaera not requiring any intervention. Patient is now undergoing trial of RT, did not tolerate first fraction 10/4 and didn't tolerate session on 10/5 even with increased pain meds before session. Pending hospice referral    #Neuro  AOx0  sedated on propofol    #Cardiovascular  Shock, undifferentiated  - continue with levo, wean as tolerated, MAP > 65  - repeat blood cx ordered  - f/u cortisol levels    Cardiac mass.   - TTE: two large RV masses and two additional small masses. IVC filter placed prophylactically for undiagnosed vegetations on the TTE.  - repeat TTE 9/9 limited study but demonstrated a mobile mass likely 2/2 tumour, thrombus, or vegetation   - repeat TTE ordered 10/10  - monitor on tele    #Respiratory  Rapid Response called on 10/10 for AMS and respiratory distress  -pt sedated and intubated (7.5mm)  -SBT as tolerated   -f/u chest XR post intubation    Cavitary lesion of lung.   -Patient presented to OSH with hypoxemia and L chest pigtail placed 8/31 due to suspected pneumothorax. Patient showed improvement in SOB and f/u CT chest performed which showed evidence of 12 cm multiloculated thick walled cavitary mass in the CHRISTINE and lingula.  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   -CT Chest 9/11 with evidence of persistent pneumomediastinum and extensive subcutaneous emphysema of the neck, chest, abdomen, pelvis, and extremities, including the scrotum.   - CANDIS per thoracic surgery    #GI/Nutrition  -OG tube  -start feeds    #/Renal  No active issues    #Skin  No active issues    #ID  MSSA bacteremia and c/f MSSA PNA and IE   - Hx of MSSA bacteremia with blood cultures 8/31 positive for MSSA and repeat on 9/2 NGTD.  - s/p Zosyn (9/8 - 9/9)  - transitioned to Cefazolin 2GM q8h for 6 weeks (9/9-10/13)     #Endocrine  Hypoglycemia  -was noted to be hypoglycemic (42)  during rapid response  -POCT 3x: 42, 105, 42  -dextrose ordered    #Hematologic/DVT ppx  Rectal cancer   - history of untreated HPV related rectal cancer with episode of rectal bleeding   - CT with evidence of liver and bone lesions  - Pt initially planned for RT-  5 fractions, w/ 1st fraction on 10/04 and 2nd fraction on 10/05, however pt did not tolerate both times d/t pain, despite receiving increased dose of Dilaudid prior to 2nd fraction  - per heme-onc, already discussed with family/pt of no more treatment being offered  - hospice referral placed 10/10    Neoplasm related pain.   - will hold oral pain regimen: -PO Methadone 5mg TID (QTC- 443 on 9/23),  Gabapentin 300mg TID, Dilaudid 4mg q4 PRN moderate pain, PO Dilaudid 6mg q4 PRN severe pain  - sedation as above for vent synchrony     Leukocytosis.   - WBC uptrending but no concern for new infection  - likely reactive 2/2 to underlying tumor burden vs hemoconcentration in the setting of poor po intake.  - f/u repeat blood cx    Microcytic anemia.   - microcytic anemia with Hgb 9.4 and MCV 81, likely a mixed picture of microcytic and normocytic anemia.  - iron studies with low iron levels but low iron binding capacity likely indicating a mixed picture  - ferritin wnl   - continue to monitor    #Ethics  full code but pending hospice referral (see note 10/7/22) MICU Accept Note    CHIEF COMPLAINT:   shortness of breath    HPI / INTERVAL HISTORY:     32M smoker with PMH significant for HIV, untreated metastatic anorectal SCC (dx 7/2022, mets to liver and bone), R eye cataract, transferred from [facility] after presenting on 8/31 after syncopal episode. Pt was admitted for AHRF for suspected PTX s/p L PTC placement. CT Chest subsequently revealed pigtail catheter was in within a cavitary lung lesion. PTC was eventually converted to L lateral chest tube. 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 Chest tubed removed 9/16 and pt downgraded back to floors on 9/17. Course further c/b hypercalcemia of malignancy and hyponatremia.      Pt was initially transferred to [facility] on 9/8/22 per family request (mother) for oncology eval (Dr Frazier). Oncology recommended resolution of bacteremia prior to considering further treatment, however, due to poor performance status, not deemed a candidate for disease modifying agents. Radiation Oncology planned palliative pelvic radiation therapy, however, pt did not tolerate both times d/t pain, despite receiving increased pain medication. Ongoing [facility] discussions with pt's mother, she's agreed to inpt hospice referral (placed 10/10)      RRT was called on 10/10 for altered mental status, respiratory distress, and hypotension. Pt was intubated, sedated, started on levophed, and transferred to the MICU.           PAST MEDICAL & SURGICAL HISTORY:  HIV infection  6/30/2022 virus detected, switched to Biktarvy, male partners    Current smoker      History of depression  and anxiety      Rectal cancer  not on treatment      Right cataract      No significant past surgical history          FAMILY HISTORY:      SOCIAL HISTORY:  Smoking: current smoker   Substance Use:   EtOH Use:   Marital Status:   Sexual History:  male partners, virus detected, switched to Biktarvy   Occupation:  Recent Travel:  Country of Birth:   Advance Directives:     HOME MEDICATIONS:      Allergies    ibuprofen (Angioedema)    Intolerances          REVIEW OF SYSTEMS:  Constitutional: No fevers, chills, weight loss, weight gain  HEENT: No vision problems, eye pain, nasal congestion, rhinorrhea, sore throat, dysphagia  CV: No chest pain, orthopnea, palpitations  Resp: No cough, dyspnea, wheezing, hemoptysis  GI: No nausea, vomiting, diarrhea, constipation, abdominal pain  : [ ] dysuria [ ] nocturia [ ] hematuria [ ] increased urinary frequency  Musculoskeletal: [ ] back pain [ ] myalgias [ ] arthralgias [ ] fracture  Skin: [ ] rash [ ] itch  Neurological: [ ] headache [ ] dizziness [ ] syncope [ ] weakness [ ] numbness  Psychiatric: [ ] anxiety [ ] depression  Endocrine: [ ] diabetes [ ] thyroid problem  Hematologic/Lymphatic: [ ] anemia [ ] bleeding problem  Allergic/Immunologic: [ ] itchy eyes [ ] nasal discharge [ ] hives [ ] angioedema  [ ] All other systems negative  [x ] Unable to assess ROS because pt is sedated    OBJECTIVE:  ICU Vital Signs Last 24 Hrs  T(C): 36.7 (10 Oct 2022 13:25), Max: 36.7 (10 Oct 2022 13:25)  T(F): 98 (10 Oct 2022 13:25), Max: 98 (10 Oct 2022 13:25)  HR: 79 (10 Oct 2022 18:43) (60 - 100)  BP: 114/74 (10 Oct 2022 18:43) (103/72 - 114/74)  BP(mean): --  ABP: --  ABP(mean): --  RR: 18 (10 Oct 2022 18:43) (16 - 18)  SpO2: 95% (10 Oct 2022 18:43) (95% - 97%)    O2 Parameters below as of 10 Oct 2022 18:43  Patient On (Oxygen Delivery Method): room air              10-10 @ 07:01  -  10-10 @ 21:16  --------------------------------------------------------  IN: 100 mL / OUT: 500 mL / NET: -400 mL      CAPILLARY BLOOD GLUCOSE      POCT Blood Glucose.: 99 mg/dL (10 Oct 2022 20:36)      PHYSICAL EXAM:  GENERAL: young man, intubated  RESPIRATORY: Intubated, lungs are clear to auscultation bilaterally; crackles heard- emphysematous changes present   CARDIOVASCULAR: Regular rate and rhythm, normal S1 and S2, no murmur/rub/gallop; No lower extremity edema; Peripheral pulses are 2+ bilaterally  ABDOMEN: Nontender to palpation, normoactive bowel sounds, no rebound/guarding; No hepatosplenomegaly  MUSCULOSKELETAL: no clubbing or cyanosis of digits; no joint swelling or tenderness to palpation  PSYCH: A+Ox0, sedated  LINES:     HOSPITAL MEDICATIONS:  MEDICATIONS  (STANDING):  bictegravir 50 mG/emtricitabine 200 mG/tenofovir alafenamide 25 mG (BIKTARVY) 1 Tablet(s) Oral daily  ceFAZolin   IVPB 2000 milliGRAM(s) IV Intermittent every 8 hours  chlorhexidine 2% Cloths 1 Application(s) Topical <User Schedule>  enoxaparin Injectable 60 milliGRAM(s) SubCutaneous every 12 hours  gabapentin 300 milliGRAM(s) Oral three times a day  lactated ringers. 1000 milliLiter(s) (75 mL/Hr) IV Continuous <Continuous>  lactulose Syrup 10 Gram(s) Oral daily  lidocaine   4% Patch 1 Patch Transdermal daily  lidocaine   4% Patch 1 Patch Transdermal daily  melatonin 3 milliGRAM(s) Oral at bedtime  methadone    Tablet 5 milliGRAM(s) Oral three times a day  metoprolol tartrate 50 milliGRAM(s) Oral two times a day  naloxegol 25 milliGRAM(s) Oral daily  norepinephrine Infusion 0.3 MICROgram(s)/kG/Min (33.8 mL/Hr) IV Continuous <Continuous>  polyethylene glycol 3350 17 Gram(s) Oral two times a day  propofol Injectable 10 milliGRAM(s) IV Push once  senna 2 Tablet(s) Oral at bedtime  sodium chloride 0.9% Bolus 1000 milliLiter(s) IV Bolus once    MEDICATIONS  (PRN):  bisacodyl 5 milliGRAM(s) Oral at bedtime PRN Constipation  HYDROmorphone   Tablet 4 milliGRAM(s) Oral every 4 hours PRN Moderate Pain (4 - 6)  HYDROmorphone   Tablet 6 milliGRAM(s) Oral every 4 hours PRN Severe Pain (7 - 10)  naloxone Injectable 0.1 milliGRAM(s) IV Push every 3 minutes PRN opioid intoxication      LABS:                        11.5   20.41 )-----------( 164      ( 10 Oct 2022 07:15 )             38.3     Hgb Trend: 11.5<--, 11.5<--, 10.1<--, 9.7<--  10-10    136  |  102  |  71<H>  ----------------------------<  93  4.6   |  17<L>  |  0.57    Ca    7.9<L>      10 Oct 2022 07:15  Phos  5.0     10-10  Mg     2.00     10-10      Creatinine Trend: 0.57<--, 0.54<--, 0.54<--, 0.41<--, 0.45<--, 0.60<--            MICROBIOLOGY:     RADIOLOGY & ADDITIONAL TESTS:      ASSESSMENT AND PLAN:  Patient is a 31 M with untreated metastatic ano-rectal SCC (followed by Dr Frazier [facility] oncology) with mets to liver and possibly bone, HPV positive, HIV infection, and R eye cataracts who presented to the [facility] on 8/31/22 after syncopal episode at home and transferred to [facility] for continued of care per family request. Being treated for MSSA bacteremia. c/b scrotal swelling s/p coude catheter, hypercalcemia s/p pamidronate and IVF, and AFB from [place] w M chimaera not requiring any intervention. Patient is now undergoing trial of RT, did not tolerate first fraction 10/4 and didn't tolerate session on 10/5 even with increased pain meds before session. Pending hospice referral    #Neuro  AOx0  sedated on propofol    #Cardiovascular  Shock, undifferentiated  - continue with levo, wean as tolerated, MAP > 65  - repeat blood cx ordered  - f/u cortisol levels    Cardiac mass.   - TTE: two large RV masses and two additional small masses. IVC filter placed prophylactically for undiagnosed vegetations on the TTE.  - repeat TTE 9/9 limited study but demonstrated a mobile mass likely 2/2 tumour, thrombus, or vegetation   - repeat TTE ordered 10/10  - monitor on tele    #Respiratory  Rapid Response called on 10/10 for AMS and respiratory distress  -pt sedated and intubated (7.5mm)  -SBT as tolerated   -f/u chest XR post intubation    Cavitary lesion of lung.   -Patient presented to OSH with hypoxemia and L chest pigtail placed 8/31 due to suspected pneumothorax. Patient showed improvement in SOB and f/u CT chest performed which showed evidence of 12 cm multiloculated thick walled cavitary mass in the CHRISTINE and lingula.  - sputum culture 9/10 + mod klebsiella pneumoniae pansensitive  - blood Cx neg 9/8  - fungitell and crypto neg 9/10  - positive AFB in 1/3 sputum samples (sept 3) from [facility] with final speciation Mycobacterium chimaera (avium complex)    Subcutaneous emphysema.   -CT Chest 9/11 with evidence of persistent pneumomediastinum and extensive subcutaneous emphysema of the neck, chest, abdomen, pelvis, and extremities, including the scrotum.   - CANDIS per thoracic surgery    #GI/Nutrition  -OG tube  -start feeds    #/Renal  No active issues    #Skin  No active issues    #ID  MSSA bacteremia and c/f MSSA PNA and IE   - Hx of MSSA bacteremia with blood cultures 8/31 positive for MSSA and repeat on 9/2 NGTD.  - s/p Zosyn (9/8 - 9/9)  - transitioned to Cefazolin 2GM q8h for 6 weeks (9/9-10/13)     #Endocrine  Hypoglycemia  -was noted to be hypoglycemic (42)  during rapid response  -POCT 3x: 42, 105, 42  -dextrose ordered    #Hematologic/DVT ppx  Rectal cancer   - history of untreated HPV related rectal cancer with episode of rectal bleeding   - CT with evidence of liver and bone lesions  - Pt initially planned for RT-  5 fractions, w/ 1st fraction on 10/04 and 2nd fraction on 10/05, however pt did not tolerate both times d/t pain, despite receiving increased dose of Dilaudid prior to 2nd fraction  - per heme-onc, already discussed with family/pt of no more treatment being offered  - hospice referral placed 10/10    Neoplasm related pain.   - will hold oral pain regimen: -PO Methadone 5mg TID (QTC- 443 on 9/23),  Gabapentin 300mg TID, Dilaudid 4mg q4 PRN moderate pain, PO Dilaudid 6mg q4 PRN severe pain  - sedation as above for vent synchrony     Leukocytosis.   - WBC uptrending  - likely reactive 2/2 to underlying tumor burden vs hemoconcentration in the setting of poor po intake.  - f/u repeat blood cx  - will start zosyn in the interim for empiric coverage     Microcytic anemia.   - microcytic anemia with Hgb 9.4 and MCV 81, likely a mixed picture of microcytic and normocytic anemia.  - iron studies with low iron levels but low iron binding capacity likely indicating a mixed picture  - ferritin wnl   - continue to monitor    #Ethics  full code but pending hospice referral (see note 10/7/22) MICU Accept Note    CHIEF COMPLAINT:   shortness of breath    HPI / INTERVAL HISTORY:     32M smoker with PMH significant for HIV, untreated metastatic anorectal SCC (dx 7/2022, mets to liver and bone), R eye cataract, transferred from [facility] after presenting on 8/31 after syncopal episode. Pt was admitted for AHRF for suspected PTX s/p L PTC placement. CT Chest subsequently revealed pigtail catheter was in within a cavitary lung lesion. PTC was eventually converted to L lateral chest tube. 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 Chest tubed removed 9/16 and pt downgraded back to floors on 9/17. Course further c/b hypercalcemia of malignancy and hyponatremia.      Pt was initially transferred to [facility] on 9/8/22 per family request (mother) for oncology eval (Dr Fraizer). Oncology recommended resolution of bacteremia prior to considering further treatment, however, due to poor performance status, not deemed a candidate for disease modifying agents. Radiation Oncology planned palliative pelvic radiation therapy, however, pt did not tolerate both times d/t pain, despite receiving increased pain medication. Ongoing [facility] discussions with pt's mother, she's agreed to inpt hospice referral (placed 10/10)      RRT was called on 10/10 for altered mental status, respiratory distress, and hypotension. Pt was intubated, sedated, started on levophed, and transferred to the MICU.           PAST MEDICAL & SURGICAL HISTORY:  HIV infection  6/30/2022 virus detected, switched to Biktarvy, male partners    Current smoker      History of depression  and anxiety      Rectal cancer  not on treatment      Right cataract      No significant past surgical history          FAMILY HISTORY:      SOCIAL HISTORY:  Smoking: current smoker   Substance Use:   EtOH Use:   Marital Status:   Sexual History:  male partners, virus detected, switched to Biktarvy   Occupation:  Recent Travel:  Country of Birth:   Advance Directives:     HOME MEDICATIONS:      Allergies    ibuprofen (Angioedema)    Intolerances          REVIEW OF SYSTEMS:  Constitutional: No fevers, chills, weight loss, weight gain  HEENT: No vision problems, eye pain, nasal congestion, rhinorrhea, sore throat, dysphagia  CV: No chest pain, orthopnea, palpitations  Resp: No cough, dyspnea, wheezing, hemoptysis  GI: No nausea, vomiting, diarrhea, constipation, abdominal pain  : [ ] dysuria [ ] nocturia [ ] hematuria [ ] increased urinary frequency  Musculoskeletal: [ ] back pain [ ] myalgias [ ] arthralgias [ ] fracture  Skin: [ ] rash [ ] itch  Neurological: [ ] headache [ ] dizziness [ ] syncope [ ] weakness [ ] numbness  Psychiatric: [ ] anxiety [ ] depression  Endocrine: [ ] diabetes [ ] thyroid problem  Hematologic/Lymphatic: [ ] anemia [ ] bleeding problem  Allergic/Immunologic: [ ] itchy eyes [ ] nasal discharge [ ] hives [ ] angioedema  [ ] All other systems negative  [x ] Unable to assess ROS because pt is sedated    OBJECTIVE:  ICU Vital Signs Last 24 Hrs  T(C): 36.7 (10 Oct 2022 13:25), Max: 36.7 (10 Oct 2022 13:25)  T(F): 98 (10 Oct 2022 13:25), Max: 98 (10 Oct 2022 13:25)  HR: 79 (10 Oct 2022 18:43) (60 - 100)  BP: 114/74 (10 Oct 2022 18:43) (103/72 - 114/74)  BP(mean): --  ABP: --  ABP(mean): --  RR: 18 (10 Oct 2022 18:43) (16 - 18)  SpO2: 95% (10 Oct 2022 18:43) (95% - 97%)    O2 Parameters below as of 10 Oct 2022 18:43  Patient On (Oxygen Delivery Method): room air              10-10 @ 07:01  -  10-10 @ 21:16  --------------------------------------------------------  IN: 100 mL / OUT: 500 mL / NET: -400 mL      CAPILLARY BLOOD GLUCOSE      POCT Blood Glucose.: 99 mg/dL (10 Oct 2022 20:36)      PHYSICAL EXAM:  GENERAL: young man, intubated  RESPIRATORY: Intubated, lungs are clear to auscultation bilaterally; crackles heard- emphysematous changes present   CARDIOVASCULAR: Regular rate and rhythm, normal S1 and S2, no murmur/rub/gallop; No lower extremity edema; Peripheral pulses are 2+ bilaterally  ABDOMEN: Nontender to palpation, normoactive bowel sounds, no rebound/guarding; No hepatosplenomegaly  MUSCULOSKELETAL: no clubbing or cyanosis of digits; no joint swelling or tenderness to palpation  PSYCH: A+Ox0, sedated  LINES:     HOSPITAL MEDICATIONS:  MEDICATIONS  (STANDING):  bictegravir 50 mG/emtricitabine 200 mG/tenofovir alafenamide 25 mG (BIKTARVY) 1 Tablet(s) Oral daily  ceFAZolin   IVPB 2000 milliGRAM(s) IV Intermittent every 8 hours  chlorhexidine 2% Cloths 1 Application(s) Topical <User Schedule>  enoxaparin Injectable 60 milliGRAM(s) SubCutaneous every 12 hours  gabapentin 300 milliGRAM(s) Oral three times a day  lactated ringers. 1000 milliLiter(s) (75 mL/Hr) IV Continuous <Continuous>  lactulose Syrup 10 Gram(s) Oral daily  lidocaine   4% Patch 1 Patch Transdermal daily  lidocaine   4% Patch 1 Patch Transdermal daily  melatonin 3 milliGRAM(s) Oral at bedtime  methadone    Tablet 5 milliGRAM(s) Oral three times a day  metoprolol tartrate 50 milliGRAM(s) Oral two times a day  naloxegol 25 milliGRAM(s) Oral daily  norepinephrine Infusion 0.3 MICROgram(s)/kG/Min (33.8 mL/Hr) IV Continuous <Continuous>  polyethylene glycol 3350 17 Gram(s) Oral two times a day  propofol Injectable 10 milliGRAM(s) IV Push once  senna 2 Tablet(s) Oral at bedtime  sodium chloride 0.9% Bolus 1000 milliLiter(s) IV Bolus once    MEDICATIONS  (PRN):  bisacodyl 5 milliGRAM(s) Oral at bedtime PRN Constipation  HYDROmorphone   Tablet 4 milliGRAM(s) Oral every 4 hours PRN Moderate Pain (4 - 6)  HYDROmorphone   Tablet 6 milliGRAM(s) Oral every 4 hours PRN Severe Pain (7 - 10)  naloxone Injectable 0.1 milliGRAM(s) IV Push every 3 minutes PRN opioid intoxication      LABS:                        11.5   20.41 )-----------( 164      ( 10 Oct 2022 07:15 )             38.3     Hgb Trend: 11.5<--, 11.5<--, 10.1<--, 9.7<--  10-10    136  |  102  |  71<H>  ----------------------------<  93  4.6   |  17<L>  |  0.57    Ca    7.9<L>      10 Oct 2022 07:15  Phos  5.0     10-10  Mg     2.00     10-10      Creatinine Trend: 0.57<--, 0.54<--, 0.54<--, 0.41<--, 0.45<--, 0.60<--            MICROBIOLOGY:     RADIOLOGY & ADDITIONAL TESTS:      ASSESSMENT AND PLAN:  Patient is a 31 M with untreated metastatic ano-rectal SCC (followed by Dr Frazier [facility] oncology) with mets to liver and possibly bone, HPV positive, HIV infection, and R eye cataracts who presented to the [facility] on 8/31/22 after syncopal episode at home and transferred to [facility] for continued of care per family request. Being treated for MSSA bacteremia. c/b scrotal swelling s/p coude catheter, hypercalcemia s/p pamidronate and IVF, and AFB from [place] w M chimaera not requiring any intervention. Patient is now undergoing trial of RT, did not tolerate first fraction 10/4 and didn't tolerate session on 10/5 even with increased pain meds before session. Pending hospice referral    #Neuro  AOx0  sedated on propofol    #Cardiovascular  Shock, undifferentiated  - continue with levo, wean as tolerated, MAP > 65  - repeat blood cx ordered  - f/u cortisol levels    Cardiac mass.   - TTE: two large RV masses and two additional small masses. IVC filter placed prophylactically for undiagnosed vegetations on the TTE.  - repeat TTE 9/9 limited study but demonstrated a mobile mass likely 2/2 tumour, thrombus, or vegetation   - repeat TTE ordered 10/10  - monitor on tele    #Respiratory  Rapid Response called on 10/10 for AMS and respiratory distress  -pt sedated and intubated (7.5mm)  -SBT as tolerated   -f/u chest XR post intubation    Cavitary lesion of lung.   -Patient presented to OSH with hypoxemia and L chest pigtail placed 8/31 due to suspected pneumothorax. Patient showed improvement in SOB and f/u CT chest performed which showed evidence of 12 cm multiloculated thick walled cavitary mass in the CHRISTINE and lingula.  - sputum culture 9/10 + mod klebsiella pneumoniae pansensitive  - blood Cx neg 9/8  - fungitell and crypto neg 9/10  - positive AFB in 1/3 sputum samples (sept 3) from [facility] with final speciation Mycobacterium chimaera (avium complex)    Subcutaneous emphysema.   -CT Chest 9/11 with evidence of persistent pneumomediastinum and extensive subcutaneous emphysema of the neck, chest, abdomen, pelvis, and extremities, including the scrotum.   - CANDIS per thoracic surgery    #GI/Nutrition  -OG tube  -start feeds    #/Renal  No active issues    #Skin  No active issues    #ID  MSSA bacteremia and c/f MSSA PNA and IE   - Hx of MSSA bacteremia with blood cultures 8/31 positive for MSSA and repeat on 9/2 NGTD.  - s/p Zosyn (9/8 - 9/9)  - transitioned to Cefazolin 2GM q8h for 6 weeks (9/9-10/13)     #Endocrine  Hypoglycemia  -was noted to be hypoglycemic (42)  during rapid response  -POCT 3x: 42, 105, 42  -dextrose ordered    #Hematologic/DVT ppx  Rectal cancer   - history of untreated HPV related rectal cancer with episode of rectal bleeding   - CT with evidence of liver and bone lesions  - Pt initially planned for RT-  5 fractions, w/ 1st fraction on 10/04 and 2nd fraction on 10/05, however pt did not tolerate both times d/t pain, despite receiving increased dose of Dilaudid prior to 2nd fraction  - per heme-onc, already discussed with family/pt of no more treatment being offered  - hospice referral placed 10/10    Neoplasm related pain.   - will hold oral pain regimen: -PO Methadone 5mg TID (QTC- 443 on 9/23),  Gabapentin 300mg TID, Dilaudid 4mg q4 PRN moderate pain, PO Dilaudid 6mg q4 PRN severe pain  - sedation as above for vent synchrony     Leukocytosis.   - WBC uptrending  - likely reactive 2/2 to underlying tumor burden vs hemoconcentration in the setting of poor po intake.  - f/u repeat blood cx  - will start zosyn in the interim for empiric coverage     Microcytic anemia.   - microcytic anemia with Hgb 9.4 and MCV 81, likely a mixed picture of microcytic and normocytic anemia.  - iron studies with low iron levels but low iron binding capacity likely indicating a mixed picture  - ferritin wnl   - continue to monitor    #Ethics  full code but pending hospice referral (see note 10/7/22)      CCM attending:  pt seen and examined as above,   pt is a 30 yo male with acute hypoxemic respiratory failure. s/p intubation.  pt with leucocytosis, will panculture, start zosyn  -pain management with dilaudid  -monitor fingerstick, d50 prn  -continue norepinephrine for pressor support , shock  -hospice referral  critically ill with shock and hypoxemic respiratory failure MICU Accept Note    CHIEF COMPLAINT:   shortness of breath    HPI / INTERVAL HISTORY:     32M smoker with PMH significant for HIV, untreated metastatic anorectal SCC (dx 7/2022, mets to liver and bone), R eye cataract, transferred from [facility] after presenting on 8/31 after syncopal episode. Pt was admitted for AHRF for suspected PTX s/p L PTC placement. CT Chest subsequently revealed pigtail catheter was in within a cavitary lung lesion. PTC was eventually converted to L lateral chest tube. 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 Chest tubed removed 9/16 and pt downgraded back to floors on 9/17. Course further c/b hypercalcemia of malignancy and hyponatremia.      Pt was initially transferred to [facility] on 9/8/22 per family request (mother) for oncology eval (Dr Frazier). Oncology recommended resolution of bacteremia prior to considering further treatment, however, due to poor performance status, not deemed a candidate for disease modifying agents. Radiation Oncology planned palliative pelvic radiation therapy, however, pt did not tolerate both times d/t pain, despite receiving increased pain medication. Ongoing [facility] discussions with pt's mother, she's agreed to inpt hospice referral (placed 10/10)      RRT was called on 10/10 for altered mental status, respiratory distress, and hypotension. Pt was intubated, sedated, started on levophed, and transferred to the MICU.           PAST MEDICAL & SURGICAL HISTORY:  HIV infection  6/30/2022 virus detected, switched to Biktarvy, male partners    Current smoker      History of depression  and anxiety      Rectal cancer  not on treatment      Right cataract      No significant past surgical history          FAMILY HISTORY:      SOCIAL HISTORY:  Smoking: current smoker   Substance Use:   EtOH Use:   Marital Status:   Sexual History:  male partners, virus detected, switched to Biktarvy   Occupation:  Recent Travel:  Country of Birth:   Advance Directives:     HOME MEDICATIONS:      Allergies    ibuprofen (Angioedema)    Intolerances          REVIEW OF SYSTEMS:  Constitutional: No fevers, chills, weight loss, weight gain  HEENT: No vision problems, eye pain, nasal congestion, rhinorrhea, sore throat, dysphagia  CV: No chest pain, orthopnea, palpitations  Resp: No cough, dyspnea, wheezing, hemoptysis  GI: No nausea, vomiting, diarrhea, constipation, abdominal pain  : [ ] dysuria [ ] nocturia [ ] hematuria [ ] increased urinary frequency  Musculoskeletal: [ ] back pain [ ] myalgias [ ] arthralgias [ ] fracture  Skin: [ ] rash [ ] itch  Neurological: [ ] headache [ ] dizziness [ ] syncope [ ] weakness [ ] numbness  Psychiatric: [ ] anxiety [ ] depression  Endocrine: [ ] diabetes [ ] thyroid problem  Hematologic/Lymphatic: [ ] anemia [ ] bleeding problem  Allergic/Immunologic: [ ] itchy eyes [ ] nasal discharge [ ] hives [ ] angioedema  [ ] All other systems negative  [x ] Unable to assess ROS because pt is sedated    OBJECTIVE:  ICU Vital Signs Last 24 Hrs  T(C): 36.7 (10 Oct 2022 13:25), Max: 36.7 (10 Oct 2022 13:25)  T(F): 98 (10 Oct 2022 13:25), Max: 98 (10 Oct 2022 13:25)  HR: 79 (10 Oct 2022 18:43) (60 - 100)  BP: 114/74 (10 Oct 2022 18:43) (103/72 - 114/74)  BP(mean): --  ABP: --  ABP(mean): --  RR: 18 (10 Oct 2022 18:43) (16 - 18)  SpO2: 95% (10 Oct 2022 18:43) (95% - 97%)    O2 Parameters below as of 10 Oct 2022 18:43  Patient On (Oxygen Delivery Method): room air              10-10 @ 07:01  -  10-10 @ 21:16  --------------------------------------------------------  IN: 100 mL / OUT: 500 mL / NET: -400 mL      CAPILLARY BLOOD GLUCOSE      POCT Blood Glucose.: 99 mg/dL (10 Oct 2022 20:36)      PHYSICAL EXAM:  GENERAL: cachectic, ill-appearing man, severe temporal wasting b/l  RESPIRATORY: Intubated, lungs are clear to auscultation bilaterally  CARDIOVASCULAR: Regular rate and rhythm, normal S1 and S2, no murmur/rub/gallop  ABDOMEN: Nontender to palpation, normoactive bowel sounds, no rebound/guarding; No hepatosplenomegaly  MUSCULOSKELETAL: no clubbing or cyanosis of digits; no joint swelling or tenderness to palpation  PSYCH: A+Ox0, sedated  LINES:     HOSPITAL MEDICATIONS:  MEDICATIONS  (STANDING):  bictegravir 50 mG/emtricitabine 200 mG/tenofovir alafenamide 25 mG (BIKTARVY) 1 Tablet(s) Oral daily  ceFAZolin   IVPB 2000 milliGRAM(s) IV Intermittent every 8 hours  chlorhexidine 2% Cloths 1 Application(s) Topical <User Schedule>  enoxaparin Injectable 60 milliGRAM(s) SubCutaneous every 12 hours  gabapentin 300 milliGRAM(s) Oral three times a day  lactated ringers. 1000 milliLiter(s) (75 mL/Hr) IV Continuous <Continuous>  lactulose Syrup 10 Gram(s) Oral daily  lidocaine   4% Patch 1 Patch Transdermal daily  lidocaine   4% Patch 1 Patch Transdermal daily  melatonin 3 milliGRAM(s) Oral at bedtime  methadone    Tablet 5 milliGRAM(s) Oral three times a day  metoprolol tartrate 50 milliGRAM(s) Oral two times a day  naloxegol 25 milliGRAM(s) Oral daily  norepinephrine Infusion 0.3 MICROgram(s)/kG/Min (33.8 mL/Hr) IV Continuous <Continuous>  polyethylene glycol 3350 17 Gram(s) Oral two times a day  propofol Injectable 10 milliGRAM(s) IV Push once  senna 2 Tablet(s) Oral at bedtime  sodium chloride 0.9% Bolus 1000 milliLiter(s) IV Bolus once    MEDICATIONS  (PRN):  bisacodyl 5 milliGRAM(s) Oral at bedtime PRN Constipation  HYDROmorphone   Tablet 4 milliGRAM(s) Oral every 4 hours PRN Moderate Pain (4 - 6)  HYDROmorphone   Tablet 6 milliGRAM(s) Oral every 4 hours PRN Severe Pain (7 - 10)  naloxone Injectable 0.1 milliGRAM(s) IV Push every 3 minutes PRN opioid intoxication      LABS:                        11.5   20.41 )-----------( 164      ( 10 Oct 2022 07:15 )             38.3     Hgb Trend: 11.5<--, 11.5<--, 10.1<--, 9.7<--  10-10    136  |  102  |  71<H>  ----------------------------<  93  4.6   |  17<L>  |  0.57    Ca    7.9<L>      10 Oct 2022 07:15  Phos  5.0     10-10  Mg     2.00     10-10      Creatinine Trend: 0.57<--, 0.54<--, 0.54<--, 0.41<--, 0.45<--, 0.60<--            MICROBIOLOGY:     RADIOLOGY & ADDITIONAL TESTS:      ASSESSMENT AND PLAN:  Patient is a 31 M with untreated metastatic ano-rectal SCC (followed by Dr Frazier [facility] oncology) with mets to liver and possibly bone, HPV positive, HIV infection, and R eye cataracts who presented to the [facility] on 8/31/22 after syncopal episode at home and transferred to [facility] for continued of care per family request. Being treated for MSSA bacteremia. c/b scrotal swelling s/p coude catheter, hypercalcemia s/p pamidronate and IVF, and AFB from [place] w  chimaera not requiring any intervention. Patient is now undergoing trial of RT, did not tolerate first fraction 10/4 and didn't tolerate session on 10/5 even with increased pain meds before session. Pending hospice referral    #Neuro  AOx0  sedated on propofol    #Cardiovascular  Shock, undifferentiated  - continue with levo, wean as tolerated, MAP > 65  - repeat blood cx ordered  - f/u cortisol levels    Cardiac mass.   - TTE: two large RV masses and two additional small masses. IVC filter placed prophylactically for undiagnosed vegetations on the TTE.  - repeat TTE 9/9 limited study but demonstrated a mobile mass likely 2/2 tumour, thrombus, or vegetation   - repeat TTE ordered 10/10  - monitor on tele    #Respiratory  Rapid Response called on 10/10 for AMS and respiratory distress  -pt sedated and intubated (7.5mm)  -SBT as tolerated   -f/u chest XR post intubation    Cavitary lesion of lung.   -Patient presented to OSH with hypoxemia and L chest pigtail placed 8/31 due to suspected pneumothorax. Patient showed improvement in SOB and f/u CT chest performed which showed evidence of 12 cm multiloculated thick walled cavitary mass in the CHRISTINE and lingula.  - sputum culture 9/10 + mod klebsiella pneumoniae pansensitive  - blood Cx neg 9/8  - fungitell and crypto neg 9/10  - positive AFB in 1/3 sputum samples (sept 3) from [facility] with final speciation Mycobacterium chimaera (avium complex)    Subcutaneous emphysema.   -CT Chest 9/11 with evidence of persistent pneumomediastinum and extensive subcutaneous emphysema of the neck, chest, abdomen, pelvis, and extremities, including the scrotum.   - CANDIS per thoracic surgery    #GI/Nutrition  -OG tube  -start feeds    #/Renal  No active issues    #Skin  No active issues    #ID  MSSA bacteremia and c/f MSSA PNA and IE   - Hx of MSSA bacteremia with blood cultures 8/31 positive for MSSA and repeat on 9/2 NGTD.  - s/p Zosyn (9/8 - 9/9)  - transitioned to Cefazolin 2GM q8h for 6 weeks (9/9-10/13)     #Endocrine  Hypoglycemia  -was noted to be hypoglycemic (42)  during rapid response  -POCT 3x: 42, 105, 42  -dextrose ordered    #Hematologic/DVT ppx  Rectal cancer   - history of untreated HPV related rectal cancer with episode of rectal bleeding   - CT with evidence of liver and bone lesions  - Pt initially planned for RT-  5 fractions, w/ 1st fraction on 10/04 and 2nd fraction on 10/05, however pt did not tolerate both times d/t pain, despite receiving increased dose of Dilaudid prior to 2nd fraction  - per heme-onc, already discussed with family/pt of no more treatment being offered  - hospice referral placed 10/10    Neoplasm related pain.   - will hold oral pain regimen: -PO Methadone 5mg TID (QTC- 443 on 9/23),  Gabapentin 300mg TID, Dilaudid 4mg q4 PRN moderate pain, PO Dilaudid 6mg q4 PRN severe pain  - sedation as above for vent synchrony     Leukocytosis.   - WBC uptrending  - likely reactive 2/2 to underlying tumor burden vs hemoconcentration in the setting of poor po intake.  - f/u repeat blood cx  - will start zosyn in the interim for empiric coverage     Microcytic anemia.   - microcytic anemia with Hgb 9.4 and MCV 81, likely a mixed picture of microcytic and normocytic anemia.  - iron studies with low iron levels but low iron binding capacity likely indicating a mixed picture  - ferritin wnl   - continue to monitor    #Ethics  full code but pending hospice referral (see note 10/7/22)      CCM attending:  pt seen and examined as above,   pt is a 28 yo male with acute hypoxemic respiratory failure. s/p intubation.  pt with leucocytosis, will panculture, start zosyn  -pain management with dilaudid  -monitor fingerstick, d50 prn  -continue norepinephrine for pressor support , shock  -hospice referral  critically ill with shock and hypoxemic respiratory failure

## 2022-10-10 NOTE — PROGRESS NOTE ADULT - PROBLEM SELECTOR PLAN 8
Palliative care consulted for extensive disease burden and GOC discussion. At this time, patient was all available treatment and resuscitation. Remains full code.  - increased PO Methadone 5mg TID (QTC- 443 on 9/23); repeat EKG today  - c/w Gabapentin 300mg TID  - PO Dilaudid 4mg q4 PRN moderate pain  - PO Dilaudid 6mg q4 PRN severe pain  - hold parameters placed for all medications  - appreciate palliative care recs  - will need OP f/u at [facility] with Dr. Kiesha Calle/ Dr. Mazariegos/ NP Zoe Carvalho/ ROCIO Cruz; [phone]

## 2022-10-10 NOTE — PROGRESS NOTE ADULT - PROBLEM SELECTOR PLAN 2
- WBC increased since last lab drawn  - inc in hgb as well  - no concern for new infection at this time; afebrile, no clinical worsening, remains afebrile   - likely reactive 2/2 to underlying tumor burden vs hemoconcentration in the setting of poor po intake - WBC uptrending  - no concern for new infection at this time; afebrile, no clinical worsening, remains afebrile   - likely reactive 2/2 to underlying tumor burden vs hemoconcentration in the setting of poor po intake

## 2022-10-10 NOTE — ADVANCED PRACTICE NURSE CONSULT - ASSESSMENT
General: Alert, opens eyes spontaneously and able to answer yes/no questions , bedbound, incontinent of stool, pedraza catheter in place. Skin warm, dry with increased moisture in intertriginous folds, temporal wasting noted, cachetic.  General: Alert, opens eyes spontaneously and able to answer yes/no questions , bedbound, incontinent of stool, pedraza catheter in place. Skin warm, dry with increased moisture in intertriginous folds, temporal wasting noted, cachetic. Bilateral plantar feet with dry callous.     Perineum - incontinence and moisture associated dermatitis as evidenced by hyperpigmentation and denudation. No suspected candidiasis, no ulcerations noted.     Patient continues to be critically ill- at high risk for skin breakdown due to poor PO intake, cachexia, bedbound status. On assessment patient on a low air loss surface, heels offloaded with pillow, moisture management in place with use of one incontinence pad. Turning and positioning as tolerated.  General: Alert, opens eyes spontaneously and able to answer yes/no questions , bedbound, incontinent of stool, pedraza catheter in place. Skin warm, dry with increased moisture in intertriginous folds, temporal wasting noted, cachetic. Bilateral plantar feet with dry callous.     Perineum - incontinence and moisture associated dermatitis as evidenced by hyperpigmentation, increased moisture, and denudation. No suspected candidiasis, no ulcerations noted.     Patient continues to be critically ill- at high risk for skin breakdown due to poor PO intake, cachexia, bedbound status. On assessment patient on a low air loss surface, heels offloaded with pillow, moisture management in place with use of one incontinence pad. Turning and positioning as tolerated.

## 2022-10-10 NOTE — PROGRESS NOTE ADULT - PROBLEM SELECTOR PLAN 5
CT Chest 9/11 with evidence of persistent pneumomediastinum and extensive subcutaneous emphysema of the neck, chest, abdomen, pelvis, and extremities, including the scrotum.   - CANDIS per thoracic surgery  - indwelling pedraza was placed at [facility] (Coude placed for urinary strain)   - TOV attempted 9/27-9/28 overnight and failed with difficulty   - coude catheter placed by urology 9/28; can remain in place for 4 weeks prior to requiring change.  - No further urologic intervention needed at this time.   - Can f/u outpatient w/ Dr Prabhu Lucio, [facility] [place]  - pain control with dilaudid and methadone, ct a/p 9/11 did not show any inflammation/hydrocele/infection .

## 2022-10-10 NOTE — PROGRESS NOTE ADULT - PROBLEM SELECTOR PLAN 4
Patient presented to [facility] with hypoxemia and L chest pigtail placed 8/31 due to suspected pneumothorax. Patient showed improvement in SOB and f/u CT chest performed which showed evidence of 12 cm multiloculated thick walled cavitary mass in the CHRISTINE and lingula.  - sputum culture 9/10 + mod klebsiella pneumoniae pansensitive  - blood Cx neg 9/8  - fungitell and crypto neg 9/10  - ID consulted, recs appreciated  - positive AFB in 1/3 sputum samples (sept 3) from [facility] with final speciation Mycobacterium chimaera (avium complex)  - no need for isolation precautions at this time  - no indication for treatment at this time  - per pulm, no CANDIS. will need f/u CT in 2 months

## 2022-10-10 NOTE — PROGRESS NOTE ADULT - ATTENDING SUPERVISION STATEMENT
Resident

## 2022-10-11 ENCOUNTER — NON-APPOINTMENT (OUTPATIENT)
Age: 32
End: 2022-10-11

## 2022-10-11 LAB
ALBUMIN SERPL ELPH-MCNC: 2.4 G/DL — LOW (ref 3.3–5)
ALP SERPL-CCNC: 439 U/L — HIGH (ref 40–120)
ALT FLD-CCNC: 21 U/L — SIGNIFICANT CHANGE UP (ref 4–41)
ANION GAP SERPL CALC-SCNC: 14 MMOL/L — SIGNIFICANT CHANGE UP (ref 7–14)
ANISOCYTOSIS BLD QL: SLIGHT — SIGNIFICANT CHANGE UP
APTT BLD: 33.1 SEC — SIGNIFICANT CHANGE UP (ref 27–36.3)
AST SERPL-CCNC: 168 U/L — HIGH (ref 4–40)
BASE EXCESS BLDV CALC-SCNC: -4 MMOL/L — LOW (ref -2–3)
BASOPHILS # BLD AUTO: 0 K/UL — SIGNIFICANT CHANGE UP (ref 0–0.2)
BASOPHILS NFR BLD AUTO: 0 % — SIGNIFICANT CHANGE UP (ref 0–2)
BILIRUB SERPL-MCNC: 0.4 MG/DL — SIGNIFICANT CHANGE UP (ref 0.2–1.2)
BLOOD GAS ARTERIAL - LYTES,HGB,ICA,LACT RESULT: SIGNIFICANT CHANGE UP
BLOOD GAS VENOUS COMPREHENSIVE RESULT: SIGNIFICANT CHANGE UP
BUN SERPL-MCNC: 96 MG/DL — HIGH (ref 7–23)
BURR CELLS BLD QL SMEAR: PRESENT — SIGNIFICANT CHANGE UP
CALCIUM SERPL-MCNC: 7.2 MG/DL — LOW (ref 8.4–10.5)
CHLORIDE BLDV-SCNC: 104 MMOL/L — SIGNIFICANT CHANGE UP (ref 96–108)
CHLORIDE SERPL-SCNC: 103 MMOL/L — SIGNIFICANT CHANGE UP (ref 98–107)
CO2 BLDV-SCNC: 24 MMOL/L — SIGNIFICANT CHANGE UP (ref 22–26)
CO2 SERPL-SCNC: 21 MMOL/L — LOW (ref 22–31)
CORTIS AM PEAK SERPL-MCNC: 116.3 UG/DL — HIGH (ref 6–18.4)
CREAT SERPL-MCNC: 0.75 MG/DL — SIGNIFICANT CHANGE UP (ref 0.5–1.3)
EGFR: 121 ML/MIN/1.73M2 — SIGNIFICANT CHANGE UP
EOSINOPHIL # BLD AUTO: 0 K/UL — SIGNIFICANT CHANGE UP (ref 0–0.5)
EOSINOPHIL NFR BLD AUTO: 0 % — SIGNIFICANT CHANGE UP (ref 0–6)
GAS PNL BLDV: 136 MMOL/L — SIGNIFICANT CHANGE UP (ref 136–145)
GIANT PLATELETS BLD QL SMEAR: PRESENT — SIGNIFICANT CHANGE UP
GLUCOSE BLDV-MCNC: 112 MG/DL — HIGH (ref 70–99)
GLUCOSE SERPL-MCNC: 107 MG/DL — HIGH (ref 70–99)
HCO3 BLDV-SCNC: 23 MMOL/L — SIGNIFICANT CHANGE UP (ref 22–29)
HCT VFR BLD CALC: 37.2 % — LOW (ref 39–50)
HCT VFR BLDA CALC: 33 % — LOW (ref 39–51)
HGB BLD CALC-MCNC: 11 G/DL — LOW (ref 13–17)
HGB BLD-MCNC: 11 G/DL — LOW (ref 13–17)
IANC: 24.12 K/UL — HIGH (ref 1.8–7.4)
INR BLD: 1.81 RATIO — HIGH (ref 0.88–1.16)
LACTATE BLDV-MCNC: 1.9 MMOL/L — SIGNIFICANT CHANGE UP (ref 0.5–2)
LYMPHOCYTES # BLD AUTO: 2.35 K/UL — SIGNIFICANT CHANGE UP (ref 1–3.3)
LYMPHOCYTES # BLD AUTO: 7.9 % — LOW (ref 13–44)
MAGNESIUM SERPL-MCNC: 2.2 MG/DL — SIGNIFICANT CHANGE UP (ref 1.6–2.6)
MANUAL SMEAR VERIFICATION: SIGNIFICANT CHANGE UP
MCHC RBC-ENTMCNC: 23.5 PG — LOW (ref 27–34)
MCHC RBC-ENTMCNC: 29.6 GM/DL — LOW (ref 32–36)
MCV RBC AUTO: 79.5 FL — LOW (ref 80–100)
METAMYELOCYTES # FLD: 1.8 % — HIGH (ref 0–1)
MICROCYTES BLD QL: SLIGHT — SIGNIFICANT CHANGE UP
MONOCYTES # BLD AUTO: 0 K/UL — SIGNIFICANT CHANGE UP (ref 0–0.9)
MONOCYTES NFR BLD AUTO: 0 % — LOW (ref 2–14)
MYELOCYTES NFR BLD: 1.7 % — HIGH (ref 0–0)
NEUTROPHILS # BLD AUTO: 26.33 K/UL — HIGH (ref 1.8–7.4)
NEUTROPHILS NFR BLD AUTO: 84.2 % — HIGH (ref 43–77)
NEUTS BAND # BLD: 4.4 % — SIGNIFICANT CHANGE UP (ref 0–6)
PCO2 BLDV: 47 MMHG — SIGNIFICANT CHANGE UP (ref 42–55)
PH BLDV: 7.29 — LOW (ref 7.32–7.43)
PHOSPHATE SERPL-MCNC: 8.1 MG/DL — HIGH (ref 2.5–4.5)
PLAT MORPH BLD: NORMAL — SIGNIFICANT CHANGE UP
PLATELET # BLD AUTO: 141 K/UL — LOW (ref 150–400)
PLATELET COUNT - ESTIMATE: ABNORMAL
PO2 BLDV: 63 MMHG — SIGNIFICANT CHANGE UP
POIKILOCYTOSIS BLD QL AUTO: SLIGHT — SIGNIFICANT CHANGE UP
POLYCHROMASIA BLD QL SMEAR: SIGNIFICANT CHANGE UP
POTASSIUM BLDV-SCNC: 4.4 MMOL/L — SIGNIFICANT CHANGE UP (ref 3.5–5.1)
POTASSIUM SERPL-MCNC: 4.6 MMOL/L — SIGNIFICANT CHANGE UP (ref 3.5–5.3)
POTASSIUM SERPL-SCNC: 4.6 MMOL/L — SIGNIFICANT CHANGE UP (ref 3.5–5.3)
PROT SERPL-MCNC: 5.7 G/DL — LOW (ref 6–8.3)
PROTHROM AB SERPL-ACNC: 21.1 SEC — HIGH (ref 10.5–13.4)
RBC # BLD: 4.68 M/UL — SIGNIFICANT CHANGE UP (ref 4.2–5.8)
RBC # FLD: 21.4 % — HIGH (ref 10.3–14.5)
RBC BLD AUTO: ABNORMAL
SAO2 % BLDV: 89.3 % — SIGNIFICANT CHANGE UP
SCHISTOCYTES BLD QL AUTO: SLIGHT — SIGNIFICANT CHANGE UP
SODIUM SERPL-SCNC: 138 MMOL/L — SIGNIFICANT CHANGE UP (ref 135–145)
WBC # BLD: 29.72 K/UL — HIGH (ref 3.8–10.5)
WBC # FLD AUTO: 29.72 K/UL — HIGH (ref 3.8–10.5)

## 2022-10-11 PROCEDURE — 93306 TTE W/DOPPLER COMPLETE: CPT | Mod: 26

## 2022-10-11 PROCEDURE — ZZZZZ: CPT

## 2022-10-11 PROCEDURE — 36000 PLACE NEEDLE IN VEIN: CPT | Mod: LT

## 2022-10-11 PROCEDURE — 71045 X-RAY EXAM CHEST 1 VIEW: CPT | Mod: 26

## 2022-10-11 RX ORDER — DEXTROSE 50 % IN WATER 50 %
50 SYRINGE (ML) INTRAVENOUS ONCE
Refills: 0 | Status: COMPLETED | OUTPATIENT
Start: 2022-10-11 | End: 2022-10-11

## 2022-10-11 RX ORDER — PIPERACILLIN AND TAZOBACTAM 4; .5 G/20ML; G/20ML
3.38 INJECTION, POWDER, LYOPHILIZED, FOR SOLUTION INTRAVENOUS ONCE
Refills: 0 | Status: COMPLETED | OUTPATIENT
Start: 2022-10-11 | End: 2022-10-11

## 2022-10-11 RX ORDER — PIPERACILLIN AND TAZOBACTAM 4; .5 G/20ML; G/20ML
3.38 INJECTION, POWDER, LYOPHILIZED, FOR SOLUTION INTRAVENOUS EVERY 8 HOURS
Refills: 0 | Status: DISCONTINUED | OUTPATIENT
Start: 2022-10-11 | End: 2022-10-12

## 2022-10-11 RX ORDER — SODIUM CHLORIDE 9 MG/ML
1000 INJECTION, SOLUTION INTRAVENOUS
Refills: 0 | Status: DISCONTINUED | OUTPATIENT
Start: 2022-10-11 | End: 2022-10-12

## 2022-10-11 RX ORDER — HYDROMORPHONE HYDROCHLORIDE 2 MG/ML
0.5 INJECTION INTRAMUSCULAR; INTRAVENOUS; SUBCUTANEOUS
Qty: 100 | Refills: 0 | Status: DISCONTINUED | OUTPATIENT
Start: 2022-10-11 | End: 2022-10-12

## 2022-10-11 RX ORDER — CHLORHEXIDINE GLUCONATE 213 G/1000ML
15 SOLUTION TOPICAL EVERY 12 HOURS
Refills: 0 | Status: DISCONTINUED | OUTPATIENT
Start: 2022-10-11 | End: 2022-10-12

## 2022-10-11 RX ORDER — CALCIUM GLUCONATE 100 MG/ML
1 VIAL (ML) INTRAVENOUS ONCE
Refills: 0 | Status: COMPLETED | OUTPATIENT
Start: 2022-10-11 | End: 2022-10-11

## 2022-10-11 RX ORDER — PROPOFOL 10 MG/ML
30 INJECTION, EMULSION INTRAVENOUS
Qty: 1000 | Refills: 0 | Status: DISCONTINUED | OUTPATIENT
Start: 2022-10-11 | End: 2022-10-12

## 2022-10-11 RX ORDER — FENTANYL CITRATE 50 UG/ML
50 INJECTION INTRAVENOUS
Refills: 0 | Status: DISCONTINUED | OUTPATIENT
Start: 2022-10-11 | End: 2022-10-12

## 2022-10-11 RX ADMIN — Medication 100 GRAM(S): at 13:45

## 2022-10-11 RX ADMIN — Medication 33.8 MICROGRAM(S)/KG/MIN: at 19:20

## 2022-10-11 RX ADMIN — PROPOFOL 10.9 MICROGRAM(S)/KG/MIN: 10 INJECTION, EMULSION INTRAVENOUS at 19:25

## 2022-10-11 RX ADMIN — PIPERACILLIN AND TAZOBACTAM 200 GRAM(S): 4; .5 INJECTION, POWDER, LYOPHILIZED, FOR SOLUTION INTRAVENOUS at 03:27

## 2022-10-11 RX ADMIN — ENOXAPARIN SODIUM 60 MILLIGRAM(S): 100 INJECTION SUBCUTANEOUS at 11:54

## 2022-10-11 RX ADMIN — Medication 100 MILLIGRAM(S): at 06:23

## 2022-10-11 RX ADMIN — HYDROMORPHONE HYDROCHLORIDE 0.5 MG/HR: 2 INJECTION INTRAMUSCULAR; INTRAVENOUS; SUBCUTANEOUS at 12:27

## 2022-10-11 RX ADMIN — ENOXAPARIN SODIUM 60 MILLIGRAM(S): 100 INJECTION SUBCUTANEOUS at 03:27

## 2022-10-11 RX ADMIN — HYDROMORPHONE HYDROCHLORIDE 0.5 MG/HR: 2 INJECTION INTRAMUSCULAR; INTRAVENOUS; SUBCUTANEOUS at 20:24

## 2022-10-11 RX ADMIN — HYDROMORPHONE HYDROCHLORIDE 2.5 MG/HR: 2 INJECTION INTRAMUSCULAR; INTRAVENOUS; SUBCUTANEOUS at 11:54

## 2022-10-11 RX ADMIN — LIDOCAINE 1 PATCH: 4 CREAM TOPICAL at 00:00

## 2022-10-11 RX ADMIN — Medication 1 APPLICATION(S): at 17:30

## 2022-10-11 RX ADMIN — HYDROMORPHONE HYDROCHLORIDE 0.5 MG/HR: 2 INJECTION INTRAMUSCULAR; INTRAVENOUS; SUBCUTANEOUS at 19:25

## 2022-10-11 RX ADMIN — PIPERACILLIN AND TAZOBACTAM 25 GRAM(S): 4; .5 INJECTION, POWDER, LYOPHILIZED, FOR SOLUTION INTRAVENOUS at 22:57

## 2022-10-11 RX ADMIN — Medication 50 MILLILITER(S): at 08:14

## 2022-10-11 RX ADMIN — Medication 33.8 MICROGRAM(S)/KG/MIN: at 11:55

## 2022-10-11 RX ADMIN — PIPERACILLIN AND TAZOBACTAM 25 GRAM(S): 4; .5 INJECTION, POWDER, LYOPHILIZED, FOR SOLUTION INTRAVENOUS at 08:14

## 2022-10-11 RX ADMIN — CHLORHEXIDINE GLUCONATE 1 APPLICATION(S): 213 SOLUTION TOPICAL at 06:22

## 2022-10-11 RX ADMIN — CHLORHEXIDINE GLUCONATE 15 MILLILITER(S): 213 SOLUTION TOPICAL at 06:22

## 2022-10-11 RX ADMIN — Medication 33.8 MICROGRAM(S)/KG/MIN: at 06:22

## 2022-10-11 RX ADMIN — PROPOFOL 3.6 MICROGRAM(S)/KG/MIN: 10 INJECTION, EMULSION INTRAVENOUS at 11:55

## 2022-10-11 RX ADMIN — CHLORHEXIDINE GLUCONATE 15 MILLILITER(S): 213 SOLUTION TOPICAL at 17:27

## 2022-10-11 RX ADMIN — PROPOFOL 3.6 MICROGRAM(S)/KG/MIN: 10 INJECTION, EMULSION INTRAVENOUS at 06:23

## 2022-10-11 RX ADMIN — SODIUM CHLORIDE 40 MILLILITER(S): 9 INJECTION, SOLUTION INTRAVENOUS at 19:23

## 2022-10-11 RX ADMIN — SODIUM CHLORIDE 40 MILLILITER(S): 9 INJECTION, SOLUTION INTRAVENOUS at 11:55

## 2022-10-11 RX ADMIN — PIPERACILLIN AND TAZOBACTAM 25 GRAM(S): 4; .5 INJECTION, POWDER, LYOPHILIZED, FOR SOLUTION INTRAVENOUS at 13:55

## 2022-10-11 NOTE — PROCEDURE NOTE - NSPOSTCAREGUIDE_GEN_A_CORE
Verbal/written post procedure instructions were given to patient/caregiver
Care for catheter as per unit/ICU protocols

## 2022-10-11 NOTE — PROGRESS NOTE ADULT - ASSESSMENT
Patient is a 31 M with untreated metastatic ano-rectal SCC (followed by Dr Frazier [facility] oncology) with mets to liver and possibly bone, HPV positive, HIV infection, and R eye cataracts who presented to the [facility] on 8/31/22 after syncopal episode at home and transferred to [facility] for continued of care per family request. Being treated for MSSA bacteremia. c/b scrotal swelling s/p coude catheter, hypercalcemia s/p pamidronate and IVF, and AFB from [place] w M chimaera not requiring any intervention. Patient is now undergoing trial of RT, did not tolerate first fraction 10/4 and didn't tolerate session on 10/5 even with increased pain meds before session. Pending hospice referral    #Neuro  AOx0  sedated on propofol    #Cardiovascular  Shock, undifferentiated  - continue with levo, wean as tolerated, MAP > 65  - repeat blood cx ordered  - f/u cortisol levels    Cardiac mass.   - TTE: two large RV masses and two additional small masses. IVC filter placed prophylactically for undiagnosed vegetations on the TTE.  - repeat TTE 9/9 limited study but demonstrated a mobile mass likely 2/2 tumour, thrombus, or vegetation   - repeat TTE ordered 10/10  - monitor on tele    #Respiratory  Rapid Response called on 10/10 for AMS and respiratory distress  -pt sedated and intubated (7.5mm)  -SBT as tolerated   -f/u chest XR post intubation    Cavitary lesion of lung.   -Patient presented to OSH with hypoxemia and L chest pigtail placed 8/31 due to suspected pneumothorax. Patient showed improvement in SOB and f/u CT chest performed which showed evidence of 12 cm multiloculated thick walled cavitary mass in the CHRISTINE and lingula.  - sputum culture 9/10 + mod klebsiella pneumoniae pansensitive  - blood Cx neg 9/8  - fungitell and crypto neg 9/10  - positive AFB in 1/3 sputum samples (sept 3) from [facility] with final speciation Mycobacterium chimaera (avium complex)    Subcutaneous emphysema.   -CT Chest 9/11 with evidence of persistent pneumomediastinum and extensive subcutaneous emphysema of the neck, chest, abdomen, pelvis, and extremities, including the scrotum.   - CANDIS per thoracic surgery    #GI/Nutrition  -OG tube  -start feeds    #/Renal  No active issues    #Skin  No active issues    #ID  MSSA bacteremia and c/f MSSA PNA and IE   - Hx of MSSA bacteremia with blood cultures 8/31 positive for MSSA and repeat on 9/2 NGTD.  - s/p Zosyn (9/8 - 9/9)  - transitioned to Cefazolin 2GM q8h for 6 weeks (9/9-10/13)     #Endocrine  Hypoglycemia  -was noted to be hypoglycemic (42)  during rapid response  -POCT 3x: 42, 105, 42  -dextrose ordered    #Hematologic/DVT ppx  Rectal cancer   - history of untreated HPV related rectal cancer with episode of rectal bleeding   - CT with evidence of liver and bone lesions  - Pt initially planned for RT-  5 fractions, w/ 1st fraction on 10/04 and 2nd fraction on 10/05, however pt did not tolerate both times d/t pain, despite receiving increased dose of Dilaudid prior to 2nd fraction  - per heme-onc, already discussed with family/pt of no more treatment being offered  - hospice referral placed 10/10    Neoplasm related pain.   - will hold oral pain regimen: -PO Methadone 5mg TID (QTC- 443 on 9/23),  Gabapentin 300mg TID, Dilaudid 4mg q4 PRN moderate pain, PO Dilaudid 6mg q4 PRN severe pain  - sedation as above for vent synchrony     Leukocytosis.   - WBC uptrending  - likely reactive 2/2 to underlying tumor burden vs hemoconcentration in the setting of poor po intake.  - f/u repeat blood cx  - will start zosyn in the interim for empiric coverage     Microcytic anemia.   - microcytic anemia with Hgb 9.4 and MCV 81, likely a mixed picture of microcytic and normocytic anemia.  - iron studies with low iron levels but low iron binding capacity likely indicating a mixed picture  - ferritin wnl   - continue to monitor    #Ethics  full code but pending hospice referral (see note 10/7/22) Patient is a 31 M with untreated metastatic ano-rectal SCC (followed by Dr Frazier [facility] oncology) with mets to liver and possibly bone, HPV positive, HIV infection, and R eye cataracts who presented to the [facility] on 8/31/22 after syncopal episode at home and transferred to [facility] for continued of care per family request. Being treated for MSSA bacteremia. c/b scrotal swelling s/p coude catheter, hypercalcemia s/p pamidronate and IVF, and AFB from [place] w M chimaera not requiring any intervention. Patient is now undergoing trial of RT, did not tolerate first fraction 10/4 and didn't tolerate session on 10/5 even with increased pain meds before session. Pending hospice referral    #Neuro  AOx 3 at baseline  sedated on propofol, unresponsive,   -- discussed with mother__ as per discussion, keep pt comfortable    -- pt was on Dilaudid 4- 6 mg PO q 4 hrs PRN prior to intubation for chronic pain/ cancer pain__ continue Dilaudid gtt for pain     #Cardiovascular  Shock, undifferentiated, possibly aspiration PNA   - continue with levo, wean as tolerated, MAP > 65  -- on 0.52 mcg/kg/min  - repeat blood cx ordered  - f/u cortisol levels-- 116    Cardiac mass.   - TTE: two large RV masses and two additional small masses. IVC filter placed prophylactically for undiagnosed vegetations on the TTE.  - repeat TTE 9/9 limited study but demonstrated a mobile mass likely 2/2 tumour, thrombus, or vegetation   - repeat TTE 10/11-- unable to evaluate left ventricular systolic function.  Right ventricular enlargement with decreased right  ventricular systolic function.  Multiple large, mobile masses are visualized in the right ventricle.  One or more of the masses may involve the tricuspid valve leaflets (best seen in the RV inflow view).  Although the nature of these masses is uncertain, they may represent tumour,  thrombus, and/or vegetations. Estimated right ventricular systolic pressure equals 66 mm Hg, assuming right atrial pressure equals 10 mm Hg,  consistent with severe pulmonary hypertension.  - monitor on tele    #Respiratory  Rapid Response called on 10/10 for AMS and respiratory distress  -pt sedated and intubated (7.5mm)  -SBT as tolerated   -f/u chest XR post intubation    Cavitary lesion of lung.   -Patient presented to OSH with hypoxemia and L chest pigtail placed 8/31 due to suspected pneumothorax. Patient showed improvement in SOB and f/u CT chest performed which showed evidence of 12 cm multiloculated thick walled cavitary mass in the CHRISTINE and lingula.  - sputum culture 9/10 + mod klebsiella pneumoniae pansensitive  - blood Cx neg 9/8  - fungitell and crypto neg 9/10  - positive AFB in 1/3 sputum samples (sept 3) from [facility] with final speciation Mycobacterium chimaera (avium complex)    Subcutaneous emphysema.   -CT Chest 9/11 with evidence of persistent pneumomediastinum and extensive subcutaneous emphysema of the neck, chest, abdomen, pelvis, and extremities, including the scrotum.   - CANDIS per thoracic surgery    #GI/Nutrition  -OG tube  -start feeds    #/Renal  No active issues    #Skin  No active issues    #ID  MSSA bacteremia and c/f MSSA PNA and IE   - Hx of MSSA bacteremia with blood cultures 8/31 positive for MSSA and repeat on 9/2 NGTD.  - s/p Zosyn (9/8 - 9/9)  - transitioned to Cefazolin 2GM q8h for 6 weeks (9/9-10/13)     #Endocrine  Hypoglycemia  -was noted to be hypoglycemic (42)  during rapid response  -POCT 3x: 42, 105, 42  -dextrose ordered    #Hematologic/DVT ppx  Rectal cancer   - history of untreated HPV related rectal cancer with episode of rectal bleeding   - CT with evidence of liver and bone lesions  - Pt initially planned for RT-  5 fractions, w/ 1st fraction on 10/04 and 2nd fraction on 10/05, however pt did not tolerate both times d/t pain, despite receiving increased dose of Dilaudid prior to 2nd fraction  - per heme-onc, already discussed with family/pt of no more treatment being offered  - hospice referral placed 10/10    Neoplasm related pain.   - will hold oral pain regimen: -PO Methadone 5mg TID (QTC- 443 on 9/23),  Gabapentin 300mg TID, Dilaudid 4mg q4 PRN moderate pain, PO Dilaudid 6mg q4 PRN severe pain  - sedation as above for vent synchrony     Leukocytosis.   - WBC uptrending  - likely reactive 2/2 to underlying tumor burden vs hemoconcentration in the setting of poor po intake.  - f/u repeat blood cx  - will start zosyn in the interim for empiric coverage     Microcytic anemia.   - microcytic anemia with Hgb 9.4 and MCV 81, likely a mixed picture of microcytic and normocytic anemia.  - iron studies with low iron levels but low iron binding capacity likely indicating a mixed picture  - ferritin wnl   - continue to monitor    #Ethics  full code but pending hospice referral (see note 10/7/22) Patient is a 31 M with untreated metastatic ano-rectal SCC (followed by Dr Frazier [facility] oncology) with mets to liver and possibly bone, HPV positive, HIV infection, and R eye cataracts who presented to the [facility] on 8/31/22 after syncopal episode at home and transferred to [facility] for continued of care per family request. Being treated for MSSA bacteremia. c/b scrotal swelling s/p coude catheter, hypercalcemia s/p pamidronate and IVF, and AFB from [place] w M chimaera not requiring any intervention. Patient is now undergoing trial of RT, did not tolerate first fraction 10/4 and didn't tolerate session on 10/5 even with increased pain meds before session. Pending hospice referral    #Neuro  AOx 3 at baseline  sedated on propofol, unresponsive,   -- discussed with mother__ as per discussion, keep pt comfortable    -- pt was on Dilaudid 4- 6 mg PO q 4 hrs PRN prior to intubation for chronic pain/ cancer pain__ continue Dilaudid gtt for pain     #Cardiovascular  Shock, undifferentiated, possibly aspiration PNA   - continue with levo, wean as tolerated, MAP > 65  -- on 0.52 mcg/kg/min  - repeat blood cx ordered  - f/u cortisol levels-- 116    Cardiac mass.   - TTE: two large RV masses and two additional small masses. IVC filter placed prophylactically for undiagnosed vegetations on the TTE.  - repeat TTE 9/9 limited study but demonstrated a mobile mass likely 2/2 tumour, thrombus, or vegetation   - repeat TTE 10/11-- unable to evaluate left ventricular systolic function.  Right ventricular enlargement with decreased right  ventricular systolic function.  Multiple large, mobile masses are visualized in the right ventricle.  One or more of the masses may involve the tricuspid valve leaflets (best seen in the RV inflow view).  Although the nature of these masses is uncertain, they may represent tumour,  thrombus, and/or vegetations. Estimated right ventricular systolic pressure equals 66 mm Hg, assuming right atrial pressure equals 10 mm Hg,  consistent with severe pulmonary hypertension.  - monitor on tele    #Respiratory  Rapid Response called on 10/10 for AMS and respiratory distress  -pt sedated and intubated (7.5mm)  - as per discussion with mother Sophie Schulte__ palliative extubation tomorrow   -chest XR post intubation -- ETT above the freddy, multilobar infiltrates, R>L     Cavitary lesion of lung.   -Patient presented to OSH with hypoxemia and L chest pigtail placed 8/31 due to suspected pneumothorax. Patient showed improvement in SOB and f/u CT chest performed which showed evidence of 12 cm multiloculated thick walled cavitary mass in the CHRISTINE and lingula.  - sputum culture 9/10 + mod klebsiella pneumoniae pansensitive  - blood Cx neg 9/8  - fungitell and crypto neg 9/10  - positive AFB in 1/3 sputum samples (sept 3) from [facility] with final speciation Mycobacterium chimaera (avium complex)    Subcutaneous emphysema.   -CT Chest 9/11 with evidence of persistent pneumomediastinum and extensive subcutaneous emphysema of the neck, chest, abdomen, pelvis, and extremities, including the scrotum.   - CANDIS per thoracic surgery    #GI/Nutrition  -NPO, no NGT placement as per [facility] discussion   --     #/Renal  No active issues  -- pedraza remains in place, which was placed by Urology at [facility]   _ -will remain in place __ as per discussion with mother, palliative extubation and comfort measures tomorrow     #Skin  No active issues    #ID  MSSA bacteremia and c/f MSSA PNA and IE   - Hx of MSSA bacteremia with blood cultures 8/31 positive for MSSA and repeat on 9/2 NGTD.  - s/p Zosyn (9/8 - 9/9)  - transitioned to Cefazolin 2GM q8h for 6 weeks (9/9-10/13)   -- 10/11 __ Cefazolin was switched to Zosyn _ for MSSA bacteremia coverage and possible aspiration PNA   -- blood cxs-- 9/8-- neg, 10/11 -- in lab  -- urine cx -- 9/9-- neg  -- pleural fluid cx-- staph Aureus  -- sputum cx 9/10 -- Klebsiella (sensitive to Cefazolin) AFB (9/26) -- neg     #Endocrine  Hypoglycemia  -was noted to be hypoglycemic (42)  during rapid response  -POCT 3x: 42>105>42>144>72>114   -placed on D 5 LR at 40 cc /hr   - -cont monitoring     #Hematologic/DVT ppx  Rectal cancer   - history of untreated HPV related rectal cancer with episode of rectal bleeding   - CT with evidence of liver and bone lesions  - Pt initially planned for RT-  5 fractions, w/ 1st fraction on 10/04 and 2nd fraction on 10/05, however pt did not tolerate both times d/t pain, despite receiving increased dose of Dilaudid prior to 2nd fraction  - per heme-onc, already discussed with family/pt of no more treatment being offered  - hospice referral placed 10/10      Neoplasm related pain.   - will hold oral pain regimen: -PO Methadone 5mg TID (QTC- 443 on 9/23),  Gabapentin 300mg TID, Dilaudid 4mg q4 PRN moderate pain, PO Dilaudid 6mg q4 PRN severe pain  - sedation as above for vent synchrony   -- -- continue Dilaudid gtt     Leukocytosis.   - WBC uptrending, WBC-- 21>29, LA_ 1.7, PCT-- 46, T max-- 99.3   - likely in the setting of septic shock/aspiration PNA, reactive 2/2 to underlying tumor burden vs hemoconcentration in the setting of poor po intake.  - f/u repeat blood cx -- in lab (10/11)  - 10/11- started zosyn in the interim for empiric coverage     Microcytic anemia.   - microcytic anemia with Hgb 9.4 and MCV 81, likely a mixed picture of microcytic and normocytic anemia.  - iron studies with low iron levels but low iron binding capacity likely indicating a mixed picture  - ferritin wnl   -- H/H-- 10/37, plts-- 141   - continue to monitor    #Ethics  full code but pending hospice referral (see note 10/7/22)  10/11 - __ DNR MOLST form signed, Pt's mother states that she does not want escalation of care. pt's mother states that her daughter is flying in tomorrow from [place] and they planning to stop all medications, palliatively extubate pt, and comfort measures only

## 2022-10-11 NOTE — RAPID RESPONSE TEAM SUMMARY - NSADDTLFINDINGSRRT_GEN_ALL_CORE
RRT called for hypotension. SBP 60s. Pt also found to be agonally breathing. 1L bolus initiated with pressure bag. Pt received NIV initially with two-person bagging. BP modestly increased with fluids to SBP 80, however pt still unresponsive and agonally breathing. Pt had recently received pain meds, however pupils were not pinpoint. Pt intubated. IO placed and pt started on levophed drip as well as propofol. Labs drawn including CBC, ABG/lactate, CMP/mag/phos. Pt transferred to MICU for further management. Family notified and made pt DNR with trial of intubation.

## 2022-10-11 NOTE — PROGRESS NOTE ADULT - SUBJECTIVE AND OBJECTIVE BOX
Significant recent/past 24 hr events:    Subjective:    Review of Systems         [ ] A ten-point review of systems was otherwise negative except as noted.  [ ] Due to altered mental status/intubation, subjective information were not able to be obtained from the patient. History was obtained, to the extent possible, from review of the chart and collateral sources of information.      Patient is a 32y old  Male who presents with a chief complaint of Shortness of breath (10 Oct 2022 07:20)    HPI:  31 M with PMH/ HIV, rectal cancer not on tx (followed up by Dr Frazier ([facility] oncology)), mets to liver and possibly bone, R eye cataract, who presented to the [facility] on 8/31/22 after syncopal episode at home. Pt with cough x 2 mos, with yellow sputum and shortness of breath since 5 days PTA.   -- at [facility]: pt was found to be hypoxemic. 8/31/22 -- L chest wall Pigtail placement. Pigtail was placed for suspected PMX with improvement in shortness of breath. CT chest was performed, which showed that pigtail is within the mass__ differential is TB, fungal infection, cavitary lesion, or squamous cell carcinoma. Pt was found to be anemic due to rectal bleeding with Hgb 5.9 s/p 2 U PRBC on 9/1. Blood cxs with MSSA bacteremia, and has been treated with Cefazolin and Zosyn.  Since 9/2 pt with worsening diffuse SC emphysema extending to the neck/face/all 4 extremities/scrotum/back , which has been getting progressively worse over past 2 days as per pt's statement. 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 TTE with two large RV masses and two additional small masses. 9/7-- s/p L lateral chest tube to 14 Fr. 9/7 -- s/p IVC Filter placement (as per [facility] statement, was placed prophylactically due to undiagnosed vegetations on the TTE. 12 cm multiloculated thick walled cavitary mass in the CHRISTINE and lingula.+ liver lesion on the CT a/p. + external iliac vein thrombosis, PE study was indeterminate   __ pt was transferred to [facility] as per family request (mother) for pt to be evaluated by his outpt oncology team (Dr Frazier). As per oncology team the plan is to start Carbotaxol q 3 weeks + RT for diffuse mets as per PET scan,  (08 Sep 2022 14:44)    PAST MEDICAL & SURGICAL HISTORY:  HIV infection  6/30/2022 virus detected, switched to Biktarvy, male partners      Rectal cancer  not on tretment      Right cataract      Current smoker      History of depression  and anxiety      No significant past surgical history        FAMILY HISTORY:      Vitals   ICU Vital Signs Last 24 Hrs  T(C): 37.4 (11 Oct 2022 04:00), Max: 37.4 (11 Oct 2022 04:00)  T(F): 99.3 (11 Oct 2022 04:00), Max: 99.3 (11 Oct 2022 04:00)  HR: 78 (11 Oct 2022 07:00) (60 - 79)  BP: 101/69 (11 Oct 2022 07:00) (86/61 - 114/74)  BP(mean): 76 (11 Oct 2022 07:00) (59 - 100)  ABP: --  ABP(mean): --  RR: 19 (11 Oct 2022 07:00) (11 - 20)  SpO2: 98% (11 Oct 2022 07:00) (95% - 100%)    O2 Parameters below as of 11 Oct 2022 07:00  Patient On (Oxygen Delivery Method): ventilator    O2 Concentration (%): 30        Physical Exam:   Constitutional: NAD, well-groomed, well-developed  HEENT: PERRLA, EOMI, no drainage or redness  Neck: supple,  No JVD, Trachea midline  Back: Normal spine flexure, No CVA tenderness, No deformity or limitation of movement  Respiratory: Breath Sounds equal & clear bilaterally to auscultation, no accessory muscle use noted  Cardiovascular: Regular rate, regular rhythm, normal S1, S2; no murmurs or rub  Gastrointestinal: Soft, non-tender, non distended, no hepatosplenomegaly, normal bowel sounds  Extremities: NEGRO x 4, no peripheral edema, no cyanosis, no clubbing   Vascular: Equal and normal pulses: 2+ peripheral pulses throughout  Neurological: A+O x 3; speech clear and intact; no sensory, motor  deficits, normal reflexes  Psychiatric: calm, normal mood, normal affect  Musculoskeletal: No joint swelling or deformity; no limitation of movement  Skin: warm, dry, well perfused, no rashes    VENT SETTINGS   Mode: AC/ CMV (Assist Control/ Continuous Mandatory Ventilation)  RR (machine): 20  TV (machine): 400  FiO2: 40  PEEP: 5  ITime: 0.8  MAP: 11  PIP: 25    ABG - ( 10 Oct 2022 21:14 )  pH, Arterial: 7.26  pH, Blood: x     /  pCO2: 50    /  pO2: 282   / HCO3: 22    / Base Excess: -4.7  /  SaO2: 99.9                I&O's Detail    10 Oct 2022 07:01  -  11 Oct 2022 07:00  --------------------------------------------------------  IN:    IV PiggyBack: 350 mL    Norepinephrine: 628.2 mL    Propofol: 83.7 mL  Total IN: 1061.9 mL    OUT:    Indwelling Catheter - Urethral (mL): 880 mL  Total OUT: 880 mL    Total NET: 181.9 mL          LABS                        11.0   29.72 )-----------( 141      ( 11 Oct 2022 00:30 )             37.2     10-11    138  |  103  |  96<H>  ----------------------------<  107<H>  4.6   |  21<L>  |  0.75    Ca    7.2<L>      11 Oct 2022 00:30  Phos  8.1     10-11  Mg     2.20     10-11    TPro  5.7<L>  /  Alb  2.4<L>  /  TBili  0.4  /  DBili  x   /  AST  168<H>  /  ALT  21  /  AlkPhos  439<H>  10-11    LIVER FUNCTIONS - ( 11 Oct 2022 00:30 )  Alb: 2.4 g/dL / Pro: 5.7 g/dL / ALK PHOS: 439 U/L / ALT: 21 U/L / AST: 168 U/L / GGT: x           PT/INR - ( 11 Oct 2022 00:30 )   PT: 21.1 sec;   INR: 1.81 ratio         PTT - ( 11 Oct 2022 00:30 )  PTT:33.1 sec          POCT Blood Glucose.: 144 mg/dL *H* (10-10-22 @ 23:27)  POCT Blood Glucose.: 42 mg/dL *LL* (10-10-22 @ 22:19)  POCT Blood Glucose.: 105 mg/dL *H* (10-10-22 @ 22:17)  POCT Blood Glucose.: 42 mg/dL *LL* (10-10-22 @ 22:15)  POCT Blood Glucose.: 99 mg/dL (10-10-22 @ 20:36)        MEDICATIONS  (STANDING):  bictegravir 50 mG/emtricitabine 200 mG/tenofovir alafenamide 25 mG (BIKTARVY) 1 Tablet(s) Oral daily  ceFAZolin   IVPB 2000 milliGRAM(s) IV Intermittent every 8 hours  chlorhexidine 0.12% Liquid 15 milliLiter(s) Oral Mucosa every 12 hours  chlorhexidine 2% Cloths 1 Application(s) Topical <User Schedule>  chlorhexidine 4% Liquid 1 Application(s) Topical <User Schedule>  enoxaparin Injectable 60 milliGRAM(s) SubCutaneous every 12 hours  lactulose Syrup 10 Gram(s) Oral daily  naloxegol 25 milliGRAM(s) Oral daily  norepinephrine Infusion 0.3 MICROgram(s)/kG/Min (33.8 mL/Hr) IV Continuous <Continuous>  piperacillin/tazobactam IVPB.- 3.375 Gram(s) IV Intermittent once  piperacillin/tazobactam IVPB.. 3.375 Gram(s) IV Intermittent every 8 hours  propofol Infusion 10 MICROgram(s)/kG/Min (3.6 mL/Hr) IV Continuous <Continuous>  senna 2 Tablet(s) Oral at bedtime    MEDICATIONS  (PRN):  fentaNYL    Injectable 50 MICROGram(s) IV Push every 2 hours PRN shortness of breath/or severe pain      Allergies:  ibuprofen (Angioedema)        CRITICAL CARE TIME SPENT:  minutes of critical care time spent providing medical care for patient's acute illness/conditions that impairs at least one vital organ system and/or poses a high risk of imminent or life threatening deterioration in the patient's condition. It includes time spent evaluating and treating the patient's acute illness as well as time spent reviewing labs, radiology, discussing goals of care with patient and/or patient's family, and discussing the case with a multidisciplinary team, in an effort to prevent further life threatening deterioration or end organ damage. This time is independent of any procedures performed.         Significant recent/past 24 hr events: RRT was called, for hypoxemia, AMS, hypotension, and hypoglycemia, s/p intubation, and vasopressors initiation, pt was transferred to MICU for further monitoring,   __ DNR MOLST form signed, Pt's mother states that she does not want escalation of care. pt's mother states that her daughter is flying in tomorrow from [place] and they planning to stop all medications and palliatively extubate pt     Subjective: pt cannot provide any history in the setting of cognitive impairment     Review of Systems         [ ] A ten-point review of systems was otherwise negative except as noted.  [ ] Due to altered mental status/intubation, subjective information were not able to be obtained from the patient. History was obtained, to the extent possible, from review of the chart and collateral sources of information.      Patient is a 32y old  Male who presents with a chief complaint of Shortness of breath (10 Oct 2022 07:20)    HPI:  31 M with PMH/ HIV, rectal cancer not on tx (followed up by Dr Frazier ([facility] oncology)), mets to liver and possibly bone, R eye cataract, who presented to the [facility] on 8/31/22 after syncopal episode at home. Pt with cough x 2 mos, with yellow sputum and shortness of breath since 5 days PTA.   -- at [facility]: pt was found to be hypoxemic. 8/31/22 -- L chest wall Pigtail placement. Pigtail was placed for suspected PMX with improvement in shortness of breath. CT chest was performed, which showed that pigtail is within the mass__ differential is TB, fungal infection, cavitary lesion, or squamous cell carcinoma. Pt was found to be anemic due to rectal bleeding with Hgb 5.9 s/p 2 U PRBC on 9/1. Blood cxs with MSSA bacteremia, and has been treated with Cefazolin and Zosyn.  Since 9/2 pt with worsening diffuse SC emphysema extending to the neck/face/all 4 extremities/scrotum/back , which has been getting progressively worse over past 2 days as per pt's statement. 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 TTE with two large RV masses and two additional small masses. 9/7-- s/p L lateral chest tube to 14 Fr. 9/7 -- s/p IVC Filter placement (as per [facility] statement, was placed prophylactically due to undiagnosed vegetations on the TTE. 12 cm multiloculated thick walled cavitary mass in the CHRISTINE and lingula.+ liver lesion on the CT a/p. + external iliac vein thrombosis, PE study was indeterminate   __ pt was transferred to [facility] as per family request (mother) for pt to be evaluated by his outpt oncology team (Dr Frazier). As per oncology team the plan is to start Carbotaxol q 3 weeks + RT for diffuse mets as per PET scan,  (08 Sep 2022 14:44)    PAST MEDICAL & SURGICAL HISTORY:  HIV infection  6/30/2022 virus detected, switched to Biktarvy, male partners      Rectal cancer  not on tretment      Right cataract      Current smoker      History of depression  and anxiety      No significant past surgical history        FAMILY HISTORY:      Vitals   ICU Vital Signs Last 24 Hrs  T(C): 37.4 (11 Oct 2022 04:00), Max: 37.4 (11 Oct 2022 04:00)  T(F): 99.3 (11 Oct 2022 04:00), Max: 99.3 (11 Oct 2022 04:00)  HR: 78 (11 Oct 2022 07:00) (60 - 79)  BP: 101/69 (11 Oct 2022 07:00) (86/61 - 114/74)  BP(mean): 76 (11 Oct 2022 07:00) (59 - 100)  ABP: --  ABP(mean): --  RR: 19 (11 Oct 2022 07:00) (11 - 20)  SpO2: 98% (11 Oct 2022 07:00) (95% - 100%)    O2 Parameters below as of 11 Oct 2022 07:00  Patient On (Oxygen Delivery Method): ventilator    O2 Concentration (%): 30        Physical Exam:   Constitutional: NAD, well-groomed, well-developed  HEENT: PERRLA, EOMI, no drainage or redness  Neck: supple,  No JVD, Trachea midline  Back: Normal spine flexure, No CVA tenderness, No deformity or limitation of movement  Respiratory: Breath Sounds equal & clear bilaterally to auscultation, no accessory muscle use noted  Cardiovascular: Regular rate, regular rhythm, normal S1, S2; no murmurs or rub  Gastrointestinal: Soft, non-tender, non distended, no hepatosplenomegaly, normal bowel sounds  Extremities: NEGRO x 4, no peripheral edema, no cyanosis, no clubbing   Vascular: Equal and normal pulses: 2+ peripheral pulses throughout  Neurological: sedated, unresponsive; no sensory, motor  deficits, normal reflexes  Psychiatric: unresponsive   Musculoskeletal: No joint swelling or deformity; no limitation of movement  Skin: warm, dry, well perfused, no rashes    VENT SETTINGS   Mode: AC/ CMV (Assist Control/ Continuous Mandatory Ventilation)  RR (machine): 20  TV (machine): 400  FiO2: 40  PEEP: 5  ITime: 0.8  MAP: 11  PIP: 25    ABG - ( 10 Oct 2022 21:14 )  pH, Arterial: 7.26  pH, Blood: x     /  pCO2: 50    /  pO2: 282   / HCO3: 22    / Base Excess: -4.7  /  SaO2: 99.9                I&O's Detail    10 Oct 2022 07:01  -  11 Oct 2022 07:00  --------------------------------------------------------  IN:    IV PiggyBack: 350 mL    Norepinephrine: 628.2 mL    Propofol: 83.7 mL  Total IN: 1061.9 mL    OUT:    Indwelling Catheter - Urethral (mL): 880 mL  Total OUT: 880 mL    Total NET: 181.9 mL          LABS                        11.0   29.72 )-----------( 141      ( 11 Oct 2022 00:30 )             37.2     10-11    138  |  103  |  96<H>  ----------------------------<  107<H>  4.6   |  21<L>  |  0.75    Ca    7.2<L>      11 Oct 2022 00:30  Phos  8.1     10-11  Mg     2.20     10-11    TPro  5.7<L>  /  Alb  2.4<L>  /  TBili  0.4  /  DBili  x   /  AST  168<H>  /  ALT  21  /  AlkPhos  439<H>  10-11    LIVER FUNCTIONS - ( 11 Oct 2022 00:30 )  Alb: 2.4 g/dL / Pro: 5.7 g/dL / ALK PHOS: 439 U/L / ALT: 21 U/L / AST: 168 U/L / GGT: x           PT/INR - ( 11 Oct 2022 00:30 )   PT: 21.1 sec;   INR: 1.81 ratio         PTT - ( 11 Oct 2022 00:30 )  PTT:33.1 sec          POCT Blood Glucose.: 144 mg/dL *H* (10-10-22 @ 23:27)  POCT Blood Glucose.: 42 mg/dL *LL* (10-10-22 @ 22:19)  POCT Blood Glucose.: 105 mg/dL *H* (10-10-22 @ 22:17)  POCT Blood Glucose.: 42 mg/dL *LL* (10-10-22 @ 22:15)  POCT Blood Glucose.: 99 mg/dL (10-10-22 @ 20:36)        MEDICATIONS  (STANDING):  bictegravir 50 mG/emtricitabine 200 mG/tenofovir alafenamide 25 mG (BIKTARVY) 1 Tablet(s) Oral daily  ceFAZolin   IVPB 2000 milliGRAM(s) IV Intermittent every 8 hours  chlorhexidine 0.12% Liquid 15 milliLiter(s) Oral Mucosa every 12 hours  chlorhexidine 2% Cloths 1 Application(s) Topical <User Schedule>  chlorhexidine 4% Liquid 1 Application(s) Topical <User Schedule>  enoxaparin Injectable 60 milliGRAM(s) SubCutaneous every 12 hours  lactulose Syrup 10 Gram(s) Oral daily  naloxegol 25 milliGRAM(s) Oral daily  norepinephrine Infusion 0.3 MICROgram(s)/kG/Min (33.8 mL/Hr) IV Continuous <Continuous>  piperacillin/tazobactam IVPB.- 3.375 Gram(s) IV Intermittent once  piperacillin/tazobactam IVPB.. 3.375 Gram(s) IV Intermittent every 8 hours  propofol Infusion 10 MICROgram(s)/kG/Min (3.6 mL/Hr) IV Continuous <Continuous>  senna 2 Tablet(s) Oral at bedtime    MEDICATIONS  (PRN):  fentaNYL    Injectable 50 MICROGram(s) IV Push every 2 hours PRN shortness of breath/or severe pain      Allergies:  ibuprofen (Angioedema)        CRITICAL CARE TIME SPENT: 40 minutes of critical care time spent providing medical care for patient's acute illness/conditions that impairs at least one vital organ system and/or poses a high risk of imminent or life threatening deterioration in the patient's condition. It includes time spent evaluating and treating the patient's acute illness as well as time spent reviewing labs, radiology, discussing goals of care with patient and/or patient's family, and discussing the case with a multidisciplinary team, in an effort to prevent further life threatening deterioration or end organ damage. This time is independent of any procedures performed.         Significant recent/past 24 hr events: RRT was called, for hypoxemia, AMS, hypotension, and hypoglycemia, s/p intubation, and vasopressors initiation, pt was transferred to MICU for further monitoring,   __ DNR MOLST form signed, Pt's mother states that she does not want escalation of care. pt's mother states that her daughter is flying in tomorrow from [place] and they planning to stop all medications, palliatively extubate pt, and comfort measures only     Subjective: pt cannot provide any history in the setting of cognitive impairment     Review of Systems         [ ] A ten-point review of systems was otherwise negative except as noted.  [ ] Due to altered mental status/intubation, subjective information were not able to be obtained from the patient. History was obtained, to the extent possible, from review of the chart and collateral sources of information.      Patient is a 32y old  Male who presents with a chief complaint of Shortness of breath (10 Oct 2022 07:20)    HPI:  31 M with PMH/ HIV, rectal cancer not on tx (followed up by Dr Frazier ([facility] oncology)), mets to liver and possibly bone, R eye cataract, who presented to the [facility] on 8/31/22 after syncopal episode at home. Pt with cough x 2 mos, with yellow sputum and shortness of breath since 5 days PTA.   -- at [facility]: pt was found to be hypoxemic. 8/31/22 -- L chest wall Pigtail placement. Pigtail was placed for suspected PMX with improvement in shortness of breath. CT chest was performed, which showed that pigtail is within the mass__ differential is TB, fungal infection, cavitary lesion, or squamous cell carcinoma. Pt was found to be anemic due to rectal bleeding with Hgb 5.9 s/p 2 U PRBC on 9/1. Blood cxs with MSSA bacteremia, and has been treated with Cefazolin and Zosyn.  Since 9/2 pt with worsening diffuse SC emphysema extending to the neck/face/all 4 extremities/scrotum/back , which has been getting progressively worse over past 2 days as per pt's statement. 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 TTE with two large RV masses and two additional small masses. 9/7-- s/p L lateral chest tube to 14 Fr. 9/7 -- s/p IVC Filter placement (as per [facility] statement, was placed prophylactically due to undiagnosed vegetations on the TTE. 12 cm multiloculated thick walled cavitary mass in the CHRISTINE and lingula.+ liver lesion on the CT a/p. + external iliac vein thrombosis, PE study was indeterminate   __ pt was transferred to [facility] as per family request (mother) for pt to be evaluated by his outpt oncology team (Dr Frazier). As per oncology team the plan is to start Carbotaxol q 3 weeks + RT for diffuse mets as per PET scan,  (08 Sep 2022 14:44)    PAST MEDICAL & SURGICAL HISTORY:  HIV infection  6/30/2022 virus detected, switched to Biktarvy, male partners      Rectal cancer  not on tretment      Right cataract      Current smoker      History of depression  and anxiety      No significant past surgical history        FAMILY HISTORY:      Vitals   ICU Vital Signs Last 24 Hrs  T(C): 37.4 (11 Oct 2022 04:00), Max: 37.4 (11 Oct 2022 04:00)  T(F): 99.3 (11 Oct 2022 04:00), Max: 99.3 (11 Oct 2022 04:00)  HR: 78 (11 Oct 2022 07:00) (60 - 79)  BP: 101/69 (11 Oct 2022 07:00) (86/61 - 114/74)  BP(mean): 76 (11 Oct 2022 07:00) (59 - 100)  ABP: --  ABP(mean): --  RR: 19 (11 Oct 2022 07:00) (11 - 20)  SpO2: 98% (11 Oct 2022 07:00) (95% - 100%)    O2 Parameters below as of 11 Oct 2022 07:00  Patient On (Oxygen Delivery Method): ventilator    O2 Concentration (%): 30        Physical Exam:   Constitutional: NAD, well-groomed, well-developed  HEENT: PERRLA, EOMI, no drainage or redness  Neck: supple,  No JVD, Trachea midline  Back: Normal spine flexure, No CVA tenderness, No deformity or limitation of movement  Respiratory: Breath Sounds equal & clear bilaterally to auscultation, no accessory muscle use noted  Cardiovascular: Regular rate, regular rhythm, normal S1, S2; no murmurs or rub  Gastrointestinal: Soft, non-tender, non distended, no hepatosplenomegaly, normal bowel sounds  Extremities: NEGRO x 4, no peripheral edema, no cyanosis, no clubbing   Vascular: Equal and normal pulses: 2+ peripheral pulses throughout  Neurological: sedated, unresponsive; no sensory, motor  deficits, normal reflexes  Psychiatric: unresponsive   Musculoskeletal: No joint swelling or deformity; no limitation of movement  Skin: warm, dry, well perfused, no rashes    VENT SETTINGS   Mode: AC/ CMV (Assist Control/ Continuous Mandatory Ventilation)  RR (machine): 20  TV (machine): 400  FiO2: 40  PEEP: 5  ITime: 0.8  MAP: 11  PIP: 25    ABG - ( 10 Oct 2022 21:14 )  pH, Arterial: 7.26  pH, Blood: x     /  pCO2: 50    /  pO2: 282   / HCO3: 22    / Base Excess: -4.7  /  SaO2: 99.9                I&O's Detail    10 Oct 2022 07:01  -  11 Oct 2022 07:00  --------------------------------------------------------  IN:    IV PiggyBack: 350 mL    Norepinephrine: 628.2 mL    Propofol: 83.7 mL  Total IN: 1061.9 mL    OUT:    Indwelling Catheter - Urethral (mL): 880 mL  Total OUT: 880 mL    Total NET: 181.9 mL          LABS                        11.0   29.72 )-----------( 141      ( 11 Oct 2022 00:30 )             37.2     10-11    138  |  103  |  96<H>  ----------------------------<  107<H>  4.6   |  21<L>  |  0.75    Ca    7.2<L>      11 Oct 2022 00:30  Phos  8.1     10-11  Mg     2.20     10-11    TPro  5.7<L>  /  Alb  2.4<L>  /  TBili  0.4  /  DBili  x   /  AST  168<H>  /  ALT  21  /  AlkPhos  439<H>  10-11    LIVER FUNCTIONS - ( 11 Oct 2022 00:30 )  Alb: 2.4 g/dL / Pro: 5.7 g/dL / ALK PHOS: 439 U/L / ALT: 21 U/L / AST: 168 U/L / GGT: x           PT/INR - ( 11 Oct 2022 00:30 )   PT: 21.1 sec;   INR: 1.81 ratio         PTT - ( 11 Oct 2022 00:30 )  PTT:33.1 sec          POCT Blood Glucose.: 144 mg/dL *H* (10-10-22 @ 23:27)  POCT Blood Glucose.: 42 mg/dL *LL* (10-10-22 @ 22:19)  POCT Blood Glucose.: 105 mg/dL *H* (10-10-22 @ 22:17)  POCT Blood Glucose.: 42 mg/dL *LL* (10-10-22 @ 22:15)  POCT Blood Glucose.: 99 mg/dL (10-10-22 @ 20:36)        MEDICATIONS  (STANDING):  bictegravir 50 mG/emtricitabine 200 mG/tenofovir alafenamide 25 mG (BIKTARVY) 1 Tablet(s) Oral daily  ceFAZolin   IVPB 2000 milliGRAM(s) IV Intermittent every 8 hours  chlorhexidine 0.12% Liquid 15 milliLiter(s) Oral Mucosa every 12 hours  chlorhexidine 2% Cloths 1 Application(s) Topical <User Schedule>  chlorhexidine 4% Liquid 1 Application(s) Topical <User Schedule>  enoxaparin Injectable 60 milliGRAM(s) SubCutaneous every 12 hours  lactulose Syrup 10 Gram(s) Oral daily  naloxegol 25 milliGRAM(s) Oral daily  norepinephrine Infusion 0.3 MICROgram(s)/kG/Min (33.8 mL/Hr) IV Continuous <Continuous>  piperacillin/tazobactam IVPB.- 3.375 Gram(s) IV Intermittent once  piperacillin/tazobactam IVPB.. 3.375 Gram(s) IV Intermittent every 8 hours  propofol Infusion 10 MICROgram(s)/kG/Min (3.6 mL/Hr) IV Continuous <Continuous>  senna 2 Tablet(s) Oral at bedtime    MEDICATIONS  (PRN):  fentaNYL    Injectable 50 MICROGram(s) IV Push every 2 hours PRN shortness of breath/or severe pain      Allergies:  ibuprofen (Angioedema)        CRITICAL CARE TIME SPENT: 40 minutes of critical care time spent providing medical care for patient's acute illness/conditions that impairs at least one vital organ system and/or poses a high risk of imminent or life threatening deterioration in the patient's condition. It includes time spent evaluating and treating the patient's acute illness as well as time spent reviewing labs, radiology, discussing goals of care with patient and/or patient's family, and discussing the case with a multidisciplinary team, in an effort to prevent further life threatening deterioration or end organ damage. This time is independent of any procedures performed.

## 2022-10-11 NOTE — PROCEDURE NOTE - NSPROCNAME_GEN_A_CORE
Arterial Puncture/Cannulation
Peripheral Line Insertion
Peripheral Line Insertion
Tracheal Intubation

## 2022-10-11 NOTE — PROCEDURE NOTE - NSINDICATIONS_GEN_A_CORE
vasopressors administration/emergency venous access
airway protection/critical patient/mental status change/respiratory distress
arterial puncture to obtain ABG's/blood sampling
vasopressors administration/emergency venous access

## 2022-10-11 NOTE — RAPID RESPONSE TEAM SUMMARY - NSSITUATIONBACKGROUNDRRT_GEN_ALL_CORE
31 M with untreated metastatic ano-rectal SCC (followed by Dr Frazier [facility] oncology) with mets to liver and possibly bone, HPV positive, HIV infection, and R eye cataracts who presented to the [facility] on 8/31/22 after syncopal episode at home and transferred to [facility] for continued of care per family request. Being treated for MSSA bacteremia. c/b scrotal swelling s/p coude catheter, hypercalcemia s/p pamidronate and IVF, and AFB from [place] w M chimaera not requiring any intervention. Patient is now undergoing trial of RT, did not tolerate first fraction 10/4 and didn't tolerate session on 10/5 even with increased pain meds before session. Pending hospice referral

## 2022-10-11 NOTE — PROCEDURE NOTE - NSPROCDETAILS_GEN_ALL_CORE
location identified, draped/prepped, sterile technique used/blood seen on insertion/dressing applied/flushes easily/secured in place/sterile technique, catheter placed
location identified, draped/prepped, sterile technique used/blood seen on insertion/dressing applied/flushes easily/secured in place/sterile technique, catheter placed
patient pre-oxygenated, tube inserted, placement confirmed

## 2022-10-12 LAB
ALBUMIN SERPL ELPH-MCNC: 1.6 G/DL — LOW (ref 3.3–5)
ALBUMIN SERPL ELPH-MCNC: 2 G/DL — LOW (ref 3.3–5)
ALP SERPL-CCNC: 380 U/L — HIGH (ref 40–120)
ALP SERPL-CCNC: 401 U/L — HIGH (ref 40–120)
ALT FLD-CCNC: 8 U/L — SIGNIFICANT CHANGE UP (ref 4–41)
ALT FLD-CCNC: 9 U/L — SIGNIFICANT CHANGE UP (ref 4–41)
ANION GAP SERPL CALC-SCNC: 18 MMOL/L — HIGH (ref 7–14)
ANION GAP SERPL CALC-SCNC: 18 MMOL/L — HIGH (ref 7–14)
APTT BLD: 36.9 SEC — HIGH (ref 27–36.3)
AST SERPL-CCNC: 139 U/L — HIGH (ref 4–40)
AST SERPL-CCNC: 139 U/L — HIGH (ref 4–40)
BASOPHILS # BLD AUTO: 0.11 K/UL — SIGNIFICANT CHANGE UP (ref 0–0.2)
BASOPHILS NFR BLD AUTO: 0.4 % — SIGNIFICANT CHANGE UP (ref 0–2)
BILIRUB SERPL-MCNC: 0.4 MG/DL — SIGNIFICANT CHANGE UP (ref 0.2–1.2)
BILIRUB SERPL-MCNC: 0.5 MG/DL — SIGNIFICANT CHANGE UP (ref 0.2–1.2)
BLD GP AB SCN SERPL QL: NEGATIVE — SIGNIFICANT CHANGE UP
BUN SERPL-MCNC: 112 MG/DL — HIGH (ref 7–23)
BUN SERPL-MCNC: SIGNIFICANT CHANGE UP MG/DL (ref 7–23)
CALCIUM SERPL-MCNC: 6.2 MG/DL — CRITICAL LOW (ref 8.4–10.5)
CALCIUM SERPL-MCNC: 6.4 MG/DL — CRITICAL LOW (ref 8.4–10.5)
CHLORIDE SERPL-SCNC: 106 MMOL/L — SIGNIFICANT CHANGE UP (ref 98–107)
CHLORIDE SERPL-SCNC: 108 MMOL/L — HIGH (ref 98–107)
CO2 SERPL-SCNC: 12 MMOL/L — LOW (ref 22–31)
CO2 SERPL-SCNC: 16 MMOL/L — LOW (ref 22–31)
CREAT SERPL-MCNC: 1.23 MG/DL — SIGNIFICANT CHANGE UP (ref 0.5–1.3)
CREAT SERPL-MCNC: 1.25 MG/DL — SIGNIFICANT CHANGE UP (ref 0.5–1.3)
EGFR: 77 ML/MIN/1.73M2 — SIGNIFICANT CHANGE UP
EGFR: 79 ML/MIN/1.73M2 — SIGNIFICANT CHANGE UP
EOSINOPHIL # BLD AUTO: 0 K/UL — SIGNIFICANT CHANGE UP (ref 0–0.5)
EOSINOPHIL NFR BLD AUTO: 0 % — SIGNIFICANT CHANGE UP (ref 0–6)
GLUCOSE SERPL-MCNC: 181 MG/DL — HIGH (ref 70–99)
GLUCOSE SERPL-MCNC: 59 MG/DL — LOW (ref 70–99)
HCT VFR BLD CALC: 39.1 % — SIGNIFICANT CHANGE UP (ref 39–50)
HGB BLD-MCNC: 11.3 G/DL — LOW (ref 13–17)
IANC: 22.45 K/UL — HIGH (ref 1.8–7.4)
IMM GRANULOCYTES NFR BLD AUTO: 5.3 % — HIGH (ref 0–0.9)
INR BLD: 1.99 RATIO — HIGH (ref 0.88–1.16)
LYMPHOCYTES # BLD AUTO: 10.8 % — LOW (ref 13–44)
LYMPHOCYTES # BLD AUTO: 3.1 K/UL — SIGNIFICANT CHANGE UP (ref 1–3.3)
MAGNESIUM SERPL-MCNC: 2.2 MG/DL — SIGNIFICANT CHANGE UP (ref 1.6–2.6)
MCHC RBC-ENTMCNC: 23.5 PG — LOW (ref 27–34)
MCHC RBC-ENTMCNC: 28.9 GM/DL — LOW (ref 32–36)
MCV RBC AUTO: 81.5 FL — SIGNIFICANT CHANGE UP (ref 80–100)
MONOCYTES # BLD AUTO: 1.5 K/UL — HIGH (ref 0–0.9)
MONOCYTES NFR BLD AUTO: 5.2 % — SIGNIFICANT CHANGE UP (ref 2–14)
NEUTROPHILS # BLD AUTO: 22.45 K/UL — HIGH (ref 1.8–7.4)
NEUTROPHILS NFR BLD AUTO: 78.3 % — HIGH (ref 43–77)
NRBC # BLD: 0 /100 WBCS — SIGNIFICANT CHANGE UP (ref 0–0)
NRBC # FLD: 0.02 K/UL — HIGH (ref 0–0)
PHOSPHATE SERPL-MCNC: 8.6 MG/DL — HIGH (ref 2.5–4.5)
PLATELET # BLD AUTO: 162 K/UL — SIGNIFICANT CHANGE UP (ref 150–400)
POTASSIUM SERPL-MCNC: 5.5 MMOL/L — HIGH (ref 3.5–5.3)
POTASSIUM SERPL-MCNC: 6 MMOL/L — HIGH (ref 3.5–5.3)
POTASSIUM SERPL-SCNC: 5.5 MMOL/L — HIGH (ref 3.5–5.3)
POTASSIUM SERPL-SCNC: 6 MMOL/L — HIGH (ref 3.5–5.3)
PROT SERPL-MCNC: 5.3 G/DL — LOW (ref 6–8.3)
PROT SERPL-MCNC: 5.7 G/DL — LOW (ref 6–8.3)
PROTHROM AB SERPL-ACNC: 23.3 SEC — HIGH (ref 10.5–13.4)
RBC # BLD: 4.8 M/UL — SIGNIFICANT CHANGE UP (ref 4.2–5.8)
RBC # FLD: 22.1 % — HIGH (ref 10.3–14.5)
RH IG SCN BLD-IMP: POSITIVE — SIGNIFICANT CHANGE UP
SODIUM SERPL-SCNC: 136 MMOL/L — SIGNIFICANT CHANGE UP (ref 135–145)
SODIUM SERPL-SCNC: 142 MMOL/L — SIGNIFICANT CHANGE UP (ref 135–145)
WBC # BLD: 28.69 K/UL — HIGH (ref 3.8–10.5)
WBC # FLD AUTO: 28.69 K/UL — HIGH (ref 3.8–10.5)

## 2022-10-12 PROCEDURE — 99233 SBSQ HOSP IP/OBS HIGH 50: CPT

## 2022-10-12 RX ORDER — INSULIN HUMAN 100 [IU]/ML
6 INJECTION, SOLUTION SUBCUTANEOUS ONCE
Refills: 0 | Status: COMPLETED | OUTPATIENT
Start: 2022-10-12 | End: 2022-10-12

## 2022-10-12 RX ORDER — CALCIUM GLUCONATE 100 MG/ML
1 VIAL (ML) INTRAVENOUS ONCE
Refills: 0 | Status: COMPLETED | OUTPATIENT
Start: 2022-10-12 | End: 2022-10-12

## 2022-10-12 RX ORDER — INSULIN HUMAN 100 [IU]/ML
5 INJECTION, SOLUTION SUBCUTANEOUS ONCE
Refills: 0 | Status: COMPLETED | OUTPATIENT
Start: 2022-10-12 | End: 2022-10-12

## 2022-10-12 RX ORDER — DEXTROSE 50 % IN WATER 50 %
25 SYRINGE (ML) INTRAVENOUS ONCE
Refills: 0 | Status: COMPLETED | OUTPATIENT
Start: 2022-10-12 | End: 2022-10-12

## 2022-10-12 RX ORDER — DEXTROSE 50 % IN WATER 50 %
50 SYRINGE (ML) INTRAVENOUS ONCE
Refills: 0 | Status: COMPLETED | OUTPATIENT
Start: 2022-10-12 | End: 2022-10-12

## 2022-10-12 RX ADMIN — INSULIN HUMAN 5 UNIT(S): 100 INJECTION, SOLUTION SUBCUTANEOUS at 02:58

## 2022-10-12 RX ADMIN — Medication 50 MILLILITER(S): at 02:58

## 2022-10-12 RX ADMIN — PIPERACILLIN AND TAZOBACTAM 25 GRAM(S): 4; .5 INJECTION, POWDER, LYOPHILIZED, FOR SOLUTION INTRAVENOUS at 05:18

## 2022-10-12 RX ADMIN — Medication 100 GRAM(S): at 09:03

## 2022-10-12 RX ADMIN — CHLORHEXIDINE GLUCONATE 1 APPLICATION(S): 213 SOLUTION TOPICAL at 06:27

## 2022-10-12 RX ADMIN — Medication 1 APPLICATION(S): at 05:21

## 2022-10-12 RX ADMIN — PROPOFOL 10.9 MICROGRAM(S)/KG/MIN: 10 INJECTION, EMULSION INTRAVENOUS at 09:03

## 2022-10-12 RX ADMIN — PIPERACILLIN AND TAZOBACTAM 25 GRAM(S): 4; .5 INJECTION, POWDER, LYOPHILIZED, FOR SOLUTION INTRAVENOUS at 14:50

## 2022-10-12 RX ADMIN — CHLORHEXIDINE GLUCONATE 15 MILLILITER(S): 213 SOLUTION TOPICAL at 05:25

## 2022-10-12 RX ADMIN — CHLORHEXIDINE GLUCONATE 1 APPLICATION(S): 213 SOLUTION TOPICAL at 05:19

## 2022-10-12 RX ADMIN — INSULIN HUMAN 6 UNIT(S): 100 INJECTION, SOLUTION SUBCUTANEOUS at 09:06

## 2022-10-12 RX ADMIN — HYDROMORPHONE HYDROCHLORIDE 0.5 MG/HR: 2 INJECTION INTRAMUSCULAR; INTRAVENOUS; SUBCUTANEOUS at 09:05

## 2022-10-12 RX ADMIN — ENOXAPARIN SODIUM 60 MILLIGRAM(S): 100 INJECTION SUBCUTANEOUS at 11:56

## 2022-10-12 RX ADMIN — Medication 33.8 MICROGRAM(S)/KG/MIN: at 09:02

## 2022-10-12 RX ADMIN — HYDROMORPHONE HYDROCHLORIDE 0.5 MG/HR: 2 INJECTION INTRAMUSCULAR; INTRAVENOUS; SUBCUTANEOUS at 09:20

## 2022-10-12 RX ADMIN — Medication 25 GRAM(S): at 09:06

## 2022-10-12 NOTE — PROGRESS NOTE ADULT - ASSESSMENT
Patient is a 31 M with untreated metastatic ano-rectal SCC (followed by Dr Frazier [facility] oncology) with mets to liver and possibly bone, HPV positive, HIV infection, and R eye cataracts who presented to the [facility] on 8/31/22 after syncopal episode at home and transferred to [facility] for continued of care per family request. Being treated for MSSA bacteremia. c/b scrotal swelling s/p coude catheter, hypercalcemia s/p pamidronate and IVF, and AFB from [place] w M chimaera not requiring any intervention. Patient is now undergoing trial of RT, did not tolerate first fraction 10/4 and didn't tolerate session on 10/5 even with increased pain meds before session. Pending hospice referral    #Neuro  AOx 3 at baseline  sedated on propofol, unresponsive,   -- discussed with mother__ as per discussion, keep pt comfortable    -- pt was on Dilaudid 4- 6 mg PO q 4 hrs PRN prior to intubation for chronic pain/ cancer pain__ continue Dilaudid gtt for pain     #Cardiovascular  Shock, undifferentiated, possibly aspiration PNA   - continue with levo, wean as tolerated, MAP > 65  -- on 0.52 mcg/kg/min  - repeat blood cx ordered  - f/u cortisol levels-- 116    Cardiac mass.   - TTE: two large RV masses and two additional small masses. IVC filter placed prophylactically for undiagnosed vegetations on the TTE.  - repeat TTE 9/9 limited study but demonstrated a mobile mass likely 2/2 tumour, thrombus, or vegetation   - repeat TTE 10/11-- unable to evaluate left ventricular systolic function.  Right ventricular enlargement with decreased right  ventricular systolic function.  Multiple large, mobile masses are visualized in the right ventricle.  One or more of the masses may involve the tricuspid valve leaflets (best seen in the RV inflow view).  Although the nature of these masses is uncertain, they may represent tumour,  thrombus, and/or vegetations. Estimated right ventricular systolic pressure equals 66 mm Hg, assuming right atrial pressure equals 10 mm Hg,  consistent with severe pulmonary hypertension.  - monitor on tele    #Respiratory  Rapid Response called on 10/10 for AMS and respiratory distress  -pt sedated and intubated (7.5mm)  - as per discussion with mother Sophie Schulte__ palliative extubation tomorrow   -chest XR post intubation -- ETT above the freddy, multilobar infiltrates, R>L     Cavitary lesion of lung.   -Patient presented to OSH with hypoxemia and L chest pigtail placed 8/31 due to suspected pneumothorax. Patient showed improvement in SOB and f/u CT chest performed which showed evidence of 12 cm multiloculated thick walled cavitary mass in the CHRISTINE and lingula.  - sputum culture 9/10 + mod klebsiella pneumoniae pansensitive  - blood Cx neg 9/8  - fungitell and crypto neg 9/10  - positive AFB in 1/3 sputum samples (sept 3) from [facility] with final speciation Mycobacterium chimaera (avium complex)    Subcutaneous emphysema.   -CT Chest 9/11 with evidence of persistent pneumomediastinum and extensive subcutaneous emphysema of the neck, chest, abdomen, pelvis, and extremities, including the scrotum.   - CANDIS per thoracic surgery    #GI/Nutrition  -NPO, no NGT placement as per [facility] discussion   --     #/Renal  No active issues  -- pedraza remains in place, which was placed by Urology at [facility]   _ -will remain in place __ as per discussion with mother, palliative extubation and comfort measures tomorrow     #Skin  No active issues    #ID  MSSA bacteremia and c/f MSSA PNA and IE   - Hx of MSSA bacteremia with blood cultures 8/31 positive for MSSA and repeat on 9/2 NGTD.  - s/p Zosyn (9/8 - 9/9)  - transitioned to Cefazolin 2GM q8h for 6 weeks (9/9-10/13)   -- 10/11 __ Cefazolin was switched to Zosyn _ for MSSA bacteremia coverage and possible aspiration PNA   -- blood cxs-- 9/8-- neg, 10/11 -- in lab  -- urine cx -- 9/9-- neg  -- pleural fluid cx-- staph Aureus  -- sputum cx 9/10 -- Klebsiella (sensitive to Cefazolin) AFB (9/26) -- neg     #Endocrine  Hypoglycemia  -was noted to be hypoglycemic (42)  during rapid response  -POCT 3x: 42>105>42>144>72>114   -placed on D 5 LR at 40 cc /hr   - -cont monitoring     #Hematologic/DVT ppx  Rectal cancer   - history of untreated HPV related rectal cancer with episode of rectal bleeding   - CT with evidence of liver and bone lesions  - Pt initially planned for RT-  5 fractions, w/ 1st fraction on 10/04 and 2nd fraction on 10/05, however pt did not tolerate both times d/t pain, despite receiving increased dose of Dilaudid prior to 2nd fraction  - per heme-onc, already discussed with family/pt of no more treatment being offered  - hospice referral placed 10/10      Neoplasm related pain.   - will hold oral pain regimen: -PO Methadone 5mg TID (QTC- 443 on 9/23),  Gabapentin 300mg TID, Dilaudid 4mg q4 PRN moderate pain, PO Dilaudid 6mg q4 PRN severe pain  - sedation as above for vent synchrony   -- -- continue Dilaudid gtt     Leukocytosis.   - WBC uptrending, WBC-- 21>29, LA_ 1.7, PCT-- 46, T max-- 99.3   - likely in the setting of septic shock/aspiration PNA, reactive 2/2 to underlying tumor burden vs hemoconcentration in the setting of poor po intake.  - f/u repeat blood cx -- in lab (10/11)  - 10/11- started zosyn in the interim for empiric coverage     Microcytic anemia.   - microcytic anemia with Hgb 9.4 and MCV 81, likely a mixed picture of microcytic and normocytic anemia.  - iron studies with low iron levels but low iron binding capacity likely indicating a mixed picture  - ferritin wnl   -- H/H-- 10/37, plts-- 141   - continue to monitor    #Ethics  full code but pending hospice referral (see note 10/7/22)  10/11 - __ DNR MOLST form signed, Pt's mother states that she does not want escalation of care. pt's mother states that her daughter is flying in tomorrow from [place] and they planning to stop all medications, palliatively extubate pt, and comfort measures only

## 2022-10-12 NOTE — PROGRESS NOTE ADULT - PROVIDER SPECIALTY LIST ADULT
Cardiology
Critical Care
Heme/Onc
Heme/Onc
Infectious Disease
Internal Medicine
MICU
Heme/Onc
Internal Medicine
Internal Medicine
MICU
Critical Care
Critical Care
Heme/Onc
Infectious Disease
Infectious Disease
Internal Medicine
MICU
Urology
Critical Care
Internal Medicine
MICU
MICU
Critical Care
Internal Medicine
MICU
Internal Medicine
Palliative Care
Internal Medicine
Palliative Care
Internal Medicine
Palliative Care
Internal Medicine

## 2022-10-12 NOTE — PROGRESS NOTE ADULT - CRITICAL CARE ATTENDING COMMENT
Patient is a 31 y o M w/ HIV/AIDS (on HAART), Metastatic Rectal Ca (Not yet on chemo) who was initially admitted to [facility] on 8/31 after presenting with productive cough for 2 months and then acute SOB x 5 days. L pigtail catheter was placed due to concern for L PTX. However, patient was found to have large CHRISTINE cavitary lesion instead, into which the pigtail had been placed.     Imaging notable for 8/31 CTH unremarkable, 8/31 CT C/A/P notable for large thick walled multiloculated CHRISTINE cavitary lesion, 10 cm ulcerated rectal mass inseperable from prostate and multiple necrotic liver mets and concern for L external iliac thrombosis. 9/6 CT chest which showed displaced pigtail catheter and worsening subcutaneous emphysema.     TTE notable for 2 large sperical masses in RV.    Sputm cultures from 9/1 grew MSSA and Blood cultures from 8/31 grew MSSA with blood cultures from 9/2 NGTD. Legionella negative. Sputum AFB negative. Fungitell negative.     Patient had been empirically on Zosyn, Cefazolin and Caspofungin (which was dc'ed).    On 9/7 patient had L pigtail replaced (currently 14F) as well as IVC filter placement.     #CHRISTINE cavitary lesion - Differential includes cavitary PNA (sputum and blood cultures positive for MSSA) vs malignancy given history of metastatic rectal CA vs atypical infection. Of note PET/CT in our system from 8/2022 with only few subcm nodules in L and GGO in R lung, making malignancy as etiology of cavitary lesion less likely.   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 Will consider removal and observation vs endobronchial valve placement. Will discuss with IP  - Pigtail to waterseal for now    #Concern for DVT - CT AP with concern for L external Iliac DVT. IVC filter placed at OSH for unclear reasons. No contraindication to AC  - f/u b/l LE duplex  - Heparin gtt for now    #Abnormal TTE - OSH TTE notable for 2 large sperical masses in RV. POCUS here with limited views due to severe diffuse subcutaneous emphysema but on limited subcostal views with abnormally thickened and heterogenous RV endocardium with areas that are independently mobile. Differential includes infectious vs malignancy vs thrombus  - Cardiac MRI for better characterization   - Heparin gtt  - Blood cultures    #Hypercalcemia - ?related to malignancy  - Check PTH, PTHrp, Vit D levels  - Trend  - IVF    #Metastatic rectal ca  - Onc consult    Shan Stout MD  Pulmonary & Critical Care
31 y o M w/ HIV/AIDS (on HAART), Metastatic Rectal Ca (Not yet on chemo) who was initially admitted to [facility] on 8/31 after presenting with productive cough for 2 months and then acute SOB x 5 days. L pigtail catheter was placed due to concern for L PTX but found to be in a large cavitary lesions. Patient was also found to have MSSA PNA, bacteremia, with possible endocarditis vs clot, hx of DVT, now on hep gtt, was initially held 2/2 to GIB but cbc is now stable. Course is complicated but massive subq emphysema with involvement of the scrotum, and now kelb growing in the sputum.     With flushes patient has 20 CC of purulent material from the chest tube as well as continuous flow of air. Still with main,. Mild improvement in subq air in the left scrotum.    # Cavitary lung lesion  # MSSA endocarditis, with MSSA pna and bacteremia  # Kleb pneumonia  # Anemia  # HIV  # Metastatic rectal ca  # Sub q emphysema  # Scrotal pain  # DVT  # TELMA  # Hypercalcemia  - C/W abx for MSSA endocarditis, kleb now in the sputum but sensitive to cefazolin, will need 6 weeks of abx  - Pending MRI, not a great candidate for JENNIFER. To characterize clot vs endocarditis.   - s/p chest tube flushing with thick secretions and now again an airleak. Will continue to chest tube as there is continued drainage with purulent material and air. When output improved and is no longer purulent will consider clamping trials. Flush chest tube BID.   - Urology for scrotal pain requiring pain medications. 2/2 to Air surrounding the scrotal space. Patient not tolerating compressive therapy around the scrotum.   - HIV meds per ID, monitor renal function  - Hyper calcemia s/p bisphosphonates, C/W IVF, s/p calcitonin and bisphosphonates.   - TELMA, pedraza in place, good urine output. IVF, monitor cr. On nephrotoxic meds. Cr lower today with IVF  - Palliative for pain meds and advance GOC. Patient agreeable. Full code for now. Plan for family meeting this week to discuss GOC.   - On hep gtt for DVT    D/W MICU ACP.
Agree with above. Seen and examined with team on rounds. Critically ill with large cavitary lesion in lungs, extensive SC air, metastatic cancer with widespread mets in liver and severe pain. Treating pain with dilaudid, on abx for positive U/A. Will check CD 4 count and consider treating for aspergillus in the lung. Chest tube in place in cavitary lesion. Also has multiple masses in the heart of unclear etiology. Likely related to cancer vs thrombus. Poor prognosis for functional recovery.
31 M with AIDS on HAART, metastatic rectal ca presented to outside hospital for sob and received L chest tube for ?ptx (in reality, likely was lung abscess), c/b MSSA pna, bacteremia, DVT, now with subcutaneous emphysema involving the scrotum, acute hypoxemic respiratory failure for klebsiella pneumonia    - left chest tube removed yesterday; chest xray today no ptx  - need to assess for BPF with serial xrays  - pain control for scrotal swelling, likely related to subcutaneous emphysema  - hypercalcemia slowly improving, may get repeat doses of bisphosphonates over time  - c/w broad abx for mssa in lung abscess  - on heparin gtt for dvt, had some blood in stool but no more this AM, h/h stable   - c/w HAART    Critically ill patient requiring frequent bedside visits with therapy changes.
31 y o M w/ HIV/AIDS (on HAART), Metastatic Rectal Ca (Not yet on chemo) who was initially admitted to [facility] on 8/31 after presenting with productive cough for 2 months and then acute SOB x 5 days. L pigtail catheter was placed due to concern for L PTX but found to be in a large cavitary lesions. Patient was also found to have MSSA PNA, bacteremia, with possible endocarditis vs clot, hx of DVT, now on hep gtt, was initially held 2/2 to GIB but cbc is now stable. Course is complicated but massive subq emphysema with involvement of the scrotum, and now kelb growing in the sputum.     Chest tube no longer draining purulent material. No change on CXR during clamping trial. Chest tube removed today. Will need serial CXR to assess for possible BPF leading to PTX. SVT overnight. improved with vagal maneuvers.     # Cavitary lung lesion  # MSSA endocarditis, with MSSA pna and bacteremia  # Kleb pneumonia  # Anemia  # HIV  # Metastatic rectal ca  # Sub q emphysema  # Scrotal pain  # DVT  # TELMA  # Hypercalcemia  # SVT  - C/W abx for MSSA endocarditis/lung abscess, will need 6 weeks total of abx  - Pending MRI, not a great candidate for JENNIFER. To characterize clot vs endocarditis.   - Chest tube removed today, pending repeat CXR.   - Urology for scrotal pain requiring pain medications. 2/2 to Air surrounding the scrotal space. Patient not tolerating compressive therapy around the scrotum.   - HIV meds per ID, monitor renal function  - Hyper calcemia s/p bisphosphonates, C/W IVF. improving. Repeat dose of bisophosphonate next week if persistent.   - TELMA, pedraza in place, good urine output. IVF, monitor cr. On nephrotoxic meds. Cr also improving.   - Small dark stool, monitor for further bleeding, H/H stable. No BM on narcotics, s/p relestor x2, c/w Movantik, Bowel regiment.    - Episode of SVT overnight, broke on own with valsalva, start low dose BB, if recurrent will consider Ep eval.   - Palliative for pain meds and advance GOC. Patient agreeable. Full code for now. S/P GOC meeting with Onc, MICU and Palliative care.   - DVT ppx/DVT- Will restart hep gtt if no PTX on chest xray.     D/W MICU ACP.
31 y o M w/ HIV/AIDS (on HAART), Metastatic Rectal Ca (Not yet on chemo) who was initially admitted to [facility] on 8/31 after presenting with productive cough for 2 months and then acute SOB x 5 days. L pigtail catheter was placed due to concern for L PTX but found to be in a large cavitary lesions. Patient was also found to have MSSA PNA, bacteremia, with possible endocarditis vs clot, hx of DVT, now on hep gtt, was initially held 2/2 to GIB but cbc is now stable. Course is complicated but massive subq emphysema with involvement of the scrotum, and now kelb growing in the sputum.     # Cavitary lung lesion  # MSSA endocarditis, with MSSA pna and bacteremia  # Kleb pneumonia  # Anemia  # HIV  # Metastatic rectal ca  # Sub q emphysema  # Scrotal pain  # DVT  # TELMA  # Hypercalcemia  - C/W abx for MSSA endocarditis, kleb now in the sputum but senstive to MSSA.   - Pending MRI, not a great candidate for JENNIFER. To characterize clot vs endocarditis.   - s/p chest tube flushing with thick secretions and now again an airleak. Will continue to chest tube as there is continued drainage with purulent material and air. When output improved and is no longer purulent will consider clamping trials. Flush chest tube BID.   - Urology for scrotal pain requiring pain medications. 2/2 to Air surrounding the scrotal space. Patient not tolerating compressive therapy around the scrotum.   - HIV meds per ID, monitor renal function  - Hyper calcemia s/p bisphosphonates, C/W IVF, s/p calcitonin and bisphosphonates.   - TELMA, pedraza in place, good urine output. IVF, monitor cr. On nephrotoxic meds.  - Pallative for pain meds and advance GOC. Patient agreeable. Full code for now  - On hep gtt for dvt    D/W MICU ACP.
32 year old man with HIV, metastatic rectal cancer admitted with MSSA pneumonia and bacteremia course complicated by pneumothorax and BPF now resolved, found to have masses in RV unclear if it is clots or tumors, external iliac clot and hypercalcemia of malignancy transferred back to MICU overnight after an RRT for hypoxic respiratory failure and hypotension now intubated and on vasopressors    - sedated for comfort and vent coordination will start low dose Dilaudid for pain control  - Shock liver   - on vasopressors  - planned for compassionate withdrawal of ventilator after family comes to visit     Prognosis guarded.
31 y o M w/ HIV/AIDS (on HAART), Metastatic Rectal Ca (Not yet on chemo) who was initially admitted to [facility] on 8/31 after presenting with productive cough for 2 months and then acute SOB x 5 days. L pigtail catheter was placed due to concern for L PTX but found to be in a large cavitary lesions. Patient was also found to have MSSA PNA, bacteremia, with possible endocarditis vs clot, hx of DVT, now on hep gtt, was initially held 2/2 to GIB but cbc is now stable. Course is complicated but massive subq emphysema with involvement of the scrotum, and now kelb growing in the sputum.     No longer having purulent drainage from the chest tube. Performing a clamping trial. GOC meeting performed with palliative care, MICU and Onc.     # Cavitary lung lesion  # MSSA endocarditis, with MSSA pna and bacteremia  # Kleb pneumonia  # Anemia  # HIV  # Metastatic rectal ca  # Sub q emphysema  # Scrotal pain  # DVT  # TELMA  # Hypercalcemia  - C/W abx for MSSA endocarditis, kleb now in the sputum but sensitive to cefazolin, will need 6 weeks of abx  - Pending MRI, not a great candidate for JENNIFER. To characterize clot vs endocarditis.   - Chest tube not clamped, will check CXR but given in the lung abscess not likely to change. Will hold A/C overnight and plan for chest tube removal with potential for possible PTX.   - Urology for scrotal pain requiring pain medications. 2/2 to Air surrounding the scrotal space. Patient not tolerating compressive therapy around the scrotum.   - HIV meds per ID, monitor renal function  - Hyper calcemia s/p bisphosphonates, C/W IVF. improving.   - TELMA, pedraza in place, good urine output. IVF, monitor cr. On nephrotoxic meds. Cr also improving.   - Palliative for pain meds and advance GOC. Patient agreeable. Full code for now. Plan for GOC meeting today.   - On hep gtt for DVT    D/W MICU ACP.
32 year old man with HIV, metastatic rectal cancer admitted with MSSA pneumonia and bacteremia course complicated by pneumothorax and BPF now resolved, found to have masses in RV unclear if it is clots or tumors, external iliac clot and hypercalcemia of malignancy transferred back to MICU overnight after an RRT for hypoxic respiratory failure and hypotension now intubated and on vasopressors    - sedated for comfort and vent coordination will start low dose Dilaudid for pain control  - NPO for now increase in LFTs ? shock liver will monitor  - adequate urine output will monitor 'lytes and creatinine  - ART on hold, no PO access  - patient was still on Cefazolin for MSSA bacteremia with worsening sepsis will broaden ABx coverage and follow up repeat culture  - shock most likely distributive will continue vasopressor support  - Hypoxia improved on ventilator will continue to adjust vent as needed    Prognosis guarded. Discussed with family MOLST form in chart awaiting arrival of more family for possible withdrawal.
Patient seen and examined at bedside with the MICU ACP. Agree with above.     31 y o M w/ HIV/AIDS (on HAART), Metastatic Rectal Ca (Not yet on chemo) who was initially admitted to [facility] on 8/31 after presenting with productive cough for 2 months and then acute SOB x 5 days. L pigtail catheter was placed due to concern for L PTX but found to be in a large cavitary lesions. Patient was also found to have MSSA PNA, bacteremia, with possible endocarditis vs clot, hx of DVT, now on hep gtt, was initially held 2/2 to GIB but cbc is now stable. Course is complicated but massive subq emphysema with involvement of the scrotum, and now kelb growing in the sputum.     # Cavitary lung lesion  # MSSA endocarditis, with MSSA pna and bacteremia  # Kleb pneumonia  # Anemia  # HIV  # Metastatic rectal ca  # Sub q emphysema  # Scrotal pain  # DVT  # TELMA  # Hypercalcemia  - C/W abx for MSSA endocarditis, kleb now in the sputum, chest tube now to waterseal without increase in size, no drainage.   - Pending MRI, not a great candidate for JENNIFER  - C/W chest tube to waterseal, will f/u CT surgery recs, no role for MIST, not an empyema. No air leak noted.  - Urology for scrotal pain requiring pain medications. 2/2 to Air surrounding the scrotal space. F/U scrotal US  - HIV meds per ID, monitor renal function  - Hyper calcemia s/p bisphosphonates, restart IVF  - TELMA, pedraza in place, good urine output. IVF, monitor cr. On nephrotoxic meds.  - Pallative for pain meds and advance GOC. Patient agreeable. Full code for now  - On hep gtt for dvt    D/W MICU ACP.

## 2022-10-12 NOTE — PROGRESS NOTE ADULT - SUBJECTIVE AND OBJECTIVE BOX
Significant recent/past 24 hr events:    Subjective:    Review of Systems         [ ] A ten-point review of systems was otherwise negative except as noted.  [ ] Due to altered mental status/intubation, subjective information were not able to be obtained from the patient. History was obtained, to the extent possible, from review of the chart and collateral sources of information.      Patient is a 32y old  Male who presents with a chief complaint of Shortness of breath (11 Oct 2022 07:45)    HPI:  31 M with PMH/ HIV, rectal cancer not on tx (followed up by Dr Frazier ([facility] oncology)), mets to liver and possibly bone, R eye cataract, who presented to the [facility] on 8/31/22 after syncopal episode at home. Pt with cough x 2 mos, with yellow sputum and shortness of breath since 5 days PTA.   -- at [facility]: pt was found to be hypoxemic. 8/31/22 -- L chest wall Pigtail placement. Pigtail was placed for suspected PMX with improvement in shortness of breath. CT chest was performed, which showed that pigtail is within the mass__ differential is TB, fungal infection, cavitary lesion, or squamous cell carcinoma. Pt was found to be anemic due to rectal bleeding with Hgb 5.9 s/p 2 U PRBC on 9/1. Blood cxs with MSSA bacteremia, and has been treated with Cefazolin and Zosyn.  Since 9/2 pt with worsening diffuse SC emphysema extending to the neck/face/all 4 extremities/scrotum/back , which has been getting progressively worse over past 2 days as per pt's statement. 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 TTE with two large RV masses and two additional small masses. 9/7-- s/p L lateral chest tube to 14 Fr. 9/7 -- s/p IVC Filter placement (as per [facility] statement, was placed prophylactically due to undiagnosed vegetations on the TTE. 12 cm multiloculated thick walled cavitary mass in the CHRISTINE and lingula.+ liver lesion on the CT a/p. + external iliac vein thrombosis, PE study was indeterminate   __ pt was transferred to [facility] as per family request (mother) for pt to be evaluated by his outpt oncology team (Dr Frazier). As per oncology team the plan is to start Carbotaxol q 3 weeks + RT for diffuse mets as per PET scan,  (08 Sep 2022 14:44)    PAST MEDICAL & SURGICAL HISTORY:  HIV infection  6/30/2022 virus detected, switched to Biktarvy, male partners      Rectal cancer  not on tretment      Right cataract      Current smoker      History of depression  and anxiety      No significant past surgical history        FAMILY HISTORY:      Vitals   ICU Vital Signs Last 24 Hrs  T(C): 37.1 (12 Oct 2022 04:00), Max: 38.1 (11 Oct 2022 16:00)  T(F): 98.8 (12 Oct 2022 04:00), Max: 100.6 (11 Oct 2022 16:00)  HR: 102 (12 Oct 2022 06:00) (77 - 102)  BP: 85/57 (12 Oct 2022 06:00) (84/55 - 108/47)  BP(mean): 63 (12 Oct 2022 06:00) (49 - 79)  ABP: --  ABP(mean): --  RR: 22 (12 Oct 2022 06:00) (19 - 22)  SpO2: 100% (12 Oct 2022 06:00) (97% - 100%)    O2 Parameters below as of 12 Oct 2022 06:00  Patient On (Oxygen Delivery Method): ventilator    O2 Concentration (%): 30        Physical Exam:   Constitutional: NAD, well-groomed, well-developed  HEENT: PERRLA, EOMI, no drainage or redness  Neck: supple,  No JVD, Trachea midline  Back: Normal spine flexure, No CVA tenderness, No deformity or limitation of movement  Respiratory: Breath Sounds equal & clear bilaterally to auscultation, no accessory muscle use noted  Cardiovascular: Regular rate, regular rhythm, normal S1, S2; no murmurs or rub  Gastrointestinal: Soft, non-tender, non distended, no hepatosplenomegaly, normal bowel sounds  Extremities: NEGRO x 4, no peripheral edema, no cyanosis, no clubbing   Vascular: Equal and normal pulses: 2+ peripheral pulses throughout  Neurological: A+O x 3; speech clear and intact; no sensory, motor  deficits, normal reflexes  Psychiatric: calm, normal mood, normal affect  Musculoskeletal: No joint swelling or deformity; no limitation of movement  Skin: warm, dry, well perfused, no rashes    VENT SETTINGS   Mode: AC/ CMV (Assist Control/ Continuous Mandatory Ventilation)  RR (machine): 22  TV (machine): 450  FiO2: 30  PEEP: 5  ITime: 0.88  MAP: 12  PIP: 31    ABG - ( 11 Oct 2022 12:24 )  pH, Arterial: 7.27  pH, Blood: x     /  pCO2: 41    /  pO2: 123   / HCO3: 19    / Base Excess: -7.7  /  SaO2: 99.0                I&O's Detail    10 Oct 2022 07:01  -  11 Oct 2022 07:00  --------------------------------------------------------  IN:    IV PiggyBack: 350 mL    Norepinephrine: 628.2 mL    Propofol: 83.7 mL  Total IN: 1061.9 mL    OUT:    Indwelling Catheter - Urethral (mL): 1005 mL  Total OUT: 1005 mL    Total NET: 56.9 mL      11 Oct 2022 07:01  -  12 Oct 2022 06:36  --------------------------------------------------------  IN:    dextrose 5% + lactated ringers: 840 mL    HYRDOmorphone: 9.5 mL    IV PiggyBack: 450 mL    Norepinephrine: 1377.9 mL    Propofol: 65.2 mL    Propofol: 227.9 mL  Total IN: 2970.5 mL    OUT:    Indwelling Catheter - Urethral (mL): 1115 mL  Total OUT: 1115 mL    Total NET: 1855.5 mL          LABS                        11.3   28.69 )-----------( 162      ( 12 Oct 2022 01:20 )             39.1     10-12    142  |  108<H>  |  112<H>  ----------------------------<  181<H>  5.5<H>   |  16<L>  |  1.25    Ca    6.2<LL>      12 Oct 2022 04:00  Phos  8.6     10-12  Mg     2.20     10-12    TPro  5.3<L>  /  Alb  2.0<L>  /  TBili  0.4  /  DBili  x   /  AST  139<H>  /  ALT  8   /  AlkPhos  380<H>  10-12    LIVER FUNCTIONS - ( 12 Oct 2022 04:00 )  Alb: 2.0 g/dL / Pro: 5.3 g/dL / ALK PHOS: 380 U/L / ALT: 8 U/L / AST: 139 U/L / GGT: x           PT/INR - ( 12 Oct 2022 01:20 )   PT: 23.3 sec;   INR: 1.99 ratio         PTT - ( 12 Oct 2022 01:20 )  PTT:36.9 sec          POCT Blood Glucose.: 160 mg/dL *H* (10-12-22 @ 05:16)  POCT Blood Glucose.: 251 mg/dL *H* (10-12-22 @ 03:13)  POCT Blood Glucose.: 84 mg/dL (10-12-22 @ 02:53)  POCT Blood Glucose.: 95 mg/dL (10-11-22 @ 22:55)  POCT Blood Glucose.: 111 mg/dL *H* (10-11-22 @ 17:28)  POCT Blood Glucose.: 114 mg/dL *H* (10-11-22 @ 12:14)  POCT Blood Glucose.: 140 mg/dL *H* (10-11-22 @ 09:10)  POCT Blood Glucose.: 72 mg/dL (10-11-22 @ 08:03)        MEDICATIONS  (STANDING):  calcium gluconate IVPB 1 Gram(s) IV Intermittent once  chlorhexidine 0.12% Liquid 15 milliLiter(s) Oral Mucosa every 12 hours  chlorhexidine 2% Cloths 1 Application(s) Topical <User Schedule>  chlorhexidine 4% Liquid 1 Application(s) Topical <User Schedule>  dextrose 50% Injectable 25 Gram(s) IV Push once  enoxaparin Injectable 60 milliGRAM(s) SubCutaneous every 12 hours  HYDROmorphone Infusion 0.5 mG/Hr (0.5 mL/Hr) IV Continuous <Continuous>  insulin regular  human recombinant. 6 Unit(s) IV Push once  norepinephrine Infusion 0.3 MICROgram(s)/kG/Min (33.8 mL/Hr) IV Continuous <Continuous>  petrolatum Ophthalmic Ointment 1 Application(s) Both EYES two times a day  piperacillin/tazobactam IVPB.. 3.375 Gram(s) IV Intermittent every 8 hours  propofol Infusion 30 MICROgram(s)/kG/Min (10.9 mL/Hr) IV Continuous <Continuous>    MEDICATIONS  (PRN):  fentaNYL    Injectable 50 MICROGram(s) IV Push every 2 hours PRN shortness of breath/or severe pain      Allergies:  ibuprofen (Angioedema)        CRITICAL CARE TIME SPENT:  minutes of critical care time spent providing medical care for patient's acute illness/conditions that impairs at least one vital organ system and/or poses a high risk of imminent or life threatening deterioration in the patient's condition. It includes time spent evaluating and treating the patient's acute illness as well as time spent reviewing labs, radiology, discussing goals of care with patient and/or patient's family, and discussing the case with a multidisciplinary team, in an effort to prevent further life threatening deterioration or end organ damage. This time is independent of any procedures performed.

## 2022-10-12 NOTE — DISCHARGE NOTE FOR THE EXPIRED PATIENT - HOSPITAL COURSE
32 year old man with HIV, metastatic rectal cancer to liver and bone, was followed up with Dr Frazier (oncology), but never started on a treatment possibly in the setting of being poor candidate for chemo or radiation tx,  transferred from [facility] where he was admitted with MSSA pneumonia and bacteremia. Pt had R chest tube placed at the [facility] for L pleural effusion, which was then found to be cavitary lesion of the lung, Course complicated by pneumothorax, and L Chest tube was upsized at [facility]. The hospital course was further c/b diffuse subcutaneous emphysema, including neck, face, and all 4 extremities, severe scrotal SC emphysema, and BPF, now resolved, found to have masses in RV unclear if it were clots or tumors, or vegetation. External iliac clot and hypercalcemia of malignancy. pt was treated for MSSA bacteremia, L chest tube was successfully removed, and pt SX emphysema improved. pt was transferred to medical floor. RRT was called on a medical floor and pt was transferred back to MICU overnight after an RRT for hypoxic respiratory failure, AMS, hypotension, and hypoglycemia. Pt was intubated and started on vasopressors on the setting of possible aspiration PNA. Pt was started on Zosyn for septic shock and possible aspiration PNA.   _ -after discussion with mother Ms Sophie Schulte, as per mother's wishes, pt DNR  __ today pt was palliatively extubated, as per mother's wishes, comfort measures only and no further vasopressors, Abx, tube feeds,     __ Pt without spontaneous breathing. Pt examined. Pt without spontaneous breathing, No S1, no S 2, no repsonse      32 year old man with HIV, metastatic rectal cancer to liver and bone, was followed up with Dr Frazier (oncology), but never started on a treatment possibly in the setting of being poor candidate for chemo or radiation tx,  transferred from [facility] where he was admitted with MSSA pneumonia and bacteremia. Pt had R chest tube placed at the [facility] for L pleural effusion, which was then found to be cavitary lesion of the lung, Course complicated by pneumothorax, and L Chest tube was upsized at [facility]. The hospital course was further c/b diffuse subcutaneous emphysema, including neck, face, and all 4 extremities, severe scrotal SC emphysema, and BPF, now resolved, found to have masses in RV unclear if it were clots or tumors, or vegetation. External iliac clot and hypercalcemia of malignancy. pt was treated for MSSA bacteremia, L chest tube was successfully removed, and pt SX emphysema improved. pt was transferred to medical floor. RRT was called on a medical floor and pt was transferred back to MICU overnight after an RRT for hypoxic respiratory failure, AMS, hypotension, and hypoglycemia. Pt was intubated and started on vasopressors on the setting of possible aspiration PNA. Pt was started on Zosyn for septic shock and possible aspiration PNA.   _ -after discussion with mother Ms Sophie Schulte, as per mother's wishes, pt DNR  __ today pt was palliatively extubated, as per mother's wishes, comfort measures only and no further vasopressors, Abx, tube feeds,     __ Pt without spontaneous breathing. Pt examined. Pt without spontaneous breathing, No S1, no S 2, no response to painful stimuli. Pupils are fixed and non reactive to light. Pt was pronounced at 16:47.   __ Mother Sophie Schulte, pt's sister Charlene, pt's Brother Marco (half-brother), and Marco's mother are at the bedside. As per mother's wishes, no autopsy.      32 year old man with HIV, metastatic rectal cancer to liver and bone, was followed up with Dr Frazier (oncology), but never started on a treatment possibly in the setting of being poor candidate for chemo or radiation tx,  transferred from [facility] where he was admitted with MSSA pneumonia and bacteremia. Pt had R chest tube placed at the [facility] for L pleural effusion, which was then found to be cavitary lesion of the lung, Course complicated by pneumothorax, and L Chest tube was upsized at [facility]. The hospital course was further c/b diffuse subcutaneous emphysema, including neck, face, and all 4 extremities, severe scrotal SC emphysema, and BPF, now resolved, found to have masses in RV unclear if those were clots or tumors, or vegetation. External iliac clot and hypercalcemia of malignancy. pt was treated for MSSA bacteremia, L chest tube was successfully removed, and pt SX emphysema improved. pt was transferred to medical floor. RRT was called on a medical floor and pt was transferred back to MICU overnight after an RRT for hypoxic respiratory failure, AMS, hypotension, and hypoglycemia. Pt was intubated and started on vasopressors on the setting of possible aspiration PNA. Pt was started on Zosyn for septic shock and possible aspiration PNA.   _ -after discussion with mother Ms Sophie Schulte, as per mother's wishes, pt DNR  __ today pt was palliatively extubated, as per mother's ans sister's wishes, comfort measures only and no further vasopressors, Abx, tube feeds,     __ Pt without spontaneous breathing. Pt examined. Pt without spontaneous breathing, No S1, no S 2, no response to painful stimuli. Pupils are fixed and non reactive to light. Pt was pronounced at 16:47.   __ Mother Sophie Schulte, pt's sister Charlene, pt's Brother Marco (half-brother), and Marco's mother are at the bedside. As per mother's wishes, no autopsy.

## 2022-10-12 NOTE — PROGRESS NOTE ADULT - NUTRITIONAL ASSESSMENT
This patient has been assessed with a concern for Malnutrition and has been determined to have a diagnosis/diagnoses of Severe protein-calorie malnutrition and Underweight (BMI < 19).    This patient is being managed with:   Diet NPO after Midnight-     NPO Start Date: 15-Sep-2022   NPO Start Time: 23:59  Except Medications  Entered: Sep 15 2022  5:07PM    Diet Regular-  Supplement Feeding Modality:  Oral  Ensure Enlive Cans or Servings Per Day:  4       Frequency:  Daily  Entered: Sep 10 2022  4:06PM    
This patient has been assessed with a concern for Malnutrition and has been determined to have a diagnosis/diagnoses of Severe protein-calorie malnutrition and Underweight (BMI < 19).    This patient is being managed with:   Diet Regular-  Supplement Feeding Modality:  Oral  Ensure Enlive Cans or Servings Per Day:  4       Frequency:  Daily  Entered: Sep 10 2022  4:06PM    
This patient has been assessed with a concern for Malnutrition and has been determined to have a diagnosis/diagnoses of Severe protein-calorie malnutrition and Underweight (BMI < 19).    This patient is being managed with:   Diet NPO after Midnight-     NPO Start Date: 11-Sep-2022   NPO Start Time: 23:59  Entered: Sep 11 2022  9:48PM    Diet Regular-  Supplement Feeding Modality:  Oral  Ensure Enlive Cans or Servings Per Day:  4       Frequency:  Daily  Entered: Sep 10 2022  4:06PM    
This patient has been assessed with a concern for Malnutrition and has been determined to have a diagnosis/diagnoses of Severe protein-calorie malnutrition and Underweight (BMI < 19).    This patient is being managed with:   Diet Regular-  Supplement Feeding Modality:  Oral  Ensure Enlive Cans or Servings Per Day:  4       Frequency:  Daily  Entered: Sep 10 2022  4:06PM    
This patient has been assessed with a concern for Malnutrition and has been determined to have a diagnosis/diagnoses of Severe protein-calorie malnutrition and Underweight (BMI < 19).    This patient is being managed with:   Diet Regular-  Supplement Feeding Modality:  Oral  Ensure Enlive Cans or Servings Per Day:  4       Frequency:  Daily  Entered: Sep 10 2022  4:06PM    
This patient has been assessed with a concern for Malnutrition and has been determined to have a diagnosis/diagnoses of Severe protein-calorie malnutrition and Underweight (BMI < 19).      
This patient has been assessed with a concern for Malnutrition and has been determined to have a diagnosis/diagnoses of Severe protein-calorie malnutrition and Underweight (BMI < 19).    This patient is being managed with:   Diet NPO-  Entered: Oct 11 2022  3:02PM    
This patient has been assessed with a concern for Malnutrition and has been determined to have a diagnosis/diagnoses of Severe protein-calorie malnutrition and Underweight (BMI < 19).    This patient is being managed with:   Diet Regular-  Supplement Feeding Modality:  Oral  Ensure Enlive Cans or Servings Per Day:  4       Frequency:  Daily  Entered: Sep 10 2022  4:06PM    
This patient has been assessed with a concern for Malnutrition and has been determined to have a diagnosis/diagnoses of Severe protein-calorie malnutrition and Underweight (BMI < 19).    This patient is being managed with:   Diet NPO after Midnight-     NPO Start Date: 21-Sep-2022   NPO Start Time: 23:59  Except Medications  Entered: Sep 21 2022  2:06PM    Diet Regular-  Supplement Feeding Modality:  Oral  Ensure Enlive Cans or Servings Per Day:  4       Frequency:  Daily  Entered: Sep 10 2022  4:06PM    
This patient has been assessed with a concern for Malnutrition and has been determined to have a diagnosis/diagnoses of Severe protein-calorie malnutrition and Underweight (BMI < 19).    This patient is being managed with:   Diet Regular-  Supplement Feeding Modality:  Oral  Ensure Enlive Cans or Servings Per Day:  4       Frequency:  Daily  Entered: Sep 10 2022  4:06PM    

## 2022-10-12 NOTE — PROGRESS NOTE ADULT - REASON FOR ADMISSION

## 2022-10-12 NOTE — DISCHARGE NOTE FOR THE EXPIRED PATIENT - SECONDARY DIAGNOSIS.
Cavitary lesion of lung Pain from bone metastases MSSA bacteremia Cardiac mass Thrombophlebitis of left external iliac vein Malignant neoplasm of rectum metastatic to liver Septic shock Hypercalcemia of malignancy Rectal cancer metastatic to bone HIV disease

## 2022-10-13 ENCOUNTER — NON-APPOINTMENT (OUTPATIENT)
Age: 32
End: 2022-10-13

## 2022-10-16 LAB
CULTURE RESULTS: SIGNIFICANT CHANGE UP
CULTURE RESULTS: SIGNIFICANT CHANGE UP
SPECIMEN SOURCE: SIGNIFICANT CHANGE UP
SPECIMEN SOURCE: SIGNIFICANT CHANGE UP

## 2022-11-16 LAB
CULTURE RESULTS: SIGNIFICANT CHANGE UP
SPECIMEN SOURCE: SIGNIFICANT CHANGE UP

## 2023-03-11 NOTE — PROGRESS NOTE ADULT - PROBLEM SELECTOR PLAN 10
1. The severity of signs/symptoms.(See ED/admit documents) Patient with history of HIV and follows with Dr. Marcial in clinic.  - CD4 392 and viral load < 30 on 9/2/22  - Viral load undetectable 9/8  - c/w home Biktarvy

## 2023-09-27 NOTE — PROGRESS NOTE ADULT - PROBLEM/PLAN-8
DISPLAY PLAN FREE TEXT
No
DISPLAY PLAN FREE TEXT

## 2025-02-19 NOTE — PROGRESS NOTE ADULT - PROBLEM SELECTOR PLAN 12
148 SVT events x 2 9/15 and 9/16 resolved with vagal maneuvers. Repeat SVT 9/25 with 9 beats of V tach and 13 beats V tach asymptomatic.  - inc metoprolol to 50 BID with improved HR 90s-low 100; however, often held in the setting of hypotension  - consider EP eval if persists

## 2025-05-09 NOTE — PROGRESS NOTE ADULT - PROBLEM SELECTOR PROBLEM 9
Subjective       Koffi Melo is a 60 y.o. male.     Chief Complaint   Patient presents with    Cough    Generalized Body Aches    Fever    Diarrhea    Fatigue    Sore Throat       History obtained from the patient.      URI   This is a new problem. Episode onset: 7-9 days. The problem has been gradually worsening. Maximum temperature: tactile. Associated symptoms include coughing (wet, productive of brown sputum, no hemoptysis), diarrhea, neck pain (mild) and wheezing. Pertinent negatives include no abdominal pain, chest pain, congestion, ear pain, headaches, joint pain, joint swelling, nausea, plugged ear sensation, rash, rhinorrhea, sinus pain, sneezing, sore throat, swollen glands or vomiting. Associated symptoms comments: No loss of taste or smell  .      There is no known exposure to Influenza, COVID-19, RSV, or mono.  His daughter has recently been diagnosed with Strep.  He states his son also has a sinus infection.        The following portions of the patient's history were reviewed and updated as appropriate: allergies, current medications, past family history, past medical history, past social history, past surgical history, and problem list.      Review of Systems   Constitutional:  Positive for chills, fatigue and fever.   HENT:  Negative for congestion, ear pain, rhinorrhea, sinus pressure, sinus pain, sneezing and sore throat.    Respiratory:  Positive for cough (wet, productive of brown sputum, no hemoptysis) and wheezing. Negative for shortness of breath.    Cardiovascular:  Negative for chest pain.   Gastrointestinal:  Positive for diarrhea. Negative for abdominal pain, nausea and vomiting.   Musculoskeletal:  Positive for myalgias and neck pain (mild). Negative for arthralgias, joint pain and joint swelling.   Skin:  Negative for rash.   Neurological:  Negative for headaches.   Hematological:  Negative for adenopathy.           Objective     Blood pressure 118/64, pulse 80, temperature  98 °F (36.7 °C), temperature source Temporal, resp. rate 18, weight 98.1 kg (216 lb 6 oz).    Physical Exam  Vitals and nursing note reviewed.   Constitutional:       Appearance: He is well-developed.      Comments: BMI greater than 30   HENT:      Head: Normocephalic and atraumatic.      Comments: No maxillary or frontal sinus tenderness to palpation.     Right Ear: Tympanic membrane, ear canal and external ear normal.      Left Ear: Tympanic membrane, ear canal and external ear normal.      Mouth/Throat:      Mouth: Mucous membranes are moist. No oral lesions.      Pharynx: Oropharynx is clear.      Comments: Tonsils normal.  Eyes:      Conjunctiva/sclera: Conjunctivae normal.   Cardiovascular:      Rate and Rhythm: Normal rate and regular rhythm.      Heart sounds: Normal heart sounds. No murmur heard.  Pulmonary:      Effort: Pulmonary effort is normal.      Breath sounds: Normal breath sounds.   Musculoskeletal:      Cervical back: Normal range of motion and neck supple.   Lymphadenopathy:      Cervical: No cervical adenopathy.   Skin:     Findings: No rash.   Neurological:      Mental Status: He is alert.   Psychiatric:         Mood and Affect: Mood normal.         Results for orders placed or performed in visit on 05/09/25   POCT SARS-CoV-2 + Flu Antigen ADAN    Collection Time: 05/09/25  1:56 PM    Specimen: Swab   Result Value Ref Range    SARS Antigen Detected (A) Not Detected, Presumptive Negative    Influenza A Antigen ADAN Not Detected Not Detected    Influenza B Antigen ADAN Not Detected Not Detected    Internal Control Passed Passed    Lot Number [id number]     Expiration Date 02/07/2026    POC Rapid Strep A    Collection Time: 05/09/25  1:57 PM    Specimen: Swab   Result Value Ref Range    Rapid Strep A Screen Negative Negative, VALID, INVALID, Not Performed    Internal Control Passed Passed    Lot Number [id number]     Expiration Date 04/24/2026        Assessment & Plan   Diagnoses and all orders for this  visit:    1. COVID-19 virus infection (Primary)  -     methylPREDNISolone (MEDROL) 4 MG dose pack; Take as directed on package instructions.  Dispense: 1 each; Refill: 0   Recommended Tylenol, Ibuprofen, and plenty of fluids.     The patient is out of the treatment window for Paxlovid.    I did recommend COVID-19 vaccine in Fall 2025.    2. Acute cough  -     POCT SARS-CoV-2 + Flu Antigen ADAN  -     POC Rapid Strep A  May take Mucinex for cough and congestion.      Return if symptoms worsen or fail to improve.              SVT (supraventricular tachycardia) Hypercalcemia of malignancy

## (undated) DEVICE — MEDICATION LABELS W MARKER

## (undated) DEVICE — LUBRICATING JELLY ONESHOT 1.25OZ

## (undated) DEVICE — GLV 7 PROTEXIS (WHITE)

## (undated) DEVICE — DRSG COMBINE 5X9"

## (undated) DEVICE — POSITIONER FOAM EGG CRATE ULNAR 2PCS (PINK)

## (undated) DEVICE — SOL IRR POUR H2O 250ML

## (undated) DEVICE — SUT CHROMIC 3-0 30" V-20

## (undated) DEVICE — PACK MINOR

## (undated) DEVICE — ELCTR BOVIE PENCIL SMOKE EVACUATION

## (undated) DEVICE — SUT POLYSORB 3-0 30" V-20 UNDYED

## (undated) DEVICE — PREP BETADINE SPONGE STICKS

## (undated) DEVICE — DRAPE INSTRUMENT POUCH 6.75" X 11"

## (undated) DEVICE — VENODYNE/SCD SLEEVE CALF MEDIUM

## (undated) DEVICE — POSITIONER PATIENT SAFETY STRAP 3X60"

## (undated) DEVICE — SPECIMEN CONTAINER 100ML

## (undated) DEVICE — TAPE SILK 3"

## (undated) DEVICE — SOL IRR POUR NS 0.9% 500ML

## (undated) DEVICE — DRAPE THYROID 77" X 123"

## (undated) DEVICE — WARMING BLANKET UPPER ADULT

## (undated) DEVICE — DRAPE TOWEL BLUE 17" X 24"